# Patient Record
Sex: FEMALE | Race: BLACK OR AFRICAN AMERICAN | Employment: UNEMPLOYED | URBAN - METROPOLITAN AREA
[De-identification: names, ages, dates, MRNs, and addresses within clinical notes are randomized per-mention and may not be internally consistent; named-entity substitution may affect disease eponyms.]

---

## 2017-01-17 ENCOUNTER — OFFICE VISIT (OUTPATIENT)
Dept: INTERNAL MEDICINE CLINIC | Age: 62
End: 2017-01-17

## 2017-01-17 VITALS
TEMPERATURE: 98.6 F | DIASTOLIC BLOOD PRESSURE: 68 MMHG | RESPIRATION RATE: 18 BRPM | HEART RATE: 68 BPM | SYSTOLIC BLOOD PRESSURE: 136 MMHG | OXYGEN SATURATION: 97 % | HEIGHT: 65 IN | BODY MASS INDEX: 29.69 KG/M2 | WEIGHT: 178.2 LBS

## 2017-01-17 DIAGNOSIS — I10 ESSENTIAL HYPERTENSION: Primary | ICD-10-CM

## 2017-01-17 DIAGNOSIS — M33.20 POLYMYOSITIS (HCC): ICD-10-CM

## 2017-01-17 DIAGNOSIS — Z99.89 OSA ON CPAP: ICD-10-CM

## 2017-01-17 DIAGNOSIS — C64.1 MALIGNANT NEOPLASM OF RIGHT KIDNEY (HCC): ICD-10-CM

## 2017-01-17 DIAGNOSIS — G47.33 OSA ON CPAP: ICD-10-CM

## 2017-01-17 RX ORDER — AZATHIOPRINE 50 MG/1
TABLET ORAL
Refills: 3 | COMMUNITY
Start: 2017-01-14 | End: 2017-05-22

## 2017-01-17 RX ORDER — PREDNISONE 5 MG/1
TABLET ORAL
Refills: 3 | COMMUNITY
Start: 2016-12-28 | End: 2020-05-27

## 2017-01-17 RX ORDER — LOSARTAN POTASSIUM 100 MG/1
TABLET ORAL
Refills: 6 | COMMUNITY
Start: 2016-12-18 | End: 2017-08-22 | Stop reason: SDUPTHER

## 2017-01-17 RX ORDER — FLUTICASONE PROPIONATE 50 MCG
SPRAY, SUSPENSION (ML) NASAL
Refills: 12 | COMMUNITY
Start: 2016-12-20 | End: 2017-11-30 | Stop reason: SDUPTHER

## 2017-01-17 NOTE — MR AVS SNAPSHOT
Visit Information Date & Time Provider Department Dept. Phone Encounter #  
 1/17/2017  3:00 PM Nya Dueñas MD [facility] [phone] [id number] Follow-up Instructions Return in about 1 month (around 2/17/2017) for follow-up blood pressure, labs. Your Appointments 2/2/2017 10:20 AM  
Any with Lester Sanchez MD  
[address] [facility] ([address]) Appt Note: 1st adherence visit; pt r/s; pt r/s  
 [address] [facility] [address] [address] [facility] [address] 2/15/2017  2:40 PM  
Follow Up with Valentine Alvares MD  
[facility] at [facility] [address]) Appt Note: F/U numbness,CP,$60,adt,11/2/2016  
 [address] [facility], 
[address] [place] [address], [address] [id number]  
[address], [address] [place] [address] [address], [address] [address] Upcoming Health Maintenance Date Due Hepatitis C Screening 1955 Pneumococcal 19-64 Highest Risk (1 of 3 - PCV13) 9/7/1974 DTaP/Tdap/Td series (1 - Tdap) 9/7/1976 PAP AKA CERVICAL CYTOLOGY 9/7/1976 FOBT Q 1 YEAR AGE 50-75 9/7/2005 ZOSTER VACCINE AGE 60> 9/7/2015 INFLUENZA AGE 9 TO ADULT 8/1/2016 BREAST CANCER SCRN MAMMOGRAM 9/12/2018 Allergies as of 1/17/2017  Review Complete On: 1/17/2017 By: Sam Camacho Severity Noted Reaction Type Reactions Lisinopril  05/02/2016    Other (comments) Metoprolol  07/28/2016    Other (comments)  
 hallucinations Peanut  05/02/2016    Other (comments) Current Immunizations  Reviewed on 5/2/2016 Name Date Influenza Vaccine 10/15/2015 TB Skin Test (PPD) 1/1/2012 Not reviewed this visit You Were Diagnosed With   
  
 Codes Comments Essential hypertension    -  Primary ICD-10-CM: I10 
ICD-9-CM: 401.9 Polymyositis ([facility] [address]     ICD-10-CM: M33.20 ICD-9-CM: 710.4 Malignant neoplasm of right kidney [facility])     ICD-10-CM: C64.1 ICD-9-CM: 189. 0 Vitals BP Pulse Temp Resp Height(growth percentile) Weight(growth percentile) 136/68 (BP 1 Location: Left arm, BP Patient Position: Sitting) 68 98.6 °F (37 °C) (Oral) 18 5' 5\" (1.651 m) 178 lb 3.2 oz (80.8 kg) SpO2 BMI OB Status Smoking Status 97% 29.65 kg/m2 Hysterectomy Former Smoker BMI and BSA Data Body Mass Index Body Surface Area  
 29.65 kg/m 2 1.92 m 2 Preferred Pharmacy Pharmacy Name Phone [facility] [address] [phone] Your Updated Medication List  
  
   
This list is accurate as of: 1/17/17  3:33 PM.  Always use your most recent med list.  
  
  
  
  
 alendronate 70 mg tablet Commonly known as:  FOSAMAX Take  by mouth. azaTHIOprine 50 mg tablet Commonly known as:  IMURAN  
TAKE THREE TABLET BY MOUTH DAILY  
  
 bumetanide 0.5 mg tablet Commonly known as:  Marisue Days Take  by mouth daily. fluticasone 50 mcg/actuation nasal spray Commonly known as:  Anyi Gallia INHALE 2 SPRAYS IN EACH NOSTRIL AT BEDTIME  
  
 losartan 100 mg tablet Commonly known as:  COZAAR  
TAKE ONE TABLET BY MOUTH DAILY predniSONE 5 mg tablet Commonly known as:  DELTASONE  
TAKE THREE TABLETS BY MOUTH EVERY DAY We Performed the Following METABOLIC PANEL, BASIC [53977 CPT(R)] REFERRAL TO NEUROLOGY [NYW16 Custom] Comments:  
 Please evaluate patient for polymyositis, muscle weakness. REFERRAL TO RHEUMATOLOGY [CWX51 Custom] Comments:  
 Please evaluate patient for polymyositis. REFERRAL TO UROLOGY [[id number] Custom] Comments:  
 Please evaluate patient for history of kidney cancer. Follow-up Instructions Return in about 1 month (around 2/17/2017) for follow-up blood pressure, labs. Referral Information Referral ID Referred By Referred To  
  
 [id number] Humble Oropeza MD   
   [address] Phone: [phone] Fax: [phone] Visits Status Start Date End Date 1 New Request 1/17/17 1/17/18 If your referral has a status of pending review or denied, additional information will be sent to support the outcome of this decision. Referral ID Referred By Referred To  
 [id number] Pablo Bashir MD  
   [facility] [address] [place], [address] [facility] Phone: [phone] Fax: [phone] Visits Status Start Date End Date 1 New Request 1/17/17 1/17/18 If your referral has a status of pending review or denied, additional information will be sent to support the outcome of this decision. Referral ID Referred By Referred To  
 [id number] Will Cipro Not Available Visits Status Start Date End Date 1 New Request 1/17/17 1/17/18 If your referral has a status of pending review or denied, additional information will be sent to support the outcome of this decision. Patient Instructions Polymyositis and Dermatomyositis: Care Instructions Your Care Instructions Polymyositis and dermatomyositis are problems with your immune system. Polymyositis attacks your muscles. Dermatomyositis attacks your skin and muscles. These problems affect people in different ways. Most people have muscle weakness. Some people also have swelling or pain. Others get a rash. Your symptoms depend on the muscles that are affected. It may be hard to swallow if your throat muscles are affected. And if your chest muscles are affected, it could be hard to breathe. Medicines can help with swelling. They may also help control your immune system. Physical therapy and other exercises can also help. Follow-up care is a key part of your treatment and safety.  Be sure to make and go to all appointments, and call your doctor if you are having problems. It's also a good idea to know your test results and keep a list of the medicines you take. How can you care for yourself at home? · You may get different medicines. Some may be for pain and swelling. Others may be for a rash. You may get some to control your immune system. · Take your medicines exactly as prescribed. Call your doctor if you think you are having a problem with your medicine. · Go to physical therapy. It can help keep your muscles strong and flexible. You also may get exercises from a doctor who specializes in diseases of the joints. · Talk to your doctor about an exercise program. Activity is important for your muscles, heart, and lungs. If your doctor says it is okay, you may be able to walk, swim, cycle, or take exercise classes. You also may be able to lift weights. · If your muscles get very sore, you may want to do less activity or change the type of activity. · Use canes, crutches, and any other assistive devices to help you get around. · Do not smoke or allow others to smoke around you. If you need help quitting, talk to your doctor about stop-smoking programs and medicines. These can increase your chances of quitting for good. When should you call for help? Call 911 anytime you think you may need emergency care. For example, call if: 
· It is very hard to breathe or swallow. Call your doctor now or seek immediate medical care if: 
· Your muscle pain or weakness gets worse. Watch closely for changes in your health, and be sure to contact your doctor if: 
· You have side effects from the medicines that help control the immune system. These may include: ¨ Weight gain. ¨ Mood changes. ¨ Trouble sleeping. ¨ Bruising easily. · You have any other problems with your medicine. Where can you learn more? Go to http://brijesh-marichuy.info/. Enter J003 in the search box to learn more about \"Polymyositis and Dermatomyositis: Care Instructions. \" Current as of: February 24, 2016 Content Version: 11.1 © 5636-2191 [employer]. Care instructions adapted under license by [employer] (which disclaims liability or warranty for this information). If you have questions about a medical condition or this instruction, always ask your healthcare professional. [place] [address] any warranty or liability for your use of this information. High Blood Pressure: Care Instructions Your Care Instructions If your blood pressure is usually above 140/90, you have high blood pressure, or hypertension. That means the top number is 140 or higher or the bottom number is 90 or higher, or both. Despite what a lot of people think, high blood pressure usually doesn't cause headaches or make you feel dizzy or lightheaded. It usually has no symptoms. But it does increase your risk for heart attack, stroke, and kidney or eye damage. The higher your blood pressure, the more your risk increases. Your doctor will give you a goal for your blood pressure. Your goal will be based on your health and your age. An example of a goal is to keep your blood pressure below 140/90. Lifestyle changes, such as eating healthy and being active, are always important to help lower blood pressure. You might also take medicine to reach your blood pressure goal. 
Follow-up care is a key part of your treatment and safety. Be sure to make and go to all appointments, and call your doctor if you are having problems. It's also a good idea to know your test results and keep a list of the medicines you take. How can you care for yourself at home? Medical treatment · If you stop taking your medicine, your blood pressure will go back up. You may take one or more types of medicine to lower your blood pressure. Be safe with medicines. Take your medicine exactly as prescribed. Call your doctor if you think you are having a problem with your medicine. · Talk to your doctor before you start taking aspirin every day. Aspirin can help certain people lower their risk of a heart attack or stroke. But taking aspirin isn't right for everyone, because it can cause serious bleeding. · See your doctor regularly. You may need to see the doctor more often at first or until your blood pressure comes down. · If you are taking blood pressure medicine, talk to your doctor before you take decongestants or anti-inflammatory medicine, such as ibuprofen. Some of these medicines can raise blood pressure. · Learn how to check your blood pressure at home. Lifestyle changes · Stay at a healthy weight. This is especially important if you put on weight around the waist. Losing even 10 pounds can help you lower your blood pressure. · If your doctor recommends it, get more exercise. Walking is a good choice. Bit by bit, increase the amount you walk every day. Try for at least 30 minutes on most days of the week. You also may want to swim, bike, or do other activities. · Avoid or limit alcohol. Talk to your doctor about whether you can drink any alcohol. · Try to limit how much sodium you eat to less than 2,300 milligrams (mg) a day. Your doctor may ask you to try to eat less than 1,500 mg a day. · Eat plenty of fruits (such as bananas and oranges), vegetables, legumes, whole grains, and low-fat dairy products. · Lower the amount of saturated fat in your diet. Saturated fat is found in animal products such as milk, cheese, and meat. Limiting these foods may help you lose weight and also lower your risk for heart disease. · Do not smoke. Smoking increases your risk for heart attack and stroke. If you need help quitting, talk to your doctor about stop-smoking programs and medicines. These can increase your chances of quitting for good. When should you call for help? Call 911 anytime you think you may need emergency care. This may mean having symptoms that suggest that your blood pressure is causing a serious heart or blood vessel problem. Your blood pressure may be over 180/110. For example, call 911 if: 
· You have symptoms of a heart attack. These may include: ¨ Chest pain or pressure, or a strange feeling in the chest. 
¨ Sweating. ¨ Shortness of breath. ¨ Nausea or vomiting. ¨ Pain, pressure, or a strange feeling in the back, neck, jaw, or upper belly or in one or both shoulders or arms. ¨ Lightheadedness or sudden weakness. ¨ A fast or irregular heartbeat. · You have symptoms of a stroke. These may include: 
¨ Sudden numbness, tingling, weakness, or loss of movement in your face, arm, or leg, especially on only one side of your body. ¨ Sudden vision changes. ¨ Sudden trouble speaking. ¨ Sudden confusion or trouble understanding simple statements. ¨ Sudden problems with walking or balance. ¨ A sudden, severe headache that is different from past headaches. · You have severe back or belly pain. Do not wait until your blood pressure comes down on its own. Get help right away. Call your doctor now or seek immediate care if: 
· Your blood pressure is much higher than normal (such as 180/110 or higher), but you don't have symptoms. · You think high blood pressure is causing symptoms, such as: ¨ Severe headache. ¨ Blurry vision. Watch closely for changes in your health, and be sure to contact your doctor if: 
· Your blood pressure measures 140/90 or higher at least 2 times. That means the top number is 140 or higher or the bottom number is 90 or higher, or both. · You think you may be having side effects from your blood pressure medicine. · Your blood pressure is usually normal, but it goes above normal at least 2 times. Where can you learn more? Go to http://brijesh-marichuy.info/. Enter P204 in the search box to learn more about \"High Blood Pressure: Care Instructions. \" Current as of: August 8, 2016 Content Version: 11.1 © 9648-1163 [employer]. Care instructions adapted under license by [employer] (which disclaims liability or warranty for this information). If you have questions about a medical condition or this instruction, always ask your healthcare professional. [facility] any warranty or liability for your use of this information. Introducing [facility]! Khoa Rojas introduces GlassBox patient portal. Now you can access parts of your medical record, email your doctor's office, and request medication refills online. 1. In your internet browser, go to [email or web address] 2. Click on the First Time User? Click Here link in the Sign In box. You will see the New Member Sign Up page. 3. Enter your GlassBox Access Code exactly as it appears below. You will not need to use this code after youve completed the sign-up process. If you do not sign up before the expiration date, you must request a new code. · GlassBox Access Code: [email or web address] Expires: 4/17/2017  2:32 PM 
 
4. Enter the last four digits of your Social Security Number (xxxx) and Date of Birth (mm/dd/yyyy) as indicated and click Submit. You will be taken to the next sign-up page. 5. Create a GlassBox ID. This will be your GlassBox login ID and cannot be changed, so think of one that is secure and easy to remember. 6. Create a GlassBox password. You can change your password at any time. 7. Enter your Password Reset Question and Answer. This can be used at a later time if you forget your password. 8. Enter your e-mail address. You will receive e-mail notification when new information is available in [address] 9. Click Sign Up. You can now view and download portions of your medical record. 10. Click the Download Summary menu link to download a portable copy of your medical information. If you have questions, please visit the Frequently Asked Questions section of the AVA.ai website. Remember, AVA.ai is NOT to be used for urgent needs. For medical emergencies, dial 911. Now available from your iPhone and Android! Please provide this summary of care documentation to your next provider. Your primary care clinician is listed as Alexandrea Teague. If you have any questions after today's visit, please call [phone].

## 2017-01-17 NOTE — PROGRESS NOTES
Chief Complaint   Patient presents with   [facility] Referral Request     referral for present specialists needed for insurance     Room #3

## 2017-01-17 NOTE — PROGRESS NOTES
HPI   Sowmya Jovel is a 64 y.o. female, she presents today for:    HTN: notes that her blood pressure has been wacky for years. Nyla    Has polymyositits: admitted for 60 days and had respiratory failure. Has never been in pain but has extreme weakness. Thinks she started having symptoms years ago (difficulty walking up stairs). Then in November 2015 couldn't get out of car to walk to office. Had a hospitalization that was complicated by respiratory failure, pneumonia and tracheostomy was completed. Sleep apnea: changed from     Right side nephrectomy. Had flare of disease after this surgery. But is now just starting to recover her strength. Mother and [place] may have had something similar per patient. (After a certain age sort of stopped moving). Patient continues to question if she truly has polymyositis. HTN: was treated with HCTZ. But changed to bumex by Dr. Duane Buckleyrcy. Following with:  - rheumatologist at [facility]: [place]  - neurologist: Dr. Sergo Oconnor  - urology: Dr. Rees [facility]    PMH/PSH: reviewed and updated  Sochx:  reports that she quit smoking about 18 years ago. Her smoking use included Cigarettes. She smoked 1.00 pack per day. She has never used smokeless tobacco. She reports that she drinks about 0.6 oz of alcohol per week  She reports that she does not use illicit drugs. Famhx: reviewed and updated     All: Allergies   Allergen Reactions    Lisinopril Other (comments)    Metoprolol Other (comments)     hallucinations    Peanut Other (comments)     Med:   Current Outpatient Prescriptions   Medication Sig    losartan (COZAAR) 100 mg tablet TAKE ONE TABLET BY MOUTH DAILY    predniSONE (DELTASONE) 5 mg tablet TAKE THREE TABLETS BY MOUTH EVERY DAY    fluticasone (FLONASE) 50 mcg/actuation nasal spray INHALE 2 SPRAYS IN EACH NOSTRIL AT BEDTIME    azaTHIOprine (IMURAN) 50 mg tablet TAKE THREE TABLET BY MOUTH DAILY    bumetanide (BUMEX) 0.5 mg tablet Take  by mouth daily.     hydrALAZINE (APRESOLINE) 50 mg tablet     alendronate (FOSAMAX) 70 mg tablet Take  by mouth.  carvedilol (COREG) 12.5 mg tablet Take  by mouth two (2) times daily (with meals).  predniSONE (DELTASONE) 20 mg tablet Take  by mouth daily (with breakfast). No current facility-administered medications for this visit. Review of Systems   Constitutional: Negative for chills, fever and weight loss. HENT: Negative for congestion. Respiratory: Negative for cough, shortness of breath and wheezing. Cardiovascular: Negative for chest pain and leg swelling. Gastrointestinal: Negative for abdominal pain, diarrhea, nausea and vomiting. Genitourinary: Negative for dysuria. Skin: Negative for rash. Neurological: Negative for dizziness and headaches. PE:  Blood pressure 136/68, pulse 68, temperature 98.6 °F (37 °C), temperature source Oral, resp. rate 18, height 5' 5\" (1.651 m), weight 178 lb 3.2 oz (80.8 kg), SpO2 97 %. Body mass index is 29.65 kg/(m^2). Physical Exam   Constitutional: She is oriented to person, place, and time. She appears well-developed and well-nourished. No distress. HENT:   Head: Normocephalic. Mouth/Throat: Oropharynx is clear and moist.   Eyes: Conjunctivae and EOM are normal.   Neck: Neck supple. Cardiovascular: Normal rate, regular rhythm and normal heart sounds. Pulmonary/Chest: Effort normal and breath sounds normal.   Abdominal: Soft. She exhibits no mass. Musculoskeletal:   Walks with walker, decreased strength in upper and lower extremities   Neurological: She is alert and oriented to person, place, and time. Skin: Skin is warm and dry. Nursing note and vitals reviewed. Labs:   No results found for any visits on 01/17/17. A/P:  64 y.o. female    ICD-10-CM ICD-9-CM    1. Essential hypertension E29 311.9 METABOLIC PANEL, BASIC   2.  Polymyositis ([facility] [address] M33.20 710.4 REFERRAL TO NEUROLOGY      REFERRAL TO RHEUMATOLOGY   3. Malignant neoplasm of right kidney ([facility] C64.1 189.0 REFERRAL TO UROLOGY     HTN: bp near goal today. Bmp requested. monitor    Polymyositis: patient not sure of diagnosis. However has responded to immunomodulating drugs. Requesting records. Following with neruolgoist. Possible family history of similar change reported by patient. History of kidney cancer: s/p nephrectomy. Referral placed to continue to follow-up with urologist    JOHN: continue to follow-up with sleep medicine doctor    Follow-up Disposition:  Return in about 1 month (around 2/17/2017) for follow-up blood pressure, labs.   Future Appointments  Date Time Provider Nel Davis   2/2/2017 10:20 AM Aranza Keita  [address]   2/15/2017 2:40 PM Ronell Bumpers, MD [address]

## 2017-01-17 NOTE — PATIENT INSTRUCTIONS
Polymyositis and Dermatomyositis: Care Instructions  Your Care Instructions  Polymyositis and dermatomyositis are problems with your immune system. Polymyositis attacks your muscles. Dermatomyositis attacks your skin and muscles. These problems affect people in different ways. Most people have muscle weakness. Some people also have swelling or pain. Others get a rash. Your symptoms depend on the muscles that are affected. It may be hard to swallow if your throat muscles are affected. And if your chest muscles are affected, it could be hard to breathe. Medicines can help with swelling. They may also help control your immune system. Physical therapy and other exercises can also help. Follow-up care is a key part of your treatment and safety. Be sure to make and go to all appointments, and call your doctor if you are having problems. It's also a good idea to know your test results and keep a list of the medicines you take. How can you care for yourself at home? · You may get different medicines. Some may be for pain and swelling. Others may be for a rash. You may get some to control your immune system. · Take your medicines exactly as prescribed. Call your doctor if you think you are having a problem with your medicine. · Go to physical therapy. It can help keep your muscles strong and flexible. You also may get exercises from a doctor who specializes in diseases of the joints. · Talk to your doctor about an exercise program. Activity is important for your muscles, heart, and lungs. If your doctor says it is okay, you may be able to walk, swim, cycle, or take exercise classes. You also may be able to lift weights. · If your muscles get very sore, you may want to do less activity or change the type of activity. · Use canes, crutches, and any other assistive devices to help you get around. · Do not smoke or allow others to smoke around you.  If you need help quitting, talk to your doctor about stop-smoking programs and medicines. These can increase your chances of quitting for good. When should you call for help? Call 911 anytime you think you may need emergency care. For example, call if:  · It is very hard to breathe or swallow. Call your doctor now or seek immediate medical care if:  · Your muscle pain or weakness gets worse. Watch closely for changes in your health, and be sure to contact your doctor if:  · You have side effects from the medicines that help control the immune system. These may include:  ¨ Weight gain. ¨ Mood changes. ¨ Trouble sleeping. ¨ Bruising easily. · You have any other problems with your medicine. Where can you learn more? Go to http://brijeshZapointmarichuy.info/. Enter F360 in the search box to learn more about \"Polymyositis and Dermatomyositis: Care Instructions. \"  Current as of: February 24, 2016  Content Version: 11.1  © 9494-7348 [employer]. Care instructions adapted under license by [employer] (which disclaims liability or warranty for this information). If you have questions about a medical condition or this instruction, always ask your healthcare professional. [place] [address] any warranty or liability for your use of this information. High Blood Pressure: Care Instructions  Your Care Instructions  If your blood pressure is usually above 140/90, you have high blood pressure, or hypertension. That means the top number is 140 or higher or the bottom number is 90 or higher, or both. Despite what a lot of people think, high blood pressure usually doesn't cause headaches or make you feel dizzy or lightheaded. It usually has no symptoms. But it does increase your risk for heart attack, stroke, and kidney or eye damage. The higher your blood pressure, the more your risk increases. Your doctor will give you a goal for your blood pressure. Your goal will be based on your health and your age.  An example of a goal is to keep your blood pressure below 140/90. Lifestyle changes, such as eating healthy and being active, are always important to help lower blood pressure. You might also take medicine to reach your blood pressure goal.  Follow-up care is a key part of your treatment and safety. Be sure to make and go to all appointments, and call your doctor if you are having problems. It's also a good idea to know your test results and keep a list of the medicines you take. How can you care for yourself at home? Medical treatment  · If you stop taking your medicine, your blood pressure will go back up. You may take one or more types of medicine to lower your blood pressure. Be safe with medicines. Take your medicine exactly as prescribed. Call your doctor if you think you are having a problem with your medicine. · Talk to your doctor before you start taking aspirin every day. Aspirin can help certain people lower their risk of a heart attack or stroke. But taking aspirin isn't right for everyone, because it can cause serious bleeding. · See your doctor regularly. You may need to see the doctor more often at first or until your blood pressure comes down. · If you are taking blood pressure medicine, talk to your doctor before you take decongestants or anti-inflammatory medicine, such as ibuprofen. Some of these medicines can raise blood pressure. · Learn how to check your blood pressure at home. Lifestyle changes  · Stay at a healthy weight. This is especially important if you put on weight around the waist. Losing even 10 pounds can help you lower your blood pressure. · If your doctor recommends it, get more exercise. Walking is a good choice. Bit by bit, increase the amount you walk every day. Try for at least 30 minutes on most days of the week. You also may want to swim, bike, or do other activities. · Avoid or limit alcohol. Talk to your doctor about whether you can drink any alcohol.   · Try to limit how much sodium you eat to less than 2,300 milligrams (mg) a day. Your doctor may ask you to try to eat less than 1,500 mg a day. · Eat plenty of fruits (such as bananas and oranges), vegetables, legumes, whole grains, and low-fat dairy products. · Lower the amount of saturated fat in your diet. Saturated fat is found in animal products such as milk, cheese, and meat. Limiting these foods may help you lose weight and also lower your risk for heart disease. · Do not smoke. Smoking increases your risk for heart attack and stroke. If you need help quitting, talk to your doctor about stop-smoking programs and medicines. These can increase your chances of quitting for good. When should you call for help? Call 911 anytime you think you may need emergency care. This may mean having symptoms that suggest that your blood pressure is causing a serious heart or blood vessel problem. Your blood pressure may be over 180/110. For example, call 911 if:  · You have symptoms of a heart attack. These may include:  ¨ Chest pain or pressure, or a strange feeling in the chest.  ¨ Sweating. ¨ Shortness of breath. ¨ Nausea or vomiting. ¨ Pain, pressure, or a strange feeling in the back, neck, jaw, or upper belly or in one or both shoulders or arms. ¨ Lightheadedness or sudden weakness. ¨ A fast or irregular heartbeat. · You have symptoms of a stroke. These may include:  ¨ Sudden numbness, tingling, weakness, or loss of movement in your face, arm, or leg, especially on only one side of your body. ¨ Sudden vision changes. ¨ Sudden trouble speaking. ¨ Sudden confusion or trouble understanding simple statements. ¨ Sudden problems with walking or balance. ¨ A sudden, severe headache that is different from past headaches. · You have severe back or belly pain. Do not wait until your blood pressure comes down on its own. Get help right away.   Call your doctor now or seek immediate care if:  · Your blood pressure is much higher than normal (such as 180/110 or higher), but you don't have symptoms. · You think high blood pressure is causing symptoms, such as:  ¨ Severe headache. ¨ Blurry vision. Watch closely for changes in your health, and be sure to contact your doctor if:  · Your blood pressure measures 140/90 or higher at least 2 times. That means the top number is 140 or higher or the bottom number is 90 or higher, or both. · You think you may be having side effects from your blood pressure medicine. · Your blood pressure is usually normal, but it goes above normal at least 2 times. Where can you learn more? Go to http://brijesh-marichuy.info/. Enter Y081 in the search box to learn more about \"High Blood Pressure: Care Instructions. \"  Current as of: August 8, 2016  Content Version: 11.1  © 3656-4682 [employer]. Care instructions adapted under license by [employer] (which disclaims liability or warranty for this information). If you have questions about a medical condition or this instruction, always ask your healthcare professional. [place] [address] any warranty or liability for your use of this information.

## 2017-01-18 PROBLEM — R73.09 ELEVATED GLUCOSE: Status: ACTIVE | Noted: 2017-01-18

## 2017-01-18 LAB
BUN SERPL-MCNC: 24 MG/DL (ref 8–27)
BUN/CREAT SERPL: 22 (ref 11–26)
CALCIUM SERPL-MCNC: 9 MG/DL (ref 8.7–10.3)
CHLORIDE SERPL-SCNC: 101 MMOL/L (ref 96–106)
CO2 SERPL-SCNC: 20 MMOL/L (ref 18–29)
CREAT SERPL-MCNC: 1.08 MG/DL (ref 0.57–1)
GLUCOSE SERPL-MCNC: 101 MG/DL (ref 65–99)
POTASSIUM SERPL-SCNC: 4.9 MMOL/L (ref 3.5–5.2)
SODIUM SERPL-SCNC: 142 MMOL/L (ref 134–144)

## 2017-01-18 NOTE — PROGRESS NOTES
Borderline glucose (nonfasting) and creatinine. Will want to monitor creat with time. egfr [id number]  Lab letter generated.

## 2017-02-02 ENCOUNTER — DOCUMENTATION ONLY (OUTPATIENT)
Dept: SLEEP MEDICINE | Age: 62
End: 2017-02-02

## 2017-02-02 ENCOUNTER — OFFICE VISIT (OUTPATIENT)
Dept: SLEEP MEDICINE | Age: 62
End: 2017-02-02

## 2017-02-02 VITALS
WEIGHT: 182 LBS | SYSTOLIC BLOOD PRESSURE: 163 MMHG | HEART RATE: 79 BPM | OXYGEN SATURATION: 96 % | TEMPERATURE: 97.4 F | DIASTOLIC BLOOD PRESSURE: 74 MMHG | HEIGHT: 65 IN | BODY MASS INDEX: 30.32 KG/M2

## 2017-02-02 DIAGNOSIS — G47.33 OBSTRUCTIVE SLEEP APNEA (ADULT) (PEDIATRIC): Primary | ICD-10-CM

## 2017-02-02 DIAGNOSIS — I10 ESSENTIAL HYPERTENSION: ICD-10-CM

## 2017-02-02 NOTE — PATIENT INSTRUCTIONS
217 [facility] [place]. [place], [address]  Tel.  [phone]  Fax. 100 [facility] 60  [place], [address]  Tel.  [phone]  Fax. [phone] [address]  Tel.  [phone]  Fax. [phone]     PROPER SLEEP HYGIENE    What to avoid  · Do not have drinks with caffeine, such as coffee or black tea, for 8 hours before bed. · Do not smoke or use other types of tobacco near bedtime. Nicotine is a stimulant and can keep you awake. · Avoid drinking alcohol late in the evening, because it can cause you to wake in the middle of the night. · Do not eat a big meal close to bedtime. If you are hungry, eat a light snack. · Do not drink a lot of water close to bedtime, because the need to urinate may wake you up during the night. · Do not read or watch TV in bed. Use the bed only for sleeping and sexual activity. What to try  · Go to bed at the same time every night, and wake up at the same time every morning. Do not take naps during the day. · Keep your bedroom quiet, dark, and cool. · Get regular exercise, but not within 3 to 4 hours of your bedtime. .  · Sleep on a comfortable pillow and mattress. · If watching the clock makes you anxious, turn it facing away from you so you cannot see the time. · If you worry when you lie down, start a worry book. Well before bedtime, write down your worries, and then set the book and your concerns aside. · Try meditation or other relaxation techniques before you go to bed. · If you cannot fall asleep, get up and go to another room until you feel sleepy. Do something relaxing. Repeat your bedtime routine before you go to bed again. · Make your house quiet and calm about an hour before bedtime. Turn down the lights, turn off the TV, log off the computer, and turn down the volume on music. This can help you relax after a busy day.     Drowsy Driving  The [address] [place] [address] Road Traffic Safety Administration cites drowsiness as a causing factor in more than 086,390 police reported crashes annually, resulting in 76,000 injuries and 1,500 deaths. Other surveys suggest 55% of people polled have driven while drowsy in the past year, 23% had fallen asleep but not crashed, 3% crashed, and 2% had and accident due to drowsy driving. Who is at risk? Young Drivers: One study of drowsy driving accidents states that 55% of the drivers were under 25 years. Of those, 75% were male. Shift Workers and Travelers: People who work overnight or travel across time zones frequently are at higher risk of experiencing Circadian Rhythm Disorders. They are trying to work and function when their body is programed to sleep. Sleep Deprived: Lack of sleep has a serious impact on your ability to pay attention or focus on a task. Consistently getting less than the average of 8 hours your body needs creates partial or cumulative sleep deprivation. Untreated Sleep Disorders: Sleep Apnea, Narcolepsy, R.L.S., and other sleep disorders (untreated) prevent a person from getting enough restful sleep. This leads to excessive daytime sleepiness and increases the risk for drowsy driving accidents by up to 7 times. Medications / Alcohol: Even over the counter medications can cause drowsiness. Medications that impair a drivers attention should have a warning label. Alcohol naturally makes you sleepy and on its own can cause accidents. Combined with excessive drowsiness its effects are amplified. Signs of Drowsy Driving:   * You don't remember driving the last few miles   * You may drift out of your marcelo   * You are unable to focus and your thoughts wander   * You may yawn more often than normal   * You have difficulty keeping your eyes open / nodding off   * Missing traffic signs, speeding, or tailgating  Prevention-   Good sleep hygiene, lifestyle and behavioral choices have the most impact on drowsy driving.  There is no substitute for sleep and the average person requires 8 hours nightly. If you find yourself driving drowsy, stop and sleep. Consider the sleep hygiene tips provided during your visit as well. Medication Refill Policy: Refills for all medications require 1 week advance notice. Please have your pharmacy fax a refill request. We are unable to fax, or call in \"controled substance\" medications and you will need to pick these prescriptions up from our office. Emotive Activation    Thank you for requesting access to Em[employer]. Please follow the instructions below to securely access and download your online medical record. Emotive allows you to send messages to your doctor, view your test results, renew your prescriptions, schedule appointments, and more. How Do I Sign Up? 1. In your internet browser, go to [email or web address] ([facility]). [email or web address] ([facility]). 2. Click on the First Time User? Click Here link in the Sign In box. You will see the New Member Sign Up page. 3. Enter your Emotive Access Code exactly as it appears below. You will not need to use this code after youve completed the sign-up process. If you do not sign up before the expiration date, you must request a new code. Emotive Access Code: [email or web address]  Expires: 2017  2:32 PM (This is the date your Emotive access code will )    4. Enter the last four digits of your Social Security Number (xxxx) and Date of Birth (mm/dd/yyyy) as indicated and click Submit. You will be taken to the next sign-up page. 5. Create a Emotive ID. This will be your Emotive login ID and cannot be changed, so think of one that is secure and easy to remember. 6. Create a Emotive password. You can change your password at any time. 7. Enter your Password Reset Question and Answer. This can be used at a later time if you forget your password. 8. Enter your e-mail address. You will receive e-mail notification when new information is available in [address] 9. Click Sign Up.  You can now view and download portions of your medical record. 10. Click the Download Summary menu link to download a portable copy of your medical information. Additional Information    If you have questions, please call [phone]. Remember, [employer] is NOT to be used for urgent needs. For medical emergencies, dial 911.

## 2017-02-02 NOTE — PROGRESS NOTES
217 [facility] [place]. [place], [address]  Tel.  [phone]  Fax. 100 [facility] 60  [place], [address] [facility]  Tel.  [phone]  Fax. [phone] [address]   Tel.  [phone]  Fax. [phone]     S>Oksana Benavidez is a 64 y.o. female seen for a positive airway pressure follow-up. She reports no problems using the device. She is 100% compliant over the past 30 days. The following problems are identified:    Drowsiness no Problems exhaling no   Snoring no Forget to put on no   Mask Comfortable yes Can't fall asleep no   Dry Mouth no Mask falls off no   Air Leaking no Frequent awakenings no     Download reviewed. She admits that her sleep has improved. Allergies   Allergen Reactions    Lisinopril Other (comments)    Metoprolol Other (comments)     hallucinations    Peanut Other (comments)       She has a current medication list which includes the following prescription(s): losartan, prednisone, fluticasone, azathioprine, bumetanide, and alendronate. .      She  has a past medical history of Breast lump (1999); Calculus of kidney; Hypertension; and Polymyositis ([facility] [address] (7/28/2016). Altair Sleepiness Score: 7   and Modified F.O.S.Q. Score Total / 2: 19.5   which reflect improved sleep quality over therapy time. O>    Visit Vitals    /74    Pulse 79    Temp 97.4 °F (36.3 °C)    Ht 5' 5\" (1.651 m)    Wt 182 lb (82.6 kg)    SpO2 96%    BMI 30.29 kg/m2           General:   Alert, oriented, not in distress   Neck:   No JVD    Chest/Lungs:  symetrical lung expansion , no accessory muscle use    Extremities:  no obvious rashes , negative edema    Neuro:  No focal deficits ; No obvious tremor    Psych:  Normal affect ,  Normal countenance ;           A>    ICD-10-CM ICD-9-CM    1. Obstructive sleep apnea (adult) (pediatric) G47.33 327.23    2. Essential hypertension I10 401.9      AHI = 5.2(7-16).   On CPAP :  6 cmH2O. Compliant:      yes    Therapeutic Response:  Positive    P>      *she will continue on her current pressure settings. * She was asked to contact our office for any problems regarding PAP therapy. * Counseling was provided regarding the importance of regular PAP use and on proper sleep hygiene and safe driving. * Re-enforced proper and regular cleaning for the device. She would like to avoid unnecessary appointments. 2. Hypertension - she continues on her current regimen. She has a follow-up appointment   I have reviewed the relationship between hypertension as it relates to sleep-disordered breathing.    Andre Jackson MD  Diplomate in Sleep Medicine  [facility]

## 2017-02-15 ENCOUNTER — OFFICE VISIT (OUTPATIENT)
Dept: NEUROLOGY | Age: 62
End: 2017-02-15

## 2017-02-15 VITALS
WEIGHT: 177.2 LBS | SYSTOLIC BLOOD PRESSURE: 130 MMHG | BODY MASS INDEX: 29.52 KG/M2 | DIASTOLIC BLOOD PRESSURE: 70 MMHG | OXYGEN SATURATION: 96 % | HEIGHT: 65 IN | HEART RATE: 81 BPM

## 2017-02-15 DIAGNOSIS — I10 ESSENTIAL HYPERTENSION: ICD-10-CM

## 2017-02-15 DIAGNOSIS — G47.33 OSA ON CPAP: ICD-10-CM

## 2017-02-15 DIAGNOSIS — M33.20 POLYMYOSITIS (HCC): Primary | ICD-10-CM

## 2017-02-15 DIAGNOSIS — Z99.89 OSA ON CPAP: ICD-10-CM

## 2017-02-15 NOTE — PATIENT INSTRUCTIONS
1.  Recommend EMG/nerve conduction study. Patient to call the insurance to check the cost.  Procedure code is  and 43475×1   2. Follow-up as needed      A Healthy Lifestyle: Care Instructions  Your Care Instructions  A healthy lifestyle can help you feel good, stay at a healthy weight, and have plenty of energy for both work and play. A healthy lifestyle is something you can share with your whole family. A healthy lifestyle also can lower your risk for serious health problems, such as high blood pressure, heart disease, and diabetes. You can follow a few steps listed below to improve your health and the health of your family. Follow-up care is a key part of your treatment and safety. Be sure to make and go to all appointments, and call your doctor if you are having problems. Its also a good idea to know your test results and keep a list of the medicines you take. How can you care for yourself at home? · Do not eat too much sugar, fat, or fast foods. You can still have dessert and treats now and then. The goal is moderation. · Start small to improve your eating habits. Pay attention to portion sizes, drink less juice and soda pop, and eat more fruits and vegetables. ¨ Eat a healthy amount of food. A 3-ounce serving of meat, for example, is about the size of a deck of cards. Fill the rest of your plate with vegetables and whole grains. ¨ Limit the amount of soda and sports drinks you have every day. Drink more water when you are thirsty. ¨ Eat at least 5 servings of fruits and vegetables every day. It may seem like a lot, but it is not hard to reach this goal. A serving or helping is 1 piece of fruit, 1 cup of vegetables, or 2 cups of leafy, raw vegetables. Have an apple or some carrot sticks as an afternoon snack instead of a candy bar. Try to have fruits and/or vegetables at every meal.  · Make exercise part of your daily routine.  You may want to start with simple activities, such as walking, bicycling, or slow swimming. Try to be active 30 to 60 minutes every day. You do not need to do all 30 to 60 minutes all at once. For example, you can exercise 3 times a day for 10 or 20 minutes. Moderate exercise is safe for most people, but it is always a good idea to talk to your doctor before starting an exercise program.  · Keep moving. [place] the lawn, work in the garden, or [employer]. Take the stairs instead of the elevator at work. · If you smoke, quit. People who smoke have an increased risk for heart attack, stroke, cancer, and other lung illnesses. Quitting is hard, but there are ways to boost your chance of quitting tobacco for good. ¨ Use nicotine gum, patches, or lozenges. ¨ Ask your doctor about stop-smoking programs and medicines. ¨ Keep trying. In addition to reducing your risk of diseases in the future, you will notice some benefits soon after you stop using tobacco. If you have shortness of breath or asthma symptoms, they will likely get better within a few weeks after you quit. · Limit how much alcohol you drink. Moderate amounts of alcohol (up to 2 drinks a day for men, 1 drink a day for women) are okay. But drinking too much can lead to liver problems, high blood pressure, and other health problems. Family health  If you have a family, there are many things you can do together to improve your health. · Eat meals together as a family as often as possible. · Eat healthy foods. This includes fruits, vegetables, lean meats and dairy, and whole grains. · Include your family in your fitness plan. Most people think of activities such as jogging or tennis as the way to fitness, but there are many ways you and your family can be more active. Anything that makes you breathe hard and gets your heart pumping is exercise. Here are some tips:  ¨ Walk to do errands or to take your child to school or the bus. ¨ Go for a family bike ride after dinner instead of watching TV.   Where can you learn more?  Go to http://brijesh-marichuy.info/. Enter E585 in the search box to learn more about \"A Healthy Lifestyle: Care Instructions. \"  Current as of: July 26, 2016  Content Version: 11.1  © 2485-1187 [employer]. Care instructions adapted under license by [facility] (which disclaims liability or warranty for this information). If you have questions about a medical condition or this instruction, always ask your healthcare professional. [address] any warranty or liability for your use of this information.

## 2017-02-15 NOTE — MR AVS SNAPSHOT
Visit Information Date & Time Provider Department Dept. Phone Encounter #  
 2/15/2017  2:40 PM Elliot Shields MD Neurology [facility] [phone] [id number] Your Appointments 2/21/2017  3:30 PM  
ROUTINE CARE with Hyun Moncada MD  
[facility] Pediatrics and Internal Medicine [facility] Appt Note: follow-up blood pressure, labs [address] [facility] [address]  
[place] [address] [facility] [address] Upcoming Health Maintenance Date Due Hepatitis C Screening 1955 Pneumococcal 19-64 Highest Risk (1 of 3 - PCV13) 9/7/1974 DTaP/Tdap/Td series (1 - Tdap) 9/7/1976 PAP AKA CERVICAL CYTOLOGY 9/7/1976 FOBT Q 1 YEAR AGE 50-75 9/7/2005 ZOSTER VACCINE AGE 60> 9/7/2015 INFLUENZA AGE 9 TO ADULT 8/1/2016 BREAST CANCER SCRN MAMMOGRAM 9/12/2018 Allergies as of 2/15/2017  Review Complete On: 2/15/2017 By: Elliot Shields MD  
  
 Severity Noted Reaction Type Reactions Lisinopril  05/02/2016    Other (comments) Metoprolol  07/28/2016    Other (comments)  
 hallucinations Peanut  05/02/2016    Other (comments) Current Immunizations  Reviewed on 5/2/2016 Name Date Influenza Vaccine 10/15/2015 TB Skin Test (PPD) 1/1/2012 Not reviewed this visit Vitals BP Pulse Height(growth percentile) Weight(growth percentile) SpO2 BMI  
 130/70 81 5' 5\" (1.651 m) 177 lb 3.2 oz (80.4 kg) 96% 29.49 kg/m2 OB Status Smoking Status Hysterectomy Former Smoker Vitals History BMI and BSA Data Body Mass Index Body Surface Area  
 29.49 kg/m 2 1.92 m 2 Preferred Pharmacy Pharmacy Name Phone [facility] Kristine [address] [phone] Your Updated Medication List  
  
   
This list is accurate as of: 2/15/17  3:17 PM.  Always use your most recent med list.  
  
  
  
  
 alendronate 70 mg tablet Commonly known as:  FOSAMAX Take  by mouth. azaTHIOprine 50 mg tablet Commonly known as:  IMURAN  
TAKE THREE TABLET BY MOUTH DAILY  
  
 fluticasone 50 mcg/actuation nasal spray Commonly known as:  Everette Peel INHALE 2 SPRAYS IN EACH NOSTRIL AT BEDTIME  
  
 losartan 100 mg tablet Commonly known as:  COZAAR  
TAKE ONE TABLET BY MOUTH DAILY predniSONE 5 mg tablet Commonly known as:  DELTASONE  
TAKE THREE TABLETS BY MOUTH EVERY DAY Patient Instructions 1. Recommend EMG/nerve conduction study. Patient to call the insurance to check the cost.  Procedure code is 569[address] and 70940×2 2. Follow-up as needed A Healthy Lifestyle: Care Instructions Your Care Instructions A healthy lifestyle can help you feel good, stay at a healthy weight, and have plenty of energy for both work and play. A healthy lifestyle is something you can share with your whole family. A healthy lifestyle also can lower your risk for serious health problems, such as high blood pressure, heart disease, and diabetes. You can follow a few steps listed below to improve your health and the health of your family. Follow-up care is a key part of your treatment and safety. Be sure to make and go to all appointments, and call your doctor if you are having problems. Its also a good idea to know your test results and keep a list of the medicines you take. How can you care for yourself at home? · Do not eat too much sugar, fat, or fast foods. You can still have dessert and treats now and then. The goal is moderation. · Start small to improve your eating habits. Pay attention to portion sizes, drink less juice and soda pop, and eat more fruits and vegetables. ¨ Eat a healthy amount of food. A 3-ounce serving of meat, for example, is about the size of a deck of cards. Fill the rest of your plate with vegetables and whole grains. ¨ Limit the amount of soda and sports drinks you have every day. Drink more water when you are thirsty. ¨ Eat at least 5 servings of fruits and vegetables every day. It may seem like a lot, but it is not hard to reach this goal. A serving or helping is 1 piece of fruit, 1 cup of vegetables, or 2 cups of leafy, raw vegetables. Have an apple or some carrot sticks as an afternoon snack instead of a candy bar. Try to have fruits and/or vegetables at every meal. 
· Make exercise part of your daily routine. You may want to start with simple activities, such as walking, bicycling, or slow swimming. Try to be active 30 to 60 minutes every day. You do not need to do all 30 to 60 minutes all at once. For example, you can exercise 3 times a day for 10 or 20 minutes. Moderate exercise is safe for most people, but it is always a good idea to talk to your doctor before starting an exercise program. 
· Keep moving. [employer] the lawn, work in the garden, or Backlift. Take the stairs instead of the elevator at work. · If you smoke, quit. People who smoke have an increased risk for heart attack, stroke, cancer, and other lung illnesses. Quitting is hard, but there are ways to boost your chance of quitting tobacco for good. ¨ Use nicotine gum, patches, or lozenges. ¨ Ask your doctor about stop-smoking programs and medicines. ¨ Keep trying. In addition to reducing your risk of diseases in the future, you will notice some benefits soon after you stop using tobacco. If you have shortness of breath or asthma symptoms, they will likely get better within a few weeks after you quit. · Limit how much alcohol you drink. Moderate amounts of alcohol (up to 2 drinks a day for men, 1 drink a day for women) are okay. But drinking too much can lead to liver problems, high blood pressure, and other health problems. Family health If you have a family, there are many things you can do together to improve your health. · Eat meals together as a family as often as possible. · Eat healthy foods. This includes fruits, vegetables, lean meats and dairy, and whole grains. · Include your family in your fitness plan. Most people think of activities such as jogging or tennis as the way to fitness, but there are many ways you and your family can be more active. Anything that makes you breathe hard and gets your heart pumping is exercise. Here are some tips: 
¨ Walk to do errands or to take your child to school or the bus. ¨ Go for a family bike ride after dinner instead of watching TV. Where can you learn more? Go to http://brijeshREHmarichuy.info/. Enter T907 in the search box to learn more about \"A Healthy Lifestyle: Care Instructions. \" Current as of: July 26, 2016 Content Version: 11.1 © 1712-0003 anywayanyday. Care instructions adapted under license by [employer] (which disclaims liability or warranty for this information). If you have questions about a medical condition or this instruction, always ask your healthcare professional. [address] any warranty or liability for your use of this information. Introducing hospitals & HEALTH SERVICES! Deshawn Lomeli introduces Pictrition App patient portal. Now you can access parts of your medical record, email your doctor's office, and request medication refills online. 1. In your internet browser, go to [email or web address] 2. Click on the First Time User? Click Here link in the Sign In box. You will see the New Member Sign Up page. 3. Enter your Pictrition App Access Code exactly as it appears below. You will not need to use this code after youve completed the sign-up process. If you do not sign up before the expiration date, you must request a new code. · Pictrition App Access Code: [email or web address] Expires: 4/17/2017  2:32 PM 
 
4.  Enter the last four digits of your Social Security Number (xxxx) and Date of Birth (mm/dd/yyyy) as indicated and click Submit. You will be taken to the next sign-up page. 5. Create a AQUA PURE ID. This will be your AQUA PURE login ID and cannot be changed, so think of one that is secure and easy to remember. 6. Create a AQUA PURE password. You can change your password at any time. 7. Enter your Password Reset Question and Answer. This can be used at a later time if you forget your password. 8. Enter your e-mail address. You will receive e-mail notification when new information is available in [address] 9. Click Sign Up. You can now view and download portions of your medical record. 10. Click the Download Summary menu link to download a portable copy of your medical information. If you have questions, please visit the Frequently Asked Questions section of the AQUA PURE website. Remember, AQUA PURE is NOT to be used for urgent needs. For medical emergencies, dial 911. Now available from your iPhone and Android! Please provide this summary of care documentation to your next provider. Your primary care clinician is listed as Jw Porras. If you have any questions after today's visit, please call [phone].

## 2017-02-15 NOTE — PROGRESS NOTES
NEUROLOGY follow-up note      REFERRED BY:  Radha Pearson MD    02/15/17    Chief Complaint   Patient presents with    Follow-up     hand&feet numbness inflammation       Subjective  Yoli Martínez is a 64 y.o. female who presented to the neurology office for management of muscle weakness. To recap, she did have weakness in the arms and legs in December 2015 and she did have muscle bx and was diagnosed with polymyositis. She was weak to the extent that she was in a wheel chair. She was in the CCU for 60 days and 21 days of rehab. She did improve some. She was diagnosed with kidney cancer in July 2016 and had a nephrectomy. Her symptoms worsened after that. She is presently on imuran 150 mg a day and prednisone. She has had IVIG in the past with no benefit. The patient has been with weakness. She is complaining of numbness in her hand and feet distally December. Discuss about EMG/nerve conduction study but she wants to find out the cost before we proceed. Current Outpatient Prescriptions   Medication Sig    losartan (COZAAR) 100 mg tablet TAKE ONE TABLET BY MOUTH DAILY    predniSONE (DELTASONE) 5 mg tablet TAKE THREE TABLETS BY MOUTH EVERY DAY    fluticasone (FLONASE) 50 mcg/actuation nasal spray INHALE 2 SPRAYS IN EACH NOSTRIL AT BEDTIME    azaTHIOprine (IMURAN) 50 mg tablet TAKE THREE TABLET BY MOUTH DAILY    alendronate (FOSAMAX) 70 mg tablet Take  by mouth. No current facility-administered medications for this visit.       REVIEW OF SYSTEMS:   A ten system review of constitutional, cardiovascular, respiratory, musculoskeletal, endocrine, skin, SHEENT, genitourinary, psychiatric and neurologic systems was obtained and is unremarkable except as stated in HPI     EXAMINATION:   Visit Vitals    /70    Pulse 81    Ht 5' 5\" (1.651 m)    Wt 177 lb 3.2 oz (80.4 kg)    SpO2 96%    BMI 29.49 kg/m2        General:   General appearance: Pt is in no acute distress   Distal pulses are preserved    Neurological Examination:   Mental Status: AAO x3. Speech is fluent. Follows commands, has normal fund of knowledge, attention, short term recall, comprehension and insight. Cranial Nerves: Visual fields are full. PERRL, Extraocular movements are full. Facial sensation intact V1- V3. Facial movement intact, symmetric. Hearing intact to conversation. Palate elevates symmetrically. Shoulder shrug symmetric. Tongue midline. Motor: Strength is 4/5 in bilateral deltoid and 4/5 in hip flexors. Tone: Normal    Sensation: Normal to light touch    Reflexes: DTRs trace. Coordination/Cerebellar: Intact to finger-nose-finger     Gait: Stood up with difficulty. Laboratory review:   Results for orders placed or performed in visit on 36/51/78   METABOLIC PANEL, BASIC   Result Value Ref Range    Glucose 101 (H) 65 - 99 mg/dL    BUN 24 8 - 27 mg/dL    Creatinine 1.08 (H) 0.57 - 1.00 mg/dL    BUN/Creatinine ratio 22 11 - 26    Sodium 142 134 - 144 mmol/L    Potassium 4.9 3.5 - 5.2 mmol/L    Chloride 101 96 - 106 mmol/L    CO2 20 18 - 29 mmol/L    Calcium 9.0 8.7 - 10.3 mg/dL       Imaging review:   8/3/16  Sleep Study  + for Sleep Apnea    9/12/2015  MRI brain  Normal unenhanced evaluation of the orbits. Mild chronic ischemic changes with no acute infarction. Documentation review:  I reviewed the records from the rheumatologist at [facility]. The patient did have muscle biopsy that was consistent with polymyositis. The patient was first evaluated here on 9/15/2016 and was started on Imuran and was titrated up to 150 mg daily and technique of October. In the follow-up appointment today, she feels the same in terms of strength and continues with physical therapy. She is not in a wheelchair now but uses a walker. He is taking Imuran 50 mg daily and prednisone 20 mg daily. She feels weak especially in her upper extremities. She is able to walk with walker but only short distances.   On examination she has 3+ out of 5 strength proximal muscles in the arms. Pad muscles are 4+ out of 5 CPK is elevated to 6313. Plan to continue Imuran at 150 mg daily. Taper prednisone to 15 mg daily. Need to evaluate pulmonary function test.    Assessment/Plan:   Douglas Sinclair is a 64 y.o. female who presented to the neurology office for management of proximal muscle weakness. She does have a diagnosis of polymyositis. She is being follow by a rheumatologist at [facility]. Continue Imuran 150 mg a day and prednisone 15 mg a day. She is concerned about the numbness distally in the upper and lower extremities. There is a possibility of a length dependent neuropathy and I do recommend getting an EMG/nerve conduction study but she is concerned about the cost.  I gave her the procedure records and she will call the insurance before we get that done. Follow-up as needed    Over 25minutes was spent with the patient and family of which > 50% of the visit was spent counseling on diagnosis, management, and treatment of the diagnosis         Michelle Oliva MD  Neurologist and Clinical Neurophysiologist    CC: Rhiannon Galan MD  Fax: [phone]    This note will not be viewable in [address]

## 2017-02-15 NOTE — LETTER
2/15/2017 3:22 PM 
 
Patient:    Prabha Mtz YOB: 1955 Date of Visit:    2/15/2017 Dear Jovanni Morel MD 
 
Thank you for referring Ms. Prabha Mtz to me for evaluation/treatment. Below are the relevant portions of my assessment and plan of care. NEUROLOGY follow-up note REFERRED BY: 
Jovanni Morel MD 
 
02/15/17 Chief Complaint Patient presents with  Follow-up  
  hand&feet numbness inflammation Subjective Prabha Mtz is a 64 y.o. female who presented to the neurology office for management of muscle weakness. To recap, she did have weakness in the arms and legs in December 2015 and she did have muscle bx and was diagnosed with polymyositis. She was weak to the extent that she was in a wheel chair. She was in the CCU for 60 days and 21 days of rehab. She did improve some. She was diagnosed with kidney cancer in July 2016 and had a nephrectomy. Her symptoms worsened after that. She is presently on imuran 150 mg a day and prednisone. She has had IVIG in the past with no benefit. The patient has been with weakness. She is complaining of numbness in her hand and feet distally December. Discuss about EMG/nerve conduction study but she wants to find out the cost before we proceed. Current Outpatient Prescriptions Medication Sig  
 losartan (COZAAR) 100 mg tablet TAKE ONE TABLET BY MOUTH DAILY  predniSONE (DELTASONE) 5 mg tablet TAKE THREE TABLETS BY MOUTH EVERY DAY  fluticasone (FLONASE) 50 mcg/actuation nasal spray INHALE 2 SPRAYS IN EACH NOSTRIL AT BEDTIME  azaTHIOprine (IMURAN) 50 mg tablet TAKE THREE TABLET BY MOUTH DAILY  alendronate (FOSAMAX) 70 mg tablet Take  by mouth. No current facility-administered medications for this visit.    
 
REVIEW OF SYSTEMS:  
A ten system review of constitutional, cardiovascular, respiratory, musculoskeletal, endocrine, skin, SHEENT, genitourinary, psychiatric and neurologic systems was obtained and is unremarkable except as stated in HPI EXAMINATION:  
Visit Vitals  /70  Pulse 81  
 Ht 5' 5\" (1.651 m)  Wt 177 lb 3.2 oz (80.4 kg)  SpO2 96%  BMI 29.49 kg/m2 General:  
General appearance: Pt is in no acute distress Distal pulses are preserved Neurological Examination:  
Mental Status: AAO x3. Speech is fluent. Follows commands, has normal fund of knowledge, attention, short term recall, comprehension and insight. Cranial Nerves: Visual fields are full. PERRL, Extraocular movements are full. Facial sensation intact V1- V3. Facial movement intact, symmetric. Hearing intact to conversation. Palate elevates symmetrically. Shoulder shrug symmetric. Tongue midline. Motor: Strength is 4/5 in bilateral deltoid and 4/5 in hip flexors. Tone: Normal 
 
Sensation: Normal to light touch Reflexes: DTRs trace. Coordination/Cerebellar: Intact to finger-nose-finger Gait: Stood up with difficulty. Laboratory review:  
Results for orders placed or performed in visit on 01/17/17 METABOLIC PANEL, BASIC Result Value Ref Range Glucose 101 (H) 65 - 99 mg/dL BUN 24 8 - 27 mg/dL Creatinine 1.08 (H) 0.57 - 1.00 mg/dL BUN/Creatinine ratio 22 11 - 26 Sodium 142 134 - 144 mmol/L Potassium 4.9 3.5 - 5.2 mmol/L Chloride 101 96 - 106 mmol/L  
 CO2 20 18 - 29 mmol/L Calcium 9.0 8.7 - 10.3 mg/dL Imaging review:  
8/3/16 Sleep Study + for Sleep Apnea 9/12/2015 MRI brain Normal unenhanced evaluation of the orbits. Mild chronic ischemic changes with no acute infarction. Documentation review: I reviewed the records from the rheumatologist at [facility]. The patient did have muscle biopsy that was consistent with polymyositis. The patient was first evaluated here on 9/15/2016 and was started on Imuran and was titrated up to 150 mg daily and technique of October.   In the follow-up appointment today, she feels the same in terms of strength and continues with physical therapy. She is not in a wheelchair now but uses a walker. He is taking Imuran 50 mg daily and prednisone 20 mg daily. She feels weak especially in her upper extremities. She is able to walk with walker but only short distances. On examination she has 3+ out of 5 strength proximal muscles in the arms. Pad muscles are 4+ out of 5 CPK is elevated to 6313. Plan to continue Imuran at 150 mg daily. Taper prednisone to 15 mg daily. Need to evaluate pulmonary function test. 
 
Assessment/Plan:  
Prosper Thomas is a 64 y.o. female who presented to the neurology office for management of proximal muscle weakness. She does have a diagnosis of polymyositis. She is being follow by a rheumatologist at [facility]. Continue Imuran 150 mg a day and prednisone 15 mg a day. She is concerned about the numbness distally in the upper and lower extremities. There is a possibility of a length dependent neuropathy and I do recommend getting an EMG/nerve conduction study but she is concerned about the cost.  I gave her the procedure records and she will call the insurance before we get that done. Follow-up as needed Over 25minutes was spent with the patient and family of which > 50% of the visit was spent counseling on diagnosis, management, and treatment of the diagnosis Tami Levi MD 
Neurologist and Clinical Neurophysiologist 
 
CC: Ravi Moser MD 
Fax: [phone] This note will not be viewable in [address] If you have questions, please do not hesitate to call me. I look forward to following Ms. Garner along with you. Sincerely, Tami Levi MD

## 2017-02-21 ENCOUNTER — OFFICE VISIT (OUTPATIENT)
Dept: INTERNAL MEDICINE CLINIC | Age: 62
End: 2017-02-21

## 2017-02-21 VITALS
RESPIRATION RATE: 18 BRPM | BODY MASS INDEX: 29.52 KG/M2 | TEMPERATURE: 98.9 F | HEART RATE: 91 BPM | HEIGHT: 65 IN | SYSTOLIC BLOOD PRESSURE: 135 MMHG | OXYGEN SATURATION: 98 % | WEIGHT: 177.2 LBS | DIASTOLIC BLOOD PRESSURE: 73 MMHG

## 2017-02-21 DIAGNOSIS — I10 ESSENTIAL HYPERTENSION: Primary | ICD-10-CM

## 2017-02-21 NOTE — PROGRESS NOTES
RM 1    Chief Complaint   Patient presents with    Follow-up     blood pressure     PHQ 2 / 9, over the last two weeks 2/21/2017   Little interest or pleasure in doing things Not at all   Feeling down, depressed or hopeless Not at all   Total Score PHQ 2 0

## 2017-02-21 NOTE — MR AVS SNAPSHOT
Visit Information Date & Time Provider Department Dept. Phone Encounter #  
 2/21/2017  3:30 PM Patricia Briceño MD [address] [facility] [phone] [id number] Follow-up Instructions Return in about 3 months (around 5/21/2017) for well woman and review of preventative care. Naomi Balbuena Upcoming Health Maintenance Date Due Hepatitis C Screening 1955 Pneumococcal 19-64 Highest Risk (1 of 3 - PCV13) 9/7/1974 DTaP/Tdap/Td series (1 - Tdap) 9/7/1976 PAP AKA CERVICAL CYTOLOGY 9/7/1976 FOBT Q 1 YEAR AGE 50-75 9/7/2005 ZOSTER VACCINE AGE 60> 9/7/2015 INFLUENZA AGE 9 TO ADULT 8/1/2016 BREAST CANCER SCRN MAMMOGRAM 9/12/2018 Allergies as of 2/21/2017  Review Complete On: 2/21/2017 By: Bassam Carrasco Severity Noted Reaction Type Reactions Lisinopril  05/02/2016    Other (comments) Metoprolol  07/28/2016    Other (comments)  
 hallucinations Peanut  05/02/2016    Other (comments) Current Immunizations  Reviewed on 5/2/2016 Name Date Influenza Vaccine 10/15/2015 TB Skin Test (PPD) 1/1/2012 Not reviewed this visit You Were Diagnosed With   
  
 Codes Comments Essential hypertension    -  Primary ICD-10-CM: I10 
ICD-9-CM: 401.9 Vitals BP Pulse Temp Resp Height(growth percentile) Weight(growth percentile) 135/73 91 98.9 °F (37.2 °C) (Oral) 18 5' 5\" (1.651 m) 177 lb 3.2 oz (80.4 kg) SpO2 BMI OB Status Smoking Status 98% 29.49 kg/m2 Hysterectomy Former Smoker Vitals History BMI and BSA Data Body Mass Index Body Surface Area  
 29.49 kg/m 2 1.92 m 2 Preferred Pharmacy Pharmacy Name Phone [facility] [address] [phone] Your Updated Medication List  
  
   
This list is accurate as of: 2/21/17  4:45 PM.  Always use your most recent med list.  
  
  
  
  
 alendronate 70 mg tablet Commonly known as:  FOSAMAX Take  by mouth. azaTHIOprine 50 mg tablet Commonly known as:  IMURAN  
TAKE THREE TABLET BY MOUTH DAILY  
  
 fluticasone 50 mcg/actuation nasal spray Commonly known as:  Marcia Jumper INHALE 2 SPRAYS IN EACH NOSTRIL AT BEDTIME  
  
 losartan 100 mg tablet Commonly known as:  COZAAR  
TAKE ONE TABLET BY MOUTH DAILY predniSONE 5 mg tablet Commonly known as:  DELTASONE  
TAKE THREE TABLETS BY MOUTH EVERY DAY We Performed the Following METABOLIC PANEL, BASIC [94635 CPT(R)] Follow-up Instructions Return in about 3 months (around 5/21/2017) for well woman and review of preventative care. Lion Saunders Patient Instructions Please call Dr. Wicho Lyn to discuss prednisone dosing. High Blood Pressure: Care Instructions Your Care Instructions If your blood pressure is usually above 140/90, you have high blood pressure, or hypertension. That means the top number is 140 or higher or the bottom number is 90 or higher, or both. Despite what a lot of people think, high blood pressure usually doesn't cause headaches or make you feel dizzy or lightheaded. It usually has no symptoms. But it does increase your risk for heart attack, stroke, and kidney or eye damage. The higher your blood pressure, the more your risk increases. Your doctor will give you a goal for your blood pressure. Your goal will be based on your health and your age. An example of a goal is to keep your blood pressure below 140/90. Lifestyle changes, such as eating healthy and being active, are always important to help lower blood pressure. You might also take medicine to reach your blood pressure goal. 
Follow-up care is a key part of your treatment and safety. Be sure to make and go to all appointments, and call your doctor if you are having problems. It's also a good idea to know your test results and keep a list of the medicines you take. How can you care for yourself at home? Medical treatment · If you stop taking your medicine, your blood pressure will go back up. You may take one or more types of medicine to lower your blood pressure. Be safe with medicines. Take your medicine exactly as prescribed. Call your doctor if you think you are having a problem with your medicine. · Talk to your doctor before you start taking aspirin every day. Aspirin can help certain people lower their risk of a heart attack or stroke. But taking aspirin isn't right for everyone, because it can cause serious bleeding. · See your doctor regularly. You may need to see the doctor more often at first or until your blood pressure comes down. · If you are taking blood pressure medicine, talk to your doctor before you take decongestants or anti-inflammatory medicine, such as ibuprofen. Some of these medicines can raise blood pressure. · Learn how to check your blood pressure at home. Lifestyle changes · Stay at a healthy weight. This is especially important if you put on weight around the waist. Losing even 10 pounds can help you lower your blood pressure. · If your doctor recommends it, get more exercise. Walking is a good choice. Bit by bit, increase the amount you walk every day. Try for at least 30 minutes on most days of the week. You also may want to swim, bike, or do other activities. · Avoid or limit alcohol. Talk to your doctor about whether you can drink any alcohol. · Try to limit how much sodium you eat to less than 2,300 milligrams (mg) a day. Your doctor may ask you to try to eat less than 1,500 mg a day. · Eat plenty of fruits (such as bananas and oranges), vegetables, legumes, whole grains, and low-fat dairy products. · Lower the amount of saturated fat in your diet. Saturated fat is found in animal products such as milk, cheese, and meat. Limiting these foods may help you lose weight and also lower your risk for heart disease. · Do not smoke. Smoking increases your risk for heart attack and stroke. If you need help quitting, talk to your doctor about stop-smoking programs and medicines. These can increase your chances of quitting for good. When should you call for help? Call 911 anytime you think you may need emergency care. This may mean having symptoms that suggest that your blood pressure is causing a serious heart or blood vessel problem. Your blood pressure may be over 180/110. For example, call 911 if: 
· You have symptoms of a heart attack. These may include: ¨ Chest pain or pressure, or a strange feeling in the chest. 
¨ Sweating. ¨ Shortness of breath. ¨ Nausea or vomiting. ¨ Pain, pressure, or a strange feeling in the back, neck, jaw, or upper belly or in one or both shoulders or arms. ¨ Lightheadedness or sudden weakness. ¨ A fast or irregular heartbeat. · You have symptoms of a stroke. These may include: 
¨ Sudden numbness, tingling, weakness, or loss of movement in your face, arm, or leg, especially on only one side of your body. ¨ Sudden vision changes. ¨ Sudden trouble speaking. ¨ Sudden confusion or trouble understanding simple statements. ¨ Sudden problems with walking or balance. ¨ A sudden, severe headache that is different from past headaches. · You have severe back or belly pain. Do not wait until your blood pressure comes down on its own. Get help right away. Call your doctor now or seek immediate care if: 
· Your blood pressure is much higher than normal (such as 180/110 or higher), but you don't have symptoms. · You think high blood pressure is causing symptoms, such as: ¨ Severe headache. ¨ Blurry vision. Watch closely for changes in your health, and be sure to contact your doctor if: 
· Your blood pressure measures 140/90 or higher at least 2 times. That means the top number is 140 or higher or the bottom number is 90 or higher, or both. · You think you may be having side effects from your blood pressure medicine. · Your blood pressure is usually normal, but it goes above normal at least 2 times. Where can you learn more? Go to http://brijesh-marichuy.info/. Enter R849 in the search box to learn more about \"High Blood Pressure: Care Instructions. \" Current as of: August 8, 2016 Content Version: 11.1 © 1302-1103 [employer]. Care instructions adapted under license by [employer] (which disclaims liability or warranty for this information). If you have questions about a medical condition or this instruction, always ask your healthcare professional. [address] any warranty or liability for your use of this information. Introducing [facility]! Dayami Govea introduces [employer] patient portal. Now you can access parts of your medical record, email your doctor's office, and request medication refills online. 1. In your internet browser, go to [email or web address] 2. Click on the First Time User? Click Here link in the Sign In box. You will see the New Member Sign Up page. 3. Enter your [employer] Access Code exactly as it appears below. You will not need to use this code after youve completed the sign-up process. If you do not sign up before the expiration date, you must request a new code. · [employer] Access Code: [email or web address] Expires: 4/17/2017  2:32 PM 
 
4. Enter the last four digits of your Social Security Number (xxxx) and Date of Birth (mm/dd/yyyy) as indicated and click Submit. You will be taken to the next sign-up page. 5. Create a [employer] ID. This will be your [employer] login ID and cannot be changed, so think of one that is secure and easy to remember. 6. Create a [employer] password. You can change your password at any time. 7. Enter your Password Reset Question and Answer. This can be used at a later time if you forget your password. 8. Enter your e-mail address. You will receive e-mail notification when new information is available in [address]. 9. Click Sign Up. You can now view and download portions of your medical record. 10. Click the Download Summary menu link to download a portable copy of your medical information. If you have questions, please visit the Frequently Asked Questions section of the [employer] website. Remember, [employer] is NOT to be used for urgent needs. For medical emergencies, dial 911. Now available from your iPhone and Android! Please provide this summary of care documentation to your next provider. Your primary care clinician is listed as Britt Ramsay. If you have any questions after today's visit, please call [phone].

## 2017-02-21 NOTE — PROGRESS NOTES
REED Gray is a 64 y.o. female, she presents today for:    Polymyositis: following with Dr. Rubalcava  [facility] rheumatology and neurologist, Dr. Marylu Awad. Planning on joining gym and have work-out bayron. This was advised from physical therapist. However       PMH/PSH: reviewed and updated  Sochx:  reports that she quit smoking about 18 years ago. Her smoking use included Cigarettes. She smoked 1.00 pack per day. She has never used smokeless tobacco. She reports that she drinks about 0.6 oz of alcohol per week  She reports that she does not use illicit drugs. Famhx: reviewed and updated     All: Allergies   Allergen Reactions    Lisinopril Other (comments)    Metoprolol Other (comments)     hallucinations    Peanut Other (comments)     Med:   Current Outpatient Prescriptions   Medication Sig    losartan (COZAAR) 100 mg tablet TAKE ONE TABLET BY MOUTH DAILY    predniSONE (DELTASONE) 5 mg tablet TAKE THREE TABLETS BY MOUTH EVERY DAY    fluticasone (FLONASE) 50 mcg/actuation nasal spray INHALE 2 SPRAYS IN EACH NOSTRIL AT BEDTIME    azaTHIOprine (IMURAN) 50 mg tablet TAKE THREE TABLET BY MOUTH DAILY    alendronate (FOSAMAX) 70 mg tablet Take  by mouth. No current facility-administered medications for this visit. ROS    PE:  Blood pressure 135/73, pulse 91, temperature 98.9 °F (37.2 °C), temperature source Oral, resp. rate 18, height 5' 5\" (1.651 m), weight 177 lb 3.2 oz (80.4 kg), SpO2 98 %. Body mass index is 29.49 kg/(m^2). Physical Exam    Labs:   No results found for any visits on 02/21/17. A/P:  64 y.o. female    ICD-10-CM ICD-9-CM    1. Essential hypertension M17 641.7 METABOLIC PANEL, BASIC       HTN: bp near goal today. Bmp requested. Patient prefers not to start new bp medication.    -discussed use of hibiscus tea. Brief pub-med search does show some possible bp lower effects. Unable to find what effect may be noted on potassium.  Provided lab slip for patient to have potassium checked if she chooses to start drinking this tea on a regular basis. Discussed that losartan raises potassium.    - to call if bp consistently over 140 as I would recommend adding low dose HCTZ     Polymyositis: patient not sure of diagnosis. However has responded to immunomodulating drugs. Requesting records. Following with neruolgoist. Possible family history of similar change reported by patient.      ---------------- ACTIVE PROBLEMS NOT ADDRESSED DURING THIS VISIT ---------------    History of kidney cancer: s/p nephrectomy. Referral placed to continue to follow-up with urologist     JOHN: continue to follow-up with sleep medicine doctor     Follow-up Disposition:  Return in about 3 months (around 5/21/2017) for well woman and review of preventative care. Ela Luna

## 2017-02-21 NOTE — PATIENT INSTRUCTIONS
Please call Dr. Janeen De Luna to discuss prednisone dosing. High Blood Pressure: Care Instructions  Your Care Instructions  If your blood pressure is usually above 140/90, you have high blood pressure, or hypertension. That means the top number is 140 or higher or the bottom number is 90 or higher, or both. Despite what a lot of people think, high blood pressure usually doesn't cause headaches or make you feel dizzy or lightheaded. It usually has no symptoms. But it does increase your risk for heart attack, stroke, and kidney or eye damage. The higher your blood pressure, the more your risk increases. Your doctor will give you a goal for your blood pressure. Your goal will be based on your health and your age. An example of a goal is to keep your blood pressure below 140/90. Lifestyle changes, such as eating healthy and being active, are always important to help lower blood pressure. You might also take medicine to reach your blood pressure goal.  Follow-up care is a key part of your treatment and safety. Be sure to make and go to all appointments, and call your doctor if you are having problems. It's also a good idea to know your test results and keep a list of the medicines you take. How can you care for yourself at home? Medical treatment  · If you stop taking your medicine, your blood pressure will go back up. You may take one or more types of medicine to lower your blood pressure. Be safe with medicines. Take your medicine exactly as prescribed. Call your doctor if you think you are having a problem with your medicine. · Talk to your doctor before you start taking aspirin every day. Aspirin can help certain people lower their risk of a heart attack or stroke. But taking aspirin isn't right for everyone, because it can cause serious bleeding. · See your doctor regularly. You may need to see the doctor more often at first or until your blood pressure comes down.   · If you are taking blood pressure medicine, talk to your doctor before you take decongestants or anti-inflammatory medicine, such as ibuprofen. Some of these medicines can raise blood pressure. · Learn how to check your blood pressure at home. Lifestyle changes  · Stay at a healthy weight. This is especially important if you put on weight around the waist. Losing even 10 pounds can help you lower your blood pressure. · If your doctor recommends it, get more exercise. Walking is a good choice. Bit by bit, increase the amount you walk every day. Try for at least 30 minutes on most days of the week. You also may want to swim, bike, or do other activities. · Avoid or limit alcohol. Talk to your doctor about whether you can drink any alcohol. · Try to limit how much sodium you eat to less than 2,300 milligrams (mg) a day. Your doctor may ask you to try to eat less than 1,500 mg a day. · Eat plenty of fruits (such as bananas and oranges), vegetables, legumes, whole grains, and low-fat dairy products. · Lower the amount of saturated fat in your diet. Saturated fat is found in animal products such as milk, cheese, and meat. Limiting these foods may help you lose weight and also lower your risk for heart disease. · Do not smoke. Smoking increases your risk for heart attack and stroke. If you need help quitting, talk to your doctor about stop-smoking programs and medicines. These can increase your chances of quitting for good. When should you call for help? Call 911 anytime you think you may need emergency care. This may mean having symptoms that suggest that your blood pressure is causing a serious heart or blood vessel problem. Your blood pressure may be over 180/110. For example, call 911 if:  · You have symptoms of a heart attack. These may include:  ¨ Chest pain or pressure, or a strange feeling in the chest.  ¨ Sweating. ¨ Shortness of breath. ¨ Nausea or vomiting.   ¨ Pain, pressure, or a strange feeling in the back, neck, jaw, or upper belly or in one or both shoulders or arms. ¨ Lightheadedness or sudden weakness. ¨ A fast or irregular heartbeat. · You have symptoms of a stroke. These may include:  ¨ Sudden numbness, tingling, weakness, or loss of movement in your face, arm, or leg, especially on only one side of your body. ¨ Sudden vision changes. ¨ Sudden trouble speaking. ¨ Sudden confusion or trouble understanding simple statements. ¨ Sudden problems with walking or balance. ¨ A sudden, severe headache that is different from past headaches. · You have severe back or belly pain. Do not wait until your blood pressure comes down on its own. Get help right away. Call your doctor now or seek immediate care if:  · Your blood pressure is much higher than normal (such as 180/110 or higher), but you don't have symptoms. · You think high blood pressure is causing symptoms, such as:  ¨ Severe headache. ¨ Blurry vision. Watch closely for changes in your health, and be sure to contact your doctor if:  · Your blood pressure measures 140/90 or higher at least 2 times. That means the top number is 140 or higher or the bottom number is 90 or higher, or both. · You think you may be having side effects from your blood pressure medicine. · Your blood pressure is usually normal, but it goes above normal at least 2 times. Where can you learn more? Go to http://brijesh-marichuy.info/. Enter K738 in the search box to learn more about \"High Blood Pressure: Care Instructions. \"  Current as of: August 8, 2016  Content Version: 11.1  © 4837-8319 [employer]. Care instructions adapted under license by [employer] (which disclaims liability or warranty for this information). If you have questions about a medical condition or this instruction, always ask your healthcare professional. [address] any warranty or liability for your use of this information.

## 2017-02-22 NOTE — PROGRESS NOTES
Review of Systems   Constitutional: Negative for chills and fever. Respiratory: Negative for cough and shortness of breath. Cardiovascular: Negative for chest pain. Overall feeling strong and improved compared to prior. Physical Exam   Constitutional: She is oriented to person, place, and time. She appears well-developed and well-nourished. No distress. HENT:   Head: Normocephalic. Mouth/Throat: Oropharynx is clear and moist.   Eyes: Conjunctivae and EOM are normal.   Neck: Neck supple. Cardiovascular: Normal rate, regular rhythm and normal heart sounds. Pulmonary/Chest: Effort normal and breath sounds normal.   Abdominal: Soft. She exhibits no mass. Musculoskeletal: She exhibits no edema. Walks with walker, decreased strength in upper and lower extremities   Neurological: She is alert and oriented to person, place, and time. Skin: Skin is warm and dry. Nursing note and vitals reviewed.

## 2017-03-11 ENCOUNTER — HOSPITAL ENCOUNTER (EMERGENCY)
Age: 62
Discharge: HOME | End: 2017-03-11
Attending: EMERGENCY MEDICINE

## 2017-03-11 ENCOUNTER — APPOINTMENT (OUTPATIENT)
Dept: GENERAL RADIOLOGY | Age: 62
End: 2017-03-11
Attending: EMERGENCY MEDICINE

## 2017-03-11 VITALS
DIASTOLIC BLOOD PRESSURE: 65 MMHG | TEMPERATURE: 98.6 F | SYSTOLIC BLOOD PRESSURE: 148 MMHG | BODY MASS INDEX: 29.49 KG/M2 | RESPIRATION RATE: 18 BRPM | WEIGHT: 177 LBS | HEIGHT: 65 IN | OXYGEN SATURATION: 97 % | HEART RATE: 88 BPM

## 2017-03-11 DIAGNOSIS — J18.9 PNEUMONIA OF LEFT LOWER LOBE DUE TO INFECTIOUS ORGANISM: Primary | ICD-10-CM

## 2017-03-11 RX ORDER — AMOXICILLIN AND CLAVULANATE POTASSIUM 875; 125 MG/1; MG/1
1 TABLET, FILM COATED ORAL 2 TIMES DAILY
Qty: 20 TAB | Refills: 0 | Status: SHIPPED | OUTPATIENT
Start: 2017-03-11 | End: 2017-05-22 | Stop reason: ALTCHOICE

## 2017-03-11 RX ORDER — LEVOFLOXACIN 500 MG/1
500 TABLET, FILM COATED ORAL DAILY
Qty: 10 TAB | Refills: 0 | Status: SHIPPED | OUTPATIENT
Start: 2017-03-11 | End: 2017-03-11

## 2017-03-11 NOTE — DISCHARGE INSTRUCTIONS
Pneumonia: Care Instructions  Your Care Instructions    Pneumonia is an infection of the lungs. Most cases are caused by infections from bacteria or viruses. Pneumonia may be mild or very severe. If it is caused by bacteria, you will be treated with antibiotics. It may take a few weeks to a few months to recover fully from pneumonia, depending on how sick you were and whether your overall health is good. Follow-up care is a key part of your treatment and safety. Be sure to make and go to all appointments, and call your doctor if you are having problems. Its also a good idea to know your test results and keep a list of the medicines you take. How can you care for yourself at home? · Take your antibiotics exactly as directed. Do not stop taking the medicine just because you are feeling better. You need to take the full course of antibiotics. · Take your medicines exactly as prescribed. Call your doctor if you think you are having a problem with your medicine. · Get plenty of rest and sleep. You may feel weak and tired for a while, but your energy level will improve with time. · To prevent dehydration, drink plenty of fluids, enough so that your urine is light yellow or clear like water. Choose water and other caffeine-free clear liquids until you feel better. If you have kidney, heart, or liver disease and have to limit fluids, talk with your doctor before you increase the amount of fluids you drink. · Take care of your cough so you can rest. A cough that brings up mucus from your lungs is common with pneumonia. It is one way your body gets rid of the infection. But if coughing keeps you from resting or causes severe fatigue and chest-wall pain, talk to your doctor. He or she may suggest that you take a medicine to reduce the cough. · Use a vaporizer or humidifier to add moisture to your bedroom. Follow the directions for cleaning the machine. · Do not smoke or allow others to smoke around you.  Smoke will make your cough last longer. If you need help quitting, talk to your doctor about stop-smoking programs and medicines. These can increase your chances of quitting for good. · Take an over-the-counter pain medicine, such as acetaminophen (Tylenol), ibuprofen (Advil, Motrin), or naproxen (Aleve). Read and follow all instructions on the label. · Do not take two or more pain medicines at the same time unless the doctor told you to. Many pain medicines have acetaminophen, which is Tylenol. Too much acetaminophen (Tylenol) can be harmful. · If you were given a spirometer to measure how well your lungs are working, use it as instructed. This can help your doctor tell how your recovery is going. · To prevent pneumonia in the future, talk to your doctor about getting a flu vaccine (once a year) and a pneumococcal vaccine (one time only for most people). When should you call for help? Call 911 anytime you think you may need emergency care. For example, call if:  · You have severe trouble breathing. Call your doctor now or seek immediate medical care if:  · You cough up dark brown or bloody mucus (sputum). · You have new or worse trouble breathing. · You are dizzy or lightheaded, or you feel like you may faint. Watch closely for changes in your health, and be sure to contact your doctor if:  · You have a new or higher fever. · You are coughing more deeply or more often. · You are not getting better after 2 days (48 hours). · You do not get better as expected. Where can you learn more? Go to http://brijesh-marichuy.info/. Enter [id number] in the search box to learn more about \"Pneumonia: Care Instructions. \"  Current as of: May 23, 2016  Content Version: 11.1  © 0117-4655 [employer]. Care instructions adapted under license by [employer] (which disclaims liability or warranty for this information).  If you have questions about a medical condition or this instruction, always ask your healthcare professional. [place] [address] any warranty or liability for your use of this information.

## 2017-03-11 NOTE — UC PROVIDER NOTE
Patient is a 64 y.o. female presenting with cough. The history is provided by the patient. Cough   This is a new problem. The current episode started more than 2 days ago (5 days ago). The problem occurs every few hours. The problem has not changed since onset. The cough is productive of brown sputum. There has been no fever. Associated symptoms include rhinorrhea. Pertinent negatives include no chest pain, no chills, no sweats, no weight loss, no eye redness, no ear congestion, no ear pain, no headaches, no sore throat, no myalgias, no shortness of breath, no wheezing, no nausea, no vomiting and no confusion. She has tried nothing for the symptoms. The treatment provided no relief. She is not a smoker. Her past medical history is significant for pneumonia. Her past medical history does not include bronchitis, bronchiectasis, COPD, emphysema, asthma, cancer, heart failure or CHF. Past Medical History:   Diagnosis Date    Breast lump 1999    Negative    Calculus of kidney     Hypertension     Polymyositis ([facility] [address] 7/28/2016        Past Surgical History:   Procedure Laterality Date    HX NEPHRECTOMY Right 2016    for kidney cancer    US GUIDED CORE BREAST BIOPSY Left 1999    Negative         Family History   Problem Relation Age of Onset    Cancer Mother     Breast Cancer Mother 68    Diabetes Father     Hypertension Father     Stroke Maternal Grandmother         Social History     Social History    Marital status: SINGLE     Spouse name: N/A    Number of children: N/A    Years of education: N/A     Occupational History    Not on file.      Social History Main Topics    Smoking status: Former Smoker     Packs/day: 1.00     Types: Cigarettes     Quit date: 5/1/1998    Smokeless tobacco: Never Used    Alcohol use 0.6 oz/week     0 Standard drinks or equivalent, 1 Glasses of wine per week    Drug use: No    Sexual activity: Not on file     Other Topics Concern    Not on file     Social History Narrative                ALLERGIES: Lisinopril; Metoprolol; and Peanut    Review of Systems   Constitutional: Positive for fatigue. Negative for chills, fever and weight loss. HENT: Positive for postnasal drip and rhinorrhea. Negative for ear pain, mouth sores, nosebleeds and sore throat. Eyes: Negative for redness. Respiratory: Positive for cough. Negative for chest tightness, shortness of breath and wheezing. Cardiovascular: Negative for chest pain, palpitations and leg swelling. Gastrointestinal: Negative. Negative for nausea and vomiting. Endocrine: Negative. Genitourinary: Negative. Musculoskeletal: Negative for myalgias. Skin: Negative. Allergic/Immunologic: Positive for food allergies and immunocompromised state. Negative for environmental allergies. Neurological: Negative for headaches. Psychiatric/Behavioral: Negative for confusion. All other systems reviewed and are negative. Vitals:    03/11/17 1338   BP: 148/65   Pulse: 88   Resp: 18   Temp: 98.6 °F (37 °C)   SpO2: 97%   Weight: 80.3 kg (177 lb)   Height: 5' 5\" (1.651 m)       Physical Exam   Constitutional: She is oriented to person, place, and time. She appears well-developed and well-nourished. HENT:   Head: Normocephalic and atraumatic. Right Ear: External ear normal.   Left Ear: External ear normal.   Mouth/Throat: Oropharynx is clear and moist. No oropharyngeal exudate. Eyes: Conjunctivae and EOM are normal. Pupils are equal, round, and reactive to light. Right eye exhibits no discharge. Left eye exhibits no discharge. No scleral icterus. Neck: Normal range of motion. No tracheal deviation present. No thyromegaly present. Cardiovascular: Normal rate, regular rhythm and normal heart sounds. No murmur heard. Pulmonary/Chest: Effort normal and breath sounds normal. No respiratory distress. She has no wheezes. She has no rales. She exhibits no tenderness. Abdominal: Soft.  Bowel sounds are normal. She exhibits no distension. There is no tenderness. There is no rebound and no guarding. Musculoskeletal: Normal range of motion. She exhibits no edema or tenderness. Lymphadenopathy:     She has no cervical adenopathy. Neurological: She is alert and oriented to person, place, and time. No cranial nerve deficit. Coordination normal.   Skin: Skin is warm. No erythema. Psychiatric: She has a normal mood and affect. Her behavior is normal. Judgment and thought content normal.   Nursing note and vitals reviewed.       MDM     Differential Diagnosis; Clinical Impression; Plan:     Cough, bronchitis, possible pneumonia,  Chest xray with patchy infiltrate left lower lobe consistent with pneumonia      Procedures

## 2017-03-17 ENCOUNTER — OFFICE VISIT (OUTPATIENT)
Dept: INTERNAL MEDICINE CLINIC | Age: 62
End: 2017-03-17

## 2017-03-17 VITALS
HEART RATE: 82 BPM | WEIGHT: 174 LBS | OXYGEN SATURATION: 97 % | RESPIRATION RATE: 14 BRPM | BODY MASS INDEX: 28.99 KG/M2 | SYSTOLIC BLOOD PRESSURE: 153 MMHG | TEMPERATURE: 98.3 F | DIASTOLIC BLOOD PRESSURE: 79 MMHG | HEIGHT: 65 IN

## 2017-03-17 DIAGNOSIS — J18.9 PNEUMONIA OF LEFT LOWER LOBE DUE TO INFECTIOUS ORGANISM: Primary | ICD-10-CM

## 2017-03-17 DIAGNOSIS — M33.20 POLYMYOSITIS (HCC): ICD-10-CM

## 2017-03-17 RX ORDER — BUMETANIDE 0.5 MG/1
TABLET ORAL
COMMUNITY
Start: 2017-02-14 | End: 2017-05-22

## 2017-03-17 NOTE — PROGRESS NOTES
ACUTE VISIT     HPI:   Roxane Galdamez is a 64 y.o. female, she presents today for:     Diagnosed with left lower lobe pneumonia on 3/9/2017. Cough, phlegm, felt feverish, chills, headache, congestion. No pain in chest no pain breating, did not feel short of breath. Started on Augmentin this is day 8/10. She is not coughing as much and is bring up more phlegm. No longer feels feverish. Continues to have chills but congestion and headache resolved. Continues to have a feeling of congestion centrally around epigastrum. Has had feeling of fullness, also with pain in esophagus that comes and goes. Worsened when using fosamax (the day she took the pill). Improved when taking milkshakes. Also with concern around mold in fathers home. She is moving out today. Could this be causing her muscle problem. ROS: as noted above. Aslo no N/V/D, no dizziness     Medications used for acute illness: augmentin    Current Outpatient Prescriptions on File Prior to Visit   Medication Sig    amoxicillin-clavulanate (AUGMENTIN) 875-125 mg per tablet Take 1 Tab by mouth two (2) times a day.  losartan (COZAAR) 100 mg tablet TAKE ONE TABLET BY MOUTH DAILY    predniSONE (DELTASONE) 5 mg tablet TAKE THREE TABLETS BY MOUTH EVERY DAY    fluticasone (FLONASE) 50 mcg/actuation nasal spray INHALE 2 SPRAYS IN EACH NOSTRIL AT BEDTIME    azaTHIOprine (IMURAN) 50 mg tablet TAKE THREE TABLET BY MOUTH DAILY     No current facility-administered medications on file prior to visit. Allergies   Allergen Reactions    Lisinopril Other (comments)    Metoprolol Other (comments)     hallucinations    Peanut Other (comments)       PMH/PSH/FH: reviewed and updated    Sochx:   reports that she quit smoking about 18 years ago. Her smoking use included Cigarettes. She smoked 1.00 pack per day.  She has never used smokeless tobacco. She reports that she drinks about 0.6 oz of alcohol per week  She reports that she does not use illicit drugs. PE:  Blood pressure 153/79, pulse 82, temperature 98.3 °F (36.8 °C), temperature source Oral, resp. rate 14, height 5' 5\" (1.651 m), weight 174 lb (78.9 kg), SpO2 97 %. Body mass index is 28.96 kg/(m^2). Physical Exam   Constitutional: She is oriented to person, place, and time. She appears well-developed and well-nourished. No distress. HENT:   Head: Normocephalic. Mouth/Throat: Oropharynx is clear and moist.   Eyes: Conjunctivae and EOM are normal.   Neck: Neck supple. Cardiovascular: Normal rate, regular rhythm and normal heart sounds. Pulmonary/Chest: Effort normal and breath sounds normal. She has no wheezes. She has no rales. Abdominal: Soft. She exhibits no mass. Musculoskeletal: She exhibits no edema. Walks with walker, decreased strength in upper and lower extremities   Neurological: She is alert and oriented to person, place, and time. Skin: Skin is warm and dry. Nursing note and vitals reviewed. A/P  Sowmya Jovel was seen today for had concerns including Pneumonia. .  The diagnosis and plan was discussed including:        ICD-10-CM ICD-9-CM    1. Pneumonia of left lower lobe due to infectious organism J18.9 483.8 XR CHEST PA LAT   2. Polymyositis ([facility]) M33.20 710.4      Pneumonia: recovering well. Advised to complete abx. Plan to follow-up CXR in 8 weeks to ensure clearing. Polymyositis: discussed low likelihood of allergy causing this muscle response. However if much improved after 6--8 weeks out of prior home to consider.     - I advised her to call back or return to office if symptoms worsen/change/persist.  - She was given AVS and expressed understanding with the diagnosis and plan as discussed. Follow-up Disposition:  Return for as abhijit scheduled, please get Chest x-ray after may 4th and before appoitnment.  .    25 minutes time spent with >50% in counseling and/or coordination of care

## 2017-03-17 NOTE — MR AVS SNAPSHOT
Visit Information Date & Time Provider Department Dept. Phone Encounter #  
 3/17/2017  2:00 PM Anastacio Jung MD [address] [facility] [phone] [id number] Follow-up Instructions Return for as aleady scheduled, please get Chest x-ray after may 4th and before appoitnment. .  
  
Your Appointments 5/22/2017  3:45 PM  
ROUTINE CARE with Anastacio Jung MD  
[facility] [address] [facility]) Appt Note: 3 mo f/u  
 [address] [address] [facility] [address] [place] [address]  
[place] [address] [place] [address] Upcoming Health Maintenance Date Due Hepatitis C Screening 1955 Pneumococcal 19-64 Highest Risk (1 of 3 - PCV13) 9/7/1974 DTaP/Tdap/Td series (1 - Tdap) 9/7/1976 PAP AKA CERVICAL CYTOLOGY 9/7/1976 FOBT Q 1 YEAR AGE 50-75 9/7/2005 ZOSTER VACCINE AGE 60> 9/7/2015 INFLUENZA AGE 9 TO ADULT 8/1/2016 BREAST CANCER SCRN MAMMOGRAM 9/12/2018 Allergies as of 3/17/2017  Review Complete On: 3/17/2017 By: Anastacio Jung MD  
  
 Severity Noted Reaction Type Reactions Lisinopril  05/02/2016    Other (comments) Metoprolol  07/28/2016    Other (comments)  
 hallucinations Peanut  05/02/2016    Other (comments) Current Immunizations  Reviewed on 5/2/2016 Name Date Influenza Vaccine 10/15/2015 TB Skin Test (PPD) 1/1/2012 Not reviewed this visit You Were Diagnosed With   
  
 Codes Comments Pneumonia of left lower lobe due to infectious organism    -  Primary ICD-10-CM: J18.9 ICD-9-CM: 483.8 Polymyositis ([address]     ICD-10-CM: M33.20 ICD-9-CM: 710.4 Vitals BP Pulse Temp Resp Height(growth percentile) Weight(growth percentile) 153/79 (BP 1 Location: Right arm, BP Patient Position: Sitting) 82 98.3 °F (36.8 °C) (Oral) 14 5' 5\" (1.651 m) 174 lb (78.9 kg) SpO2 BMI OB Status Smoking Status 97% 28.96 kg/m2 Hysterectomy Former Smoker BMI and BSA Data Body Mass Index Body Surface Area  
 28.96 kg/m 2 1.9 m 2 Preferred Pharmacy Pharmacy Name Phone [facility] [address] [phone] Your Updated Medication List  
  
   
This list is accurate as of: 3/17/17  3:07 PM.  Always use your most recent med list.  
  
  
  
  
 amoxicillin-clavulanate 875-125 mg per tablet Commonly known as:  AUGMENTIN Take 1 Tab by mouth two (2) times a day. azaTHIOprine 50 mg tablet Commonly known as:  IMURAN  
TAKE THREE TABLET BY MOUTH DAILY  
  
 bumetanide 0.5 mg tablet Commonly known as:  BUMEX  
  
 fluticasone 50 mcg/actuation nasal spray Commonly known as:  Euna Jericho INHALE 2 SPRAYS IN EACH NOSTRIL AT BEDTIME  
  
 losartan 100 mg tablet Commonly known as:  COZAAR  
TAKE ONE TABLET BY MOUTH DAILY predniSONE 5 mg tablet Commonly known as:  DELTASONE  
TAKE THREE TABLETS BY MOUTH EVERY DAY Follow-up Instructions Return for as shajiady scheduled, please get Chest x-ray after may 4th and before appoitnment. Cynthia Krishnamurthy To-Do List   
 Around 05/03/2017 Imaging:  XR CHEST PA LAT Patient Instructions Pneumonia: Care Instructions Your Care Instructions Pneumonia is an infection of the lungs. Most cases are caused by infections from bacteria or viruses. Pneumonia may be mild or very severe. If it is caused by bacteria, you will be treated with antibiotics. It may take a few weeks to a few months to recover fully from pneumonia, depending on how sick you were and whether your overall health is good. Follow-up care is a key part of your treatment and safety. Be sure to make and go to all appointments, and call your doctor if you are having problems. Its also a good idea to know your test results and keep a list of the medicines you take. How can you care for yourself at home? · Take your antibiotics exactly as directed. Do not stop taking the medicine just because you are feeling better. You need to take the full course of antibiotics. · Take your medicines exactly as prescribed. Call your doctor if you think you are having a problem with your medicine. · Get plenty of rest and sleep. You may feel weak and tired for a while, but your energy level will improve with time. · To prevent dehydration, drink plenty of fluids, enough so that your urine is light yellow or clear like water. Choose water and other caffeine-free clear liquids until you feel better. If you have kidney, heart, or liver disease and have to limit fluids, talk with your doctor before you increase the amount of fluids you drink. · Take care of your cough so you can rest. A cough that brings up mucus from your lungs is common with pneumonia. It is one way your body gets rid of the infection. But if coughing keeps you from resting or causes severe fatigue and chest-wall pain, talk to your doctor. He or she may suggest that you take a medicine to reduce the cough. · Use a vaporizer or humidifier to add moisture to your bedroom. Follow the directions for cleaning the machine. · Do not smoke or allow others to smoke around you. Smoke will make your cough last longer. If you need help quitting, talk to your doctor about stop-smoking programs and medicines. These can increase your chances of quitting for good. · Take an over-the-counter pain medicine, such as acetaminophen (Tylenol), ibuprofen (Advil, Motrin), or naproxen (Aleve). Read and follow all instructions on the label. · Do not take two or more pain medicines at the same time unless the doctor told you to. Many pain medicines have acetaminophen, which is Tylenol. Too much acetaminophen (Tylenol) can be harmful. · If you were given a spirometer to measure how well your lungs are working, use it as instructed.  This can help your doctor tell how your recovery is going. · To prevent pneumonia in the future, talk to your doctor about getting a flu vaccine (once a year) and a pneumococcal vaccine (one time only for most people). When should you call for help? Call 911 anytime you think you may need emergency care. For example, call if: 
· You have severe trouble breathing. Call your doctor now or seek immediate medical care if: 
· You cough up dark brown or bloody mucus (sputum). · You have new or worse trouble breathing. · You are dizzy or lightheaded, or you feel like you may faint. Watch closely for changes in your health, and be sure to contact your doctor if: 
· You have a new or higher fever. · You are coughing more deeply or more often. · You are not getting better after 2 days (48 hours). · You do not get better as expected. Where can you learn more? Go to http://brijesh-marichuy.info/. Enter [id number] in the search box to learn more about \"Pneumonia: Care Instructions. \" Current as of: May 23, 2016 Content Version: 11.1 © 1212-5308 [employer]. Care instructions adapted under license by [employer] (which disclaims liability or warranty for this information). If you have questions about a medical condition or this instruction, always ask your healthcare professional. [address] any warranty or liability for your use of this information. Introducing [facility]! Prashant Del Cid introduces [employer] patient portal. Now you can access parts of your medical record, email your doctor's office, and request medication refills online. 1. In your internet browser, go to [email or web address] 2. Click on the First Time User? Click Here link in the Sign In box. You will see the New Member Sign Up page. 3. Enter your [employer] Access Code exactly as it appears below. You will not need to use this code after youve completed the sign-up process.  If you do not sign up before the expiration date, you must request a new code. · Normal Access Code: [email or web address] Expires: 4/17/2017  3:32 PM 
 
4. Enter the last four digits of your Social Security Number (xxxx) and Date of Birth (mm/dd/yyyy) as indicated and click Submit. You will be taken to the next sign-up page. 5. Create a Normal ID. This will be your Normal login ID and cannot be changed, so think of one that is secure and easy to remember. 6. Create a Normal password. You can change your password at any time. 7. Enter your Password Reset Question and Answer. This can be used at a later time if you forget your password. 8. Enter your e-mail address. You will receive e-mail notification when new information is available in [address] 9. Click Sign Up. You can now view and download portions of your medical record. 10. Click the Download Summary menu link to download a portable copy of your medical information. If you have questions, please visit the Frequently Asked Questions section of the Normal website. Remember, Normal is NOT to be used for urgent needs. For medical emergencies, dial 911. Now available from your iPhone and Android! Please provide this summary of care documentation to your next provider. Your primary care clinician is listed as Elida Cisneros. If you have any questions after today's visit, please call [phone].

## 2017-03-17 NOTE — PATIENT INSTRUCTIONS
Pneumonia: Care Instructions  Your Care Instructions    Pneumonia is an infection of the lungs. Most cases are caused by infections from bacteria or viruses. Pneumonia may be mild or very severe. If it is caused by bacteria, you will be treated with antibiotics. It may take a few weeks to a few months to recover fully from pneumonia, depending on how sick you were and whether your overall health is good. Follow-up care is a key part of your treatment and safety. Be sure to make and go to all appointments, and call your doctor if you are having problems. Its also a good idea to know your test results and keep a list of the medicines you take. How can you care for yourself at home? · Take your antibiotics exactly as directed. Do not stop taking the medicine just because you are feeling better. You need to take the full course of antibiotics. · Take your medicines exactly as prescribed. Call your doctor if you think you are having a problem with your medicine. · Get plenty of rest and sleep. You may feel weak and tired for a while, but your energy level will improve with time. · To prevent dehydration, drink plenty of fluids, enough so that your urine is light yellow or clear like water. Choose water and other caffeine-free clear liquids until you feel better. If you have kidney, heart, or liver disease and have to limit fluids, talk with your doctor before you increase the amount of fluids you drink. · Take care of your cough so you can rest. A cough that brings up mucus from your lungs is common with pneumonia. It is one way your body gets rid of the infection. But if coughing keeps you from resting or causes severe fatigue and chest-wall pain, talk to your doctor. He or she may suggest that you take a medicine to reduce the cough. · Use a vaporizer or humidifier to add moisture to your bedroom. Follow the directions for cleaning the machine. · Do not smoke or allow others to smoke around you.  Smoke will make your cough last longer. If you need help quitting, talk to your doctor about stop-smoking programs and medicines. These can increase your chances of quitting for good. · Take an over-the-counter pain medicine, such as acetaminophen (Tylenol), ibuprofen (Advil, Motrin), or naproxen (Aleve). Read and follow all instructions on the label. · Do not take two or more pain medicines at the same time unless the doctor told you to. Many pain medicines have acetaminophen, which is Tylenol. Too much acetaminophen (Tylenol) can be harmful. · If you were given a spirometer to measure how well your lungs are working, use it as instructed. This can help your doctor tell how your recovery is going. · To prevent pneumonia in the future, talk to your doctor about getting a flu vaccine (once a year) and a pneumococcal vaccine (one time only for most people). When should you call for help? Call 911 anytime you think you may need emergency care. For example, call if:  · You have severe trouble breathing. Call your doctor now or seek immediate medical care if:  · You cough up dark brown or bloody mucus (sputum). · You have new or worse trouble breathing. · You are dizzy or lightheaded, or you feel like you may faint. Watch closely for changes in your health, and be sure to contact your doctor if:  · You have a new or higher fever. · You are coughing more deeply or more often. · You are not getting better after 2 days (48 hours). · You do not get better as expected. Where can you learn more? Go to http://brijesh-marichuy.info/. Enter [id number] in the search box to learn more about \"Pneumonia: Care Instructions. \"  Current as of: May 23, 2016  Content Version: 11.1  © 4157-6230 [employer]. Care instructions adapted under license by [employer] (which disclaims liability or warranty for this information).  If you have questions about a medical condition or this instruction, always ask your healthcare professional. [place] [address] any warranty or liability for your use of this information.

## 2017-05-09 ENCOUNTER — OFFICE VISIT (OUTPATIENT)
Dept: INTERNAL MEDICINE CLINIC | Age: 62
End: 2017-05-09

## 2017-05-09 VITALS
DIASTOLIC BLOOD PRESSURE: 62 MMHG | OXYGEN SATURATION: 95 % | HEIGHT: 65 IN | BODY MASS INDEX: 29.36 KG/M2 | SYSTOLIC BLOOD PRESSURE: 131 MMHG | WEIGHT: 176.2 LBS | RESPIRATION RATE: 16 BRPM | HEART RATE: 90 BPM | TEMPERATURE: 98.5 F

## 2017-05-09 DIAGNOSIS — R07.0 THROAT BURNING: ICD-10-CM
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
## 2017-05-09 DIAGNOSIS — K64.9 HEMORRHOIDS, UNSPECIFIED HEMORRHOID TYPE: ICD-10-CM

## 2017-05-09 RX ORDER — OMEPRAZOLE 40 MG/1
40 CAPSULE, DELAYED RELEASE ORAL DAILY
Qty: 14 CAP | Refills: 3 | Status: SHIPPED | OUTPATIENT
Start: 2017-05-09 | End: 2017-08-03

## 2017-05-09 RX ORDER — HYDROCORTISONE 25 MG/G
CREAM TOPICAL 4 TIMES DAILY
Qty: 30 G | Refills: 0 | Status: SHIPPED | OUTPATIENT
Start: 2017-05-09 | End: 2017-08-03

## 2017-05-09 RX ORDER — FLUTICASONE PROPIONATE 0.5 MG/G
CREAM TOPICAL 2 TIMES DAILY
Qty: 15 G | Refills: 0 | Status: SHIPPED | OUTPATIENT
Start: 2017-05-09 | End: 2017-08-03

## 2017-05-09 NOTE — PROGRESS NOTES
HISTORY OF PRESENT ILLNESS  Alisa Coburn is a 64 y.o. female. HPI  Last Monday saw black spider on floor . Legs started to itch shortly after. Lesion next day. Unaware of bite, no swelling. Burning sensaiton in throat at nights or when she gets up at 6 AM. No cough. Symptoms present for ~ two weeks    Hemorrhoids bleeding, not constipated, small amount on blood on  TP, pinkinsh. Hemorrhoids snce January     Past Medical History:   Diagnosis Date    Breast lump 1999    Negative    Calculus of kidney     Hypertension     Polymyositis ([address] 7/28/2016       Current Outpatient Prescriptions on File Prior to Visit   Medication Sig Dispense Refill    losartan (COZAAR) 100 mg tablet TAKE ONE TABLET BY MOUTH DAILY  6    predniSONE (DELTASONE) 5 mg tablet TAKE THREE TABLETS BY MOUTH EVERY DAY  3    fluticasone (FLONASE) 50 mcg/actuation nasal spray INHALE 2 SPRAYS IN EACH NOSTRIL AT BEDTIME  12    azaTHIOprine (IMURAN) 50 mg tablet TAKE THREE TABLET BY MOUTH DAILY  3    bumetanide (BUMEX) 0.5 mg tablet       amoxicillin-clavulanate (AUGMENTIN) 875-125 mg per tablet Take 1 Tab by mouth two (2) times a day. 20 Tab 0     No current facility-administered medications on file prior to visit. Review of Systems   Constitutional: Negative for chills, diaphoresis and fever. Eyes: Negative for blurred vision. Respiratory: Negative. Cardiovascular: Negative. Gastrointestinal: Negative for nausea and vomiting. Musculoskeletal: Positive for myalgias. Neurological: Negative for dizziness, weakness and headaches. Physical Exam   Constitutional: She is oriented to person, place, and time. She appears well-developed and well-nourished. No distress. Cardiovascular: Normal rate and regular rhythm. Pulmonary/Chest: Effort normal and breath sounds normal.   Musculoskeletal: She exhibits no edema, tenderness or deformity.         Legs:  Neurological: She is alert and oriented to person, place, and time.   Skin: Skin is warm and dry. She is not diaphoretic. ASSESSMENT and PLAN    ICD-10-CM ICD-9-CM    1. Insect bite, initial encounter W57. XXXA 919.4 fluticasone (CUTIVATE) 0.05 % topical cream     E906.4    2. Hemorrhoids, unspecified hemorrhoid type K64.9 455.6 hydrocortisone (ANUSOL-HC) 2.5 % rectal cream   3. Throat burning R07.0 784.1 omeprazole (PRILOSEC) 40 mg capsule     Follow-up Disposition:  Return if symptoms worsen or fail to improve. reviewed medications and side effects in detail    1.  nsect bite resolving. 2. Encouraged to increase hydration, avoid constipation, GI evaluation of symptoms persist    3. likely GERD. Uriel Kelley Discussed lifestyle, dietary management.  GI evaluation if persist

## 2017-05-09 NOTE — PROGRESS NOTES
RM#9  Chief Complaint   Patient presents with   [address]     left lower extremity     1. Have you been to the ER, urgent care clinic since your last visit? Hospitalized since your last visit? No    2. Have you seen or consulted any other health care providers outside of the [address] [place] [address] since your last visit? Include any pap smears or colon screening.  No

## 2017-05-09 NOTE — MR AVS SNAPSHOT
Visit Information Date & Time Provider Department Dept. Phone Encounter #  
 5/9/2017  4:00 PM Gregory Hughesgolden [address] and Internal Medicine [phone] [id number] Follow-up Instructions Return if symptoms worsen or fail to improve. Your Appointments 5/22/2017  3:45 PM  
ROUTINE CARE with Leigh Howell MD  
[facility] and Internal Medicine [facility]) Appt Note: 3 mo f/u  
 [address] [facility] [address] [facility] [address]  
[place] [address] [facility] [address] Upcoming Health Maintenance Date Due Hepatitis C Screening 1955 Pneumococcal 19-64 Highest Risk (1 of 3 - PCV13) 9/7/1974 DTaP/Tdap/Td series (1 - Tdap) 9/7/1976 PAP AKA CERVICAL CYTOLOGY 9/7/1976 FOBT Q 1 YEAR AGE 50-75 9/7/2005 ZOSTER VACCINE AGE 60> 9/7/2015 INFLUENZA AGE 9 TO ADULT 8/1/2017 BREAST CANCER SCRN MAMMOGRAM 9/12/2018 Allergies as of 5/9/2017  Review Complete On: 5/9/2017 By: Wilton Vaca NP Severity Noted Reaction Type Reactions Levaquin [Levofloxacin]  05/09/2017    Other (comments) Lisinopril  05/02/2016    Other (comments) Metoprolol  07/28/2016    Other (comments)  
 hallucinations Peanut  05/02/2016    Other (comments) Current Immunizations  Reviewed on 5/2/2016 Name Date Influenza Vaccine 10/15/2015 TB Skin Test (PPD) 1/1/2012 Not reviewed this visit You Were Diagnosed With   
  
 Codes Comments Hemorrhoids, unspecified hemorrhoid type    -  Primary ICD-10-CM: K64.9 ICD-9-CM: 455.6 Insect bite, initial encounter     ICD-10-CM: W57. [place] ICD-9-CM: 919.4, E906.4 Throat burning     ICD-10-CM: R07.0 ICD-9-CM: 784.1 Vitals BP Pulse Temp Resp Height(growth percentile) Weight(growth percentile)  131/62 (BP 1 Location: Left arm, BP Patient Position: Sitting) 90 98.5 °F (36.9 °C) (Oral) 16 5' 5\" (1.651 m) 176 lb 3.2 oz (79.9 kg) SpO2 BMI OB Status Smoking Status 95% 29.32 kg/m2 Hysterectomy Former Smoker BMI and BSA Data Body Mass Index Body Surface Area  
 29.32 kg/m 2 1.91 m 2 Preferred Pharmacy Pharmacy Name Phone [facility] Laurita Fierro [phone] Your Updated Medication List  
  
   
This list is accurate as of: 5/9/17  4:27 PM.  Always use your most recent med list.  
  
  
  
  
 amoxicillin-clavulanate 875-125 mg per tablet Commonly known as:  AUGMENTIN Take 1 Tab by mouth two (2) times a day. azaTHIOprine 50 mg tablet Commonly known as:  IMURAN  
TAKE THREE TABLET BY MOUTH DAILY  
  
 bumetanide 0.5 mg tablet Commonly known as:  Yolanda Azul * fluticasone 50 mcg/actuation nasal spray Commonly known as:  Kamran Noss INHALE 2 SPRAYS IN EACH NOSTRIL AT BEDTIME  
  
 * fluticasone 0.05 % topical cream  
Commonly known as:  Latrice Evans Apply  to affected area two (2) times a day. hydrocortisone 2.5 % rectal cream  
Commonly known as:  ANUSOL-HC Insert  into rectum four (4) times daily. losartan 100 mg tablet Commonly known as:  COZAAR  
TAKE ONE TABLET BY MOUTH DAILY  
  
 omeprazole 40 mg capsule Commonly known as:  PRILOSEC Take 1 Cap by mouth daily. predniSONE 5 mg tablet Commonly known as:  [address] TABLETS BY MOUTH EVERY DAY  
  
 * Notice: This list has 2 medication(s) that are the same as other medications prescribed for you. Read the directions carefully, and ask your doctor or other care provider to review them with you. Prescriptions Sent to Pharmacy Refills  
 hydrocortisone (ANUSOL-HC) 2.5 % rectal cream 0 Sig: Insert  into rectum four (4) times daily. Class: Normal  
 Pharmacy: [facility] [place] [address], [address] [place] [address] #: [phone]  Route: Rectal  
 fluticasone (CUTIVATE) 0.05 % topical cream 0 Sig: Apply  to affected area two (2) times a day. Class: Normal  
 Pharmacy: [facility] [address] #: [phone] Route: Topical  
 omeprazole (PRILOSEC) 40 mg capsule 3 Sig: Take 1 Cap by mouth daily. Class: Normal  
 Pharmacy: [address] #: [phone] Route: Oral  
  
Follow-up Instructions Return if symptoms worsen or fail to improve. Introducing [facility] & HEALTH SERVICES! Vikki Jackson introduces Arrogene patient portal. Now you can access parts of your medical record, email your doctor's office, and request medication refills online. 1. In your internet browser, go to [email or web address] 2. Click on the First Time User? Click Here link in the Sign In box. You will see the New Member Sign Up page. 3. Enter your Arrogene Access Code exactly as it appears below. You will not need to use this code after youve completed the sign-up process. If you do not sign up before the expiration date, you must request a new code. · Arrogene Access Code: [email or web address] Expires: 8/7/2017  3:43 PM 
 
4. Enter the last four digits of your Social Security Number (xxxx) and Date of Birth (mm/dd/yyyy) as indicated and click Submit. You will be taken to the next sign-up page. 5. Create a Endoventiont ID. This will be your Arrogene login ID and cannot be changed, so think of one that is secure and easy to remember. 6. Create a Arrogene password. You can change your password at any time. 7. Enter your Password Reset Question and Answer. This can be used at a later time if you forget your password. 8. Enter your e-mail address. You will receive e-mail notification when new information is available in [address] 9. Click Sign Up. You can now view and download portions of your medical record. 10. Click the Download Summary menu link to download a portable copy of your medical information. If you have questions, please visit the Frequently Asked Questions section of the Marro.[email or web address] website. Remember, [email or web address] is NOT to be used for urgent needs. For medical emergencies, dial 911. Now available from your iPhone and Android! Please provide this summary of care documentation to your next provider. Your primary care clinician is listed as Chad Mathews. If you have any questions after today's visit, please call [phone].

## 2017-05-22 ENCOUNTER — OFFICE VISIT (OUTPATIENT)
Dept: INTERNAL MEDICINE CLINIC | Age: 62
End: 2017-05-22

## 2017-05-22 VITALS
BODY MASS INDEX: 29.85 KG/M2 | OXYGEN SATURATION: 98 % | SYSTOLIC BLOOD PRESSURE: 148 MMHG | HEIGHT: 65 IN | DIASTOLIC BLOOD PRESSURE: 76 MMHG | WEIGHT: 179.2 LBS | HEART RATE: 86 BPM | RESPIRATION RATE: 18 BRPM | TEMPERATURE: 98.2 F

## 2017-05-22 DIAGNOSIS — Z99.89 OSA ON CPAP: ICD-10-CM

## 2017-05-22 DIAGNOSIS — I10 ESSENTIAL HYPERTENSION: Primary | ICD-10-CM

## 2017-05-22 DIAGNOSIS — M33.20 POLYMYOSITIS (HCC): ICD-10-CM

## 2017-05-22 DIAGNOSIS — R73.09 ELEVATED GLUCOSE: ICD-10-CM

## 2017-05-22 DIAGNOSIS — G47.33 OSA ON CPAP: ICD-10-CM

## 2017-05-22 RX ORDER — MYCOPHENOLATE MOFETIL 200 MG/ML
250 POWDER, FOR SUSPENSION ORAL 2 TIMES DAILY
COMMUNITY
End: 2017-05-22 | Stop reason: SDUPTHER

## 2017-05-22 RX ORDER — MYCOPHENOLATE MOFETIL 500 MG/1
TABLET ORAL
Refills: 1 | COMMUNITY
Start: 2017-03-17 | End: 2017-08-03

## 2017-05-22 NOTE — MR AVS SNAPSHOT
Visit Information Date & Time Provider Department Dept. Phone Encounter #  
 5/22/2017  3:45 PM Mega Thomas MD [address] [facility] and Internal Medicine [phone] [id number] Follow-up Instructions Return in about 3 months (around 8/22/2017) for follow-up mood, blood pressure. Upcoming Health Maintenance Date Due Hepatitis C Screening 1955 Pneumococcal 19-64 Highest Risk (1 of 3 - PCV13) 9/7/1974 DTaP/Tdap/Td series (1 - Tdap) 9/7/1976 PAP AKA CERVICAL CYTOLOGY 9/7/1976 FOBT Q 1 YEAR AGE 50-75 9/7/2005 ZOSTER VACCINE AGE 60> 9/7/2015 INFLUENZA AGE 9 TO ADULT 8/1/2017 BREAST CANCER SCRN MAMMOGRAM 9/12/2018 Allergies as of 5/22/2017  Review Complete On: 5/22/2017 By: Roverto Lizarraga LPN Severity Noted Reaction Type Reactions Levaquin [Levofloxacin]  05/09/2017    Other (comments) Lisinopril  05/02/2016    Other (comments) Metoprolol  07/28/2016    Other (comments)  
 hallucinations Peanut  05/02/2016    Other (comments) Current Immunizations  Reviewed on 5/2/2016 Name Date Influenza Vaccine 10/15/2015 TB Skin Test (PPD) 1/1/2012 Not reviewed this visit You Were Diagnosed With   
  
 Codes Comments Essential hypertension    -  Primary ICD-10-CM: I10 
ICD-9-CM: 401.9 Elevated glucose     ICD-10-CM: R73.09 
ICD-9-CM: 790.29 JOHN on CPAP     ICD-10-CM: G47.33, Z99.89 ICD-9-CM: 327.23, V46.8 Polymyositis ([facility] [address]     ICD-10-CM: M33.20 ICD-9-CM: 710.4 Vitals BP Pulse Temp Resp Height(growth percentile) Weight(growth percentile) 148/76 (BP 1 Location: Left arm, BP Patient Position: Sitting) 86 98.2 °F (36.8 °C) (Oral) 18 5' 5\" (1.651 m) 179 lb 3.2 oz (81.3 kg) LMP SpO2 BMI OB Status Smoking Status (Exact Date) 98% 29.82 kg/m2 Hysterectomy Former Smoker BMI and BSA Data Body Mass Index Body Surface Area  
 29.82 kg/m 2 1.93 m 2 Preferred Pharmacy Pharmacy Name Phone [facility] Laurita Fierro [phone] Your Updated Medication List  
  
   
This list is accurate as of: 5/22/17  5:13 PM.  Always use your most recent med list.  
  
  
  
  
 * fluticasone 50 mcg/actuation nasal spray Commonly known as:  Dell Hutchinson INHALE 2 SPRAYS IN EACH NOSTRIL AT BEDTIME  
  
 * fluticasone 0.05 % topical cream  
Commonly known as:  Tony Sundar Apply  to affected area two (2) times a day. hydrocortisone 2.5 % rectal cream  
Commonly known as:  ANUSOL-HC Insert  into rectum four (4) times daily. losartan 100 mg tablet Commonly known as:  COZAAR  
TAKE ONE TABLET BY MOUTH DAILY  
  
 mycophenolate 500 mg tablet Commonly known as:  CELLCEPT  
TAKE ONE TABLET BY MOUTH TWICE A DAY FOR 7 DAYS ,THEN TAKE UP TO TWO TABLETS BY MOUTH TWICE A DAY THEREAFTER  
  
 omeprazole 40 mg capsule Commonly known as:  PRILOSEC Take 1 Cap by mouth daily. predniSONE 5 mg tablet Commonly known as:  [address] TABLETS BY MOUTH EVERY DAY  
  
 * Notice: This list has 2 medication(s) that are the same as other medications prescribed for you. Read the directions carefully, and ask your doctor or other care provider to review them with you. We Performed the Following REFERRAL TO RHEUMATOLOGY [WGX93 Custom] Comments:  
 Referral to [facility] rheumatology clinic for second opinion of chronic muscle inflammation. Currently treated with rheumatologist at [facility] as polymyositis. Muscle biopsy reported as not consistent with polymyositis, also remembers having some family members that had weakness issues, and patient not seeing much response to treatment. She feels discouraged understandably and would like second opinion to alleviate concerns of some other cause for the muscle inflammation that is not being recognized. Follow-up Instructions Return in about 3 months (around 8/22/2017) for follow-up mood, blood pressure. Referral Information Referral ID Referred By Referred To  
  
 [id number] Madyson Ching Not Available Visits Status Start Date End Date 1 New Request 5/22/17 5/22/18 If your referral has a status of pending review or denied, additional information will be sent to support the outcome of this decision. Patient Instructions Please call if mood not improving. Depression and Chronic Disease: Care Instructions Your Care Instructions A chronic disease is one that you have for a long time. Some chronic diseases can be controlled, but they usually cannot be cured. Depression is common in people with chronic diseases, but it often goes unnoticed. Many people have concerns about seeking treatment for a mental health problem. You may think it's a sign of weakness, or you don't want people to know about it. It's important to overcome these reasons for not seeking treatment. Treating depression or anxiety is good for your health. Follow-up care is a key part of your treatment and safety. Be sure to make and go to all appointments, and call your doctor if you are having problems. It's also a good idea to know your test results and keep a list of the medicines you take. How can you care for yourself at home? Watch for symptoms of depression The symptoms of depression are often subtle at first. You may think they are caused by your disease rather than depression. Or you may think it is normal to be depressed when you have a chronic disease. If you are depressed you may: · Feel sad or hopeless. · Feel guilty or worthless. · Not enjoy the things you used to enjoy. · Feel hopeless, as though life is not worth living. · Have trouble thinking or remembering. · Have low energy, and you may not eat or sleep well. · Pull away from others. · Think often about death or killing yourself. (Keep the numbers for these national suicide hotlines: 6-775-038-TALK [1-830.987.1210] and 6-654-PFBCGZF [1-838.625.6226]. ) Get treatment By treating your depression, you can feel more hopeful and have more energy. If you feel better, you may take better care of yourself, so your health may improve. · Talk to your doctor if you have any changes in mood during treatment for your disease. · Ask your doctor for help. Counseling, antidepressant medicine, or a combination of the two can help most people with depression. Often a combination works best. Counseling can also help you cope with having a chronic disease. When should you call for help? Call 911 anytime you think you may need emergency care. For example, call if: 
· You feel like hurting yourself or someone else. · Someone you know has depression and is about to attempt or is attempting suicide. Call your doctor now or seek immediate medical care if: 
· You hear voices. · Someone you know has depression and: 
¨ Starts to give away his or her possessions. ¨ Uses illegal drugs or drinks alcohol heavily. ¨ Talks or writes about death, including writing suicide notes or talking about guns, knives, or pills. ¨ Starts to spend a lot of time alone. ¨ Acts very aggressively or suddenly appears calm. Watch closely for changes in your health, and be sure to contact your doctor if: 
· You do not get better as expected. Where can you learn more? Go to http://brijesh-marichuy.info/. Enter H994 in the search box to learn more about \"Depression and Chronic Disease: Care Instructions. \" Current as of: July 26, 2016 Content Version: 11.2 © 7560-9700 [employer]. Care instructions adapted under license by AppLovin (which disclaims liability or warranty for this information).  If you have questions about a medical condition or this instruction, always ask your healthcare professional. [place] [address] any warranty or liability for your use of this information. Introducing [facility] & HEALTH SERVICES! Santiago Ackerman introduces Beem patient portal. Now you can access parts of your medical record, email your doctor's office, and request medication refills online. 1. In your internet browser, go to [email or web address] 2. Click on the First Time User? Click Here link in the Sign In box. You will see the New Member Sign Up page. 3. Enter your Beem Access Code exactly as it appears below. You will not need to use this code after youve completed the sign-up process. If you do not sign up before the expiration date, you must request a new code. · Beem Access Code: [email or web address] Expires: 8/7/2017  3:43 PM 
 
4. Enter the last four digits of your Social Security Number (xxxx) and Date of Birth (mm/dd/yyyy) as indicated and click Submit. You will be taken to the next sign-up page. 5. Create a Beem ID. This will be your Beem login ID and cannot be changed, so think of one that is secure and easy to remember. 6. Create a Beem password. You can change your password at any time. 7. Enter your Password Reset Question and Answer. This can be used at a later time if you forget your password. 8. Enter your e-mail address. You will receive e-mail notification when new information is available in [address] 9. Click Sign Up. You can now view and download portions of your medical record. 10. Click the Download Summary menu link to download a portable copy of your medical information. If you have questions, please visit the Frequently Asked Questions section of the Beem website. Remember, Beem is NOT to be used for urgent needs. For medical emergencies, dial 911. Now available from your iPhone and Android! Please provide this summary of care documentation to your next provider. Your primary care clinician is listed as Cy Loges. If you have any questions after today's visit, please call [phone].

## 2017-05-22 NOTE — PROGRESS NOTES
Rm #2    Chief Complaint   Patient presents with    Hypertension     1. Have you been to the ER, urgent care clinic since your last visit? Hospitalized since your last visit? NO    2. Have you seen or consulted any other health care providers outside of the [address] [place] [address] since your last visit? Include any pap smears or colon screening.  Every month-Dr. Sow-[facility]

## 2017-05-22 NOTE — PATIENT INSTRUCTIONS
Please call if mood not improving. Depression and Chronic Disease: Care Instructions  Your Care Instructions  A chronic disease is one that you have for a long time. Some chronic diseases can be controlled, but they usually cannot be cured. Depression is common in people with chronic diseases, but it often goes unnoticed. Many people have concerns about seeking treatment for a mental health problem. You may think it's a sign of weakness, or you don't want people to know about it. It's important to overcome these reasons for not seeking treatment. Treating depression or anxiety is good for your health. Follow-up care is a key part of your treatment and safety. Be sure to make and go to all appointments, and call your doctor if you are having problems. It's also a good idea to know your test results and keep a list of the medicines you take. How can you care for yourself at home? Watch for symptoms of depression  The symptoms of depression are often subtle at first. You may think they are caused by your disease rather than depression. Or you may think it is normal to be depressed when you have a chronic disease. If you are depressed you may:  · Feel sad or hopeless. · Feel guilty or worthless. · Not enjoy the things you used to enjoy. · Feel hopeless, as though life is not worth living. · Have trouble thinking or remembering. · Have low energy, and you may not eat or sleep well. · Pull away from others. · Think often about death or killing yourself. (Keep the numbers for these national suicide hotlines: 0-530-022-TALK [1-709.943.7864] and 1-353-UZCFWLV [1-967.921.7645]. )  Get treatment  By treating your depression, you can feel more hopeful and have more energy. If you feel better, you may take better care of yourself, so your health may improve. · Talk to your doctor if you have any changes in mood during treatment for your disease. · Ask your doctor for help.  Counseling, antidepressant medicine, or a combination of the two can help most people with depression. Often a combination works best. Counseling can also help you cope with having a chronic disease. When should you call for help? Call 911 anytime you think you may need emergency care. For example, call if:  · You feel like hurting yourself or someone else. · Someone you know has depression and is about to attempt or is attempting suicide. Call your doctor now or seek immediate medical care if:  · You hear voices. · Someone you know has depression and:  ¨ Starts to give away his or her possessions. ¨ Uses illegal drugs or drinks alcohol heavily. ¨ Talks or writes about death, including writing suicide notes or talking about guns, knives, or pills. ¨ Starts to spend a lot of time alone. ¨ Acts very aggressively or suddenly appears calm. Watch closely for changes in your health, and be sure to contact your doctor if:  · You do not get better as expected. Where can you learn more? Go to http://brijesh-marichuy.info/. Enter H697 in the search box to learn more about \"Depression and Chronic Disease: Care Instructions. \"  Current as of: July 26, 2016  Content Version: 11.2  © 7506-3163 [employer], Tabblo. Care instructions adapted under license by Webcom (which disclaims liability or warranty for this information). If you have questions about a medical condition or this instruction, always ask your healthcare professional. [place] [address] any warranty or liability for your use of this information.

## 2017-05-22 NOTE — PROGRESS NOTES
REED Richardson is a 64 y.o. female, she presents today for:    Reports chest xray completed ~ 1 month ago by rheumatoloigst. Also had lung function testing. Was advised she still had pneumonia, but was asymptomatic. Ongoing CK elevation. Feeling discurated. On liquid cellcept and 40mg prednisone. Doesn't feel it is helping. Advised to repeat IVIg. Has had constant flares since April 2016. PMH/PSH: reviewed and updated  Sochx:  reports that she quit smoking about 19 years ago. Her smoking use included Cigarettes. She smoked 1.00 pack per day. She has never used smokeless tobacco. She reports that she drinks about 0.6 oz of alcohol per week  She reports that she does not use illicit drugs. Famhx: reviewed and updated     All: Allergies   Allergen Reactions    Levaquin [Levofloxacin] Other (comments)    Lisinopril Other (comments)    Metoprolol Other (comments)     hallucinations    Peanut Other (comments)     Med:   Current Outpatient Prescriptions   Medication Sig    mycophenolate (CELLCEPT) 500 mg tablet TAKE ONE TABLET BY MOUTH TWICE A DAY FOR 7 DAYS ,THEN TAKE UP TO TWO TABLETS BY MOUTH TWICE A DAY THEREAFTER    hydrocortisone (ANUSOL-HC) 2.5 % rectal cream Insert  into rectum four (4) times daily.  fluticasone (CUTIVATE) 0.05 % topical cream Apply  to affected area two (2) times a day.  omeprazole (PRILOSEC) 40 mg capsule Take 1 Cap by mouth daily.  losartan (COZAAR) 100 mg tablet TAKE ONE TABLET BY MOUTH DAILY    predniSONE (DELTASONE) 5 mg tablet TAKE THREE TABLETS BY MOUTH EVERY DAY    fluticasone (FLONASE) 50 mcg/actuation nasal spray INHALE 2 SPRAYS IN EACH NOSTRIL AT BEDTIME    bumetanide (BUMEX) 0.5 mg tablet     azaTHIOprine (IMURAN) 50 mg tablet TAKE THREE TABLET BY MOUTH DAILY     No current facility-administered medications for this visit. Review of Systems   Constitutional: Positive for malaise/fatigue. Negative for chills and fever.    Respiratory: Negative for shortness of breath. Cardiovascular: Negative for chest pain. Gastrointestinal: Negative for abdominal pain, constipation and diarrhea. PE:  Blood pressure 148/76, pulse 86, temperature 98.2 °F (36.8 °C), temperature source Oral, resp. rate 18, height 5' 5\" (1.651 m), weight 179 lb 3.2 oz (81.3 kg), SpO2 98 %. Body mass index is 29.82 kg/(m^2). Physical Exam   Constitutional: She is oriented to person, place, and time. She appears well-developed. No distress. HENT:   Head: Normocephalic. Mouth/Throat: Oropharynx is clear and moist.   Eyes: Conjunctivae are normal. Pupils are equal, round, and reactive to light. Neck: Neck supple. Cardiovascular: Normal rate. No murmur heard. Pulmonary/Chest: Effort normal.   Abdominal: Soft. Neurological: She is alert and oriented to person, place, and time. Skin: No rash noted. Psychiatric: She has a normal mood and affect. Her behavior is normal. Thought content normal.   Nursing note and vitals reviewed. A/P:  64 y.o. female    ICD-10-CM ICD-9-CM    1. Essential hypertension I10 401.9    2. Elevated glucose R73.09 790.29    3. JHON on CPAP G47.33 327.23     Z99.89 V46.8    4. [facility] M33.20 710.4 REFERRAL TO RHEUMATOLOGY     HTN: bp near goal today. Bmp requested. monitor     Polymyositis: patient not sure of diagnosis. However has responded to immunomodulating drugs. Requesting records. Following with neruolgoist. Possible family history of similar change reported by patient. - needs rheumatology referral to be processed through insurance for second opinion. Encouarged patient to seek second opinion if able to help reassure her she is getting appropirate treatment.      History of kidney cancer: s/p nephrectomy.      JOHN: continue to follow-up with sleep medicine doctor    Mood: discussed need to care for herself and her mood. Risk of depression with chronic disease.      Follow-up Disposition:  Return in about 3 months (around 8/22/2017) for follow-up mood, blood pressure. No future appointments.

## 2017-07-06 ENCOUNTER — OFFICE VISIT (OUTPATIENT)
Dept: INTERNAL MEDICINE CLINIC | Age: 62
End: 2017-07-06

## 2017-07-06 VITALS
BODY MASS INDEX: 28.32 KG/M2 | WEIGHT: 170 LBS | RESPIRATION RATE: 22 BRPM | DIASTOLIC BLOOD PRESSURE: 67 MMHG | TEMPERATURE: 98.4 F | SYSTOLIC BLOOD PRESSURE: 139 MMHG | OXYGEN SATURATION: 98 % | HEART RATE: 80 BPM | HEIGHT: 65 IN

## 2017-07-06 DIAGNOSIS — Z23 ENCOUNTER FOR IMMUNIZATION: ICD-10-CM

## 2017-07-06 DIAGNOSIS — L30.9 DERMATITIS: ICD-10-CM

## 2017-07-06 DIAGNOSIS — Z86.19 HISTORY OF VARICELLA INFECTION: ICD-10-CM

## 2017-07-06 DIAGNOSIS — H10.10 ALLERGIC CONJUNCTIVITIS, UNSPECIFIED LATERALITY: ICD-10-CM

## 2017-07-06 DIAGNOSIS — D84.9 IMMUNOSUPPRESSED STATUS (HCC): ICD-10-CM

## 2017-07-06 DIAGNOSIS — M33.20 POLYMYOSITIS (HCC): ICD-10-CM

## 2017-07-06 DIAGNOSIS — Z11.59 SCREENING FOR MEASLES: ICD-10-CM

## 2017-07-06 DIAGNOSIS — Z11.1 SCREENING FOR TUBERCULOSIS: Primary | ICD-10-CM

## 2017-07-06 DIAGNOSIS — Z78.9 RUBELLA IMMUNE STATUS NOT KNOWN: ICD-10-CM

## 2017-07-06 RX ORDER — MYCOPHENOLATE MOFETIL 200 MG/ML
6 POWDER, FOR SUSPENSION ORAL 2 TIMES DAILY
Refills: 1 | COMMUNITY
Start: 2017-06-07 | End: 2018-08-23

## 2017-07-06 RX ORDER — KETOTIFEN FUMARATE 0.35 MG/ML
1 SOLUTION/ DROPS OPHTHALMIC 2 TIMES DAILY
Qty: 1 BOTTLE | Refills: 0 | Status: SHIPPED | OUTPATIENT
Start: 2017-07-06 | End: 2017-07-16

## 2017-07-06 NOTE — MR AVS SNAPSHOT
Visit Information Date & Time Provider Department Dept. Phone Encounter #  
 7/6/2017  9:30 AM Fidelina Fitzgerald MD [address] [phone] [id number] Follow-up Instructions Return for as previously scheduled. (plan for pneumonia vaccine at next visit). Upcoming Health Maintenance Date Due Hepatitis C Screening 1955 Pneumococcal 19-64 Highest Risk (1 of 3 - PCV13) 9/7/1974 DTaP/Tdap/Td series (1 - Tdap) 9/7/1976 PAP AKA CERVICAL CYTOLOGY 9/7/1976 FOBT Q 1 YEAR AGE 50-75 9/7/2005 INFLUENZA AGE 9 TO ADULT 8/1/2017 BREAST CANCER SCRN MAMMOGRAM 9/12/2018 Allergies as of 7/6/2017  Review Complete On: 7/6/2017 By: Gisela Silverman LPN Severity Noted Reaction Type Reactions Levaquin [Levofloxacin]  05/09/2017    Other (comments) Lisinopril  05/02/2016    Other (comments) Metoprolol  07/28/2016    Other (comments)  
 hallucinations Peanut  05/02/2016    Other (comments) Current Immunizations  Reviewed on 5/2/2016 Name Date Influenza Vaccine 11/3/2016, 10/15/2015 TB Skin Test (PPD) 1/1/2012 Tdap  Incomplete Not reviewed this visit You Were Diagnosed With   
  
 Codes Comments Screening for tuberculosis    -  Primary ICD-10-CM: Z11.1 ICD-9-CM: V74.1 Polymyositis ([facility]     ICD-10-CM: M33.20 ICD-9-CM: 710.4 Dermatitis     ICD-10-CM: L30.9 ICD-9-CM: 692.9 Allergic conjunctivitis, unspecified laterality     ICD-10-CM: H10.10 ICD-9-CM: 372.14 Screening for measles     ICD-10-CM: Z11.59 
ICD-9-CM: V73.2 Rubella immune status not known     ICD-10-CM: Z78.9 ICD-9-CM: V49.89 History of varicella infection     ICD-10-CM: Z86.19 ICD-9-CM: V12.09 Encounter for immunization     ICD-10-CM: R46 ICD-9-CM: V03.89 Vitals BP Pulse Temp Resp Height(growth percentile) Weight(growth percentile) 139/67 (BP 1 Location: Left arm, BP Patient Position: Sitting) 80 98.4 °F (36.9 °C) (Oral) 22 5' 5\" (1.651 m) 170 lb (77.1 kg) LMP SpO2 BMI OB Status Smoking Status (Exact Date) 98% 28.29 kg/m2 Hysterectomy Former Smoker BMI and BSA Data Body Mass Index Body Surface Area  
 28.29 kg/m 2 1.88 m 2 Preferred Pharmacy Pharmacy Name Phone [facility]/PHARMACY #[id number] [place] [phone] Your Updated Medication List  
  
   
This list is accurate as of: 7/6/17 10:31 AM.  Always use your most recent med list.  
  
  
  
  
 * fluticasone 50 mcg/actuation nasal spray Commonly known as:  Em Alvarado INHALE 2 SPRAYS IN EACH NOSTRIL AT BEDTIME  
  
 * fluticasone 0.05 % topical cream  
Commonly known as:  Harini Lakisha Apply  to affected area two (2) times a day. hydrocortisone 2.5 % rectal cream  
Commonly known as:  ANUSOL-HC Insert  into rectum four (4) times daily. IMMUNE GLOBULIN (HUMAN) (IGG) IV  
80 g by IntraVENous route.  
  
 ketotifen 0.025 % (0.035 %) ophthalmic solution Commonly known as:  ZADITOR Administer 1 Drop to left eye two (2) times a day for 10 days. losartan 100 mg tablet Commonly known as:  COZAAR  
TAKE ONE TABLET BY MOUTH DAILY * mycophenolate 500 mg tablet Commonly known as:  CELLCEPT  
TAKE ONE TABLET BY MOUTH TWICE A DAY FOR 7 DAYS ,THEN TAKE UP TO TWO TABLETS BY MOUTH TWICE A DAY THEREAFTER  
  
 * mycophenolate mofetil 200 mg/mL suspension Commonly known as:  CELLCEPT Take 6 mL by mouth two (2) times a day. omeprazole 40 mg capsule Commonly known as:  PRILOSEC Take 1 Cap by mouth daily. predniSONE 5 mg tablet Commonly known as:  [address] TABLETS BY MOUTH EVERY DAY  
  
 * Notice: This list has 4 medication(s) that are the same as other medications prescribed for you.  Read the directions carefully, and ask your doctor or other care provider to review them with you. Prescriptions Sent to Pharmacy Refills  
 ketotifen (ZADITOR) 0.025 % (0.035 %) ophthalmic solution 0 Sig: Administer 1 Drop to left eye two (2) times a day for 10 days. Class: Normal  
 Pharmacy: [facility]/pharmacy #[id number] [place] [address], [address] Ph #: [phone] Route: Left Eye We Performed the Following MEASLES/MUMPS/RUBELLA IMMUNITY [id number] CPT(R)] QUANTIFERON TB GOLD [[id number] Custom] TETANUS, DIPHTHERIA TOXOIDS AND ACELLULAR PERTUSSIS VACCINE (TDAP), IN INDIVIDS. >=7, IM [id number] CPT(R)] VZV AB, IGG [id number] CPT(R)] Follow-up Instructions Return for as previously scheduled. (plan for pneumonia vaccine at next visit). Introducing [facility]! [employer] introduces [employer] patient portal. Now you can access parts of your medical record, email your doctor's office, and request medication refills online. 1. In your internet browser, go to [email or web address] 2. Click on the First Time User? Click Here link in the Sign In box. You will see the New Member Sign Up page. 3. Enter your [employer] Access Code exactly as it appears below. You will not need to use this code after youve completed the sign-up process. If you do not sign up before the expiration date, you must request a new code. · [employer] Access Code: [email or web address] Expires: 8/7/2017  3:43 PM 
 
4. Enter the last four digits of your Social Security Number (xxxx) and Date of Birth (mm/dd/yyyy) as indicated and click Submit. You will be taken to the next sign-up page. 5. Create a [facility] ID. This will be your [employer] login ID and cannot be changed, so think of one that is secure and easy to remember. 6. Create a [employer] password. You can change your password at any time. 7. Enter your Password Reset Question and Answer.  This can be used at a later time if you forget your password. 8. Enter your e-mail address. You will receive e-mail notification when new information is available in [address] 9. Click Sign Up. You can now view and download portions of your medical record. 10. Click the Download Summary menu link to download a portable copy of your medical information. If you have questions, please visit the Frequently Asked Questions section of the [employer] website. Remember, [employer] is NOT to be used for urgent needs. For medical emergencies, dial 911. Now available from your iPhone and Android! Please provide this summary of care documentation to your next provider. Your primary care clinician is listed as Cezar Durham. If you have any questions after today's visit, please call [phone].

## 2017-07-06 NOTE — PROGRESS NOTES
REED Atkins is a 64 y.o. female, she presents today for:    Responding to cellcept. And IVIG. Also drinking green smoothies (kale, carrots, apple, beet and kaiden). To participate in [employer] program and needs testing done. Left eye itching \"all the time\". PMH/PSH: reviewed and updated  Sochx:  reports that she quit smoking about 19 years ago. Her smoking use included Cigarettes. She smoked 1.00 pack per day. She has never used smokeless tobacco. She reports that she drinks about 0.6 oz of alcohol per week  She reports that she does not use illicit drugs. Famhx: reviewed and updated     All: Allergies   Allergen Reactions    Levaquin [Levofloxacin] Other (comments)    Lisinopril Other (comments)    Metoprolol Other (comments)     hallucinations    Peanut Other (comments)     Med:   Current Outpatient Prescriptions   Medication Sig    mycophenolate mofetil (CELLCEPT) 200 mg/mL suspension Take 6 mL by mouth two (2) times a day.  IMMUNE GLOBULIN,GAMMA,IGG, (IMMUNE GLOBULIN, HUMAN,, IGG, IV) 80 g by IntraVENous route.  losartan (COZAAR) 100 mg tablet TAKE ONE TABLET BY MOUTH DAILY    predniSONE (DELTASONE) 5 mg tablet TAKE THREE TABLETS BY MOUTH EVERY DAY    fluticasone (FLONASE) 50 mcg/actuation nasal spray INHALE 2 SPRAYS IN EACH NOSTRIL AT BEDTIME    mycophenolate (CELLCEPT) 500 mg tablet TAKE ONE TABLET BY MOUTH TWICE A DAY FOR 7 DAYS ,THEN TAKE UP TO TWO TABLETS BY MOUTH TWICE A DAY THEREAFTER    hydrocortisone (ANUSOL-HC) 2.5 % rectal cream Insert  into rectum four (4) times daily.  fluticasone (CUTIVATE) 0.05 % topical cream Apply  to affected area two (2) times a day.  omeprazole (PRILOSEC) 40 mg capsule Take 1 Cap by mouth daily. No current facility-administered medications for this visit. Review of Systems   Constitutional: Negative for chills, diaphoresis and fever. Respiratory: Negative for cough, hemoptysis, sputum production and shortness of breath. Cardiovascular: Negative for chest pain. PE:  Blood pressure 139/67, pulse 80, temperature 98.4 °F (36.9 °C), temperature source Oral, resp. rate 22, height 5' 5\" (1.651 m), weight 170 lb (77.1 kg), SpO2 98 %. Body mass index is 28.29 kg/(m^2). Physical Exam   Constitutional: She is oriented to person, place, and time. She appears well-developed. No distress. HENT:   Head: Normocephalic. Mouth/Throat: Oropharynx is clear and moist.   Eyes: Conjunctivae are normal. Pupils are equal, round, and reactive to light. Neck: Neck supple. Cardiovascular: Normal rate. No murmur heard. Pulmonary/Chest: Effort normal.   Abdominal: Soft. Neurological: She is alert and oriented to person, place, and time. Skin: No rash noted. Psychiatric: She has a normal mood and affect. Her behavior is normal. Thought content normal.   Nursing note and vitals reviewed. Labs:   See addendum    A/P:  64 y.o. female    ICD-10-CM ICD-9-CM    1. Screening for tuberculosis Z11.1 V74.1 QUANTIFERON TB GOLD   2. Polymyositis (HCC) M33.20 710.4    3. Dermatitis L30.9 692.9    4. Allergic conjunctivitis, unspecified laterality H10.10 372.14 ketotifen (ZADITOR) 0.025 % (0.035 %) ophthalmic solution   5. Screening for measles Z11.59 V73.2 MEASLES/MUMPS/RUBELLA IMMUNITY   6. Rubella immune status not known Z78.9 V49.89 MEASLES/MUMPS/RUBELLA IMMUNITY   7. History of varicella infection Z86.19 V12.09 VZV AB, IGG   8. Encounter for immunization Z23 V03.89 TETANUS, DIPHTHERIA TOXOIDS AND ACELLULAR PERTUSSIS VACCINE (TDAP), IN INDIVIDS. >=7, IM     Polymyositis and immunosuppressed: discussed considering pneumonia vaccine. To discuss timing of vaccine with rheum since she is receiving IVIG and may have low efficacy. 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 Will not actually be going into clinical setting (while immunosuppressed this is wisest). However required to have testing.     - She was given AVS and expressed understanding with the diagnosis and plan as discussed. Follow-up Disposition: Not on File  No future appointments.

## 2017-07-06 NOTE — PROGRESS NOTES
Rm#3  Chief Complaint   Patient presents with    Form Completion     [facility] completion [facility]  Itchy Eye     left eye; x3 days, itching, no pain, no drainage, no redness.  Skin Problem     marks on skin on upper and lower back. no pain. upper back/neck is painful. lower back is itching. x1 month      1. Have you been to the ER, urgent care clinic since your last visit? Hospitalized since your last visit? No    2. Have you seen or consulted any other health care providers outside of the [address] [place] [address] since your last visit? Include any pap smears or colon screening.  Yes When: 7/3/17 Where: [facility] Reason for visit: f/u  Health Maintenance Due   Topic Date Due    Hepatitis C Screening  1955    Pneumococcal 19-64 Highest Risk (1 of 3 - PCV13) 09/07/1974    DTaP/Tdap/Td series (1 - Tdap) 09/07/1976    PAP AKA CERVICAL CYTOLOGY  09/07/1976    FOBT Q 1 YEAR AGE 50-75  09/07/2005    ZOSTER VACCINE AGE 60>  09/07/2015      reviewed

## 2017-07-10 ENCOUNTER — TELEPHONE (OUTPATIENT)
Dept: INTERNAL MEDICINE CLINIC | Age: 62
End: 2017-07-10

## 2017-07-10 PROBLEM — D84.9 IMMUNOSUPPRESSED STATUS (HCC): Status: ACTIVE | Noted: 2017-07-10

## 2017-07-10 LAB
ANNOTATION COMMENT IMP: ABNORMAL
GAMMA INTERFERON BACKGROUND BLD IA-ACNC: 0.06 IU/ML
M TB IFN-G BLD-IMP: ABNORMAL
M TB IFN-G CD4+ BCKGRND COR BLD-ACNC: <0 IU/ML
M TB IFN-G CD4+ T-CELLS BLD-ACNC: 0.05 IU/ML
MEV IGG SER IA-ACNC: >300 AU/ML
MITOGEN IGNF BLD-ACNC: 0.14 IU/ML
MUV IGG SER IA-ACNC: 100 AU/ML
QUANTIFERON INCUBATION: NORMAL
RUBV IGG SERPL IA-ACNC: 22.6 INDEX
SERVICE CMNT-IMP: ABNORMAL
VZV IGG SER IA-ACNC: >4000 INDEX

## 2017-07-10 NOTE — TELEPHONE ENCOUNTER
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 However the Tuberculosis screen cannot be validated because your immune response on the IGRA test was low. This is because of immunosuppression. I have explained this in the printed letter. No further testing indicated.      Please send letter as requested to patient and or volunteer      Thanks  Marcia Mcelroy MD

## 2017-07-10 NOTE — TELEPHONE ENCOUNTER
Patient returned call. Notified her of results. She also asked about the Volunteer Form being completed. I told her I will get this filled out and ready for her. She plans to come around 3 pm to pick it up along with Lab letter.

## 2017-07-10 NOTE — PROGRESS NOTES
Immune response present for MMR/varicella. Unable to determine TB screen because of immunosuppressed status.

## 2017-07-29 ENCOUNTER — APPOINTMENT (OUTPATIENT)
Dept: CT IMAGING | Age: 62
End: 2017-07-29
Attending: EMERGENCY MEDICINE
Payer: COMMERCIAL

## 2017-07-29 ENCOUNTER — HOSPITAL ENCOUNTER (EMERGENCY)
Age: 62
Discharge: HOME | End: 2017-07-30
Attending: EMERGENCY MEDICINE | Admitting: EMERGENCY MEDICINE
Payer: COMMERCIAL

## 2017-07-29 DIAGNOSIS — R19.7 DIARRHEA, UNSPECIFIED TYPE: Primary | ICD-10-CM

## 2017-07-29 DIAGNOSIS — R10.9 ABDOMINAL PAIN, OTHER SPECIFIED SITE: ICD-10-CM

## 2017-07-29 LAB
ALBUMIN SERPL BCP-MCNC: 3.1 G/DL (ref 3.5–5)
ALBUMIN/GLOB SERPL: 0.6 {RATIO} (ref 1.1–2.2)
ALP SERPL-CCNC: 97 U/L (ref 45–117)
ALT SERPL-CCNC: 59 U/L (ref 12–78)
ANION GAP BLD CALC-SCNC: 6 MMOL/L (ref 5–15)
AST SERPL W P-5'-P-CCNC: 84 U/L (ref 15–37)
BILIRUB SERPL-MCNC: 0.3 MG/DL (ref 0.2–1)
BUN SERPL-MCNC: 26 MG/DL (ref 6–20)
BUN/CREAT SERPL: 22 (ref 12–20)
CALCIUM SERPL-MCNC: 8.7 MG/DL (ref 8.5–10.1)
CHLORIDE SERPL-SCNC: 106 MMOL/L (ref 97–108)
CO2 SERPL-SCNC: 25 MMOL/L (ref 21–32)
CREAT SERPL-MCNC: 1.19 MG/DL (ref 0.55–1.02)
ERYTHROCYTE [DISTWIDTH] IN BLOOD BY AUTOMATED COUNT: 16.3 % (ref 11.5–14.5)
GLOBULIN SER CALC-MCNC: 4.9 G/DL (ref 2–4)
GLUCOSE SERPL-MCNC: 94 MG/DL (ref 65–100)
HCT VFR BLD AUTO: 36.2 % (ref 35–47)
HGB BLD-MCNC: 11.9 G/DL (ref 11.5–16)
LACTATE SERPL-SCNC: 1.1 MMOL/L (ref 0.4–2)
MCH RBC QN AUTO: 22.8 PG (ref 26–34)
MCHC RBC AUTO-ENTMCNC: 32.9 G/DL (ref 30–36.5)
MCV RBC AUTO: 69.5 FL (ref 80–99)
PLATELET # BLD AUTO: 185 K/UL (ref 150–400)
POTASSIUM SERPL-SCNC: 4 MMOL/L (ref 3.5–5.1)
PROT SERPL-MCNC: 8 G/DL (ref 6.4–8.2)
RBC # BLD AUTO: 5.21 M/UL (ref 3.8–5.2)
SODIUM SERPL-SCNC: 137 MMOL/L (ref 136–145)
WBC # BLD AUTO: 7 K/UL (ref 3.6–11)

## 2017-07-29 PROCEDURE — 93005 ELECTROCARDIOGRAM TRACING: CPT

## 2017-07-29 PROCEDURE — 74177 CT ABD & PELVIS W/CONTRAST: CPT

## 2017-07-29 PROCEDURE — 85027 COMPLETE CBC AUTOMATED: CPT | Performed by: EMERGENCY MEDICINE

## 2017-07-29 PROCEDURE — 80053 COMPREHEN METABOLIC PANEL: CPT | Performed by: EMERGENCY MEDICINE

## 2017-07-29 PROCEDURE — 96374 THER/PROPH/DIAG INJ IV PUSH: CPT

## 2017-07-29 PROCEDURE — 96375 TX/PRO/DX INJ NEW DRUG ADDON: CPT

## 2017-07-29 PROCEDURE — 83605 ASSAY OF LACTIC ACID: CPT | Performed by: EMERGENCY MEDICINE

## 2017-07-29 PROCEDURE — 99284 EMERGENCY DEPT VISIT MOD MDM: CPT

## 2017-07-29 PROCEDURE — 83690 ASSAY OF LIPASE: CPT | Performed by: EMERGENCY MEDICINE

## 2017-07-29 PROCEDURE — 36415 COLL VENOUS BLD VENIPUNCTURE: CPT | Performed by: EMERGENCY MEDICINE

## 2017-07-29 PROCEDURE — 96361 HYDRATE IV INFUSION ADD-ON: CPT

## 2017-07-29 PROCEDURE — 74011250636 HC RX REV CODE- 250/636: Performed by: EMERGENCY MEDICINE

## 2017-07-29 PROCEDURE — 83735 ASSAY OF MAGNESIUM: CPT | Performed by: EMERGENCY MEDICINE

## 2017-07-29 PROCEDURE — 94762 N-INVAS EAR/PLS OXIMTRY CONT: CPT

## 2017-07-29 PROCEDURE — 74011636320 HC RX REV CODE- 636/320: Performed by: EMERGENCY MEDICINE

## 2017-07-29 PROCEDURE — 85025 COMPLETE CBC W/AUTO DIFF WBC: CPT | Performed by: EMERGENCY MEDICINE

## 2017-07-29 RX ORDER — MORPHINE SULFATE 2 MG/ML
4 INJECTION, SOLUTION INTRAMUSCULAR; INTRAVENOUS
Status: COMPLETED | OUTPATIENT
Start: 2017-07-29 | End: 2017-07-29

## 2017-07-29 RX ORDER — ONDANSETRON 2 MG/ML
4 INJECTION INTRAMUSCULAR; INTRAVENOUS
Status: COMPLETED | OUTPATIENT
Start: 2017-07-29 | End: 2017-07-29

## 2017-07-29 RX ORDER — SODIUM CHLORIDE 0.9 % (FLUSH) 0.9 %
10 SYRINGE (ML) INJECTION
Status: COMPLETED | OUTPATIENT
Start: 2017-07-29 | End: 2017-07-29

## 2017-07-29 RX ORDER — SODIUM CHLORIDE 9 MG/ML
50 INJECTION, SOLUTION INTRAVENOUS
Status: COMPLETED | OUTPATIENT
Start: 2017-07-29 | End: 2017-07-30

## 2017-07-29 RX ADMIN — SODIUM CHLORIDE 50 ML/HR: 900 INJECTION, SOLUTION INTRAVENOUS at 23:54

## 2017-07-29 RX ADMIN — Medication 10 ML: at 23:54

## 2017-07-29 RX ADMIN — ONDANSETRON 4 MG: 2 INJECTION INTRAMUSCULAR; INTRAVENOUS at 23:18

## 2017-07-29 RX ADMIN — MORPHINE SULFATE 4 MG: 2 INJECTION, SOLUTION INTRAMUSCULAR; INTRAVENOUS at 23:21

## 2017-07-29 RX ADMIN — IOPAMIDOL 100 ML: 755 INJECTION, SOLUTION INTRAVENOUS at 23:54

## 2017-07-29 RX ADMIN — SODIUM CHLORIDE 1000 ML: 900 INJECTION, SOLUTION INTRAVENOUS at 22:58

## 2017-07-30 VITALS
WEIGHT: 170 LBS | OXYGEN SATURATION: 96 % | BODY MASS INDEX: 30.12 KG/M2 | TEMPERATURE: 97.8 F | HEIGHT: 63 IN | DIASTOLIC BLOOD PRESSURE: 64 MMHG | HEART RATE: 80 BPM | SYSTOLIC BLOOD PRESSURE: 157 MMHG | RESPIRATION RATE: 14 BRPM

## 2017-07-30 LAB
APPEARANCE UR: CLEAR
ATRIAL RATE: 78 BPM
BACTERIA URNS QL MICRO: NEGATIVE /HPF
BASOPHILS # BLD AUTO: 0 K/UL (ref 0–0.1)
BASOPHILS # BLD: 0 % (ref 0–1)
BILIRUB UR QL: NEGATIVE
CALCULATED P AXIS, ECG09: 63 DEGREES
CALCULATED R AXIS, ECG10: 57 DEGREES
CALCULATED T AXIS, ECG11: 31 DEGREES
COLOR UR: ABNORMAL
DIAGNOSIS, 93000: NORMAL
DIFFERENTIAL METHOD BLD: ABNORMAL
EOSINOPHIL # BLD: 0.1 K/UL (ref 0–0.4)
EOSINOPHIL NFR BLD: 1 % (ref 0–7)
EPITH CASTS URNS QL MICRO: ABNORMAL /LPF
ERYTHROCYTE [DISTWIDTH] IN BLOOD BY AUTOMATED COUNT: 16.3 % (ref 11.5–14.5)
GLUCOSE UR STRIP.AUTO-MCNC: NEGATIVE MG/DL
HCT VFR BLD AUTO: 36.6 % (ref 35–47)
HGB BLD-MCNC: 11.7 G/DL (ref 11.5–16)
HGB UR QL STRIP: ABNORMAL
HYALINE CASTS URNS QL MICRO: ABNORMAL /LPF (ref 0–5)
KETONES UR QL STRIP.AUTO: NEGATIVE MG/DL
LEUKOCYTE ESTERASE UR QL STRIP.AUTO: ABNORMAL
LIPASE SERPL-CCNC: 173 U/L (ref 73–393)
LYMPHOCYTES # BLD AUTO: 21 % (ref 12–49)
LYMPHOCYTES # BLD: 1.5 K/UL (ref 0.8–3.5)
MAGNESIUM SERPL-MCNC: 1.8 MG/DL (ref 1.6–2.4)
MCH RBC QN AUTO: 22.3 PG (ref 26–34)
MCHC RBC AUTO-ENTMCNC: 32 G/DL (ref 30–36.5)
MCV RBC AUTO: 69.7 FL (ref 80–99)
MONOCYTES # BLD: 0.5 K/UL (ref 0–1)
MONOCYTES NFR BLD AUTO: 7 % (ref 5–13)
NEUTS SEG # BLD: 4.9 K/UL (ref 1.8–8)
NEUTS SEG NFR BLD AUTO: 71 % (ref 32–75)
NITRITE UR QL STRIP.AUTO: NEGATIVE
P-R INTERVAL, ECG05: 176 MS
PH UR STRIP: 5 [PH] (ref 5–8)
PLATELET # BLD AUTO: 183 K/UL (ref 150–400)
PLATELET COMMENTS,PCOM: ABNORMAL
PROT UR STRIP-MCNC: NEGATIVE MG/DL
Q-T INTERVAL, ECG07: 426 MS
QRS DURATION, ECG06: 76 MS
QTC CALCULATION (BEZET), ECG08: 485 MS
RBC # BLD AUTO: 5.25 M/UL (ref 3.8–5.2)
RBC #/AREA URNS HPF: ABNORMAL /HPF (ref 0–5)
RBC MORPH BLD: ABNORMAL
SP GR UR REFRACTOMETRY: 1.01 (ref 1–1.03)
UA: UC IF INDICATED,UAUC: ABNORMAL
UROBILINOGEN UR QL STRIP.AUTO: 0.2 EU/DL (ref 0.2–1)
VENTRICULAR RATE, ECG03: 78 BPM
WBC # BLD AUTO: 7 K/UL (ref 3.6–11)
WBC URNS QL MICRO: ABNORMAL /HPF (ref 0–4)

## 2017-07-30 PROCEDURE — 87086 URINE CULTURE/COLONY COUNT: CPT | Performed by: EMERGENCY MEDICINE

## 2017-07-30 PROCEDURE — 81001 URINALYSIS AUTO W/SCOPE: CPT | Performed by: EMERGENCY MEDICINE

## 2017-07-30 RX ORDER — DICYCLOMINE HYDROCHLORIDE 10 MG/1
10 CAPSULE ORAL 4 TIMES DAILY
Qty: 20 CAP | Refills: 0 | Status: SHIPPED | OUTPATIENT
Start: 2017-07-30 | End: 2017-08-03

## 2017-07-30 RX ORDER — LOPERAMIDE HCL 2 MG
2 TABLET ORAL
Qty: 20 TAB | Refills: 0 | Status: SHIPPED | OUTPATIENT
Start: 2017-07-30 | End: 2017-08-03

## 2017-07-30 NOTE — DISCHARGE INSTRUCTIONS
Abdominal Pain: Care Instructions  Your Care Instructions    Abdominal pain has many possible causes. Some aren't serious and get better on their own in a few days. Others need more testing and treatment. If your pain continues or gets worse, you need to be rechecked and may need more tests to find out what is wrong. You may need surgery to correct the problem. Don't ignore new symptoms, such as fever, nausea and vomiting, urination problems, pain that gets worse, and dizziness. These may be signs of a more serious problem. Your doctor may have recommended a follow-up visit in the next 8 to 12 hours. If you are not getting better, you may need more tests or treatment. The doctor has checked you carefully, but problems can develop later. If you notice any problems or new symptoms, get medical treatment right away. Follow-up care is a key part of your treatment and safety. Be sure to make and go to all appointments, and call your doctor if you are having problems. It's also a good idea to know your test results and keep a list of the medicines you take. How can you care for yourself at home? · Rest until you feel better. · To prevent dehydration, drink plenty of fluids, enough so that your urine is light yellow or clear like water. Choose water and other caffeine-free clear liquids until you feel better. If you have kidney, heart, or liver disease and have to limit fluids, talk with your doctor before you increase the amount of fluids you drink. · If your stomach is upset, eat mild foods, such as rice, dry toast or crackers, bananas, and applesauce. Try eating several small meals instead of two or three large ones. · Wait until 48 hours after all symptoms have gone away before you have spicy foods, alcohol, and drinks that contain caffeine. · Do not eat foods that are high in fat. · Avoid anti-inflammatory medicines such as aspirin, ibuprofen (Advil, Motrin), and naproxen (Aleve).  These can cause stomach upset. Talk to your doctor if you take daily aspirin for another health problem. When should you call for help? Call 911 anytime you think you may need emergency care. For example, call if:  · You passed out (lost consciousness). · You pass maroon or very bloody stools. · You vomit blood or what looks like coffee grounds. · You have new, severe belly pain. Call your doctor now or seek immediate medical care if:  · Your pain gets worse, especially if it becomes focused in one area of your belly. · You have a new or higher fever. · Your stools are black and look like tar, or they have streaks of blood. · You have unexpected vaginal bleeding. · You have symptoms of a urinary tract infection. These may include:  ¨ Pain when you urinate. ¨ Urinating more often than usual.  ¨ Blood in your urine. · You are dizzy or lightheaded, or you feel like you may faint. Watch closely for changes in your health, and be sure to contact your doctor if:  · You are not getting better after 1 day (24 hours). Where can you learn more? Go to http://brijeshTrialBeemarichuy.info/. Enter G701 in the search box to learn more about \"Abdominal Pain: Care Instructions. \"  Current as of: March 20, 2017  Content Version: 11.3  © 7261-5493 [employer]. Care instructions adapted under license by [employer] (which disclaims liability or warranty for this information). If you have questions about a medical condition or this instruction, always ask your healthcare professional. [place] [address] any warranty or liability for your use of this information. Diarrhea: Care Instructions  Your Care Instructions    Diarrhea is loose, watery stools (bowel movements). The exact cause is often hard to find. Sometimes diarrhea is your body's way of getting rid of what caused an upset stomach. Viruses, food poisoning, and many medicines can cause diarrhea.  Some people get diarrhea in response to emotional stress, anxiety, or certain foods. Almost everyone has diarrhea now and then. It usually isn't serious, and your stools will return to normal soon. The important thing to do is replace the fluids you have lost, so you can prevent dehydration. The doctor has checked you carefully, but problems can develop later. If you notice any problems or new symptoms, get medical treatment right away. Follow-up care is a key part of your treatment and safety. Be sure to make and go to all appointments, and call your doctor if you are having problems. It's also a good idea to know your test results and keep a list of the medicines you take. How can you care for yourself at home? · Watch for signs of dehydration, which means your body has lost too much water. Dehydration is a serious condition and should be treated right away. Signs of dehydration are:  ¨ Increasing thirst and dry eyes and mouth. ¨ Feeling faint or lightheaded. ¨ Darker urine, and a smaller amount of urine than normal.  · To prevent dehydration, drink plenty of fluids, enough so that your urine is light yellow or clear like water. Choose water and other caffeine-free clear liquids until you feel better. If you have kidney, heart, or liver disease and have to limit fluids, talk with your doctor before you increase the amount of fluids you drink. · Begin eating small amounts of mild foods the next day, if you feel like it. ¨ Try yogurt that has live cultures of Lactobacillus. (Check the label.)  ¨ Avoid spicy foods, fruits, alcohol, and caffeine until 48 hours after all symptoms are gone. ¨ Avoid chewing gum that contains sorbitol. ¨ Avoid dairy products (except for yogurt with Lactobacillus) while you have diarrhea and for 3 days after symptoms are gone. · The doctor may recommend that you take over-the-counter medicine, such as loperamide (Imodium), if you still have diarrhea after 6 hours.  Read and follow all instructions on the label. Do not use this medicine if you have bloody diarrhea, a high fever, or other signs of serious illness. Call your doctor if you think you are having a problem with your medicine. When should you call for help? Call 911 anytime you think you may need emergency care. For example, call if:  · You passed out (lost consciousness). · Your stools are maroon or very bloody. Call your doctor now or seek immediate medical care if:  · You are dizzy or lightheaded, or you feel like you may faint. · Your stools are black and look like tar, or they have streaks of blood. · You have new or worse belly pain. · You have symptoms of dehydration, such as:  ¨ Dry eyes and a dry mouth. ¨ Passing only a little dark urine. ¨ Feeling thirstier than usual.  · You have a new or higher fever. Watch closely for changes in your health, and be sure to contact your doctor if:  · Your diarrhea is getting worse. · You see pus in the diarrhea. · You are not getting better after 2 days (48 hours). Where can you learn more? Go to http://brijesh-marichuy.info/. Enter Q910 in the search box to learn more about \"Diarrhea: Care Instructions. \"  Current as of: March 20, 2017  Content Version: 11.3  © 8899-9240 [employer]. Care instructions adapted under license by [employer] (which disclaims liability or warranty for this information). If you have questions about a medical condition or this instruction, always ask your healthcare professional. [place] [address] any warranty or liability for your use of this information.

## 2017-07-30 NOTE — ED PROVIDER NOTES
HPI Comments: Georgia Whitehead is a 64 y.o. female with PMhx significant for HTN, polymyositis, and kidney cancer who presents ambulatory to the ED with cc of constant diarrhea and nausea  x 6 days as well as waxing and waning suprapubic pain beginning ~1830. Pt also c/o of difficulty urinating while in ED. She notes unexpected weight loss (32 lbs since 8/15/2017 after starting Prednisone) and intermittent chills since 5/16/2017 after starting Cellcept. Pt indicates her diarrhea initially was watery, but is now primarily loose stools ~2x/day. She reports hx of similar abdominal pain, but states it typically lasts ~20 seconds and resolves on its own. Pt denies any recent travel, abx use, or change in medication dosage. She notes her last colonoscopy was in 10/2017 and was normal. Pt reports hx of right nephrectomy due to kidney cancer one year ago. She reports occasional EtOH use, but denies any tobacco and illicit drug use. Pt specifically denies any fever, vomiting, and blood in her stool. PCP: Ashley Wren MD    There are no other complaints, changes or physical findings at this time. The history is provided by the patient. Past Medical History:   Diagnosis Date    Breast lump 1999    Negative    Calculus of kidney     Hypertension     Polymyositis ([facility] [address] 7/28/2016       Past Surgical History:   Procedure Laterality Date    HX NEPHRECTOMY Right 2016    for kidney cancer    US GUIDED CORE BREAST BIOPSY Left 1999    Negative         Family History:   Problem Relation Age of Onset    Cancer Mother     Breast Cancer Mother 68    Diabetes Father     Hypertension Father     Stroke Maternal Grandmother        Social History     Social History    Marital status: SINGLE     Spouse name: N/A    Number of children: N/A    Years of education: N/A     Occupational History    Not on file.      Social History Main Topics    Smoking status: Former Smoker     Packs/day: 1.00     Types: Cigarettes Quit date: 5/1/1998    Smokeless tobacco: Never Used    Alcohol use 0.6 oz/week     1 Glasses of wine, 0 Standard drinks or equivalent per week      Comment: rare    Drug use: No    Sexual activity: Not on file     Other Topics Concern    Not on file     Social History Narrative         ALLERGIES: Levaquin [levofloxacin]; Lisinopril; Metoprolol; and Peanut    Review of Systems   Constitutional: Positive for chills and unexpected weight change. Negative for diaphoresis and fever. HENT: Negative for rhinorrhea and sore throat. Eyes: Negative for pain. Respiratory: Negative for shortness of breath, wheezing and stridor. Cardiovascular: Negative for chest pain and leg swelling. Gastrointestinal: Positive for abdominal pain (suprapubic), diarrhea and nausea. Negative for blood in stool and vomiting. Genitourinary: Positive for difficulty urinating. Negative for dysuria and flank pain. Musculoskeletal: Negative for back pain and neck stiffness. Skin: Negative for rash. Neurological: Negative for seizures, syncope, weakness, light-headedness and headaches. Psychiatric/Behavioral: Negative for confusion. Patient Vitals for the past 12 hrs:   Temp Pulse Resp BP SpO2   07/30/17 0130 - 80 14 157/64 96 %   07/29/17 2330 - 75 14 154/69 98 %   07/29/17 2315 - 75 16 170/65 99 %   07/29/17 2300 - 76 20 170/67 97 %   07/29/17 2245 - 61 13 165/66 99 %   07/29/17 2050 97.8 °F (36.6 °C) 83 16 185/80 100 %            Physical Exam   Constitutional: She is oriented to person, place, and time. She appears well-developed and well-nourished. No distress. HENT:   Nose: Nose normal.   Mouth/Throat: Oropharynx is clear and moist. No oropharyngeal exudate. Eyes: Conjunctivae and EOM are normal. Pupils are equal, round, and reactive to light. Right eye exhibits no discharge. Left eye exhibits no discharge. No scleral icterus. Neck: Normal range of motion. Neck supple. No JVD present.    Cardiovascular: Normal rate, regular rhythm, normal heart sounds and intact distal pulses. No murmur heard. Pulmonary/Chest: Effort normal and breath sounds normal. No stridor. No respiratory distress. She has no wheezes. She has no rales. Abdominal: Soft. Bowel sounds are normal. She exhibits no distension. There is tenderness. There is no rebound and no guarding. Well healed ex-lap scar, diffuse lower abdominal tenderness, particularly in the suprapubic region   Musculoskeletal: She exhibits no edema or tenderness. Neurological: She is alert and oriented to person, place, and time. Skin: Skin is warm and dry. No rash noted. She is not diaphoretic. Psychiatric: She has a normal mood and affect. Nursing note and vitals reviewed. MDM  Number of Diagnoses or Management Options  Abdominal pain, other specified site:   Diarrhea, unspecified type:   Diagnosis management comments: Diarrhea x days with lower abdominal pain. Labs unremarkable. CT negative. Symptoms resolved with treatment in the ED. Patient nontoxic, afebrile, well appearing, tolerating PO. Stable for discharge. Amount and/or Complexity of Data Reviewed  Clinical lab tests: ordered and reviewed  Tests in the radiology section of CPT®: reviewed and ordered  Tests in the medicine section of CPT®: ordered and reviewed  Review and summarize past medical records: yes  Independent visualization of images, tracings, or specimens: yes    Patient Progress  Patient progress: stable    ED Course       Procedures    EKG interpretation: (Preliminary)  11:06  Rhythm: normal sinus rhythm; and regular . Rate (approx.): 78; Axis: normal; IA interval: normal; QRS interval: normal ; ST/T wave: normal; Other findings: possible left atrial enlargement. PROGRESS NOTE:  12:50 AM  Pt urinated and feels much better.   Written by Francesco Patterson ED Scribe, as dictated by Alex Holden MD.    LABORATORY TESTS:  Recent Results (from the past 12 hour(s))   CBC W/O DIFF Collection Time: 07/29/17 10:29 PM   Result Value Ref Range    WBC 7.0 3.6 - 11.0 K/uL    RBC 5.21 (H) 3.80 - 5.20 M/uL    HGB 11.9 11.5 - 16.0 g/dL    HCT 36.2 35.0 - 47.0 %    MCV 69.5 (L) 80.0 - 99.0 FL    MCH 22.8 (L) 26.0 - 34.0 PG    MCHC 32.9 30.0 - 36.5 g/dL    RDW 16.3 (H) 11.5 - 14.5 %    PLATELET 521 423 - 652 K/uL   METABOLIC PANEL, COMPREHENSIVE    Collection Time: 07/29/17 10:29 PM   Result Value Ref Range    Sodium 137 136 - 145 mmol/L    Potassium 4.0 3.5 - 5.1 mmol/L    Chloride 106 97 - 108 mmol/L    CO2 25 21 - 32 mmol/L    Anion gap 6 5 - 15 mmol/L    Glucose 94 65 - 100 mg/dL    BUN 26 (H) 6 - 20 MG/DL    Creatinine 1.19 (H) 0.55 - 1.02 MG/DL    BUN/Creatinine ratio 22 (H) 12 - 20      GFR est AA 56 (L) >60 ml/min/1.73m2    GFR est non-AA 46 (L) >60 ml/min/1.73m2    Calcium 8.7 8.5 - 10.1 MG/DL    Bilirubin, total 0.3 0.2 - 1.0 MG/DL    ALT (SGPT) 59 12 - 78 U/L    AST (SGOT) 84 (H) 15 - 37 U/L    Alk. phosphatase 97 45 - 117 U/L    Protein, total 8.0 6.4 - 8.2 g/dL    Albumin 3.1 (L) 3.5 - 5.0 g/dL    Globulin 4.9 (H) 2.0 - 4.0 g/dL    A-G Ratio 0.6 (L) 1.1 - 2.2     CBC WITH AUTOMATED DIFF    Collection Time: 07/29/17 10:29 PM   Result Value Ref Range    WBC 7.0 3.6 - 11.0 K/uL    RBC 5.25 (H) 3.80 - 5.20 M/uL    HGB 11.7 11.5 - 16.0 g/dL    HCT 36.6 35.0 - 47.0 %    MCV 69.7 (L) 80.0 - 99.0 FL    MCH 22.3 (L) 26.0 - 34.0 PG    MCHC 32.0 30.0 - 36.5 g/dL    RDW 16.3 (H) 11.5 - 14.5 %    PLATELET 775 468 - 304 K/uL    NEUTROPHILS 71 32 - 75 %    LYMPHOCYTES 21 12 - 49 %    MONOCYTES 7 5 - 13 %    EOSINOPHILS 1 0 - 7 %    BASOPHILS 0 0 - 1 %    ABS. NEUTROPHILS 4.9 1.8 - 8.0 K/UL    ABS. LYMPHOCYTES 1.5 0.8 - 3.5 K/UL    ABS. MONOCYTES 0.5 0.0 - 1.0 K/UL    ABS. EOSINOPHILS 0.1 0.0 - 0.4 K/UL    ABS.  BASOPHILS 0.0 0.0 - 0.1 K/UL    PLATELET COMMENTS LARGE PLATELETS      RBC COMMENTS ANISOCYTOSIS  1+        RBC COMMENTS MICROCYTOSIS  1+        RBC COMMENTS HYPOCHROMIA  2+        DF SMEAR SCANNED     LIPASE    Collection Time: 07/29/17 10:29 PM   Result Value Ref Range    Lipase 173 73 - 393 U/L   MAGNESIUM    Collection Time: 07/29/17 10:29 PM   Result Value Ref Range    Magnesium 1.8 1.6 - 2.4 mg/dL   LACTIC ACID    Collection Time: 07/29/17 11:00 PM   Result Value Ref Range    Lactic acid 1.1 0.4 - 2.0 MMOL/L   EKG, 12 LEAD, INITIAL    Collection Time: 07/29/17 11:06 PM   Result Value Ref Range    Ventricular Rate 78 BPM    Atrial Rate 78 BPM    P-R Interval 176 ms    QRS Duration 76 ms    Q-T Interval 426 ms    QTC Calculation (Bezet) 485 ms    Calculated P Axis 63 degrees    Calculated R Axis 57 degrees    Calculated T Axis 31 degrees    Diagnosis       Normal sinus rhythm  Possible Left atrial enlargement  Borderline ECG  No previous ECGs available     URINALYSIS W/ REFLEX CULTURE    Collection Time: 07/30/17 12:10 AM   Result Value Ref Range    Color YELLOW/STRAW      Appearance CLEAR CLEAR      Specific gravity 1.012 1.003 - 1.030      pH (UA) 5.0 5.0 - 8.0      Protein NEGATIVE  NEG mg/dL    Glucose NEGATIVE  NEG mg/dL    Ketone NEGATIVE  NEG mg/dL    Bilirubin NEGATIVE  NEG      Blood TRACE (A) NEG      Urobilinogen 0.2 0.2 - 1.0 EU/dL    Nitrites NEGATIVE  NEG      Leukocyte Esterase SMALL (A) NEG      WBC 10-20 0 - 4 /hpf    RBC 5-10 0 - 5 /hpf    Epithelial cells FEW FEW /lpf    Bacteria NEGATIVE  NEG /hpf    UA:UC IF INDICATED URINE CULTURE ORDERED (A) CNI      Hyaline cast 0-2 0 - 5 /lpf       IMAGING RESULTS:  CT Results  (Last 48 hours)               07/29/17 2354  CT ABD PELV W CONT Final result    Narrative:  EXAM:  CT ABD PELV W CONT       INDICATION: lower abd pain, diarrhea, on immunomodulators       COMPARISON: CT abdomen 4/20/2016. CONTRAST:  100 mL of Isovue-370. TECHNIQUE:    Following the uneventful intravenous administration of contrast, thin axial   images were obtained through the abdomen and pelvis. Coronal and sagittal   reconstructions were generated.  Oral contrast was not administered. CT dose   reduction was achieved through use of a standardized protocol tailored for this   examination and automatic exposure control for dose modulation. FINDINGS:        LUNG BASES: Dependent atelectasis. Otherwise clear. INCIDENTALLY IMAGED HEART AND MEDIASTINUM: The visualized heart is normal in   size without pericardial effusion. LIVER: There is a too small to fully characterize hypodensity in the right lobe   which is statistically likely to be benign. Otherwise unremarkable. GALLBLADDER: Unremarkable. SPLEEN: Unremarkable. PANCREAS: No mass or duct dilation. ADRENALS: Unremarkable. KIDNEYS: The right kidney is surgically as it. Left kidney is unremarkable with   no mass, calculus, or hydronephrosis. STOMACH: There is a small hiatal hernia. Otherwise unremarkable. SMALL BOWEL: No dilatation or wall thickening. COLON: No dilation or wall thickening. APPENDIX: Unremarkable. PERITONEUM: No ascites or pneumoperitoneum. RETROPERITONEUM: Atherosclerotic calcification of the aorta without aneurysm or   dissection. No enlarged lymphadenopathy. REPRODUCTIVE ORGANS:   URINARY BLADDER: Mildly distended with no stone or mass evident. BONES: Upper convex lumbar scoliosis, degenerative spine change, advanced hip   osteoarthritis, and no acute fracture or aggressive lesion. ADDITIONAL COMMENTS: N/A       IMPRESSION: Urinary bladder distention. Incidental findings as above with no   acute findings otherwise.                      MEDICATIONS GIVEN:  Medications   sodium chloride 0.9 % bolus infusion 1,000 mL (1,000 mL IntraVENous New Bag 7/29/17 2255)   morphine injection 4 mg (4 mg IntraVENous Given 7/29/17 2321)   ondansetron (ZOFRAN) injection 4 mg (4 mg IntraVENous Given 7/29/17 2318)   0.9% sodium chloride infusion (50 mL/hr IntraVENous New Bag 7/29/17 6284)   iopamidol (ISOVUE-370) 76 % injection 100 mL (100 mL IntraVENous Given 7/29/17 2354)   sodium chloride (NS) flush 10 mL (10 mL IntraVENous Given 7/29/17 6303)       IMPRESSION:  1. Diarrhea, unspecified type    2. Abdominal pain, other specified site        PLAN:  1. Discharge home  Discharge Medication List as of 7/30/2017  1:09 AM      START taking these medications    Details   loperamide (IMODIUM A-D) 2 mg tablet Take 1 Tab by mouth four (4) times daily as needed for Diarrhea for up to 10 days. , Normal, Disp-20 Tab, R-0         CONTINUE these medications which have NOT CHANGED    Details   mycophenolate mofetil (CELLCEPT) 200 mg/mL suspension Take 6 mL by mouth two (2) times a day., Historical Med, R-1      IMMUNE GLOBULIN,GAMMA,IGG, (IMMUNE GLOBULIN, HUMAN,, IGG, IV) 80 g by IntraVENous route., Historical Med      mycophenolate (CELLCEPT) 500 mg tablet TAKE ONE TABLET BY MOUTH TWICE A DAY FOR 7 DAYS ,THEN TAKE UP TO TWO TABLETS BY MOUTH TWICE A DAY THEREAFTER, Historical Med, R-1      hydrocortisone (ANUSOL-HC) 2.5 % rectal cream Insert  into rectum four (4) times daily. , Normal, Disp-30 g, R-0      fluticasone (CUTIVATE) 0.05 % topical cream Apply  to affected area two (2) times a day., Normal, Disp-15 g, R-0      omeprazole (PRILOSEC) 40 mg capsule Take 1 Cap by mouth daily. , Normal, Disp-14 Cap, R-3      losartan (COZAAR) 100 mg tablet TAKE ONE TABLET BY MOUTH DAILY, Historical Med, R-6      predniSONE (DELTASONE) 5 mg tablet TAKE THREE TABLETS BY MOUTH EVERY DAY, Historical Med, R-3      fluticasone (FLONASE) 50 mcg/actuation nasal spray INHALE 2 SPRAYS IN EACH NOSTRIL AT BEDTIME, Historical Med, R-12           2.    Follow-up Information     Follow up With Details Comments Contact Info    Pranav Finn MD Call in 2 days  [address]  [phone]      [facility] EMERGENCY DEPT  As needed, If symptoms worsen [address] [place] [address] Drive  [address]  [phone]        Return to ED if worse     DISCHARGE NOTE:  1:11 AM  The patient is ready for discharge. The patients signs, symptoms, diagnosis, and instructions for discharge have been discussed and the pt has conveyed their understanding. The patient is to follow up as recommended with PCP or return to the ER should their symptoms worsen. Plan has been discussed and patient has conveyed their agreement. This note is prepared by Zoe Roche, acting as Scribe for Abby Menjivar MD.    Abby Menjivar MD: The scribe's documentation has been prepared under my direction and personally reviewed by me in its entirety. I confirm that the note above accurately reflects all work, treatment, procedures, and medical decision making performed by me.

## 2017-07-31 LAB
BACTERIA SPEC CULT: NORMAL
CC UR VC: NORMAL
SERVICE CMNT-IMP: NORMAL

## 2017-08-01 ENCOUNTER — HOSPITAL ENCOUNTER (EMERGENCY)
Age: 62
Discharge: HOME | End: 2017-08-02
Attending: EMERGENCY MEDICINE
Payer: COMMERCIAL

## 2017-08-01 ENCOUNTER — HOSPITAL ENCOUNTER (EMERGENCY)
Age: 62
Discharge: ANOTHER HOSPITAL | End: 2017-08-01
Attending: FAMILY MEDICINE

## 2017-08-01 ENCOUNTER — APPOINTMENT (OUTPATIENT)
Dept: ULTRASOUND IMAGING | Age: 62
End: 2017-08-01
Attending: EMERGENCY MEDICINE
Payer: COMMERCIAL

## 2017-08-01 VITALS
HEIGHT: 65 IN | RESPIRATION RATE: 16 BRPM | TEMPERATURE: 98.1 F | HEART RATE: 105 BPM | BODY MASS INDEX: 26.96 KG/M2 | WEIGHT: 161.8 LBS | SYSTOLIC BLOOD PRESSURE: 185 MMHG | DIASTOLIC BLOOD PRESSURE: 87 MMHG | OXYGEN SATURATION: 96 %

## 2017-08-01 DIAGNOSIS — K80.20 GALLSTONES: Primary | ICD-10-CM

## 2017-08-01 DIAGNOSIS — R14.0 ABDOMINAL DISTENTION: ICD-10-CM

## 2017-08-01 DIAGNOSIS — R10.84 ABDOMINAL PAIN, GENERALIZED: Primary | ICD-10-CM

## 2017-08-01 LAB
ALBUMIN SERPL BCP-MCNC: 3.4 G/DL (ref 3.5–5)
ALBUMIN/GLOB SERPL: 0.6 {RATIO} (ref 1.1–2.2)
ALP SERPL-CCNC: 87 U/L (ref 45–117)
ALT SERPL-CCNC: 52 U/L (ref 12–78)
ANION GAP BLD CALC-SCNC: 8 MMOL/L (ref 5–15)
APPEARANCE UR: ABNORMAL
AST SERPL W P-5'-P-CCNC: 69 U/L (ref 15–37)
BACTERIA URNS QL MICRO: ABNORMAL /HPF
BILIRUB SERPL-MCNC: 0.6 MG/DL (ref 0.2–1)
BILIRUB UR QL CFM: NEGATIVE
BILIRUB UR QL: ABNORMAL
BUN SERPL-MCNC: 35 MG/DL (ref 6–20)
BUN/CREAT SERPL: 28 (ref 12–20)
CALCIUM SERPL-MCNC: 9 MG/DL (ref 8.5–10.1)
CHLORIDE SERPL-SCNC: 99 MMOL/L (ref 97–108)
CO2 SERPL-SCNC: 28 MMOL/L (ref 21–32)
COLOR UR: ABNORMAL
CREAT SERPL-MCNC: 1.24 MG/DL (ref 0.55–1.02)
EPITH CASTS URNS QL MICRO: ABNORMAL /LPF
ERYTHROCYTE [DISTWIDTH] IN BLOOD BY AUTOMATED COUNT: 16.1 % (ref 11.5–14.5)
GLOBULIN SER CALC-MCNC: 5.4 G/DL (ref 2–4)
GLUCOSE SERPL-MCNC: 87 MG/DL (ref 65–100)
GLUCOSE UR QL STRIP.AUTO: NEGATIVE MG/DL
GLUCOSE UR STRIP.AUTO-MCNC: NEGATIVE MG/DL
HCT VFR BLD AUTO: 42.3 % (ref 35–47)
HGB BLD-MCNC: 14.1 G/DL (ref 11.5–16)
HGB UR QL STRIP: ABNORMAL
KETONES UR QL STRIP.AUTO: ABNORMAL MG/DL
KETONES UR-MCNC: ABNORMAL MG/DL
LACTATE SERPL-SCNC: 2.4 MMOL/L (ref 0.4–2)
LEUKOCYTE ESTERASE UR QL STRIP.AUTO: NEGATIVE
LEUKOCYTE ESTERASE UR QL STRIP: NEGATIVE
MCH RBC QN AUTO: 22.9 PG (ref 26–34)
MCHC RBC AUTO-ENTMCNC: 33.3 G/DL (ref 30–36.5)
MCV RBC AUTO: 68.8 FL (ref 80–99)
NITRITE UR QL STRIP.AUTO: NEGATIVE
NITRITE UR QL: NEGATIVE
PH UR STRIP: 5 [PH] (ref 5–8)
PH UR: 5 [PH] (ref 5–8)
PLATELET # BLD AUTO: 208 K/UL (ref 150–400)
POTASSIUM SERPL-SCNC: 3.9 MMOL/L (ref 3.5–5.1)
PROT SERPL-MCNC: 8.8 G/DL (ref 6.4–8.2)
PROT UR QL: 100 MG/DL
PROT UR STRIP-MCNC: 100 MG/DL
RBC # BLD AUTO: 6.15 M/UL (ref 3.8–5.2)
RBC # UR STRIP: ABNORMAL /UL
RBC #/AREA URNS HPF: ABNORMAL /HPF (ref 0–5)
SODIUM SERPL-SCNC: 135 MMOL/L (ref 136–145)
SP GR UR REFRACTOMETRY: 1.03 (ref 1–1.03)
SP GR UR: 1.03 (ref 1–1.03)
UROBILINOGEN UR QL STRIP.AUTO: 0.2 EU/DL (ref 0.2–1)
UROBILINOGEN UR QL: 1 EU/DL (ref 0.2–1)
WBC # BLD AUTO: 10.1 K/UL (ref 3.6–11)
WBC URNS QL MICRO: ABNORMAL /HPF (ref 0–4)

## 2017-08-01 PROCEDURE — 81001 URINALYSIS AUTO W/SCOPE: CPT | Performed by: EMERGENCY MEDICINE

## 2017-08-01 PROCEDURE — 96361 HYDRATE IV INFUSION ADD-ON: CPT

## 2017-08-01 PROCEDURE — 77030008771 HC TU NG SALEM SUMP -A

## 2017-08-01 PROCEDURE — 74011250636 HC RX REV CODE- 250/636: Performed by: EMERGENCY MEDICINE

## 2017-08-01 PROCEDURE — 80053 COMPREHEN METABOLIC PANEL: CPT | Performed by: EMERGENCY MEDICINE

## 2017-08-01 PROCEDURE — 99284 EMERGENCY DEPT VISIT MOD MDM: CPT

## 2017-08-01 PROCEDURE — 83605 ASSAY OF LACTIC ACID: CPT | Performed by: EMERGENCY MEDICINE

## 2017-08-01 PROCEDURE — 76705 ECHO EXAM OF ABDOMEN: CPT

## 2017-08-01 PROCEDURE — 77030019563 HC DEV ATTCH FEED HOLL -A

## 2017-08-01 PROCEDURE — 96374 THER/PROPH/DIAG INJ IV PUSH: CPT

## 2017-08-01 PROCEDURE — 85027 COMPLETE CBC AUTOMATED: CPT | Performed by: EMERGENCY MEDICINE

## 2017-08-01 PROCEDURE — 36415 COLL VENOUS BLD VENIPUNCTURE: CPT | Performed by: EMERGENCY MEDICINE

## 2017-08-01 RX ORDER — MORPHINE SULFATE 2 MG/ML
2 INJECTION, SOLUTION INTRAMUSCULAR; INTRAVENOUS
Status: COMPLETED | OUTPATIENT
Start: 2017-08-01 | End: 2017-08-01

## 2017-08-01 RX ORDER — ONDANSETRON 4 MG/1
4 TABLET, ORALLY DISINTEGRATING ORAL
Status: COMPLETED | OUTPATIENT
Start: 2017-08-01 | End: 2017-08-01

## 2017-08-01 RX ORDER — ONDANSETRON 4 MG/1
4 TABLET, ORALLY DISINTEGRATING ORAL
Qty: 10 TAB | Refills: 0 | Status: SHIPPED | OUTPATIENT
Start: 2017-08-01 | End: 2018-10-16

## 2017-08-01 RX ADMIN — SODIUM CHLORIDE 1000 ML: 900 INJECTION, SOLUTION INTRAVENOUS at 23:02

## 2017-08-01 RX ADMIN — SODIUM CHLORIDE 500 ML: 900 INJECTION, SOLUTION INTRAVENOUS at 21:40

## 2017-08-01 RX ADMIN — ONDANSETRON 4 MG: 4 TABLET, ORALLY DISINTEGRATING ORAL at 18:14

## 2017-08-01 RX ADMIN — MORPHINE SULFATE 2 MG: 2 INJECTION, SOLUTION INTRAMUSCULAR; INTRAVENOUS at 21:40

## 2017-08-01 NOTE — UC PROVIDER NOTE
HPI Comments: Frandy Artis, with hx of HTN, Polymyositis, Renal Ca s/p nephrectomy in 2016, presents with worsening generalized abdominal pain and bloating, nausea and vomiting. Was seen at [facility] ED 4 days ago for diarrhea, nausea, CT abdomen was negative; given imodium and bentyl with improvement of diarrhea but no improvement with nausea. States she does not remember passing gas for the past day. The history is provided by the patient. Past Medical History:   Diagnosis Date    Breast lump 1999    Negative    Calculus of kidney     Hypertension     Polymyositis ([address] 7/28/2016        Past Surgical History:   Procedure Laterality Date    HX NEPHRECTOMY Right 2016    for kidney cancer    US GUIDED CORE BREAST BIOPSY Left 1999    Negative         Family History   Problem Relation Age of Onset    Cancer Mother     Breast Cancer Mother 68    Diabetes Father     Hypertension Father     Stroke Maternal Grandmother         Social History     Social History    Marital status: SINGLE     Spouse name: N/A    Number of children: N/A    Years of education: N/A     Occupational History    Not on file. Social History Main Topics    Smoking status: Former Smoker     Packs/day: 1.00     Types: Cigarettes     Quit date: 5/1/1998    Smokeless tobacco: Never Used    Alcohol use 0.6 oz/week     1 Glasses of wine, 0 Standard drinks or equivalent per week      Comment: rare    Drug use: No    Sexual activity: Not on file     Other Topics Concern    Not on file     Social History Narrative                ALLERGIES: Levaquin [levofloxacin]; Lisinopril; Metoprolol; and Peanut    Review of Systems   Constitutional: Negative for chills and fever. Respiratory: Negative for shortness of breath and wheezing. Cardiovascular: Negative for chest pain and palpitations. Gastrointestinal: Positive for abdominal pain, nausea and vomiting. Musculoskeletal: Negative for back pain.        Vitals:    08/01/17 1744 BP: 185/87   Pulse: (!) 105   Resp: 16   Temp: 98.1 °F (36.7 °C)   SpO2: 96%   Weight: 73.4 kg (161 lb 12.8 oz)   Height: 5' 5\" (1.651 m)       Physical Exam   Constitutional: She appears well-developed and well-nourished. She appears distressed. Cardiovascular: Normal rate, regular rhythm and normal heart sounds. Pulmonary/Chest: Effort normal and breath sounds normal. No respiratory distress. She has no rales. Abdominal: Soft. She exhibits distension. Bowel sounds are decreased. There is generalized tenderness. There is no rigidity, no rebound, no guarding and no CVA tenderness. Skin: She is not diaphoretic. Nursing note and vitals reviewed. MDM     Differential Diagnosis; Clinical Impression; Plan:     CLINICAL IMPRESSION:  Abdominal pain, generalized  (primary encounter diagnosis)  Abdominal distention    DDx: SBO, gastroenteritis    Plan:  1. Referred to ER for further evaluation  Amount and/or Complexity of Data Reviewed:   Clinical lab tests:  Ordered and reviewed  Risk of Significant Complications, Morbidity, and/or Mortality:   Presenting problems: Moderate  Diagnostic procedures: Moderate  Management options:   Moderate      Procedures

## 2017-08-01 NOTE — TELEPHONE ENCOUNTER
Patient called states that she was seen at the ER 7/29/2017 for Diarrhea , States the medicine has not helped and noww she is vomiting and diarrhea is worse.   Patient asking if she should return to the ER I stated yes

## 2017-08-02 VITALS
SYSTOLIC BLOOD PRESSURE: 174 MMHG | DIASTOLIC BLOOD PRESSURE: 79 MMHG | OXYGEN SATURATION: 97 % | TEMPERATURE: 98 F | WEIGHT: 160.94 LBS | HEIGHT: 63 IN | BODY MASS INDEX: 28.52 KG/M2 | HEART RATE: 100 BPM | RESPIRATION RATE: 16 BRPM

## 2017-08-02 RX ORDER — HYDROCODONE BITARTRATE AND ACETAMINOPHEN 5; 325 MG/1; MG/1
1 TABLET ORAL
Qty: 20 TAB | Refills: 0 | Status: SHIPPED | OUTPATIENT
Start: 2017-08-02 | End: 2017-08-03

## 2017-08-02 NOTE — ED NOTES
Pt resting quietly and in no acute distress at this time. VSS. Pt A&Ox4. Family at bedside. Call bell within reach.

## 2017-08-02 NOTE — ED PROVIDER NOTES
HPI Comments: Carmenza Santiago is a 64 y.o. female with PMhx significant for HTN who presents ambulatory to the ED c/o an acute onset of generalized abdominal pain with associated nausea/vomiting worsening over the last 3 days. The pt reports associated sx of abdominal distention and chills. She discloses that she was seen in the ED on 7/29 for similar sx and after a negative workup she was discharged home noting that she was feeling much better. She states that her discomfort returned the next morning and she vomited 3 times. The pt reports that she has vomited several times since 7/30 leading her back to the ED. She denies any recent abdominal surgeries but makes note of a hysterectomy and a right nephrectomy. She specifically denies any fevers, chest pain, shortness of breath, headache, rash, diarrhea, sweating or weight loss. PCP: Abigail Fulton MD      There are no other complaints, changes or physical findings at this time. Written by ESTIVEN Kaiseribroman, as dictated by [employer] Rasheed Powell MD     The history is provided by the patient. No  was used. Past Medical History:   Diagnosis Date    Breast lump 1999    Negative    Calculus of kidney     Hypertension     Polymyositis ([address] 7/28/2016       Past Surgical History:   Procedure Laterality Date    HX NEPHRECTOMY Right 2016    for kidney cancer    US GUIDED CORE BREAST BIOPSY Left 1999    Negative         Family History:   Problem Relation Age of Onset    Cancer Mother     Breast Cancer Mother 68    Diabetes Father     Hypertension Father     Stroke Maternal Grandmother        Social History     Social History    Marital status: SINGLE     Spouse name: N/A    Number of children: N/A    Years of education: N/A     Occupational History    Not on file.      Social History Main Topics    Smoking status: Former Smoker     Packs/day: 1.00     Types: Cigarettes     Quit date: 5/1/1998    Smokeless tobacco: Never Used    Alcohol use 0.6 oz/week     1 Glasses of wine, 0 Standard drinks or equivalent per week      Comment: rare    Drug use: No    Sexual activity: Not on file     Other Topics Concern    Not on file     Social History Narrative         ALLERGIES: Levaquin [levofloxacin]; Lisinopril; Metoprolol; and Peanut    Review of Systems   Constitutional: Positive for chills. Negative for activity change, appetite change, fatigue, fever and unexpected weight change. HENT: Negative. Negative for congestion, hearing loss, rhinorrhea, sneezing and voice change. Eyes: Negative. Negative for pain and visual disturbance. Respiratory: Negative. Negative for apnea, cough, choking, chest tightness and shortness of breath. Cardiovascular: Negative. Negative for chest pain and palpitations. Gastrointestinal: Positive for abdominal distention, abdominal pain, nausea and vomiting. Negative for blood in stool and diarrhea. Genitourinary: Negative. Negative for difficulty urinating, flank pain, frequency and urgency. No discharge   Musculoskeletal: Negative. Negative for arthralgias, back pain, myalgias and neck stiffness. Skin: Negative. Negative for color change and rash. Neurological: Negative. Negative for dizziness, seizures, syncope, speech difficulty, weakness, numbness and headaches. Hematological: Negative for adenopathy. Psychiatric/Behavioral: Negative. Negative for agitation, behavioral problems, dysphoric mood and suicidal ideas. The patient is not nervous/anxious. Patient Vitals for the past 12 hrs:   Temp Pulse Resp BP SpO2   08/01/17 2304 - - - 180/79 97 %   08/01/17 2200 - - - 174/82 97 %   08/01/17 2030 - - - 180/90 97 %   08/01/17 2014 - - - 177/89 94 %   08/01/17 1841 98 °F (36.7 °C) 100 16 162/83 95 %      Physical Exam   Constitutional: She is oriented to person, place, and time. She appears well-developed and well-nourished. No distress.    HENT:   Head: Normocephalic and atraumatic. Mouth/Throat: Oropharynx is clear and moist. No oropharyngeal exudate. Eyes: Conjunctivae and EOM are normal. Pupils are equal, round, and reactive to light. Right eye exhibits no discharge. Left eye exhibits no discharge. Neck: Normal range of motion. Neck supple. Cardiovascular: Normal rate, regular rhythm and intact distal pulses. Exam reveals no gallop and no friction rub. No murmur heard. Pulmonary/Chest: Effort normal and breath sounds normal. No respiratory distress. She has no wheezes. She has no rales. She exhibits no tenderness. Abdominal: Soft. Bowel sounds are normal. She exhibits no distension and no mass. There is generalized tenderness (mild). There is no rebound and no guarding. Musculoskeletal: Normal range of motion. She exhibits no edema. Lymphadenopathy:     She has no cervical adenopathy. Neurological: She is alert and oriented to person, place, and time. No cranial nerve deficit. Coordination normal.   Skin: Skin is warm and dry. No rash noted. No erythema. Psychiatric: She has a normal mood and affect. Nursing note and vitals reviewed. MDM  Number of Diagnoses or Management Options  Diagnosis management comments: DDx: Gastritis, Gastroenteritis, Pancreatitis, Cholecystitis. Amount and/or Complexity of Data Reviewed  Clinical lab tests: ordered and reviewed  Review and summarize past medical records: yes    Patient Progress  Patient progress: stable    ED Course       Procedures    PROGRESS NOTE:  10:26 PM  Pt has been re-evaluated. The pt has still yet to produce urine. Will straight cath the pt to obtain urine at this time. Written by Justa Mathews ED Scribe, as dictated by Ulisadiq Hernandez. Sylvia Coelho MD.     PROGRESS NOTE:  11:58 PM  Pt has been re-evaluated. The pt has remained stable during her stay in the ED. She is stable for discharge with instructions to follow up closely with GI. Written by Justa Mathews ED Scribe, as dictated by Ihsan Erazo.  Rico Hernandes MD.     LABORATORY TESTS:  Recent Results (from the past 12 hour(s))   POC URINE MACROSCOPIC    Collection Time: 08/01/17  5:55 PM   Result Value Ref Range    Spec. gravity (POC) 1.030 1.003 - 1.030      pH, urine  (POC) 5.0 5.0 - 8.0      Protein (POC) 100 (A) NEG mg/dL    Glucose, urine (POC) NEGATIVE  NEG mg/dL    Ketones (POC) TRACE (A) NEG mg/dL    Bilirubin (POC) MODERATE (A) NEG      Blood (POC) MODERATE (A) NEG      Urobilinogen (POC) 1.0 0.2 - 1.0 EU/dL    Nitrite (POC) NEGATIVE  NEG      Leukocyte esterase (POC) NEGATIVE  NEG     CBC W/O DIFF    Collection Time: 08/01/17  9:08 PM   Result Value Ref Range    WBC 10.1 3.6 - 11.0 K/uL    RBC 6.15 (H) 3.80 - 5.20 M/uL    HGB 14.1 11.5 - 16.0 g/dL    HCT 42.3 35.0 - 47.0 %    MCV 68.8 (L) 80.0 - 99.0 FL    MCH 22.9 (L) 26.0 - 34.0 PG    MCHC 33.3 30.0 - 36.5 g/dL    RDW 16.1 (H) 11.5 - 14.5 %    PLATELET 148 657 - 899 K/uL   LACTIC ACID    Collection Time: 08/01/17  9:21 PM   Result Value Ref Range    Lactic acid 2.4 (HH) 0.4 - 2.0 MMOL/L   URINALYSIS W/MICROSCOPIC    Collection Time: 08/01/17 10:57 PM   Result Value Ref Range    Color YELLOW/STRAW      Appearance TURBID (A) CLEAR      Specific gravity 1.030 1.003 - 1.030      pH (UA) 5.0 5.0 - 8.0      Protein 100 (A) NEG mg/dL    Glucose NEGATIVE  NEG mg/dL    Ketone TRACE (A) NEG mg/dL    Blood MODERATE (A) NEG      Urobilinogen 0.2 0.2 - 1.0 EU/dL    Nitrites NEGATIVE  NEG      Leukocyte Esterase NEGATIVE  NEG      WBC 5-10 0 - 4 /hpf    RBC 0-5 0 - 5 /hpf    Epithelial cells FEW FEW /lpf    Bacteria 1+ (A) NEG /hpf   BILIRUBIN, CONFIRM    Collection Time: 08/01/17 10:57 PM   Result Value Ref Range    Bilirubin UA, confirm NEGATIVE  NEG     METABOLIC PANEL, COMPREHENSIVE    Collection Time: 08/01/17 11:04 PM   Result Value Ref Range    Sodium 135 (L) 136 - 145 mmol/L    Potassium 3.9 3.5 - 5.1 mmol/L    Chloride 99 97 - 108 mmol/L    CO2 28 21 - 32 mmol/L    Anion gap 8 5 - 15 mmol/L    Glucose 87 65 - 100 mg/dL    BUN 35 (H) 6 - 20 MG/DL    Creatinine 1.24 (H) 0.55 - 1.02 MG/DL    BUN/Creatinine ratio 28 (H) 12 - 20      GFR est AA 53 (L) >60 ml/min/1.73m2    GFR est non-AA 44 (L) >60 ml/min/1.73m2    Calcium 9.0 8.5 - 10.1 MG/DL    Bilirubin, total 0.6 0.2 - 1.0 MG/DL    ALT (SGPT) 52 12 - 78 U/L    AST (SGOT) 69 (H) 15 - 37 U/L    Alk. phosphatase 87 45 - 117 U/L    Protein, total 8.8 (H) 6.4 - 8.2 g/dL    Albumin 3.4 (L) 3.5 - 5.0 g/dL    Globulin 5.4 (H) 2.0 - 4.0 g/dL    A-G Ratio 0.6 (L) 1.1 - 2.2         IMAGING RESULTS:  US ABD LTD   Final Result   INDICATION: 3 day history of mid abdominal pain     COMPARISON: Abdomen ultrasound 12/10/2015     EXAM: Real-time ultrasound of the right upper quadrant.     FINDINGS: Tiny mobile gallstones are shown. There is no gallbladder dilation or  mural thickening. No tenderness to probe palpation is noted over the gallbladder  fossa. There is no intrahepatic or extrahepatic biliary duct dilation. Common  bile duct diameter measures 5 mm. The liver is normal in size and echotexture. The visualized portion of the pancreatic head appears normal. The right kidney  is surgically absent.     IMPRESSION: Tiny gallstones. MEDICATIONS GIVEN:  Medications   morphine injection 2 mg (2 mg IntraVENous Given 8/1/17 2140)   sodium chloride 0.9 % bolus infusion 500 mL (500 mL IntraVENous New Bag 8/1/17 2140)   sodium chloride 0.9 % bolus infusion 1,000 mL (1,000 mL IntraVENous New Bag 8/1/17 2302)       IMPRESSION:  1. Gallstones        PLAN:  1. Current Discharge Medication List      START taking these medications    Details   HYDROcodone-acetaminophen (NORCO) 5-325 mg per tablet Take 1 Tab by mouth every four (4) hours as needed for Pain. Max Daily Amount: 6 Tabs. Qty: 20 Tab, Refills: 0           2.    Follow-up Information     Follow up With Details Comments Contact Info    Liliya Chua MD Call in 2 days  [address] [facility]      Bartolome Ramirez MD Call in 2 days  [address] [place] [address] Rd  [phone]          3. Return to ED if worse   Discharge Note:  12:02 AM  The patient is ready for discharge. The patient's signs, symptoms, diagnosis, and discharge instruction have been discussed and the patient has conveyed their understanding. The patient is to follow up as recommended or return to the ER should their symptoms worsen. Plan has been discussed and the patient is in agreement. Written by Mendel Bast Barrett ED Scribe, as dictated by [employer]ta Vázquez MD     Attestation: This note is prepared by Byron Baker. Reid, acting as Scribe for [employer]ta Vázquez, [address] [place] [address] Zoila Vázquez MD: The scribe's documentation has been prepared under my direction and personally reviewed by me in its entirety. I confirm that the note above accurately reflects all work, treatment, procedures, and medical decision making performed by me.

## 2017-08-02 NOTE — DISCHARGE INSTRUCTIONS
Low-Fat Diet for Gallbladder Disease: Care Instructions  Your Care Instructions  When you eat, the gallbladder releases bile, which helps you digest the fat in food. If you have an inflamed gallbladder, this may cause pain. A low-fat diet may give your gallbladder a rest so you can start to heal. Your doctor and dietitian can help you make an eating plan that does not irritate your digestive system. Always talk with your doctor or dietitian before you make changes in your diet. Follow-up care is a key part of your treatment and safety. Be sure to make and go to all appointments, and call your doctor if you are having problems. It's also a good idea to know your test results and keep a list of the medicines you take. How can you care for yourself at home? · Eat many small meals and snacks each day instead of three large meals. · Choose lean meats. ¨ Eat no more than 5 to 6½ ounces of meat a day. ¨ Cut off all fat you can see. ¨ Eat chicken and turkey without the skin. ¨ Many types of fish, such as salmon, lake trout, tuna, and herring, provide healthy omega-3 fat. But, avoid fish canned in oil, such as sardines in olive oil. ¨ Bake, broil, or grill meats, poultry, or fish instead of frying them in butter or fat. · Drink or eat nonfat or low-fat milk, yogurt, cheese, or other milk products each day. ¨ Read the labels on cheeses, and choose those with less than 5 grams of fat an ounce. ¨ Try fat-free sour cream, cream cheese, or yogurt. ¨ Avoid cream soups and cream sauces on pasta. ¨ Eat low-fat ice cream, frozen yogurt, or sorbet. Avoid regular ice cream.  · Eat whole-grain cereals, breads, crackers, rice, or pasta. Avoid high-fat foods such as croissants, scones, biscuits, waffles, doughnuts, muffins, granola, and high-fat breads. · Flavor your foods with herbs and spices (such as basil, tarragon, or mint), fat-free sauces, or lemon juice instead of butter.  You can also use butter substitutes, fat-free mayonnaise, or fat-free dressing. · Try applesauce, prune puree, or mashed bananas to replace some or all of the fat when you bake. · Limit fats and oils, such as butter, margarine, mayonnaise, and salad dressing, to no more than 1 tablespoon a meal.  · Avoid high-fat foods, such as:  ¨ Chocolate, whole milk, ice cream, and processed cheese. ¨ Fried or buttered foods. ¨ Sausage, salami, and fajardo. ¨ Cinnamon rolls, cakes, pies, cookies, and other pastries. ¨ Prepared snack foods, such as potato chips, nut and granola bars, and mixed nuts. ¨ Coconut and avocado. · Learn how to read food labels for serving sizes and ingredients. Fast-food and convenience-food meals often have lots of fat. Where can you learn more? Go to http://brijesh-marichuy.info/. Enter I041 in the search box to learn more about \"Low-Fat Diet for Gallbladder Disease: Care Instructions. \"  Current as of: July 26, 2016  Content Version: 11.3  © 7529-4196 [employer]. Care instructions adapted under license by [employer] (which disclaims liability or warranty for this information). If you have questions about a medical condition or this instruction, always ask your healthcare professional. [place] [address] any warranty or liability for your use of this information.

## 2017-08-02 NOTE — ED NOTES
Assumed care of pt from triage. Pt complaining of abdominal pain since Sunday. Pt was seen for diarrhea on Saturday. Pt has been vomiting since Sunday and has had abdominal distention. Pt denies any health history with the exception of HTN and a \"muscle weakness disease\". Pt bowel sounds hypoactive. Abdomen hard/distended on palpation. Pt A&Ox4 and in no acute distress at this time. Call bell within reach.

## 2017-08-03 ENCOUNTER — APPOINTMENT (OUTPATIENT)
Dept: GENERAL RADIOLOGY | Age: 62
DRG: 389 | End: 2017-08-03
Attending: PHYSICIAN ASSISTANT
Payer: COMMERCIAL

## 2017-08-03 ENCOUNTER — APPOINTMENT (OUTPATIENT)
Dept: GENERAL RADIOLOGY | Age: 62
DRG: 389 | End: 2017-08-03
Attending: EMERGENCY MEDICINE
Payer: COMMERCIAL

## 2017-08-03 ENCOUNTER — APPOINTMENT (OUTPATIENT)
Dept: CT IMAGING | Age: 62
DRG: 389 | End: 2017-08-03
Attending: PHYSICIAN ASSISTANT
Payer: COMMERCIAL

## 2017-08-03 ENCOUNTER — HOSPITAL ENCOUNTER (INPATIENT)
Age: 62
LOS: 6 days | Discharge: HOME | DRG: 389 | End: 2017-08-09
Attending: EMERGENCY MEDICINE | Admitting: FAMILY MEDICINE
Payer: COMMERCIAL

## 2017-08-03 DIAGNOSIS — M33.20 POLYMYOSITIS (HCC): ICD-10-CM

## 2017-08-03 DIAGNOSIS — K56.609 SBO (SMALL BOWEL OBSTRUCTION) (HCC): Primary | ICD-10-CM

## 2017-08-03 LAB
ALBUMIN SERPL BCP-MCNC: 3.7 G/DL (ref 3.5–5)
ALBUMIN/GLOB SERPL: 0.7 {RATIO} (ref 1.1–2.2)
ALP SERPL-CCNC: 84 U/L (ref 45–117)
ALT SERPL-CCNC: 48 U/L (ref 12–78)
ANION GAP BLD CALC-SCNC: 15 MMOL/L (ref 5–15)
APPEARANCE UR: CLEAR
AST SERPL W P-5'-P-CCNC: 62 U/L (ref 15–37)
ATRIAL RATE: 104 BPM
BACTERIA URNS QL MICRO: ABNORMAL /HPF
BASOPHILS # BLD AUTO: 0 K/UL (ref 0–0.1)
BASOPHILS # BLD: 0 % (ref 0–1)
BILIRUB SERPL-MCNC: 0.6 MG/DL (ref 0.2–1)
BILIRUB UR QL CFM: NEGATIVE
BUN SERPL-MCNC: 60 MG/DL (ref 6–20)
BUN/CREAT SERPL: 28 (ref 12–20)
CALCIUM SERPL-MCNC: 9.3 MG/DL (ref 8.5–10.1)
CALCULATED P AXIS, ECG09: 52 DEGREES
CALCULATED R AXIS, ECG10: 27 DEGREES
CALCULATED T AXIS, ECG11: 26 DEGREES
CAOX CRY URNS QL MICRO: ABNORMAL
CHLORIDE SERPL-SCNC: 91 MMOL/L (ref 97–108)
CK MB CFR SERPL CALC: 3 % (ref 0–2.5)
CK MB CFR SERPL CALC: 3.1 % (ref 0–2.5)
CK MB SERPL-MCNC: 24.2 NG/ML (ref 5–25)
CK MB SERPL-MCNC: 32.7 NG/ML (ref 5–25)
CK SERPL-CCNC: 1072 U/L (ref 26–192)
CK SERPL-CCNC: 804 U/L (ref 26–192)
CO2 SERPL-SCNC: 26 MMOL/L (ref 21–32)
COLOR UR: ABNORMAL
CREAT SERPL-MCNC: 2.15 MG/DL (ref 0.55–1.02)
DIAGNOSIS, 93000: NORMAL
EOSINOPHIL # BLD: 0 K/UL (ref 0–0.4)
EOSINOPHIL NFR BLD: 0 % (ref 0–7)
EPITH CASTS URNS QL MICRO: ABNORMAL /LPF
ERYTHROCYTE [DISTWIDTH] IN BLOOD BY AUTOMATED COUNT: 16 % (ref 11.5–14.5)
GLOBULIN SER CALC-MCNC: 5.3 G/DL (ref 2–4)
GLUCOSE SERPL-MCNC: 99 MG/DL (ref 65–100)
GLUCOSE UR STRIP.AUTO-MCNC: NEGATIVE MG/DL
HCT VFR BLD AUTO: 43.4 % (ref 35–47)
HGB BLD-MCNC: 14.1 G/DL (ref 11.5–16)
HGB UR QL STRIP: ABNORMAL
KETONES UR QL STRIP.AUTO: ABNORMAL MG/DL
LACTATE SERPL-SCNC: 2 MMOL/L (ref 0.4–2)
LEUKOCYTE ESTERASE UR QL STRIP.AUTO: NEGATIVE
LIPASE SERPL-CCNC: 213 U/L (ref 73–393)
LYMPHOCYTES # BLD AUTO: 16 % (ref 12–49)
LYMPHOCYTES # BLD: 1.4 K/UL (ref 0.8–3.5)
MAGNESIUM SERPL-MCNC: 2.2 MG/DL (ref 1.6–2.4)
MCH RBC QN AUTO: 22.2 PG (ref 26–34)
MCHC RBC AUTO-ENTMCNC: 32.5 G/DL (ref 30–36.5)
MCV RBC AUTO: 68.2 FL (ref 80–99)
MONOCYTES # BLD: 0.4 K/UL (ref 0–1)
MONOCYTES NFR BLD AUTO: 4 % (ref 5–13)
NEUTS SEG # BLD: 7.1 K/UL (ref 1.8–8)
NEUTS SEG NFR BLD AUTO: 80 % (ref 32–75)
NITRITE UR QL STRIP.AUTO: NEGATIVE
P-R INTERVAL, ECG05: 146 MS
PH UR STRIP: 5.5 [PH] (ref 5–8)
PHOSPHATE SERPL-MCNC: 5.3 MG/DL (ref 2.6–4.7)
PLATELET # BLD AUTO: 221 K/UL (ref 150–400)
PLATELET COMMENTS,PCOM: ABNORMAL
POTASSIUM SERPL-SCNC: 3.4 MMOL/L (ref 3.5–5.1)
PROT SERPL-MCNC: 9 G/DL (ref 6.4–8.2)
PROT UR STRIP-MCNC: 30 MG/DL
Q-T INTERVAL, ECG07: 368 MS
QRS DURATION, ECG06: 76 MS
QTC CALCULATION (BEZET), ECG08: 483 MS
RBC # BLD AUTO: 6.36 M/UL (ref 3.8–5.2)
RBC #/AREA URNS HPF: ABNORMAL /HPF (ref 0–5)
RBC MORPH BLD: ABNORMAL
SODIUM SERPL-SCNC: 132 MMOL/L (ref 136–145)
SP GR UR REFRACTOMETRY: <1.005 (ref 1–1.03)
TROPONIN I SERPL-MCNC: 0.14 NG/ML
TROPONIN I SERPL-MCNC: 0.14 NG/ML
UA: UC IF INDICATED,UAUC: ABNORMAL
UROBILINOGEN UR QL STRIP.AUTO: 0.2 EU/DL (ref 0.2–1)
VENTRICULAR RATE, ECG03: 104 BPM
WBC # BLD AUTO: 8.9 K/UL (ref 3.6–11)
WBC URNS QL MICRO: ABNORMAL /HPF (ref 0–4)

## 2017-08-03 PROCEDURE — 87040 BLOOD CULTURE FOR BACTERIA: CPT | Performed by: PHYSICIAN ASSISTANT

## 2017-08-03 PROCEDURE — 93005 ELECTROCARDIOGRAM TRACING: CPT

## 2017-08-03 PROCEDURE — 85025 COMPLETE CBC W/AUTO DIFF WBC: CPT | Performed by: PHYSICIAN ASSISTANT

## 2017-08-03 PROCEDURE — 36415 COLL VENOUS BLD VENIPUNCTURE: CPT | Performed by: PHYSICIAN ASSISTANT

## 2017-08-03 PROCEDURE — 77030005514 HC CATH URETH FOL14 BARD -A

## 2017-08-03 PROCEDURE — 82550 ASSAY OF CK (CPK): CPT | Performed by: PHYSICIAN ASSISTANT

## 2017-08-03 PROCEDURE — 96374 THER/PROPH/DIAG INJ IV PUSH: CPT

## 2017-08-03 PROCEDURE — 71020 XR CHEST PA LAT: CPT

## 2017-08-03 PROCEDURE — 96361 HYDRATE IV INFUSION ADD-ON: CPT

## 2017-08-03 PROCEDURE — 83605 ASSAY OF LACTIC ACID: CPT | Performed by: PHYSICIAN ASSISTANT

## 2017-08-03 PROCEDURE — 74011250636 HC RX REV CODE- 250/636: Performed by: PHYSICIAN ASSISTANT

## 2017-08-03 PROCEDURE — 51702 INSERT TEMP BLADDER CATH: CPT

## 2017-08-03 PROCEDURE — 71010 XR CHEST PORT: CPT

## 2017-08-03 PROCEDURE — 83690 ASSAY OF LIPASE: CPT | Performed by: PHYSICIAN ASSISTANT

## 2017-08-03 PROCEDURE — 74011000250 HC RX REV CODE- 250: Performed by: HOSPITALIST

## 2017-08-03 PROCEDURE — 99285 EMERGENCY DEPT VISIT HI MDM: CPT

## 2017-08-03 PROCEDURE — 74174 CTA ABD&PLVS W/CONTRAST: CPT

## 2017-08-03 PROCEDURE — 81001 URINALYSIS AUTO W/SCOPE: CPT | Performed by: PHYSICIAN ASSISTANT

## 2017-08-03 PROCEDURE — 83735 ASSAY OF MAGNESIUM: CPT | Performed by: HOSPITALIST

## 2017-08-03 PROCEDURE — 74011250636 HC RX REV CODE- 250/636: Performed by: HOSPITALIST

## 2017-08-03 PROCEDURE — 87086 URINE CULTURE/COLONY COUNT: CPT | Performed by: PHYSICIAN ASSISTANT

## 2017-08-03 PROCEDURE — 96375 TX/PRO/DX INJ NEW DRUG ADDON: CPT

## 2017-08-03 PROCEDURE — 74011636320 HC RX REV CODE- 636/320: Performed by: EMERGENCY MEDICINE

## 2017-08-03 PROCEDURE — 84484 ASSAY OF TROPONIN QUANT: CPT | Performed by: PHYSICIAN ASSISTANT

## 2017-08-03 PROCEDURE — 74011250636 HC RX REV CODE- 250/636: Performed by: EMERGENCY MEDICINE

## 2017-08-03 PROCEDURE — 80053 COMPREHEN METABOLIC PANEL: CPT | Performed by: PHYSICIAN ASSISTANT

## 2017-08-03 PROCEDURE — 65270000029 HC RM PRIVATE

## 2017-08-03 PROCEDURE — 84100 ASSAY OF PHOSPHORUS: CPT | Performed by: HOSPITALIST

## 2017-08-03 RX ORDER — LORAZEPAM 2 MG/ML
0.5 INJECTION INTRAMUSCULAR
Status: DISCONTINUED | OUTPATIENT
Start: 2017-08-03 | End: 2017-08-09 | Stop reason: HOSPADM

## 2017-08-03 RX ORDER — VITAMIN E
CREAM (GRAM) TOPICAL DAILY
Status: ON HOLD | COMMUNITY
End: 2020-06-06

## 2017-08-03 RX ORDER — SODIUM CHLORIDE 0.9 % (FLUSH) 0.9 %
5-10 SYRINGE (ML) INJECTION AS NEEDED
Status: DISCONTINUED | OUTPATIENT
Start: 2017-08-03 | End: 2017-08-09 | Stop reason: HOSPADM

## 2017-08-03 RX ORDER — MORPHINE SULFATE 10 MG/ML
4 INJECTION, SOLUTION INTRAMUSCULAR; INTRAVENOUS
Status: COMPLETED | OUTPATIENT
Start: 2017-08-03 | End: 2017-08-03

## 2017-08-03 RX ORDER — FAMOTIDINE 10 MG/ML
20 INJECTION INTRAVENOUS EVERY 12 HOURS
Status: DISCONTINUED | OUTPATIENT
Start: 2017-08-03 | End: 2017-08-03

## 2017-08-03 RX ORDER — SODIUM CHLORIDE 9 MG/ML
50 INJECTION, SOLUTION INTRAVENOUS
Status: COMPLETED | OUTPATIENT
Start: 2017-08-03 | End: 2017-08-03

## 2017-08-03 RX ORDER — SODIUM CHLORIDE 0.9 % (FLUSH) 0.9 %
5-10 SYRINGE (ML) INJECTION EVERY 8 HOURS
Status: DISCONTINUED | OUTPATIENT
Start: 2017-08-03 | End: 2017-08-09 | Stop reason: HOSPADM

## 2017-08-03 RX ORDER — FAMOTIDINE 10 MG/ML
20 INJECTION INTRAVENOUS DAILY
Status: DISCONTINUED | OUTPATIENT
Start: 2017-08-04 | End: 2017-08-07

## 2017-08-03 RX ORDER — ONDANSETRON 2 MG/ML
4 INJECTION INTRAMUSCULAR; INTRAVENOUS
Status: COMPLETED | OUTPATIENT
Start: 2017-08-03 | End: 2017-08-03

## 2017-08-03 RX ORDER — SODIUM CHLORIDE 0.9 % (FLUSH) 0.9 %
10 SYRINGE (ML) INJECTION
Status: COMPLETED | OUTPATIENT
Start: 2017-08-03 | End: 2017-08-03

## 2017-08-03 RX ORDER — SODIUM CHLORIDE AND POTASSIUM CHLORIDE .9; .15 G/100ML; G/100ML
SOLUTION INTRAVENOUS CONTINUOUS
Status: DISCONTINUED | OUTPATIENT
Start: 2017-08-03 | End: 2017-08-05

## 2017-08-03 RX ORDER — ONDANSETRON 2 MG/ML
4 INJECTION INTRAMUSCULAR; INTRAVENOUS
Status: DISCONTINUED | OUTPATIENT
Start: 2017-08-03 | End: 2017-08-09 | Stop reason: HOSPADM

## 2017-08-03 RX ORDER — HYDROMORPHONE HYDROCHLORIDE 1 MG/ML
1 INJECTION, SOLUTION INTRAMUSCULAR; INTRAVENOUS; SUBCUTANEOUS
Status: DISCONTINUED | OUTPATIENT
Start: 2017-08-03 | End: 2017-08-06

## 2017-08-03 RX ADMIN — IOPAMIDOL 100 ML: 755 INJECTION, SOLUTION INTRAVENOUS at 12:10

## 2017-08-03 RX ADMIN — SODIUM CHLORIDE AND POTASSIUM CHLORIDE: 9; 1.49 INJECTION, SOLUTION INTRAVENOUS at 17:28

## 2017-08-03 RX ADMIN — MORPHINE SULFATE 4 MG: 10 INJECTION INTRAMUSCULAR; INTRAVENOUS; SUBCUTANEOUS at 12:38

## 2017-08-03 RX ADMIN — SODIUM CHLORIDE 2313 ML: 900 INJECTION, SOLUTION INTRAVENOUS at 12:39

## 2017-08-03 RX ADMIN — METHYLPREDNISOLONE SODIUM SUCCINATE 15.2 MG: 40 INJECTION, POWDER, FOR SOLUTION INTRAMUSCULAR; INTRAVENOUS at 14:56

## 2017-08-03 RX ADMIN — SODIUM CHLORIDE 50 ML/HR: 900 INJECTION, SOLUTION INTRAVENOUS at 12:11

## 2017-08-03 RX ADMIN — FAMOTIDINE 20 MG: 10 INJECTION, SOLUTION INTRAVENOUS at 14:56

## 2017-08-03 RX ADMIN — Medication 10 ML: at 12:10

## 2017-08-03 RX ADMIN — ONDANSETRON 4 MG: 2 INJECTION INTRAMUSCULAR; INTRAVENOUS at 12:38

## 2017-08-03 NOTE — CONSULTS
[address]   [address] [place] [address]       Name:  Puja Padilla   MR#:  [id number]   :  1955   Account #:  [de-identified]    Date of Consultation:  2017   Date of Adm:  2017       REQUESTING PHYSICIAN: Meg Portillo MD    REASON FOR CONSULTATION: Abnormal cardiac enzymes. CHIEF COMPLAINT: Nausea, vomiting and chest pain. HISTORY OF PRESENT ILLNESS: The patient is a 42-year-old   female without prior cardiac history, but with a history of hypertension   and multiple other medical issues including polymyositis. She was   diagnosed with this in 2015, and is currently on medication for this,   including prednisone and CellCept. She has no prior cardiac history. No history of diabetes. Lipid status is unknown. She presented after   feeling poorly, starting about 2 weeks ago, when she developed some   diarrhea. She was seen in the ER Saturday and discharged home. She   subsequently developed nausea and vomiting, which started earlier   this week. In addition, she had some chest discomfort, along with   abdominal discomfort yesterday. She came to the ER overnight, and   was admitted with an apparent small-bowel obstruction. She has been   admitted to the surgery service. Her creatinine has also had increased   significantly from baseline, and her troponin was mildly abnormal. We   were asked to see the patient for evaluation. As stated above, the   patient denies prior cardiac history. She is not very active, but has had   no other episodes of chest discomfort. The chest discomfort she   experienced prior to admission was associated with nausea, vomiting   and abdominal discomfort. She denies syncope. She does have   occasional palpitations. No sudden episodes of shortness of breath. PAST MEDICAL HISTORY: As noted above, history of polymyositis   diagnosed 2015, history of renal cell cancer.     PRIOR SURGERIES: Status post prior right nephrectomy, status post   hysterectomy. MEDICATIONS ON ADMISSION   1. Vitamin E.   2. Gamunex. 3. Zofran. 4. CellCept. 5. Cozaar. 6. Prednisone. 7. Flonase. SOCIAL HISTORY: The patient is a former smoker. She only drinks   alcohol rarely. FAMILY HISTORY: Negative for heart disease. REVIEW OF SYSTEMS: As noted above. No history of GI bleeding or   melena. No strokes or TIAs. No history of pulmonary emboli or blood   clots. PHYSICAL EXAMINATION   GENERAL: Reveals a late middle-aged [facility] female,   currently in no acute distress. She has an NG tube in place. VITAL SIGNS: Blood pressure 159/63, pulse 106, respirations 20,   temperature 97.9. HEENT: Pupils are equal and reactive to light. Oropharynx with moist   oral mucosa. NECK: Supple. No masses or thyromegaly. No cervical or   supraclavicular adenopathy. No carotid bruits. No JVD. CHEST: Clear. No wheezes or crackles. SKIN: Warm and dry. BACK: No scoliosis. CARDIAC: Tachycardic rhythm, a 2/6 systolic murmur, no gallop or   rub, no diastolic murmur heard. ABDOMEN: Mildly distended, with mild tenderness. Bowel sounds   present. No obvious masses. EXTREMITIES: No cyanosis, clubbing or edema. Distal pulses 1+ in   the feet bilaterally. NEUROLOGIC: No obvious gross motor deficits. LABORATORY DATA: BUN 60, creatinine 2.2. Sodium 132. . Hemoglobin 14.1. Troponin 0.14. DIAGNOSTIC DATA: EKG: Sinus tachycardia, left atrial enlargement,   nonspecific ST and T changes. Chest x-ray shows left basilar airspace   disease. IMPRESSION   1. Mildly abnormal troponin of uncertain significance. 2. Atypical chest pain associated with nausea, vomiting and small-  bowel obstruction. 3. Hypertension. 4. Acute renal failure, probably due to dehydration. 5. Hyponatremia. 6. 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    7. History of renal cell cancer, status post nephrectomy. RECOMMENDATIONS: At this point, there is no evidence of a primary   cardiac cause of the patient's elevated troponin. She certainly has   other reasons, including the acute renal failure, dehydration and   systemic illness. At this point, I would get an echocardiogram to   establish baseline LV function and rule out wall motion abnormalities. Would also repeat cardiac enzymes and an EKG in the morning. If the   patient needs to go to surgery, then cardiac risks will be somewhat   increased, given the patient's uncertain cardiac status. However, at this   point, she is clinically stable and there is no clear evidence of a primary   cardiac event. Thank you for this consultation.         Mauri Dandy, MD Kermitt Morrison   D:  08/03/2017   16:51   T:  08/03/2017   17:14   Job #:  [id number]

## 2017-08-03 NOTE — CONSULTS
Hospitalist Consultation Note    NAME: Katie Granger   :  1955   MRN:  [id number]     Date/Time:  8/3/2017 2:28 PM    Patient PCP: Mary Larios MD    I have been asked to see this patient by the attending, Dr. Artie Bahtt , for advice/opinion re: abdominal pain. My advice is:  ________________________________________________________________________   Assessment &  Plan:  Polymyositis (PM), POA  --has chronic arm and leg weakness, dysphagia with pills. --current treatment:  Prednisone 15mg daily (reduced from 40mg in ), cellcept 1200mg bid, and IVIG qmonth x 2 months (last dose 17). Uses cane, sedentary lifestyle, exertion limited by SOB. --will switch prednisone to IV. Hold cellcept until able to resume PO.  --had respiratory failure requiring trach for 6 weeks 12/15 when first diagnosed with disease. Now use cpap at night. Report with treatment her CK has decreased from 9000 to 2000. \    Chest pain, POA  Elevated troponin 0.14  --due to SBO and GERD from repeated vomiting and musculoskeletal since eproducible in midsternum. --IV pepcid  -patients with polymyositis at increased risk. EKG with sinus tach, no active ischemia. Follow enzymes, echo. Get cardiology consult. Elevated CK/CKMB is from polymyositis but troponin I more cardiac specific. Acute kidney injury due to dehydration from repeated vomiting. Acquired solitary kidney s/p nephrectomy for renal cancer   --Cr baseline 1-1.1, now 2.15 today and got IV contrast.    --place silva to follow strict I/O's, aggressive IVF with NS 125ml/hr. Has NG tube so will need to follow output and match IVF replacement with output. --hold cozaar    HTN  --has allergy to metoprolol and lisinopril. Having to hold cozaar due to ARF. --will use nitropaste prn. Could consider clonidine patch, perhaps IV labetalol    Hypokalemia 3.3  --due to vomiting. KCl in IVF.   Recheck labs in AM    Abdominal pain due to small bowel obstruction with transition point at ileum, POA  --management per surgery. Abdomen very distended, vomiting moderate green liquid. To get NG tube placed    Will follow with you. Subjective:   CHIEF COMPLAINT:  \"abdominal pain\"    HISTORY OF PRESENT ILLNESS:     Rockland Severance is a 64 y.o.  female with polymyositis dx 12/15 when hospitalized with respiratory failure requiring trach for 6 weeks, profound weakness. Baseline now cannot lift arms above shoulders, walk little with cane. Exertion usually limited by SOB, cannot walk a block. Consulted to see for medical management. Admitted for SBo  Several ER and urgent care visits this week. Past Medical History:   Diagnosis Date    Breast lump 1999    Negative    Hypertension     Polymyositis ([facility] [address] 12/2015    Renal cancer ([facility] [address] 07/08/2016      Past Surgical History:   Procedure Laterality Date    HX GYN  1999    hysterectomy    HX NEPHRECTOMY Right 2016    for kidney cancer    US GUIDED CORE BREAST BIOPSY Left 1999    Negative     Social History   Substance Use Topics    Smoking status: Former Smoker     Packs/day: 1.00     Years: 20.00     Types: Cigarettes     Quit date: 5/1/1998    Smokeless tobacco: Never Used    Alcohol use No    Lives alone but father next door. Family History   Problem Relation Age of Onset   [facility] Cancer Mother     Breast Cancer Mother 68    Diabetes Father     Hypertension Father     Stroke Maternal Grandmother       Allergies   Allergen Reactions    Levaquin [Levofloxacin] Other (comments)    Lisinopril Other (comments)    Metoprolol Other (comments)     hallucinations    Peanut Other (comments)        Prior to Admission medications    Medication Sig Start Date End Date Taking? Authorizing Provider   immune globulin (CARIMUNE NF) 12 gram solr by IntraVENous route.    Yes Historical Provider   ondansetron (ZOFRAN ODT) 4 mg disintegrating tablet Take 1 Tab by mouth every eight (8) hours as needed for Nausea. 8/1/17  Yes Nermine Ardie Klinefelter, MD   mycophenolate mofetil (CELLCEPT) 200 mg/mL suspension Take 6 mL by mouth two (2) times a day. 6/7/17  Yes Historical Provider   losartan (COZAAR) 100 mg tablet TAKE ONE TABLET BY MOUTH DAILY 12/18/16  Yes Historical Provider   predniSONE (DELTASONE) 5 mg tablet TAKE THREE TABLETS BY MOUTH EVERY DAY 12/28/16  Yes Historical Provider   fluticasone (FLONASE) 50 mcg/actuation nasal spray INHALE 2 SPRAYS IN EACH NOSTRIL AT BEDTIME 12/20/16  Yes Historical Provider   IMMUNE GLOBULIN,GAMMA,IGG, (IMMUNE GLOBULIN, HUMAN,, IGG, IV) 80 g by IntraVENous route. Historical Provider     Current Facility-Administered Medications   Medication Dose Route Frequency    0.9% sodium chloride with KCl 20 mEq/L infusion   IntraVENous CONTINUOUS    famotidine (PF) (PEPCID) injection 20 mg  20 mg IntraVENous Q12H    methylPREDNISolone (PF) (SOLU-MEDROL) injection 15.2 mg  15.2 mg IntraVENous DAILY    nitroglycerin (NITROBID) 2 % ointment 1 Inch  1 Inch Topical Q6H PRN     Current Outpatient Prescriptions   Medication Sig    immune globulin (CARIMUNE NF) 12 gram solr by IntraVENous route.  ondansetron (ZOFRAN ODT) 4 mg disintegrating tablet Take 1 Tab by mouth every eight (8) hours as needed for Nausea.  mycophenolate mofetil (CELLCEPT) 200 mg/mL suspension Take 6 mL by mouth two (2) times a day.  losartan (COZAAR) 100 mg tablet TAKE ONE TABLET BY MOUTH DAILY    predniSONE (DELTASONE) 5 mg tablet TAKE THREE TABLETS BY MOUTH EVERY DAY    fluticasone (FLONASE) 50 mcg/actuation nasal spray INHALE 2 SPRAYS IN EACH NOSTRIL AT BEDTIME    IMMUNE GLOBULIN,GAMMA,IGG, (IMMUNE GLOBULIN, HUMAN,, IGG, IV) 80 g by IntraVENous route.       REVIEW OF SYSTEMS:  BOLD = POSITIVE; negative = normal text  General:  fever, chills, sweats, weakness, weight loss/gain  Eyes: blurred vision, eye pain, loss of vision, diplopia  Ear Nose and Throat: rhinorrhea, pharyngitis, otalgia, tinnitus, speech or swallowing difficulties  Respiratory: cough, sputum production, SOB, wheezing, LOREDO, pleuritic pain  Cardiology:   chest pain, palpitations, orthopnea, PND, edema, syncope   Gastrointestinal: abdominal pain, N/V, dysphagia, change in bowel habits--diarrhea yesterday, bleeding  Genitourinary: frequency, urgency, dysuria, hematuria, incontinence, decreased urination  Muskuloskeletal : arthralgia, myalgia, muscle weakness  Hematology:  easy bruising, bleeding, lymphadenopathy  Dermatological: rash, ulceration, mole change, new lesion  Endocrine: hot flashes or polydipsia  Neurological:  headache, dizziness, confusion, focal weakness, paresthesia, memory loss, gait disturbance  Psychological:  anxiety, depression, agitation      Objective:   VITALS:    Visit Vitals    /80    Pulse (!) 106    Temp 97.9 °F (36.6 °C)    Resp 18    Ht 5' 3\" (1.6 m)    Wt 77.1 kg (170 lb)    SpO2 92%    BMI 30.11 kg/m2     Temp (24hrs), Av.9 °F (36.6 °C), Min:97.9 °F (36.6 °C), Max:97.9 °F (36.6 °C)    PHYSICAL EXAM:    General:    Alert, cooperative, no distress, appears stated age. Heaving and vomiting, looks chronically ill  HEENT: Atraumatic, anicteric sclerae, pink conjunctivae     No oral ulcers, mucosa dry, throat clear. Hearing intact. Neck:  Supple, symmetrical,  thyroid: non tender  Lungs:   Few crackles in bases L > right. No Wheezing or Rhonchi. Chest wall:  No tenderness  No Accessory muscle use. Heart:   Regular  rhythm,  Tachy, 2/6  murmur   No gallop. No edema  Abdomen:   Distended, mild diffuse tender. Bowel sounds normal. No masses  Extremities: No cyanosis. No clubbing  Skin:     Not pale Not Jaundiced  No rashes   Psych:  Good insight. Depressed. Not anxious or agitated. Neurologic: EOMs intact. No facial asymmetry. No aphasia or slurred speech.  Symmetrical strength, Alert and oriented X 3.     ________________________________________________________________________  Care Plan discussed with:    Comments   Patient y    [facility]:      ________________________________________________________________________  TOTAL TIME:  50 minutes    ________________________________________________________________________  Marie Doss MD      Procedures: see electronic medical records for all procedures/Xrays and details which were not copied into this note but were reviewed prior to creation of Plan. LAB DATA REVIEWED:    Recent Results (from the past 24 hour(s))   EKG, 12 LEAD, INITIAL    Collection Time: 08/03/17 11:25 AM   Result Value Ref Range    Ventricular Rate 104 BPM    Atrial Rate 104 BPM    P-R Interval 146 ms    QRS Duration 76 ms    Q-T Interval 368 ms    QTC Calculation (Bezet) 483 ms    Calculated P Axis 52 degrees    Calculated R Axis 27 degrees    Calculated T Axis 26 degrees    Diagnosis       Sinus tachycardia  Possible Left atrial enlargement  Borderline ECG  When compared with ECG of 29-JUL-2017 23:06,  No significant change was found     CBC WITH AUTOMATED DIFF    Collection Time: 08/03/17 12:00 PM   Result Value Ref Range    WBC 8.9 3.6 - 11.0 K/uL    RBC 6.36 (H) 3.80 - 5.20 M/uL    HGB 14.1 11.5 - 16.0 g/dL    HCT 43.4 35.0 - 47.0 %    MCV 68.2 (L) 80.0 - 99.0 FL    MCH 22.2 (L) 26.0 - 34.0 PG    MCHC 32.5 30.0 - 36.5 g/dL    RDW 16.0 (H) 11.5 - 14.5 %    PLATELET 678 949 - 315 K/uL    NEUTROPHILS 80 (H) 32 - 75 %    LYMPHOCYTES 16 12 - 49 %    MONOCYTES 4 (L) 5 - 13 %    EOSINOPHILS 0 0 - 7 %    BASOPHILS 0 0 - 1 %    ABS. NEUTROPHILS 7.1 1.8 - 8.0 K/UL    ABS. LYMPHOCYTES 1.4 0.8 - 3.5 K/UL    ABS. MONOCYTES 0.4 0.0 - 1.0 K/UL    ABS. EOSINOPHILS 0.0 0.0 - 0.4 K/UL    ABS.  BASOPHILS 0.0 0.0 - 0.1 K/UL    PLATELET COMMENTS LARGE PLATELETS      RBC COMMENTS ANISOCYTOSIS  1+        RBC COMMENTS MICROCYTOSIS  1+        RBC COMMENTS HYPOCHROMIA  2+        RBC COMMENTS TARGET CELLS  PRESENT       METABOLIC PANEL, COMPREHENSIVE Collection Time: 08/03/17 12:00 PM   Result Value Ref Range    Sodium 132 (L) 136 - 145 mmol/L    Potassium 3.4 (L) 3.5 - 5.1 mmol/L    Chloride 91 (L) 97 - 108 mmol/L    CO2 26 21 - 32 mmol/L    Anion gap 15 5 - 15 mmol/L    Glucose 99 65 - 100 mg/dL    BUN 60 (H) 6 - 20 MG/DL    Creatinine 2.15 (H) 0.55 - 1.02 MG/DL    BUN/Creatinine ratio 28 (H) 12 - 20      GFR est AA 28 (L) >60 ml/min/1.73m2    GFR est non-AA 23 (L) >60 ml/min/1.73m2    Calcium 9.3 8.5 - 10.1 MG/DL    Bilirubin, total 0.6 0.2 - 1.0 MG/DL    ALT (SGPT) 48 12 - 78 U/L    AST (SGOT) 62 (H) 15 - 37 U/L    Alk.  phosphatase 84 45 - 117 U/L    Protein, total 9.0 (H) 6.4 - 8.2 g/dL    Albumin 3.7 3.5 - 5.0 g/dL    Globulin 5.3 (H) 2.0 - 4.0 g/dL    A-G Ratio 0.7 (L) 1.1 - 2.2     TROPONIN I    Collection Time: 08/03/17 12:00 PM   Result Value Ref Range    Troponin-I, Qt. 0.14 (H) <0.05 ng/mL   CK W/ CKMB & INDEX    Collection Time: 08/03/17 12:00 PM   Result Value Ref Range    CK 1072 (H) 26 - 192 U/L    CK - MB 32.7 (H) <3.6 NG/ML    CK-MB Index 3.1 (H) 0 - 2.5     LACTIC ACID    Collection Time: 08/03/17 12:00 PM   Result Value Ref Range    Lactic acid 2.0 0.4 - 2.0 MMOL/L   LIPASE    Collection Time: 08/03/17 12:00 PM   Result Value Ref Range    Lipase 213 73 - 393 U/L   URINALYSIS W/ REFLEX CULTURE    Collection Time: 08/03/17  1:04 PM   Result Value Ref Range    Color YELLOW/STRAW      Appearance CLEAR CLEAR      Specific gravity <1.005 1.003 - 1.030    pH (UA) 5.5 5.0 - 8.0      Protein 30 (A) NEG mg/dL    Glucose NEGATIVE  NEG mg/dL    Ketone TRACE (A) NEG mg/dL    Blood TRACE (A) NEG      Urobilinogen 0.2 0.2 - 1.0 EU/dL    Nitrites NEGATIVE  NEG      Leukocyte Esterase NEGATIVE  NEG      WBC 5-10 0 - 4 /hpf    RBC 5-10 0 - 5 /hpf    Epithelial cells MODERATE (A) FEW /lpf    Bacteria 1+ (A) NEG /hpf    UA:UC IF INDICATED URINE CULTURE ORDERED (A) CNI      CA Oxalate crystals 1+ (A) NEG   BILIRUBIN, CONFIRM    Collection Time: 08/03/17  1:04 PM Result Value Ref Range    Bilirubin UA, confirm NEGATIVE  NEG

## 2017-08-03 NOTE — PROGRESS NOTES
Renal Dosing/Monitoring  Medication: Famotidine   Current regimen:  20 mg IV every 12 hr  Recent Labs      08/03/17   1200  08/01/17   2304   CREA  2.15*  1.24*   BUN  60*  35*     Estimated CrCl:  23 mL/min  Plan: Change to 20 mg IV daily for CrCl < 50 mL/min per renal dosing protocol.        Thank you,  Dennis Matos, PHARMD

## 2017-08-03 NOTE — H&P
Surgery History and Physical    Subjective:      Alice Means is a 64 y.o. female who presents for evaluation of abdominal complaints. [facility] ER visit in 5 days. Started with diarrhea, progressed to vomiting with no BMs. Had [address] showing cholelithiasis. CT scan today shows: \"Findings consistent with small bowel obstruction, transition point in the ileum. Interval small left effusion. No evidence of mesenteric ischemia. No mesenteric vascular occlusion or  hemodynamically significant stenosis. Hydropic gallbladder.'    . Past Medical History:   Diagnosis Date    Breast lump 1999    Negative    Hypertension     Polymyositis ([facility] [address] 12/2015    Renal cancer ([facility] [address] 07/08/2016     Past Surgical History:   Procedure Laterality Date    HX GYN  1999    hysterectomy    HX NEPHRECTOMY Right 2016    for kidney cancer    US GUIDED CORE BREAST BIOPSY Left 1999    Negative      Family History   Problem Relation Age of Onset    Cancer Mother     Breast Cancer Mother 68    Diabetes Father     Hypertension Father     Stroke Maternal Grandmother      Social History   Substance Use Topics    Smoking status: Former Smoker     Packs/day: 1.00     Years: 20.00     Types: Cigarettes     Quit date: 5/1/1998    Smokeless tobacco: Never Used    Alcohol use No      Prior to Admission medications    Medication Sig Start Date End Date Taking? Authorizing Provider   vitamin E topical cream Apply  to affected area daily. Patient applies to face   Yes Melanie Hawk MD   IMMUNE GLOB,ROXANNE CAPRYLATE,IGG, (GAMUNEX IV) 160 g by IntraVENous route every twenty-eight (28) days. Patient receives 160g Gamunex infusion monthly, administered over two consecutive days, at 80g each day. Yes Melaine Hawk MD   ondansetron (ZOFRAN ODT) 4 mg disintegrating tablet Take 1 Tab by mouth every eight (8) hours as needed for Nausea.  8/1/17  Yes Komal Esparza MD   mycophenolate mofetil (CELLCEPT) 200 mg/mL suspension Take 6 mL by mouth two (2) times a day. 17  Yes Historical Provider   losartan (COZAAR) 100 mg tablet TAKE ONE TABLET BY MOUTH DAILY 16  Yes Historical Provider   predniSONE (DELTASONE) 5 mg tablet TAKE THREE TABLETS BY MOUTH EVERY DAY 16  Yes Historical Provider   fluticasone (FLONASE) 50 mcg/actuation nasal spray INHALE 1-2 SPRAYS IN EACH NOSTRIL AT BEDTIME 16  Yes Historical Provider      Allergies   Allergen Reactions    Levaquin [Levofloxacin] Other (comments)    Lisinopril Other (comments)    Metoprolol Other (comments)     hallucinations    Peanut Other (comments)       Review of SystemsSEE HPI/PMH/er RECORD    Objective:     Patient Vitals for the past 8 hrs:   BP Temp Pulse Resp SpO2 Height Weight   17 1515 159/63 - (!) 106 24 92 % - -   17 1400 145/80 - - - 92 % - -   17 1345 160/75 - - - 93 % - -   17 1330 140/73 - - - 92 % - -   17 1315 149/81 - - - 93 % - -   17 1300 (!) 152/91 - - - 92 % - -   17 1245 150/72 - - - 95 % - -   17 1242 154/80 - - - 94 % - -   17 1145 145/70 - - - 93 % - -   17 1130 140/64 - - - 94 % - -   17 1115 135/71 - - - 94 % - -   17 1111 139/72 97.9 °F (36.6 °C) (!) 106 18 94 % 5' 3\" (1.6 m) 170 lb (77.1 kg)   17 1109 139/72 - - - - - -       Temp (24hrs), Av.9 °F (36.6 °C), Min:97.9 °F (36.6 °C), Max:97.9 °F (36.6 °C)      Physical Exam Constitutional: She is oriented to person, place, and time. She appears well-developed and well-nourished. No ACUTE distress. Head: Normocephalic and atraumatic. Nose: NoseNG IN PLACE BILIOUS FLUID   Eyes: Conjunctivae and EOM are normal. Pupils are equal, round, and reactive   Neck: TRACH MIDLINE  Cardiovascular: Normal rate,and rhyythm. Pulmonary/Chest: Effort normal   Abdominal: Soft. Decreased BS She exhibits distension. There is diffuse tenderness. There is no guarding or rebound.    Musculoskeletal: grossly wnl  Neuro: alert and oriented to person, place, and time No focal neuro deficits appreciated. Skin: Skin is warm and dry. [facility]: She has a normal mood and affect. Her behavior is normal.     Assessment:   SBO    Multiple med issues    Plan:   Admit  NG/NPO  Serial exams, labs and imaging. Cardiology and Hospitalist consults.     Signed By: [place]   [id number] [facility]    August 3, 2017

## 2017-08-03 NOTE — PROGRESS NOTES
Pharmacy Clarification of the Prior to Admission Medication Regimen Retrospective to the Admission Medication Reconciliation    The patient was interviewed regarding clarification of the prior to admission medication regimen. Friend was present in room and obtained permission from patient to discuss drug regimen with visitor(s) present. Patient was questioned regarding use of any other inhalers, topical products, over the counter medications, herbal medications, vitamin products or ophthalmic/nasal/otic medication use. Patient stated she receives IVIG infusions from [facility]. Pharmacy called [facility] and after several transfers and phone calls, was connected to the [address] [place] [address], [phone], and spoke with Lamine Ron RN, who was able to provide information regarding the patient's infusion. Information Obtained From: Patient, Prescription bottles, [address] [place] [address], RX Query    Recommendations/Findings: The following amendments were made to the patient's active medication list on file at [facility]:     1) Additions:    vitamin E topical cream    2) Removals:    Carimune NF    3) Changes:   IVIG Infustion (Old regimen: 80g IV  /New regimen: 160g IV Infusion (over two consecutive days))   fluticasone (FLONASE) 50 mcg/actuation nasal spray (Old regimen: 2 sprays QHS  /New regimen: 1-2 sprays QHS)    4) Pertinent Pharmacy Findings:   losartan (COZAAR) 100 mg tablet and predniSONE (DELTASONE) 5 mg tablet: Patient stated she took these agent 8/3/17, then immediately vomited, and is unsure if the medications 'stayed down'.     Identified High Alert Medication Information  - Oral Immunosuppressants: (mycophenolate mofetil oral suspension)  - Indication: RA  - Dosing Instructions: 6 mL BID  - Can patient supply from home: Yes, patient has prescription bottle in the ED  o Current IV Medication Outpatient Regimen: (IVIG (Gamunex))  - Rheumatologist: Dr. Perri Kaur  - Indication: polymyositis   - Location receiving infusion: [address] [place] [address] [phone]  - Last dose administered: July 18 and 19, 2017  - Patient receives a total of 160 g IV every 28 days. The dose is administered as 80 g IV daily x 2 days for a total dose of 160 g IV. Patient has only received two monthly infusions (June and July 2017) and has a third infusion scheduled for 8/16/17 and 8/17/17. Confirmed with infusion center that the order does not currently have a stop date. PTA medication list was corrected to the following:     Prior to Admission Medications   Prescriptions Last Dose Informant Patient Reported? Taking? IMMUNE GLOB,ROXANNE CAPRYLATE,IGG, ([facility] IV) 7/19/2017 at Unknown time Other Yes Yes   Sig: 160 g by IntraVENous route every twenty-eight (28) days. Patient receives 160g Gamunex infusion monthly, administered over two consecutive days, at 80g each day. fluticasone (FLONASE) 50 mcg/actuation nasal spray 7/29/2017 at Unknown time Self Yes Yes   Sig: INHALE 1-2 SPRAYS IN EACH NOSTRIL AT BEDTIME   losartan (COZAAR) 100 mg tablet 8/3/2017 at Unknown time Other Yes Yes   Sig: TAKE ONE TABLET BY MOUTH DAILY   mycophenolate mofetil (CELLCEPT) 200 mg/mL suspension 8/2/2017 at Unknown time Other Yes Yes   Sig: Take 6 mL by mouth two (2) times a day. ondansetron (ZOFRAN ODT) 4 mg disintegrating tablet 8/3/2017 at Unknown time Other No Yes   Sig: Take 1 Tab by mouth every eight (8) hours as needed for Nausea. predniSONE (DELTASONE) 5 mg tablet 8/3/2017 at Unknown time Other Yes Yes   Sig: TAKE THREE TABLETS BY MOUTH EVERY DAY   vitamin E topical cream 8/3/2017 at Unknown time Self Yes Yes   Sig: Apply  to affected area daily.  Patient applies to face      Facility-Administered Medications: None          Thank you,  Natty Hightower CPhT  Medication History Pharmacy Technician

## 2017-08-03 NOTE — ED NOTES
Bedside shift change report given to Mikayla Hines (oncoming nurse) by Brandy Hobbs  (offgoing nurse). Report included the following information SBAR and ED Summary.

## 2017-08-03 NOTE — IP AVS SNAPSHOT
[address] [facility] 
[phone] Patient: Alex Nava MRN: [id number] DLN:[id number] You are allergic to the following Allergen Reactions Levaquin (Levofloxacin) Other (comments) Lisinopril Other (comments) Metoprolol Other (comments)  
 hallucinations Peanut Other (comments) Recent Documentation Height Weight BMI OB Status Smoking Status 1.6 m 80.3 kg 31.35 kg/m2 Hysterectomy Former Smoker Emergency Contacts Name Discharge Info Relation Home Work Mobile [facility] HOSPITAL DISCHARGE CAREGIVER [3] Parent [1] [phone] Ron Win DISCHARGE CAREGIVER [3] Parent [1] [phone] About your hospitalization You were admitted on:  August 3, 2017 You last received care in the:  hospitals 2 GENERAL SURGERY You were discharged on:  August 9, 2017 Unit phone number:  [phone] Why you were hospitalized Your primary diagnosis was:  Not on File Your diagnoses also included:  Sbo (Small Bowel Obstruction) ([facility]) Providers Seen During Your Hospitalizations Provider Role Specialty Primary office phone Johny Armenta MD Attending Provider Emergency Medicine [phone] Stormy Blake MD Attending Provider General Surgery [phone] Your Primary Care Physician (PCP) Primary Care Physician Office Phone Office Fax Tanja June [phone] Follow-up Information Follow up With Details Comments Contact Info Tihen Epstein MD  for stress test. [id number] [facility] 
[phone] f/u with primary Rheumatologist in 1-2 weeks. In 1 week Ines Mays MD In 1 week  [address] [facility] [place] [id number] [phone] Current Discharge Medication List  
  
START taking these medications Dose & Instructions Dispensing Information Comments Morning Noon Evening Bedtime  
 amLODIPine 5 mg tablet Commonly known as:  Ramon Rand Your last dose was: Your next dose is:    
   
   
 Dose:  5 mg Take 1 Tab by mouth daily. Quantity:  30 Tab Refills:  1  
     
   
   
   
  
 aspirin 81 mg chewable tablet Your last dose was: Your next dose is:    
   
   
 Dose:  81 mg Take 1 Tab by mouth daily. Quantity:  30 Tab Refills:  1  
     
   
   
   
  
 potassium, sodium phosphates 280-160-250 mg packet Commonly known as:  NEUTRA-PHOS Your last dose was: Your next dose is:    
   
   
 Dose:  1 Packet Take 1 Packet by mouth daily for 3 days. Indications: hypophosphatemia Quantity:  3 Packet Refills:  0 CONTINUE these medications which have NOT CHANGED Dose & Instructions Dispensing Information Comments Morning Noon Evening Bedtime  
 fluticasone 50 mcg/actuation nasal spray Commonly known as:  Jeannette Souza Your last dose was: Your next dose is:    
   
   
 INHALE 1-2 SPRAYS IN EACH NOSTRIL AT BEDTIME Refills:  12  
     
   
   
   
  
 GAMUNEX IV Your last dose was: Your next dose is:    
   
   
 Dose:  160 g  
160 g by IntraVENous route every twenty-eight (28) days. Patient receives 160g Gamunex infusion monthly, administered over two consecutive days, at 80g each day. Refills:  0  
     
   
   
   
  
 losartan 100 mg tablet Commonly known as:  COZAAR Your last dose was: Your next dose is: TAKE ONE TABLET BY MOUTH DAILY Refills:  6  
     
   
   
   
  
 mycophenolate mofetil 200 mg/mL suspension Commonly known as:  CELLCEPT Your last dose was: Your next dose is:    
   
   
 Dose:  6 mL Take 6 mL by mouth two (2) times a day. Refills:  1  
     
   
   
   
  
 ondansetron 4 mg disintegrating tablet Commonly known as:  ZOFRAN ODT Your last dose was: Your next dose is:    
   
   
 Dose:  4 mg Take 1 Tab by mouth every eight (8) hours as needed for Nausea. Quantity:  10 Tab Refills:  0  
     
   
   
   
  
 predniSONE 5 mg tablet Commonly known as:  Pawan Alexander Your last dose was: Your next dose is: TAKE THREE TABLETS BY MOUTH EVERY DAY Refills:  3  
     
   
   
   
  
 vitamin E topical cream  
   
Your last dose was: Your next dose is:    
   
   
 Apply  to affected area daily. Patient applies to face Refills:  0 STOP taking these medications IMMUNE GLOBULIN (HUMAN) (IGG) IV Where to Get Your Medications These medications were sent to [facility] [address], [address] [place] [address], [address] [facility] [address] Phone:  [phone]  
  amLODIPine 5 mg tablet  
 aspirin 81 mg chewable tablet  
 potassium, sodium phosphates 280-160-250 mg packet Discharge Instructions Bmp, Magnesium and phosphorous level in 1 week. Discharge Orders None Fotolog Announcement We are excited to announce that we are making your provider's discharge notes available to you in Fotolog. You will see these notes when they are completed and signed by the physician that discharged you from your recent hospital stay. If you have any questions or concerns about any information you see in Fotolog, please call the Health Information Department where you were seen or reach out to your Primary Care Provider for more information about your plan of care. Introducing [facility] [place] [facility]! Dear Ortencia Meigs: Thank you for requesting a Fotolog account. Our records indicate that you already have an active Fotolog account. You can access your account anytime at [email or web address] Did you know that you can access your hospital and ER discharge instructions at any time in DailyDeal? You can also review all of your test results from your hospital stay or ER visit. Additional Information If you have questions, please visit the Frequently Asked Questions section of the DailyDeal website at [email or web address]. [employer]/. Remember, DisplayLinkhart is NOT to be used for urgent needs. For medical emergencies, dial 911. Now available from your iPhone and Android! General Information Please provide this summary of care documentation to your next provider. Patient Signature:  ____________________________________________________________ Date:  ____________________________________________________________  
  
[facility] Provider Signature:  ____________________________________________________________ Date:  ____________________________________________________________

## 2017-08-04 ENCOUNTER — APPOINTMENT (OUTPATIENT)
Dept: GENERAL RADIOLOGY | Age: 62
DRG: 389 | End: 2017-08-04
Attending: FAMILY MEDICINE
Payer: COMMERCIAL

## 2017-08-04 LAB
ANION GAP BLD CALC-SCNC: 11 MMOL/L (ref 5–15)
ATRIAL RATE: 96 BPM
BUN SERPL-MCNC: 55 MG/DL (ref 6–20)
BUN/CREAT SERPL: 37 (ref 12–20)
CALCIUM SERPL-MCNC: 7.9 MG/DL (ref 8.5–10.1)
CALCULATED P AXIS, ECG09: 64 DEGREES
CALCULATED R AXIS, ECG10: 37 DEGREES
CALCULATED T AXIS, ECG11: 35 DEGREES
CHLORIDE SERPL-SCNC: 105 MMOL/L (ref 97–108)
CO2 SERPL-SCNC: 22 MMOL/L (ref 21–32)
CREAT SERPL-MCNC: 1.5 MG/DL (ref 0.55–1.02)
DIAGNOSIS, 93000: NORMAL
ERYTHROCYTE [DISTWIDTH] IN BLOOD BY AUTOMATED COUNT: 15.9 % (ref 11.5–14.5)
GLUCOSE SERPL-MCNC: 78 MG/DL (ref 65–100)
HCT VFR BLD AUTO: 36.7 % (ref 35–47)
HGB BLD-MCNC: 11.7 G/DL (ref 11.5–16)
MCH RBC QN AUTO: 22 PG (ref 26–34)
MCHC RBC AUTO-ENTMCNC: 31.9 G/DL (ref 30–36.5)
MCV RBC AUTO: 68.9 FL (ref 80–99)
P-R INTERVAL, ECG05: 152 MS
PLATELET # BLD AUTO: 203 K/UL (ref 150–400)
POTASSIUM SERPL-SCNC: 4.1 MMOL/L (ref 3.5–5.1)
Q-T INTERVAL, ECG07: 342 MS
QRS DURATION, ECG06: 76 MS
QTC CALCULATION (BEZET), ECG08: 432 MS
RBC # BLD AUTO: 5.33 M/UL (ref 3.8–5.2)
SODIUM SERPL-SCNC: 138 MMOL/L (ref 136–145)
TROPONIN I SERPL-MCNC: 0.11 NG/ML
VENTRICULAR RATE, ECG03: 96 BPM
WBC # BLD AUTO: 7.4 K/UL (ref 3.6–11)

## 2017-08-04 PROCEDURE — 74011250636 HC RX REV CODE- 250/636: Performed by: HOSPITALIST

## 2017-08-04 PROCEDURE — 74022 RADEX COMPL AQT ABD SERIES: CPT

## 2017-08-04 PROCEDURE — 80048 BASIC METABOLIC PNL TOTAL CA: CPT | Performed by: HOSPITALIST

## 2017-08-04 PROCEDURE — 84484 ASSAY OF TROPONIN QUANT: CPT | Performed by: HOSPITALIST

## 2017-08-04 PROCEDURE — 93306 TTE W/DOPPLER COMPLETE: CPT

## 2017-08-04 PROCEDURE — 74011000250 HC RX REV CODE- 250: Performed by: HOSPITALIST

## 2017-08-04 PROCEDURE — 65270000029 HC RM PRIVATE

## 2017-08-04 PROCEDURE — 36415 COLL VENOUS BLD VENIPUNCTURE: CPT | Performed by: HOSPITALIST

## 2017-08-04 PROCEDURE — 85027 COMPLETE CBC AUTOMATED: CPT | Performed by: FAMILY MEDICINE

## 2017-08-04 PROCEDURE — 74011250636 HC RX REV CODE- 250/636: Performed by: FAMILY MEDICINE

## 2017-08-04 PROCEDURE — 93005 ELECTROCARDIOGRAM TRACING: CPT

## 2017-08-04 PROCEDURE — 74011250637 HC RX REV CODE- 250/637: Performed by: HOSPITALIST

## 2017-08-04 RX ADMIN — Medication 10 ML: at 18:47

## 2017-08-04 RX ADMIN — ONDANSETRON 4 MG: 2 INJECTION INTRAMUSCULAR; INTRAVENOUS at 18:11

## 2017-08-04 RX ADMIN — FAMOTIDINE 20 MG: 10 INJECTION, SOLUTION INTRAVENOUS at 09:58

## 2017-08-04 RX ADMIN — METHYLPREDNISOLONE SODIUM SUCCINATE 15.2 MG: 40 INJECTION, POWDER, FOR SOLUTION INTRAMUSCULAR; INTRAVENOUS at 09:56

## 2017-08-04 RX ADMIN — NITROGLYCERIN 1 INCH: 20 OINTMENT TOPICAL at 20:39

## 2017-08-04 RX ADMIN — ONDANSETRON 4 MG: 2 INJECTION INTRAMUSCULAR; INTRAVENOUS at 10:04

## 2017-08-04 RX ADMIN — SODIUM CHLORIDE AND POTASSIUM CHLORIDE: 9; 1.49 INJECTION, SOLUTION INTRAVENOUS at 18:00

## 2017-08-04 RX ADMIN — SODIUM CHLORIDE AND POTASSIUM CHLORIDE: 9; 1.49 INJECTION, SOLUTION INTRAVENOUS at 10:00

## 2017-08-04 NOTE — PROGRESS NOTES
Cardiology Progress Note      8/4/2017 10:37 AM    Admit Date: 8/3/2017          Subjective:  No further chest pain. Echo pending. No other new c/o          Visit Vitals    /66    Pulse 95    Temp 98.5 °F (36.9 °C)    Resp 18    Ht 5' 3\" (1.6 m)    Wt 77.1 kg (170 lb)    LMP  (Exact Date)    SpO2 96%    BMI 30.11 kg/m2     08/02 1901 - 08/04 0700  In: -   Out: 1500 [Urine:500]        Objective:      Physical Exam:  VS as above  Chest CTA  Card RRR no agllop     Data Review:   Labs:    Hgb 11.7  Trop 0.11  BUN 55  Creat 1.5     Telemetry: not on tele       Assessment:     1. Mildly abnormal troponin of uncertain significance. 2. Atypical chest pain associated with nausea, vomiting and small-  bowel obstruction. 3. Hypertension. 4. Acute renal failure, probably due to dehydration. - better   5. Hyponatremia. 6. 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 7. History of renal cell cancer, status post nephrectomy. Plan: Will review echo.  At some point should prob have pharm nuclear stress

## 2017-08-04 NOTE — PROGRESS NOTES
Nutrition Assessment:    RECOMMENDATIONS:   Continue supportive care    DIETITIANS INTERVENTIONS/PLAN:   Monitor plan of care    ASSESSMENT:   Pt admitted with SBO. PMH: HTN, renal CA, s/p nephrectomy. Chart reviewed. NGT to suction, 1,000mL OP yesterday. Abdominal xray shows worsening of SBO today. Plan for films in AM.  No skin breakdown. MST triggered for wt loss, however wt is stable per EMR. Will monitor need for nutrition support upon F/U if diet cannot be advanced. SUBJECTIVE/OBJECTIVE:     Diet Order: NPO  % Eaten:  No data found. Pertinent Medications:pepcid, solumedrol, KCl; [email or web address]). Chemistries:  Lab Results   Component Value Date/Time    Sodium 138 08/04/2017 03:27 AM    Potassium 4.1 08/04/2017 03:27 AM    Chloride 105 08/04/2017 03:27 AM    CO2 22 08/04/2017 03:27 AM    Anion gap 11 08/04/2017 03:27 AM    Glucose 78 08/04/2017 03:27 AM    BUN 55 08/04/2017 03:27 AM    Creatinine 1.50 08/04/2017 03:27 AM    BUN/Creatinine ratio 37 08/04/2017 03:27 AM    GFR est AA 43 08/04/2017 03:27 AM    GFR est non-AA 35 08/04/2017 03:27 AM    Calcium 7.9 08/04/2017 03:27 AM    AST (SGOT) 62 08/03/2017 12:00 PM    Alk. phosphatase 84 08/03/2017 12:00 PM    Protein, total 9.0 08/03/2017 12:00 PM    Albumin 3.7 08/03/2017 12:00 PM    Globulin 5.3 08/03/2017 12:00 PM    A-G Ratio 0.7 08/03/2017 12:00 PM    ALT (SGPT) 48 08/03/2017 12:00 PM      Anthropometrics: Height: 5' 3\" (160 cm) Weight: 77.1 kg (170 lb)    IBW (%IBW):   ( ) UBW (%UBW):   (  %)    BMI: Body mass index is 30.11 kg/(m^2). This BMI is indicative of:  []Underweight   []Normal   []Overweight   [x] Obesity   [] Extreme Obesity (BMI>40)  Estimated Nutrition Needs (Based on): 1697 Kcals/day (MSJ 1305 x 1.3) , 62 g (0.8gPro/kg) Protein  Carbohydrate:  At Least 130 g/day  Fluids: 1700 mL/day    Last BM: 7/28   []Active     []Hyperactive  [x]Hypoactive       [] Absent   BS  Skin:    [x] Intact   [] Incision  [] Breakdown   [] DTI   [] Tears/Excoriation/Abrasion  []Edema [] Other: Wt Readings from Last 30 Encounters:   08/03/17 77.1 kg (170 lb)   08/01/17 73 kg (160 lb 15 oz)   08/01/17 73.4 kg (161 lb 12.8 oz)   07/29/17 77.1 kg (170 lb)   07/06/17 77.1 kg (170 lb)   05/22/17 81.3 kg (179 lb 3.2 oz)   05/09/17 79.9 kg (176 lb 3.2 oz)   03/17/17 78.9 kg (174 lb)   03/11/17 80.3 kg (177 lb)   02/21/17 80.4 kg (177 lb 3.2 oz)   02/15/17 80.4 kg (177 lb 3.2 oz)   02/02/17 82.6 kg (182 lb)   01/17/17 80.8 kg (178 lb 3.2 oz)   11/02/16 85.9 kg (189 lb 6.4 oz)   07/28/16 91.6 kg (202 lb)   05/02/16 90.7 kg (200 lb)      NUTRITION DIAGNOSES:   Problem:  Altered GI function      Etiology: related to SBO      Signs/Symptoms: as evidenced by imaging, NGT to suction, NPO. NUTRITION INTERVENTIONS:                     GOAL:   Plan of care will be determined re: nutrition in 2-4 days. Cultural, Baptist, or Ethnic Dietary Needs: None     LEARNING NEEDS (Diet, Food/Nutrient-Drug Interaction):    [x] None Identified   [] Identified and Education Provided/Documented   [] Identified and Pt declined/was not appropriate      [x] Interdisciplinary Care Plan Reviewed/Documented    [x] Participated in Discharge Planning:  To be determined    [] Interdisciplinary Rounds     NUTRITION RISK:    [x] High              [] Moderate           []  Low  []  Minimal/Uncompromised    PT SEEN FOR:    []  MD Consult: []Calorie Count      []Diabetic Diet Education        []Diet Education     []Electrolyte Management     []General Nutrition Management and Supplements     []Management of Tube Feeding     []TPN Recommendations    [x]  RN Referral:  [x]MST score >=2     []Enteral/Parenteral Nutrition PTA     []Pregnant: Gestational DM or Multigestation   []  Low BMI  []  DTR Referral       Dolores Carroll, [address]    Pager [phone]  Weekend Pager [phone]

## 2017-08-04 NOTE — PROGRESS NOTES
End of Shift Nursing Note    Bedside shift change report given to [facility] (oncoming nurse) by [address] [facility] (offgoing nurse). Report included the following information SBAR and Kardex. Zone Phone:       Significant changes during shift:    NONE   Non-emergent issues for physician to address:   NONE     Number times ambulated in hallway past shift: 1      Number of times OOB to chair past shift: 2    POD #: NONE     Vital Signs:    Temp: 98.5 °F (36.9 °C)     Pulse (Heart Rate): 98     BP: 164/72     Resp Rate: 18     O2 Sat (%): 100 %    Lines & Drains:     Urinary Catheter? Yes   Placement Date: 8/3/2017   Medical Necessity:   Central Line? No       PICC Line? NO       NG tube [] in [] removed [] not applicable   Drains [] in [] removed [] not applicable     Skin Integrity:      Wounds: no   Dressings Present: no    Wound Concerns: no      GI:    Current diet:  DIET NPO    Nausea: YES  Vomiting: NO  Bowel Sounds: YES  Flatus: NO  Last Bowel Movement: several days ago   Appearance:     Respiratory:  Supplemental O2: No      Device:    via  Liters/min     Incentive Spirometer: YES  Volume:   Coughing and Deep Breathing: YES  Oral Care: YES  Understanding (patient/family education): YES   Getting out of bed: YES  Head of bed elevation: YES    Patient Safety:    Falls Score: 2  Mobility Score: 1  Bed Alarm On? No  Sitter? No      Opportunity for questions and clarification was given to oncoming nurse. Patient bed is in low position, side rails are up x 2, door & observation blinds open as needed, call bell within reach and patient not in distress.     Leigh Cano RN

## 2017-08-04 NOTE — PROGRESS NOTES
Admit Date: 8/3/2017    POD * No surgery found *    Procedure:  * No surgery found *    Subjective:     Patient has no new complaints. Feels better, no flatus or BM    Objective:     Blood pressure 156/70, pulse 95, temperature 98 °F (36.7 °C), resp. rate 18, height 5' 3\" (1.6 m), weight 170 lb (77.1 kg), SpO2 100 %.     Temp (24hrs), Av.1 °F (36.7 °C), Min:97.9 °F (36.6 °C), Max:98.5 °F (36.9 °C)      Physical Exam:  GENERAL: alert, cooperative, no distress, appears stated age, LUNG: nl effort, HEART: regular rate and rhythm, ABDOMEN: softer, less tender, no guarding or rebound, EXTREMITIES:  extremities normal, atraumatic, no cyanosis or edema    Labs:   Recent Results (from the past 24 hour(s))   URINALYSIS W/ REFLEX CULTURE    Collection Time: 17  1:04 PM   Result Value Ref Range    Color YELLOW/STRAW      Appearance CLEAR CLEAR      Specific gravity <1.005 1.003 - 1.030    pH (UA) 5.5 5.0 - 8.0      Protein 30 (A) NEG mg/dL    Glucose NEGATIVE  NEG mg/dL    Ketone TRACE (A) NEG mg/dL    Blood TRACE (A) NEG      Urobilinogen 0.2 0.2 - 1.0 EU/dL    Nitrites NEGATIVE  NEG      Leukocyte Esterase NEGATIVE  NEG      WBC 5-10 0 - 4 /hpf    RBC 5-10 0 - 5 /hpf    Epithelial cells MODERATE (A) FEW /lpf    Bacteria 1+ (A) NEG /hpf    UA:UC IF INDICATED URINE CULTURE ORDERED (A) CNI      CA Oxalate crystals 1+ (A) NEG   BILIRUBIN, CONFIRM    Collection Time: 17  1:04 PM   Result Value Ref Range    Bilirubin UA, confirm NEGATIVE  NEG     CK W/ CKMB & INDEX    Collection Time: 17  3:05 PM   Result Value Ref Range     (H) 26 - 192 U/L    CK - MB 24.2 (H) <3.6 NG/ML    CK-MB Index 3.0 (H) 0 - 2.5     PHOSPHORUS    Collection Time: 17  3:05 PM   Result Value Ref Range    Phosphorus 5.3 (H) 2.6 - 4.7 MG/DL   MAGNESIUM    Collection Time: 17  3:05 PM   Result Value Ref Range    Magnesium 2.2 1.6 - 2.4 mg/dL   TROPONIN I    Collection Time: 17  3:05 PM   Result Value Ref Range Troponin-I, Qt. 0.14 (H) <0.31 ng/mL   METABOLIC PANEL, BASIC    Collection Time: 08/04/17  3:27 AM   Result Value Ref Range    Sodium 138 136 - 145 mmol/L    Potassium 4.1 3.5 - 5.1 mmol/L    Chloride 105 97 - 108 mmol/L    CO2 22 21 - 32 mmol/L    Anion gap 11 5 - 15 mmol/L    Glucose 78 65 - 100 mg/dL    BUN 55 (H) 6 - 20 MG/DL    Creatinine 1.50 (H) 0.55 - 1.02 MG/DL    BUN/Creatinine ratio 37 (H) 12 - 20      GFR est AA 43 (L) >60 ml/min/1.73m2    GFR est non-AA 35 (L) >60 ml/min/1.73m2    Calcium 7.9 (L) 8.5 - 10.1 MG/DL   TROPONIN I    Collection Time: 08/04/17  3:27 AM   Result Value Ref Range    Troponin-I, Qt. 0.11 (H) <0.05 ng/mL   CBC W/O DIFF    Collection Time: 08/04/17  3:27 AM   Result Value Ref Range    WBC 7.4 3.6 - 11.0 K/uL    RBC 5.33 (H) 3.80 - 5.20 M/uL    HGB 11.7 11.5 - 16.0 g/dL    HCT 36.7 35.0 - 47.0 %    MCV 68.9 (L) 80.0 - 99.0 FL    MCH 22.0 (L) 26.0 - 34.0 PG    MCHC 31.9 30.0 - 36.5 g/dL    RDW 15.9 (H) 11.5 - 14.5 %    PLATELET 700 372 - 276 K/uL   EKG, 12 LEAD, SUBSEQUENT    Collection Time: 08/04/17  3:39 AM   Result Value Ref Range    Ventricular Rate 0 BPM    Atrial Rate 0 BPM    QRS Duration 0 ms    Q-T Interval 0 ms    QTC Calculation (Bezet) 0 ms    Calculated R Axis 0 degrees    Calculated T Axis 0 degrees    Diagnosis       ** No QRS complexes found, no ECG analysis possible **  When compared with ECG of 03-AUG-2017 11:25,  Current undetermined rhythm precludes rhythm comparison, needs review         Data Review images and reports reviewed  Xray no improvement  Assessment:     Active Problems:    SBO (small bowel obstruction) (HCC) (8/3/2017)        Plan/Recommendations/Medical Decision Making:     Continue present treatment  films in AM   Increase activity    Salome Guerrero.  Shoshana Weston MD, [facility]

## 2017-08-04 NOTE — ED NOTES
Received report from Allie Damon, [facility]. Pt comfortable in bed. Green NG drainage noted in collection canister. V.s.s. IVFs w/ kcl infusing at 125 ml/hr as order. IV site is asymptomatic.

## 2017-08-04 NOTE — ED NOTES
TRANSFER - OUT REPORT:    Verbal report given to Currie Siemens, RN(name) on [facility]  being transferred to [facility]. Surg(unit) for routine progression of care       Report consisted of patients Situation, Background, Assessment and   Recommendations(SBAR). Information from the following report(s) SBAR, ED Summary, Procedure Summary, MAR and Recent Results was reviewed with the receiving nurse. Lines:   Peripheral IV 08/03/17 Right Antecubital (Active)   Site Assessment Clean, dry, & intact 8/3/2017 11:29 AM   Phlebitis Assessment 0 8/3/2017 11:29 AM   Infiltration Assessment 0 8/3/2017 11:29 AM   Dressing Status Clean, dry, & intact 8/3/2017 11:29 AM   Dressing Type Transparent 8/3/2017 11:29 AM   Hub Color/Line Status Pink;Flushed;Patent 8/3/2017 11:29 AM   Action Taken Blood drawn 8/3/2017 11:29 AM        Opportunity for questions and clarification was provided.       Patient transported with:   benchee

## 2017-08-04 NOTE — PROGRESS NOTES
Hospitalist Progress Note    NAME: Manuel Lciea   :  1955   MRN:  [id number]     Assessment / Plan:  Polymyositis (PM), POA  -baseline has chronic arm and leg weaknes - no pains, dysphagia with pills, sedentary lifestyle  -current OP Rx Prednisone 15mg daily (reduced from 40mg in ), cellcept 1200mg bid, and IVIG qmonth x 2 months (last dose 17). -given NPO status - requires hold on cellcept - restart as soon as able  -chagne to solumedrol for prednisone - will be curious to trend the cpk and look for drop with IV steroid (report shows: Report with treatment her CK has decreased from 9000 to 2000.)   -wonder if she will require higher steroid dosing at [facility] - may need to involve Rheumatology OP  -had respiratory failure requiring trach for 6 weeks 12/15 when first diagnosed with disease. Now use cpap at night. Look to continue     Chest pain, POA  Elevated troponin 0.14  -tte pending  -suspect due to underlying medical/surgical issues driving trop along with renal insufficiency now trop is trending down  -following with cardiology     Acute kidney injury due to dehydration from repeated vomiting. Acquired solitary kidney s/p nephrectomy for renal cancer   -creat improved now with IVF to 1.5  -patient with solitary kidney therefore creat <1.5 acceptable     Hypertension, Benign essential   -hold arb due to ARF and NPO status  -has allergy to metoprolol PO therefore cannot use IV either  -if needed could consider clonidine patch  -try hydralazine IV for now as well - if needed will schedule     Hypokalemia  -replaced     Abdominal pain due to small bowel obstruction with transition point at ileum, POA  --management per surgery     Subjective:     Chief Complaint / Reason for Physician Visit  \"I feel pretty good other than the NGT\". Discussed with RN events overnight.  C/o sore throat    Review of Systems:  Symptom Y/N Comments  Symptom Y/N Comments   Fever/Chills n   Chest Pain n    Poor Appetite y   Edema     Cough    Abdominal Pain     Sputum    Joint Pain     SOB/LOREDO n   Pruritis/Rash     Nausea/vomit n   Tolerating PT/OT     Diarrhea n   Tolerating Diet n    Constipation    Other       Could NOT obtain due to:      Objective:     VITALS:   Last 24hrs VS reviewed since prior progress note.  Most recent are:  Patient Vitals for the past 24 hrs:   Temp Pulse Resp BP SpO2   08/04/17 0320 97.9 °F (36.6 °C) 97 18 143/71 97 %   08/03/17 2254 98.1 °F (36.7 °C) 97 18 142/63 96 %   08/03/17 2234 - (!) 101 18 145/62 94 %   08/03/17 2115 - (!) 102 20 145/64 92 %   08/03/17 2100 - 100 20 142/56 92 %   08/03/17 2045 - (!) 103 20 142/61 92 %   08/03/17 2031 - (!) 102 20 - 91 %   08/03/17 2030 - (!) 102 20 146/59 91 %   08/03/17 2015 - (!) 102 20 144/60 91 %   08/03/17 2000 - (!) 103 20 139/60 92 %   08/03/17 1945 - (!) 104 21 147/63 91 %   08/03/17 1930 - (!) 103 20 145/61 91 %   08/03/17 1915 - (!) 103 23 144/63 91 %   08/03/17 1901 - 100 16 147/66 94 %   08/03/17 1730 - (!) 102 18 149/66 93 %   08/03/17 1715 - (!) 105 22 157/69 92 %   08/03/17 1700 - (!) 105 23 151/72 93 %   08/03/17 1645 - (!) 102 20 146/64 92 %   08/03/17 1630 - 99 18 163/64 93 %   08/03/17 1615 - (!) 101 20 161/65 94 %   08/03/17 1600 - 100 20 158/67 92 %   08/03/17 1545 - (!) 104 27 162/66 94 %   08/03/17 1530 - (!) 109 20 147/71 93 %   08/03/17 1515 - (!) 106 24 159/63 92 %   08/03/17 1400 - - - 145/80 92 %   08/03/17 1345 - - - 160/75 93 %   08/03/17 1330 - - - 140/73 92 %   08/03/17 1315 - - - 149/81 93 %   08/03/17 1300 - - - (!) 152/91 92 %   08/03/17 1245 - - - 150/72 95 %   08/03/17 1242 - - - 154/80 94 %   08/03/17 1145 - - - 145/70 93 %   08/03/17 1130 - - - 140/64 94 %   08/03/17 1115 - - - 135/71 94 %   08/03/17 1111 97.9 °F (36.6 °C) (!) 106 18 139/72 94 %   08/03/17 1109 - - - 139/72 -       Intake/Output Summary (Last 24 hours) at 08/04/17 0809  Last data filed at 08/04/17 0405   Gross per 24 hour   Intake                0 ml   Output             1500 ml   Net            -1500 ml        PHYSICAL EXAM:  General: WD, thin bf lying in bed in nad Alert, cooperative, no acute distress    EENT:  EOMI. Anicteric sclerae. MM dry - NGT in place draining green fluid  Resp:  CTA bilaterally, no wheezing or rales. No accessory muscle use  CV:  Regular  rhythm,  No edema  GI:  Soft, Non distended, Non tender.  +Bowel sounds  Neurologic:  Alert and oriented X 3, normal speech  Psych:   Good insight. Not anxious nor agitated  Skin:  no rashes or ulcers. No jaundice    Reviewed most current lab test results and cultures  YES  Reviewed most current radiology test results   YES  Review and summation of old records today    NO  Reviewed patient's current orders and MAR    YES  PMH/SH reviewed - no change compared to H&P  ________________________________________________________________________  Care Plan discussed with:    Comments   Patient x Discussed with patient in room. POC discussed. Questions answered (15   Family      RN x 5   Care Manager     Consultant:                       Multidiciplinary team rounds were held today with , nursing, pharmacist and clinical coordinator. Patient's plan of care was discussed; medications were reviewed and discharge planning was addressed. ________________________________________________________________________  Total NON critical care TIME:  25   Minutes    Total CRITICAL CARE TIME Spent:   Minutes non procedure based      Comments   >50% of visit spent in counseling and coordination of care x See above   ________________________________________________________________________  Procedures: see electronic medical records for all procedures/Xrays and details which were not copied into this note but were reviewed prior to creation of Plan.       LABS:  Recent Labs      08/04/17   0327  08/03/17   1200  08/01/17   2108   WBC  7.4  8.9  10.1   HGB  11.7  14.1  14.1   HCT  36.7  43.4  42.3   PLT  203 221  208     Recent Labs      08/04/17 0327 08/03/17   1505  08/03/17   1200  08/01/17   2304   NA  138   --   132*  135*   K  4.1   --   3.4*  3.9   CL  105   --   91*  99   CO2  22   --   26  28   BUN  55*   --   60*  35*   CREA  1.50*   --   2.15*  1.24*   GLU  78   --   99  87   CA  7.9*   --   9.3  9.0   MG   --   2.2   --    --    PHOS   --   5.3*   --    --      Recent Labs      08/03/17   1200  08/01/17   2304   SGOT  62*  69*   ALT  48  52   AP  84  87   TBILI  0.6  0.6   TP  9.0*  8.8*   ALB  3.7  3.4*   GLOB  5.3*  5.4*   LPSE  213   --      No results for input(s): INR, PTP, APTT in the last 72 hours. No lab exists for component: INREXT   No results for input(s): FE, TIBC, PSAT, FERR in the last 72 hours. No results found for: FOL, RBCF   No results for input(s): PH, PCO2, PO2 in the last 72 hours. No results for input(s): PHI, PO2I, PCO2I in the last 72 hours.   Recent Labs      08/04/17 0327 08/03/17   1505  08/03/17   1200   CPK   --   804*  1072*   CKNDX   --   3.0*  3.1*   TROIQ  0.11*  0.14*  0.14*     No results found for: CHOL, CHOLX, CHLST, CHOLV, HDL, LDL, LDLC, DLDLP, TGLX, TRIGL, TRIGP, CHHD, CHHDX  No results found for: [facility]  Lab Results   Component Value Date/Time    Color YELLOW/STRAW 08/03/2017 01:04 PM    Appearance CLEAR 08/03/2017 01:04 PM    Specific gravity <1.005 08/03/2017 01:04 PM    Specific gravity 1.012 07/30/2017 12:10 AM    pH (UA) 5.5 08/03/2017 01:04 PM    Protein 30 08/03/2017 01:04 PM    Glucose NEGATIVE  08/03/2017 01:04 PM    Ketone TRACE 08/03/2017 01:04 PM    Bilirubin NEGATIVE  07/30/2017 12:10 AM    Urobilinogen 0.2 08/03/2017 01:04 PM    Nitrites NEGATIVE  08/03/2017 01:04 PM    Leukocyte Esterase NEGATIVE  08/03/2017 01:04 PM    Epithelial cells MODERATE 08/03/2017 01:04 PM    Bacteria 1+ 08/03/2017 01:04 PM    WBC 5-10 08/03/2017 01:04 PM    RBC 5-10 08/03/2017 01:04 PM       RADIOGRAPHIC STUDIES:  CXR Results  (Last 48 hours)               08/03/17 1451  XR CHEST PORT Final result    Impression:  IMPRESSION:       Feeding tube extends to the stomach below the diaphragm. Nonspecific left   basilar airspace disease            Narrative:  history: Feeding tube placement       COMPARISON: 8/3/2017       FINDINGS:       Frontal chest radiograph submitted for review. Stable heart size. Hypoinflated lungs with left basilar airspace disease. No   pneumothorax. No significant effusion. Feeding tube extends to the stomach below   the diaphragm. 08/03/17 1231  XR CHEST PA LAT Final result    Impression:  IMPRESSION: No acute intrathoracic disease. Narrative:  CLINICAL HISTORY: Epigastric pain    INDICATION: Epigastric pain       COMPARISON: 3/11/2017       FINDINGS:    PA and lateral views of the chest are obtained. The cardiopericardial silhouette is within normal limits. There is no pleural   effusion, pneumothorax or focal consolidation present. CT Results  (Last 48 hours)               08/03/17 1210  CTA ABDOMEN PELV W CONT Final result    Impression:  IMPRESSION:        Findings consistent with small bowel obstruction, transition point in the ileum. Interval small left effusion. No evidence of mesenteric ischemia. No mesenteric vascular occlusion or   hemodynamically significant stenosis. Hydropic gallbladder. The findings were called to Maureen Santizo on 8/3/2017 at 12:46 PM by Dr. Rox Marshall. [phone]                   Narrative:  CLINICAL HISTORY: Right upper quadrant pain, abdominal pain        INDICATION:  Elevated lactic acid, abdominal pain       COMPARISON: 7/29/2017       TECHNIQUE: CT of the abdomen and pelvis with  IV contrast , Isovue-370 is   performed. Axial images from the abdomen to the level of the upper thighs is   obtained. Manual post-processing of the images and coronal reformatting is also   performed. Multiplanar reformatted imaging was performed. Sagittal and coronal reformatting. 3-D Postprocessing of imaging was performed. 3-D MIP reconstructed images were obtained in the coronal plane. CT dose reduction was achieved through use of a standardized protocol tailored   for this examination and automatic exposure control for dose modulation. FINDINGS:    LUNG BASES: Dependent atelectasis. Small left effusion. .   INCIDENTALLY IMAGED HEART AND MEDIASTINUM: There is distal esophageal wall   thickening. There is gastric distention. LIVER: Hepatic steatosis. Hepatic cyst. No duct dilatation. Portal vein is   patent. GALLBLADDER: Hydropic   SPLEEN: Unremarkable. PANCREAS: No mass or duct dilation. ADRENALS: Unremarkable. KIDNEYS: Right kidney surgically absent. Left kidney unremarkable. No mass or   calculus. STOMACH: Gastric distention. Hiatal hernia. SMALL BOWEL: Small bowel distention with transition point in the ileum the large   bowel is completely decompressed. COLON: No dilation or wall thickening. APPENDIX: Unremarkable. PERITONEUM: No ascites or pneumoperitoneum. RETROPERITONEUM: Aortic atherosclerotic change. Celiac is patent. SMA is patent. KELSEA is patent. No hemodynamically significant stenosis. No embolus. REPRODUCTIVE ORGANS:   URINARY BLADDER: No stone or mass evident. BONES: Upper convex lumbar scoliosis, degenerative spine change, advanced hip   osteoarthritis, and no acute fracture or aggressive lesion. This   protrusion/osteophyte at L5-S1 with foraminal stenosis. ADDITIONAL COMMENTS: N/A                 Echo Results  (Last 48 hours)    None          VENOUS DOPPLER results  (Last 48 hours)    None          CULTURES:    No results found for: SD[facility] Lab Results   Component Value Date/Time    Culture result: NO GROWTH AFTER 19 HOURS 08/03/2017 12:00 PM    Culture result: NO GROWTH AFTER 19 HOURS 08/03/2017 12:00 PM    Culture result: NO GROWTH 1 DAY 07/30/2017 12:10 AM          Signed:  Andrea Valles MD    This note will not be viewable in [address]

## 2017-08-05 ENCOUNTER — APPOINTMENT (OUTPATIENT)
Dept: GENERAL RADIOLOGY | Age: 62
DRG: 389 | End: 2017-08-05
Attending: FAMILY MEDICINE
Payer: COMMERCIAL

## 2017-08-05 LAB
ANION GAP BLD CALC-SCNC: 12 MMOL/L (ref 5–15)
BACTERIA SPEC CULT: NORMAL
BASOPHILS # BLD AUTO: 0 K/UL (ref 0–0.1)
BASOPHILS # BLD: 0 % (ref 0–1)
BUN SERPL-MCNC: 36 MG/DL (ref 6–20)
BUN/CREAT SERPL: 37 (ref 12–20)
CALCIUM SERPL-MCNC: 8.2 MG/DL (ref 8.5–10.1)
CC UR VC: NORMAL
CHLORIDE SERPL-SCNC: 112 MMOL/L (ref 97–108)
CK MB CFR SERPL CALC: 4 % (ref 0–2.5)
CK MB SERPL-MCNC: 22 NG/ML (ref 5–25)
CK SERPL-CCNC: 544 U/L (ref 26–192)
CO2 SERPL-SCNC: 19 MMOL/L (ref 21–32)
CREAT SERPL-MCNC: 0.97 MG/DL (ref 0.55–1.02)
EOSINOPHIL # BLD: 0 K/UL (ref 0–0.4)
EOSINOPHIL NFR BLD: 0 % (ref 0–7)
ERYTHROCYTE [DISTWIDTH] IN BLOOD BY AUTOMATED COUNT: 16.2 % (ref 11.5–14.5)
GLUCOSE BLD STRIP.AUTO-MCNC: 127 MG/DL (ref 65–100)
GLUCOSE BLD STRIP.AUTO-MCNC: 49 MG/DL (ref 65–100)
GLUCOSE BLD STRIP.AUTO-MCNC: 77 MG/DL (ref 65–100)
GLUCOSE BLD STRIP.AUTO-MCNC: 98 MG/DL (ref 65–100)
GLUCOSE SERPL-MCNC: 45 MG/DL (ref 65–100)
HCT VFR BLD AUTO: 36.3 % (ref 35–47)
HGB BLD-MCNC: 11.5 G/DL (ref 11.5–16)
LYMPHOCYTES # BLD AUTO: 21 % (ref 12–49)
LYMPHOCYTES # BLD: 1.7 K/UL (ref 0.8–3.5)
MAGNESIUM SERPL-MCNC: 2.5 MG/DL (ref 1.6–2.4)
MCH RBC QN AUTO: 22.2 PG (ref 26–34)
MCHC RBC AUTO-ENTMCNC: 31.7 G/DL (ref 30–36.5)
MCV RBC AUTO: 70.1 FL (ref 80–99)
MONOCYTES # BLD: 0.4 K/UL (ref 0–1)
MONOCYTES NFR BLD AUTO: 5 % (ref 5–13)
NEUTS SEG # BLD: 5.8 K/UL (ref 1.8–8)
NEUTS SEG NFR BLD AUTO: 74 % (ref 32–75)
PLATELET # BLD AUTO: 199 K/UL (ref 150–400)
POTASSIUM SERPL-SCNC: 4.5 MMOL/L (ref 3.5–5.1)
RBC # BLD AUTO: 5.18 M/UL (ref 3.8–5.2)
SERVICE CMNT-IMP: ABNORMAL
SERVICE CMNT-IMP: ABNORMAL
SERVICE CMNT-IMP: NORMAL
SODIUM SERPL-SCNC: 143 MMOL/L (ref 136–145)
TROPONIN I SERPL-MCNC: 0.11 NG/ML
WBC # BLD AUTO: 7.8 K/UL (ref 3.6–11)

## 2017-08-05 PROCEDURE — 83735 ASSAY OF MAGNESIUM: CPT | Performed by: INTERNAL MEDICINE

## 2017-08-05 PROCEDURE — 74011250636 HC RX REV CODE- 250/636: Performed by: INTERNAL MEDICINE

## 2017-08-05 PROCEDURE — 74011000250 HC RX REV CODE- 250: Performed by: HOSPITALIST

## 2017-08-05 PROCEDURE — 74011250637 HC RX REV CODE- 250/637: Performed by: FAMILY MEDICINE

## 2017-08-05 PROCEDURE — 36415 COLL VENOUS BLD VENIPUNCTURE: CPT | Performed by: INTERNAL MEDICINE

## 2017-08-05 PROCEDURE — 74011250636 HC RX REV CODE- 250/636: Performed by: FAMILY MEDICINE

## 2017-08-05 PROCEDURE — 85025 COMPLETE CBC W/AUTO DIFF WBC: CPT | Performed by: INTERNAL MEDICINE

## 2017-08-05 PROCEDURE — 74020 XR ABD FLAT/ ERECT: CPT

## 2017-08-05 PROCEDURE — 74011250637 HC RX REV CODE- 250/637: Performed by: INTERNAL MEDICINE

## 2017-08-05 PROCEDURE — 84484 ASSAY OF TROPONIN QUANT: CPT | Performed by: INTERNAL MEDICINE

## 2017-08-05 PROCEDURE — 65270000029 HC RM PRIVATE

## 2017-08-05 PROCEDURE — 80048 BASIC METABOLIC PNL TOTAL CA: CPT | Performed by: INTERNAL MEDICINE

## 2017-08-05 PROCEDURE — 82550 ASSAY OF CK (CPK): CPT | Performed by: INTERNAL MEDICINE

## 2017-08-05 PROCEDURE — 82962 GLUCOSE BLOOD TEST: CPT

## 2017-08-05 PROCEDURE — 74011000250 HC RX REV CODE- 250

## 2017-08-05 PROCEDURE — 74011250636 HC RX REV CODE- 250/636: Performed by: HOSPITALIST

## 2017-08-05 RX ORDER — HYDRALAZINE HYDROCHLORIDE 20 MG/ML
10 INJECTION INTRAMUSCULAR; INTRAVENOUS
Status: DISCONTINUED | OUTPATIENT
Start: 2017-08-05 | End: 2017-08-09 | Stop reason: HOSPADM

## 2017-08-05 RX ORDER — DEXTROSE 50 % IN WATER (D50W) INTRAVENOUS SYRINGE
Status: COMPLETED
Start: 2017-08-05 | End: 2017-08-05

## 2017-08-05 RX ORDER — LOSARTAN POTASSIUM 50 MG/1
100 TABLET ORAL DAILY
Status: DISCONTINUED | OUTPATIENT
Start: 2017-08-05 | End: 2017-08-05

## 2017-08-05 RX ORDER — DEXTROSE, SODIUM CHLORIDE, AND POTASSIUM CHLORIDE 5; .9; .15 G/100ML; G/100ML; G/100ML
150 INJECTION INTRAVENOUS CONTINUOUS
Status: DISCONTINUED | OUTPATIENT
Start: 2017-08-05 | End: 2017-08-09 | Stop reason: HOSPADM

## 2017-08-05 RX ORDER — VALSARTAN 80 MG/1
160 TABLET ORAL DAILY
Status: DISCONTINUED | OUTPATIENT
Start: 2017-08-05 | End: 2017-08-05

## 2017-08-05 RX ORDER — CHLORPROMAZINE HYDROCHLORIDE 25 MG/ML
25 INJECTION INTRAMUSCULAR
Status: DISCONTINUED | OUTPATIENT
Start: 2017-08-05 | End: 2017-08-09 | Stop reason: HOSPADM

## 2017-08-05 RX ORDER — DEXTROSE 50 % IN WATER (D50W) INTRAVENOUS SYRINGE
25 ONCE
Status: COMPLETED | OUTPATIENT
Start: 2017-08-05 | End: 2017-08-05

## 2017-08-05 RX ADMIN — DEXTROSE MONOHYDRATE, SODIUM CHLORIDE, AND POTASSIUM CHLORIDE 125 ML/HR: 50; 9; 1.49 INJECTION, SOLUTION INTRAVENOUS at 12:19

## 2017-08-05 RX ADMIN — HYDROMORPHONE HYDROCHLORIDE 1 MG: 1 INJECTION, SOLUTION INTRAMUSCULAR; INTRAVENOUS; SUBCUTANEOUS at 23:47

## 2017-08-05 RX ADMIN — NITROGLYCERIN 1 INCH: 20 OINTMENT TOPICAL at 18:49

## 2017-08-05 RX ADMIN — METHYLPREDNISOLONE SODIUM SUCCINATE 15.2 MG: 40 INJECTION, POWDER, FOR SOLUTION INTRAMUSCULAR; INTRAVENOUS at 09:29

## 2017-08-05 RX ADMIN — HYDRALAZINE HYDROCHLORIDE 10 MG: 20 INJECTION INTRAMUSCULAR; INTRAVENOUS at 20:39

## 2017-08-05 RX ADMIN — DEXTROSE MONOHYDRATE, SODIUM CHLORIDE, AND POTASSIUM CHLORIDE 125 ML/HR: 50; 9; 1.49 INJECTION, SOLUTION INTRAVENOUS at 04:05

## 2017-08-05 RX ADMIN — DEXTROSE MONOHYDRATE 12.5 G: 25 INJECTION, SOLUTION INTRAVENOUS at 03:56

## 2017-08-05 RX ADMIN — NITROGLYCERIN 1 INCH: 20 OINTMENT TOPICAL at 12:55

## 2017-08-05 RX ADMIN — DEXTROSE MONOHYDRATE, SODIUM CHLORIDE, AND POTASSIUM CHLORIDE 125 ML/HR: 50; 9; 1.49 INJECTION, SOLUTION INTRAVENOUS at 18:50

## 2017-08-05 RX ADMIN — HYDRALAZINE HYDROCHLORIDE 10 MG: 20 INJECTION INTRAMUSCULAR; INTRAVENOUS at 23:57

## 2017-08-05 RX ADMIN — FAMOTIDINE 20 MG: 10 INJECTION, SOLUTION INTRAVENOUS at 09:29

## 2017-08-05 RX ADMIN — HYDROMORPHONE HYDROCHLORIDE 1 MG: 1 INJECTION, SOLUTION INTRAMUSCULAR; INTRAVENOUS; SUBCUTANEOUS at 19:24

## 2017-08-05 RX ADMIN — VALSARTAN 160 MG: 80 TABLET ORAL at 01:33

## 2017-08-05 RX ADMIN — CHLORPROMAZINE HYDROCHLORIDE 25 MG: 25 INJECTION INTRAMUSCULAR at 12:55

## 2017-08-05 RX ADMIN — HYDROMORPHONE HYDROCHLORIDE 1 MG: 1 INJECTION, SOLUTION INTRAMUSCULAR; INTRAVENOUS; SUBCUTANEOUS at 15:25

## 2017-08-05 RX ADMIN — Medication 10 ML: at 15:25

## 2017-08-05 RX ADMIN — NITROGLYCERIN 1 INCH: 20 OINTMENT TOPICAL at 23:47

## 2017-08-05 RX ADMIN — DEXTROSE 50 % IN WATER (D50W) INTRAVENOUS SYRINGE 12.5 G: at 03:56

## 2017-08-05 NOTE — PROGRESS NOTES
2355 Page out to hospitalist answering service regarding blood pressure  0027 2nd page out to hospitalist answering service. Will continue to follow up  0050 3rd attempt for hospitalist will follow up with surgical group if no call back  0113 Page out to Dr. Emi Ang Return call from Dr. Lesly Kerr order to restart cozaar per home dose. Patient was hypoglycemic on morning lab doctor was called and orders received per mar. End of Shift Nursing Note    Bedside shift change report given to [address] [place] [address],[address] (oncoming nurse) by Hiral Puga (offgoing nurse). Report included the following information SBAR, Kardex, Intake/Output and MAR. Significant changes during shift:    none   Non-emergent issues for physician to address:   none     Number times ambulated in hallway past shift: 0      Number of times OOB to chair past shift: 0    Vital Signs:    Temp: 99.1 °F (37.3 °C)     Pulse (Heart Rate): 96     BP: 180/82     Resp Rate: 16     O2 Sat (%): 97 %    Lines & Drains:     Urinary Catheter? Yes   Placement Date: 8/3/17   Medical Necessity: Strict I/O    NG tube [x] in [] removed [] not applicable   Drains [] in [] removed [] not applicable     Skin Integrity:      Wounds: no   Dressings Present: no    Wound Concerns: no      GI:    Current diet:  DIET NPO    Nausea: NO  Vomiting: NO  Bowel Sounds: YES  Flatus: NO  Last Bowel Movement: several days ago   Appearance:     Respiratory:  Incentive Spirometer: NO  Volume:   Coughing and Deep Breathing: YES  Oral Care: YES  Understanding (patient/family education): YES   Getting out of bed: YES  Head of bed elevation: YES    Patient Safety:    Falls Score: 1  Mobility Score: 1  Bed Alarm On? No  Sitter? No      Opportunity for questions and clarification was given to oncoming nurse. Patient bed is in low position, side rails are up x 2, door & observation blinds open as needed, call bell within reach and patient not in distress.     Tish Blackwell RN

## 2017-08-05 NOTE — PROGRESS NOTES
Hospitalist Progress Note    NAME: Larry Foreman   :  1955   MRN:  [id number]     Assessment / Plan:  Hypoglycemia  -no prior hx of diabetes therefore have to suspect this is related to the chronic steroids - currently is on solumedrol but may need to adjust this to solucortef should bs continue to be a problem - currently is hypertensive  -continue on POC Bs as we are doing  -dsicussed symptoms of hypoglycemia with patient so that we can attempt to catch earlier and therefore avoid possible complications    Hypertension, Benign essential now accelerating  -overnight worsened blood pressures  -once able to tolerate PO will resume home medications - she remains with htn and will need possibly scheduled IV medication - could always attempt IV acei, but for now alpha blocker with hydralazine  -has allergy to metoprolol PO therefore cannot use IV either  -if needed could consider clonidine patch    Chest pain, POA  Elevated troponin  -tte 55% no valvular changes  -trop now down to 0.11 peaked at 0.14. Doubt this to be cardiac in origin but related to SBO and renal failure  -following with cardiology     Acute kidney injury due to dehydration from repeated vomiting. Acquired solitary kidney s/p nephrectomy for renal cancer   -creat now normal with IVF  -patient with solitary kidney therefore creat <1.5 acceptable     Hypokalemia   -replaced     Abdominal pain due to small bowel obstruction with transition point at ileum, POA  --management per surgery    Polymyositis (PM), POA  -cpk continues to drop         Subjective:     Chief Complaint / Reason for Physician Visit  \"I feel fine today\" no pain. Discomfort from NGT noted. Remains NPO. On dextrose in her fluid due to dropped bs.      Review of Systems:  Symptom Y/N Comments  Symptom Y/N Comments   Fever/Chills n   Chest Pain n    Poor Appetite y   Edema     Cough n   Abdominal Pain     Sputum    Joint Pain     SOB/LOREDO n   Pruritis/Rash     Nausea/vomit n Tolerating PT/OT     Diarrhea n   Tolerating Diet n    Constipation    Other       Could NOT obtain due to:      Objective:     VITALS:   Last 24hrs VS reviewed since prior progress note. Most recent are:  Patient Vitals for the past 24 hrs:   Temp Pulse Resp BP SpO2   08/05/17 0812 99 °F (37.2 °C) 95 18 196/79 95 %   08/05/17 0302 98.9 °F (37.2 °C) 95 18 176/76 98 %   08/04/17 2355 99.1 °F (37.3 °C) 96 16 180/82 97 %   08/04/17 2034 99 °F (37.2 °C) 92 18 190/79 96 %   08/04/17 1511 98.5 °F (36.9 °C) 98 18 164/72 100 %   08/04/17 1119 98 °F (36.7 °C) 95 18 156/70 100 %       Intake/Output Summary (Last 24 hours) at 08/05/17 0932  Last data filed at 08/05/17 0346   Gross per 24 hour   Intake          3064.58 ml   Output             2450 ml   Net           614.58 ml        PHYSICAL EXAM:  General: WD, thin bf lying in bed in nad Alert, cooperative, no acute distress    EENT:  EOMI. Anicteric sclerae. MM dry - NGT in place draining green fluid  Resp:  CTA bilaterally, no wheezing or rales. No accessory muscle use  CV:  Regular  rhythm,  1+ edema  GI:  Soft, Non distended, Non tender.  +Bowel sounds  Neurologic:  Alert and oriented X 3, normal speech  Psych:   Good insight. Not anxious nor agitated  Skin:  no rashes or ulcers. No jaundice    Reviewed most current lab test results and cultures  YES  Reviewed most current radiology test results   YES  Review and summation of old records today    NO  Reviewed patient's current orders and MAR    YES  PMH/SH reviewed - no change compared to H&P  ________________________________________________________________________  Care Plan discussed with:    Comments   Patient x Discussed with patient in room. POC discussed. Questions answered (15   Family      RN x 5   Care Manager     Consultant: brandon Rivera 5                     Multidiciplinary team rounds were held today with , nursing, pharmacist and clinical coordinator.  Patient's plan of care was discussed; medications were reviewed and discharge planning was addressed. ________________________________________________________________________  Total NON critical care TIME:  35   Minutes    Total CRITICAL CARE TIME Spent:   Minutes non procedure based      Comments   >50% of visit spent in counseling and coordination of care x See above   ________________________________________________________________________  Procedures: see electronic medical records for all procedures/Xrays and details which were not copied into this note but were reviewed prior to creation of Plan. LABS:  Recent Labs      08/05/17 0248 08/04/17 0327 08/03/17   1200   WBC  7.8  7.4  8.9   HGB  11.5  11.7  14.1   HCT  36.3  36.7  43.4   PLT  199  203  221     Recent Labs      08/05/17   0248  08/04/17 0327  08/03/17   1505  08/03/17   1200   NA  143  138   --   132*   K  4.5  4.1   --   3.4*   CL  112*  105   --   91*   CO2  19*  22   --   26   BUN  36*  55*   --   60*   CREA  0.97  1.50*   --   2.15*   GLU  45*  78   --   99   CA  8.2*  7.9*   --   9.3   MG  2.5*   --   2.2   --    PHOS   --    --   5.3*   --      Recent Labs      08/03/17   1200   SGOT  62*   ALT  48   AP  84   TBILI  0.6   TP  9.0*   ALB  3.7   GLOB  5.3*   LPSE  213     No results for input(s): INR, PTP, APTT in the last 72 hours. No lab exists for component: INREXT, INREXT   No results for input(s): FE, TIBC, PSAT, FERR in the last 72 hours. No results found for: FOL, RBCF   No results for input(s): PH, PCO2, PO2 in the last 72 hours. No results for input(s): PHI, PO2I, PCO2I in the last 72 hours.   Recent Labs      08/05/17   0248  08/04/17   0327  08/03/17   1505  08/03/17   1200   CPK  544*   --   804*  1072*   CKNDX  4.0*   --   3.0*  3.1*   TROIQ  0.11*  0.11*  0.14*  0.14*     No results found for: CHOL, CHOLX, CHLST, CHOLV, HDL, LDL, LDLC, DLDLP, TGLX, TRIGL, TRIGP, CHHD, CHHDX  Lab Results   Component Value Date/Time    Glucose (POC) 77 08/05/2017 07:56 AM    Glucose (POC) 98 08/05/2017 04:11 AM    Glucose (POC) 49 08/05/2017 03:41 AM     Lab Results   Component Value Date/Time    Color YELLOW/STRAW 08/03/2017 01:04 PM    Appearance CLEAR 08/03/2017 01:04 PM    Specific gravity <1.005 08/03/2017 01:04 PM    Specific gravity 1.012 07/30/2017 12:10 AM    pH (UA) 5.5 08/03/2017 01:04 PM    Protein 30 08/03/2017 01:04 PM    Glucose NEGATIVE  08/03/2017 01:04 PM    Ketone TRACE 08/03/2017 01:04 PM    Bilirubin NEGATIVE  07/30/2017 12:10 AM    Urobilinogen 0.2 08/03/2017 01:04 PM    Nitrites NEGATIVE  08/03/2017 01:04 PM    Leukocyte Esterase NEGATIVE  08/03/2017 01:04 PM    Epithelial cells MODERATE 08/03/2017 01:04 PM    Bacteria 1+ 08/03/2017 01:04 PM    WBC 5-10 08/03/2017 01:04 PM    RBC 5-10 08/03/2017 01:04 PM       RADIOGRAPHIC STUDIES:  CXR Results  (Last 48 hours)               08/04/17 0804  XR ABD ACUTE W 1 V CHEST Final result    Impression:  IMPRESSION:    Interval worsening of small bowel obstruction   Left basilar subsegmental atelectasis                           Narrative:  EXAM:  XR ABD ACUTE W 1 V CHEST       INDICATION:  Small bowel obstruction       COMPARISON: Prior day CT       FINDINGS: The upright chest radiograph demonstrates linear streaky changes at   the left base, and normal cardiac and mediastinal contours. There is no pleural   effusion or free air under the diaphragm. The NG tube extends to the stomach. Supine and upright views of the abdomen demonstrate distended small bowel loops   from 3.6 cm to an axial dimension of 5.6 cm. Air-fluid levels are present. There   is no free intraperitoneal air. No soft tissue masses or pathologic   calcifications are identified. The bones are within normal limits. 08/03/17 1451  XR CHEST PORT Final result    Impression:  IMPRESSION:       Feeding tube extends to the stomach below the diaphragm.  Nonspecific left   basilar airspace disease            Narrative:  history: Feeding tube placement       COMPARISON: 8/3/2017       FINDINGS:       Frontal chest radiograph submitted for review. Stable heart size. Hypoinflated lungs with left basilar airspace disease. No   pneumothorax. No significant effusion. Feeding tube extends to the stomach below   the diaphragm. 08/03/17 1231  XR CHEST PA LAT Final result    Impression:  IMPRESSION: No acute intrathoracic disease. Narrative:  CLINICAL HISTORY: Epigastric pain    INDICATION: Epigastric pain       COMPARISON: 3/11/2017       FINDINGS:    PA and lateral views of the chest are obtained. The cardiopericardial silhouette is within normal limits. There is no pleural   effusion, pneumothorax or focal consolidation present. CT Results  (Last 48 hours)               08/03/17 1210  CTA ABDOMEN PELV W CONT Final result    Impression:  IMPRESSION:        Findings consistent with small bowel obstruction, transition point in the ileum. Interval small left effusion. No evidence of mesenteric ischemia. No mesenteric vascular occlusion or   hemodynamically significant stenosis. Hydropic gallbladder. The findings were called to Hoda Baker on 8/3/2017 at 12:46 PM by Dr. Jimena Uriostegui. [id number]                   Narrative:  CLINICAL HISTORY: Right upper quadrant pain, abdominal pain        INDICATION:  Elevated lactic acid, abdominal pain       COMPARISON: 7/29/2017       TECHNIQUE: CT of the abdomen and pelvis with  IV contrast , Isovue-370 is   performed. Axial images from the abdomen to the level of the upper thighs is   obtained. Manual post-processing of the images and coronal reformatting is also   performed. Multiplanar reformatted imaging was performed. Sagittal and coronal reformatting. 3-D Postprocessing of imaging was performed. 3-D MIP reconstructed images were obtained in the coronal plane.        CT dose reduction was achieved through use of a standardized protocol tailored   for this examination and automatic exposure control for dose modulation. FINDINGS:    LUNG BASES: Dependent atelectasis. Small left effusion. .   INCIDENTALLY IMAGED HEART AND MEDIASTINUM: There is distal esophageal wall   thickening. There is gastric distention. LIVER: Hepatic steatosis. Hepatic cyst. No duct dilatation. Portal vein is   patent. GALLBLADDER: Hydropic   SPLEEN: Unremarkable. PANCREAS: No mass or duct dilation. ADRENALS: Unremarkable. KIDNEYS: Right kidney surgically absent. Left kidney unremarkable. No mass or   calculus. STOMACH: Gastric distention. Hiatal hernia. SMALL BOWEL: Small bowel distention with transition point in the ileum the large   bowel is completely decompressed. COLON: No dilation or wall thickening. APPENDIX: Unremarkable. PERITONEUM: No ascites or pneumoperitoneum. RETROPERITONEUM: Aortic atherosclerotic change. Celiac is patent. SMA is patent. KELSEA is patent. No hemodynamically significant stenosis. No embolus. REPRODUCTIVE ORGANS:   URINARY BLADDER: No stone or mass evident. BONES: Upper convex lumbar scoliosis, degenerative spine change, advanced hip   osteoarthritis, and no acute fracture or aggressive lesion. This   protrusion/osteophyte at L5-S1 with foraminal stenosis. ADDITIONAL COMMENTS: N/A                 Echo Results  (Last 48 hours)               17 0901  2D ECHO COMPLETE ADULT (TTE) W OR WO CONTR Final result    Narrative:  [address] [facility], [facility] [address] [facility]   [phone]       Transthoracic Echocardiogram       Patient: Andrés Jensen   MRN: [id number]   [address] #: [de-identified]   : 1955   Age: 64 years   Gender: Female   Height: 63 in   Weight: 169.6 lb   BSA: 1.8 m squared   BP: / mmHg   Study date: 04-Aug-2017   Status: Routine   Location: Echo lab   [facility] #: [id number]       Allergies: LEVOFLOXACIN, LISINOPRIL, METOPROLOL, PEANUT       Ordering Physician:  Jim Lyons. Ana Lilia Hidalgo MD   Reading Group:  [facility]   Technologist:  Alina Caldwell [facility]   Reading Physician:  Shanta Menjivar MD       SUMMARY:   Left ventricle: Systolic function was normal. Ejection fraction was   estimated in the range of 55 % to 60 %. No obvious wall motion   abnormalities identified in the views obtained. There was mild concentric   hypertrophy. Mitral valve: There was trivial regurgitation. Aortic valve: There was no stenosis. INDICATIONS: Assess left ventricular function. PROCEDURE: This was a routine study. The study included complete 2D   imaging, M-mode, complete spectral Doppler, and color Doppler. Echocardiographic views were limited by poor acoustic window availability. This was a technically difficult study. LEFT VENTRICLE: Size was normal. Systolic function was normal. Ejection   fraction was estimated in the range of 55 % to 60 %. No obvious wall   motion abnormalities identified in the views obtained. There was mild   concentric hypertrophy. RIGHT VENTRICLE: The size was normal. Systolic function was normal. Wall   thickness was normal.       LEFT ATRIUM: Size was normal.       RIGHT ATRIUM: Size was normal.       MITRAL VALVE: Normal valve structure. There was normal leaflet separation. DOPPLER: There was no evidence for stenosis. There was trivial   regurgitation. AORTIC VALVE: The valve was probably trileaflet. Leaflets exhibited normal   thickness and normal cuspal separation. DOPPLER: Transaortic velocity was   within the normal range. There was no stenosis. There was no regurgitation. TRICUSPID VALVE: Normal valve structure. There was normal leaflet   separation. DOPPLER: The transtricuspid velocity was within the normal   range. There was no evidence for tricuspid stenosis. There was trivial   regurgitation. PULMONIC VALVE: Not well visualized, but normal Doppler findings. AORTA: The root exhibited normal size. PERICARDIUM: Insignificant pericardial effusion and/or pericardial fat was   present. SYSTEM MEASUREMENT TABLES       2D   LVOT Diam: 2 cm   Ao Diam: 2.3 cm   LA Diam: 3.3 cm   %FS: 42.9 %   EDV(Teich): 58.4 ml   EF(Teich): 74.9 %   ESV(Teich): 14.7 ml   IVSd: 1.1 cm   LVIDd: 3.7 cm   LVIDs: 2.1 cm   LVPWd: 1.2 cm   SI(Teich): 24.2 ml/m2   SV(Teich): 43.7 ml   EF(Cube): 81.4 %   ESV(Cube): 9.5 ml   SI(Cube): 22.9 ml/m2   SV(Cube): 41.5 ml       CW   AV Vmax: 1.5 m/s   AV maxP.9 mmHg       PW   SHAKA Vmax: 2.6 cm2   SHAKA Vmax, Pt: 2.6 cm2   LVOT Env. Ti: 305 ms   LVOT maxP.8 mmHg   LVOT meanP.3 mmHg   LVOT VTI: 29.8 cm   LVOT Vmax: 1.3 m/s   LVOT Vmean: 1 m/s   A': 0.1 m/s   E': 0.1 m/s   LVSI Dopp: 49.7 ml/m2   LVSV Dopp: 89.9 ml   E/E': 7.3   MV A Melchor: 1.3 m/s   MV Dec [place]: 5.1 m/s2   MV DecT: 186.7 ms   MV E Melchor: 0.9 m/s   MV E/A Ratio: 0.7   MV PHT: 54.1 ms   MVA By PHT: 4.1 cm2   HR: 85.9 BPM       Prepared and E-signed by       David Rod MD   Signed 04-Aug-2017 17:52:41                 VENOUS DOPPLER results  (Last 48 hours)    None          CULTURES:    No results found for: SDES Lab Results   Component Value Date/Time    Culture result: MIXED UROGENITAL OSVALDO ISOLATED 2017 01:04 PM    Culture result: NO GROWTH 2 DAYS 2017 12:00 PM    Culture result: NO GROWTH 2 DAYS 2017 12:00 PM    Culture result: NO GROWTH 1 DAY 2017 12:10 AM          Signed: Stefan Forman MD    This note will not be viewable in [address]

## 2017-08-05 NOTE — PROGRESS NOTES
Surgery      Hiccups, will order torazine    Hypoglycemia, Dextrose added to IV    Hypertensive, Hospitalist to adrress    SBO unchanged, cont present RX, may require exploration    Cont present Rx      Benedetto Meigs MD, [facility]  ED [facility]

## 2017-08-05 NOTE — PROGRESS NOTES
Cardiology Progress Note      8/5/2017 10:37 AM    Admit Date: 8/3/2017          Subjective:  No further chest pain. No other new c/o          Visit Vitals    /79 (BP 1 Location: Left arm, BP Patient Position: At rest)    Pulse 94    Temp 99 °F (37.2 °C)    Resp 18    Ht 5' 3\" (1.6 m)    Wt 77.1 kg (170 lb)    LMP  (Exact Date)    SpO2 95%    BMI 30.11 kg/m2     08/03 1901 - 08/05 0700  In: 3064.6 [I.V.:3064.6]  Out: 2950 [Urine:1350]        Objective:      Physical Exam:  VS as above  Chest CTA  Card RRR no agllop     Data Review:   Labs:    Hgb 11.7  Trop 0.11  BUN 55  Creat 1.5     Telemetry: not on tele       Assessment:     1. Mildly abnormal troponin of uncertain significance. 2. Atypical chest pain associated with nausea, vomiting and small-  bowel obstruction. 3. Hypertension. 4. Acute renal failure, probably due to dehydration. - better   5. Hyponatremia. 6. 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 7. History of renal cell cancer, status post nephrectomy. Plan:   No recurrent pain. Echo with normal EF  Equivicol troponin in setting of CKD  IV hydralazine PRN/NTP standing for Bp control.   Current plan will be outpt nuclear stress

## 2017-08-05 NOTE — PROGRESS NOTES
Visited by Kristina Burnett Partner on 8/4/17      LINN Gomez, [facility], [address] [facility] [address]     Paging Service  [phone]

## 2017-08-05 NOTE — PROGRESS NOTES
End of Shift Nursing Note    Bedside shift change report given to Pastora Sequoyah  (oncoming nurse) by Martínez Limon (offgoing nurse). Report included the following information SBAR and Kardex. Zone Phone:       Significant changes during shift:    NONE   Non-emergent issues for physician to address:   NONE     Number times ambulated in hallway past shift: 1      Number of times OOB to chair past shift: 2    POD #: NONE     Vital Signs:    Temp: 99.3 °F (37.4 °C)     Pulse (Heart Rate): 100     BP: 180/83     Resp Rate: 18     O2 Sat (%): 93 %    Lines & Drains:     Urinary Catheter? Yes   Placement Date: 8/3/2017   Medical Necessity:   Central Line? No       PICC Line? NO       NG tube [] in [] removed [] not applicable   Drains [] in [] removed [] not applicable     Skin Integrity:      Wounds: no   Dressings Present: no    Wound Concerns: no      GI:    Current diet:  DIET NPO    Nausea: YES  Vomiting: NO  Bowel Sounds: YES  Flatus: NO  Last Bowel Movement: several days ago   Appearance:     Respiratory:  Supplemental O2: No      Device:    via  Liters/min     Incentive Spirometer: YES  Volume:   Coughing and Deep Breathing: YES  Oral Care: YES  Understanding (patient/family education): YES   Getting out of bed: YES  Head of bed elevation: YES    Patient Safety:    Falls Score: 2  Mobility Score: 1  Bed Alarm On? No  Sitter? No      Opportunity for questions and clarification was given to oncoming nurse. Patient bed is in low position, side rails are up x 2, door & observation blinds open as needed, call bell within reach and patient not in distress.     Héctor Garcia RN

## 2017-08-06 LAB
ANION GAP BLD CALC-SCNC: 4 MMOL/L (ref 5–15)
BASOPHILS # BLD AUTO: 0 K/UL (ref 0–0.1)
BASOPHILS # BLD: 0 % (ref 0–1)
BUN SERPL-MCNC: 19 MG/DL (ref 6–20)
BUN/CREAT SERPL: 20 (ref 12–20)
CALCIUM SERPL-MCNC: 8.1 MG/DL (ref 8.5–10.1)
CHLORIDE SERPL-SCNC: 116 MMOL/L (ref 97–108)
CO2 SERPL-SCNC: 26 MMOL/L (ref 21–32)
CREAT SERPL-MCNC: 0.96 MG/DL (ref 0.55–1.02)
DIFFERENTIAL METHOD BLD: ABNORMAL
EOSINOPHIL # BLD: 0.1 K/UL (ref 0–0.4)
EOSINOPHIL NFR BLD: 1 % (ref 0–7)
ERYTHROCYTE [DISTWIDTH] IN BLOOD BY AUTOMATED COUNT: 16.4 % (ref 11.5–14.5)
GLUCOSE BLD STRIP.AUTO-MCNC: 113 MG/DL (ref 65–100)
GLUCOSE BLD STRIP.AUTO-MCNC: 114 MG/DL (ref 65–100)
GLUCOSE BLD STRIP.AUTO-MCNC: 129 MG/DL (ref 65–100)
GLUCOSE SERPL-MCNC: 107 MG/DL (ref 65–100)
HCT VFR BLD AUTO: 36.8 % (ref 35–47)
HGB BLD-MCNC: 11.9 G/DL (ref 11.5–16)
LYMPHOCYTES # BLD AUTO: 11 % (ref 12–49)
LYMPHOCYTES # BLD: 1.2 K/UL (ref 0.8–3.5)
MAGNESIUM SERPL-MCNC: 2.2 MG/DL (ref 1.6–2.4)
MCH RBC QN AUTO: 22.5 PG (ref 26–34)
MCHC RBC AUTO-ENTMCNC: 32.3 G/DL (ref 30–36.5)
MCV RBC AUTO: 69.7 FL (ref 80–99)
MONOCYTES # BLD: 1.9 K/UL (ref 0–1)
MONOCYTES NFR BLD AUTO: 18 % (ref 5–13)
NEUTS SEG # BLD: 7.3 K/UL (ref 1.8–8)
NEUTS SEG NFR BLD AUTO: 70 % (ref 32–75)
PHOSPHATE SERPL-MCNC: 1.2 MG/DL (ref 2.6–4.7)
PLATELET # BLD AUTO: 207 K/UL (ref 150–400)
PLATELET COMMENTS,PCOM: ABNORMAL
POTASSIUM SERPL-SCNC: 4.8 MMOL/L (ref 3.5–5.1)
RBC # BLD AUTO: 5.28 M/UL (ref 3.8–5.2)
RBC MORPH BLD: ABNORMAL
SERVICE CMNT-IMP: ABNORMAL
SODIUM SERPL-SCNC: 146 MMOL/L (ref 136–145)
WBC # BLD AUTO: 10.5 K/UL (ref 3.6–11)

## 2017-08-06 PROCEDURE — 74011250636 HC RX REV CODE- 250/636: Performed by: INTERNAL MEDICINE

## 2017-08-06 PROCEDURE — 80048 BASIC METABOLIC PNL TOTAL CA: CPT | Performed by: INTERNAL MEDICINE

## 2017-08-06 PROCEDURE — 65270000029 HC RM PRIVATE

## 2017-08-06 PROCEDURE — 77030018836 HC SOL IRR NACL ICUM -A

## 2017-08-06 PROCEDURE — 77030018846 HC SOL IRR STRL H20 ICUM -A

## 2017-08-06 PROCEDURE — 74011250637 HC RX REV CODE- 250/637: Performed by: INTERNAL MEDICINE

## 2017-08-06 PROCEDURE — 36415 COLL VENOUS BLD VENIPUNCTURE: CPT | Performed by: INTERNAL MEDICINE

## 2017-08-06 PROCEDURE — 82962 GLUCOSE BLOOD TEST: CPT

## 2017-08-06 PROCEDURE — 84100 ASSAY OF PHOSPHORUS: CPT | Performed by: INTERNAL MEDICINE

## 2017-08-06 PROCEDURE — 85025 COMPLETE CBC W/AUTO DIFF WBC: CPT | Performed by: INTERNAL MEDICINE

## 2017-08-06 PROCEDURE — 74011000250 HC RX REV CODE- 250: Performed by: HOSPITALIST

## 2017-08-06 PROCEDURE — 77030027138 HC INCENT SPIROMETER -A

## 2017-08-06 PROCEDURE — 74011250636 HC RX REV CODE- 250/636: Performed by: HOSPITALIST

## 2017-08-06 PROCEDURE — 74011250636 HC RX REV CODE- 250/636: Performed by: FAMILY MEDICINE

## 2017-08-06 PROCEDURE — 83735 ASSAY OF MAGNESIUM: CPT | Performed by: INTERNAL MEDICINE

## 2017-08-06 RX ORDER — MORPHINE SULFATE 10 MG/ML
2 INJECTION, SOLUTION INTRAMUSCULAR; INTRAVENOUS
Status: DISCONTINUED | OUTPATIENT
Start: 2017-08-06 | End: 2017-08-09 | Stop reason: HOSPADM

## 2017-08-06 RX ADMIN — FAMOTIDINE 20 MG: 10 INJECTION, SOLUTION INTRAVENOUS at 09:30

## 2017-08-06 RX ADMIN — ONDANSETRON 4 MG: 2 INJECTION INTRAMUSCULAR; INTRAVENOUS at 11:13

## 2017-08-06 RX ADMIN — NITROGLYCERIN 1 INCH: 20 OINTMENT TOPICAL at 05:13

## 2017-08-06 RX ADMIN — DEXTROSE MONOHYDRATE, SODIUM CHLORIDE, AND POTASSIUM CHLORIDE 125 ML/HR: 50; 9; 1.49 INJECTION, SOLUTION INTRAVENOUS at 09:30

## 2017-08-06 RX ADMIN — HYDROMORPHONE HYDROCHLORIDE 1 MG: 1 INJECTION, SOLUTION INTRAMUSCULAR; INTRAVENOUS; SUBCUTANEOUS at 04:42

## 2017-08-06 RX ADMIN — MORPHINE SULFATE 2 MG: 10 INJECTION INTRAMUSCULAR; INTRAVENOUS; SUBCUTANEOUS at 17:00

## 2017-08-06 RX ADMIN — ONDANSETRON 4 MG: 2 INJECTION INTRAMUSCULAR; INTRAVENOUS at 17:12

## 2017-08-06 RX ADMIN — NITROGLYCERIN 1 INCH: 20 OINTMENT TOPICAL at 23:18

## 2017-08-06 RX ADMIN — Medication 10 ML: at 05:13

## 2017-08-06 RX ADMIN — HYDRALAZINE HYDROCHLORIDE 10 MG: 20 INJECTION INTRAMUSCULAR; INTRAVENOUS at 12:59

## 2017-08-06 RX ADMIN — DEXTROSE MONOHYDRATE, SODIUM CHLORIDE, AND POTASSIUM CHLORIDE 150 ML/HR: 50; 9; 1.49 INJECTION, SOLUTION INTRAVENOUS at 23:36

## 2017-08-06 RX ADMIN — ONDANSETRON 4 MG: 2 INJECTION INTRAMUSCULAR; INTRAVENOUS at 05:11

## 2017-08-06 RX ADMIN — Medication 5 ML: at 22:00

## 2017-08-06 RX ADMIN — Medication 10 ML: at 11:07

## 2017-08-06 RX ADMIN — METHYLPREDNISOLONE SODIUM SUCCINATE 15.2 MG: 40 INJECTION, POWDER, FOR SOLUTION INTRAMUSCULAR; INTRAVENOUS at 09:30

## 2017-08-06 RX ADMIN — NITROGLYCERIN 1 INCH: 20 OINTMENT TOPICAL at 11:04

## 2017-08-06 RX ADMIN — DEXTROSE MONOHYDRATE, SODIUM CHLORIDE, AND POTASSIUM CHLORIDE 150 ML/HR: 50; 9; 1.49 INJECTION, SOLUTION INTRAVENOUS at 16:52

## 2017-08-06 RX ADMIN — NITROGLYCERIN 1 INCH: 20 OINTMENT TOPICAL at 16:52

## 2017-08-06 NOTE — PROGRESS NOTES
End of Shift Nursing Note    Bedside shift change report given to Ciara Atkinson (oncoming nurse) by Ivory Delgadillo (offgoing nurse). Report included the following information SBAR, Kardex, Intake/Output and MAR. Significant changes during shift:    none   Non-emergent issues for physician to address:   none     Number times ambulated in hallway past shift: 0      Number of times OOB to chair past shift: 0    Vital Signs:    Temp: 99 °F (37.2 °C)     Pulse (Heart Rate): 94     BP: 172/74     Resp Rate: 16     O2 Sat (%): 95 %    Lines & Drains:     Urinary Catheter? Yes   Placement Date: 8/3/17   Medical Necessity: Strict I/O    NG tube [x] in [] removed [] not applicable   Drains [] in [] removed [] not applicable     Skin Integrity:      Wounds: no   Dressings Present: no    Wound Concerns: no      GI:    Current diet:  DIET NPO    Nausea: NO  Vomiting: NO  Bowel Sounds: YES  Flatus: NO  Last Bowel Movement: several days ago   Appearance:     Respiratory:  Incentive Spirometer: NO  Volume:   Coughing and Deep Breathing: YES  Oral Care: YES  Understanding (patient/family education): YES   Getting out of bed: YES  Head of bed elevation: YES    Patient Safety:    Falls Score: 1  Mobility Score: 1  Bed Alarm On? No  Sitter? No      Opportunity for questions and clarification was given to oncoming nurse. Patient bed is in low position, side rails are up x 2, door & observation blinds open as needed, call bell within reach and patient not in distress.     Alison Anderson RN

## 2017-08-06 NOTE — PROGRESS NOTES
Surgery      Patient had episode of passing flatus followed by a large BM, but not much flatus since. Crampy/spasm like pain resolved but still feels bloated. Abd softer, less tender    Cardiology and Hospitalist consults appreciated. Labs and films in AM    Dr Bg Pierre will assume management of patient in AM    Shanae Burgos MD  [facility]

## 2017-08-06 NOTE — PROGRESS NOTES
Hospitalist Progress Note    NAME: Maite Parikh   :  1955   MRN:  [id number]     Assessment / Plan:  Hypoglycemia  -none further from episode yesterday  -could be related to illness and use of steroids  -remains on fluid with dextrose  -continue on POC Bs as we are doing  -dsicussed symptoms of hypoglycemia with patient so that we can attempt to catch earlier and therefore avoid possible complications    Hypertension, Benign essential now accelerating  -starting to stabilize  -continue to work with cardiology for BP control  -if needed could consider clonidine patch    Chest pain, POA  Elevated troponin  -resolved chest pains  -tte 55% no valvular changes  -trop stable  -following with cardiology for OP nuclear stress     Acute kidney injury due to dehydration from repeated vomiting. Acquired solitary kidney s/p nephrectomy for renal cancer   -creat now normal with IVF  -patient with solitary kidney therefore creat <1.5 acceptable     Hypokalemia   -replaced     Abdominal pain due to small bowel obstruction with transition point at ileum, POA  --management per surgery  -+bm today    Polymyositis (PM), POA  -cpk continues to drop         Subjective:     Chief Complaint / Reason for Physician Visit  \"I feel better\" Discomfort from NGT noted. Remains NPO. +bm today. ngt still in place. Drinking some water now     Review of Systems:  Symptom Y/N Comments  Symptom Y/N Comments   Fever/Chills n   Chest Pain n    Poor Appetite y   Edema     Cough n   Abdominal Pain     Sputum    Joint Pain     SOB/LOREDO n   Pruritis/Rash     Nausea/vomit n   Tolerating PT/OT     Diarrhea n   Tolerating Diet n    Constipation y   Other       Could NOT obtain due to:      Objective:     VITALS:   Last 24hrs VS reviewed since prior progress note.  Most recent are:  Patient Vitals for the past 24 hrs:   Temp Pulse Resp BP SpO2   17 1259 - (!) 101 - 174/86 -   17 1252 - (!) 101 - 174/86 98 %   17 1250 - 96 - 198/90 97 %   08/06/17 1104 - (!) 120 - 174/81 -   08/06/17 1057 99.5 °F (37.5 °C) (!) 120 20 174/81 99 %   08/06/17 0928 98.7 °F (37.1 °C) (!) 124 19 162/80 99 %   08/06/17 0511 98.9 °F (37.2 °C) 96 17 143/64 94 %   08/06/17 0112 - - - 163/63 -   08/05/17 2347 - (!) 103 - 190/84 -   08/05/17 2137 - 94 - 172/74 -   08/05/17 2039 - 96 - 192/78 -   08/05/17 1928 99 °F (37.2 °C) 98 16 - 95 %   08/05/17 1511 99.3 °F (37.4 °C) 100 18 180/83 93 %       Intake/Output Summary (Last 24 hours) at 08/06/17 1337  Last data filed at 08/06/17 1102   Gross per 24 hour   Intake               20 ml   Output             2275 ml   Net            -2255 ml        PHYSICAL EXAM:  General: WD, thin bf sitting in chair, alert, cooperative, no acute distress    EENT:  EOMI. Anicteric sclerae. MM dry - NGT in place draining green fluid  Resp:  CTA bilaterally, no wheezing or rales. No accessory muscle use  CV:  Regular  rhythm,  no edema  GI:  Soft, Non distended, Non tender.  +Bowel sounds  Neurologic:  Alert and oriented X 3, normal speech  Psych:   Good insight. Not anxious nor agitated  Skin:  no rashes or ulcers. No jaundice    Reviewed most current lab test results and cultures  YES  Reviewed most current radiology test results   YES  Review and summation of old records today    NO  Reviewed patient's current orders and MAR    YES  PMH/SH reviewed - no change compared to H&P  ________________________________________________________________________  Care Plan discussed with:    Comments   Patient x Discussed with patient in room. POC discussed. Questions answered (10   Family      RN x 5   Care Manager     Consultant: brandon Burgos 5                     Multidiciplinary team rounds were held today with , nursing, pharmacist and clinical coordinator. Patient's plan of care was discussed; medications were reviewed and discharge planning was addressed. ________________________________________________________________________  Total NON critical care TIME:  25   Minutes    Total CRITICAL CARE TIME Spent:   Minutes non procedure based      Comments   >50% of visit spent in counseling and coordination of care x See above   ________________________________________________________________________  Procedures: see electronic medical records for all procedures/Xrays and details which were not copied into this note but were reviewed prior to creation of Plan. LABS:  Recent Labs      08/06/17   0343  08/05/17   0248  08/04/17   0327   WBC  10.5  7.8  7.4   HGB  11.9  11.5  11.7   HCT  36.8  36.3  36.7   PLT  207  199  203     Recent Labs      08/06/17   0343  08/05/17   0248  08/04/17 0327  08/03/17   1505   NA  146*  143  138   --    K  4.8  4.5  4.1   --    CL  116*  112*  105   --    CO2  26  19*  22   --    BUN  19  36*  55*   --    CREA  0.96  0.97  1.50*   --    GLU  107*  45*  78   --    CA  8.1*  8.2*  7.9*   --    MG  2.2  2.5*   --   2.2   PHOS  1.2*   --    --   5.3*     No results for input(s): SGOT, GPT, ALT, AP, TBIL, TBILI, TP, ALB, GLOB, GGT, AML, LPSE in the last 72 hours. No lab exists for component: AMYP, HLPSE  No results for input(s): INR, PTP, APTT in the last 72 hours. No lab exists for component: INREXT, INREXT   No results for input(s): FE, TIBC, PSAT, FERR in the last 72 hours. No results found for: FOL, RBCF   No results for input(s): PH, PCO2, PO2 in the last 72 hours. No results for input(s): PHI, PO2I, PCO2I in the last 72 hours.   Recent Labs      08/05/17   0248  08/04/17   0327  08/03/17   1505   CPK  544*   --   804*   CKNDX  4.0*   --   3.0*   TROIQ  0.11*  0.11*  0.14*     No results found for: CHOL, CHOLX, CHLST, CHOLV, HDL, LDL, LDLC, DLDLP, TGLX, TRIGL, TRIGP, CHHD, CHHDX  Lab Results   Component Value Date/Time    Glucose (POC) 129 08/06/2017 11:14 AM    Glucose (POC) 113 08/06/2017 08:06 AM    Glucose (POC) 114 08/06/2017 12:00 AM    Glucose (POC) 127 08/05/2017 05:14 PM    Glucose (POC) 77 08/05/2017 07:56 AM     Lab Results   Component Value Date/Time    Color YELLOW/STRAW 08/03/2017 01:04 PM    Appearance CLEAR 08/03/2017 01:04 PM    Specific gravity <1.005 08/03/2017 01:04 PM    Specific gravity 1.012 07/30/2017 12:10 AM    pH (UA) 5.5 08/03/2017 01:04 PM    Protein 30 08/03/2017 01:04 PM    Glucose NEGATIVE  08/03/2017 01:04 PM    Ketone TRACE 08/03/2017 01:04 PM    Bilirubin NEGATIVE  07/30/2017 12:10 AM    Urobilinogen 0.2 08/03/2017 01:04 PM    Nitrites NEGATIVE  08/03/2017 01:04 PM    Leukocyte Esterase NEGATIVE  08/03/2017 01:04 PM    Epithelial cells MODERATE 08/03/2017 01:04 PM    Bacteria 1+ 08/03/2017 01:04 PM    WBC 5-10 08/03/2017 01:04 PM    RBC 5-10 08/03/2017 01:04 PM       RADIOGRAPHIC STUDIES:  CXR Results  (Last 48 hours)    None          CT Results  (Last 48 hours)    None          Echo Results  (Last 48 hours)    None          VENOUS DOPPLER results  (Last 48 hours)    None          CULTURES:    No results found for: SDES Lab Results   Component Value Date/Time    Culture result: MIXED UROGENITAL OSVALDO ISOLATED 08/03/2017 01:04 PM    Culture result: NO GROWTH 3 DAYS 08/03/2017 12:00 PM    Culture result: NO GROWTH 3 DAYS 08/03/2017 12:00 PM    Culture result: NO GROWTH 1 DAY 07/30/2017 12:10 AM          Signed: Momo Lamb MD    This note will not be viewable in [address]

## 2017-08-06 NOTE — PROGRESS NOTES
End of Shift Nursing Note    Bedside shift change report given to Homar Esposito (oncoming nurse) by Jon Dunn (offgoing nurse). Report included the following information SBAR, Kardex, Intake/Output, MAR and Recent Results. Zone Phone:   [phone]    Significant changes during shift:    0   Non-emergent issues for physician to address:   0     Number times ambulated in hallway past shift: 3      Number of times OOB to chair past shift: 3    POD #: 0     Vital Signs:    Temp: 98.6 °F (37 °C)     Pulse (Heart Rate): (!) 118     BP: 171/76     Resp Rate: 19     O2 Sat (%): 97 %    Lines & Drains:     Urinary Catheter? Yes   Placement Date: 8-3-17   Medical Necessity: yes  Central Line? No   Placement Date: 0   Medical Necessity: 0  PICC Line? No   Placement Date: 0   Medical Necessity: 0    NG tube [x] in [] removed [] not applicable   Drains [] in [] removed [x] not applicable     Skin Integrity:      Wounds: no   Dressings Present: no    Wound Concerns: no      GI:    Current diet:  DIET NPO    Nausea: YES  Vomiting: NO  Bowel Sounds: YES  Flatus: YES  Last Bowel Movement: today   Appearance: 0    Respiratory:  Supplemental O2: No      Device: 0   via 0 Liters/min     Incentive Spirometer: YES  Volume: 1200  Coughing and Deep Breathing: YES  Oral Care: YES  Understanding (patient/family education): YES   Getting out of bed: YES  Head of bed elevation: YES    Patient Safety:    Falls Score: 1  Mobility Score: 1  Bed Alarm On? No  Sitter? No      Opportunity for questions and clarification was given to oncoming nurse. Patient bed is in low position, side rails are up x 2, door & observation blinds open as needed, call bell within reach and patient not in distress.     Rick Li RN

## 2017-08-06 NOTE — PROGRESS NOTES
Problem: Nausea/Vomiting (Adult)  Goal: *Absence of nausea/vomiting  Outcome: Progressing Towards Goal  Assess s/sx of N/V Q4h & prn. Medicating with 4 mg IV Zofran Q4h. NGT to LWS. Emesis bag at bedside. IVF infusing per MD order.

## 2017-08-06 NOTE — PROGRESS NOTES
Cardiology Progress Note      8/6/2017 10:37 AM    Admit Date: 8/3/2017          Subjective:  No further chest pain. No other new c/o          Visit Vitals    /81    Pulse (!) 120    Temp 99.5 °F (37.5 °C)    Resp 20    Ht 5' 3\" (1.6 m)    Wt 77.1 kg (170 lb)    LMP  (Exact Date)    SpO2 99%    BMI 30.11 kg/m2     08/04 1901 - 08/06 0700  In: 0   Out: 5000 [Urine:2300]        Objective:      Physical Exam:  VS as above  Chest CTA  Card RRR no agllop     Data Review:   Labs:    Hgb 11.7  Trop 0.11  BUN 55  Creat 1.5     Telemetry: not on tele       Assessment:     1. Mildly abnormal troponin of uncertain significance. 2. Atypical chest pain associated with nausea, vomiting and small-  bowel obstruction. 3. Hypertension. 4. Acute renal failure, probably due to dehydration. - better   5. Hyponatremia. 6. 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 7. History of renal cell cancer, status post nephrectomy. Plan:   No recurrent pain. Echo with normal EF  Equivicol troponin in setting of CKD  Intolerant of metoprolol in past  Cont NTP  Cont hydralazine PRN  Increase fluids, increased sodium, poor urine outpt  Had bm today, hopefully sbo improving.   Current plan will be outpt nuclear stress

## 2017-08-06 NOTE — PROGRESS NOTES
Problem: HYPERTENSION  Goal: *Blood pressure within specified parameters  Outcome: Progressing Towards Goal  Assessing vital signs Q4h & prn. Monitoring BP Q2h & prn. Medicating with Nitro paste Q6h, 10mg IV Hydralazine prn. Increase IVF to 150ml/hr. Monitoring for s/sx of HTN prn.

## 2017-08-07 ENCOUNTER — APPOINTMENT (OUTPATIENT)
Dept: GENERAL RADIOLOGY | Age: 62
DRG: 389 | End: 2017-08-07
Attending: FAMILY MEDICINE
Payer: COMMERCIAL

## 2017-08-07 LAB
ANION GAP BLD CALC-SCNC: 4 MMOL/L (ref 5–15)
BASOPHILS # BLD AUTO: 0 K/UL
BASOPHILS # BLD: 0 %
BUN SERPL-MCNC: 14 MG/DL (ref 6–20)
BUN/CREAT SERPL: 14 (ref 12–20)
CALCIUM SERPL-MCNC: 8.1 MG/DL (ref 8.5–10.1)
CHLORIDE SERPL-SCNC: 118 MMOL/L (ref 97–108)
CO2 SERPL-SCNC: 26 MMOL/L (ref 21–32)
CREAT SERPL-MCNC: 0.98 MG/DL (ref 0.55–1.02)
DIFFERENTIAL METHOD BLD: ABNORMAL
EOSINOPHIL # BLD: 0 K/UL
EOSINOPHIL NFR BLD: 0 %
ERYTHROCYTE [DISTWIDTH] IN BLOOD BY AUTOMATED COUNT: 16.5 % (ref 11.5–14.5)
GLUCOSE BLD STRIP.AUTO-MCNC: 107 MG/DL (ref 65–100)
GLUCOSE BLD STRIP.AUTO-MCNC: 110 MG/DL (ref 65–100)
GLUCOSE BLD STRIP.AUTO-MCNC: 84 MG/DL (ref 65–100)
GLUCOSE BLD STRIP.AUTO-MCNC: 88 MG/DL (ref 65–100)
GLUCOSE BLD STRIP.AUTO-MCNC: 91 MG/DL (ref 65–100)
GLUCOSE SERPL-MCNC: 85 MG/DL (ref 65–100)
HCT VFR BLD AUTO: 37.4 % (ref 35–47)
HGB BLD-MCNC: 11.7 G/DL (ref 11.5–16)
LYMPHOCYTES # BLD AUTO: 25 %
LYMPHOCYTES # BLD: 3.5 K/UL
MAGNESIUM SERPL-MCNC: 1.9 MG/DL (ref 1.6–2.4)
MCH RBC QN AUTO: 22.1 PG (ref 26–34)
MCHC RBC AUTO-ENTMCNC: 31.3 G/DL (ref 30–36.5)
MCV RBC AUTO: 70.6 FL (ref 80–99)
METAMYELOCYTES NFR BLD MANUAL: 1 %
MONOCYTES # BLD: 1.4 K/UL
MONOCYTES NFR BLD AUTO: 10 %
NEUTS BAND NFR BLD MANUAL: 2 %
NEUTS SEG # BLD: 8.8 K/UL
NEUTS SEG NFR BLD AUTO: 62 %
PHOSPHATE SERPL-MCNC: 1 MG/DL (ref 2.6–4.7)
PLATELET # BLD AUTO: 206 K/UL (ref 150–400)
PLATELET COMMENTS,PCOM: ABNORMAL
POTASSIUM SERPL-SCNC: 4.9 MMOL/L (ref 3.5–5.1)
RBC # BLD AUTO: 5.3 M/UL (ref 3.8–5.2)
RBC MORPH BLD: ABNORMAL
RBC MORPH BLD: ABNORMAL
SERVICE CMNT-IMP: ABNORMAL
SERVICE CMNT-IMP: ABNORMAL
SERVICE CMNT-IMP: NORMAL
SODIUM SERPL-SCNC: 148 MMOL/L (ref 136–145)
WBC # BLD AUTO: 13.8 K/UL (ref 3.6–11)
WBC MORPH BLD: ABNORMAL

## 2017-08-07 PROCEDURE — 74011000250 HC RX REV CODE- 250: Performed by: HOSPITALIST

## 2017-08-07 PROCEDURE — 74020 XR ABD FLAT/ ERECT: CPT

## 2017-08-07 PROCEDURE — 82962 GLUCOSE BLOOD TEST: CPT

## 2017-08-07 PROCEDURE — 65270000029 HC RM PRIVATE

## 2017-08-07 PROCEDURE — 36415 COLL VENOUS BLD VENIPUNCTURE: CPT | Performed by: INTERNAL MEDICINE

## 2017-08-07 PROCEDURE — 74011000250 HC RX REV CODE- 250: Performed by: INTERNAL MEDICINE

## 2017-08-07 PROCEDURE — 74011636637 HC RX REV CODE- 636/637: Performed by: INTERNAL MEDICINE

## 2017-08-07 PROCEDURE — 74011250636 HC RX REV CODE- 250/636: Performed by: INTERNAL MEDICINE

## 2017-08-07 PROCEDURE — 74011250637 HC RX REV CODE- 250/637: Performed by: INTERNAL MEDICINE

## 2017-08-07 PROCEDURE — 74011250636 HC RX REV CODE- 250/636: Performed by: FAMILY MEDICINE

## 2017-08-07 PROCEDURE — 74011250636 HC RX REV CODE- 250/636: Performed by: HOSPITALIST

## 2017-08-07 PROCEDURE — 83735 ASSAY OF MAGNESIUM: CPT | Performed by: INTERNAL MEDICINE

## 2017-08-07 PROCEDURE — 84100 ASSAY OF PHOSPHORUS: CPT | Performed by: INTERNAL MEDICINE

## 2017-08-07 PROCEDURE — 80048 BASIC METABOLIC PNL TOTAL CA: CPT | Performed by: INTERNAL MEDICINE

## 2017-08-07 PROCEDURE — 85025 COMPLETE CBC W/AUTO DIFF WBC: CPT | Performed by: INTERNAL MEDICINE

## 2017-08-07 RX ORDER — LOSARTAN POTASSIUM 50 MG/1
100 TABLET ORAL DAILY
Status: DISCONTINUED | OUTPATIENT
Start: 2017-08-07 | End: 2017-08-09 | Stop reason: HOSPADM

## 2017-08-07 RX ORDER — FAMOTIDINE 20 MG/1
20 TABLET, FILM COATED ORAL 2 TIMES DAILY
Status: DISCONTINUED | OUTPATIENT
Start: 2017-08-07 | End: 2017-08-09 | Stop reason: HOSPADM

## 2017-08-07 RX ORDER — MYCOPHENOLATE MOFETIL 200 MG/ML
1200 POWDER, FOR SUSPENSION ORAL 2 TIMES DAILY
Status: DISCONTINUED | OUTPATIENT
Start: 2017-08-07 | End: 2017-08-09 | Stop reason: HOSPADM

## 2017-08-07 RX ADMIN — FAMOTIDINE 20 MG: 10 INJECTION, SOLUTION INTRAVENOUS at 09:11

## 2017-08-07 RX ADMIN — Medication 10 ML: at 21:32

## 2017-08-07 RX ADMIN — DEXTROSE MONOHYDRATE, SODIUM CHLORIDE, AND POTASSIUM CHLORIDE 150 ML/HR: 50; 9; 1.49 INJECTION, SOLUTION INTRAVENOUS at 20:41

## 2017-08-07 RX ADMIN — FAMOTIDINE 20 MG: 20 TABLET ORAL at 12:01

## 2017-08-07 RX ADMIN — NITROGLYCERIN 1 INCH: 20 OINTMENT TOPICAL at 07:08

## 2017-08-07 RX ADMIN — DEXTROSE MONOHYDRATE, SODIUM CHLORIDE, AND POTASSIUM CHLORIDE 150 ML/HR: 50; 9; 1.49 INJECTION, SOLUTION INTRAVENOUS at 07:08

## 2017-08-07 RX ADMIN — NITROGLYCERIN 1 INCH: 20 OINTMENT TOPICAL at 12:04

## 2017-08-07 RX ADMIN — PREDNISONE 15 MG: 10 TABLET ORAL at 12:01

## 2017-08-07 RX ADMIN — NITROGLYCERIN 1 INCH: 20 OINTMENT TOPICAL at 17:38

## 2017-08-07 RX ADMIN — MYCOPHENOLATE MOFETIL 1200 MG: 200 POWDER, FOR SUSPENSION ORAL at 21:33

## 2017-08-07 RX ADMIN — POTASSIUM PHOSPHATE, MONOBASIC AND POTASSIUM PHOSPHATE, DIBASIC: 224; 236 INJECTION, SOLUTION INTRAVENOUS at 11:56

## 2017-08-07 RX ADMIN — MYCOPHENOLATE MOFETIL 1200 MG: 200 POWDER, FOR SUSPENSION ORAL at 11:59

## 2017-08-07 RX ADMIN — FAMOTIDINE 20 MG: 20 TABLET ORAL at 17:38

## 2017-08-07 RX ADMIN — METHYLPREDNISOLONE SODIUM SUCCINATE 15.2 MG: 40 INJECTION, POWDER, FOR SOLUTION INTRAMUSCULAR; INTRAVENOUS at 09:10

## 2017-08-07 RX ADMIN — Medication 10 ML: at 07:10

## 2017-08-07 RX ADMIN — Medication 10 ML: at 13:32

## 2017-08-07 RX ADMIN — POTASSIUM PHOSPHATE, MONOBASIC AND POTASSIUM PHOSPHATE, DIBASIC: 224; 236 INJECTION, SOLUTION INTRAVENOUS at 21:30

## 2017-08-07 RX ADMIN — LORAZEPAM 0.5 MG: 2 INJECTION INTRAMUSCULAR; INTRAVENOUS at 03:35

## 2017-08-07 RX ADMIN — LOSARTAN POTASSIUM 100 MG: 50 TABLET ORAL at 12:00

## 2017-08-07 NOTE — PROGRESS NOTES
Pt is a 64 y/0 -American female admitted for SBO. Pt lives with her father in a one story home with two steps to the entrance of the residence. Pt is independent with ADL's at baseline to include driving. Pt has previous [facility] with [address] Pt has previous SNF provider with [employer]. Pt has access to cane, rolling walker, BSC, shower chair and C-Pap (serviced by [facility] Pt prefers to use [facility] as needed. Pt will be transported at discharge by friend, Ms. Anel Mastreson. CM met with pt to complete initial assessment. Pt is alert and oriented to person, place,time and situation. CM will continue to follow pt to assist with discharge plans as needed. Care Management Interventions  PCP Verified by CM: Yes (Dr. Pillo Villeda )  Mode of Transport at Discharge:  Other (see comment) (private vehicle )  Transition of Care Consult (CM Consult): Discharge Planning  Discharge Durable Medical Equipment: No (Pt has rolling walker, cane, shower chair, BSC and Cpap )  Health Maintenance Reviewed: Yes  Physical Therapy Consult: No  Occupational Therapy Consult: No  Speech Therapy Consult: No  Current Support Network: [place] (Pt lives with father \)  Confirm Follow Up Transport: Self  Plan discussed with Pt/Family/Caregiver: Yes  Discharge Location  Discharge Placement: Home with family assistance    MARTIN Johnson, [address] [place] [address]   [phone]

## 2017-08-07 NOTE — PROGRESS NOTES
End of Shift Nursing Note    Bedside shift change report given to [address] [place] [address],[address] (oncoming nurse) by Flo Guerin RN (offgoing nurse). Report included the following information SBAR, Kardex, OR Summary, Intake/Output, MAR and Recent Results. Zone Phone:       Significant changes during shift:    Pt had liquid brown BMs, given Ativan with positive results when pt began demanding that nurse remove NG so she could breathe. Non-emergent issues for physician to address:   none     Number times ambulated in hallway past shift: 0      Number of times OOB to chair past shift: 0    POD #: n/a     Vital Signs:    Temp: 99.4 °F (37.4 °C)     Pulse (Heart Rate): (!) 104     BP: 189/89     Resp Rate: 18     O2 Sat (%): 97 %    Lines & Drains:     Urinary Catheter? Yes   Placement Date: 8/6   Medical Necessity: accurate I & O  Central Line? No    NG tube [x] in [] removed [] not applicable   Drains [] in [] removed [x] not applicable     Skin Integrity:      Wounds: no   Dressings Present: no    Wound Concerns: no      GI:    Current diet:  DIET NPO    Nausea: NO  Vomiting: NO  Bowel Sounds: YES  Flatus: YES  Last Bowel Movement: today   Appearance: liquid brown    Respiratory:  Supplemental O2: No          Incentive Spirometer: YES    Coughing and Deep Breathing: YES  Oral Care: YES  Understanding (patient/family education): YES   Getting out of bed: YES  Head of bed elevation: YES    Patient Safety:    Falls Score: 2  Mobility Score: 1  Bed Alarm On? No  Sitter? No      Opportunity for questions and clarification was given to oncoming nurse. Patient bed is in low position, side rails are up x 3, door & observation blinds open as needed, call bell within reach and patient not in distress.     Florence Arrington RN

## 2017-08-07 NOTE — PROGRESS NOTES
Admit Date: 8/3/2017    POD * No surgery found *    Procedure:  * No surgery found *    Subjective:     Patient has no complaints. Having BMs and flatus. Scant NG o/p    Objective:     Blood pressure (!) 160/99, pulse 90, temperature 98.2 °F (36.8 °C), resp. rate 16, height 5' 3\" (1.6 m), weight 170 lb (77.1 kg), SpO2 97 %. Temp (24hrs), Av.8 °F (37.1 °C), Min:97.9 °F (36.6 °C), Max:99.5 °F (37.5 °C)      Physical Exam:  GENERAL: alert, cooperative, no distress, appears stated age, LUNG: clear to auscultation bilaterally, HEART: regular rate and rhythm, ABDOMEN: soft, non-tender. Bowel sounds normal, EXTREMITIES:  extremities normal, atraumatic, no cyanosis or edema    Labs:   Recent Results (from the past 24 hour(s))   GLUCOSE, POC    Collection Time: 17 11:14 AM   Result Value Ref Range    Glucose (POC) 129 (H) 65 - 100 mg/dL    Performed by Hi Rivero    GLUCOSE, POC    Collection Time: 17 12:02 AM   Result Value Ref Range    Glucose (POC) 84 65 - 100 mg/dL    Performed by Jolene Orozco    CBC WITH AUTOMATED DIFF    Collection Time: 17 12:17 AM   Result Value Ref Range    WBC 13.8 (H) 3.6 - 11.0 K/uL    RBC 5.30 (H) 3.80 - 5.20 M/uL    HGB 11.7 11.5 - 16.0 g/dL    HCT 37.4 35.0 - 47.0 %    MCV 70.6 (L) 80.0 - 99.0 FL    MCH 22.1 (L) 26.0 - 34.0 PG    MCHC 31.3 30.0 - 36.5 g/dL    RDW 16.5 (H) 11.5 - 14.5 %    PLATELET 555 123 - 748 K/uL    NEUTROPHILS 62 %    BAND NEUTROPHILS 2 %    LYMPHOCYTES 25 %    MONOCYTES 10 %    EOSINOPHILS 0 %    BASOPHILS 0 %    METAMYELOCYTES 1 %    ABS. NEUTROPHILS 8.8 K/UL    ABS. LYMPHOCYTES 3.5 K/UL    ABS. MONOCYTES 1.4 K/UL    ABS. EOSINOPHILS 0.0 K/UL    ABS.  BASOPHILS 0.0 K/UL    PLATELET COMMENTS LARGE PLATELETS      RBC COMMENTS MICROCYTOSIS  1+        RBC COMMENTS HYPOCHROMIA  2+        WBC COMMENTS VACUOLATED POLYS      DF MANUAL     METABOLIC PANEL, BASIC    Collection Time: 17 12:17 AM   Result Value Ref Range    Sodium 148 (H) 136 - 145 mmol/L    Potassium 4.9 3.5 - 5.1 mmol/L    Chloride 118 (H) 97 - 108 mmol/L    CO2 26 21 - 32 mmol/L    Anion gap 4 (L) 5 - 15 mmol/L    Glucose 85 65 - 100 mg/dL    BUN 14 6 - 20 MG/DL    Creatinine 0.98 0.55 - 1.02 MG/DL    BUN/Creatinine ratio 14 12 - 20      GFR est AA >60 >60 ml/min/1.73m2    GFR est non-AA 58 (L) >60 ml/min/1.73m2    Calcium 8.1 (L) 8.5 - 10.1 MG/DL   MAGNESIUM    Collection Time: 08/07/17 12:17 AM   Result Value Ref Range    Magnesium 1.9 1.6 - 2.4 mg/dL   PHOSPHORUS    Collection Time: 08/07/17 12:17 AM   Result Value Ref Range    Phosphorus 1.0 (L) 2.6 - 4.7 MG/DL   GLUCOSE, POC    Collection Time: 08/07/17  5:45 AM   Result Value Ref Range    Glucose (POC) 91 65 - 100 mg/dL    Performed by Fito Ibrahim        Data Review images and reports reviewed    Assessment:     Active Problems:    SBO (small bowel obstruction) ([facility] [address] (8/3/2017)        Plan/Recommendations/Medical Decision Making:     Continue present treatment   D/c ngt and silva  Clear liquids and advance as shivam. Deanna Li.  Paul Crockett MD, [facility]

## 2017-08-07 NOTE — PROGRESS NOTES
Cardiology Progress Note      8/7/2017 1:40 PM    Admit Date: 8/3/2017      Subjective:  Stable, NG out. Still some occasional bandlike disc         Visit Vitals    /83 (BP 1 Location: Right arm, BP Patient Position: At rest)    Pulse 89    Temp 99.1 °F (37.3 °C)    Resp 16    Ht 5' 3\" (1.6 m)    Wt 77.1 kg (170 lb)    LMP  (Exact Date)    SpO2 98%    BMI 30.11 kg/m2     08/05 1901 - 08/07 0700  In: 6201.7 [I.V.:6141.7]  Out: 2725 [Urine:1975]        Objective:      Physical Exam:  VS as above    Data Review:   Labs:    creat 1.0  Hgb 11.7     Telemetry: not on tele      Assessment:     1. Mildly abnormal troponin of uncertain significance. 2. Atypical chest pain associated with nausea, vomiting and small-  bowel obstruction. 3. Hypertension. 4. Acute renal failure, probably due to dehydration. - better   5. Hyponatremia. 6. 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 7. History of renal cell cancer, status post nephrectomy. Plan: watch BP for now. May need additional meds.  Outpt stress at some point

## 2017-08-07 NOTE — PROGRESS NOTES
End of Shift Nursing Note    Bedside shift change report given to ABEL Rock (oncoming nurse) by Lupillo Howell (offgoing nurse). Report included the following information SBAR and Kardex. Zone Phone:       Significant changes during shift:    NONE   Non-emergent issues for physician to address:   NONE     Number times ambulated in hallway past shift: 1      Number of times OOB to chair past shift: 2    POD #: NONE     Vital Signs:    Temp: 99.1 °F (37.3 °C)     Pulse (Heart Rate): 89     BP: 173/83     Resp Rate: 16     O2 Sat (%): 98 %    Lines & Drains:     Urinary Catheter? NO     Central Line? No       PICC Line? NO       NG tube [] in [] removed [] not applicable   Drains [] in [] removed [] not applicable     Skin Integrity:      Wounds: no   Dressings Present: no    Wound Concerns: no      GI:    Current diet:  DIET CLEAR LIQUID    Nausea: YES  Vomiting: NO  Bowel Sounds: YES  Flatus: NO  Last Bowel Movement: several days ago   Appearance:     Respiratory:  Supplemental O2: No      Device:    via  Liters/min     Incentive Spirometer: YES  Volume:   Coughing and Deep Breathing: YES  Oral Care: YES  Understanding (patient/family education): YES   Getting out of bed: YES  Head of bed elevation: YES    Patient Safety:    Falls Score: 2  Mobility Score: 1  Bed Alarm On? No  Sitter? No      Opportunity for questions and clarification was given to oncoming nurse. Patient bed is in low position, side rails are up x 2, door & observation blinds open as needed, call bell within reach and patient not in distress.     Anjelica Bravo RN

## 2017-08-07 NOTE — PROGRESS NOTES
Hospitalist Progress Note    NAME: rKistian Shrestha   :  1955   MRN:  [id number]     Assessment / Plan:  Severe hypophosphatemia  hypernatremia  -elevated risk for refeed - aggressively replace phos when is this low - goal is >2.5. Will give a 30mmol replacement now and repeat this evening. Labs to repeat tomorrow AM  -suspect underlying PM and her SBO playing a large role along with mild refeeding  -watch mag/k as well  -needs free water - encourage PO    Hypoglycemia  -none further from episode   -could have been related to illness and use of steroids. For now to remains on fluid with dextrose but would consider dc if tolerating PO  -continue on POC Bs as we are doing  -dsicussed symptoms of hypoglycemia with patient so that we can attempt to catch earlier and therefore avoid possible complications    Hypertension, Benign essential now accelerating  -starting to stabilize and should improve now that we have clearance to restart home cozaar    Chest pain, POA  Elevated troponin  -resolved chest pains  -tte 55% no valvular changes  -trop stable  -following with cardiology for OP nuclear stress     Acute kidney injury due to dehydration from repeated vomiting. Acquired solitary kidney s/p nephrectomy for renal cancer   -creat now normal with IVF  -patient with solitary kidney therefore creat <1.5 acceptable     Hypokalemia   -replaced     Abdominal pain due to small bowel obstruction with transition point at ileum, POA  --management per surgery - appears to be resolving conservatively  -+bm     Polymyositis (PM), POA  -cpk continues to drop  -restart cellcept  -restart prednisone and dc IV solumedrol         Subjective:     Chief Complaint / Reason for Physician Visit  \"I feel good today\" ngt out and silva to be removed today. Tolerating some PO. Pending another BM. Some flatus.  No mm pains    Review of Systems:  Symptom Y/N Comments  Symptom Y/N Comments   Fever/Chills n   Chest Pain n    Poor Appetite y   Edema     Cough n   Abdominal Pain n    Sputum n   Joint Pain     SOB/LOREDO n   Pruritis/Rash     Nausea/vomit    Tolerating PT/OT     Diarrhea    Tolerating Diet n    Constipation y   Other       Could NOT obtain due to:      Objective:     VITALS:   Last 24hrs VS reviewed since prior progress note. Most recent are:  Patient Vitals for the past 24 hrs:   Temp Pulse Resp BP SpO2   08/07/17 0908 98.2 °F (36.8 °C) 90 16 (!) 160/99 97 %   08/07/17 0405 99.2 °F (37.3 °C) (!) 103 16 174/80 -   08/06/17 2334 99.4 °F (37.4 °C) (!) 104 18 189/89 97 %   08/06/17 1936 97.9 °F (36.6 °C) (!) 113 - 178/82 97 %   08/06/17 1839 - (!) 108 - 158/84 97 %   08/06/17 1652 - (!) 118 - 171/76 -   08/06/17 1644 98.6 °F (37 °C) (!) 118 - 171/76 97 %   08/06/17 1554 - (!) 116 - 173/86 97 %   08/06/17 1435 - (!) 113 - 170/76 96 %   08/06/17 1408 - - - (!) 206/86 -   08/06/17 1405 98.6 °F (37 °C) (!) 113 19 - 98 %   08/06/17 1259 - (!) 101 - 174/86 -   08/06/17 1252 - (!) 101 - 174/86 98 %   08/06/17 1250 - 96 - 198/90 97 %   08/06/17 1104 - (!) 120 - 174/81 -   08/06/17 1057 99.5 °F (37.5 °C) (!) 120 20 174/81 99 %       Intake/Output Summary (Last 24 hours) at 08/07/17 1025  Last data filed at 08/07/17 0811   Gross per 24 hour   Intake          3044.17 ml   Output             1175 ml   Net          1869.17 ml        PHYSICAL EXAM:  General: WD, thin bf sitting in bed, alert, cooperative, no acute distress    EENT:  EOMI. Anicteric sclerae. MM dry - NGT removed  Resp:  CTA bilaterally, no wheezing or rales. No accessory muscle use  CV:  Regular  rhythm,  no edema  GI:  Soft, Non distended, Non tender.  +Bowel sounds  Neurologic:  Alert and oriented X 3, normal speech  Psych:   Good insight. Not anxious nor agitated  Skin:  no rashes or ulcers.   No jaundice    Reviewed most current lab test results and cultures  YES  Reviewed most current radiology test results   YES  Review and summation of old records today    NO  Reviewed patient's current orders and MAR    YES  PMH/SH reviewed - no change compared to H&P  ________________________________________________________________________  Care Plan discussed with:    Comments   Patient x Discussed with patient in room. POC discussed. Questions answered (10   Family      RN x 5   Care Manager     Consultant: brandon Robles 5                     Multidiciplinary team rounds were held today with , nursing, pharmacist and clinical coordinator. Patient's plan of care was discussed; medications were reviewed and discharge planning was addressed. ________________________________________________________________________  Total NON critical care TIME:  35   Minutes    Total CRITICAL CARE TIME Spent:   Minutes non procedure based      Comments   >50% of visit spent in counseling and coordination of care x See above   ________________________________________________________________________  Procedures: see electronic medical records for all procedures/Xrays and details which were not copied into this note but were reviewed prior to creation of Plan. LABS:  Recent Labs      08/07/17 0017 08/06/17   0343  08/05/17   0248   WBC  13.8*  10.5  7.8   HGB  11.7  11.9  11.5   HCT  37.4  36.8  36.3   PLT  206  207  199     Recent Labs      08/07/17   0017 08/06/17   0343  08/05/17   0248   NA  148*  146*  143   K  4.9  4.8  4.5   CL  118*  116*  112*   CO2  26  26  19*   BUN  14  19  36*   CREA  0.98  0.96  0.97   GLU  85  107*  45*   CA  8.1*  8.1*  8.2*   MG  1.9  2.2  2.5*   PHOS  1.0*  1.2*   --      No results for input(s): SGOT, GPT, ALT, AP, TBIL, TBILI, TP, ALB, GLOB, GGT, AML, LPSE in the last 72 hours. No lab exists for component: AMYP, HLPSE  No results for input(s): INR, PTP, APTT in the last 72 hours. No lab exists for component: INREXT, INREXT   No results for input(s): FE, TIBC, PSAT, FERR in the last 72 hours.    No results found for: FOL, RBCF   No results for input(s): PH, PCO2, PO2 in the last 72 hours. No results for input(s): PHI, PO2I, PCO2I in the last 72 hours. Recent Labs      08/05/17   0248   CPK  544*   CKNDX  4.0*   TROIQ  0.11*     No results found for: CHOL, CHOLX, CHLST, CHOLV, HDL, LDL, LDLC, DLDLP, TGLX, TRIGL, TRIGP, CHHD, CHHDX  Lab Results   Component Value Date/Time    Glucose (POC) 91 08/07/2017 05:45 AM    Glucose (POC) 84 08/07/2017 12:02 AM    Glucose (POC) 129 08/06/2017 11:14 AM    Glucose (POC) 113 08/06/2017 08:06 AM    Glucose (POC) 114 08/06/2017 12:00 AM     Lab Results   Component Value Date/Time    Color YELLOW/STRAW 08/03/2017 01:04 PM    Appearance CLEAR 08/03/2017 01:04 PM    Specific gravity <1.005 08/03/2017 01:04 PM    Specific gravity 1.012 07/30/2017 12:10 AM    pH (UA) 5.5 08/03/2017 01:04 PM    Protein 30 08/03/2017 01:04 PM    Glucose NEGATIVE  08/03/2017 01:04 PM    Ketone TRACE 08/03/2017 01:04 PM    Bilirubin NEGATIVE  07/30/2017 12:10 AM    Urobilinogen 0.2 08/03/2017 01:04 PM    Nitrites NEGATIVE  08/03/2017 01:04 PM    Leukocyte Esterase NEGATIVE  08/03/2017 01:04 PM    Epithelial cells MODERATE 08/03/2017 01:04 PM    Bacteria 1+ 08/03/2017 01:04 PM    WBC 5-10 08/03/2017 01:04 PM    RBC 5-10 08/03/2017 01:04 PM       RADIOGRAPHIC STUDIES:  CXR Results  (Last 48 hours)    None          CT Results  (Last 48 hours)    None          Echo Results  (Last 48 hours)    None          VENOUS DOPPLER results  (Last 48 hours)    None          CULTURES:    No results found for: SDES Lab Results   Component Value Date/Time    Culture result: MIXED UROGENITAL OSVALDO ISOLATED 08/03/2017 01:04 PM    Culture result: NO GROWTH 4 DAYS 08/03/2017 12:00 PM    Culture result: NO GROWTH 4 DAYS 08/03/2017 12:00 PM    Culture result: NO GROWTH 1 DAY 07/30/2017 12:10 AM          Signed: Areatha Ohs, MD    This note will not be viewable in [address]

## 2017-08-08 LAB
ANION GAP BLD CALC-SCNC: 8 MMOL/L (ref 5–15)
BASOPHILS # BLD AUTO: 0 K/UL
BASOPHILS # BLD: 0 %
BUN SERPL-MCNC: 9 MG/DL (ref 6–20)
BUN/CREAT SERPL: 11 (ref 12–20)
CALCIUM SERPL-MCNC: 7.2 MG/DL (ref 8.5–10.1)
CHLORIDE SERPL-SCNC: 114 MMOL/L (ref 97–108)
CO2 SERPL-SCNC: 21 MMOL/L (ref 21–32)
CREAT SERPL-MCNC: 0.81 MG/DL (ref 0.55–1.02)
DIFFERENTIAL METHOD BLD: ABNORMAL
EOSINOPHIL # BLD: 0 K/UL
EOSINOPHIL NFR BLD: 0 %
ERYTHROCYTE [DISTWIDTH] IN BLOOD BY AUTOMATED COUNT: 16.4 % (ref 11.5–14.5)
GLUCOSE BLD STRIP.AUTO-MCNC: 108 MG/DL (ref 65–100)
GLUCOSE BLD STRIP.AUTO-MCNC: 90 MG/DL (ref 65–100)
GLUCOSE SERPL-MCNC: 89 MG/DL (ref 65–100)
HCT VFR BLD AUTO: 31.4 % (ref 35–47)
HGB BLD-MCNC: 10.2 G/DL (ref 11.5–16)
LYMPHOCYTES # BLD AUTO: 19 %
LYMPHOCYTES # BLD: 2.4 K/UL
MAGNESIUM SERPL-MCNC: 1.4 MG/DL (ref 1.6–2.4)
MCH RBC QN AUTO: 22.5 PG (ref 26–34)
MCHC RBC AUTO-ENTMCNC: 32.5 G/DL (ref 30–36.5)
MCV RBC AUTO: 69.3 FL (ref 80–99)
MONOCYTES # BLD: 2 K/UL
MONOCYTES NFR BLD AUTO: 16 %
NEUTS SEG # BLD: 8.1 K/UL
NEUTS SEG NFR BLD AUTO: 65 %
PHOSPHATE SERPL-MCNC: 2.9 MG/DL (ref 2.6–4.7)
PLATELET # BLD AUTO: 171 K/UL (ref 150–400)
POTASSIUM SERPL-SCNC: 4.2 MMOL/L (ref 3.5–5.1)
RBC # BLD AUTO: 4.53 M/UL (ref 3.8–5.2)
RBC MORPH BLD: ABNORMAL
RBC MORPH BLD: ABNORMAL
SERVICE CMNT-IMP: ABNORMAL
SERVICE CMNT-IMP: NORMAL
SODIUM SERPL-SCNC: 143 MMOL/L (ref 136–145)
WBC # BLD AUTO: 12.5 K/UL (ref 3.6–11)
WBC MORPH BLD: ABNORMAL

## 2017-08-08 PROCEDURE — 74011250636 HC RX REV CODE- 250/636: Performed by: INTERNAL MEDICINE

## 2017-08-08 PROCEDURE — 82962 GLUCOSE BLOOD TEST: CPT

## 2017-08-08 PROCEDURE — 74011000258 HC RX REV CODE- 258: Performed by: INTERNAL MEDICINE

## 2017-08-08 PROCEDURE — 80048 BASIC METABOLIC PNL TOTAL CA: CPT | Performed by: INTERNAL MEDICINE

## 2017-08-08 PROCEDURE — 83735 ASSAY OF MAGNESIUM: CPT | Performed by: INTERNAL MEDICINE

## 2017-08-08 PROCEDURE — 74011636637 HC RX REV CODE- 636/637: Performed by: INTERNAL MEDICINE

## 2017-08-08 PROCEDURE — 36415 COLL VENOUS BLD VENIPUNCTURE: CPT | Performed by: INTERNAL MEDICINE

## 2017-08-08 PROCEDURE — 84100 ASSAY OF PHOSPHORUS: CPT | Performed by: INTERNAL MEDICINE

## 2017-08-08 PROCEDURE — 65270000029 HC RM PRIVATE

## 2017-08-08 PROCEDURE — 74011250637 HC RX REV CODE- 250/637: Performed by: INTERNAL MEDICINE

## 2017-08-08 PROCEDURE — 85025 COMPLETE CBC W/AUTO DIFF WBC: CPT | Performed by: INTERNAL MEDICINE

## 2017-08-08 RX ORDER — GUAIFENESIN 100 MG/5ML
81 LIQUID (ML) ORAL DAILY
Status: DISCONTINUED | OUTPATIENT
Start: 2017-08-09 | End: 2017-08-09 | Stop reason: HOSPADM

## 2017-08-08 RX ADMIN — MYCOPHENOLATE MOFETIL 1200 MG: 200 POWDER, FOR SUSPENSION ORAL at 12:01

## 2017-08-08 RX ADMIN — DEXTROSE MONOHYDRATE, SODIUM CHLORIDE, AND POTASSIUM CHLORIDE 150 ML/HR: 50; 9; 1.49 INJECTION, SOLUTION INTRAVENOUS at 08:41

## 2017-08-08 RX ADMIN — FAMOTIDINE 20 MG: 20 TABLET ORAL at 17:24

## 2017-08-08 RX ADMIN — PREDNISONE 15 MG: 10 TABLET ORAL at 08:33

## 2017-08-08 RX ADMIN — MAGNESIUM SULFATE HEPTAHYDRATE 3 G: 500 INJECTION, SOLUTION INTRAMUSCULAR; INTRAVENOUS at 12:17

## 2017-08-08 RX ADMIN — MYCOPHENOLATE MOFETIL 1200 MG: 200 POWDER, FOR SUSPENSION ORAL at 22:30

## 2017-08-08 RX ADMIN — Medication 10 ML: at 22:31

## 2017-08-08 RX ADMIN — DEXTROSE MONOHYDRATE, SODIUM CHLORIDE, AND POTASSIUM CHLORIDE 150 ML/HR: 50; 9; 1.49 INJECTION, SOLUTION INTRAVENOUS at 17:24

## 2017-08-08 RX ADMIN — FAMOTIDINE 20 MG: 20 TABLET ORAL at 08:33

## 2017-08-08 RX ADMIN — LOSARTAN POTASSIUM 100 MG: 50 TABLET ORAL at 08:33

## 2017-08-08 RX ADMIN — HYDRALAZINE HYDROCHLORIDE 10 MG: 20 INJECTION INTRAMUSCULAR; INTRAVENOUS at 15:39

## 2017-08-08 RX ADMIN — Medication 10 ML: at 08:10

## 2017-08-08 RX ADMIN — DEXTROSE MONOHYDRATE, SODIUM CHLORIDE, AND POTASSIUM CHLORIDE 150 ML/HR: 50; 9; 1.49 INJECTION, SOLUTION INTRAVENOUS at 23:53

## 2017-08-08 RX ADMIN — NITROGLYCERIN 1 INCH: 20 OINTMENT TOPICAL at 00:00

## 2017-08-08 NOTE — PROGRESS NOTES
End of Shift Nursing Note    Bedside shift change report given to Lena Roblero (oncoming nurse) by Bc Cao (offgoing nurse). Report included the following information SBAR and Kardex. Zone Phone:       Significant changes during shift:    Patient got a headache after Nitrobid. Nitrobid removed, headache resolved. Phosphorus back to normal levels. Non-emergent issues for physician to address:        Number times ambulated in hallway past shift: 0     Number of times OOB to chair past shift: 0        Vital Signs:    Temp: 98.9 °F (37.2 °C)     Pulse (Heart Rate): 84     BP: 146/69     Resp Rate: 16     O2 Sat (%): 97 %    Lines & Drains:     Urinary Catheter? NO     Central Line? No       PICC Line? NO       NG tube [] in [] removed [x] not applicable   Drains [] in [] removed [x] not applicable     Skin Integrity:      Wounds: no   Dressings Present: no    Wound Concerns: no      GI:    Current diet:  DIET CLEAR LIQUID    Nausea: YES   Vomiting: NO  Bowel Sounds: YES  Flatus: YES  Last Bowel Movement: Today   Appearance: Loose brown    Respiratory:  Supplemental O2: No      Device:    via  Liters/min     Incentive Spirometer: YES  Volume:   Coughing and Deep Breathing: YES  Oral Care: YES  Understanding (patient/family education): YES   Getting out of bed: YES  Head of bed elevation: YES    Patient Safety:    Falls Score: 2  Mobility Score: 1  Bed Alarm On? No  Sitter? No      Opportunity for questions and clarification was given to oncoming nurse. Patient bed is in low position, side rails are up x 2, door & observation blinds open as needed, call bell within reach and patient not in distress.     Jazz Angeles RN

## 2017-08-08 NOTE — PROGRESS NOTES
Admit Date: 8/3/2017    POD * No surgery found *    Procedure:  * No surgery found *    Subjective:     Patient still having liquid stools. Mild discomfort LLQ. Nathalia clears well. Objective:     Blood pressure 152/74, pulse 86, temperature 98.9 °F (37.2 °C), resp. rate 16, height 5' 3\" (1.6 m), weight 170 lb (77.1 kg), SpO2 96 %. Temp (24hrs), Av °F (37.2 °C), Min:98.5 °F (36.9 °C), Max:99.6 °F (37.6 °C)      Physical Exam:  GENERAL: alert, cooperative, no distress, appears stated age, LUNG: clear to auscultation bilaterally, HEART: regular rate and rhythm, ABDOMEN: soft, non-tender. Bowel sounds normal, EXTREMITIES:  extremities normal, atraumatic, no cyanosis or edema    Labs:   Recent Results (from the past 24 hour(s))   GLUCOSE, POC    Collection Time: 17 11:12 AM   Result Value Ref Range    Glucose (POC) 107 (H) 65 - 100 mg/dL    Performed by [address] [place], [place] [id number], POC    Collection Time: 17  5:10 PM   Result Value Ref Range    Glucose (POC) 110 (H) 65 - 100 mg/dL    Performed by [address] [place], [place] [id number], POC    Collection Time: 17 11:07 PM   Result Value Ref Range    Glucose (POC) 88 65 - 100 mg/dL    Performed by Stanley FRANCO(EDD)    CBC WITH AUTOMATED DIFF    Collection Time: 17  6:58 AM   Result Value Ref Range    WBC 12.5 (H) 3.6 - 11.0 K/uL    RBC 4.53 3.80 - 5.20 M/uL    HGB 10.2 (L) 11.5 - 16.0 g/dL    HCT 31.4 (L) 35.0 - 47.0 %    MCV 69.3 (L) 80.0 - 99.0 FL    MCH 22.5 (L) 26.0 - 34.0 PG    MCHC 32.5 30.0 - 36.5 g/dL    RDW 16.4 (H) 11.5 - 14.5 %    PLATELET 746 882 - 824 K/uL    NEUTROPHILS 65 %    LYMPHOCYTES 19 %    MONOCYTES 16 %    EOSINOPHILS 0 %    BASOPHILS 0 %    ABS. NEUTROPHILS 8.1 K/UL    ABS. LYMPHOCYTES 2.4 K/UL    ABS. MONOCYTES 2.0 K/UL    ABS. EOSINOPHILS 0.0 K/UL    ABS.  BASOPHILS 0.0 K/UL    DF MANUAL      RBC COMMENTS ANISOCYTOSIS  1+        RBC COMMENTS HYPOCHROMIA  1+        WBC COMMENTS VACUOLATED POLYS     METABOLIC PANEL, BASIC Collection Time: 08/08/17  6:58 AM   Result Value Ref Range    Sodium 143 136 - 145 mmol/L    Potassium 4.2 3.5 - 5.1 mmol/L    Chloride 114 (H) 97 - 108 mmol/L    CO2 21 21 - 32 mmol/L    Anion gap 8 5 - 15 mmol/L    Glucose 89 65 - 100 mg/dL    BUN 9 6 - 20 MG/DL    Creatinine 0.81 0.55 - 1.02 MG/DL    BUN/Creatinine ratio 11 (L) 12 - 20      GFR est AA >60 >60 ml/min/1.73m2    GFR est non-AA >60 >60 ml/min/1.73m2    Calcium 7.2 (L) 8.5 - 10.1 MG/DL   MAGNESIUM    Collection Time: 08/08/17  6:58 AM   Result Value Ref Range    Magnesium 1.4 (L) 1.6 - 2.4 mg/dL   PHOSPHORUS    Collection Time: 08/08/17  6:58 AM   Result Value Ref Range    Phosphorus 2.9 2.6 - 4.7 MG/DL   GLUCOSE, POC    Collection Time: 08/08/17  7:15 AM   Result Value Ref Range    Glucose (POC) 90 65 - 100 mg/dL    Performed by Renetta Becker        Data Review images and reports reviewed    Assessment:     Active Problems:    SBO (small bowel obstruction) ([address] (8/3/2017)        Plan/Recommendations/Medical Decision Making:     Continue present treatment   WBC down a little today, ? Steroid effect? GI lite diet  Repeat CBC in am  Possibly ready for d/c home soon    Nany Mercado.  Luis Carlos Mendoza MD, [facility]

## 2017-08-08 NOTE — ADT AUTH CERT NOTES
Utilization Review           Intestinal Obstruction - Care Day 5 (8/7/2017) by Emilee Alvares RN        Review Status Review Entered       Completed 8/7/2017       Details              Care Day: 5 Care Date: 8/7/2017 Level of Care: Inpatient Floor       Guideline Day 2        Level Of Care       (X) Floor              Clinical Status       (X) * Hypotension absent       8/7/2017 4:52 PM EDT by Chaka Wyman         99.1 hr 89 rr 16 173/83 98% r/a              (X) * Nausea and vomiting absent or improved       (X) * Pain absent or managed       (X) * Abdominal exam stable or improved       8/7/2017 4:52 PM EDT by Chaka Wyman         Having BMs and flatus                     Activity       (X) Activity as tolerated              Routes       (X) IV fluids, medications       8/7/2017 4:52 PM EDT by Chaka Wyman         Iv d5 ns kcl 20 meq 150ml/h, iv Ativan x1, po cozaar qd, nitrobid paste q6h one inch, iv k phos 30 mmol x2,  po prednisone qd,              (X) Diet as tolerated       8/7/2017 4:52 PM EDT by Chaka Wyman         Clear liquids and advance as shivam.                     Interventions       (X) * NG tube absent       8/7/2017 4:52 PM EDT by Chaka Wyman         D/c ngt and silva              (X) Surgical re-evaluation       (X) Possible follow-up abdominal imaging       8/7/2017 4:52 PM EDT by Dex Joseph       xr abd Decreased small bowel distention                     8/7/2017 4:52 PM EDT by Chaka Wyman       Subject: Additional Clinical Information       review concurrent 8/7/17         Wbc 13.8 na 148, phos 1           Severe hypophosphatemia  hypernatremia  -elevated risk for refeed - aggressively replace phos when is this low - goal is >2.5. Will give a 30mmol replacement now and repeat this evening.  Labs to repeat tomorrow AM  -suspect underlying PM and her SBO playing a large role along with mild refeeding  -watch mag/k as well  -needs free water - encourage PO      Hypoglycemia  -none further from episode 8/5  -could have been related to illness and use of steroids. For now to remains on fluid with dextrose but would consider dc if tolerating PO  -continue on POC Bs as we are doing  -dsicussed symptoms of hypoglycemia with patient so that we can attempt to catch earlier and therefore avoid possible complications      Hypertension, Benign essential now accelerating  -starting to stabilize and should improve now that we have clearance to restart home cozaar      Chest pain, POA  Elevated troponin  -resolved chest pains  -tte 55% no valvular changes  -trop stable  -following with cardiology for OP nuclear stress        Acute kidney injury due to dehydration from repeated vomiting. Acquired solitary kidney s/p nephrectomy for renal cancer 2016  -creat now normal with IVF  -patient with solitary kidney therefore creat <1.5 acceptable        Hypokalemia   -replaced        Abdominal pain due to small bowel obstruction with transition point at ileum, POA  --management per surgery - appears to be resolving conservatively  -+bm 8/6      Polymyositis (PM), POA  -cpk continues to drop  -restart cellcept  -restart prednisone and dc IV solumedrol                                       * Milestone                  Intestinal Obstruction - Care Day 4 (8/6/2017) by Jadyn Peterson, RN        Review Status Review Entered       Completed 8/7/2017       Details              Care Day: 4 Care Date: 8/6/2017 Level of Care: Inpatient Floor       Guideline Day 2        Level Of Care       (X) Floor              Clinical Status       (X) * Hypotension absent       8/7/2017 4:41 PM EDT by Karuna Lindquist         99.5  rr 20 174/81 99% r/a              (X) * Nausea and vomiting absent or improved       (X) * Pain absent or managed       ( ) * Abdominal exam stable or improved       8/7/2017 4:41 PM EDT by Karuna Lindquist         Patient had episode of passing flatus followed by a large BM, but not much flatus since. Crampy/spasm like pain resolved but still feels bloated. Abd softer, less tender                     Routes       (X) IV fluids, medications       8/7/2017 4:41 PM EDT by Ekta Gonzalez         Iv D5 ns kcl 20 meq 125ml/h, iv hydralazine x1, iv morphine x1, nitro paste q6h scheduled, iv Zofran x3,  iv dilaudid x1, iv solumedrol qd,              ( ) Diet as tolerated       8/7/2017 4:41 PM EDT by Ekta Gonzalez         Discomfort from NGT noted. Remains NPO. +bm today. ngt still in place. Drinking some water now                     Interventions       ( ) * NG tube absent       (X) Surgical re-evaluation       (X) Possible follow-up abdominal imaging       8/7/2017 4:41 PM EDT by Ekta Gonzalez         Bmp , cbc in am, xr abd flat/erect in am,                     8/7/2017 4:41 PM EDT by Ekta Gonzalez       Subject: Additional Clinical Information       review concurrent 8/6/17                  Glucose 129, na 146,                    Hypoglycemia  -none further from episode yesterday  -could be related to illness and use of steroids  -remains on fluid with dextrose  -continue on POC Bs as we are doing  -dsicussed symptoms of hypoglycemia with patient so that we can attempt to catch earlier and therefore avoid possible complications      Hypertension, Benign essential now accelerating  -starting to stabilize  -continue to work with cardiology for BP control  -if needed could consider clonidine patch      Chest pain, POA  Elevated troponin  -resolved chest pains  -tte 55% no valvular changes  -trop stable  -following with cardiology for OP nuclear stress        Acute kidney injury due to dehydration from repeated vomiting.   Acquired solitary kidney s/p nephrectomy for renal cancer 2016  -creat now normal with IVF  -patient with solitary kidney therefore creat <1.5 acceptable        Hypokalemia   -replaced        Abdominal pain due to small bowel obstruction with transition point at ileum, POA  --management per surgery  -+bm today      Polymyositis (PM), POA  -cpk continues to drop

## 2017-08-08 NOTE — PROGRESS NOTES
Nutrition Assessment:    RECOMMENDATIONS:   Continue diet per surgeon    ASSESSMENT:   Chart reviewed, physician in with pt at time of visit. Her diet has been advanced and per RN flowsheet's she has a good appetite. Noted plan for discharge is likely very soon. Mag 1.4, being repleted. BM noted today, watery. Will monitor appetite. Dietitians Intervention(s)/Plan(s): Continue diet, replete electrolytes  SUBJECTIVE/OBJECTIVE:   Pt speaking with the physician   Diet Order: Other (comment) (GI Lite )  % Eaten:  Patient Vitals for the past 72 hrs:   % Diet Eaten   08/08/17 1037 75 %   08/07/17 1947 100 %   08/07/17 1506 100 %   08/07/17 1103 75 %   08/07/17 0811 0 %       Pertinent Medications:pepcid, MagSO4, prednisone, KCl; [email or web address]). Chemistries:  Lab Results   Component Value Date/Time    Sodium 143 08/08/2017 06:58 AM    Potassium 4.2 08/08/2017 06:58 AM    Chloride 114 08/08/2017 06:58 AM    CO2 21 08/08/2017 06:58 AM    Anion gap 8 08/08/2017 06:58 AM    Glucose 89 08/08/2017 06:58 AM    BUN 9 08/08/2017 06:58 AM    Creatinine 0.81 08/08/2017 06:58 AM    BUN/Creatinine ratio 11 08/08/2017 06:58 AM    GFR est AA >60 08/08/2017 06:58 AM    GFR est non-AA >60 08/08/2017 06:58 AM    Calcium 7.2 08/08/2017 06:58 AM    Albumin 3.7 08/03/2017 12:00 PM      Anthropometrics: Height: 5' 3\" (160 cm) Weight: 77.1 kg (170 lb)    IBW (%IBW):   ( ) UBW (%UBW):   (  %)    BMI: Body mass index is 30.11 kg/(m^2). This BMI is indicative of:  []Underweight   []Normal   []Overweight   [x] Obesity   [] Extreme Obesity (BMI>40)  Estimated Nutrition Needs (Based on): 1697 Kcals/day (MSJ 1305 x 1.3) , 62 g (0.8gPro/kg) Protein  Carbohydrate: At Least 130 g/day  Fluids: 1700 mL/day    Last BM: 8/8-watery   [x]Active     []Hyperactive  []Hypoactive       [] Absent   BS  Skin:    [x] Intact   [] Incision  [] Breakdown   [] DTI   [] Tears/Excoriation/Abrasion  []Edema [] Other:    Wt Readings from Last 30 Encounters: 08/08/17 77.1 kg (170 lb)   08/01/17 73 kg (160 lb 15 oz)   08/01/17 73.4 kg (161 lb 12.8 oz)   07/29/17 77.1 kg (170 lb)   07/06/17 77.1 kg (170 lb)   05/22/17 81.3 kg (179 lb 3.2 oz)   05/09/17 79.9 kg (176 lb 3.2 oz)   03/17/17 78.9 kg (174 lb)   03/11/17 80.3 kg (177 lb)   02/21/17 80.4 kg (177 lb 3.2 oz)   02/15/17 80.4 kg (177 lb 3.2 oz)   02/02/17 82.6 kg (182 lb)   01/17/17 80.8 kg (178 lb 3.2 oz)   11/02/16 85.9 kg (189 lb 6.4 oz)   07/28/16 91.6 kg (202 lb)   05/02/16 90.7 kg (200 lb)      NUTRITION DIAGNOSES:   Problem:  Altered GI function      Etiology: related to SBO      Signs/Symptoms: as evidenced by imaging, NGT to suction, NPO. Previous dx re: altered GI function resolved, diet advanced. NUTRITION INTERVENTIONS:  Meals/Snacks: General/healthful diet                  GOAL:   Pt will consume >70% of meals in 3-5 days.      NUTRITION MONITORING AND EVALUATION   Previous Goal: Plan of care will be determined re: nutrition in 2-4 days   Previous Goal Met: Yes   Previous Recommendations Implemented: Yes   Cultural, Tenriism, or Ethnic Dietary Needs: None   LEARNING NEEDS (Diet, Food/Nutrient-Drug Interaction):    [x] None Identified   [] Identified and Education Provided/Documented   [] Identified and Pt declined/was not appropriate      [x] Interdisciplinary Care Plan Reviewed/Documented    [x] Participated in Discharge Planning: Low Fiber diet    [x] Interdisciplinary Rounds     NUTRITION RISK:    [] High              [x] Moderate           [x]  Low  []  Minimal/Uncompromised      Angela Escalona RD, [address]   Pager [phone]  Weekend Pager [phone]

## 2017-08-08 NOTE — PROGRESS NOTES
Hospitalist Progress Note    NAME: Bill Moon   :  1955   MRN:  [id number]       Assessment / Plan:  Severe hypophosphatemia improved  With replacement  Hypernatremia improved   -suspect underlying PM and her SBO playing a large role along with mild refeeding  -recheck in am     Hypoglycemia  -none further from episode   -could have been related to illness and poor oral intake .  -continue on POC Bs as we are doing  -nutrition per surgery and now on clear liquids    Hypertension, Benign essential with few episodes in the 150's  -Cont cozaar and cardiology recommended to start norvasc at discharge. On prn hydralazine. Chest pain, POA with mildly Elevated troponin(.14)  -resolved chest pains  -tte 55% no valvular changes  -trop stable  -following with cardiology for OP nuclear stress  -will start asa       Acute kidney injury due to dehydration from repeated vomiting resolved . Acquired solitary kidney s/p nephrectomy for renal cancer   -patient with solitary kidney therefore creat <1.5 acceptable      Hypokalemia   -replaced    Hypomagnesemia: replaced      Abdominal pain due to small bowel obstruction with transition point at ileum, POA  --management per surgery     Polymyositis (PM), POA  -cpk continues to decrease   -cont predisone and cellcept  -will need op f/u with her rheumatologist      Body mass index is 30.11 kg/(m^2). Code status: Full  Recommended Disposition: Home w/Family     Subjective:     Chief Complaint / Reason for Physician Visit  Abdominal pain    She is feeling better today. She reports less abdominal pain. No nausea and vomiting. Still on clear liquid diet. Denies chest pain.     Review of Systems:  Symptom Y/N Comments  Symptom Y/N Comments   Fever/Chills    Chest Pain     Poor Appetite y   Edema     Cough    Abdominal Pain y    Sputum    Joint Pain     SOB/LOREDO    Pruritis/Rash     Nausea/vomit y   Tolerating PT/OT     Diarrhea    Tolerating Diet y    Constipation Other       Could NOT obtain due to:      Objective:     VITALS:   Last 24hrs VS reviewed since prior progress note. Most recent are:  Patient Vitals for the past 24 hrs:   Temp Pulse Resp BP SpO2   08/08/17 1100 98.5 °F (36.9 °C) 80 16 161/80 100 %   08/08/17 0830 98.9 °F (37.2 °C) 86 16 152/74 96 %   08/08/17 0437 98.9 °F (37.2 °C) 84 16 146/69 97 %   08/07/17 2303 99.1 °F (37.3 °C) 90 16 152/74 96 %   08/07/17 1953 99.6 °F (37.6 °C) 91 16 156/71 98 %   08/07/17 1500 98.5 °F (36.9 °C) 88 16 157/79 98 %       Intake/Output Summary (Last 24 hours) at 08/08/17 1453  Last data filed at 08/08/17 1338   Gross per 24 hour   Intake             3715 ml   Output              965 ml   Net             2750 ml        PHYSICAL EXAM:  General: Alert, cooperative, no acute distress    EENT:  EOMI. Anicteric sclerae. MMM  Resp:  CTA bilaterally, no wheezing or rales. No accessory muscle use  CV:  Regular  rhythm,  No edema  GI:  Soft, Non distended, Non tender.  +Bowel sounds  Neurologic:  Alert and oriented X 3, normal speech,   Skin:  No rashes.   No jaundice      Current Facility-Administered Medications:     magnesium sulfate 3 g in 0.9% sodium chloride 100 mL IVPB, 3 g, IntraVENous, ONCE, Phill Lares MD, Last Rate: 33.3 mL/hr at 08/08/17 1217, 3 g at 08/08/17 1217    famotidine (PEPCID) tablet 20 mg, 20 mg, Oral, BID, Phill Lares MD, 20 mg at 08/08/17 2954    mycophenolate mofetil (CELLCEPT) 200 mg/mL oral suspension 1,200 mg, 1,200 mg, Oral, BID, Phill Lares MD, 1,200 mg at 08/08/17 1201    predniSONE (DELTASONE) tablet 15 mg, 15 mg, Oral, DAILY WITH BREAKFAST, Phill Lares MD, 15 mg at 08/08/17 3722    losartan (COZAAR) tablet 100 mg, 100 mg, Oral, DAILY, Phill Lares MD, 100 mg at 08/08/17 2280    morphine injection 2 mg, 2 mg, IntraVENous, Q3H PRN, Renita Carlos MD, 2 mg at 08/06/17 1700    dextrose 5% - 0.9% NaCl with KCl 20 mEq/L infusion, 150 mL/hr, IntraVENous, CONTINUOUS, Sekou Suarez, MD, Last Rate: 150 mL/hr at 08/08/17 0841, 150 mL/hr at 08/08/17 0841    hydrALAZINE (APRESOLINE) 20 mg/mL injection 10 mg, 10 mg, IntraVENous, Q2H PRN, Stefan Forman MD, 10 mg at 08/06/17 1259    chlorproMAZINE (THORAZINE) injection 25 mg, 25 mg, IntraMUSCular, Q6H PRN, Kylee Wharton MD, 25 mg at 08/05/17 1255    nitroglycerin (NITROBID) 2 % ointment 1 Inch, 1 Inch, Topical, Q6H, Sekou Suarez MD, 1 Inch at 08/08/17 0000    sodium chloride (NS) flush 5-10 mL, 5-10 mL, IntraVENous, Q8H, Kylee Wharton MD, 10 mL at 08/08/17 0810    sodium chloride (NS) flush 5-10 mL, 5-10 mL, IntraVENous, PRN, Kylee Wharton MD    ondansetron [facility]) injection 4 mg, 4 mg, IntraVENous, Q4H PRN, Kylee Wharton MD, 4 mg at 08/06/17 1712    LORazepam (ATIVAN) injection 0.5 mg, 0.5 mg, IntraVENous, Q6H PRN, Kylee Wharton MD, 0.5 mg at 08/07/17 0335    phenol throat spray (CHLORASEPTIC) 1 Spray, 1 Spray, Oral, PRN, Kylee Wharton MD        ________________________________________________________________________  Care Plan discussed with:    Comments   Patient y    [facility]      RN     Care Manager     Consultant                        Multidiciplinary team rounds were held today with , nursing, pharmacist and clinical coordinator. Patient's plan of care was discussed; medications were reviewed and discharge planning was addressed. ________________________________________________________________________  Total NON critical care TIME:  35  Minutes    Total CRITICAL CARE TIME Spent:   Minutes non procedure based      Comments   >50% of visit spent in counseling and coordination of care     ________________________________________________________________________  Marcellus Li MD     Procedures: see electronic medical records for all procedures/Xrays and details which were not copied into this note but were reviewed prior to creation of Plan.       LABS:  I reviewed today's most current labs and imaging studies.   Pertinent labs include:  Recent Labs      08/08/17   0658  08/07/17   0017  08/06/17   0343   WBC  12.5*  13.8*  10.5   HGB  10.2*  11.7  11.9   HCT  31.4*  37.4  36.8   PLT  171  206  207     Recent Labs      08/08/17   0658  08/07/17   0017  08/06/17   0343   NA  143  148*  146*   K  4.2  4.9  4.8   CL  114*  118*  116*   CO2  21  26  26   GLU  89  85  107*   BUN  9  14  19   CREA  0.81  0.98  0.96   CA  7.2*  8.1*  8.1*   MG  1.4*  1.9  2.2   PHOS  2.9  1.0*  1.2*       Signed: Robyn Velázquez MD

## 2017-08-08 NOTE — PROGRESS NOTES
Cardiology Progress Note      8/8/2017 9:47 AM    Admit Date: 8/3/2017          Subjective:  Up in chair. Feeling better. Taking PO. No chest pain          Visit Vitals    /74    Pulse 86    Temp 98.9 °F (37.2 °C)    Resp 16    Ht 5' 3\" (1.6 m)    Wt 77.1 kg (170 lb)    LMP  (Exact Date)    SpO2 96%    BMI 30.11 kg/m2     08/06 1901 - 08/08 0700  In: 2127.5 [P.O.:360; I.V.:1747.5]  Out: 1565 [Urine:1565]        Objective:      Physical Exam:  VS as above   Data Review:   Labs:      Hgb 10.2  Creat 0.8  K 4.2     Telemetry: not on tele      Assessment:     1. Mildly abnormal troponin of uncertain significance. 2. Atypical chest pain associated with nausea, vomiting and small-  bowel obstruction. 3. Hypertension. 4. Acute renal failure, probably due to dehydration. - better   5. Hyponatremia. 6. 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 7. History of renal cell cancer, status post nephrectomy. Plan:  BP acceptable. Add Norvasc at d/c.  Can be set up for outpt Lexiscan nuclear stress after d/c

## 2017-08-08 NOTE — ROUTINE PROCESS
End of Shift Nursing Note    Bedside shift change report given to Neil ROMAN (oncoming nurse) by Kelly Singh RN (offgoing nurse). Report included the following information SBAR. [facility] Phone:   [phone]    Significant changes during shift:       Non-emergent issues for physician to address:        Number times ambulated in hallway past shift: 0      Number of times OOB to chair past shift: 1    POD #:      Vital Signs:    Temp: 98.5 °F (36.9 °C)     Pulse (Heart Rate): 80     BP: 161/80     Resp Rate: 16     O2 Sat (%): 100 %    Lines & Drains:     Urinary Catheter? No   Placement Date:    Medical Necessity:   Central Line? No   Placement Date:    Medical Necessity:   PICC Line? No   Placement Date:    Medical Necessity:     NG tube [] in [] removed [] not applicable   Drains [] in [] removed [] not applicable     Skin Integrity:      Wounds: no   Dressings Present: no    Wound Concerns: no      GI:    Current diet:  DIET GI LITE (POST SURGICAL)    Nausea: NO  Vomiting: NO  Bowel Sounds: YES  Flatus: YES  Last Bowel Movement: today   Appearance: pt states watery. (usual for pt)    Respiratory:  Supplemental O2: No      Device:    via  Liters/min     Incentive Spirometer: YES  Volume:   Coughing and Deep Breathing: YES  Oral Care: YES  Understanding (patient/family education): YES   Getting out of bed: YES  Head of bed elevation: YES    Patient Safety:    Falls Score: 1  Mobility Score: 1  Bed Alarm On? No  Sitter? No      Opportunity for questions and clarification was given to oncoming nurse. Patient bed is in lowest position, side rails are up x 2, door & observation blinds open as needed, call bell within reach and patient not in distress.     Oskar Young RN

## 2017-08-09 VITALS
HEIGHT: 63 IN | HEART RATE: 83 BPM | DIASTOLIC BLOOD PRESSURE: 89 MMHG | TEMPERATURE: 98.4 F | BODY MASS INDEX: 31.36 KG/M2 | WEIGHT: 177 LBS | OXYGEN SATURATION: 98 % | RESPIRATION RATE: 16 BRPM | SYSTOLIC BLOOD PRESSURE: 159 MMHG

## 2017-08-09 LAB
ANION GAP BLD CALC-SCNC: 8 MMOL/L (ref 5–15)
BACTERIA SPEC CULT: NORMAL
BACTERIA SPEC CULT: NORMAL
BASOPHILS # BLD AUTO: 0 K/UL
BASOPHILS # BLD: 0 %
BUN SERPL-MCNC: 5 MG/DL (ref 6–20)
BUN/CREAT SERPL: 6 (ref 12–20)
CALCIUM SERPL-MCNC: 7.6 MG/DL (ref 8.5–10.1)
CHLORIDE SERPL-SCNC: 112 MMOL/L (ref 97–108)
CO2 SERPL-SCNC: 19 MMOL/L (ref 21–32)
CREAT SERPL-MCNC: 0.81 MG/DL (ref 0.55–1.02)
DIFFERENTIAL METHOD BLD: ABNORMAL
EOSINOPHIL # BLD: 0.1 K/UL
EOSINOPHIL NFR BLD: 1 %
ERYTHROCYTE [DISTWIDTH] IN BLOOD BY AUTOMATED COUNT: 16.6 % (ref 11.5–14.5)
GLUCOSE BLD STRIP.AUTO-MCNC: 74 MG/DL (ref 65–100)
GLUCOSE BLD STRIP.AUTO-MCNC: 75 MG/DL (ref 65–100)
GLUCOSE BLD STRIP.AUTO-MCNC: 90 MG/DL (ref 65–100)
GLUCOSE BLD STRIP.AUTO-MCNC: 90 MG/DL (ref 65–100)
GLUCOSE SERPL-MCNC: 71 MG/DL (ref 65–100)
HCT VFR BLD AUTO: 35.7 % (ref 35–47)
HGB BLD-MCNC: 11.2 G/DL (ref 11.5–16)
LYMPHOCYTES # BLD AUTO: 21 %
LYMPHOCYTES # BLD: 2.2 K/UL
MAGNESIUM SERPL-MCNC: 1.9 MG/DL (ref 1.6–2.4)
MCH RBC QN AUTO: 21.5 PG (ref 26–34)
MCHC RBC AUTO-ENTMCNC: 31.4 G/DL (ref 30–36.5)
MCV RBC AUTO: 68.7 FL (ref 80–99)
METAMYELOCYTES NFR BLD MANUAL: 1 %
MONOCYTES # BLD: 1.7 K/UL
MONOCYTES NFR BLD AUTO: 16 %
MYELOCYTES NFR BLD MANUAL: 1 %
NEUTS SEG # BLD: 6.4 K/UL
NEUTS SEG NFR BLD AUTO: 60 %
PHOSPHATE SERPL-MCNC: 1.6 MG/DL (ref 2.6–4.7)
PLATELET # BLD AUTO: 201 K/UL (ref 150–400)
POTASSIUM SERPL-SCNC: 4.2 MMOL/L (ref 3.5–5.1)
RBC # BLD AUTO: 5.2 M/UL (ref 3.8–5.2)
RBC MORPH BLD: ABNORMAL
RBC MORPH BLD: ABNORMAL
SERVICE CMNT-IMP: NORMAL
SODIUM SERPL-SCNC: 139 MMOL/L (ref 136–145)
WBC # BLD AUTO: 10.7 K/UL (ref 3.6–11)

## 2017-08-09 PROCEDURE — 74011250636 HC RX REV CODE- 250/636: Performed by: INTERNAL MEDICINE

## 2017-08-09 PROCEDURE — 74011636637 HC RX REV CODE- 636/637: Performed by: INTERNAL MEDICINE

## 2017-08-09 PROCEDURE — 83735 ASSAY OF MAGNESIUM: CPT | Performed by: SURGERY

## 2017-08-09 PROCEDURE — 36415 COLL VENOUS BLD VENIPUNCTURE: CPT | Performed by: SURGERY

## 2017-08-09 PROCEDURE — 74011250637 HC RX REV CODE- 250/637: Performed by: INTERNAL MEDICINE

## 2017-08-09 PROCEDURE — 85025 COMPLETE CBC W/AUTO DIFF WBC: CPT | Performed by: SURGERY

## 2017-08-09 PROCEDURE — 82962 GLUCOSE BLOOD TEST: CPT

## 2017-08-09 PROCEDURE — 80048 BASIC METABOLIC PNL TOTAL CA: CPT | Performed by: SURGERY

## 2017-08-09 PROCEDURE — 84100 ASSAY OF PHOSPHORUS: CPT | Performed by: SURGERY

## 2017-08-09 RX ORDER — SODIUM,POTASSIUM PHOSPHATES 280-250MG
2 POWDER IN PACKET (EA) ORAL ONCE
Status: COMPLETED | OUTPATIENT
Start: 2017-08-09 | End: 2017-08-09

## 2017-08-09 RX ORDER — AMLODIPINE BESYLATE 5 MG/1
5 TABLET ORAL DAILY
Qty: 30 TAB | Refills: 1 | Status: SHIPPED | OUTPATIENT
Start: 2017-08-09 | End: 2017-08-10 | Stop reason: SDUPTHER

## 2017-08-09 RX ORDER — LANOLIN ALCOHOL/MO/W.PET/CERES
800 CREAM (GRAM) TOPICAL ONCE
Status: COMPLETED | OUTPATIENT
Start: 2017-08-09 | End: 2017-08-09

## 2017-08-09 RX ORDER — GUAIFENESIN 100 MG/5ML
81 LIQUID (ML) ORAL DAILY
Qty: 30 TAB | Refills: 1 | Status: SHIPPED | OUTPATIENT
Start: 2017-08-09 | End: 2017-08-10 | Stop reason: SDUPTHER

## 2017-08-09 RX ORDER — EPINEPHRINE 1 MG/ML
INJECTION, SOLUTION, CONCENTRATE INTRAVENOUS
Status: DISCONTINUED
Start: 2017-08-09 | End: 2017-08-09 | Stop reason: HOSPADM

## 2017-08-09 RX ORDER — SODIUM,POTASSIUM PHOSPHATES 280-250MG
1 POWDER IN PACKET (EA) ORAL DAILY
Qty: 3 PACKET | Refills: 0 | Status: SHIPPED | OUTPATIENT
Start: 2017-08-09 | End: 2017-08-10 | Stop reason: SDUPTHER

## 2017-08-09 RX ADMIN — FAMOTIDINE 20 MG: 20 TABLET ORAL at 09:11

## 2017-08-09 RX ADMIN — Medication 800 MG: at 14:10

## 2017-08-09 RX ADMIN — DEXTROSE MONOHYDRATE, SODIUM CHLORIDE, AND POTASSIUM CHLORIDE 150 ML/HR: 50; 9; 1.49 INJECTION, SOLUTION INTRAVENOUS at 06:07

## 2017-08-09 RX ADMIN — MYCOPHENOLATE MOFETIL 1200 MG: 200 POWDER, FOR SUSPENSION ORAL at 10:00

## 2017-08-09 RX ADMIN — LOSARTAN POTASSIUM 100 MG: 50 TABLET ORAL at 14:09

## 2017-08-09 RX ADMIN — POTASSIUM & SODIUM PHOSPHATES POWDER PACK 280-160-250 MG 2 PACKET: 280-160-250 PACK at 14:10

## 2017-08-09 RX ADMIN — Medication 10 ML: at 06:02

## 2017-08-09 RX ADMIN — ASPIRIN 81 MG CHEWABLE TABLET 81 MG: 81 TABLET CHEWABLE at 09:11

## 2017-08-09 RX ADMIN — HYDRALAZINE HYDROCHLORIDE 10 MG: 20 INJECTION INTRAMUSCULAR; INTRAVENOUS at 00:05

## 2017-08-09 RX ADMIN — PREDNISONE 15 MG: 10 TABLET ORAL at 09:11

## 2017-08-09 NOTE — ROUTINE PROCESS
I have reviewed discharge instructions with the patient. The patient verbalized understanding. IV removed. Pt taken to discharge via wheelchair and tech.

## 2017-08-09 NOTE — DISCHARGE SUMMARY
Physician Discharge Summary     Patient ID:  Baylee Palumbo  [id number]  25 y.o.  1955    Admit Date: 8/3/2017    Discharge Date: 8/9/2017    Admission Diagnoses: SBO (small bowel obstruction) [facility])    Discharge Diagnoses: Active Problems:    SBO (small bowel obstruction) ([address] (8/3/2017)         Admission Condition: Fair    Discharge Condition: Good    Last Procedure: * No surgery found [facility] Course:   Pt admitted with SBO, seen in consultation by cardiology and IM. Doing well with non-operative management with return of bowel function. Had elevated troponin of uncertain significance. Needs outpatient stress testing. Consults: Cardiology and Internal Medicine    Disposition: home    Patient Instructions:   Current Discharge Medication List      START taking these medications    Details   aspirin 81 mg chewable tablet Take 1 Tab by mouth daily. Qty: 30 Tab, Refills: 1      amLODIPine (NORVASC) 5 mg tablet Take 1 Tab by mouth daily. Qty: 30 Tab, Refills: 1      potassium, sodium phosphates (NEUTRA-PHOS) 280-160-250 mg packet Take 1 Packet by mouth daily for 3 days. Indications: hypophosphatemia  Qty: 3 Packet, Refills: 0         CONTINUE these medications which have NOT CHANGED    Details   vitamin E topical cream Apply  to affected area daily. Patient applies to face      IMMUNE GLOB,ROXANNE CAPRYLATE,IGG, (GAMUNEX IV) 160 g by IntraVENous route every twenty-eight (28) days. Patient receives 160g Gamunex infusion monthly, administered over two consecutive days, at 80g each day. ondansetron (ZOFRAN ODT) 4 mg disintegrating tablet Take 1 Tab by mouth every eight (8) hours as needed for Nausea. Qty: 10 Tab, Refills: 0      mycophenolate mofetil (CELLCEPT) 200 mg/mL suspension Take 6 mL by mouth two (2) times a day.   Refills: 1      losartan (COZAAR) 100 mg tablet TAKE ONE TABLET BY MOUTH DAILY  Refills: 6      predniSONE (DELTASONE) 5 mg tablet TAKE THREE TABLETS BY MOUTH EVERY DAY  Refills: 3 fluticasone (FLONASE) 50 mcg/actuation nasal spray INHALE 1-2 SPRAYS IN EACH NOSTRIL AT BEDTIME  Refills: 12         STOP taking these medications       IMMUNE GLOBULIN,GAMMA,IGG, (IMMUNE GLOBULIN, HUMAN,, IGG, IV) Comments:   Reason for Stopping:             Activity: Activity as tolerated  Diet: Regular Diet    Follow-up with PCP in 1 week.   Follow-up tests/labs none    Signed:  Tu Johnston MD  [id number] [facility]  8/9/2017  12:14 PM

## 2017-08-09 NOTE — PROGRESS NOTES
Hospitalist Progress Note    NAME: Rose Notice   :  1955   MRN:  [id number]       Assessment / Plan:  Severe hypophosphatemia improving  With replacement  Hypernatremia improved   -suspect underlying PM and her SBO playing a large role along with mild refeeding  -Received phos supplementation today. -Going home on oral Phos supplements for 3 days with recheck in 1 week. -informed surgery team Dr Eunice Camilo  who is the primary on this pt that pt will need repeat phos levels with pcp  in 1 week.     Hypoglycemia  -none further from episode   -could have been related to illness and poor oral intake .  -encourage good po intake at home     Hypertension   -Cont cozaar and norvasc    Chest pain, POA with mildly Elevated troponin(.14)  -resolved chest pains  -tte 55% no valvular changes  -trop stable  -following with cardiology for OP nuclear stress  - cont  asa       Acute kidney injury due to dehydration from repeated vomiting resolved . Acquired solitary kidney s/p nephrectomy for renal cancer   -patient with solitary kidney therefore creat <1.5 acceptable      Hypokalemia   -replaced    Hypomagnesemia: replaced and recheck in 1 week      Abdominal pain due to small bowel obstruction with transition point at ileum, POA  --resolved    Polymyositis (PM), POA  -cont predisone and cellcept  -will need op f/u with her rheumatologist      Body mass index is 31.35 kg/(m^2). Code status: Full  Recommended Disposition: Home w/Family     Cleared for discharge from IM stand point. Informed rn to please schedule appointments and also inform PCP's office about lab work needed in 1 week. Subjective:     Chief Complaint / Reason for Physician Visit  Abdominal pain    She is feeling better today. She is able to tolerate diet. No nausea or vomiting. No abdominal pain. No fever or chills.       Review of Systems:  Symptom Y/N Comments  Symptom Y/N Comments   Fever/Chills    Chest Pain     Poor Appetite n   Edema Cough n   Abdominal Pain n    Sputum n   Joint Pain     SOB/LOREDO n   Pruritis/Rash     Nausea/vomit n   Tolerating PT/OT     Diarrhea    Tolerating Diet y    Constipation    Other       Could NOT obtain due to:      Objective:     VITALS:   Last 24hrs VS reviewed since prior progress note. Most recent are:  Patient Vitals for the past 24 hrs:   Temp Pulse Resp BP SpO2   08/09/17 1147 98.4 °F (36.9 °C) 83 16 159/89 98 %   08/09/17 0909 98.2 °F (36.8 °C) 92 16 162/75 98 %   08/09/17 0350 98.3 °F (36.8 °C) 94 16 152/76 98 %   08/08/17 2349 98.7 °F (37.1 °C) 86 16 170/88 97 %   08/08/17 1927 98.4 °F (36.9 °C) 85 16 169/76 100 %   08/08/17 1727 - - - 162/82 -       Intake/Output Summary (Last 24 hours) at 08/09/17 1519  Last data filed at 08/09/17 1500   Gross per 24 hour   Intake             3540 ml   Output                0 ml   Net             3540 ml        PHYSICAL EXAM:  General: Alert, cooperative, no acute distress    EENT:  EOMI. Anicteric sclerae. MMM  Resp:  CTA bilaterally, no wheezing or rales. No accessory muscle use  CV:  Regular  rhythm,  No edema  GI:  Soft, Non distended, Non tender.  +Bowel sounds  Neurologic:  Alert and oriented X 3, normal speech,   Skin:  No rashes.   No jaundice      Current Facility-Administered Medications:     aspirin chewable tablet 81 mg, 81 mg, Oral, DAILY, Charla Ross MD, 81 mg at 08/09/17 0911    famotidine (PEPCID) tablet 20 mg, 20 mg, Oral, BID, Gold Ricardo MD, 20 mg at 08/09/17 0911    mycophenolate mofetil (CELLCEPT) 200 mg/mL oral suspension 1,200 mg, 1,200 mg, Oral, BID, Gold Ricardo MD, 1,200 mg at 08/09/17 1000    predniSONE (DELTASONE) tablet 15 mg, 15 mg, Oral, DAILY WITH BREAKFAST, Gold Ricardo MD, 15 mg at 08/09/17 0911    losartan (COZAAR) tablet 100 mg, 100 mg, Oral, DAILY, Gold Ricardo MD, 100 mg at 08/09/17 1409    morphine injection 2 mg, 2 mg, IntraVENous, Q3H PRN, Wendy Mas MD, 2 mg at 08/06/17 1700    dextrose 5% - 0.9% NaCl with KCl 20 mEq/L infusion, 150 mL/hr, IntraVENous, CONTINUOUS, Sekou Suarez MD, Stopped at 08/09/17 1412    hydrALAZINE (APRESOLINE) 20 mg/mL injection 10 mg, 10 mg, IntraVENous, Q2H PRN, Andrea Valles MD, 10 mg at 08/09/17 0005    chlorproMAZINE (THORAZINE) injection 25 mg, 25 mg, IntraMUSCular, Q6H PRN, Felicity Kellogg MD, 25 mg at 08/05/17 1255    nitroglycerin (NITROBID) 2 % ointment 1 Inch, 1 Inch, Topical, Q6H, Sekou Suarez MD, 1 Inch at 08/08/17 0000    sodium chloride (NS) flush 5-10 mL, 5-10 mL, IntraVENous, Q8H, Felicity Kellogg MD, 10 mL at 08/09/17 0602    sodium chloride (NS) flush 5-10 mL, 5-10 mL, IntraVENous, PRN, Felicity Kellogg MD    ondansetron [facility]) injection 4 mg, 4 mg, IntraVENous, Q4H PRN, Felicity Kellogg MD, 4 mg at 08/06/17 1712    LORazepam (ATIVAN) injection 0.5 mg, 0.5 mg, IntraVENous, Q6H PRN, Felicity Kellogg MD, 0.5 mg at 08/07/17 0335    phenol throat spray (CHLORASEPTIC) 1 Spray, 1 Spray, Oral, PRN, Felicity Kellogg MD        ________________________________________________________________________  Care Plan discussed with:    Comments   Patient y    Family      RN y    Care Manager y    Consultant                        Multidiciplinary team rounds were held today with , nursing, pharmacist and clinical coordinator. Patient's plan of care was discussed; medications were reviewed and discharge planning was addressed. ________________________________________________________________________  Total NON critical care TIME:  35  Minutes          Comments   >50% of visit spent in counseling and coordination of care     ________________________________________________________________________  Terry Toscano MD     Procedures: see electronic medical records for all procedures/Xrays and details which were not copied into this note but were reviewed prior to creation of Plan.       LABS:  I reviewed today's most current labs and imaging studies.   Pertinent labs include:  Recent Labs      08/09/17   0425  08/08/17   0658  08/07/17   0017   WBC  10.7  12.5*  13.8*   HGB  11.2*  10.2*  11.7   HCT  35.7  31.4*  37.4   PLT  201  171  206     Recent Labs      08/09/17   0425  08/08/17   0658  08/07/17   0017   NA  139  143  148*   K  4.2  4.2  4.9   CL  112*  114*  118*   CO2  19*  21  26   GLU  71  89  85   BUN  5*  9  14   CREA  0.81  0.81  0.98   CA  7.6*  7.2*  8.1*   MG  1.9  1.4*  1.9   PHOS  1.6*  2.9  1.0*       Signed: Domenica Serrato MD

## 2017-08-09 NOTE — PROGRESS NOTES
End of Shift Nursing Note    Bedside shift change report given to Lena Roblero (oncoming nurse) by Raheem Brown (offgoing nurse). Report included the following information SBAR and Kardex. Zone Phone:       Significant changes during shift:    Last BM was two nights ago. Non-emergent issues for physician to address:        Number times ambulated in hallway past shift: 0     Number of times OOB to chair past shift: 0        Vital Signs:    Temp: 98.3 °F (36.8 °C)     Pulse (Heart Rate): 94     BP: 152/76     Resp Rate: 16     O2 Sat (%): 98 %    Lines & Drains:     Urinary Catheter? NO     Central Line? NO       PICC Line? NO       NG tube [] in [] removed [x] not applicable   Drains [] in [] removed [x] not applicable     Skin Integrity:      Wounds: no   Dressings Present: no    Wound Concerns: no      GI:    Current diet:  DIET GI LITE (POST SURGICAL)    Nausea: YES   Vomiting: NO  Bowel Sounds: YES  Flatus: YES  Last Bowel Movement: several days ago   Appearance: Loose brown    Respiratory:  Supplemental O2: No      Device:    via  Liters/min     Incentive Spirometer: YES  Volume:   Coughing and Deep Breathing: YES  Oral Care: YES  Understanding (patient/family education): YES   Getting out of bed: YES  Head of bed elevation: YES    Patient Safety:    Falls Score: 2  Mobility Score: 1  Bed Alarm On? No  Sitter? No      Opportunity for questions and clarification was given to oncoming nurse. Patient bed is in low position, side rails are up x 2, door & observation blinds open as needed, call bell within reach and patient not in distress.     Ramila Mendoza RN

## 2017-08-10 ENCOUNTER — PATIENT OUTREACH (OUTPATIENT)
Dept: INTERNAL MEDICINE CLINIC | Age: 62
End: 2017-08-10

## 2017-08-10 DIAGNOSIS — E83.39 HYPOPHOSPHATEMIA: ICD-10-CM

## 2017-08-10 DIAGNOSIS — I10 ESSENTIAL HYPERTENSION: Primary | ICD-10-CM

## 2017-08-10 RX ORDER — AMLODIPINE BESYLATE 5 MG/1
5 TABLET ORAL DAILY
Qty: 30 TAB | Refills: 1 | Status: SHIPPED | OUTPATIENT
Start: 2017-08-10 | End: 2017-08-22 | Stop reason: SDUPTHER

## 2017-08-10 RX ORDER — SODIUM,POTASSIUM PHOSPHATES 280-250MG
1 POWDER IN PACKET (EA) ORAL DAILY
Qty: 3 PACKET | Refills: 0 | Status: SHIPPED | OUTPATIENT
Start: 2017-08-10 | End: 2017-08-13

## 2017-08-10 RX ORDER — GUAIFENESIN 100 MG/5ML
81 LIQUID (ML) ORAL DAILY
Qty: 30 TAB | Refills: 1 | Status: SHIPPED | OUTPATIENT
Start: 2017-08-10 | End: 2020-05-27

## 2017-08-11 ENCOUNTER — PATIENT OUTREACH (OUTPATIENT)
Dept: INTERNAL MEDICINE CLINIC | Age: 62
End: 2017-08-11

## 2017-08-11 DIAGNOSIS — R77.8 ELEVATED TROPONIN: ICD-10-CM

## 2017-08-11 DIAGNOSIS — R07.89 ATYPICAL CHEST PAIN: Primary | ICD-10-CM

## 2017-08-11 DIAGNOSIS — I10 ESSENTIAL HYPERTENSION: ICD-10-CM

## 2017-08-11 NOTE — PROGRESS NOTES
Outgoing call made to [facility]. Spoke with Siddhartha Rodriguez. Scheduled follow up cardiology appointment with Dr. Monster Cruz on 2017 at 2:45 pm. Advised to fax referral to Dr. Martín Yu office at [phone]. Will call to notify patient. Outgoing call made to patient. Verified using name and . Given appointment date and time at Dr. Martín Yu office. Advised Dr. Monster Cruz will order the stress test. Patient reports picking up all prescriptions except Neutra Phos(ordered and will be available today). Reminded of upcoming appointment with Dr. Kenny Hoyt. Advised will follow up with Dr. Kenisha Benavides on 2017 at 1:45 pm. (correction from date given yesterday).

## 2017-08-22 ENCOUNTER — OFFICE VISIT (OUTPATIENT)
Dept: INTERNAL MEDICINE CLINIC | Age: 62
End: 2017-08-22

## 2017-08-22 VITALS
OXYGEN SATURATION: 97 % | HEIGHT: 63 IN | WEIGHT: 168 LBS | RESPIRATION RATE: 14 BRPM | BODY MASS INDEX: 29.77 KG/M2 | SYSTOLIC BLOOD PRESSURE: 148 MMHG | DIASTOLIC BLOOD PRESSURE: 64 MMHG | HEART RATE: 78 BPM | TEMPERATURE: 99 F

## 2017-08-22 DIAGNOSIS — D84.9 IMMUNOSUPPRESSED STATUS (HCC): ICD-10-CM

## 2017-08-22 DIAGNOSIS — M33.20 POLYMYOSITIS (HCC): ICD-10-CM

## 2017-08-22 DIAGNOSIS — E83.39 HYPOPHOSPHATEMIA: ICD-10-CM

## 2017-08-22 DIAGNOSIS — I10 ESSENTIAL HYPERTENSION: Primary | ICD-10-CM

## 2017-08-22 DIAGNOSIS — E87.6 HYPOKALEMIA: ICD-10-CM

## 2017-08-22 PROBLEM — K56.609 SBO (SMALL BOWEL OBSTRUCTION) (HCC): Status: RESOLVED | Noted: 2017-08-03 | Resolved: 2017-08-22

## 2017-08-22 RX ORDER — POTASSIUM & SODIUM PHOSPHATES POWDER PACK 280-160-250 MG 280-160-250 MG
PACK ORAL
Refills: 0 | COMMUNITY
Start: 2017-08-15 | End: 2017-08-22 | Stop reason: ALTCHOICE

## 2017-08-22 RX ORDER — AMLODIPINE BESYLATE 5 MG/1
5 TABLET ORAL DAILY
Qty: 90 TAB | Refills: 3 | Status: SHIPPED | OUTPATIENT
Start: 2017-08-22 | End: 2018-08-23

## 2017-08-22 RX ORDER — FAMOTIDINE 20 MG/1
20 TABLET, FILM COATED ORAL 2 TIMES DAILY
COMMUNITY
End: 2018-08-23

## 2017-08-22 RX ORDER — LOSARTAN POTASSIUM 100 MG/1
100 TABLET ORAL DAILY
Qty: 90 TAB | Refills: 3 | Status: SHIPPED | OUTPATIENT
Start: 2017-08-22 | End: 2018-08-14 | Stop reason: SDUPTHER

## 2017-08-22 NOTE — PROGRESS NOTES
REED   Maite Parikh is a 64 y.o. female, she presents today for:    Hospitalized for SBO. Hyponatremia. ARF and mild troponin leak. Elevated troponin and HTN: seen by cards. Plan for outpatient stress test. continue to work on BP management. Missed IVIG infusion after hospitalization because of desire to take a break from medications. Plans to see rheumatologist again on 8/31. Notes that she felt \"1000\" times better than she felt in years since being in the hospital. Able to rest and move muscles. Scheduled follow-up with cardiologist. Michelle Kelly    PMH/PSH: reviewed and updated  Sochx:  reports that she quit smoking about 19 years ago. Her smoking use included Cigarettes. She has a 20.00 pack-year smoking history. She has never used smokeless tobacco. She reports that she does not drink alcohol or use illicit drugs. Famhx: reviewed and updated     All: Allergies   Allergen Reactions    Dilaudid [Hydromorphone] Other (comments)    Levaquin [Levofloxacin] Other (comments)    Lisinopril Other (comments)    Metoprolol Other (comments)     hallucinations    Peanut Other (comments)     Med:   Current Outpatient Prescriptions   Medication Sig    famotidine (PEPCID) 20 mg tablet Take 20 mg by mouth two (2) times a day.  aspirin 81 mg chewable tablet Take 1 Tab by mouth daily.  amLODIPine (NORVASC) 5 mg tablet Take 1 Tab by mouth daily.  vitamin E topical cream Apply  to affected area daily. Patient applies to face    losartan (COZAAR) 100 mg tablet TAKE ONE TABLET BY MOUTH DAILY    predniSONE (DELTASONE) 5 mg tablet TAKE THREE TABLETS BY MOUTH EVERY DAY    fluticasone (FLONASE) 50 mcg/actuation nasal spray INHALE 1-2 SPRAYS IN EACH NOSTRIL AT BEDTIME    IMMUNE GLOB,ROXANNE CAPRYLATE,IGG, (GAMUNEX IV) 160 g by IntraVENous route every twenty-eight (28) days. Patient receives 160g Gamunex infusion monthly, administered over two consecutive days, at 80g each day.     ondansetron (ZOFRAN ODT) 4 mg disintegrating tablet Take 1 Tab by mouth every eight (8) hours as needed for Nausea.  mycophenolate mofetil (CELLCEPT) 200 mg/mL suspension Take 6 mL by mouth two (2) times a day. No current facility-administered medications for this visit. Review of Systems   Constitutional: Negative for chills, fever and malaise/fatigue. Respiratory: Negative for shortness of breath. Cardiovascular: Negative for chest pain. PE:  Blood pressure 148/64, pulse 78, temperature 99 °F (37.2 °C), temperature source Oral, resp. rate 14, height 5' 3\" (1.6 m), weight 168 lb (76.2 kg), SpO2 97 %. Body mass index is 29.76 kg/(m^2). Physical Exam   Constitutional: She is oriented to person, place, and time. No distress. HENT:   Head: Normocephalic. Mouth/Throat: Oropharynx is clear and moist.   Eyes: Conjunctivae and EOM are normal.   Neck: Neck supple. Cardiovascular: Normal rate, regular rhythm and normal heart sounds. Pulmonary/Chest: Effort normal and breath sounds normal.   Musculoskeletal:   Moving with greater ease than normal. No edema in ankles. Able to lift arm over head   Neurological: She is alert and oriented to person, place, and time. Skin: Skin is warm and dry. Nursing note and vitals reviewed. Labs:   See addendum    A/P:  64 y.o. female    ICD-10-CM ICD-9-CM    1. Essential hypertension I10 401.9 amLODIPine (NORVASC) 5 mg tablet      losartan (COZAAR) 100 mg tablet   2. Hypophosphatemia E83.39 275.3 MAGNESIUM      PHOSPHORUS   3. Hypokalemia Q85.6 212.3 METABOLIC PANEL, COMPREHENSIVE      MAGNESIUM   4. Polymyositis ([facility]) M33.20 710.4    5. Immunosuppressed status ([facility] [address] D89.9 279.9      Elevated troponin during acute illness and HTN: BP today overall in good range. Hypophosatemia/kalemia/mag: during hospitalization while having SBO. Most likely resolved. Will recheck today. Polymyositis: symptomatically much relieved after hospitalization.  Reviewed possibility of hydration as helpful. Could it also be related to dietary trigger? Plans to discuss further with rheumatologist. Encouarged ongoing good hydration.     - She was given AVS and expressed understanding with the diagnosis and plan as discussed. Follow-up Disposition:  Return if symptoms worsen or fail to improve. And as previously scheduled. Yogesh Mast

## 2017-08-22 NOTE — PROGRESS NOTES
Rm#1  Chief Complaint   Patient presents with   [facility] Follow Up     bowl obstruction ; 8-3-17     1. Have you been to the ER, urgent care clinic since your last visit? Hospitalized since your last visit? Yes When: 8-3-17 Where: [address]  Reason for visit: bowel obstruc. 2. Have you seen or consulted any other health care providers outside of the [address] [place] [address] since your last visit? Include any pap smears or colon screening. No  Health Maintenance Due   Topic Date Due    Hepatitis C Screening  1955    Pneumococcal 19-64 Highest Risk (1 of 3 - PCV13) 09/07/1974    PAP AKA CERVICAL CYTOLOGY  09/07/1976    FOBT Q 1 YEAR AGE 50-75  09/07/2005    INFLUENZA AGE 9 TO ADULT  08/01/2017   refused flu vaccine   hasnt had pcv13 however on immun. Sepr.  Drug   Hm reviewed

## 2017-08-22 NOTE — MR AVS SNAPSHOT
Visit Information Date & Time Provider Department Dept. Phone Encounter #  
 8/22/2017 11:15 AM Ines Mays MD [facility] and Internal Medicine [phone] [id number] Follow-up Instructions Return if symptoms worsen or fail to improve. And as previously scheduled. Lindsay Lemos Upcoming Health Maintenance Date Due Hepatitis C Screening 1955 Pneumococcal 19-64 Highest Risk (1 of 3 - PCV13) 9/7/1974 PAP AKA CERVICAL CYTOLOGY 9/7/1976 FOBT Q 1 YEAR AGE 50-75 9/7/2005 BREAST CANCER SCRN MAMMOGRAM 9/12/2018 DTaP/Tdap/Td series (2 - Td) 7/6/2027 Allergies as of 8/22/2017  Review Complete On: 8/22/2017 By: Dagmar Willoughby LPN Severity Noted Reaction Type Reactions Dilaudid [Hydromorphone]  08/10/2017    Other (comments) Levaquin [Levofloxacin]  05/09/2017    Other (comments) Lisinopril  05/02/2016    Other (comments) Metoprolol  07/28/2016    Other (comments)  
 hallucinations Peanut  05/02/2016    Other (comments) Current Immunizations  Reviewed on 5/2/2016 Name Date Influenza Vaccine 11/3/2016, 10/15/2015 TB Skin Test (PPD) 1/1/2012 Tdap 7/6/2017 11:14 AM  
  
 Not reviewed this visit You Were Diagnosed With   
  
 Codes Comments Essential hypertension    -  Primary ICD-10-CM: I10 
ICD-9-CM: 401.9 Hypophosphatemia     ICD-10-CM: E83.39 
ICD-9-CM: 275.3 Hypokalemia     ICD-10-CM: E87.6 ICD-9-CM: 276.8 Polymyositis ([facility] [address]     ICD-10-CM: M33.20 ICD-9-CM: 710.4 Immunosuppressed status ([facility] [address]     ICD-10-CM: D89.9 ICD-9-CM: 279.9 Vitals BP Pulse Temp Resp Height(growth percentile) Weight(growth percentile) 148/64 (BP 1 Location: Left arm, BP Patient Position: Sitting) 78 99 °F (37.2 °C) (Oral) 14 5' 3\" (1.6 m) 168 lb (76.2 kg) LMP SpO2 BMI OB Status Smoking Status (Exact Date) 97% 29.76 kg/m2 Hysterectomy Former Smoker BMI and BSA Data Body Mass Index Body Surface Area 29.76 kg/m 2 1.84 m 2 Preferred Pharmacy Pharmacy Name Phone [facility]/PHARMACY #[id number] Mendosa [place] [address] [phone] Your Updated Medication List  
  
   
This list is accurate as of: 8/22/17 11:42 AM.  Always use your most recent med list. amLODIPine 5 mg tablet Commonly known as:  Shahid Iniguez Take 1 Tab by mouth daily. aspirin 81 mg chewable tablet Take 1 Tab by mouth daily. fluticasone 50 mcg/actuation nasal spray Commonly known as:  Semaj Roman INHALE 1-2 SPRAYS IN EACH NOSTRIL AT BEDTIME  
  
 GAMUNEX IV  
160 g by IntraVENous route every twenty-eight (28) days. Patient receives 160g Gamunex infusion monthly, administered over two consecutive days, at 80g each day. losartan 100 mg tablet Commonly known as:  COZAAR Take 1 Tab by mouth daily. mycophenolate mofetil 200 mg/mL suspension Commonly known as:  CELLCEPT Take 6 mL by mouth two (2) times a day. ondansetron 4 mg disintegrating tablet Commonly known as:  ZOFRAN ODT Take 1 Tab by mouth every eight (8) hours as needed for Nausea. PEPCID 20 mg tablet Generic drug:  famotidine Take 20 mg by mouth two (2) times a day. predniSONE 5 mg tablet Commonly known as:  DELTASONE  
TAKE THREE TABLETS BY MOUTH EVERY DAY  
  
 vitamin E topical cream  
Apply  to affected area daily. Patient applies to face Prescriptions Sent to Pharmacy Refills  
 amLODIPine (NORVASC) 5 mg tablet 3 Sig: Take 1 Tab by mouth daily. Class: Normal  
 Pharmacy: [facility]/pharmacy #[id number] Mejia Alaniz, [address] Ph #: [phone] Route: Oral  
 losartan (COZAAR) 100 mg tablet 3 Sig: Take 1 Tab by mouth daily. Class: Normal  
 Pharmacy: [facility]/pharmacy #[id number] Mejia Alaniz, [address] Ph #: [phone] Route: Oral  
  
We Performed the Following MAGNESIUM [id number] CPT(R)] METABOLIC PANEL, COMPREHENSIVE [35187 CPT(R)] PHOSPHORUS [44584 CPT(R)] Follow-up Instructions Return if symptoms worsen or fail to improve. And as previously scheduled. Lindsay Lemos Introducing [address]! Dear Marva Chung: Thank you for requesting a [employer] account. Our records indicate that you already have an active [employer] account. You can access your account anytime at [email or web address] Did you know that you can access your hospital and ER discharge instructions at any time in [employer]? You can also review all of your test results from your hospital stay or ER visit. Additional Information If you have questions, please visit the Frequently Asked Questions section of the [employer] website at [email or web address] Remember, [employer] is NOT to be used for urgent needs. For medical emergencies, dial 911. Now available from your iPhone and Android! Please provide this summary of care documentation to your next provider. Your primary care clinician is listed as Kenyon Raza. If you have any questions after today's visit, please call [phone].

## 2017-08-23 LAB
ALBUMIN SERPL-MCNC: 3.7 G/DL (ref 3.6–4.8)
ALBUMIN/GLOB SERPL: 1.3 {RATIO} (ref 1.2–2.2)
ALP SERPL-CCNC: 70 IU/L (ref 39–117)
ALT SERPL-CCNC: 23 IU/L (ref 0–32)
AST SERPL-CCNC: 31 IU/L (ref 0–40)
BILIRUB SERPL-MCNC: 0.3 MG/DL (ref 0–1.2)
BUN SERPL-MCNC: 13 MG/DL (ref 8–27)
BUN/CREAT SERPL: 12 (ref 12–28)
CALCIUM SERPL-MCNC: 9 MG/DL (ref 8.7–10.3)
CHLORIDE SERPL-SCNC: 100 MMOL/L (ref 96–106)
CO2 SERPL-SCNC: 25 MMOL/L (ref 18–29)
CREAT SERPL-MCNC: 1.08 MG/DL (ref 0.57–1)
GLOBULIN SER CALC-MCNC: 2.9 G/DL (ref 1.5–4.5)
GLUCOSE SERPL-MCNC: 83 MG/DL (ref 65–99)
MAGNESIUM SERPL-MCNC: 1.9 MG/DL (ref 1.6–2.3)
PHOSPHATE SERPL-MCNC: 3.6 MG/DL (ref 2.5–4.5)
POTASSIUM SERPL-SCNC: 4.3 MMOL/L (ref 3.5–5.2)
PROT SERPL-MCNC: 6.6 G/DL (ref 6–8.5)
SODIUM SERPL-SCNC: 141 MMOL/L (ref 134–144)

## 2017-08-23 NOTE — PROGRESS NOTES
Electrolytes all in excellent range. Creatinine remains normal, but increase in number may indicate less hydration compared to while in hospital.   At this time AST/ALT are normalized.

## 2017-09-19 ENCOUNTER — HOSPITAL ENCOUNTER (OUTPATIENT)
Dept: MAMMOGRAPHY | Age: 62
Discharge: HOME | End: 2017-09-19
Attending: INTERNAL MEDICINE
Payer: COMMERCIAL

## 2017-09-19 DIAGNOSIS — Z12.31 VISIT FOR SCREENING MAMMOGRAM: ICD-10-CM

## 2017-09-19 PROCEDURE — 77067 SCR MAMMO BI INCL CAD: CPT

## 2017-09-27 ENCOUNTER — OFFICE VISIT (OUTPATIENT)
Dept: INTERNAL MEDICINE CLINIC | Age: 62
End: 2017-09-27

## 2017-09-27 VITALS
HEART RATE: 84 BPM | HEIGHT: 63 IN | RESPIRATION RATE: 24 BRPM | OXYGEN SATURATION: 95 % | SYSTOLIC BLOOD PRESSURE: 137 MMHG | TEMPERATURE: 98.6 F | BODY MASS INDEX: 30.3 KG/M2 | WEIGHT: 171 LBS | DIASTOLIC BLOOD PRESSURE: 72 MMHG

## 2017-09-27 DIAGNOSIS — Z12.4 SCREENING FOR CERVICAL CANCER: ICD-10-CM

## 2017-09-27 DIAGNOSIS — N89.8 VAGINAL ITCHING: ICD-10-CM

## 2017-09-27 DIAGNOSIS — Z01.419 WELL WOMAN EXAM WITH ROUTINE GYNECOLOGICAL EXAM: Primary | ICD-10-CM

## 2017-09-27 DIAGNOSIS — Z23 ENCOUNTER FOR IMMUNIZATION: ICD-10-CM

## 2017-09-27 NOTE — PROGRESS NOTES
REED Hwang is a 58 y.o. female, she presents today for:    Well woman: history of full abdominal hysterectomy In 1997. No drainage, mild vaginal/labial itching    PMH/PSH: reviewed and updated  Sochx:  reports that she quit smoking about 19 years ago. Her smoking use included Cigarettes. She has a 20.00 pack-year smoking history. She has never used smokeless tobacco. She reports that she does not drink alcohol or use illicit drugs. Famhx: reviewed and updated     All: Allergies   Allergen Reactions    Dilaudid [Hydromorphone] Other (comments)    Levaquin [Levofloxacin] Other (comments)    Lisinopril Other (comments)    Metoprolol Other (comments)     hallucinations    Peanut Other (comments)     Med:   Current Outpatient Prescriptions   Medication Sig    amLODIPine (NORVASC) 5 mg tablet Take 1 Tab by mouth daily.  losartan (COZAAR) 100 mg tablet Take 1 Tab by mouth daily.  aspirin 81 mg chewable tablet Take 1 Tab by mouth daily.  vitamin E topical cream Apply  to affected area daily. Patient applies to face    IMMUNE GLOB,ROXANNE CAPRYLATE,IGG, (GAMUNEX IV) 160 g by IntraVENous route every twenty-eight (28) days. Patient receives 160g Gamunex infusion monthly, administered over two consecutive days, at 80g each day.  predniSONE (DELTASONE) 5 mg tablet TAKE THREE TABLETS BY MOUTH EVERY DAY    fluticasone (FLONASE) 50 mcg/actuation nasal spray INHALE 1-2 SPRAYS IN EACH NOSTRIL AT BEDTIME    famotidine (PEPCID) 20 mg tablet Take 20 mg by mouth two (2) times a day.  ondansetron (ZOFRAN ODT) 4 mg disintegrating tablet Take 1 Tab by mouth every eight (8) hours as needed for Nausea.  mycophenolate mofetil (CELLCEPT) 200 mg/mL suspension Take 6 mL by mouth two (2) times a day. No current facility-administered medications for this visit. Review of Systems   Constitutional: Negative for chills, fever and malaise/fatigue. Respiratory: Negative for shortness of breath. Cardiovascular: Negative for chest pain. PE:  Blood pressure 137/72, pulse 84, temperature 98.6 °F (37 °C), temperature source Oral, resp. rate 24, height 5' 3\" (1.6 m), weight 171 lb (77.6 kg), SpO2 95 %. Body mass index is 30.29 kg/(m^2). Physical Exam   Constitutional: She is oriented to person, place, and time. She appears well-developed and well-nourished. No distress. HENT:   Head: Normocephalic. Mouth/Throat: Oropharynx is clear and moist.   Eyes: Conjunctivae and EOM are normal.   Neck: Neck supple. Cardiovascular: Normal rate, regular rhythm and normal heart sounds. Pulmonary/Chest: Effort normal and breath sounds normal.   Neurological: She is alert and oriented to person, place, and time. Skin: Skin is warm and dry. Nursing note and vitals reviewed. Breasts-symmetric without dimpling/retraction/palpabable masses. No axillary adenopathy or nipple discharge. No tenderness noted. Instructed in BSE. -Naman 5 with normal external labia minora and majora. Urethra normal without lesions. Vaginal mucosa wnl. No visible cervix, swab taken from area that appears to be suture line from hysterectomy    Labs:   No results found for any visits on 09/27/17. A/P:  58 y.o. female    ICD-10-CM ICD-9-CM    1. Well woman exam with routine gynecological exam Z01.419 V72.31    2. Encounter for immunization Z23 V03.89 INFLUENZA VIRUS VAC QUAD,SPLIT,PRESV FREE SYRINGE IM      PNEUMOCOCCAL POLYSACCHARIDE VACCINE, 23-VALENT, ADULT OR IMMUNOSUPPRESSED PT DOSE,   3. Vaginal itching L29.8 698.1 NUSWAB VAGINITIS   4. Screening for cervical cancer Z12.4 V76.2 PAP IG, APTIMA HPV AND RFX 16/18,45 (290100)     Well woman (non-gyn) exam: history and exam revealed issues as noted below. Cancer screening: mammo up to date.    Colonoscopy oct 2016  Pap obtained today, no visible cervix on exam. No further paps indicated as long as result normal  Vaccine status: flu and pneumonia vaccine today  Cardiovascular risk: BP well controlled, lipid screen   Bone health: daily vit D supplement wnl. Diet and Exercise: not drinking sugary beverages. Vaginal itching, nuswab obtained    - She was given AVS and expressed understanding with the diagnosis and plan as discussed. Follow-up Disposition:  Return in about 6 months (around 3/27/2018) for general follow-up BP, weight. Plan for hep c screen with next labs, patient agrees.

## 2017-09-27 NOTE — MR AVS SNAPSHOT
Visit Information Date & Time Provider Department Dept. Phone Encounter #  
 9/27/2017 10:45 AM Carson Banks MD [address] [facility] and Internal Medicine [phone] [id number] Follow-up Instructions Return in about 6 months (around 3/27/2018) for general follow-up BP, weight. Upcoming Health Maintenance Date Due Hepatitis C Screening 1955 COLONOSCOPY 9/7/1973 Pneumococcal 19-64 Highest Risk (1 of 3 - PCV13) 9/7/1974 PAP AKA CERVICAL CYTOLOGY 9/7/1976 BREAST CANCER SCRN MAMMOGRAM 9/19/2019 DTaP/Tdap/Td series (2 - Td) 7/6/2027 Allergies as of 9/27/2017  Review Complete On: 9/27/2017 By: Jana Condon LPN Severity Noted Reaction Type Reactions Dilaudid [Hydromorphone]  08/10/2017    Other (comments) Levaquin [Levofloxacin]  05/09/2017    Other (comments) Lisinopril  05/02/2016    Other (comments) Metoprolol  07/28/2016    Other (comments)  
 hallucinations Peanut  05/02/2016    Other (comments) Current Immunizations  Reviewed on 5/2/2016 Name Date Influenza Vaccine 11/3/2016, 10/15/2015 Influenza Vaccine (Quad) PF  Incomplete Pneumococcal Polysaccharide (PPSV-23)  Incomplete TB Skin Test (PPD) 1/1/2012 Tdap 7/6/2017 11:14 AM  
  
 Not reviewed this visit You Were Diagnosed With   
  
 Codes Comments Well woman exam with routine gynecological exam    -  Primary ICD-10-CM: X70.414 ICD-9-CM: V72.31 Encounter for immunization     ICD-10-CM: T56 ICD-9-CM: V03.89 Vaginal itching     ICD-10-CM: L29.8 ICD-9-CM: 698.1 Screening for cervical cancer     ICD-10-CM: Z12.4 ICD-9-CM: V76.2 Vitals BP Pulse Temp Resp Height(growth percentile) Weight(growth percentile)  
 137/72 (BP 1 Location: Right arm, BP Patient Position: Sitting) 84 98.6 °F (37 °C) (Oral) 24 5' 3\" (1.6 m) 171 lb (77.6 kg) LMP SpO2 BMI OB Status Smoking Status (Exact Date) 95% 30.29 kg/m2 Hysterectomy Former Smoker BMI and BSA Data Body Mass Index Body Surface Area  
 30.29 kg/m 2 1.86 m 2 Preferred Pharmacy Pharmacy Name Phone [facility]/PHARMACY #[id number] Benitez [phone] Your Updated Medication List  
  
   
This list is accurate as of: 9/27/17 11:37 AM.  Always use your most recent med list. amLODIPine 5 mg tablet Commonly known as:  Abhilashzabrina Eagles Take 1 Tab by mouth daily. aspirin 81 mg chewable tablet Take 1 Tab by mouth daily. fluticasone 50 mcg/actuation nasal spray Commonly known as:  Bhavani Gill INHALE 1-2 SPRAYS IN EACH NOSTRIL AT BEDTIME  
  
 GAMUNEX IV  
160 g by IntraVENous route every twenty-eight (28) days. Patient receives 160g Gamunex infusion monthly, administered over two consecutive days, at 80g each day. losartan 100 mg tablet Commonly known as:  COZAAR Take 1 Tab by mouth daily. mycophenolate mofetil 200 mg/mL suspension Commonly known as:  CELLCEPT Take 6 mL by mouth two (2) times a day. ondansetron 4 mg disintegrating tablet Commonly known as:  ZOFRAN ODT Take 1 Tab by mouth every eight (8) hours as needed for Nausea. PEPCID 20 mg tablet Generic drug:  famotidine Take 20 mg by mouth two (2) times a day. predniSONE 5 mg tablet Commonly known as:  DELTASONE  
TAKE THREE TABLETS BY MOUTH EVERY DAY  
  
 vitamin E topical cream  
Apply  to affected area daily. Patient applies to face We Performed the Following INFLUENZA VIRUS VAC QUAD,SPLIT,PRESV FREE SYRINGE IM [id number] CPT(R)] NUSWAB VAGINITIS [[id number] Custom] PAP IG, APTIMA HPV AND RFX 16/18,45 (860361) [[id number] Custom] PNEUMOCOCCAL POLYSACCHARIDE VACCINE, 23-VALENT, ADULT OR IMMUNOSUPPRESSED PT DOSE, [48830 CPT(R)] Follow-up Instructions Return in about 6 months (around 3/27/2018) for general follow-up BP, weight. Patient Instructions Well Visit, Women 48 to 72: Care Instructions Your Care Instructions Physical exams can help you stay healthy. Your doctor has checked your overall health and may have suggested ways to take good care of yourself. He or she also may have recommended tests. At home, you can help prevent illness with healthy eating, regular exercise, and other steps. Follow-up care is a key part of your treatment and safety. Be sure to make and go to all appointments, and call your doctor if you are having problems. It's also a good idea to know your test results and keep a list of the medicines you take. How can you care for yourself at home? · Reach and stay at a healthy weight. This will lower your risk for many problems, such as obesity, diabetes, heart disease, and high blood pressure. · Get at least 30 minutes of exercise on most days of the week. Walking is a good choice. You also may want to do other activities, such as running, swimming, cycling, or playing tennis or team sports. · Do not smoke. Smoking can make health problems worse. If you need help quitting, talk to your doctor about stop-smoking programs and medicines. These can increase your chances of quitting for good. · Protect your skin from too much sun. When you're outdoors from 10 a.m. to 4 p.m., stay in the shade or cover up with clothing and a hat with a wide brim. Wear sunglasses that block UV rays. Even when it's cloudy, put broad-spectrum sunscreen (SPF 30 or higher) on any exposed skin. · See a dentist one or two times a year for checkups and to have your teeth cleaned. · Wear a seat belt in the car. · Limit alcohol to 1 drink a day. Too much alcohol can cause health problems. Follow your doctor's advice about when to have certain tests. These tests can spot problems early. · Cholesterol. Your doctor will tell you how often to have this done based on your age, family history, or other things that can increase your risk for heart attack and stroke. · Blood pressure. Have your blood pressure checked during a routine doctor visit. Your doctor will tell you how often to check your blood pressure based on your age, your blood pressure results, and other factors. · Mammogram. Ask your doctor how often you should have a mammogram, which is an X-ray of your breasts. A mammogram can spot breast cancer before it can be felt and when it is easiest to treat. · Pap test and pelvic exam. Ask your doctor how often you should have a Pap test. You may not need to have a Pap test as often as you used to. · Vision. Have your eyes checked every year or two or as often as your doctor suggests. Some experts recommend that you have yearly exams for glaucoma and other age-related eye problems starting at age 48. · Hearing. Tell your doctor if you notice any change in your hearing. You can have tests to find out how well you hear. · Diabetes. Ask your doctor whether you should have tests for diabetes. · Colon cancer. You should begin tests for colon cancer at age 48. You may have one of several tests. Your doctor will tell you how often to have tests based on your age and risk. Risks include whether you already had a precancerous polyp removed from your colon or whether your parents, sisters and brothers, or children have had colon cancer. · Thyroid disease. Talk to your doctor about whether to have your thyroid checked as part of a regular physical exam. Women have an increased chance of a thyroid problem. · Osteoporosis. You should begin tests for bone density at age 72. If you are younger than 72, ask your doctor whether you have factors that may increase your risk for this disease. You may want to have this test before age 72. · Heart attack and stroke risk. At least every 4 to 6 years, you should have your risk for heart attack and stroke assessed. Your doctor uses factors such as your age, blood pressure, cholesterol, and whether you smoke or have diabetes to show what your risk for a heart attack or stroke is over the next 10 years. When should you call for help? Watch closely for changes in your health, and be sure to contact your doctor if you have any problems or symptoms that concern you. Where can you learn more? Go to http://brijesh-marichuy.info/. Enter Y332 in the search box to learn more about \"Well Visit, Women 50 to 72: Care Instructions. \" Current as of: July 19, 2016 Content Version: 11.3 © 3682-0432 [employer]. Care instructions adapted under license by [employer] (which disclaims liability or warranty for this information). If you have questions about a medical condition or this instruction, always ask your healthcare professional. [place] [address] any warranty or liability for your use of this information. Introducing [facility]! Dear Radha Kaye: Thank you for requesting a [employer] account. Our records indicate that you already have an active [employer] account. You can access your account anytime at [email or web address] Did you know that you can access your hospital and ER discharge instructions at any time in [employer]? You can also review all of your test results from your hospital stay or ER visit. Additional Information If you have questions, please visit the Frequently Asked Questions section of the [employer] website at [email or web address] Remember, [employer] is NOT to be used for urgent needs. For medical emergencies, dial 911. Now available from your iPhone and Android! Please provide this summary of care documentation to your next provider. Your primary care clinician is listed as Elida Cisneros. If you have any questions after today's visit, please call [phone].

## 2017-09-27 NOTE — PROGRESS NOTES
Rm#3  Wants results from mammo  Needs you to state that there are no changes to form to [facility]. Chief Complaint   Patient presents with    Well Woman     w/ PAP      1. Have you been to the ER, urgent care clinic since your last visit? Hospitalized since your last visit? No    2. Have you seen or consulted any other health care providers outside of the [address] [place] [address] since your last visit? Include any pap smears or colon screening.  No  Health Maintenance Due   Topic Date Due    Hepatitis C Screening  1955    Pneumococcal 19-64 Highest Risk (1 of 3 - PCV13) 09/07/1974    PAP AKA CERVICAL CYTOLOGY  09/07/1976    FOBT Q 1 YEAR AGE 50-75  09/07/2005     Flu vaccine today  Hm reviewed

## 2017-09-27 NOTE — PATIENT INSTRUCTIONS
Well Visit, Women 48 to 72: Care Instructions  Your Care Instructions  Physical exams can help you stay healthy. Your doctor has checked your overall health and may have suggested ways to take good care of yourself. He or she also may have recommended tests. At home, you can help prevent illness with healthy eating, regular exercise, and other steps. Follow-up care is a key part of your treatment and safety. Be sure to make and go to all appointments, and call your doctor if you are having problems. It's also a good idea to know your test results and keep a list of the medicines you take. How can you care for yourself at home? · Reach and stay at a healthy weight. This will lower your risk for many problems, such as obesity, diabetes, heart disease, and high blood pressure. · Get at least 30 minutes of exercise on most days of the week. Walking is a good choice. You also may want to do other activities, such as running, swimming, cycling, or playing tennis or team sports. · Do not smoke. Smoking can make health problems worse. If you need help quitting, talk to your doctor about stop-smoking programs and medicines. These can increase your chances of quitting for good. · Protect your skin from too much sun. When you're outdoors from 10 a.m. to 4 p.m., stay in the shade or cover up with clothing and a hat with a wide brim. Wear sunglasses that block UV rays. Even when it's cloudy, put broad-spectrum sunscreen (SPF 30 or higher) on any exposed skin. · See a dentist one or two times a year for checkups and to have your teeth cleaned. · Wear a seat belt in the car. · Limit alcohol to 1 drink a day. Too much alcohol can cause health problems. Follow your doctor's advice about when to have certain tests. These tests can spot problems early. · Cholesterol.  Your doctor will tell you how often to have this done based on your age, family history, or other things that can increase your risk for heart attack and stroke. · Blood pressure. Have your blood pressure checked during a routine doctor visit. Your doctor will tell you how often to check your blood pressure based on your age, your blood pressure results, and other factors. · Mammogram. Ask your doctor how often you should have a mammogram, which is an X-ray of your breasts. A mammogram can spot breast cancer before it can be felt and when it is easiest to treat. · Pap test and pelvic exam. Ask your doctor how often you should have a Pap test. You may not need to have a Pap test as often as you used to. · Vision. Have your eyes checked every year or two or as often as your doctor suggests. Some experts recommend that you have yearly exams for glaucoma and other age-related eye problems starting at age 48. · Hearing. Tell your doctor if you notice any change in your hearing. You can have tests to find out how well you hear. · Diabetes. Ask your doctor whether you should have tests for diabetes. · Colon cancer. You should begin tests for colon cancer at age 48. You may have one of several tests. Your doctor will tell you how often to have tests based on your age and risk. Risks include whether you already had a precancerous polyp removed from your colon or whether your parents, sisters and brothers, or children have had colon cancer. · Thyroid disease. Talk to your doctor about whether to have your thyroid checked as part of a regular physical exam. Women have an increased chance of a thyroid problem. · Osteoporosis. You should begin tests for bone density at age 72. If you are younger than 72, ask your doctor whether you have factors that may increase your risk for this disease. You may want to have this test before age 72. · Heart attack and stroke risk. At least every 4 to 6 years, you should have your risk for heart attack and stroke assessed.  Your doctor uses factors such as your age, blood pressure, cholesterol, and whether you smoke or have diabetes to show what your risk for a heart attack or stroke is over the next 10 years. When should you call for help? Watch closely for changes in your health, and be sure to contact your doctor if you have any problems or symptoms that concern you. Where can you learn more? Go to http://brijesh-marichuy.info/. Enter X380 in the search box to learn more about \"Well Visit, Women 50 to 72: Care Instructions. \"  Current as of: July 19, 2016  Content Version: 11.3  © 4740-2943 [employer]. Care instructions adapted under license by [employer] (which disclaims liability or warranty for this information). If you have questions about a medical condition or this instruction, always ask your healthcare professional. [address] any warranty or liability for your use of this information.

## 2017-10-02 LAB
A VAGINAE DNA VAG QL NAA+PROBE: NORMAL SCORE
BVAB2 DNA VAG QL NAA+PROBE: NORMAL SCORE
C ALBICANS DNA VAG QL NAA+PROBE: NEGATIVE
C GLABRATA DNA VAG QL NAA+PROBE: NEGATIVE
MEGA1 DNA VAG QL NAA+PROBE: NORMAL SCORE
T VAGINALIS RRNA SPEC QL NAA+PROBE: NEGATIVE

## 2017-11-30 ENCOUNTER — OFFICE VISIT (OUTPATIENT)
Dept: INTERNAL MEDICINE CLINIC | Age: 62
End: 2017-11-30

## 2017-11-30 VITALS
HEART RATE: 72 BPM | OXYGEN SATURATION: 97 % | WEIGHT: 176 LBS | HEIGHT: 63 IN | BODY MASS INDEX: 31.18 KG/M2 | RESPIRATION RATE: 20 BRPM | TEMPERATURE: 98.9 F | SYSTOLIC BLOOD PRESSURE: 154 MMHG | DIASTOLIC BLOOD PRESSURE: 67 MMHG

## 2017-11-30 DIAGNOSIS — J31.0 CHRONIC RHINITIS, UNSPECIFIED TYPE: ICD-10-CM

## 2017-11-30 DIAGNOSIS — Z78.0 POSTMENOPAUSAL: ICD-10-CM

## 2017-11-30 DIAGNOSIS — M16.0 BILATERAL HIP JOINT ARTHRITIS: Primary | ICD-10-CM

## 2017-11-30 RX ORDER — FOLIC ACID 1 MG/1
TABLET ORAL DAILY
COMMUNITY
End: 2018-10-16

## 2017-11-30 RX ORDER — FLUTICASONE PROPIONATE 50 MCG
2 SPRAY, SUSPENSION (ML) NASAL DAILY
Qty: 1 BOTTLE | Refills: 12 | Status: SHIPPED | OUTPATIENT
Start: 2017-11-30 | End: 2018-05-01 | Stop reason: SDUPTHER

## 2017-11-30 NOTE — PROGRESS NOTES
Rm#2  Chief Complaint   Patient presents with    Leg Pain     only in hips, constant, worse when walking level 8 out of 10      1. Have you been to the ER, urgent care clinic since your last visit? Hospitalized since your last visit? No    2. Have you seen or consulted any other health care providers outside of the [address] [place] [address] since your last visit? Include any pap smears or colon screening. Yes Dr. Praveena Guaman rheum.  11-2-17  Health Maintenance Due   Topic Date Due    Hepatitis C Screening  1955    COLONOSCOPY  09/07/1973    PAP AKA CERVICAL CYTOLOGY  09/07/1976

## 2017-11-30 NOTE — PROGRESS NOTES
REED Duncan is a 58 y.o. female, she presents today for:    Right groin pain, ongoing for several years intermittantly. Started around year 2-009. Now with bilateral groin pian. Occurs at walking and at rest.     Is wondering if a bone denisty test to see if osteopenia may be related. Concerned about strength of hip. PMH/PSH: reviewed and updated  Sochx:  reports that she quit smoking about 19 years ago. Her smoking use included Cigarettes. She has a 20.00 pack-year smoking history. She has never used smokeless tobacco. She reports that she drinks about 0.6 oz of alcohol per week  She reports that she does not use illicit drugs. Famhx: reviewed and updated     All: Allergies   Allergen Reactions    Dilaudid [Hydromorphone] Other (comments)    Levaquin [Levofloxacin] Other (comments)    Lisinopril Other (comments)    Metoprolol Other (comments)     hallucinations    Peanut Other (comments)     Med:   Current Outpatient Prescriptions   Medication Sig    folic acid (FOLVITE) 1 mg tablet Take  by mouth daily.  losartan (COZAAR) 100 mg tablet Take 1 Tab by mouth daily.  vitamin E topical cream Apply  to affected area daily. Patient applies to face    IMMUNE GLOB,ROXANNE CAPRYLATE,IGG, (GAMUNEX IV) 160 g by IntraVENous route every twenty-eight (28) days. Patient receives 160g Gamunex infusion monthly, administered over two consecutive days, at 80g each day.  predniSONE (DELTASONE) 5 mg tablet TAKE THREE TABLETS BY MOUTH EVERY DAY    fluticasone (FLONASE) 50 mcg/actuation nasal spray INHALE 1-2 SPRAYS IN EACH NOSTRIL AT BEDTIME    famotidine (PEPCID) 20 mg tablet Take 20 mg by mouth two (2) times a day.  amLODIPine (NORVASC) 5 mg tablet Take 1 Tab by mouth daily.  aspirin 81 mg chewable tablet Take 1 Tab by mouth daily.  ondansetron (ZOFRAN ODT) 4 mg disintegrating tablet Take 1 Tab by mouth every eight (8) hours as needed for Nausea.     mycophenolate mofetil (CELLCEPT) 200 mg/mL suspension Take 6 mL by mouth two (2) times a day. No current facility-administered medications for this visit. Review of Systems   Constitutional: Negative for chills, fever and malaise/fatigue. Respiratory: Negative for shortness of breath. Cardiovascular: Negative for chest pain. PE:  Blood pressure 154/67, pulse 72, temperature 98.9 °F (37.2 °C), temperature source Oral, resp. rate 20, height 5' 3\" (1.6 m), weight 176 lb (79.8 kg), SpO2 97 %. Body mass index is 31.18 kg/(m^2). Physical Exam   Constitutional: She is oriented to person, place, and time. She appears well-developed and well-nourished. No distress. HENT:   Head: Normocephalic. Mouth/Throat: Oropharynx is clear and moist.   Eyes: Conjunctivae and EOM are normal.   Neck: Neck supple. Cardiovascular: Normal rate, regular rhythm and normal heart sounds. Pulmonary/Chest: Effort normal and breath sounds normal.   Neurological: She is alert and oriented to person, place, and time. Skin: Skin is warm and dry. Psychiatric:   Flat affect   Nursing note and vitals reviewed. Labs:   No results found for any visits on 11/30/17. A/P:  58 y.o. female    ICD-10-CM ICD-9-CM    1. Bilateral hip joint arthritis M16.0 716.95    2. Postmenopausal Z78.0 V49.81 DEXA BONE DENSITY STUDY AXIAL   3. Chronic rhinitis, unspecified type J31.0 472.0 fluticasone (FLONASE) 50 mcg/actuation nasal spray     Hip arthritis: left hip arthritis as well as lumbar spine arthritis noted incidentally on august 2017 abdominal x-rays. Discussed that osteoporosis is not directly related to osteoarthritis. At this time will continue with stretching and movement exercises. Would be reasonable to discuss arthritis findings with ortho if pain progressive. Post-menopausal: will screen for osteoarthritis given risk factors including: postmenopausal, chronic steroid use. Dexa ordered. Discussed with patient.      Chronic rhinitis: nasal steroid discussed and ordered. - She was given AVS and expressed understanding with the diagnosis and plan as discussed. Follow-up Disposition:  Return in about 4 months (around 3/30/2018) for follow-up weight, BP.   Future Appointments  Date Time Provider Nel Davis   12/22/2017 11:30 AM [facility] DEXA 1 Gladys Cook 142 IM

## 2017-11-30 NOTE — MR AVS SNAPSHOT
Visit Information Date & Time Provider Department Dept. Phone Encounter #  
 11/30/2017  4:30 PM Zafar Sofia MD [address] [place] [phone] [id number] Follow-up Instructions Return in about 4 months (around 3/30/2018) for follow-up weight, BP. Upcoming Health Maintenance Date Due Hepatitis C Screening 1955 COLONOSCOPY 9/7/1973 PAP AKA CERVICAL CYTOLOGY 9/7/1976 Pneumococcal 19-64 Highest Risk (2 of 3 - PCV13) 9/27/2018 BREAST CANCER SCRN MAMMOGRAM 9/19/2019 DTaP/Tdap/Td series (2 - Td) 7/6/2027 Allergies as of 11/30/2017  Review Complete On: 11/30/2017 By: Jaimee Nair LPN Severity Noted Reaction Type Reactions Dilaudid [Hydromorphone]  08/10/2017    Other (comments) Levaquin [Levofloxacin]  05/09/2017    Other (comments) Lisinopril  05/02/2016    Other (comments) Metoprolol  07/28/2016    Other (comments)  
 hallucinations Peanut  05/02/2016    Other (comments) Current Immunizations  Reviewed on 10/2/2017 Name Date Influenza Vaccine 11/3/2016, 10/15/2015 Influenza Vaccine (Quad) PF 9/27/2017 11:45 AM  
 Pneumococcal Polysaccharide (PPSV-23) 9/27/2017 11:46 AM  
 TB Skin Test (PPD) 1/1/2012 Tdap 7/6/2017 11:14 AM  
  
 Not reviewed this visit You Were Diagnosed With   
  
 Codes Comments Bilateral hip joint arthritis    -  Primary ICD-10-CM: M16.0 ICD-9-CM: 716.95 Postmenopausal     ICD-10-CM: Z78.0 ICD-9-CM: V49.81 Vitals BP Pulse Temp Resp Height(growth percentile) Weight(growth percentile) 154/67 (BP 1 Location: Left arm, BP Patient Position: Sitting) 72 98.9 °F (37.2 °C) (Oral) 20 5' 3\" (1.6 m) 176 lb (79.8 kg) LMP SpO2 BMI OB Status Smoking Status (Exact Date) 97% 31.18 kg/m2 Hysterectomy Former Smoker BMI and BSA Data Body Mass Index Body Surface Area  
 31.18 kg/m 2 1.88 m 2 Preferred Pharmacy Pharmacy Name Phone [id number] [address] [phone] Your Updated Medication List  
  
   
This list is accurate as of: 11/30/17  5:22 PM.  Always use your most recent med list. amLODIPine 5 mg tablet Commonly known as:  Arrington Girma Take 1 Tab by mouth daily. aspirin 81 mg chewable tablet Take 1 Tab by mouth daily. fluticasone 50 mcg/actuation nasal spray Commonly known as:  Anyiteto Julienn INHALE 1-2 SPRAYS IN EACH NOSTRIL AT BEDTIME  
  
 folic acid 1 mg tablet Commonly known as:  Google Take  by mouth daily. GAMUNEX IV  
160 g by IntraVENous route every twenty-eight (28) days. Patient receives 160g Gamunex infusion monthly, administered over two consecutive days, at 80g each day. losartan 100 mg tablet Commonly known as:  COZAAR Take 1 Tab by mouth daily. mycophenolate mofetil 200 mg/mL suspension Commonly known as:  CELLCEPT Take 6 mL by mouth two (2) times a day. ondansetron 4 mg disintegrating tablet Commonly known as:  ZOFRAN ODT Take 1 Tab by mouth every eight (8) hours as needed for Nausea. PEPCID 20 mg tablet Generic drug:  famotidine Take 20 mg by mouth two (2) times a day. predniSONE 5 mg tablet Commonly known as:  DELTASONE  
TAKE THREE TABLETS BY MOUTH EVERY DAY  
  
 vitamin E topical cream  
Apply  to affected area daily. Patient applies to face Follow-up Instructions Return in about 4 months (around 3/30/2018) for follow-up weight, BP. To-Do List   
 Around 12/01/2017 Imaging:  DEXA BONE DENSITY STUDY AXIAL Patient Instructions Hip Arthritis: Care Instructions Your Care Instructions Arthritis, also called osteoarthritis, is a breakdown of the tissue (cartilage) that cushions your joints. Many people have some arthritis as they age.  When the cartilage in your hip joints wears down, your hip bone rubs against the hip socket. This causes pain and stiffness. Work with your doctor to find the right mix of treatments for your arthritis. There are things you can do at home to protect your hip joints, ease your pain, and help you stay active. But if your arthritis becomes so bad that you cannot walk, you may need surgery to replace the hip joint. Follow-up care is a key part of your treatment and safety. Be sure to make and go to all appointments, and call your doctor if you are having problems. It's also a good idea to know your test results and keep a list of the medicines you take. How can you care for yourself at home? · Stay at a healthy weight. Being overweight puts extra strain on your hip joints. · Talk to your doctor or physical therapist about exercises that will help ease hip pain. These tips may help. ¨ Stretch to help prevent stiffness and to prevent injury before you exercise. You may enjoy gentle forms of yoga to help keep your joints and muscles flexible. ¨ Walk instead of jog. Other types of exercise that are less stressful on the joints include riding a bike, swimming, and doing water exercise. ¨ Lift weights. Strong muscles help reduce stress on your joints. Stronger thigh muscles, for example, take some of the stress off of the knees and hips. Learn the right way to lift weights so you do not make joint pain worse. · Take pain medicines exactly as directed. ¨ If the doctor gave you a prescription medicine for pain, take it as prescribed. ¨ If you are not taking a prescription pain medicine, ask your doctor if you can take an over-the-counter medicine. · Use a cane, crutch, walker, or another device if you need help to get around. These can help rest your hips. You also can use other things to make life easier, such as a higher toilet seat. · Do not sit in low chairs, which can make it painful to get up. · Put heat or cold on your sore hips as needed.  Use whichever helps you most. You also can go back and forth between hot and cold packs. ¨ Apply heat 2 or 3 times a day for 20 to 30 minutes-using a heating pad, hot shower, or hot pack-to relieve pain and stiffness. ¨ Put ice or a cold pack on your sore hips for 10 to 20 minutes at a time to numb the area. Put a thin cloth between the ice and your skin. · Think about talking to your doctor about using capsaicin, a cream you apply to the skin for pain relief. When should you call for help? Call your doctor now or seek immediate medical care if: 
? · You have sudden swelling, warmth, or pain in any joint. ? · You have joint pain and a fever or rash. ? · You have such bad pain that you cannot use the joint. ? Watch closely for changes in your health, and be sure to contact your doctor if: 
? · You have mild joint symptoms that continue even with more than 6 weeks of care at home. ? · You do not get better as expected. ? · You have stomach pain or other problems with your medicine. Where can you learn more? Go to http://brijesh-marichuy.info/. Enter B985 in the search box to learn more about \"Hip Arthritis: Care Instructions. \" Current as of: October 31, 2016 Content Version: 11.4 © 0867-6240 [employer]. Care instructions adapted under license by [employer] (which disclaims liability or warranty for this information). If you have questions about a medical condition or this instruction, always ask your healthcare professional. [place] [address] any warranty or liability for your use of this information. Introducing [facility]! Dear Torri Valdez: Thank you for requesting a [employer] account. Our records indicate that you already have an active [employer] account. You can access your account anytime at [email or web address] Did you know that you can access your hospital and ER discharge instructions at any time in [employer]? You can also review all of your test results from your hospital stay or ER visit. Additional Information If you have questions, please visit the Frequently Asked Questions section of the [employer] website at [email or web address] Remember, [employer] is NOT to be used for urgent needs. For medical emergencies, dial 911. Now available from your iPhone and Android! Please provide this summary of care documentation to your next provider. Your primary care clinician is listed as Dimitri Witt. If you have any questions after today's visit, please call [phone].

## 2017-11-30 NOTE — PATIENT INSTRUCTIONS
Hip Arthritis: Care Instructions  Your Care Instructions    Arthritis, also called osteoarthritis, is a breakdown of the tissue (cartilage) that cushions your joints. Many people have some arthritis as they age. When the cartilage in your hip joints wears down, your hip bone rubs against the hip socket. This causes pain and stiffness. Work with your doctor to find the right mix of treatments for your arthritis. There are things you can do at home to protect your hip joints, ease your pain, and help you stay active. But if your arthritis becomes so bad that you cannot walk, you may need surgery to replace the hip joint. Follow-up care is a key part of your treatment and safety. Be sure to make and go to all appointments, and call your doctor if you are having problems. It's also a good idea to know your test results and keep a list of the medicines you take. How can you care for yourself at home? · Stay at a healthy weight. Being overweight puts extra strain on your hip joints. · Talk to your doctor or physical therapist about exercises that will help ease hip pain. These tips may help. ¨ Stretch to help prevent stiffness and to prevent injury before you exercise. You may enjoy gentle forms of yoga to help keep your joints and muscles flexible. ¨ Walk instead of jog. Other types of exercise that are less stressful on the joints include riding a bike, swimming, and doing water exercise. ¨ Lift weights. Strong muscles help reduce stress on your joints. Stronger thigh muscles, for example, take some of the stress off of the knees and hips. Learn the right way to lift weights so you do not make joint pain worse. · Take pain medicines exactly as directed. ¨ If the doctor gave you a prescription medicine for pain, take it as prescribed. ¨ If you are not taking a prescription pain medicine, ask your doctor if you can take an over-the-counter medicine.   · Use a cane, crutch, walker, or another device if you need help to get around. These can help rest your hips. You also can use other things to make life easier, such as a higher toilet seat. · Do not sit in low chairs, which can make it painful to get up. · Put heat or cold on your sore hips as needed. Use whichever helps you most. You also can go back and forth between hot and cold packs. ¨ Apply heat 2 or 3 times a day for 20 to 30 minutes-using a heating pad, hot shower, or hot pack-to relieve pain and stiffness. ¨ Put ice or a cold pack on your sore hips for 10 to 20 minutes at a time to numb the area. Put a thin cloth between the ice and your skin. · Think about talking to your doctor about using capsaicin, a cream you apply to the skin for pain relief. When should you call for help? Call your doctor now or seek immediate medical care if:  ? · You have sudden swelling, warmth, or pain in any joint. ? · You have joint pain and a fever or rash. ? · You have such bad pain that you cannot use the joint. ? Watch closely for changes in your health, and be sure to contact your doctor if:  ? · You have mild joint symptoms that continue even with more than 6 weeks of care at home. ? · You do not get better as expected. ? · You have stomach pain or other problems with your medicine. Where can you learn more? Go to http://brijesh-marichuy.info/. Enter G072 in the search box to learn more about \"Hip Arthritis: Care Instructions. \"  Current as of: October 31, 2016  Content Version: 11.4  © 5779-3839 [employer]. Care instructions adapted under license by [employer] (which disclaims liability or warranty for this information). If you have questions about a medical condition or this instruction, always ask your healthcare professional. [address] any warranty or liability for your use of this information.

## 2017-12-26 ENCOUNTER — HOSPITAL ENCOUNTER (OUTPATIENT)
Dept: BONE DENSITY | Age: 62
Discharge: HOME | End: 2017-12-26
Attending: INTERNAL MEDICINE
Payer: COMMERCIAL

## 2017-12-26 DIAGNOSIS — Z78.0 POSTMENOPAUSAL: ICD-10-CM

## 2017-12-26 PROCEDURE — 77080 DXA BONE DENSITY AXIAL: CPT

## 2018-05-01 NOTE — TELEPHONE ENCOUNTER
Medication refill request:    Last Office Visit:  11/30/17  Next Office Visit:  Future Appointments  Date Time Provider Nel Davis   5/3/2018 10:45 AM Gamaliel Martines MD [facility] [facility] verified. yes    Patient requesting 90 day supply.

## 2018-05-07 RX ORDER — FLUTICASONE PROPIONATE 50 MCG
2 SPRAY, SUSPENSION (ML) NASAL DAILY
Qty: 1 BOTTLE | Refills: 12 | Status: SHIPPED | OUTPATIENT
Start: 2018-05-07 | End: 2020-05-27

## 2018-05-08 ENCOUNTER — OFFICE VISIT (OUTPATIENT)
Dept: INTERNAL MEDICINE CLINIC | Age: 63
End: 2018-05-08

## 2018-05-08 VITALS
SYSTOLIC BLOOD PRESSURE: 156 MMHG | BODY MASS INDEX: 29.38 KG/M2 | HEIGHT: 63 IN | OXYGEN SATURATION: 97 % | TEMPERATURE: 98.8 F | HEART RATE: 70 BPM | RESPIRATION RATE: 17 BRPM | WEIGHT: 165.8 LBS | DIASTOLIC BLOOD PRESSURE: 76 MMHG

## 2018-05-08 DIAGNOSIS — M33.20 POLYMYOSITIS (HCC): Primary | ICD-10-CM

## 2018-05-08 DIAGNOSIS — R53.1 WEAKNESS GENERALIZED: ICD-10-CM

## 2018-05-08 DIAGNOSIS — Z99.89 OSA ON CPAP: ICD-10-CM

## 2018-05-08 DIAGNOSIS — D84.9 IMMUNOSUPPRESSED STATUS (HCC): ICD-10-CM

## 2018-05-08 DIAGNOSIS — R60.0 BILATERAL LEG EDEMA: ICD-10-CM

## 2018-05-08 DIAGNOSIS — G47.33 OSA ON CPAP: ICD-10-CM

## 2018-05-08 RX ORDER — METHOTREXATE 2.5 MG/1
TABLET ORAL
Refills: 2 | COMMUNITY
Start: 2018-03-26 | End: 2018-10-16

## 2018-05-08 NOTE — PATIENT INSTRUCTIONS

## 2018-05-08 NOTE — PROGRESS NOTES
Exam Room #1  Alice Means is a 58 y.o. female  Chief Complaint   Patient presents with    Ankle swelling     x6 weeks     1. Have you been to the ER, urgent care clinic since your last visit? Hospitalized since your last visit? No    2. Have you seen or consulted any other health care providers outside of the [address] [place] [address] since your last visit? Include any pap smears or colon screening. Yes, Rheumatologist at [facility].     Visit Vitals    /76 (BP 1 Location: Left arm, BP Patient Position: Sitting)    Pulse 70    Temp 98.8 °F (37.1 °C) (Oral)    Resp 17    Ht 5' 3\" (1.6 m)    Wt 165 lb 12.8 oz (75.2 kg)    SpO2 97%    BMI 29.37 kg/m2

## 2018-05-08 NOTE — MR AVS SNAPSHOT
[address] [place] [address] 
[phone] Patient: Álvaro Capone MRN: [id number] DFI:[id number] Visit Information Date & Time Provider Department Dept. Phone Encounter #  
 5/8/2018  4:15 PM Scott Zarco MD [address] [facility] [phone] [id number] Follow-up Instructions Return in about 2 days (around 5/10/2018) for lab only, then in 3 months for well woman visit. Your Appointments 5/10/2018  2:00 PM  
LAB with LAB [facility] ([facility]) Appt Note: KEMAL/ 's pt.  
 [address] [facility] [address] [facility] [address]  
[facility] [address] [facility] [address] Upcoming Health Maintenance Date Due Hepatitis C Screening 1955 COLONOSCOPY 9/7/1973 MEDICARE YEARLY EXAM 5/1/2018 Influenza Age 5 to Adult 8/1/2018 Pneumococcal 19-64 Highest Risk (2 of 3 - PCV13) 9/27/2018 BREAST CANCER SCRN MAMMOGRAM 9/19/2019 PAP AKA CERVICAL CYTOLOGY 10/24/2020 DTaP/Tdap/Td series (2 - Td) 7/6/2027 Allergies as of 5/8/2018  Review Complete On: 5/8/2018 By: Nacho Santos LPN Severity Noted Reaction Type Reactions Dilaudid [Hydromorphone]  08/10/2017    Other (comments) Levaquin [Levofloxacin]  05/09/2017    Other (comments) Lisinopril  05/02/2016    Other (comments) Metoprolol  07/28/2016    Other (comments)  
 hallucinations Peanut  05/02/2016    Other (comments) Current Immunizations  Reviewed on 10/2/2017 Name Date Influenza Vaccine 11/3/2016, 10/15/2015 Influenza Vaccine (Quad) PF 9/27/2017 11:45 AM  
 Pneumococcal Polysaccharide (PPSV-23) 9/27/2017 11:46 AM  
 TB Skin Test (PPD) 1/1/2012 Tdap 7/6/2017 11:14 AM  
  
 Not reviewed this visit You Were Diagnosed With   
  
 Codes Comments Polymyositis ([facility]    -  Primary ICD-10-CM: M33.20 ICD-9-CM: 710.4 Immunosuppressed status ([facility]     ICD-10-CM: D89.9 ICD-9-CM: 279.9 Bilateral leg edema     ICD-10-CM: R60.0 ICD-9-CM: 161. 3 Weakness generalized     ICD-10-CM: R53.1 ICD-9-CM: 780.79 JOHN on CPAP     ICD-10-CM: G47.33, Z99.89 ICD-9-CM: 327.23, V46.8 Vitals BP Pulse Temp Resp Height(growth percentile) Weight(growth percentile) 156/76 (BP 1 Location: Left arm, BP Patient Position: Sitting) 70 98.8 °F (37.1 °C) (Oral) 17 5' 3\" (1.6 m) 165 lb 12.8 oz (75.2 kg) SpO2 BMI OB Status Smoking Status 97% 29.37 kg/m2 Hysterectomy Former Smoker BMI and BSA Data Body Mass Index Body Surface Area  
 29.37 kg/m 2 1.83 m 2 Preferred Pharmacy Pharmacy Name Phone [id number] [address] [address] [phone] Your Updated Medication List  
  
   
This list is accurate as of 5/8/18  5:54 PM.  Always use your most recent med list. amLODIPine 5 mg tablet Commonly known as:  Aletha Leong Take 1 Tab by mouth daily. aspirin 81 mg chewable tablet Take 1 Tab by mouth daily. fluticasone 50 mcg/actuation nasal spray Commonly known as:  Debi Hernández 2 Sprays by Both Nostrils route daily. folic acid 1 mg tablet Commonly known as:  Google Take  by mouth daily. GAMUNEX IV  
160 g by IntraVENous route every twenty-eight (28) days. Patient receives 160g Gamunex infusion monthly, administered over two consecutive days, at 80g each day. losartan 100 mg tablet Commonly known as:  COZAAR Take 1 Tab by mouth daily. methotrexate 2.5 mg tablet Commonly known as:  RHEUMATREX  
TAKE 5 TABLETS BY MOUTH EVERY 7 DAYS  
  
 mycophenolate mofetil 200 mg/mL suspension Commonly known as:  CELLCEPT Take 6 mL by mouth two (2) times a day. ondansetron 4 mg disintegrating tablet Commonly known as:  ZOFRAN ODT Take 1 Tab by mouth every eight (8) hours as needed for Nausea. PEPCID 20 mg tablet Generic drug:  famotidine Take 20 mg by mouth two (2) times a day. predniSONE 5 mg tablet Commonly known as:  DELTASONE  
TAKE THREE TABLETS BY MOUTH EVERY DAY  
  
 vitamin E topical cream  
Apply  to affected area daily. Patient applies to face We Performed the Following AMB SUPPLY ORDER [[id number] Custom] Comments:  
 Please supply with seat lift chair with reclining capabilities. Indication: weakness secondary to polymyositis and obstructive sleep apnea and lower extremity edema. REFERRAL TO PHYSICAL THERAPY [PRB31 Custom] Follow-up Instructions Return in about 2 days (around 5/10/2018) for lab only, then in 3 months for well woman visit. To-Do List   
 05/08/2018 Lab:  METABOLIC PANEL, BASIC Referral Information Referral ID Referred By Referred To  
  
 [id number] Kapil Cade Not Available Visits Status Start Date End Date 1 New Request 5/8/18 5/8/19 If your referral has a status of pending review or denied, additional information will be sent to support the outcome of this decision. Patient Instructions Low Sodium Diet (2,000 Milligram): Care Instructions Your Care Instructions Too much sodium causes your body to hold on to extra water. This can raise your blood pressure and force your heart and kidneys to work harder. In very serious cases, this could cause you to be put in the hospital. It might even be life-threatening. By limiting sodium, you will feel better and lower your risk of serious problems. The most common source of sodium is salt. People get most of the salt in their diet from canned, prepared, and packaged foods. Fast food and restaurant meals also are very high in sodium. Your doctor will probably limit your sodium to less than 2,000 milligrams (mg) a day.  This limit counts all the sodium in prepared and packaged foods and any salt you add to your food. Follow-up care is a key part of your treatment and safety. Be sure to make and go to all appointments, and call your doctor if you are having problems. It's also a good idea to know your test results and keep a list of the medicines you take. How can you care for yourself at home? Read food labels · Read labels on cans and food packages. The labels tell you how much sodium is in each serving. Make sure that you look at the serving size. If you eat more than the serving size, you have eaten more sodium. · Food labels also tell you the Percent Daily Value for sodium. Choose products with low Percent Daily Values for sodium. · Be aware that sodium can come in forms other than salt, including monosodium glutamate (MSG), sodium citrate, and sodium bicarbonate (baking soda). MSG is often added to Asian food. When you eat out, you can sometimes ask for food without MSG or added salt. Buy low-sodium foods · Buy foods that are labeled \"unsalted\" (no salt added), \"sodium-free\" (less than 5 mg of sodium per serving), or \"low-sodium\" (less than 140 mg of sodium per serving). Foods labeled \"reduced-sodium\" and \"light sodium\" may still have too much sodium. Be sure to read the label to see how much sodium you are getting. · Buy fresh vegetables, or frozen vegetables without added sauces. Buy low-sodium versions of canned vegetables, soups, and other canned goods. Prepare low-sodium meals · Cut back on the amount of salt you use in cooking. This will help you adjust to the taste. Do not add salt after cooking. One teaspoon of salt has about 2,300 mg of sodium. · Take the salt shaker off the table. · Flavor your food with garlic, lemon juice, onion, vinegar, herbs, and spices. Do not use soy sauce, lite soy sauce, steak sauce, onion salt, garlic salt, celery salt, mustard, or ketchup on your food. · Use low-sodium salad dressings, sauces, and ketchup. Or make your own salad dressings and sauces without adding salt. · Use less salt (or none) when recipes call for it. You can often use half the salt a recipe calls for without losing flavor. Other foods such as rice, pasta, and grains do not need added salt. · Rinse canned vegetables, and cook them in fresh water. This removes some-but not all-of the salt. · Avoid water that is naturally high in sodium or that has been treated with water softeners, which add sodium. Call your local water company to find out the sodium content of your water supply. If you buy bottled water, read the label and choose a sodium-free brand. Avoid high-sodium foods · Avoid eating: ¨ Smoked, cured, salted, and canned meat, fish, and poultry. ¨ Ham, fajardo, hot dogs, and luncheon meats. ¨ Regular, hard, and processed cheese and regular peanut butter. ¨ Crackers with salted tops, and other salted snack foods such as pretzels, chips, and salted popcorn. ¨ Frozen prepared meals, unless labeled low-sodium. ¨ Canned and dried soups, broths, and bouillon, unless labeled sodium-free or low-sodium. ¨ Canned vegetables, unless labeled sodium-free or low-sodium. ¨ Western Ayesha fries, pizza, tacos, and other fast foods. ¨ Pickles, olives, ketchup, and other condiments, especially soy sauce, unless labeled sodium-free or low-sodium. Where can you learn more? Go to http://brijesh-marichuy.info/. Enter P322 in the search box to learn more about \"Low Sodium Diet (2,000 Milligram): Care Instructions. \" Current as of: May 12, 2017 Content Version: 11.4 © 7728-2852 [employer]. Care instructions adapted under license by [employer] (which disclaims liability or warranty for this information).  If you have questions about a medical condition or this instruction, always ask your healthcare professional. [employer] disclaims any warranty or liability for your use of this information. Introducing [facility]! Dear Tremayne Shay: Thank you for requesting a GigOwl account. Our records indicate that you already have an active GigOwl account. You can access your account anytime at [email or web address] Did you know that you can access your hospital and ER discharge instructions at any time in GigOwl? You can also review all of your test results from your hospital stay or ER visit. Additional Information If you have questions, please visit the Frequently Asked Questions section of the GigOwl website at [email or web address] Remember, GigOwl is NOT to be used for urgent needs. For medical emergencies, dial 911. Now available from your iPhone and Android! Please provide this summary of care documentation to your next provider. Your primary care clinician is listed as Tamiko Vergara. If you have any questions after today's visit, please call [phone].

## 2018-05-08 NOTE — PROGRESS NOTES
REED   Lexus Shanks is a 58 y.o. female, she presents today for:     Feels that she had increasing weakness in December. Completed IVIG and did not feel there was a lot of help. Also seen by [facility] and started on supplements. Currently on methotrexate and increased prednisone which seems to be helping. CPK was elevated prior to starting this therapy at 1200mg. Left ear with heartbeat sound starting middle of march. Was sleeping in a wheelchair, historically sleeping in recliner because of JOHN. On CPAP machine but still has to sleep sitting up. Wears band to keep mouth closed. Would like recliner with lift and automated recliner. Completed physical therapy and found that she can get out of chair that is 19 inches high (normal chair is 14 inches high). Plays the harp. Last physical therapy evaluation was     Has had difficulty driving during this episode. Gets help with     PMH/PSH: reviewed and updated  Sochx:  reports that she quit smoking about 20 years ago. Her smoking use included Cigarettes. She has a 20.00 pack-year smoking history. She has never used smokeless tobacco. She reports that she drinks about 0.6 oz of alcohol per week  She reports that she does not use illicit drugs. Famhx: reviewed and updated     All: Allergies   Allergen Reactions    Dilaudid [Hydromorphone] Other (comments)    Levaquin [Levofloxacin] Other (comments)    Lisinopril Other (comments)    Metoprolol Other (comments)     hallucinations    Peanut Other (comments)     Med:   Current Outpatient Prescriptions   Medication Sig    methotrexate (RHEUMATREX) 2.5 mg tablet TAKE 5 TABLETS BY MOUTH EVERY 7 DAYS    fluticasone (FLONASE) 50 mcg/actuation nasal spray 2 Sprays by Both Nostrils route daily.  folic acid (FOLVITE) 1 mg tablet Take  by mouth daily.  losartan (COZAAR) 100 mg tablet Take 1 Tab by mouth daily.  aspirin 81 mg chewable tablet Take 1 Tab by mouth daily.     predniSONE (DELTASONE) 5 mg tablet TAKE THREE TABLETS BY MOUTH EVERY DAY    famotidine (PEPCID) 20 mg tablet Take 20 mg by mouth two (2) times a day.  amLODIPine (NORVASC) 5 mg tablet Take 1 Tab by mouth daily.  vitamin E topical cream Apply  to affected area daily. Patient applies to face    IMMUNE GLOB,ROXANNE CAPRYLATE,IGG, (GAMUNEX IV) 160 g by IntraVENous route every twenty-eight (28) days. Patient receives 160g Gamunex infusion monthly, administered over two consecutive days, at 80g each day.  ondansetron (ZOFRAN ODT) 4 mg disintegrating tablet Take 1 Tab by mouth every eight (8) hours as needed for Nausea.  mycophenolate mofetil (CELLCEPT) 200 mg/mL suspension Take 6 mL by mouth two (2) times a day. No current facility-administered medications for this visit. Review of Systems   Constitutional: Negative for chills, fever and malaise/fatigue. Respiratory: Negative for shortness of breath. Cardiovascular: Negative for chest pain. PE:  Blood pressure 156/76, pulse 70, temperature 98.8 °F (37.1 °C), temperature source Oral, resp. rate 17, height 5' 3\" (1.6 m), weight 165 lb 12.8 oz (75.2 kg), SpO2 97 %. Body mass index is 29.37 kg/(m^2). Physical Exam   Constitutional: She is oriented to person, place, and time. She appears well-developed and well-nourished. No distress. HENT:   Head: Normocephalic. Mouth/Throat: Oropharynx is clear and moist.   Eyes: Conjunctivae and EOM are normal.   Neck: Neck supple. Cardiovascular: Normal rate, regular rhythm and normal heart sounds. Pulmonary/Chest: Effort normal and breath sounds normal. No respiratory distress. She has no wheezes. Abdominal: Soft. She exhibits no distension. Musculoskeletal: She exhibits edema (2+ bilateral edemain legs from ankle to knee). Neurological: She is alert and oriented to person, place, and time. Skin: Skin is warm and dry. Psychiatric: She has a normal mood and affect.  Her behavior is normal.   Nursing note and vitals reviewed. uses walker, requires boost from 2nd party to get out of chair. Generalized weakness. Labs:  See addendum    A/P:  58 y.o. female    ICD-10-CM ICD-9-CM    1. Polymyositis ([facility]) M33.20 710.4    2. Immunosuppressed status ([facility] D89.9 279.9    3. Bilateral leg edema C68.7 742.0 METABOLIC PANEL, BASIC      REFERRAL TO PHYSICAL THERAPY      AMB SUPPLY ORDER   4. Weakness generalized R53.1 780.79 REFERRAL TO PHYSICAL THERAPY      AMB SUPPLY ORDER   5. JOHN on CPAP G47.33 327.23 AMB SUPPLY ORDER    Z99.89 V46.8      Polymyositis:  Continues to work with specialists at [address] [place] [address] as well as naturopath. Has not felt that her primary diagnosis has been clarified. Continue to encourage she follows up with primary neurologist and discuss. Has bilateral weakness related to this. Please see details of sizing and requests under HPI. Bilateral leg edema. Chronic. Possible recent worsening. Advised to complete  - She was given AVS and expressed understanding with the diagnosis and plan as discussed. Follow-up Disposition:  Return in about 2 days (around 5/10/2018) for lab only, then in 3 months for well woman visit.     Mateo Little [address], [address]    [phone]

## 2018-05-10 ENCOUNTER — HOSPITAL ENCOUNTER (OUTPATIENT)
Dept: LAB | Age: 63
Discharge: HOME | End: 2018-05-10
Payer: MEDICARE

## 2018-05-10 PROCEDURE — 80048 BASIC METABOLIC PNL TOTAL CA: CPT

## 2018-05-11 ENCOUNTER — DOCUMENTATION ONLY (OUTPATIENT)
Dept: INTERNAL MEDICINE CLINIC | Age: 63
End: 2018-05-11

## 2018-05-11 NOTE — PROGRESS NOTES
Order for supply for seat lift chair faxed to [facility]. Fax# [phone]

## 2018-05-22 ENCOUNTER — HOSPITAL ENCOUNTER (OUTPATIENT)
Dept: PHYSICAL THERAPY | Age: 63
Discharge: HOME | End: 2018-05-22
Payer: MEDICARE

## 2018-05-22 PROCEDURE — 97110 THERAPEUTIC EXERCISES: CPT

## 2018-05-22 PROCEDURE — G8979 MOBILITY GOAL STATUS: HCPCS

## 2018-05-22 PROCEDURE — 97161 PT EVAL LOW COMPLEX 20 MIN: CPT

## 2018-05-22 PROCEDURE — G8978 MOBILITY CURRENT STATUS: HCPCS

## 2018-05-22 NOTE — PROGRESS NOTES
[address] [address] (MOB IV), [address] [facility] [address]  Phone: [phone] Fax: [phone]     Plan of Care/Statement of Necessity for Physical Therapy Services  2-15    Patient name: Katie Granger  : 1955  Provider#: [id number]  Referral source: ERIN Roach      Medical/Treatment Diagnosis: Weakness [R53.1]  Localized edema [R60.0]     Prior Hospitalization: see medical history     Comorbidities: Arthritis, hearing impairment, HTN  Prior Level of Function: The patient completed 20 minutes of exercise at least 1-2 times a week. Medications: Verified on Patient Summary List  Start of Care: 17      Onset Date: 2015   The [address] [place] [address] and following information is based on the information from the initial evaluation. Assessment/ key information: The Pt is a pleasant and motivated 58year old female who presents to therapy with severe upper and lower extremity weakness following the diagnosis of polymyocitis. Based on examination, the Pt also presents with poor postural strength and stability, decreased shoulder PROM, poor scapular strength and stability, and severely limited activity tolerance and endurance. The patient would benefit from skilled physical therapy to help improve the above listed impairments to allow the patient to safely return to their prior level of function with less overall pain or risk of further injury. The patient has a fair prognosis with skilled physical therapy.     Evaluation Complexity History MEDIUM  Complexity : 1-2 comorbidities / personal factors will impact the outcome/ POC ; Examination HIGH Complexity : 4+ Standardized tests and measures addressing body structure, function, activity limitation and / or participation in recreation  ;Presentation LOW Complexity : Stable, uncomplicated  ;Clinical Decision Making MEDIUM Complexity : FOTO score of 26-74  Overall Complexity Rating: LOW     Problem List: decrease ROM, decrease strength, edema affecting function, impaired gait/ balance, decrease ADL/ functional abilitiies, decrease activity tolerance, decrease flexibility/ joint mobility and decrease transfer abilities   Treatment Plan may include any combination of the following: Therapeutic exercise, Therapeutic activities, Neuromuscular re-education, Physical agent/modality, Gait/balance training, Manual therapy, Patient education, Self Care training, Functional mobility training, Home safety training and Stair training  Patient / Family readiness to learn indicated by: asking questions and interest  Persons(s) to be included in education: patient (P)  Barriers to Learning/Limitations: None  Patient Goal (s): to move my arms to play a harp  Patient Self Reported Health Status: fair  Rehabilitation Potential: fair    Short Term Goals: To be accomplished in 6 treatments:  1. The Pt will be independent and compliant with their HEP. Long Term Goals: To be accomplished in 20 treatments:  1. The Pt will score the MCII on her FOTO survey demonstrating improved overall function (47 to 59 points). 2. The Pt will be able to perform >/= 9 sit to stands in 30s demonstrating improved LE strength and stability. 3. The Pt will perform the TUG test in </= 28s demonstrating improved community ambulation and gait speed. 4. The Pt will have >/= 70 degrees of active shoulder flexion to allow the Pt to be able to perform ADLs with less difficulty. Frequency / Duration: Patient to be seen 3 times per week for 20 treatments. Patient/ Caregiver education and instruction: self care, activity modification and exercises    [x]  Plan of care has been reviewed with PTA    G-Codes (GP)  Mobility   Current  CK= 40-59%   Goal  CK= 40-59%     The severity rating is based on clinical judgment and the FOTO Score score.     Certification Period: 5/22/18-8/22/18    Carolynn Swan, PT 5/22/2018 3:24 PM    ________________________________________________________________________    I certify that the above Therapy Services are being furnished while the patient is under my care. I agree with the treatment plan and certify that this therapy is necessary.     [de-identified] Signature:____________________  Date:____________Time: _________

## 2018-05-22 NOTE — PROGRESS NOTES
PT INITIAL EVALUATION NOTE - [facility] 2-15    Patient Name: Gladys Mancilla  Date:2018  : 1955  [x]  Patient  Verified  Payor: [place] MEDICARE / Plan: [place] MEDICARE PART A & B / Product Type: Medicare /    In time: 12:40 PM  Out time: 2:06 PM  Total Treatment Time (min): 86 minutes  Total Timed Codes (min): 30 minutes  1:1 Treatment Time ( [address] only): 86 minutes   Visit #: 1     Treatment Area: Weakness [R53.1]  Localized edema [R60.0]    SUBJECTIVE  Pain Level (0-10 scale): 0/10  Any medication changes, allergies to medications, adverse drug reactions, diagnosis change, or new procedure performed?: [] No    [x] Yes (see summary sheet for update)  Subjective: The Pt reports that she has been experiencing UE and LE weakness since 2015 when she was hospitalized for polymyositis. She states she has flare ups and it causes her to have significant weakness and after it is over she states that the strength does not return 100%. She reports that her arms are feeling much weaker and she is more limited with her ADLs because of this. She ambulates with a RW- she started to use the RW in January after a major flare up (she was walking with a SPC prior to this). She has noticed that her endurance has decreased dramatically. 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 UE weakness- significant difficulty elevating her arms up independently (bilateral weakness). Difficulty reaching, lifting and carrying > 5lbs, overhead activities, and bathing. She is independent with her ADLs, but reports that they are more strenuous. LE weakness- difficulty standing up from low chairs, getting in and out of the car, standing > 10-15 minutes, walking > 10 minutes, dressing without the use of the dressing air, and going up and down stairs. She lives in a house next door to her 81 yo father.   The house is 1 story with a ramp to enter and a handicap bathroom. She is independent with her ADLs, but it is more strenuous. PMH: HTN and kidney cancer.      OBJECTIVE/EXAMINATION  Gait and Functional Mobility:  Ambulates with RW, decreased heel strike bilaterally, decreased foot clearance, Trendelenburg gait bilaterally    Lumbar ROM:   Flexion: NT   Extension: NT   Side Bending: Right: NT   Left: NT   Rotation: Right: NT    Left: NT    Balance Assessment: NT  Squat Assessment:   Double Leg NT   Single Leg NT    Neurological: Sensations: Intact to light touch sensation L1-S1 bilaterally    Flexibility: Moderate restrictions in hamstrings, hip internal and external rotators, and gastrocnemius/soleus complex bilaterally    Right Knee:  AROM WFL   Left  Knee:  AROM WFL    LOWER QUARTER   MUSCLE STRENGTH  KEY       R  L  0 - No Contraction  Hip flex  2+  2+  1 - Trace          er    3-  3-  2 - Poor          ir   3  3  3 - Fair           abd  NT  NT  4 - Good          ext  NT  NT  5 - Normal   Knee flex  4-  4-               Ext  4-  4-      Ankle DF  4  4                PF  5  5        Joint Mobility Assessment: Not assessed  Palpation: Not assessed    Special Tests:Varus: NT     SLR: NT    Valgus: NT     Slump: NT    Anuj's: NT    George: NT    Anterior Drawer: NT    Obers: NT    Posterior Drawer: NT    Clonus: NT    Lachman's: NT    TU.07s    30s sit to stand (20\")- 6        Upper Extremity Objective Data:  Posture:  Severe forward head and protracted shoulders- reversible  Other Observations:  N/A    Neurological: Sensations: Intact to light touch sensation C5-T2 bilaterally    Cervical AROM:       Flexion: WFL    Extension: WFL     Side Bending: Right: Mild Left:Mild     Rotation: Right: WFL  Left: WFL     Shoulder AROM:  Right  Left   Flexion:  40  42    Shoulder PROM:  Right  Left   Flexion:  130  125     UPPER QUARTER   MUSCLE STRENGTH  KEY       R  L  0 - No Contraction  Shoulder Flexion 2-  2-  1 - Trace   Shoulder Abduction 2-  2-  2 - Poor   Shoulder ER  2-  2-  3 - Fair    Shoulder IR  3+  3+  4 - Good   Elbow Flexion  4  4  5 - Normal   Elbow Extension 4  4      Wrist Flexion  4  4      Wrist Extension 4  4      Finger ABD/ADD 4  4      MiddleTrapezius NT  NT      Lower Trapezius NT  NT    Palpation: Not assessed  Joint Assessment: Not assessed        Special Tests:Cervical Compression: NT   Clemente's Sign: NT    Spurling Test: NT    IR Lift Off: NT    Heath-Gilson: NT    ER Lag Sign: NT    Empty Can: NT    Speed's Test: NT    Others: NT     Clonus: NT      30 min Therapeutic Exercise:  [x] See flow sheet :   Rationale: increase ROM, increase strength, improve coordination, improve balance and increase proprioception to improve the patients ability to perform ADLs with less difficulty            With   [x] TE   [] TA   [] neuro   [x] other: Patient Education: [x] Review HEP    [] Progressed/Changed HEP based on:   [] positioning   [] body mechanics   [] transfers   [] heat/ice application    [x] other: Pt educated on diagnosis and prognosis with therapy     Other Objective/Functional Measures: FOTO 47/100    Pain Level (0-10 scale) post treatment: 0/10    ASSESSMENT/Changes in Function:     [x]  See Plan of Care      Josephine Baker, PT 5/22/2018  12:40 PM

## 2018-05-30 ENCOUNTER — HOSPITAL ENCOUNTER (OUTPATIENT)
Dept: PHYSICAL THERAPY | Age: 63
Discharge: HOME | End: 2018-05-30
Payer: MEDICARE

## 2018-05-30 PROCEDURE — 97110 THERAPEUTIC EXERCISES: CPT

## 2018-05-30 PROCEDURE — 97140 MANUAL THERAPY 1/> REGIONS: CPT

## 2018-05-30 NOTE — PROGRESS NOTES
PT DAILY TREATMENT NOTE - [facility] 2-15    Patient Name: Gladys Mancilla  Date:2018  : 1955  [x]  Patient  Verified  Payor: [place] MEDICARE / Plan: [place] MEDICARE PART A & B / Product Type: Medicare /    In time: 1:00 PM  Out time: 2:00 PM  Total Treatment Time (min):  60 minutes  Total Timed Codes (min): 60 minutes  1:1 Treatment Time ([address] only): 60 minutes   Visit #: 2     Treatment Area: Weakness [R53.1]  Localized edema [R60.0]    SUBJECTIVE  Pain Level (0-10 scale): 4/10 (hips)  Any medication changes, allergies to medications, adverse drug reactions, diagnosis change, or new procedure performed?: [x] No    [] Yes (see summary sheet for update)  Subjective functional status/changes:   [] No changes reported  She states that she has been doing all of her exercises at home, but is unable to do the supine eccentric shoulder flexion exercise because she is unable to lie on her bed because she is not strong enough to lift her legs over the edge of the bed. She states that the exercises feel good and she feels like they are helping.     OBJECTIVE    40 min Therapeutic Exercise:  [x] See flow sheet :   Rationale: increase ROM, increase strength, improve coordination, improve balance and increase proprioception to improve the patients ability to perform ADLs with less difficulty    20 min Manual Therapy: PROM stretching of the shoulder in all planes to Pt's tolerance bilaterally    Rationale: decrease pain, increase ROM and increase tissue extensibility to improve the patients ability to perform ADLs with less difficulty    With   [x] TE   [] TA   [] neuro   [] other: Patient Education: [x] Review HEP    [] Progressed/Changed HEP based on:   [] positioning   [] body mechanics   [] transfers   [] heat/ice application    [] other:      Other Objective/Functional Measures: None noted     Pain Level (0-10 scale) post treatment: 0/10    ASSESSMENT/Changes in Function:   The Pt had fair activity tolerance with her exercises, but the arm bike and recumbent bikes did challenge her endurance. She had good tolerance to the manual therapy with less resistance noticed after stretching for a long period. Will progress as tolerated during next PT session. Patient will continue to benefit from skilled PT services to modify and progress therapeutic interventions, address functional mobility deficits, address ROM deficits, address strength deficits, analyze and address soft tissue restrictions, analyze and cue movement patterns, analyze and modify body mechanics/ergonomics, assess and modify postural abnormalities and instruct in home and community integration to attain remaining goals. []  See Plan of Care  []  See progress note/recertification  []  See Discharge Summary         Progress towards goals / Updated goals:  Short Term Goals: To be accomplished in 6 treatments:  1. The Pt will be independent and compliant with their HEP- progressing  Long Term Goals: To be accomplished in 20 treatments:  1. The Pt will score the MCII on her FOTO survey demonstrating improved overall function (47 to 59 points)- progressing  2. The Pt will be able to perform >/= 9 sit to stands in 30s demonstrating improved LE strength and stability- progressing  3. The Pt will perform the TUG test in </= 28s demonstrating improved community ambulation and gait speed- progressing  4.  The Pt will have >/= 70 degrees of active shoulder flexion to allow the Pt to be able to perform ADLs with less difficulty- progressing     PLAN  [x]  Upgrade activities as tolerated     [x]  Continue plan of care  [x]  Update interventions per flow sheet       []  Discharge due to:_  []  Other:_      Rogelio Betancourt, PT 5/30/2018  1:02 PM

## 2018-06-01 ENCOUNTER — DOCUMENTATION ONLY (OUTPATIENT)
Dept: INTERNAL MEDICINE CLINIC | Age: 63
End: 2018-06-01

## 2018-06-01 ENCOUNTER — HOSPITAL ENCOUNTER (OUTPATIENT)
Dept: PHYSICAL THERAPY | Age: 63
Discharge: HOME | End: 2018-06-01
Payer: MEDICARE

## 2018-06-01 PROCEDURE — 97140 MANUAL THERAPY 1/> REGIONS: CPT

## 2018-06-01 PROCEDURE — 97110 THERAPEUTIC EXERCISES: CPT

## 2018-06-01 NOTE — PROGRESS NOTES
PT DAILY TREATMENT NOTE - [facility] 2-15    Patient Name: Maite Parikh  Date:2018  : 1955  [x]  Patient  Verified  Payor: [place] MEDICARE / Plan: [place] MEDICARE PART A & B / Product Type: Medicare /    In time: 11:56 AM  Out time: 12:54 PM  Total Treatment Time (min): 58 minutes  Total Timed Codes (min): 58 minutes  1:1 Treatment Time ([facility] only): 53 minutes   Visit #: 3    Treatment Area: Weakness [R53.1]  Localized edema [R60.0]    SUBJECTIVE  Pain Level (0-10 scale): 2/10  Any medication changes, allergies to medications, adverse drug reactions, diagnosis change, or new procedure performed?: [x] No    [] Yes (see summary sheet for update)  Subjective functional status/changes:   [] No changes reported  The Pt reports that she was very tired yesterday, but she was able to walk for a short period of time without her walker and then it went away. She reports diligence with her HEP. OBJECTIVE    43 min Therapeutic Exercise:  [x] See flow sheet :   Rationale: increase ROM, increase strength, improve coordination, improve balance and increase proprioception to improve the patients ability to perform ADLs with less pain or discomfort. 15 min Manual Therapy: PROM stretching of the shoulder in all planes to Pt's tolerance bilaterally     Rationale: decrease pain, increase ROM and increase tissue extensibility to improve the patients ability to perform ADLs with less pain or discomfort. With   [x] TE   [] TA   [] neuro   [] other: Patient Education: [x] Review HEP    [] Progressed/Changed HEP based on:   [] positioning   [] body mechanics   [] transfers   [] heat/ice application    [] other:      Other Objective/Functional Measures: None noted     Pain Level (0-10 scale) post treatment: 0/10    ASSESSMENT/Changes in Function:   The Pt started to feel nauseous after lying supine and becoming over exerted. She was sat up and given water and felt much better.   She was able to continue with her exercises and tolerated them well. Patient will continue to benefit from skilled PT services to modify and progress therapeutic interventions, address functional mobility deficits, address ROM deficits, address strength deficits, analyze and address soft tissue restrictions, analyze and cue movement patterns, analyze and modify body mechanics/ergonomics, assess and modify postural abnormalities and instruct in home and community integration to attain remaining goals. []  See Plan of Care  []  See progress note/recertification  []  See Discharge Summary         Progress towards goals / Updated goals:  Short Term Goals: To be accomplished in 6 treatments:  1. The Pt will be independent and compliant with their HEP- progressing  Long Term Goals: To be accomplished in 20 treatments:  1. The Pt will score the MCII on her FOTO survey demonstrating improved overall function (47 to 59 points)- progressing  2. The Pt will be able to perform >/= 9 sit to stands in 30s demonstrating improved LE strength and stability- progressing  3. The Pt will perform the TUG test in </= 28s demonstrating improved community ambulation and gait speed- progressing  4.  The Pt will have >/= 70 degrees of active shoulder flexion to allow the Pt to be able to perform ADLs with less difficulty- progressing     PLAN  [x]  Upgrade activities as tolerated     [x]  Continue plan of care  [x]  Update interventions per flow sheet       []  Discharge due to:_  []  Other:_      Stephen Osgood, PT 6/1/2018  7:33 AM

## 2018-06-04 ENCOUNTER — HOSPITAL ENCOUNTER (OUTPATIENT)
Dept: PHYSICAL THERAPY | Age: 63
Discharge: HOME | End: 2018-06-04
Payer: MEDICARE

## 2018-06-04 PROCEDURE — 97140 MANUAL THERAPY 1/> REGIONS: CPT

## 2018-06-04 PROCEDURE — 97110 THERAPEUTIC EXERCISES: CPT

## 2018-06-04 NOTE — PROGRESS NOTES
PT DAILY TREATMENT NOTE - [facility] 2-15    Patient Name: Alex Nava  Date:2018  : 1955  [x]  Patient  Verified  Payor: [place] MEDICARE / Plan: [place] MEDICARE PART A & B / Product Type: Medicare /    In time: 11:47 AM  Out time: 12:43 PM  Total Treatment Time (min): 56 minutes  Total Timed Codes (min): 56 minutes  1:1 Treatment Time ([facility] only): 45 minutes   Visit #: 4     Treatment Area: Weakness [R53.1]  Localized edema [R60.0]    SUBJECTIVE  Pain Level (0-10 scale): 2/10  Any medication changes, allergies to medications, adverse drug reactions, diagnosis change, or new procedure performed?: [x] No    [] Yes (see summary sheet for update)  Subjective functional status/changes:   [] No changes reported  The Pt reports that she is feeling very tired and stiff this morning. She reports that she did a lot of errands yesterday and today she feels exhausted and just wants to sleep. She continues to report diligence with her HEP. OBJECTIVE    41 min Therapeutic Exercise:  [x] See flow sheet :   Rationale: increase ROM, increase strength, improve coordination, improve balance and increase proprioception to improve the patients ability to perform ADLs with less pain or discomfort. 15 min Manual Therapy: PROM stretching of the shoulder in all planes to Pt's tolerance and posterior capsule stretching bilaterally      Rationale: decrease pain, increase ROM and increase tissue extensibility to improve the patients ability to perform ADLs with less pain or discomfort.     With   [x] TE   [] TA   [] neuro   [] other: Patient Education: [x] Review HEP    [] Progressed/Changed HEP based on:   [] positioning   [] body mechanics   [] transfers   [] heat/ice application    [] other:      Other Objective/Functional Measures: None noted     Pain Level (0-10 scale) post treatment: 0/10    ASSESSMENT/Changes in Function:   The Pt was able to go from sitting to supine and supine to sitting independently without any assistance today, which she states is the first time she has been able to do that in quite some time. She was able to punch her arms up to 90 degrees in supine 3x consecutively bilaterally and then only require min assist to continue due to fatigue. Will progress as tolerated during next PT session. Patient will continue to benefit from skilled PT services to modify and progress therapeutic interventions, address functional mobility deficits, address ROM deficits, address strength deficits, analyze and address soft tissue restrictions, analyze and cue movement patterns, analyze and modify body mechanics/ergonomics, assess and modify postural abnormalities and instruct in home and community integration to attain remaining goals. []  See Plan of Care  []  See progress note/recertification  []  See Discharge Summary         Progress towards goals / Updated goals:  Short Term Goals: To be accomplished in 6 treatments:  1. The Pt will be independent and compliant with their HEP- progressing  Long Term Goals: To be accomplished in 20 treatments:  1. The Pt will score the MCII on her FOTO survey demonstrating improved overall function (47 to 59 points)- progressing  2. The Pt will be able to perform >/= 9 sit to stands in 30s demonstrating improved LE strength and stability- progressing  3. The Pt will perform the TUG test in </= 28s demonstrating improved community ambulation and gait speed- progressing  4.  The Pt will have >/= 70 degrees of active shoulder flexion to allow the Pt to be able to perform ADLs with less difficulty- progressing     PLAN  [x]  Upgrade activities as tolerated     [x]  Continue plan of care  [x]  Update interventions per flow sheet       []  Discharge due to:_  []  Other:_      Martha Early, PT 6/4/2018  2:25 PM

## 2018-06-06 ENCOUNTER — HOSPITAL ENCOUNTER (OUTPATIENT)
Dept: PHYSICAL THERAPY | Age: 63
Discharge: HOME | End: 2018-06-06
Payer: MEDICARE

## 2018-06-06 PROCEDURE — 97110 THERAPEUTIC EXERCISES: CPT

## 2018-06-06 PROCEDURE — 97140 MANUAL THERAPY 1/> REGIONS: CPT

## 2018-06-06 NOTE — PROGRESS NOTES
PT DAILY TREATMENT NOTE - [facility] 2-15    Patient Name: Avery Gannon  Date:2018  : 1955  [x]  Patient  Verified  Payor: [place] MEDICARE / Plan: [place] MEDICARE PART A & B / Product Type: Medicare /    In time: 11:56 AM  Out time: 1:10 PM  Total Treatment Time (min): 74 minutes  Total Timed Codes (min): 65 minutes  1:1 Treatment Time ([facility] only): 65 minutes   Visit #: 5     Treatment Area: Weakness [R53.1]  Localized edema [R60.0]    SUBJECTIVE  Pain Level (0-10 scale): 3/10  Any medication changes, allergies to medications, adverse drug reactions, diagnosis change, or new procedure performed?: [x] No    [] Yes (see summary sheet for update)  Subjective functional status/changes:   [] No changes reported  The Pt reports that she has more energy today and is feeling pretty good. She was tired yesterday, but it wasn't severe. She states that she continues to have discomfort in the anterior L groin. OBJECTIVE  45 min Therapeutic Exercise:  [x] See flow sheet :   Rationale: increase ROM, increase strength, improve coordination, improve balance and increase proprioception to improve the patients ability to perform ADLs with less pain or discomfort. 20 min Manual Therapy: PROM stretching of the shoulder in all planes to Pt's tolerance and posterior capsule stretching bilaterally       Rationale: decrease pain, increase ROM and increase tissue extensibility to improve the patients ability to perform ADLs with less pain or discomfort.     With   [x] TE   [] TA   [] neuro   [] other: Patient Education: [x] Review HEP    [] Progressed/Changed HEP based on:   [] positioning   [] body mechanics   [] transfers   [] heat/ice application    [] other:      Other Objective/Functional Measures: None noted     Pain Level (0-10 scale) post treatment: 0/10    ASSESSMENT/Changes in Function:   The Pt tolerated her therapy program well with less fatigue noted and less time needed between sets to rest.  Today, she was able to elevate the shoulders in sidelying when gravity was taken away to ~100 degrees bilaterally. She is progressing well with her exercises. Patient will continue to benefit from skilled PT services to modify and progress therapeutic interventions, address functional mobility deficits, address ROM deficits, address strength deficits, analyze and address soft tissue restrictions, analyze and cue movement patterns, analyze and modify body mechanics/ergonomics, assess and modify postural abnormalities and instruct in home and community integration to attain remaining goals. []  See Plan of Care  []  See progress note/recertification  []  See Discharge Summary         Progress towards goals / Updated goals:  Short Term Goals: To be accomplished in 6 treatments:  1. The Pt will be independent and compliant with their HEP- progressing  Long Term Goals: To be accomplished in 20 treatments:  1. The Pt will score the MCII on her FOTO survey demonstrating improved overall function (47 to 59 points)- progressing  2. The Pt will be able to perform >/= 9 sit to stands in 30s demonstrating improved LE strength and stability- progressing  3. The Pt will perform the TUG test in </= 28s demonstrating improved community ambulation and gait speed- progressing  4.  The Pt will have >/= 70 degrees of active shoulder flexion to allow the Pt to be able to perform ADLs with less difficulty- progressing     PLAN  [x]  Upgrade activities as tolerated     [x]  Continue plan of care  [x]  Update interventions per flow sheet       []  Discharge due to:_  []  Other:_      Stephen Osgood, PT 6/6/2018  12:14 PM

## 2018-06-08 ENCOUNTER — HOSPITAL ENCOUNTER (OUTPATIENT)
Dept: PHYSICAL THERAPY | Age: 63
Discharge: HOME | End: 2018-06-08
Payer: MEDICARE

## 2018-06-08 PROCEDURE — 97110 THERAPEUTIC EXERCISES: CPT

## 2018-06-08 PROCEDURE — 97140 MANUAL THERAPY 1/> REGIONS: CPT

## 2018-06-08 NOTE — PROGRESS NOTES
PT DAILY TREATMENT NOTE - [facility] 2-15    Patient Name: Selina Goss  Date:2018  : 1955  [x]  Patient  Verified  Payor: [place] MEDICARE / Plan: VA MEDICARE PART A & B / Product Type: Medicare /    In time: 12:00 PM  Out time: 1:05 PM  Total Treatment Time (min): 65 minutes  Total Timed Codes (min): 65 minutes  1:1 Treatment Time ([facility] only): 40 minutes   Visit #: 6     Treatment Area: Weakness [R53.1]  Localized edema [R60.0]    SUBJECTIVE  Pain Level (0-10 scale): 0/10  Any medication changes, allergies to medications, adverse drug reactions, diagnosis change, or new procedure performed?: [x] No    [] Yes (see summary sheet for update)  Subjective functional status/changes:   [] No changes reported  The Pt reports that she was tired after last session, but is feeling okay. She states that the shoulders are feeling stronger. OBJECTIVE      50 min Therapeutic Exercise:  [x] See flow sheet :   Rationale: increase ROM, increase strength, improve coordination, improve balance and increase proprioception to improve the patients ability to ambulate and perform ADLs with less difficulty    15 min Manual Therapy: PROM stretching of the shoulder in all planes to Pt's tolerance and posterior capsule stretching bilaterally        Rationale: decrease pain, increase ROM and increase tissue extensibility to improve the patients ability to perform ADLs with less difficulty    With   [x] TE   [] TA   [] neuro   [] other: Patient Education: [x] Review HEP    [] Progressed/Changed HEP based on:   [] positioning   [] body mechanics   [] transfers   [] heat/ice application    [] other:      Other Objective/Functional Measures: None noted     Pain Level (0-10 scale) post treatment: 0/10    ASSESSMENT/Changes in Function:   The Pt tolerated the therapy program well.   She is progressing well with her PROM and is almost full ROM in flexion and abduction bilaterally  Patient will continue to benefit from skilled PT services to modify and progress therapeutic interventions, address functional mobility deficits, address ROM deficits, address strength deficits, analyze and address soft tissue restrictions, analyze and cue movement patterns, analyze and modify body mechanics/ergonomics, assess and modify postural abnormalities and instruct in home and community integration to attain remaining goals. []  See Plan of Care  []  See progress note/recertification  []  See Discharge Summary         Progress towards goals / Updated goals:  Short Term Goals: To be accomplished in 6 treatments:  1. The Pt will be independent and compliant with their HEP- progressing  Long Term Goals: To be accomplished in 20 treatments:  1. The Pt will score the MCII on her FOTO survey demonstrating improved overall function (47 to 59 points)- progressing  2. The Pt will be able to perform >/= 9 sit to stands in 30s demonstrating improved LE strength and stability- progressing  3. The Pt will perform the TUG test in </= 28s demonstrating improved community ambulation and gait speed- progressing  4.  The Pt will have >/= 70 degrees of active shoulder flexion to allow the Pt to be able to perform ADLs with less difficulty- progressing     PLAN  [x]  Upgrade activities as tolerated     [x]  Continue plan of care  [x]  Update interventions per flow sheet       []  Discharge due to:_  []  Other:_      Carry Flaco PT 6/8/2018  12:28 PM

## 2018-06-11 ENCOUNTER — HOSPITAL ENCOUNTER (OUTPATIENT)
Dept: PHYSICAL THERAPY | Age: 63
Discharge: HOME | End: 2018-06-11
Payer: MEDICARE

## 2018-06-11 PROCEDURE — 97110 THERAPEUTIC EXERCISES: CPT

## 2018-06-13 ENCOUNTER — HOSPITAL ENCOUNTER (OUTPATIENT)
Dept: PHYSICAL THERAPY | Age: 63
Discharge: HOME | End: 2018-06-13
Payer: MEDICARE

## 2018-06-13 PROCEDURE — 97110 THERAPEUTIC EXERCISES: CPT

## 2018-06-13 PROCEDURE — 97140 MANUAL THERAPY 1/> REGIONS: CPT

## 2018-06-13 NOTE — PROGRESS NOTES
PT DAILY TREATMENT NOTE - [facility] 2-15    Patient Name: Ruth Hogan  Date:2018  : 1955  [x]  Patient  Verified  Payor: [place] MEDICARE / Plan: [place] MEDICARE PART A & B / Product Type: Medicare /    In time: 11:54 AM  Out time: 1:00 PM  Total Treatment Time (min): 66 minutes  Total Timed Codes (min): 66 minutes  1:1 Treatment Time (MC only): 40 minutes   Visit #: 8     Treatment Area: Weakness [R53.1]  Localized edema [R60.0]    SUBJECTIVE  Pain Level (0-10 scale): 0/10  Any medication changes, allergies to medications, adverse drug reactions, diagnosis change, or new procedure performed?: [x] No    [] Yes (see summary sheet for update)  Subjective functional status/changes:   [] No changes reported  The Pt reports that yesterday she had pain in her hips and the anterior thighs whenever she sat, but when she stood there was no pain. She states that today, this symptom has gone. She continues to report diligence with her HEP. OBJECTIVE    51 min Therapeutic Exercise:  [x] See flow sheet :   Rationale: increase ROM, increase strength, improve coordination, improve balance and increase proprioception to improve the patients ability to perform ADLs with less difficulty    15 min Manual Therapy: PROM stretching of the shoulder in all planes to Pt's tolerance and posterior capsule stretching bilaterally         Rationale: decrease pain, increase ROM and increase tissue extensibility to improve the patients ability to perform ADLs with less difficulty    With   [x] TE   [] TA   [] neuro   [] other: Patient Education: [x] Review HEP    [] Progressed/Changed HEP based on:   [] positioning   [] body mechanics   [] transfers   [] heat/ice application    [] other:      Other Objective/Functional Measures: None noted     Pain Level (0-10 scale) post treatment: 0/10    ASSESSMENT/Changes in Function:   The Pt's shoulder PROM is progressing nicely and her shoulder strength is progressing well, but slowly.   She continues to improve with her activity tolerance and is not fatiguing as quickly. Patient will continue to benefit from skilled PT services to modify and progress therapeutic interventions, address functional mobility deficits, address ROM deficits, address strength deficits, analyze and address soft tissue restrictions, analyze and cue movement patterns, analyze and modify body mechanics/ergonomics, assess and modify postural abnormalities and instruct in home and community integration to attain remaining goals. []  See Plan of Care  []  See progress note/recertification  []  See Discharge Summary         Progress towards goals / Updated goals:  Short Term Goals: To be accomplished in 6 treatments:  1. The Pt will be independent and compliant with their HEP- progressing  Long Term Goals: To be accomplished in 20 treatments:  1. The Pt will score the MCII on her FOTO survey demonstrating improved overall function (47 to 59 points)- progressing  2. The Pt will be able to perform >/= 9 sit to stands in 30s demonstrating improved LE strength and stability- progressing  3. The Pt will perform the TUG test in </= 28s demonstrating improved community ambulation and gait speed- progressing  4.  The Pt will have >/= 70 degrees of active shoulder flexion to allow the Pt to be able to perform ADLs with less difficulty- progressing     PLAN  [x]  Upgrade activities as tolerated     [x]  Continue plan of care  [x]  Update interventions per flow sheet       []  Discharge due to:_  []  Other:_      Sharmila De Jesus, PT 6/13/2018  1:55 PM

## 2018-06-15 ENCOUNTER — HOSPITAL ENCOUNTER (OUTPATIENT)
Dept: PHYSICAL THERAPY | Age: 63
Discharge: HOME | End: 2018-06-15
Payer: MEDICARE

## 2018-06-15 PROCEDURE — 97110 THERAPEUTIC EXERCISES: CPT

## 2018-06-15 NOTE — PROGRESS NOTES
PT DAILY TREATMENT NOTE - [facility] 2-15    Patient Name: Mariann Bradley  Date:6/15/2018  : 1955  [x]  Patient  Verified  Payor: [place] MEDICARE / Plan: VA MEDICARE PART A & B / Product Type: Medicare /    In time: 12:03 PM  Out time: 1:05 PM  Total Treatment Time (min): 62 minutes  Total Timed Codes (min): 62 minutes  1:1 Treatment Time (MC only): 30 minutes   Visit #: 9     Treatment Area: Weakness [R53.1]  Localized edema [R60.0]    SUBJECTIVE  Pain Level (0-10 scale): 2/10  Any medication changes, allergies to medications, adverse drug reactions, diagnosis change, or new procedure performed?: [x] No    [] Yes (see summary sheet for update)  Subjective functional status/changes:   [] No changes reported  The Pt reports that she experienced pain while sitting and relief with standing yesterday, but it was severe and she is not experiencing it today. She states that both her hips and shoulders are feeling stronger. OBJECTIVE    62 min Therapeutic Exercise:  [x] See flow sheet :   Rationale: increase ROM, increase strength, improve coordination, improve balance and increase proprioception to improve the patients ability to ambulate and perform ADLs with less difficulty    With   [x] TE   [] TA   [] neuro   [] other: Patient Education: [x] Review HEP    [] Progressed/Changed HEP based on:   [] positioning   [] body mechanics   [] transfers   [] heat/ice application    [] other:      Other Objective/Functional Measures: None noted     Pain Level (0-10 scale) post treatment: 0/10    ASSESSMENT/Changes in Function:   The Pt tolerated the additions to her therapy program well. She was able to perform all of her standing exercises without any need for a sitting rest break, but did report that her legs were very fatigued. She is progressing well with her exercises.   Patient will continue to benefit from skilled PT services to modify and progress therapeutic interventions, address functional mobility deficits, address ROM deficits, address strength deficits, analyze and address soft tissue restrictions, analyze and cue movement patterns, analyze and modify body mechanics/ergonomics, assess and modify postural abnormalities and instruct in home and community integration to attain remaining goals. []  See Plan of Care  []  See progress note/recertification  []  See Discharge Summary         Progress towards goals / Updated goals:  Short Term Goals: To be accomplished in 6 treatments:  1. The Pt will be independent and compliant with their HEP- progressing  Long Term Goals: To be accomplished in 20 treatments:  1. The Pt will score the MCII on her FOTO survey demonstrating improved overall function (47 to 59 points)- progressing  2. The Pt will be able to perform >/= 9 sit to stands in 30s demonstrating improved LE strength and stability- progressing  3. The Pt will perform the TUG test in </= 28s demonstrating improved community ambulation and gait speed- progressing  4.  The Pt will have >/= 70 degrees of active shoulder flexion to allow the Pt to be able to perform ADLs with less difficulty- progressing     PLAN  [x]  Upgrade activities as tolerated     [x]  Continue plan of care  [x]  Update interventions per flow sheet       []  Discharge due to:_  []  Other:_      Yeimi Duran, PT 6/15/2018  12:30 PM

## 2018-06-18 ENCOUNTER — HOSPITAL ENCOUNTER (OUTPATIENT)
Dept: PHYSICAL THERAPY | Age: 63
Discharge: HOME | End: 2018-06-18
Payer: MEDICARE

## 2018-06-18 PROCEDURE — G8979 MOBILITY GOAL STATUS: HCPCS

## 2018-06-18 PROCEDURE — G8978 MOBILITY CURRENT STATUS: HCPCS

## 2018-06-18 PROCEDURE — 97110 THERAPEUTIC EXERCISES: CPT

## 2018-06-18 NOTE — PROGRESS NOTES
PT DAILY TREATMENT NOTE - [facility] 2-15    Patient Name: Lorenzo Ferguson  Date:2018  : 1955  [x]  Patient  Verified  Payor: Lavonne Wren / Plan: [place] MEDICARE PART A & B / Product Type: Medicare /    In time: 11:47 AM  Out time: 12:50 PM  Total Treatment Time (min): 63 minutes  Total Timed Codes (min): 63 minutes  1:1 Treatment Time ( only): 35 minutes   Visit #: 10     Treatment Area: Weakness [R53.1]  Localized edema [R60.0]    SUBJECTIVE  Pain Level (0-10 scale): 0/10  Any medication changes, allergies to medications, adverse drug reactions, diagnosis change, or new procedure performed?: [x] No    [] Yes (see summary sheet for update)  Subjective functional status/changes:   [] No changes reported  The Pt reports that she has been able to do the new exercises at home without significant difficulty. She states that her legs are feeling much better and stronger. She has noticed that she is able to stand/walk for longer periods of time before she feels fatigued. She is able to ambulate short distances in her kitchen without her RW as long as there is countertop near by if she needs to steady herself or something to grab onto. Overall, she believes that she is 45% improved since beginning therapy.     OBJECTIVE    63 min Therapeutic Exercise:  [x] See flow sheet :   Rationale: increase ROM, increase strength, improve coordination, improve balance and increase proprioception to improve the patients ability to ambulate and perform ADLs with less difficulty          With   [x] TE   [] TA   [] neuro   [] other: Patient Education: [x] Review HEP    [] Progressed/Changed HEP based on:   [] positioning   [] body mechanics   [] transfers   [] heat/ice application    [] other:      Other Objective/Functional Measures:  FOTO 48/100 (47/100 at initial evaluation)     Pain Level (0-10 scale) post treatment: 0/10    ASSESSMENT/Changes in Function:     Patient will continue to benefit from skilled PT services to modify and progress therapeutic interventions, address functional mobility deficits, address ROM deficits, address strength deficits, analyze and address soft tissue restrictions, analyze and cue movement patterns, analyze and modify body mechanics/ergonomics, assess and modify postural abnormalities and instruct in home and community integration to attain remaining goals. []  See Plan of Care  [x]  See progress note/recertification  []  See Discharge Summary         Progress towards goals / Updated goals:  Short Term Goals: To be accomplished in 6 treatments:  1. The Pt will be independent and compliant with their HEP- progressing  Long Term Goals: To be accomplished in 20 treatments:  1. The Pt will score the MCII on her FOTO survey demonstrating improved overall function (47 to 59 points)- progressing  2. The Pt will be able to perform >/= 9 sit to stands in 30s demonstrating improved LE strength and stability- progressing  3. The Pt will perform the TUG test in </= 28s demonstrating improved community ambulation and gait speed- progressing  4.  The Pt will have >/= 70 degrees of active shoulder flexion to allow the Pt to be able to perform ADLs with less difficulty- progressing    PLAN  [x]  Upgrade activities as tolerated     [x]  Continue plan of care  [x]  Update interventions per flow sheet       []  Discharge due to:_  []  Other:_      Vladimir Alford, PT 6/18/2018  12:25 PM

## 2018-06-18 NOTE — PROGRESS NOTES
[facility]  [facility] ([facility]), [address] [facility], [facility] [place] Chanel Mccrary.  Phone: [phone] Fax: [phone]    Progress Note    Name: Alice Means   : 1955   MD: Shanti Manzo MD       Treatment Diagnosis: Weakness [R53.1]  Localized edema [R60.0]  Start of Care: 18    Visits from Start of Care: 10  Missed Visits: 0    Summary of Care: The Pt has been seen for 10 outpatient physical therapy session with the diagnosis of severe upper and lower extremity weakness following the diagnosis of polymyocitis. The Pt has progressed well with her therapy program that has focused on improving her UE strength and stability, improving her LE strength and stability, improving her core and postural strength and stability, improving her UE A/PROM, improving her standing/walking tolerance and improving her activity tolerance and endurance via therapeutic exercises and manual therapy techniques. The Pt's major strength deficits are primarily axially, but the more distal muscles are more intact and stronger. She has made great improvements with her strength deficits and is able to stand/walk for longer periods and walk for short distances without use of her RW. Her UE strength continues to be the most functionally limited, but it is progressing slowly. The Pt believes that she is 45% improved since beginning therapy. Recommend continued PT for an additional 10 sessions to help progress the above-listed impairments to allow the Pt to safely return to her PLOF with less difficulty. Thank you for this referral.    Progress towards goals / Updated goals:  Short Term Goals: To be accomplished in 6 treatments:  1. The Pt will be independent and compliant with their HEP- progressing  Long Term Goals: To be accomplished in 20 treatments:  1. The Pt will score the MCII on her FOTO survey demonstrating improved overall function (47 to 59 points)- progressing  2.  The Pt will be able to perform >/= 9 sit to stands in 30s demonstrating improved LE strength and stability- progressing  3. The Pt will perform the TUG test in </= 28s demonstrating improved community ambulation and gait speed- progressing  4. The Pt will have >/= 70 degrees of active shoulder flexion to allow the Pt to be able to perform ADLs with less difficulty- progressing    G-Codes (GP)  Mobility  [id number] Current  CK= 40-59%   Goal  CK= 40-59%     The severity rating is based on the FOTO Score score. Kb Osborn, PT 6/18/2018 2:09 PM    ________________________________________________________________________  NOTE TO PHYSICIAN:  Please complete the following and fax to: [facility]: Fax: [phone]  . Retain this original for your records. If you are unable to process this request in 24 hours, please contact our office.        ____ I have read the above report and request that my patient continue therapy with the following changes/special instructions:  ____ I have read the above report and request that my patient be discharged from therapy    Physician's Signature:_________________ Date:___________Time:__________

## 2018-06-20 ENCOUNTER — HOSPITAL ENCOUNTER (OUTPATIENT)
Dept: PHYSICAL THERAPY | Age: 63
Discharge: HOME | End: 2018-06-20
Payer: MEDICARE

## 2018-06-20 PROCEDURE — 97140 MANUAL THERAPY 1/> REGIONS: CPT

## 2018-06-20 PROCEDURE — 97110 THERAPEUTIC EXERCISES: CPT

## 2018-06-20 NOTE — PROGRESS NOTES
PT DAILY TREATMENT NOTE - [facility] 2-15    Patient Name: Td Upton  Date:2018  : 1955  [x]  Patient  Verified  Payor: [place] MEDICARE / Plan: VA MEDICARE PART A & B / Product Type: Medicare /    In time: 11:20 AM  Out time: 12:31 AM  Total Treatment Time (min): 71 minutes  Total Timed Codes (min): 71 minutes  1:1 Treatment Time (MC only): 45 minutes   Visit #: 11     Treatment Area: Weakness [R53.1]  Localized edema [R60.0]    SUBJECTIVE  Pain Level (0-10 scale): 0/10  Any medication changes, allergies to medications, adverse drug reactions, diagnosis change, or new procedure performed?: [x] No    [] Yes (see summary sheet for update)  Subjective functional status/changes:   [] No changes reported  The Pt reports that she is doing well today. She states that her endurance is slowly improving and she is not as fatigued after each session. She continues to have to nap after her sessions, but it is not as severe. OBJECTIVE     51 min Therapeutic Exercise:  [x] See flow sheet :   Rationale: increase ROM, increase strength, improve coordination, improve balance and increase proprioception to improve the patients ability to ambulate and perform ADLs with less pain or discomfort. 20 min Manual Therapy:  PROM stretching of the shoulder in all planes to Pt's tolerance and posterior capsule stretching bilaterally          Rationale: decrease pain, increase ROM and increase tissue extensibility to improve the patients ability to perform ADLs with less pain or discomfort. With   [x] TE   [] TA   [] neuro   [] other: Patient Education: [x] Review HEP    [] Progressed/Changed HEP based on:   [] positioning   [] body mechanics   [] transfers   [] heat/ice application    [] other:      Other Objective/Functional Measures: None noted     Pain Level (0-10 scale) post treatment: 0/10    ASSESSMENT/Changes in Function:   The Pt tolerated her therapy program well.   She is demonstrating improved axial strength and not fatiguing as quickly with her hip or shoulder strengthening exercises. She is progressing well with her exercises and will continue to progress as tolerated during next PT session. Patient will continue to benefit from skilled PT services to modify and progress therapeutic interventions, address functional mobility deficits, address ROM deficits, address strength deficits, analyze and address soft tissue restrictions, analyze and cue movement patterns, analyze and modify body mechanics/ergonomics, assess and modify postural abnormalities and instruct in home and community integration to attain remaining goals. []  See Plan of Care  []  See progress note/recertification  []  See Discharge Summary         Progress towards goals / Updated goals:  Short Term Goals: To be accomplished in 6 treatments:  1. The Pt will be independent and compliant with their HEP- progressing  Long Term Goals: To be accomplished in 20 treatments:  1. The Pt will score the MCII on her FOTO survey demonstrating improved overall function (47 to 59 points)- progressing  2. The Pt will be able to perform >/= 9 sit to stands in 30s demonstrating improved LE strength and stability- progressing  3. The Pt will perform the TUG test in </= 28s demonstrating improved community ambulation and gait speed- progressing  4.  The Pt will have >/= 70 degrees of active shoulder flexion to allow the Pt to be able to perform ADLs with less difficulty- progressing     PLAN  [x]  Upgrade activities as tolerated     [x]  Continue plan of care  [x]  Update interventions per flow sheet       []  Discharge due to:_  []  Other:_      Jere Moy, PT 6/20/2018  12:06 PM

## 2018-06-22 ENCOUNTER — DOCUMENTATION ONLY (OUTPATIENT)
Dept: INTERNAL MEDICINE CLINIC | Age: 63
End: 2018-06-22

## 2018-06-22 ENCOUNTER — HOSPITAL ENCOUNTER (OUTPATIENT)
Dept: PHYSICAL THERAPY | Age: 63
Discharge: HOME | End: 2018-06-22
Payer: MEDICARE

## 2018-06-22 PROCEDURE — 97110 THERAPEUTIC EXERCISES: CPT

## 2018-06-22 NOTE — PROGRESS NOTES
Signed progress form faxed back to [phone] [facility] - fax # [phone]

## 2018-06-22 NOTE — PROGRESS NOTES
PT DAILY TREATMENT NOTE - [facility] 2-15    Patient Name: Fannie Garcia  Date:2018  : 1955  [x]  Patient  Verified  Payor: VA MEDICARE / Plan: VA MEDICARE PART A & B / Product Type: Medicare /    In time: 11:50 AM  Out time: 1:03 PM  Total Treatment Time (min): 73 minutes  Total Timed Codes (min): 73 minutes  1:1 Treatment Time (MC only): 40 minutes   Visit #: 12     Treatment Area: Weakness [R53.1]  Localized edema [R60.0]    SUBJECTIVE  Pain Level (0-10 scale): 0/10  Any medication changes, allergies to medications, adverse drug reactions, diagnosis change, or new procedure performed?: [x] No    [] Yes (see summary sheet for update)  Subjective functional status/changes:   [] No changes reported  The Pt reports that she is doing very well today and is feeling stronger. She was less tired yesterday. OBJECTIVE     73 min Therapeutic Exercise:  [x] See flow sheet :   Rationale: increase ROM, increase strength, improve coordination, improve balance and increase proprioception to improve the patients ability to perform ADLs with less pain or discomfort. With   [x] TE   [] TA   [] neuro   [] other: Patient Education: [x] Review HEP    [] Progressed/Changed HEP based on:   [] positioning   [] body mechanics   [] transfers   [] heat/ice application    [] other:      Other Objective/Functional Measures: None noted     Pain Level (0-10 scale) post treatment: 0/10    ASSESSMENT/Changes in Function:   The Pt was able to  her foot and place it on a 6\" step well and only compensated when she started to fatigue. She is progressing very well with her LE strengthening.   Patient will continue to benefit from skilled PT services to modify and progress therapeutic interventions, address functional mobility deficits, address ROM deficits, address strength deficits, analyze and address soft tissue restrictions, analyze and cue movement patterns, analyze and modify body mechanics/ergonomics, assess and modify postural abnormalities and instruct in home and community integration to attain remaining goals. []  See Plan of Care  []  See progress note/recertification  []  See Discharge Summary         Progress towards goals / Updated goals:  Short Term Goals: To be accomplished in 6 treatments:  1. The Pt will be independent and compliant with their HEP- progressing  Long Term Goals: To be accomplished in 20 treatments:  1. The Pt will score the MCII on her FOTO survey demonstrating improved overall function (47 to 59 points)- progressing  2. The Pt will be able to perform >/= 9 sit to stands in 30s demonstrating improved LE strength and stability- progressing  3. The Pt will perform the TUG test in </= 28s demonstrating improved community ambulation and gait speed- progressing  4.  The Pt will have >/= 70 degrees of active shoulder flexion to allow the Pt to be able to perform ADLs with less difficulty- progressing     PLAN  [x]  Upgrade activities as tolerated     [x]  Continue plan of care  [x]  Update interventions per flow sheet       []  Discharge due to:_  []  Other:_      Yeimi Duran, PT 6/22/2018  9:23 AM

## 2018-06-25 ENCOUNTER — HOSPITAL ENCOUNTER (OUTPATIENT)
Dept: PHYSICAL THERAPY | Age: 63
Discharge: HOME | End: 2018-06-25
Payer: MEDICARE

## 2018-06-25 PROCEDURE — 97110 THERAPEUTIC EXERCISES: CPT

## 2018-06-25 NOTE — PROGRESS NOTES
PT DAILY TREATMENT NOTE - [facility] 2-15    Patient Name: Rose Notice  Date:2018  : 1955  [x]  Patient  Verified  Payor: VA MEDICARE / Plan: VA MEDICARE PART A & B / Product Type: Medicare /    In time: 12:00 PM  Out time: 1:00 PM  Total Treatment Time (min): 60 minutes  Total Timed Codes (min): 60 minutes  1:1 Treatment Time (MC only): 30 minutes   Visit #: 13     Treatment Area: Weakness [R53.1]  Localized edema [R60.0]    SUBJECTIVE  Pain Level (0-10 scale): 0/10  Any medication changes, allergies to medications, adverse drug reactions, diagnosis change, or new procedure performed?: [x] No    [] Yes (see summary sheet for update)  Subjective functional status/changes:   [] No changes reported  The Pt reports that she woke up today without any pain or stiffness, but it did return after her breakfast.  She was able to get on and off of her bed this morning without assistance. OBJECTIVE    60 min Therapeutic Exercise:  [x] See flow sheet :   Rationale: increase ROM, increase strength, improve coordination, improve balance and increase proprioception to improve the patients ability to perform ADls          With   [] TE   [] TA   [] neuro   [] other: Patient Education: [x] Review HEP    [] Progressed/Changed HEP based on:   [] positioning   [] body mechanics   [] transfers   [] heat/ice application    [] other:      Other Objective/Functional Measures: None noted     Pain Level (0-10 scale) post treatment: 0/10    ASSESSMENT/Changes in Function:   The Pt tolerated her therapy program well, but did fatigue more quickly in her LEs. Today, she was able to elevate her arms to 90 degrees in sidelying shoulder abduction. She fatigued quickly during the set of 5, but was never exhausted. She is progressing well towards her goals.   Patient will continue to benefit from skilled PT services to modify and progress therapeutic interventions, address functional mobility deficits, address ROM deficits, address strength deficits, analyze and address soft tissue restrictions, analyze and cue movement patterns, analyze and modify body mechanics/ergonomics, assess and modify postural abnormalities and instruct in home and community integration to attain remaining goals. []  See Plan of Care  []  See progress note/recertification  []  See Discharge Summary         Progress towards goals / Updated goals:  Short Term Goals: To be accomplished in 6 treatments:  1. The Pt will be independent and compliant with their HEP- progressing  Long Term Goals: To be accomplished in 20 treatments:  1. The Pt will score the MCII on her FOTO survey demonstrating improved overall function (47 to 59 points)- progressing  2. The Pt will be able to perform >/= 9 sit to stands in 30s demonstrating improved LE strength and stability- progressing  3. The Pt will perform the TUG test in </= 28s demonstrating improved community ambulation and gait speed- progressing  4.  The Pt will have >/= 70 degrees of active shoulder flexion to allow the Pt to be able to perform ADLs with less difficulty- progressing     PLAN  [x]  Upgrade activities as tolerated     [x]  Continue plan of care  [x]  Update interventions per flow sheet       []  Discharge due to:_  []  Other:_      Regina Mooney, PT 6/25/2018  12:36 PM

## 2018-06-27 ENCOUNTER — APPOINTMENT (OUTPATIENT)
Dept: PHYSICAL THERAPY | Age: 63
End: 2018-06-27
Payer: MEDICARE

## 2018-06-29 ENCOUNTER — HOSPITAL ENCOUNTER (OUTPATIENT)
Dept: PHYSICAL THERAPY | Age: 63
Discharge: HOME | End: 2018-06-29
Payer: MEDICARE

## 2018-06-29 PROCEDURE — 97140 MANUAL THERAPY 1/> REGIONS: CPT

## 2018-06-29 PROCEDURE — 97110 THERAPEUTIC EXERCISES: CPT

## 2018-06-29 NOTE — PROGRESS NOTES
PT DAILY TREATMENT NOTE - [facility] 2-15    Patient Name: Aravind Gray  Date:2018  : 1955  [x]  Patient  Verified  Payor: [place] MEDICARE / Plan: [place] MEDICARE PART A & B / Product Type: Medicare /    In time: 11:51 AM  Out time: 1:00 PM  Total Treatment Time (min): 69 minutes  Total Timed Codes (min): 69 minutes  1:1 Treatment Time (MC only): 60 minutes   Visit #: 14     Treatment Area: Weakness [R53.1]  Localized edema [R60.0]    SUBJECTIVE  Pain Level (0-10 scale): 3/10  Any medication changes, allergies to medications, adverse drug reactions, diagnosis change, or new procedure performed?: [x] No    [] Yes (see summary sheet for update)  Subjective functional status/changes:   [] No changes reported  The Pt reports that she was experiencing some soreness and stiffness in the anterior thigh bilaterally. She states that she is feeling much stronger and able to perform her ADLs with less difficulty.     OBJECTIVE    40 min Therapeutic Exercise:  [x] See flow sheet :   Rationale: increase ROM, increase strength, improve coordination, improve balance and increase proprioception to improve the patients ability to perform ADLs with less difficulty    20 min Manual Therapy: PROM stretching of shoulders in all planes bilaterally    Rationale: decrease pain, increase ROM and increase tissue extensibility to improve the patients ability to perform ADLs with less difficulty    With   [x] TE   [] TA   [] neuro   [] other: Patient Education: [x] Review HEP    [] Progressed/Changed HEP based on:   [] positioning   [] body mechanics   [] transfers   [] heat/ice application    [] other:      Other Objective/Functional Measures: None noted     Pain Level (0-10 scale) post treatment: 0/10    ASSESSMENT/Changes in Function:   The Pt continues to have improved strength and activity tolerance with her exercise program.  She is able to elevate her arm ~130 degrees in sidelying abduction and is continually progressing with the PROM stretching in all planes. Will progress as tolerated during next PT session. Patient will continue to benefit from skilled PT services to modify and progress therapeutic interventions, address functional mobility deficits, address ROM deficits, address strength deficits, analyze and address soft tissue restrictions, analyze and cue movement patterns, analyze and modify body mechanics/ergonomics, assess and modify postural abnormalities and instruct in home and community integration to attain remaining goals. []  See Plan of Care  []  See progress note/recertification  []  See Discharge Summary         Progress towards goals / Updated goals:  Short Term Goals: To be accomplished in 6 treatments:  1. The Pt will be independent and compliant with their HEP- progressing  Long Term Goals: To be accomplished in 20 treatments:  1. The Pt will score the MCII on her FOTO survey demonstrating improved overall function (47 to 59 points)- progressing  2. The Pt will be able to perform >/= 9 sit to stands in 30s demonstrating improved LE strength and stability- progressing  3. The Pt will perform the TUG test in </= 28s demonstrating improved community ambulation and gait speed- progressing  4.  The Pt will have >/= 70 degrees of active shoulder flexion to allow the Pt to be able to perform ADLs with less difficulty- progressing     PLAN  [x]  Upgrade activities as tolerated     [x]  Continue plan of care  [x]  Update interventions per flow sheet       []  Discharge due to:_  []  Other:_      Noemy Valentin, PT 6/29/2018  12:34 PM

## 2018-07-02 ENCOUNTER — HOSPITAL ENCOUNTER (OUTPATIENT)
Dept: PHYSICAL THERAPY | Age: 63
Discharge: HOME | End: 2018-07-02
Payer: MEDICARE

## 2018-07-02 PROCEDURE — 97110 THERAPEUTIC EXERCISES: CPT

## 2018-07-02 NOTE — PROGRESS NOTES
PT DAILY TREATMENT NOTE - [facility] 2-15    Patient Name: Cindy Velazquez  Date:2018  : 1955  [x]  Patient  Verified  Payor: [place] MEDICARE / Plan: VA MEDICARE PART A & B / Product Type: Medicare /    In time: 12:46 PM  Out time: 2:02 PM  Total Treatment Time (min): 76 minutes  Total Timed Codes (min): 76 minutes  1:1 Treatment Time (MC only): 45 minutes   Visit #: 15     Treatment Area: Weakness [R53.1]  Localized edema [R60.0]    SUBJECTIVE  Pain Level (0-10 scale): 0/10  Any medication changes, allergies to medications, adverse drug reactions, diagnosis change, or new procedure performed?: [x] No    [] Yes (see summary sheet for update)  Subjective functional status/changes:   [] No changes reported  The Pt reports that the pain has dissipated, but she has stiffness in her hips this morning. She has been walking without the RW at home and used her [facility] to walk into the clinic today and denies any difficulty using the [facility]. OBJECTIVE    76 min Therapeutic Exercise:  [x] See flow sheet :   Rationale: increase ROM, increase strength, improve coordination, improve balance and increase proprioception to improve the patients ability to ambulate and perform ADLs with less difficulty    With   [x] TE   [] TA   [] neuro   [] other: Patient Education: [x] Review HEP    [] Progressed/Changed HEP based on:   [] positioning   [] body mechanics   [] transfers   [] heat/ice application    [] other:      Other Objective/Functional Measures: None noted     Pain Level (0-10 scale) post treatment: 0/10    ASSESSMENT/Changes in Function:   The Pt did require mild cuing to correct the her gait pattern using a SPC and made the corrections very easily. She was able to ambulate with her trunk being more erect and less hunched forward. She is progressing well with her exercises.   Patient will continue to benefit from skilled PT services to modify and progress therapeutic interventions, address functional mobility deficits, address ROM deficits, address strength deficits, analyze and address soft tissue restrictions, analyze and cue movement patterns, analyze and modify body mechanics/ergonomics, assess and modify postural abnormalities and instruct in home and community integration to attain remaining goals. []  See Plan of Care  []  See progress note/recertification  []  See Discharge Summary         Progress towards goals / Updated goals:  Short Term Goals: To be accomplished in 6 treatments:  1. The Pt will be independent and compliant with their HEP- progressing  Long Term Goals: To be accomplished in 20 treatments:  1. The Pt will score the MCII on her FOTO survey demonstrating improved overall function (47 to 59 points)- progressing  2. The Pt will be able to perform >/= 9 sit to stands in 30s demonstrating improved LE strength and stability- progressing  3. The Pt will perform the TUG test in </= 28s demonstrating improved community ambulation and gait speed- progressing  4.  The Pt will have >/= 70 degrees of active shoulder flexion to allow the Pt to be able to perform ADLs with less difficulty- progressing     PLAN  [x]  Upgrade activities as tolerated     [x]  Continue plan of care  [x]  Update interventions per flow sheet       []  Discharge due to:_  []  Other:_      Rangel Palma, PT 7/2/2018  1:27 PM

## 2018-07-06 ENCOUNTER — HOSPITAL ENCOUNTER (OUTPATIENT)
Dept: PHYSICAL THERAPY | Age: 63
Discharge: HOME | End: 2018-07-06
Payer: MEDICARE

## 2018-07-06 PROCEDURE — 97110 THERAPEUTIC EXERCISES: CPT

## 2018-07-06 NOTE — PROGRESS NOTES
PT DAILY TREATMENT NOTE - [facility] 2-15    Patient Name: Sarita Joseph  Date:2018  : 1955  [x]  Patient  Verified  Payor: VA MEDICARE / Plan: VA MEDICARE PART A & B / Product Type: Medicare /    In time:11:57 AM  Out time: 1:04 PM  Total Treatment Time (min): 67 minutes  Total Timed Codes (min): 67 minutes  1:1 Treatment Time (MC only): 35 minutes   Visit #: 26     Treatment Area: Weakness [R53.1]  Localized edema [R60.0]    SUBJECTIVE  Pain Level (0-10 scale): 0/10  Any medication changes, allergies to medications, adverse drug reactions, diagnosis change, or new procedure performed?: [x] No    [] Yes (see summary sheet for update)  Subjective functional status/changes:   [] No changes reported  The Pt reports that she is doing well overall and has been using the cane more frequently than her RW. She states that both the legs and shoulders are feeling stronger and she is able to do more. OBJECTIVE    67 min Therapeutic Exercise:  [x] See flow sheet :   Rationale: increase ROM, increase strength, improve coordination, improve balance and increase proprioception to improve the patients ability to ambulate and perform ADLs with less difficulty    With   [x] TE   [] TA   [] neuro   [] other: Patient Education: [x] Review HEP    [] Progressed/Changed HEP based on:   [] positioning   [] body mechanics   [] transfers   [] heat/ice application    [] other:      Other Objective/Functional Measures: None noted     Pain Level (0-10 scale) post treatment: 0/10    ASSESSMENT/Changes in Function:   The Pt tolerated the additions to her therapy program well. She is doing well with sidelying shoulder elevation exercises, but continues to have significant difficulty and weakness with elevation against gravity. Will progress as tolerated during next PT session.   Patient will continue to benefit from skilled PT services to modify and progress therapeutic interventions, address functional mobility deficits, address ROM deficits, address strength deficits, analyze and address soft tissue restrictions, analyze and cue movement patterns, analyze and modify body mechanics/ergonomics, assess and modify postural abnormalities and instruct in home and community integration to attain remaining goals. []  See Plan of Care  []  See progress note/recertification  []  See Discharge Summary         Progress towards goals / Updated goals:  Short Term Goals: To be accomplished in 6 treatments:  1. The Pt will be independent and compliant with their HEP- progressing  Long Term Goals: To be accomplished in 20 treatments:  1. The Pt will score the MCII on her FOTO survey demonstrating improved overall function (47 to 59 points)- progressing  2. The Pt will be able to perform >/= 9 sit to stands in 30s demonstrating improved LE strength and stability- progressing  3. The Pt will perform the TUG test in </= 28s demonstrating improved community ambulation and gait speed- progressing  4.  The Pt will have >/= 70 degrees of active shoulder flexion to allow the Pt to be able to perform ADLs with less difficulty- progressing     PLAN  [x]  Upgrade activities as tolerated     [x]  Continue plan of care  [x]  Update interventions per flow sheet       []  Discharge due to:_  []  Other:_      Veryl Priti, PT 7/6/2018  1:05 PM

## 2018-07-16 ENCOUNTER — HOSPITAL ENCOUNTER (OUTPATIENT)
Dept: PHYSICAL THERAPY | Age: 63
Discharge: HOME | End: 2018-07-16
Payer: MEDICARE

## 2018-07-16 PROCEDURE — 97140 MANUAL THERAPY 1/> REGIONS: CPT

## 2018-07-16 PROCEDURE — G8979 MOBILITY GOAL STATUS: HCPCS

## 2018-07-16 PROCEDURE — G8978 MOBILITY CURRENT STATUS: HCPCS

## 2018-07-16 PROCEDURE — 97110 THERAPEUTIC EXERCISES: CPT

## 2018-07-16 NOTE — PROGRESS NOTES
PT DAILY TREATMENT NOTE - [facility] 2-15    Patient Name: Prabha Mtz  Date:2018  : 1955  [x]  Patient  Verified  Payor: VA MEDICARE / Plan: VA MEDICARE PART A & B / Product Type: Medicare /    In time: 11:50 AM  Out time: 1:15 PM  Total Treatment Time (min): 85 minutes  Total Timed Codes (min): 85 minutes  1:1 Treatment Time ([address] only): 70 minutes   Visit #: 17     Treatment Area: Weakness [R53.1]  Localized edema [R60.0]    SUBJECTIVE  Pain Level (0-10 scale): 2/10  Any medication changes, allergies to medications, adverse drug reactions, diagnosis change, or new procedure performed?: [x] No    [] Yes (see summary sheet for update)  Subjective functional status/changes:   [] No changes reported  The Pt reports that she was compliant with her HEP during last week. She states that she is fatigued and tired this morning. She continues to ambulate primarily with the [facility] and feels like her activity tolerance and endurance have improved greatly since beginning therapy. She states that she is still unable to elevate her arms high enough to perform ADLs without compensations, but states that the AROM is improving slowly.     OBJECTIVE    65 min Therapeutic Exercise:  [x] See flow sheet :   Rationale: increase ROM, increase strength, improve coordination, improve balance and increase proprioception to improve the patients ability to ambulate and perform ADLs with less difficulty    20 min Manual Therapy: PROM stretching of the shoulders in all planes and posterior capsule stretching bilaterally    Rationale: decrease pain, increase ROM and increase tissue extensibility to improve the patients ability to perform ADLs with less difficulty    With   [x] TE   [] TA   [] neuro   [] other: Patient Education: [x] Review HEP    [] Progressed/Changed HEP based on:   [] positioning   [] body mechanics   [] transfers   [] heat/ice application    [] other:      Other Objective/Functional Measures: FOTO 54/100 (47/100 at initial evaluation)     Pain Level (0-10 scale) post treatment: 0/10    ASSESSMENT/Changes in Function:     Patient will continue to benefit from skilled PT services to modify and progress therapeutic interventions, address functional mobility deficits, address ROM deficits, address strength deficits, analyze and address soft tissue restrictions, analyze and cue movement patterns, analyze and modify body mechanics/ergonomics, assess and modify postural abnormalities and instruct in home and community integration to attain remaining goals. []  See Plan of Care  [x]  See progress note/recertification  []  See Discharge Summary         Progress towards goals / Updated goals:  Short Term Goals: To be accomplished in 6 treatments:  1. The Pt will be independent and compliant with their HEP- progressing  Long Term Goals: To be accomplished in 20 treatments:  1. The Pt will score the MCII on her FOTO survey demonstrating improved overall function (47 to 59 points)- progressing  2. The Pt will be able to perform >/= 9 sit to stands in 30s demonstrating improved LE strength and stability- progressing  3. The Pt will perform the TUG test in </= 28s demonstrating improved community ambulation and gait speed- progressing  4.  The Pt will have >/= 70 degrees of active shoulder flexion to allow the Pt to be able to perform ADLs with less difficulty- progressing    PLAN  [x]  Upgrade activities as tolerated     [x]  Continue plan of care  [x]  Update interventions per flow sheet       []  Discharge due to:_  []  Other:_      Jerel Gee, PT 7/16/2018  2:13 PM

## 2018-07-16 NOTE — PROGRESS NOTES
[facility]  [address] [place] [address] (MOB IV), [address] [place]  Phone: [phone] Fax: [phone]    Progress Note    Name: Jessica Marques   : 1955   MD: Veronica Reardon MD       Treatment Diagnosis: Weakness [R53.1]  Localized edema [R60.0]  Start of Care: 18    Visits from Start of Care: 17  Missed Visits: 0    Summary of Care: The Pt has been seen for 17 outpatient physical therapy session with the diagnosis of severe upper and lower extremity weakness following the diagnosis of polymyocitis. She continues to progress well with her therapy program that has focused on improving her UE strength and stability, improving her LE strength and stability, improving her core and postural strength and stability, improving her UE A/PROM, improving her standing/walking tolerance, improving her balance and neuromuscular control and improving her activity tolerance and endurance via therapeutic exercises and manual therapy techniques. The Pt has improved greatly with her activity tolerance and endurance and is not fatiguing as quickly while performing her ADLs or simple ambulation. She is able to walk primarily with her [facility] at this time, and is also able to walk independently without use of assistive devices for short periods of time. She continues to have significant deficits in her shoulder active flexion, but it is slowly improving and she is having to compensate less to perform her UE ADLs. Recommend continued PT for an additional 15 sessions to help progress the Pt's above-listed impairments to allow the Pt to safely return to her PLOF with less difficulty or risk of further injury. Thank you for this referral.     Progress towards goals / Updated goals:  Short Term Goals: To be accomplished in 6 treatments:  1. The Pt will be independent and compliant with their HEP- progressing  Long Term Goals: To be accomplished in 20 treatments:  1.  The Pt will score the MCII on her FOTO survey demonstrating improved overall function (47 to 59 points)- progressing  2. The Pt will be able to perform >/= 9 sit to stands in 30s demonstrating improved LE strength and stability- progressing  3. The Pt will perform the TUG test in </= 28s demonstrating improved community ambulation and gait speed- progressing  4. The Pt will have >/= 70 degrees of active shoulder flexion to allow the Pt to be able to perform ADLs with less difficulty- progressing     G-Codes (GP)  Mobility  [id number] Current  CK= 40-59%   Goal  CK= 40-59%    The severity rating is based on the FOTO Score score. Silvia Fitzgerald, PT 7/16/2018 2:19 PM    ________________________________________________________________________  NOTE TO PHYSICIAN:  Please complete the following and fax to: [facility]: Fax: [phone]  . Retain this original for your records. If you are unable to process this request in 24 hours, please contact our office.        ____ I have read the above report and request that my patient continue therapy with the following changes/special instructions:  ____ I have read the above report and request that my patient be discharged from therapy    Physician's Signature:_________________ Date:___________Time:__________

## 2018-07-18 ENCOUNTER — HOSPITAL ENCOUNTER (OUTPATIENT)
Dept: PHYSICAL THERAPY | Age: 63
Discharge: HOME | End: 2018-07-18
Payer: MEDICARE

## 2018-07-18 PROCEDURE — 97110 THERAPEUTIC EXERCISES: CPT

## 2018-07-18 NOTE — PROGRESS NOTES
PT DAILY TREATMENT NOTE - [facility] 2-15    Patient Name: Lazaro Benites  Date:2018  : 1955  [x]  Patient  Verified  Payor: VA MEDICARE / Plan: VA MEDICARE PART A & B / Product Type: Medicare /    In time: 12:51 PM  Out time: 2:04 PM  Total Treatment Time (min): 73 minutes  Total Timed Codes (min): 73 minutes  1:1 Treatment Time (MC only): 55 minutes   Visit #: 18     Treatment Area: Weakness [R53.1]  Localized edema [R60.0]    SUBJECTIVE  Pain Level (0-10 scale): 0/10  Any medication changes, allergies to medications, adverse drug reactions, diagnosis change, or new procedure performed?: [x] No    [] Yes (see summary sheet for update)  Subjective functional status/changes:   [] No changes reported  The Pt reports that she is tired today and feels like she is in a fog. She thinks that she may have had a small flare up yesterday because her joints are hurting her more. OBJECTIVE    73 min Therapeutic Exercise:  [x] See flow sheet :   Rationale: increase ROM, increase strength, improve coordination, improve balance and increase proprioception to improve the patients ability to ambulate and perform ADLs with less pain or discomfort. With   [x] TE   [] TA   [] neuro   [] other: Patient Education: [x] Review HEP    [] Progressed/Changed HEP based on:   [] positioning   [] body mechanics   [] transfers   [] heat/ice application    [] other:      Other Objective/Functional Measures: None noted     Pain Level (0-10 scale) post treatment: 0/10    ASSESSMENT/Changes in Function:   The Pt started off very slow and more fatigued, but after ~20 minutes she was able to do more with less fatigue. She was able to perform step ups on a standard stair with only mild cuing needed to not use her arms to pull herself off- she was able to do it without that compensation.   Patient will continue to benefit from skilled PT services to modify and progress therapeutic interventions, address functional mobility deficits, address ROM deficits, address strength deficits, analyze and address soft tissue restrictions, analyze and cue movement patterns, analyze and modify body mechanics/ergonomics, assess and modify postural abnormalities and instruct in home and community integration to attain remaining goals. []  See Plan of Care  []  See progress note/recertification  []  See Discharge Summary         Progress towards goals / Updated goals:  Short Term Goals: To be accomplished in 6 treatments:  1. The Pt will be independent and compliant with their HEP- progressing  Long Term Goals: To be accomplished in 20 treatments:  1. The Pt will score the MCII on her FOTO survey demonstrating improved overall function (47 to 59 points)- progressing  2. The Pt will be able to perform >/= 9 sit to stands in 30s demonstrating improved LE strength and stability- progressing  3. The Pt will perform the TUG test in </= 28s demonstrating improved community ambulation and gait speed- progressing  4.  The Pt will have >/= 70 degrees of active shoulder flexion to allow the Pt to be able to perform ADLs with less difficulty- progressing     PLAN  [x]  Upgrade activities as tolerated     [x]  Continue plan of care  [x]  Update interventions per flow sheet       []  Discharge due to:_  []  Other:_      Casey Veloz, PT 7/18/2018  1:20 PM

## 2018-07-23 ENCOUNTER — APPOINTMENT (OUTPATIENT)
Dept: PHYSICAL THERAPY | Age: 63
End: 2018-07-23
Payer: MEDICARE

## 2018-07-25 ENCOUNTER — HOSPITAL ENCOUNTER (OUTPATIENT)
Dept: PHYSICAL THERAPY | Age: 63
Discharge: HOME | End: 2018-07-25
Payer: MEDICARE

## 2018-07-25 PROCEDURE — 97110 THERAPEUTIC EXERCISES: CPT

## 2018-07-25 NOTE — PROGRESS NOTES
PT DAILY TREATMENT NOTE - [facility] 2-15    Patient Name: Fabby Acuna  Date:2018  : 1955  [x]  Patient  Verified  Payor: VA MEDICARE / Plan: VA MEDICARE PART A & B / Product Type: Medicare /    In time: 12:45 PM  Out time: 2:06 PM  Total Treatment Time (min): 81 minutes  Total Timed Codes (min): 81 minutes  1:1 Treatment Time ([address] only): 60 minutes   Visit #: 19     Treatment Area: Weakness [R53.1]  Localized edema [R60.0]    SUBJECTIVE  Pain Level (0-10 scale): 6/10  Any medication changes, allergies to medications, adverse drug reactions, diagnosis change, or new procedure performed?: [x] No    [] Yes (see summary sheet for update)  Subjective functional status/changes:   [] No changes reported  The Pt reports that she is tired and feels more stiff and achy. She states that her arms were working better yesterday. OBJECTIVE    81 min Therapeutic Exercise:  [x] See flow sheet :   Rationale: increase ROM, increase strength, improve coordination, improve balance and increase proprioception to improve the patients ability to ambulate and perform ADLs with less difficulty          With   [x] TE   [] TA   [] neuro   [] other: Patient Education: [x] Review HEP    [] Progressed/Changed HEP based on:   [] positioning   [] body mechanics   [] transfers   [] heat/ice application    [] other:      Other Objective/Functional Measures: None noted     Pain Level (0-10 scale) post treatment: 0/10    ASSESSMENT/Changes in Function:   The Pt had improved activity tolerance and endurance and was able to perform more reps consecutively with less fatigue. Today, was able to clean up her sit to stands and not allow her to compensate as much and she performed these well, but reported that they were significantly more difficult.   Patient will continue to benefit from skilled PT services to modify and progress therapeutic interventions, address functional mobility deficits, address ROM deficits, address strength deficits, analyze and address soft tissue restrictions, analyze and cue movement patterns, analyze and modify body mechanics/ergonomics, assess and modify postural abnormalities and instruct in home and community integration to attain remaining goals. []  See Plan of Care  []  See progress note/recertification  []  See Discharge Summary         Progress towards goals / Updated goals:  Short Term Goals: To be accomplished in 6 treatments:  1. The Pt will be independent and compliant with their HEP- progressing  Long Term Goals: To be accomplished in 20 treatments:  1. The Pt will score the MCII on her FOTO survey demonstrating improved overall function (47 to 59 points)- progressing  2. The Pt will be able to perform >/= 9 sit to stands in 30s demonstrating improved LE strength and stability- progressing  3. The Pt will perform the TUG test in </= 28s demonstrating improved community ambulation and gait speed- progressing  4.  The Pt will have >/= 70 degrees of active shoulder flexion to allow the Pt to be able to perform ADLs with less difficulty- progressing     PLAN  [x]  Upgrade activities as tolerated     [x]  Continue plan of care  [x]  Update interventions per flow sheet       []  Discharge due to:_  []  Other:_      Efrem Bowling, PT 7/25/2018  2:06 PM

## 2018-07-27 ENCOUNTER — APPOINTMENT (OUTPATIENT)
Dept: PHYSICAL THERAPY | Age: 63
End: 2018-07-27
Payer: MEDICARE

## 2018-07-30 ENCOUNTER — HOSPITAL ENCOUNTER (OUTPATIENT)
Dept: PHYSICAL THERAPY | Age: 63
Discharge: HOME | End: 2018-07-30
Payer: MEDICARE

## 2018-07-30 PROCEDURE — 97110 THERAPEUTIC EXERCISES: CPT

## 2018-07-30 NOTE — PROGRESS NOTES
PT DAILY TREATMENT NOTE - [facility] 2-15    Patient Name: Elizabet Olmos  Date:2018  : 1955  [x]  Patient  Verified  [place]or: [place] MED[place] / Plan: [place] MEDICARE PART A & B / Product Type: Medicare /    In time: 12:53 PM  Out time: 2:04 PM  Total Treatment Time (min): 71 minutes  Total Timed Codes (min): 71 minutes  1:1 Treatment Time ( only): 45 minutes   Visit #: 20     Treatment Area: Weakness [R53.1]  Localized edema [R60.0]    SUBJECTIVE  Pain Level (0-10 scale): 2/10  Any medication changes, allergies to medications, adverse drug reactions, diagnosis change, or new procedure performed?: [x] No    [] Yes (see summary sheet for update)  Subjective functional status/changes:   [] No changes reported  The Pt reports that she is a little achy in her legs, but is otherwise doing well. She states that she has been having more energy and has been able to walk more without her cane. She states that she feels stable on her feet and her legs do not feel like they are going to give out on her. She continues to report diligence with her HEP. OBJECTIVE    71 min Therapeutic Exercise:  [x] See flow sheet :   Rationale: increase ROM, increase strength, improve coordination, improve balance and increase proprioception to improve the patients ability to ambulate and perform ADLs with less difficulty    With   [x] TE   [] TA   [] neuro   [] other: Patient Education: [x] Review HEP    [] Progressed/Changed HEP based on:   [] positioning   [] body mechanics   [] transfers   [] heat/ice application    [] other:      Other Objective/Functional Measures: None noted     Pain Level (0-10 scale) post treatment: 0/10    ASSESSMENT/Changes in Function:   The Pt was able to perform more repetitions with less fatigue. She is progressing well towards her goals and will transition to therapy 2x/wk to help continue her improvement.   Patient will continue to benefit from skilled PT services to modify and progress therapeutic interventions, address functional mobility deficits, address ROM deficits, address strength deficits, analyze and address soft tissue restrictions, analyze and cue movement patterns, analyze and modify body mechanics/ergonomics, assess and modify postural abnormalities and instruct in home and community integration to attain remaining goals. []  See Plan of Care  []  See progress note/recertification  []  See Discharge Summary         Progress towards goals / Updated goals:  Short Term Goals: To be accomplished in 6 treatments:  1. The Pt will be independent and compliant with their HEP- progressing  Long Term Goals: To be accomplished in 20 treatments:  1. The Pt will score the MCII on her FOTO survey demonstrating improved overall function (47 to 59 points)- progressing  2. The Pt will be able to perform >/= 9 sit to stands in 30s demonstrating improved LE strength and stability- progressing  3. The Pt will perform the TUG test in </= 28s demonstrating improved community ambulation and gait speed- progressing  4.  The Pt will have >/= 70 degrees of active shoulder flexion to allow the Pt to be able to perform ADLs with less difficulty- progressing     PLAN  [x]  Upgrade activities as tolerated     [x]  Continue plan of care  [x]  Update interventions per flow sheet       []  Discharge due to:_  []  Other:_      Stuart Swan, PT 7/30/2018  1:25 PM

## 2018-08-01 ENCOUNTER — HOSPITAL ENCOUNTER (OUTPATIENT)
Dept: PHYSICAL THERAPY | Age: 63
Discharge: HOME | End: 2018-08-01
Payer: MEDICARE

## 2018-08-01 PROCEDURE — 97110 THERAPEUTIC EXERCISES: CPT

## 2018-08-01 PROCEDURE — 97140 MANUAL THERAPY 1/> REGIONS: CPT

## 2018-08-01 NOTE — PROGRESS NOTES
PT DAILY TREATMENT NOTE - [facility] 2-15    Patient Name: Franc Thompson  Date:2018  : 1955  [x]  Patient  Verified  Payor: [place] MEDICARE / Plan: [place] MEDICARE PART A & B / Product Type: Medicare /    In time: 12:26 PM  Out time: 1:40 PM  Total Treatment Time (min): 74 minutes  Total Timed Codes (min): 74 minutes  1:1 Treatment Time (MC only): 45 minutes   Visit #: 21     Treatment Area: Weakness [R53.1]  Localized edema [R60.0]    SUBJECTIVE  Pain Level (0-10 scale): 2/10  Any medication changes, allergies to medications, adverse drug reactions, diagnosis change, or new procedure performed?: [x] No    [] Yes (see summary sheet for update)  Subjective functional status/changes:   [] No changes reported  The Pt reports that she had a very stressful day yesterday because her dad was in a car accident. She reports that this morning she woke up and felt exhausted and had difficulty walking and had to use the RW. She states that she is both mentally and physically fatigued.      OBJECTIVE    54 min Therapeutic Exercise:  [x] See flow sheet :   Rationale: increase ROM, increase strength, improve coordination, improve balance and increase proprioception to improve the patients ability to ambulate and perform ADLs with less difficulty    20 min Manual Therapy: PROM shoulder stretching in all planes to Pt's tolerance; posterior capsule stretching bilaterally    Rationale: decrease pain, increase ROM and increase tissue extensibility to improve the patients ability to perform ADLs with less difficulty    With   [x] TE   [] TA   [] neuro   [] other: Patient Education: [x] Review HEP    [] Progressed/Changed HEP based on:   [] positioning   [] body mechanics   [] transfers   [] heat/ice application    [] other:      Other Objective/Functional Measures: None noted     Pain Level (0-10 scale) post treatment: 1/10    ASSESSMENT/Changes in Function:   The Pt was significantly more fatigued with her exercises today and required more rest and was not able to stand from the same height with the sit to stands as she had during the previous session. She reported feeling a little better at the end of the session, but still very fatigued. Will progress as tolerated during next PT session. Patient will continue to benefit from skilled PT services to modify and progress therapeutic interventions, address functional mobility deficits, address ROM deficits, address strength deficits, analyze and address soft tissue restrictions, analyze and cue movement patterns, analyze and modify body mechanics/ergonomics, assess and modify postural abnormalities and instruct in home and community integration to attain remaining goals. []  See Plan of Care  []  See progress note/recertification  []  See Discharge Summary         Progress towards goals / Updated goals:  Short Term Goals: To be accomplished in 6 treatments:  1. The Pt will be independent and compliant with their HEP- progressing  Long Term Goals: To be accomplished in 20 treatments:  1. The Pt will score the MCII on her FOTO survey demonstrating improved overall function (47 to 59 points)- progressing  2. The Pt will be able to perform >/= 9 sit to stands in 30s demonstrating improved LE strength and stability- progressing  3. The Pt will perform the TUG test in </= 28s demonstrating improved community ambulation and gait speed- progressing  4.  The Pt will have >/= 70 degrees of active shoulder flexion to allow the Pt to be able to perform ADLs with less difficulty- progressing     PLAN  [x]  Upgrade activities as tolerated     [x]  Continue plan of care  [x]  Update interventions per flow sheet       []  Discharge due to:_  []  Other:_      Manny Alves, PT 8/1/2018  2:02 PM

## 2018-08-03 ENCOUNTER — APPOINTMENT (OUTPATIENT)
Dept: PHYSICAL THERAPY | Age: 63
End: 2018-08-03
Payer: MEDICARE

## 2018-08-13 ENCOUNTER — HOSPITAL ENCOUNTER (OUTPATIENT)
Dept: PHYSICAL THERAPY | Age: 63
Discharge: HOME | End: 2018-08-13
Payer: MEDICARE

## 2018-08-13 PROCEDURE — 97110 THERAPEUTIC EXERCISES: CPT

## 2018-08-13 NOTE — PROGRESS NOTES
PT DAILY TREATMENT NOTE - [facility] 2-15    Patient Name: Yoli Martínez  Date:2018  : 1955  [x]  Patient  Verified  Payor: [place] MEDICARE / Plan: [place] MEDICARE PART A & B / Product Type: Medicare /    In time: 12:48 PM  Out time: 2:00 PM  Total Treatment Time (min): 72 minutes  Total Timed Codes (min): 65 minutes  1:1 Treatment Time (MC only): 45 minutes   Visit #: 22     Treatment Area: Weakness [R53.1]  Localized edema [R60.0]    SUBJECTIVE  Pain Level (0-10 scale): 6.5/10  Any medication changes, allergies to medications, adverse drug reactions, diagnosis change, or new procedure performed?: [x] No    [] Yes (see summary sheet for update)  Subjective functional status/changes:   [] No changes reported  The Pt reports that her legs are hurting her more today and are very achy. She states that she has been having a lot of family life stressors so she has not been doing her exercises as frequently. She has had to use her RW over the last few days because her legs have been hurting more and fatiguing more frequently. OBJECTIVE    65 min Therapeutic Exercise:  [x] See flow sheet :   Rationale: increase ROM, increase strength, improve coordination, improve balance and increase proprioception to improve the patients ability to ambulate and perform ADLs with less difficulty          With   [x] TE   [] TA   [] neuro   [] other: Patient Education: [x] Review HEP    [] Progressed/Changed HEP based on:   [] positioning   [] body mechanics   [] transfers   [] heat/ice application    [] other:      Other Objective/Functional Measures: None noted     Pain Level (0-10 scale) post treatment: 0/10    ASSESSMENT/Changes in Function:   The Pt had reduced discomfort and pain while performing her exercises. When she sat down to rest the pain returned (not as intensely), but when she got up and started to move again the pain dissipated. Will progress as tolerated during next PT session.   Patient will continue to benefit from skilled PT services to modify and progress therapeutic interventions, address functional mobility deficits, address ROM deficits, address strength deficits, analyze and address soft tissue restrictions, analyze and cue movement patterns, analyze and modify body mechanics/ergonomics, assess and modify postural abnormalities and instruct in home and community integration to attain remaining goals. []  See Plan of Care  []  See progress note/recertification  []  See Discharge Summary         Progress towards goals / Updated goals:  Short Term Goals: To be accomplished in 6 treatments:  1. The Pt will be independent and compliant with their HEP- progressing  Long Term Goals: To be accomplished in 20 treatments:  1. The Pt will score the MCII on her FOTO survey demonstrating improved overall function (47 to 59 points)- progressing  2. The Pt will be able to perform >/= 9 sit to stands in 30s demonstrating improved LE strength and stability- progressing  3. The Pt will perform the TUG test in </= 28s demonstrating improved community ambulation and gait speed- progressing  4.  The Pt will have >/= 70 degrees of active shoulder flexion to allow the Pt to be able to perform ADLs with less difficulty- progressing     PLAN  [x]  Upgrade activities as tolerated     [x]  Continue plan of care  [x]  Update interventions per flow sheet       []  Discharge due to:_  []  Other:_      Shivam Serrato, PT 8/13/2018  2:03 PM

## 2018-08-14 DIAGNOSIS — I10 ESSENTIAL HYPERTENSION: ICD-10-CM

## 2018-08-14 RX ORDER — LOSARTAN POTASSIUM 100 MG/1
TABLET ORAL
Qty: 90 TAB | Refills: 3 | Status: SHIPPED | OUTPATIENT
Start: 2018-08-14 | End: 2019-08-11 | Stop reason: SDUPTHER

## 2018-08-15 ENCOUNTER — HOSPITAL ENCOUNTER (OUTPATIENT)
Dept: PHYSICAL THERAPY | Age: 63
Discharge: HOME | End: 2018-08-15
Payer: MEDICARE

## 2018-08-15 PROCEDURE — 97112 NEUROMUSCULAR REEDUCATION: CPT

## 2018-08-15 PROCEDURE — G8979 MOBILITY GOAL STATUS: HCPCS

## 2018-08-15 PROCEDURE — 97110 THERAPEUTIC EXERCISES: CPT

## 2018-08-15 PROCEDURE — G8978 MOBILITY CURRENT STATUS: HCPCS

## 2018-08-15 NOTE — PROGRESS NOTES
[facility]  [address] ([place] [place]), [address] [facility] [place]  Phone: [phone] Fax: [phone]      Continued Plan of Care/ Re-certification for Physical Therapy Services    Hayder Lopez [place]1955   Hira Boone MD   Provider # [id number]                    Diagnosis: Weakness [R53.1]  Localized edema [R60.0]  Onset Date: December 2015  Prior Hospitalization: In December 2015     Visits from Start of Care: 23     Missed Visits: 0  Start of Care: 5/22/18  Prior Level of Function: The patient completed 20 minutes of exercise 1-2 times a week. 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 She has progressed well with her therapy program that has focused on improving her UE strength and stability, improving her LE strength and stability, improving her core and postural strength and stability, improving her UE A/PROM, improving her standing/walking tolerance, improving her balance and neuromuscular control and improving her activity tolerance and endurance via therapeutic exercises and manual therapy techniques. She has progressed very well with her shoulder PROM bilaterally and is no longer as limited. She has difficulty using the full ROM secondary to severe deficits in her shoulder strength, but this is improving gradually. Her LE strength has improved greatly and the Pt is now able to stand independently without using her arms to push herself up. She is now able to ambulate using a SPC for long distances and reports that she is able to stand/walk for ~10 minutes before she needs a break. She has recently had a flare up of her polymyocitis and has had to return to her RW, but continues to be able to walk for the same distance and stand independently.   Overall, she believes that she is ~75% improved since beginning therapy.     Gait and Functional Mobility:  Ambulates with RW, decreased heel strike bilaterally, decreased foot clearance,       LOWER QUARTER                                                          MUSCLE STRENGTH  KEY                                                                                                                    R                                          L  0 - No Contraction                                       Hip flex                                            3+                                        3+  1 - Trace                                                           er                                              3+                                         3+  2 - Poor                                                             ir                                               3+                                        3+  3 - Fair                                                               abd                                           NT                                       NT  4 - Good                                                             ext                                            NT                                       NT  5 - Normal                                                        Knee flex                                    4                                         4                                                                              Ext                                          4                                         4                                                                            Ankle DF                                  4                                          4                                                                             PF                                            5                                          5      Joint Mobility Assessment: Not assessed  Palpation: Not assessed     Special Tests:TU.31s           Upper Extremity Objective Data:  Posture:   Moderate forward head and protracted shoulders- reversible             Shoulder AROM:                                       Right                                   Left                        Flexion:                                           55                                        43     Shoulder PROM:                                       Right                                   Left                        Flexion:                                           165                                      165      UPPER QUARTER                                                           MUSCLE STRENGTH  KEY                                                                                                          R                                          L  0 - No Contraction                                       Shoulder Flexion                   2                                         2  1 - Trace                                                      Shoulder Abduction              2                                         2  2 - Poor                                                        Shoulder ER                         2                                         2  3 - Fair                                                          Shoulder IR                          3+                                        3+  4 - Good                                                        Elbow Flexion                       4                                          4  5 - Normal                                                     Elbow Extension                   4                                          4                                                                        Wrist Flexion                         4                                          4                                                                        Wrist Extension 4                                          4                                                                        Finger ABD/ADD                   4                                          4                                                                        MiddleTrapezius                   NT                                       NT                                                                         Lower Trapezius                   NT                                       NT     Key functional changes: Pt is able to ambulate with a SPC for ~10 minutes. Pt is able to transfer from sit to stand independently without use of her arms. Pt is able to perform all bed mobility independently (previously was unable to get in out or out of the bed without assistance)      Problems/ barriers to goal attainment: Polymyocitis flare ups     Problem List: pain affecting function, decrease ROM, decrease strength, impaired gait/ balance, decrease ADL/ functional abilitiies, decrease activity tolerance, decrease flexibility/ joint mobility and decrease transfer abilities    Treatment Plan: Therapeutic exercise, Therapeutic activities, Neuromuscular re-education, Physical agent/modality, Gait/balance training, Manual therapy, Patient education, Self Care training, Functional mobility training, Home safety training and Stair training     Patient Goal (s) has been updated and includes:   Short Term Goals: To be accomplished in 6 treatments:  1. The Pt will be independent and compliant with their HEP- met  Long Term Goals: To be accomplished in 20 treatments:  1. The Pt will score the MCII on her FOTO survey demonstrating improved overall function (47 to 59 points)- progressing  2. The Pt will be able to perform >/= 9 sit to stands in 30s demonstrating improved LE strength and stability- progressing  3. The Pt will perform the TUG test in </= 28s demonstrating improved community ambulation and gait speed- progressing  4. The Pt will have >/= 70 degrees of active shoulder flexion to allow the Pt to be able to perform ADLs with less difficulty- progressing     G-Codes (GP)  Mobility  [id number] Current  CL= 60-79%   Goal  CK= 40-59%    The severity rating is based on the FOTO Score score. Goals for this certification period to be accomplished in 8 treatments:    Frequency / Duration: Patient to be seen 2 times per week for 4 weeks:    Assessment / Recommendations:Recommend continued PT for an additional 8 sessions. This Pt continues to have a fair prognosis with therapy. Certification Period: 8/15/18-11/15/18    Katie Dutton, PT 8/15/2018 2:22 PM    ________________________________________________________________________  I certify that the above Therapy Services are being furnished while the patient is under my care. I agree with the treatment plan and certify that this therapy is necessary. Y or N I have read the above and request that my patient continue as recommended.   Y or N I have read the above report and request that my patient continue therapy with the following changes/special instructions  Y or N I have read the above report and request that my patient be discharged from therapy    Physician's Signature:_________________ Date:___________Time:__________

## 2018-08-15 NOTE — PROGRESS NOTES
PT DAILY TREATMENT NOTE - [facility] 2-15    Patient Name: Pily Bryant  Date:8/15/2018  : 1955  [x]  Patient  Verified  Payor: [place] MEDICARE / Plan: [place] MEDICARE PART A & B / Product Type: Medicare /    In time: 11:20 AM  Out time: 12:31 PM  Total Treatment Time (min): 71 minutes  Total Timed Codes (min): 71 minutes  1:1 Treatment Time ([address] only): 60 minutes   Visit #: 23     Treatment Area: Weakness [R53.1]  Localized edema [R60.0]    SUBJECTIVE  Pain Level (0-10 scale): 0/10  Any medication changes, allergies to medications, adverse drug reactions, diagnosis change, or new procedure performed?: [x] No    [] Yes (see summary sheet for update)  Subjective functional status/changes:   [] No changes reported  The Pt reports that she felt better after the last session, but the pain returned to her hips yesterday and this morning. She states that she has had to return to using her RW secondary to the increased hip pain.   She believes that she is 75% improved since beginning therapy    OBJECTIVE    56 min Therapeutic Exercise:  [x] See flow sheet :   Rationale: increase ROM, increase strength, improve coordination, improve balance and increase proprioception to improve the patients ability to ambulate and perform ADLs with less difficulty    15 min Neuromuscular Re-education:  [x]  See flow sheet :   Rationale: increase ROM, increase strength, improve coordination, improve balance and increase proprioception  to improve the patients ability to ambulate and perform ADLs with less risk of falling    With   [x] TE   [] TA   [] neuro   [] other: Patient Education: [x] Review HEP    [] Progressed/Changed HEP based on:   [] positioning   [] body mechanics   [] transfers   [] heat/ice application    [] other:      Other Objective/Functional Measures:  FOTO 40/100 (47/100 at initial evaluation)  Shoulder Flexion AROM:  Right- 55, Left- 43    TU.31s     Pain Level (0-10 scale) post treatment: 0/10    ASSESSMENT/Changes in Function:     Patient will continue to benefit from skilled PT services to modify and progress therapeutic interventions, address functional mobility deficits, address ROM deficits, address strength deficits, analyze and address soft tissue restrictions, analyze and cue movement patterns, analyze and modify body mechanics/ergonomics, assess and modify postural abnormalities and instruct in home and community integration to attain remaining goals. []  See Plan of Care  [x]  See progress note/recertification  []  See Discharge Summary         Progress towards goals / Updated goals:  Short Term Goals: To be accomplished in 6 treatments:  1. The Pt will be independent and compliant with their HEP- met  Long Term Goals: To be accomplished in 20 treatments:  1. The Pt will score the MCII on her FOTO survey demonstrating improved overall function (47 to 59 points)- progressing  2. The Pt will be able to perform >/= 9 sit to stands in 30s demonstrating improved LE strength and stability- progressing  3. The Pt will perform the TUG test in </= 28s demonstrating improved community ambulation and gait speed- progressing  4.  The Pt will have >/= 70 degrees of active shoulder flexion to allow the Pt to be able to perform ADLs with less difficulty- progressing     PLAN  [x]  Upgrade activities as tolerated     [x]  Continue plan of care  [x]  Update interventions per flow sheet       []  Discharge due to:_  []  Other:_      Shivam Serrato, PT 8/15/2018  11:39 AM

## 2018-08-17 ENCOUNTER — APPOINTMENT (OUTPATIENT)
Dept: PHYSICAL THERAPY | Age: 63
End: 2018-08-17
Payer: MEDICARE

## 2018-08-20 ENCOUNTER — HOSPITAL ENCOUNTER (OUTPATIENT)
Dept: PHYSICAL THERAPY | Age: 63
Discharge: HOME | End: 2018-08-20
Payer: MEDICARE

## 2018-08-20 PROCEDURE — 97110 THERAPEUTIC EXERCISES: CPT

## 2018-08-20 NOTE — PROGRESS NOTES
PT DAILY TREATMENT NOTE - [facility] 2-15    Patient Name: Ashwin Abreu  Date:2018  : 1955  [x]  Patient  Verified  Payor: [place] MEDICARE / Plan: [place] MEDICARE PART A & B / Product Type: Medicare /    In time: 12:50 PM  Out time:  2:09 PM  Total Treatment Time (min): 79 minutes  Total Timed Codes (min): 79 minutes  1:1 Treatment Time ( only): 65 minutes   Visit #: 24     Treatment Area: Weakness [R53.1]  Localized edema [R60.0]    SUBJECTIVE  Pain Level (0-10 scale): 6/10  Any medication changes, allergies to medications, adverse drug reactions, diagnosis change, or new procedure performed?: [x] No    [] Yes (see summary sheet for update)  Subjective functional status/changes:   [] No changes reported  The Pt reports that she is stiff and sore this morning. She has been using the bike and the pulleys, but has not been doing her exercises at home. OBJECTIVE    79 min Therapeutic Exercise:  [x] See flow sheet :   Rationale: increase ROM, increase strength, improve coordination, improve balance and increase proprioception to improve the patients ability to ambulate and perform ADLs with less difficulty    With   [x] TE   [] TA   [] neuro   [] other: Patient Education: [x] Review HEP    [] Progressed/Changed HEP based on:   [] positioning   [] body mechanics   [] transfers   [] heat/ice application    [] other:      Other Objective/Functional Measures: None noted     Pain Level (0-10 scale) post treatment: 0/10    ASSESSMENT/Changes in Function:   The Pt tolerated her exercises well. She did not have as much shoulder strength and was unable to perform sidelying shoulder abduction on the L due to weakness. She was educated to continue with her exercises and that the bike and pulley do not help with significant strength gains.   Patient will continue to benefit from skilled PT services to modify and progress therapeutic interventions, address functional mobility deficits, address ROM deficits, address strength deficits, analyze and address soft tissue restrictions, analyze and cue movement patterns, analyze and modify body mechanics/ergonomics, assess and modify postural abnormalities and instruct in home and community integration to attain remaining goals. []  See Plan of Care  []  See progress note/recertification  []  See Discharge Summary         Progress towards goals / Updated goals:  Short Term Goals: To be accomplished in 6 treatments:  1. The Pt will be independent and compliant with their HEP- met  Long Term Goals: To be accomplished in 20 treatments:  1. The Pt will score the MCII on her FOTO survey demonstrating improved overall function (47 to 59 points)- progressing  2. The Pt will be able to perform >/= 9 sit to stands in 30s demonstrating improved LE strength and stability- progressing  3. The Pt will perform the TUG test in </= 28s demonstrating improved community ambulation and gait speed- progressing  4.  The Pt will have >/= 70 degrees of active shoulder flexion to allow the Pt to be able to perform ADLs with less difficulty- progressing     PLAN  [x]  Upgrade activities as tolerated     [x]  Continue plan of care  [x]  Update interventions per flow sheet       []  Discharge due to:_  []  Other:_      Debora Eisenberg, PT 8/20/2018  1:40 PM

## 2018-08-22 ENCOUNTER — HOSPITAL ENCOUNTER (OUTPATIENT)
Dept: PHYSICAL THERAPY | Age: 63
Discharge: HOME | End: 2018-08-22
Payer: MEDICARE

## 2018-08-22 ENCOUNTER — DOCUMENTATION ONLY (OUTPATIENT)
Dept: INTERNAL MEDICINE CLINIC | Age: 63
End: 2018-08-22

## 2018-08-22 PROCEDURE — 97110 THERAPEUTIC EXERCISES: CPT

## 2018-08-22 NOTE — PROGRESS NOTES
PT DAILY TREATMENT NOTE - [facility] 2-15    Patient Name: Ashwin Abreu  Date:2018  : 1955  [x]  Patient  Verified  Payor: [place] MEDICARE / Plan: [place] MEDICARE PART A & B / Product Type: Medicare /    In time: 12:52 PM  Out time: 2:00 PM  Total Treatment Time (min): 68 minutes  Total Timed Codes (min): 68 minutes  1:1 Treatment Time ( only): 40 minutes   Visit #: 25     Treatment Area: Weakness [R53.1]  Localized edema [R60.0]    SUBJECTIVE  Pain Level (0-10 scale): 0/10  Any medication changes, allergies to medications, adverse drug reactions, diagnosis change, or new procedure performed?: [x] No    [] Yes (see summary sheet for update)  Subjective functional status/changes:   [] No changes reported  The Pt reports that she doesn't have any pain, but is more tired. She has been able to return to walking with her [facility] and not the . She practiced her sit to stands yesterday, but no other exercises. OBJECTIVE    68  min Therapeutic Exercise:  [x] See flow sheet :   Rationale: increase ROM, increase strength, improve coordination, improve balance and increase proprioception to improve the patients ability to ambulate and perform ADLs with less difficulty    With   [x] TE   [] TA   [] neuro   [] other: Patient Education: [x] Review HEP    [] Progressed/Changed HEP based on:   [] positioning   [] body mechanics   [] transfers   [] heat/ice application    [] other:      Other Objective/Functional Measures: None noted     Pain Level (0-10 scale) post treatment: 0/10    ASSESSMENT/Changes in Function:   The Pt was able to ambulate using her SPC, but did fatigue more quickly and required more breaks due to her LE fatigue. She was able to perform her LE strengthening exercises well.   Patient will continue to benefit from skilled PT services to modify and progress therapeutic interventions, address functional mobility deficits, address ROM deficits, address strength deficits, analyze and address soft tissue restrictions, analyze and cue movement patterns, analyze and modify body mechanics/ergonomics, assess and modify postural abnormalities and instruct in home and community integration to attain remaining goals. []  See Plan of Care  []  See progress note/recertification  []  See Discharge Summary         Progress towards goals / Updated goals:  Short Term Goals: To be accomplished in 6 treatments:  1. The Pt will be independent and compliant with their HEP- met  Long Term Goals: To be accomplished in 20 treatments:  1. The Pt will score the MCII on her FOTO survey demonstrating improved overall function (47 to 59 points)- progressing  2. The Pt will be able to perform >/= 9 sit to stands in 30s demonstrating improved LE strength and stability- progressing  3. The Pt will perform the TUG test in </= 28s demonstrating improved community ambulation and gait speed- progressing  4.  The Pt will have >/= 70 degrees of active shoulder flexion to allow the Pt to be able to perform ADLs with less difficulty- progressing      PLAN  [x]  Upgrade activities as tolerated     [x]  Continue plan of care  [x]  Update interventions per flow sheet       []  Discharge due to:_  []  Other:_      Vonda Murphy PT 8/22/2018  1:33 PM

## 2018-08-23 ENCOUNTER — OFFICE VISIT (OUTPATIENT)
Dept: INTERNAL MEDICINE CLINIC | Age: 63
End: 2018-08-23

## 2018-08-23 VITALS
DIASTOLIC BLOOD PRESSURE: 87 MMHG | BODY MASS INDEX: 28.92 KG/M2 | TEMPERATURE: 97.9 F | OXYGEN SATURATION: 96 % | SYSTOLIC BLOOD PRESSURE: 161 MMHG | RESPIRATION RATE: 13 BRPM | WEIGHT: 163.2 LBS | HEART RATE: 82 BPM | HEIGHT: 63 IN

## 2018-08-23 DIAGNOSIS — C64.1 MALIGNANT NEOPLASM OF RIGHT KIDNEY (HCC): ICD-10-CM

## 2018-08-23 DIAGNOSIS — G47.33 OSA ON CPAP: ICD-10-CM

## 2018-08-23 DIAGNOSIS — Z99.89 OSA ON CPAP: ICD-10-CM

## 2018-08-23 DIAGNOSIS — M81.0 AGE-RELATED OSTEOPOROSIS WITHOUT CURRENT PATHOLOGICAL FRACTURE: ICD-10-CM

## 2018-08-23 DIAGNOSIS — Z86.79 HISTORY OF CHF (CONGESTIVE HEART FAILURE): ICD-10-CM

## 2018-08-23 DIAGNOSIS — E55.9 VITAMIN D DEFICIENCY: ICD-10-CM

## 2018-08-23 DIAGNOSIS — Z00.00 WELCOME TO MEDICARE PREVENTIVE VISIT: Primary | ICD-10-CM

## 2018-08-23 DIAGNOSIS — M33.20 POLYMYOSITIS (HCC): ICD-10-CM

## 2018-08-23 DIAGNOSIS — R73.09 ELEVATED GLUCOSE: ICD-10-CM

## 2018-08-23 DIAGNOSIS — Z12.39 SCREENING FOR BREAST CANCER: ICD-10-CM

## 2018-08-23 DIAGNOSIS — I10 ESSENTIAL HYPERTENSION: ICD-10-CM

## 2018-08-23 DIAGNOSIS — Z11.59 ENCOUNTER FOR HEPATITIS C SCREENING TEST FOR LOW RISK PATIENT: ICD-10-CM

## 2018-08-23 RX ORDER — RANITIDINE 150 MG/1
TABLET, FILM COATED ORAL
COMMUNITY
Start: 2018-06-27 | End: 2020-05-27

## 2018-08-23 NOTE — ASSESSMENT & PLAN NOTE
States that she is not using CPAP anymore because of \"smell of burning smoke\" in mask. So stopped using it. Finds that she sleeps well, has appointmentin October with sleep clinic. Has not gotten rid of machine.

## 2018-08-23 NOTE — PATIENT INSTRUCTIONS
- Please return for fasting labs at your earliest convenience. - Please consider writing living will to clarify your choice of primary decision maker.   - follow-up with cardiologist   - follow-up with optometrist or ophthalmologist.    - review treatment option for osteoporosis. I recommend we use a form of treatment that is infusion, my advise is reclast (zolendronic acid). Here is a list of your current Health Maintenance items (your personalized list of preventive services) with a due date:  Health Maintenance Due   Topic Date Due    Hepatitis C Test  1955    Colonoscopy  09/07/1973    Annual Well Visit  05/01/2018    Flu Vaccine  08/01/2018               Medicare Wellness Visit, Female     The best way to live healthy is to have a lifestyle where you eat a well-balanced diet, exercise regularly, limit alcohol use, and quit all forms of tobacco/nicotine, if applicable. Regular preventive services are another way to keep healthy. Preventive services (vaccines, screening tests, monitoring & exams) can help personalize your care plan, which helps you manage your own care. Screening tests can find health problems at the earliest stages, when they are easiest to treat. [facility] follows the current, evidence-based guidelines published by the [place] [employer] (USPSTF) when recommending preventive services for our patients. Because we follow these guidelines, sometimes recommendations change over time as research supports it. (For example, mammograms used to be recommended annually. Even though Medicare will still pay for an annual mammogram, the newer guidelines recommend a mammogram every two years for women of average risk.)  Of course, you and your doctor may decide to screen more often for some diseases, based on your risk and your health status.    Preventive services for you include:  - Medicare offers their members a free annual wellness visit, which is time for you and your primary care provider to discuss and plan for your preventive service needs. Take advantage of this benefit every year!  -All adults over the age of 72 should receive the recommended pneumonia vaccines. Current USPSTF guidelines recommend a series of two vaccines for the best pneumonia protection.   -All adults should have a flu vaccine yearly and a tetanus vaccine every 10 years. All adults age 61 and older should receive a shingles vaccine once in their lifetime.    -A bone mass density test is recommended when a woman turns 65 to screen for osteoporosis. This test is only recommended one time, as a screening. Some providers will use this same test as a disease monitoring tool if you already have osteoporosis. -All adults age 38-68 who are overweight should have a diabetes screening test once every three years.   -Other screening tests and preventive services for persons with diabetes include: an eye exam to screen for diabetic retinopathy, a kidney function test, a foot exam, and stricter control over your cholesterol.   -Cardiovascular screening for adults with routine risk involves an electrocardiogram (ECG) at intervals determined by your doctor.   -Colorectal cancer screenings should be done for adults age 54-65 with no increased risk factors for colorectal cancer. There are a number of acceptable methods of screening for this type of cancer. Each test has its own benefits and drawbacks. Discuss with your doctor what is most appropriate for you during your annual wellness visit. The different tests include: colonoscopy (considered the best screening method), a fecal occult blood test, a fecal DNA test, and sigmoidoscopy.   -Breast cancer screenings are recommended every other year for women of normal risk, age 54-69.  -Cervical cancer screenings for women over age 72 are only recommended with certain risk factors.   -All adults born between [birth year] and 1965 should be screened once for Hepatitis C.     Here is a list of your current Health Maintenance items (your personalized list of preventive services) with a due date:  Health Maintenance Due   Topic Date Due    Hepatitis C Test  1955    Colonoscopy  09/07/1973    Annual Well Visit  05/01/2018    Flu Vaccine  08/01/2018            Deciding About Bisphosphonate Medicine for Osteoporosis  Deciding About Bisphosphonate Medicine for Osteoporosis  What is osteoporosis? Osteoporosis is a disease that affects your bones. It means you have bones that are thin and brittle and have lots of holes inside them like a sponge. This makes them easy to break. It also increases your risk for spine and hip fractures. These fractures may make it hard for you to live on your own. Bisphosphonates are the most common medicines used to prevent bone loss. Most of the time, you take them as pills. They slow the way bone dissolves and is absorbed by your body. They can increase bone thickness and strength. What are key points about this decision? · If you have osteoporosis, these medicines can increase bone thickness. And they can lower your risk of spine and hip fractures. You may also want to think about taking them if you have osteopenia or risk factors for osteoporosis. · These medicines can have side effects, such as heartburn and belly pain. They also can cause headaches. Their long-term effects are not known. · Whether you take medicine or not, healthy habits can also help protect your bones. Talk to your doctor about taking calcium and vitamin D supplements. Get regular weight-bearing exercise. Cut back on alcohol. And if you smoke, quit. Why might you choose to take bisphosphonate medicines? · You have bone loss that is not normal for your age. · You have osteoporosis or osteopenia. Or you have risk factors for osteoporosis. · You have been taking hormone therapy (HT) and stopped.  Bone loss medicines can protect against rapid bone loss after you quit HT.  · You do not mind taking pills. Or you do not mind getting medicines through a tube in your vein, called an IV. · You think the benefits of taking these medicines outweigh the side effects. Why might you choose not to take these medicines? · You want to try other medicines that help prevent bone loss. These include calcitonin (Calcimar or Miacalcin), denosumab (Prolia or Xgeva), hormone therapy, raloxifene (Evista), and teriparatide (Forteo). · You want to try healthy habits to prevent bone fractures. · You do not like the idea of taking pills. Or you do not like getting medicines through a tube in your vein, called an IV. · You are worried about the side effects of these medicines. Your decision  Thinking about the facts and your feelings can help you make a decision that is right for you. Be sure you understand the benefits and risks of your options, and think about what else you need to do before you make the decision. Where can you learn more? Go to http://brijesh-marichuy.info/. Enter L728 in the search box to learn more about \"Deciding About Bisphosphonate Medicine for Osteoporosis. \"  Current as of: May 12, 2017  Content Version: 11.7  © 6710-3742 [employer]. Care instructions adapted under license by [employer] (which disclaims liability or warranty for this information). If you have questions about a medical condition or this instruction, always ask your healthcare professional. [place] [address] any warranty or liability for your use of this information.

## 2018-08-23 NOTE — ASSESSMENT & PLAN NOTE
S/p nephrectomy in 2016. Following with urologist. Discussed following up again for surveillance. Last US in June was reported to not have good imaging.

## 2018-08-23 NOTE — PROGRESS NOTES
Exam room 1  Jessica Marques is a 58 y.o. female  Visit Vitals    /87 (BP 1 Location: Left arm, BP Patient Position: Sitting)    Pulse 82    Temp 97.9 °F (36.6 °C) (Oral)    Resp 13    Ht 5' 3\" (1.6 m)    Wt 163 lb 3.2 oz (74 kg)    SpO2 96%    BMI 28.91 kg/m2       Chief Complaint   Patient presents with   [facility] Annual Wellness Visit       1. Have you been to the ER, urgent care clinic since your last visit? Hospitalized since your last visit? No    2. Have you seen or consulted any other health care providers outside of the [address] [place] [address] since your last visit? Include any pap smears or colon screening. No    Health Maintenance Due   Topic Date Due    Hepatitis C Screening  1955    COLONOSCOPY  09/07/1973    MEDICARE YEARLY EXAM  05/01/2018    Influenza Age 9 to Adult  08/01/2018     Abuse Screening Questionnaire 8/23/2018   Do you ever feel afraid of your partner? N   Are you in a relationship with someone who physically or mentally threatens you? N   Is it safe for you to go home?  Y       ADL Assessment 8/23/2018   Feeding yourself No Help Needed   Getting from bed to chair Help Needed   Getting dressed No Help Needed   Bathing or showering No Help Needed   Walk across the room (includes cane/walker) Help Needed   Using the telphone No Help Needed   Taking your medications No Help Needed   Preparing meals No Help Needed   Managing money (expenses/bills) No Help Needed   Moderately strenuous housework (laundry) No Help Needed   Shopping for personal items (toiletries/medicines) No Help Needed   Shopping for groceries No Help Needed   Driving No Help Needed   Climbing a flight of stairs Help Needed   Getting to places beyond walking distances Help Needed       PHQ over the last two weeks 8/23/2018   Little interest or pleasure in doing things Not at all   Feeling down, depressed, irritable, or hopeless Not at all   Total Score PHQ 2 0     Fall Risk Assessment, last 12 mths 8/23/2018 Able to walk? Yes   Fall in past 12 months?  No

## 2018-08-23 NOTE — MR AVS SNAPSHOT
[address]  [address] [facility] [address] 
[phone] Patient: Elizabet Olmos MRN: [id number] Visit Information Date & Time Provider Department Dept. Phone Encounter #  
 8/23/2018  1:30 PM Osmar Plaza MD [address] [facility] [phone] [id number] Follow-up Instructions Return in about 2 months (around 10/23/2018) for follow-up blood pressure. Your Appointments 10/18/2018  2:00 PM  
Any with Luisa Thomas MD  
[address] [facility] ([address]) Appt Note: yearly follow up  
 [address] [facility], [address] [address] [address] [place] [address], [address] [address] Upcoming Health Maintenance Date Due Hepatitis C Screening 1955 COLONOSCOPY 9/7/1973 MEDICARE YEARLY EXAM 5/1/2018 Influenza Age 5 to Adult 8/1/2018 Pneumococcal 19-64 Highest Risk (2 of 3 - PCV13) 9/27/2018 BREAST CANCER SCRN MAMMOGRAM 9/19/2019 PAP AKA CERVICAL CYTOLOGY 10/24/2020 DTaP/Tdap/Td series (2 - Td) 7/6/2027 Allergies as of 8/23/2018  Review Complete On: 8/23/2018 By: Osmar Plaza MD  
  
 Severity Noted Reaction Type Reactions Dilaudid [Hydromorphone]  08/10/2017    Other (comments) Levaquin [Levofloxacin]  05/09/2017    Other (comments) Lisinopril  05/02/2016    Other (comments) Metoprolol  07/28/2016    Other (comments)  
 hallucinations Peanut  05/02/2016    Other (comments) Current Immunizations  Reviewed on 8/23/2018 Name Date Influenza Vaccine 11/3/2016, 10/15/2015 Influenza Vaccine (Quad) PF 9/27/2017 11:45 AM  
 Pneumococcal Polysaccharide (PPSV-23) 9/27/2017 11:46 AM  
 TB Skin Test (PPD) 1/1/2012  Tdap 7/6/2017 11:14 AM  
  
 Reviewed by Osmar Plaza MD on 8/23/2018 at  2:42 PM  
You Were Diagnosed With   
  
 Codes Comments Welcome to Medicare preventive visit    -  Primary ICD-10-CM: Z00.00 ICD-9-CM: V70.0 JOHN on CPAP     ICD-10-CM: G47.33, Z99.89 ICD-9-CM: 327.23, V46.8 Polymyositis ([facility] [address]     ICD-10-CM: M33.20 ICD-9-CM: 710.4 History of CHF (congestive heart failure)     ICD-10-CM: Z86.79 
ICD-9-CM: V12.59 Malignant neoplasm of right kidney [facility])     ICD-10-CM: C64.1 ICD-9-CM: 189.0 Age-related osteoporosis without current pathological fracture     ICD-10-CM: M81.0 ICD-9-CM: 733.01 Essential hypertension     ICD-10-CM: I10 
ICD-9-CM: 401.9 Elevated glucose     ICD-10-CM: R73.09 
ICD-9-CM: 790.29 Encounter for hepatitis C screening test for low risk patient     ICD-10-CM: Z11.59 
ICD-9-CM: V73.89 Screening for breast cancer     ICD-10-CM: Z12.31 
ICD-9-CM: V76.10 Vitamin D deficiency     ICD-10-CM: E55.9 ICD-9-CM: 268.9 Vitals BP Pulse Temp Resp Height(growth percentile) Weight(growth percentile) 161/87 (BP 1 Location: Left arm, BP Patient Position: Sitting) 82 97.9 °F (36.6 °C) (Oral) 13 5' 3\" (1.6 m) 163 lb 3.2 oz (74 kg) SpO2 BMI OB Status Smoking Status 96% 28.91 kg/m2 Hysterectomy Former Smoker Vitals History BMI and BSA Data Body Mass Index Body Surface Area  
 28.91 kg/m 2 1.81 m 2 Preferred Pharmacy Pharmacy Name Phone [id number] [address] [address] [phone] Your Updated Medication List  
  
   
This list is accurate as of 8/23/18  3:09 PM.  Always use your most recent med list.  
  
  
  
  
 aspirin 81 mg chewable tablet Take 1 Tab by mouth daily. fluticasone 50 mcg/actuation nasal spray Commonly known as:  Arnold Redo 2 Sprays by Both Nostrils route daily. folic acid 1 mg tablet Commonly known as:  Gareth Take  by mouth daily. losartan 100 mg tablet Commonly known as:  COZAAR  
TAKE 1 TABLET BY MOUTH EVERY DAY  
  
 methotrexate 2.5 mg tablet Commonly known as:  RHEUMATREX  
TAKE 5 TABLETS BY MOUTH EVERY 7 DAYS  
  
 ondansetron 4 mg disintegrating tablet Commonly known as:  ZOFRAN ODT Take 1 Tab by mouth every eight (8) hours as needed for Nausea. predniSONE 5 mg tablet Commonly known as:  DELTASONE  
TAKE THREE TABLETS BY MOUTH EVERY DAY  
  
 raNITIdine 150 mg tablet Commonly known as:  ZANTAC Take  by mouth. 1 tablet prn heartburn. vitamin E topical cream  
Apply  to affected area daily. Patient applies to face We Performed the Following AMB POC EKG ROUTINE W/ 12 LEADS, SCREEN () [ [facility]] REFERRAL TO CARDIOLOGY [NRX81 Custom] Follow-up Instructions Return in about 2 months (around 10/23/2018) for follow-up blood pressure. To-Do List   
 08/27/2018 1:00 PM  
  Appointment with Heri Hoyt PT at [facility] ([phone]) Please remember to arrive at the hospital at least 30 minutes prior to your scheduled appointment time. When you come in for your appointment, please be sure to bring the therapy prescription the doctor gave you, your insurance card, and a list of the medicines you are taking. Also, please remember to wear comfortable, loose- fitting clothes. We look forward to seeing you. Around 08/28/2018 Lab:  CBC WITH AUTOMATED DIFF Around 08/28/2018 Lab:  HCV AB W/RFLX TO LYNDSEY Around 08/28/2018 Lab:  HEMOGLOBIN A1C WITH EAG Around 08/28/2018 Lab:  LIPID PANEL Around 08/28/2018 Lab:  METABOLIC PANEL, COMPREHENSIVE   
  
 08/29/2018 Lab:  VITAMIN D, 25 HYDROXY   
  
 08/29/2018 1:00 PM  
  Appointment with Heri Hoyt PT at [facility] ([phone]) Please remember to arrive at the hospital at least 30 minutes prior to your scheduled appointment time.   When you come in for your appointment, please be sure to bring the therapy prescription the doctor gave you, your insurance card, and a list of the medicines you are taking. Also, please remember to wear comfortable, loose- fitting clothes. We look forward to seeing you. 09/04/2018 1:30 PM  
  Appointment with Ari Alarcon PT at [facility] ([phone]) Please remember to arrive at the hospital at least 30 minutes prior to your scheduled appointment time. When you come in for your appointment, please be sure to bring the therapy prescription the doctor gave you, your insurance card, and a list of the medicines you are taking. Also, please remember to wear comfortable, loose- fitting clothes. We look forward to seeing you. 09/07/2018 12:00 PM  
  Appointment with Ari Alarcon PT at [facility] ([phone]) Please remember to arrive at the hospital at least 30 minutes prior to your scheduled appointment time. When you come in for your appointment, please be sure to bring the therapy prescription the doctor gave you, your insurance card, and a list of the medicines you are taking. Also, please remember to wear comfortable, loose- fitting clothes. We look forward to seeing you. 09/10/2018 12:30 PM  
  Appointment with Ari Alarcon PT at [facility] ([phone]) Please remember to arrive at the hospital at least 30 minutes prior to your scheduled appointment time. When you come in for your appointment, please be sure to bring the therapy prescription the doctor gave you, your insurance card, and a list of the medicines you are taking. Also, please remember to wear comfortable, loose- fitting clothes. We look forward to seeing you. Around 09/23/2018 Imaging:  [facility] MAMMO BI SCREENING INCL CAD Referral Information Referral ID Referred By Referred To  
  
 [id number] Tigist Melara MD   
   [address], [address] Phone: [phone] Fax: [phone] Visits Status Start Date End Date 1 New Request 8/23/18 8/23/19 If your referral has a status of pending review or denied, additional information will be sent to support the outcome of this decision. Patient Instructions - Please return for fasting labs at your earliest convenience. - Please consider writing living will to clarify your choice of primary decision maker.  
- follow-up with cardiologist 
 - follow-up with optometrist or ophthalmologist.  
 - review treatment option for osteoporosis. I recommend we use a form of treatment that is infusion, my advise is reclast (zolendronic acid). Here is a list of your current Health Maintenance items (your personalized list of preventive services) with a due date: 
Health Maintenance Due Topic Date Due  
 Hepatitis C Test  1955  Colonoscopy  09/07/1973 Eben Hernandez Annual Well Visit  05/01/2018  Flu Vaccine  08/01/2018 Medicare Wellness Visit, Female The best way to live healthy is to have a lifestyle where you eat a well-balanced diet, exercise regularly, limit alcohol use, and quit all forms of tobacco/nicotine, if applicable. Regular preventive services are another way to keep healthy. Preventive services (vaccines, screening tests, monitoring & exams) can help personalize your care plan, which helps you manage your own care. Screening tests can find health problems at the earliest stages, when they are easiest to treat. [facility] follows the current, evidence-based guidelines published by the [place] [employer] [employer] (USPSTF) when recommending preventive services for our patients. Because we follow these guidelines, sometimes recommendations change over time as research supports it. (For example, mammograms used to be recommended annually. Even though Medicare will still pay for an annual mammogram, the newer guidelines recommend a mammogram every two years for women of average risk.) Of course, you and your doctor may decide to screen more often for some diseases, based on your risk and your health status. Preventive services for you include: - Medicare offers their members a free annual wellness visit, which is time for you and your primary care provider to discuss and plan for your preventive service needs. Take advantage of this benefit every year! 
-All adults over the age of 72 should receive the recommended pneumonia vaccines. Current USPSTF guidelines recommend a series of two vaccines for the best pneumonia protection.  
-All adults should have a flu vaccine yearly and a tetanus vaccine every 10 years. All adults age 61 and older should receive a shingles vaccine once in their lifetime.   
-A bone mass density test is recommended when a woman turns 65 to screen for osteoporosis. This test is only recommended one time, as a screening. Some providers will use this same test as a disease monitoring tool if you already have osteoporosis. -All adults age 38-68 who are overweight should have a diabetes screening test once every three years.  
-Other screening tests and preventive services for persons with diabetes include: an eye exam to screen for diabetic retinopathy, a kidney function test, a foot exam, and stricter control over your cholesterol.  
-Cardiovascular screening for adults with routine risk involves an electrocardiogram (ECG) at intervals determined by your doctor.  
-Colorectal cancer screenings should be done for adults age 54-65 with no increased risk factors for colorectal cancer. There are a number of acceptable methods of screening for this type of cancer. Each test has its own benefits and drawbacks. Discuss with your doctor what is most appropriate for you during your annual wellness visit. The different tests include: colonoscopy (considered the best screening method), a fecal occult blood test, a fecal DNA test, and sigmoidoscopy. -Breast cancer screenings are recommended every other year for women of normal risk, age 54-69. 
-Cervical cancer screenings for women over age 72 are only recommended with certain risk factors.  
-All adults born between [facility] should be screened once for Hepatitis C. Here is a list of your current Health Maintenance items (your personalized list of preventive services) with a due date: 
Health Maintenance Due Topic Date Due  
 Hepatitis C Test  1955  Colonoscopy  09/07/1973 Uyen Mayfield Annual Well Visit  05/01/2018  Flu Vaccine  08/01/2018 Deciding About Bisphosphonate Medicine for Osteoporosis Deciding About Bisphosphonate Medicine for Osteoporosis What is osteoporosis? Osteoporosis is a disease that affects your bones. It means you have bones that are thin and brittle and have lots of holes inside them like a sponge. This makes them easy to break. It also increases your risk for spine and hip fractures. These fractures may make it hard for you to live on your own. Bisphosphonates are the most common medicines used to prevent bone loss. Most of the time, you take them as pills. They slow the way bone dissolves and is absorbed by your body. They can increase bone thickness and strength. What are key points about this decision? · If you have osteoporosis, these medicines can increase bone thickness. And they can lower your risk of spine and hip fractures. You may also want to think about taking them if you have osteopenia or risk factors for osteoporosis. · These medicines can have side effects, such as heartburn and belly pain. They also can cause headaches. Their long-term effects are not known. · Whether you take medicine or not, healthy habits can also help protect your bones. Talk to your doctor about taking calcium and vitamin D supplements. Get regular weight-bearing exercise. Cut back on alcohol. And if you smoke, quit. Why might you choose to take bisphosphonate medicines? · You have bone loss that is not normal for your age. · You have osteoporosis or osteopenia. Or you have risk factors for osteoporosis. · You have been taking hormone therapy (HT) and stopped. Bone loss medicines can protect against rapid bone loss after you quit HT. · You do not mind taking pills. Or you do not mind getting medicines through a tube in your vein, called an IV. · You think the benefits of taking these medicines outweigh the side effects. Why might you choose not to take these medicines? · You want to try other medicines that help prevent bone loss. These include calcitonin (Calcimar or Miacalcin), denosumab (Prolia or Xgeva), hormone therapy, raloxifene (Evista), and teriparatide (Forteo). · You want to try healthy habits to prevent bone fractures. · You do not like the idea of taking pills. Or you do not like getting medicines through a tube in your vein, called an IV. · You are worried about the side effects of these medicines. Your decision Thinking about the facts and your feelings can help you make a decision that is right for you. Be sure you understand the benefits and risks of your options, and think about what else you need to do before you make the decision. Where can you learn more? Go to http://brijesh-marichuy.info/. Enter H360 in the search box to learn more about \"Deciding About Bisphosphonate Medicine for Osteoporosis. \" Current as of: May 12, 2017 Content Version: 11.7 © 2824-9808 [employer]. Care instructions adapted under license by [employer] (which disclaims liability or warranty for this information). If you have questions about a medical condition or this instruction, always ask your healthcare professional. [place] [address] any warranty or liability for your use of this information. Introducing [facility]! Dear Philipp Sloan: Thank you for requesting a [employer] account. Our records indicate that you already have an active [employer] account. You can access your account anytime at [email or web address] Did you know that you can access your hospital and ER discharge instructions at any time in [employer]? You can also review all of your test results from your hospital stay or ER visit. Additional Information If you have questions, please visit the Frequently Asked Questions section of the [employer] website at [email or web address] Remember, [employer] is NOT to be used for urgent needs. For medical emergencies, dial 911. Now available from your iPhone and Android! Please provide this summary of care documentation to your next provider. Your primary care clinician is listed as Alana Trinh. If you have any questions after today's visit, please call [phone].

## 2018-08-23 NOTE — PROGRESS NOTES
This is a \"Welcome to [employer]"  Initial Preventive Physical Examination (IPPE) providing Personalized Prevention Plan Services (Performed in the first 12 months of enrollment)    I have reviewed the patient's medical history in detail and updated the computerized patient record. History     Past Medical History:   Diagnosis Date    Hypertension     Polymyositis ([facility] 12/2015    Renal cancer ([facility] 07/08/2016    SBO (small bowel obstruction) ([facility] 8/3/2017      Past Surgical History:   Procedure Laterality Date    HX GYN  1999    hysterectomy    HX NEPHRECTOMY Right 2016    for kidney cancer    US GUIDED CORE BREAST BIOPSY Left 1999    Negative     Current Outpatient Prescriptions   Medication Sig Dispense Refill    losartan (COZAAR) 100 mg tablet TAKE 1 TABLET BY MOUTH EVERY DAY 90 Tab 3    fluticasone (FLONASE) 50 mcg/actuation nasal spray 2 Sprays by Both Nostrils route daily. 1 Bottle 12    folic acid (FOLVITE) 1 mg tablet Take  by mouth daily.  famotidine (PEPCID) 20 mg tablet Take 20 mg by mouth two (2) times a day.  aspirin 81 mg chewable tablet Take 1 Tab by mouth daily. 30 Tab 1    vitamin E topical cream Apply  to affected area daily. Patient applies to face      IMMUNE GLOB,ROXANNE CAPRYLATE,IGG, (GAMUNEX IV) 160 g by IntraVENous route every twenty-eight (28) days. Patient receives 160g Gamunex infusion monthly, administered over two consecutive days, at 80g each day.  ondansetron (ZOFRAN ODT) 4 mg disintegrating tablet Take 1 Tab by mouth every eight (8) hours as needed for Nausea. 10 Tab 0    predniSONE (DELTASONE) 5 mg tablet TAKE THREE TABLETS BY MOUTH EVERY DAY  3    methotrexate (RHEUMATREX) 2.5 mg tablet TAKE 5 TABLETS BY MOUTH EVERY 7 DAYS  2    amLODIPine (NORVASC) 5 mg tablet Take 1 Tab by mouth daily. 90 Tab 3    mycophenolate mofetil (CELLCEPT) 200 mg/mL suspension Take 6 mL by mouth two (2) times a day.   1     Allergies   Allergen Reactions    Dilaudid [Hydromorphone] Other (comments)    Levaquin [Levofloxacin] Other (comments)    Lisinopril Other (comments)    Metoprolol Other (comments)     hallucinations    Peanut Other (comments)     Family History   Problem Relation Age of Onset    Cancer Mother     Breast Cancer Mother 68    Diabetes Father     Hypertension Father     Stroke Maternal Grandmother     Breast Cancer Cousin      52's     Social History   Substance Use Topics    Smoking status: Former Smoker     Packs/day: 1.00     Years: 20.00     Types: Cigarettes     Quit date: 5/1/1998    Smokeless tobacco: Never Used    Alcohol use 0.6 oz/week     1 Glasses of wine, 0 Standard drinks or equivalent per week     Diet, Lifestyle: The patient is prescribed and follows a special diet. Avoids rice, beans. At one time was on \"autoimmune protocol\" and since has added back in [employer], french fries. Eats a lot of sardines. Finds that the sardines specifically do help with the inflammation. Exercise level: sedentary to moderately active  Still doing PT, 60 minutes x2 sessions per week. Depression Risk Screen     PHQ over the last two weeks 8/23/2018   Little interest or pleasure in doing things Not at all   Feeling down, depressed, irritable, or hopeless Not at all   Total Score PHQ 2 0     Alcohol Risk Screen   You do not drink alcohol or very rarely. Functional Ability and Level of Safety   Hearing Loss  Hearing is good. Continues to have blood flow sound in left year. Notes that it improves with reduction in prednisone. Vision Screening  Vision is fair. No exam data present      Activities of Daily Living  The home contains: no safety equipment.   Patient does total self care  ADL Assessment 8/23/2018   Feeding yourself No Help Needed   Getting from bed to chair Help Needed   Getting dressed No Help Needed   Bathing or showering No Help Needed   Walk across the room (includes cane/walker) Help Needed   Using the telphone No Help Needed   Taking your medications No Help Needed   Preparing meals No Help Needed   Managing money (expenses/bills) No Help Needed   Moderately strenuous housework (laundry) No Help Needed   Shopping for personal items (toiletries/medicines) No Help Needed   Shopping for groceries No Help Needed   Driving No Help Needed   Climbing a flight of stairs Help Needed   Getting to places beyond walking distances Help Needed     Fall Risk Screen  Fall Risk Assessment, last 12 mths 8/23/2018   Able to walk? Yes   Fall in past 12 months? No     Abuse Screen  Patient is not abused    Screening EKG   EKG order placed: Yes   NSR, rate, axis and     Patient Care Team   Patient Care Team:  Zheng Quintero MD as PCP - General (Internal Medicine)  Alice Sheth MD (Family Practice)  Linda Singleton MD as Physician (Sleep Medicine)  Veronica Jones MD (Neurology)  Evelyn Dejesus as Physician (Rheumatology)  Fabian Kelley MD (Urology)     End of Life Planning   Advanced care planning directives were discussed with the patient and /or family/caregiver. - Does not currently have a living will. -  Reports that she had CPR in hospital in 2015 - noted to have hypoxemia and CHF (stopped seeing heart specialist)     Assessment/Plan   Education and counseling provided:  Are appropriate based on today's review and evaluation    Diagnoses and all orders for this visit:    1. Welcome to Medicare preventive visit    Well woman (non-gyn) exam: history and exam revealed issues as noted below. Cancer screening:    - colon: 2016 at [facility] colonoscopy, patient reports reports as normal.    - mammo ordered   - cervical cancer: double negative in 2017. Vaccine status: shingrix vaccine. Cardiovascular risk: BP well controlled, lipid screen. Bone health: daily vit D supplement  Diet and Exercise: not drinking sugary beverages.     Health Maintenance Due   Topic Date Due    Hepatitis C Screening  1955    COLONOSCOPY 09/07/1973    MEDICARE YEARLY EXAM  05/01/2018    Influenza Age 5 to Adult  08/01/2018     60 minutes spent with patient in direct face to face. REED Gordon is a 58 y.o. female, she presents today for:    Sinus pressure. Has not had any recent chest pain, dyspnea. No new swelling in ankles, tough chronic LE edema. Had cardiac work-up in 2015     PMH/PSH: reviewed and updated  Sochx:  reports that she quit smoking about 20 years ago. Her smoking use included Cigarettes. She has a 20.00 pack-year smoking history. She has never used smokeless tobacco. She reports that she drinks about 0.6 oz of alcohol per week  She reports that she does not use illicit drugs. Famhx: reviewed and updated     All: Allergies   Allergen Reactions    Dilaudid [Hydromorphone] Other (comments)    Levaquin [Levofloxacin] Other (comments)    Lisinopril Other (comments)    Metoprolol Other (comments)     hallucinations    Peanut Other (comments)     Med:   Current Outpatient Prescriptions   Medication Sig    losartan (COZAAR) 100 mg tablet TAKE 1 TABLET BY MOUTH EVERY DAY    fluticasone (FLONASE) 50 mcg/actuation nasal spray 2 Sprays by Both Nostrils route daily.  folic acid (FOLVITE) 1 mg tablet Take  by mouth daily.  famotidine (PEPCID) 20 mg tablet Take 20 mg by mouth two (2) times a day.  aspirin 81 mg chewable tablet Take 1 Tab by mouth daily.  vitamin E topical cream Apply  to affected area daily. Patient applies to face    IMMUNE GLOB,ROXANNE CAPRYLATE,IGG, (GAMUNEX IV) 160 g by IntraVENous route every twenty-eight (28) days. Patient receives 160g Gamunex infusion monthly, administered over two consecutive days, at 80g each day.  ondansetron (ZOFRAN ODT) 4 mg disintegrating tablet Take 1 Tab by mouth every eight (8) hours as needed for Nausea.     predniSONE (DELTASONE) 5 mg tablet TAKE THREE TABLETS BY MOUTH EVERY DAY    methotrexate (RHEUMATREX) 2.5 mg tablet TAKE 5 TABLETS BY MOUTH EVERY 7 DAYS  amLODIPine (NORVASC) 5 mg tablet Take 1 Tab by mouth daily.  mycophenolate mofetil (CELLCEPT) 200 mg/mL suspension Take 6 mL by mouth two (2) times a day. No current facility-administered medications for this visit. Review of Systems   Constitutional: Negative for chills, fever and weight loss. HENT: Negative for congestion. Respiratory: Negative for cough, shortness of breath and wheezing. Cardiovascular: Negative for chest pain and leg swelling. Gastrointestinal: Negative for abdominal pain, diarrhea, nausea and vomiting. Genitourinary: Negative for dysuria. Skin: Negative for rash. Neurological: Negative for dizziness and headaches. PE:  Blood pressure 161/87, pulse 82, temperature 97.9 °F (36.6 °C), temperature source Oral, resp. rate 13, height 5' 3\" (1.6 m), weight 163 lb 3.2 oz (74 kg), SpO2 96 %. Body mass index is 28.91 kg/(m^2). Physical Exam   Constitutional: She is oriented to person, place, and time. She appears well-developed and well-nourished. No distress. HENT:   Head: Normocephalic. Mouth/Throat: Oropharynx is clear and moist.   Eyes: Conjunctivae and EOM are normal.   Neck: Neck supple. Cardiovascular: Normal rate, regular rhythm and normal heart sounds. Pulmonary/Chest: Effort normal and breath sounds normal. No respiratory distress. She has no wheezes. Abdominal: Soft. She exhibits no distension. Neurological: She is alert and oriented to person, place, and time. Skin: Skin is warm and dry. Psychiatric: She has a normal mood and affect. Her behavior is normal.   Nursing note and vitals reviewed. Labs:   See addnedum    A/P:  58 y.o. female    ICD-10-CM ICD-9-CM    1. Welcome to Medicare preventive visit Z00.00 V70.0 AMB POC EKG ROUTINE W/ 12 LEADS, SCREEN ()   2. JOHN on CPAP G47.33 327.23     Z99.89 V46.8    3. Polymyositis ([facility] Q76.92 175.4 METABOLIC PANEL, COMPREHENSIVE      CBC WITH AUTOMATED DIFF   4.  History of CHF (congestive heart failure) Z86.79 V12.59 REFERRAL TO CARDIOLOGY      LIPID PANEL   5. Malignant neoplasm of right kidney (HCC) C64.1 189.0    6. Age-related osteoporosis without current pathological fracture M81.0 733.01 VITAMIN D, 25 HYDROXY   7. Essential hypertension A82 417.0 METABOLIC PANEL, COMPREHENSIVE      LIPID PANEL   8. Elevated glucose R73.09 790.29 HEMOGLOBIN A1C WITH EAG   9. Encounter for hepatitis C screening test for low risk patient Z11.59 V73.89 HCV AB W/RFLX TO LYNDSEY   10. Screening for breast cancer Z12.31 V76.10 LONA MAMMO BI SCREENING INCL CAD   6. Vitamin D deficiency E55.9 268.9      JOHN, CPAP: discussed need to continue on cpap, good adherence. States that she is not using CPAP anymore because of \"smell of burning smoke\" in mask. So stopped using it. Finds that she sleeps well, has appointmentin October with sleep clinic. Has not gotten rid of machine.    -   Polymyositis: following with rheumatologist. Starting in setting of [address] diagnosed in 2015    History of CHF in setting of respiratory arrest, pna, and dx of rcc/polymyositis 2015. Hospitalized at [facility]. Has not had follow-up with cardiologist. Unclear if prior CAD. Unclear if catheterization compelted. Tests to screen for diabetes, vit D deficiency requested. - She was given AVS and expressed understanding with the diagnosis and plan as discussed. Follow-up Disposition:  Return in about 2 months (around 10/23/2018) for follow-up blood pressure.   Future Appointments  Date Time Provider Nel Davis   8/27/2018 1:00 PM Kobi Soto PT [facility]   8/29/2018 1:00 PM Kobi Soto PT [facility]   9/4/2018 1:30 PM Kobi Soto PT [facility]   9/7/2018 12:00 PM Kobi Soto, PT [facility]   9/10/2018 12:30 PM Kobi Soto, PT [facility]   10/18/2018 2:00 PM Antoinette Guaman  [facility]

## 2018-08-24 ENCOUNTER — APPOINTMENT (OUTPATIENT)
Dept: PHYSICAL THERAPY | Age: 63
End: 2018-08-24
Payer: MEDICARE

## 2018-08-27 ENCOUNTER — HOSPITAL ENCOUNTER (OUTPATIENT)
Dept: PHYSICAL THERAPY | Age: 63
Discharge: HOME | End: 2018-08-27
Payer: MEDICARE

## 2018-08-27 PROCEDURE — 97110 THERAPEUTIC EXERCISES: CPT

## 2018-08-27 NOTE — PROGRESS NOTES
PT DAILY TREATMENT NOTE - [facility] 2-15    Patient Name: Pily Bryant  Date:2018  : 1955  [x]  Patient  Verified  Payor: [place] MEDICARE / Plan: [place] MEDICARE PART A & B / Product Type: Medicare /    In time: 12:58 PM  Out time: 2:15 PM  Total Treatment Time (min): 77 minutes  Total Timed Codes (min): 77 minutes  1:1 Treatment Time (MC only): 60 minutes   Visit #: 26     Treatment Area: Weakness [R53.1]  Localized edema [R60.0]    SUBJECTIVE  Pain Level (0-10 scale): 0/10  Any medication changes, allergies to medications, adverse drug reactions, diagnosis change, or new procedure performed?: [x] No    [] Yes (see summary sheet for update)  Subjective functional status/changes:   [] No changes reported  The Pt reports that she is tired, but there is no pain today. She was able to walk for 5 minutes 2x yesterday on the road without muscle exhaustion. She states that her endurance is improving gradually. She has been doing her exercises more frequently at home. OBJECTIVE    77 min Therapeutic Exercise:  [x] See flow sheet :   Rationale: increase ROM, increase strength, improve coordination, improve balance and increase proprioception to improve the patients ability to ambulate and perform ADLs with less pain or difficulty    With   [x] TE   [] TA   [] neuro   [] other: Patient Education: [x] Review HEP    [] Progressed/Changed HEP based on:   [] positioning   [] body mechanics   [] transfers   [] heat/ice application    [] other:      Other Objective/Functional Measures: None noted     Pain Level (0-10 scale) post treatment: 0/10    ASSESSMENT/Changes in Function:   Today, the Pt had significantly noticeable improved UE and LE strength and endurance. She did not fatigue as quickly and was able to better control her extremities. She was able to ambulate short distances without use of her [facility] and was able to perform sit to stand exercise from a lower surface.   Will continue to progress as tolerated during next PT session. Patient will continue to benefit from skilled PT services to modify and progress therapeutic interventions, address functional mobility deficits, address ROM deficits, address strength deficits, analyze and address soft tissue restrictions, analyze and cue movement patterns, analyze and modify body mechanics/ergonomics, assess and modify postural abnormalities and instruct in home and community integration to attain remaining goals. []  See Plan of Care  []  See progress note/recertification  []  See Discharge Summary         Progress towards goals / Updated goals:  Short Term Goals: To be accomplished in 6 treatments:  1. The Pt will be independent and compliant with their HEP- met  Long Term Goals: To be accomplished in 20 treatments:  1. The Pt will score the MCII on her FOTO survey demonstrating improved overall function (47 to 59 points)- progressing  2. The Pt will be able to perform >/= 9 sit to stands in 30s demonstrating improved LE strength and stability- progressing  3. The Pt will perform the TUG test in </= 28s demonstrating improved community ambulation and gait speed- progressing  4.  The Pt will have >/= 70 degrees of active shoulder flexion to allow the Pt to be able to perform ADLs with less difficulty- progressing      PLAN  [x]  Upgrade activities as tolerated     [x]  Continue plan of care  [x]  Update interventions per flow sheet       []  Discharge due to:_  []  Other:_      Mauro Vega, PT 8/27/2018  1:19 PM

## 2018-08-28 ENCOUNTER — HOSPITAL ENCOUNTER (OUTPATIENT)
Dept: LAB | Age: 63
Discharge: HOME | End: 2018-08-28
Payer: MEDICARE

## 2018-08-28 PROCEDURE — 86803 HEPATITIS C AB TEST: CPT

## 2018-08-28 PROCEDURE — 82306 VITAMIN D 25 HYDROXY: CPT

## 2018-08-28 PROCEDURE — 85025 COMPLETE CBC W/AUTO DIFF WBC: CPT

## 2018-08-28 PROCEDURE — 80053 COMPREHEN METABOLIC PANEL: CPT

## 2018-08-28 PROCEDURE — 83036 HEMOGLOBIN GLYCOSYLATED A1C: CPT

## 2018-08-28 PROCEDURE — 80061 LIPID PANEL: CPT

## 2018-08-29 ENCOUNTER — HOSPITAL ENCOUNTER (OUTPATIENT)
Dept: PHYSICAL THERAPY | Age: 63
Discharge: HOME | End: 2018-08-29
Payer: MEDICARE

## 2018-08-29 PROCEDURE — 97110 THERAPEUTIC EXERCISES: CPT

## 2018-08-29 PROCEDURE — 97140 MANUAL THERAPY 1/> REGIONS: CPT

## 2018-08-29 NOTE — PROGRESS NOTES
PT DAILY TREATMENT NOTE - [facility] 2-15    Patient Name: Butch Duncan  Date:2018  : 1955  [x]  Patient  Verified  Payor: [place] MEDICARE / Plan: [place] MEDICARE PART A & B / Product Type: Medicare /    In time: 12:50 PM  Out time: 2:09 PM  Total Treatment Time (min): 79 minutes  Total Timed Codes (min): 79 minutes  1:1 Treatment Time ( only): 49 minutes   Visit #: 27     Treatment Area: Weakness [R53.1]  Localized edema [R60.0]    SUBJECTIVE  Pain Level (0-10 scale): 0/10  Any medication changes, allergies to medications, adverse drug reactions, diagnosis change, or new procedure performed?: [x] No    [] Yes (see summary sheet for update)  Subjective functional status/changes:   [] No changes reported  The Pt reports that she is continuing to not having any pain and reports that she is able to do more with less fatigue noted. She was tired after her last session, but was not tired enough to need a nap. She reports diligence with her HEP. OBJECTIVE    64 min Therapeutic Exercise:  [x] See flow sheet :   Rationale: increase ROM, increase strength, improve coordination, improve balance and increase proprioception to improve the patients ability to ambulate and perform ADLs with less difficulty    15 min Manual Therapy: PROM stretching of the shoulders in all planes bilaterally    Rationale: decrease pain, increase ROM and increase tissue extensibility to improve the patients ability to perform ADLs with less pain or discomfort. With   [x] TE   [] TA   [] neuro   [] other: Patient Education: [x] Review HEP    [] Progressed/Changed HEP based on:   [] positioning   [] body mechanics   [] transfers   [] heat/ice application    [] other:      Other Objective/Functional Measures: None noted     Pain Level (0-10 scale) post treatment: 0/10    ASSESSMENT/Changes in Function:   The Pt tolerated the additions and progression to her exercise program well without any increased pain.   She did not fatigue quickly and did not require as many rest breaks. She is progressing well towards her goals. Patient will continue to benefit from skilled PT services to modify and progress therapeutic interventions, address functional mobility deficits, address ROM deficits, address strength deficits, analyze and address soft tissue restrictions, analyze and cue movement patterns, analyze and modify body mechanics/ergonomics, assess and modify postural abnormalities and instruct in home and community integration to attain remaining goals. []  See Plan of Care  []  See progress note/recertification  []  See Discharge Summary         Progress towards goals / Updated goals:  Short Term Goals: To be accomplished in 6 treatments:  1. The Pt will be independent and compliant with their HEP- met  Long Term Goals: To be accomplished in 20 treatments:  1. The Pt will score the MCII on her FOTO survey demonstrating improved overall function (47 to 59 points)- progressing  2. The Pt will be able to perform >/= 9 sit to stands in 30s demonstrating improved LE strength and stability- progressing  3. The Pt will perform the TUG test in </= 28s demonstrating improved community ambulation and gait speed- progressing  4.  The Pt will have >/= 70 degrees of active shoulder flexion to allow the Pt to be able to perform ADLs with less difficulty- progressing      PLAN  [x]  Upgrade activities as tolerated     [x]  Continue plan of care  [x]  Update interventions per flow sheet       []  Discharge due to:_  []  Other:_      Heri Hoyt, PT 8/29/2018  1:38 PM

## 2018-09-04 ENCOUNTER — HOSPITAL ENCOUNTER (OUTPATIENT)
Dept: PHYSICAL THERAPY | Age: 63
Discharge: HOME | End: 2018-09-04
Payer: MEDICARE

## 2018-09-04 PROCEDURE — 97110 THERAPEUTIC EXERCISES: CPT

## 2018-09-04 NOTE — PROGRESS NOTES
PT DAILY TREATMENT NOTE - [facility] 2-15    Patient Name: Douglas Sinclair  Date:2018  : 1955  [x]  Patient  Verified  Payor: [place] MEDICARE / Plan: VA MEDICARE PART A & B / Product Type: Medicare /    In time: 1:38 PM  Out time: 2:28 PM  Total Treatment Time (min): 50 minutes  Total Timed Codes (min): 50 minutes  1:1 Treatment Time ( only): 40 minutes   Visit #: 28     Treatment Area: Weakness [R53.1]  Localized edema [R60.0]    SUBJECTIVE  Pain Level (0-10 scale): 0/10  Any medication changes, allergies to medications, adverse drug reactions, diagnosis change, or new procedure performed?: [x] No    [] Yes (see summary sheet for update)  Subjective functional status/changes:   [] No changes reported  The Pt reports that she has had to run a lot of errands this morning and her legs are tired. There is no pain, but she feels like they are very fatigued and using the [facility] would be too much right now. OBJECTIVE    50 min Therapeutic Exercise:  [x] See flow sheet :   Rationale: increase ROM, increase strength, improve coordination, improve balance and increase proprioception to improve the patients ability to ambulate and perform ADLs with less difficulty          With   [x] TE   [] TA   [] neuro   [] other: Patient Education: [x] Review HEP    [] Progressed/Changed HEP based on:   [] positioning   [] body mechanics   [] transfers   [] heat/ice application    [] other:      Other Objective/Functional Measures: None noted     Pain Level (0-10 scale) post treatment: 0/10    ASSESSMENT/Changes in Function:   The Pt was unable to complete all of her exercises secondary to significant muscle fatigue. She required rest and then was able to walk to her car to leave. Will continue as tolerated during next PT session.   Patient will continue to benefit from skilled PT services to modify and progress therapeutic interventions, address functional mobility deficits, address ROM deficits, address strength deficits, analyze and address soft tissue restrictions, analyze and cue movement patterns, analyze and modify body mechanics/ergonomics, assess and modify postural abnormalities and instruct in home and community integration to attain remaining goals. []  See Plan of Care  []  See progress note/recertification  []  See Discharge Summary         Progress towards goals / Updated goals:  Short Term Goals: To be accomplished in 6 treatments:  1. The Pt will be independent and compliant with their HEP- met  Long Term Goals: To be accomplished in 20 treatments:  1. The Pt will score the MCII on her FOTO survey demonstrating improved overall function (47 to 59 points)- progressing  2. The Pt will be able to perform >/= 9 sit to stands in 30s demonstrating improved LE strength and stability- progressing  3. The Pt will perform the TUG test in </= 28s demonstrating improved community ambulation and gait speed- progressing  4.  The Pt will have >/= 70 degrees of active shoulder flexion to allow the Pt to be able to perform ADLs with less difficulty- progressing      PLAN  [x]  Upgrade activities as tolerated     [x]  Continue plan of care  [x]  Update interventions per flow sheet       []  Discharge due to:_  []  Other:_      Tony Win, PT 9/4/2018  2:17 PM

## 2018-09-07 ENCOUNTER — HOSPITAL ENCOUNTER (OUTPATIENT)
Dept: PHYSICAL THERAPY | Age: 63
Discharge: HOME | End: 2018-09-07
Payer: MEDICARE

## 2018-09-07 PROCEDURE — 97110 THERAPEUTIC EXERCISES: CPT

## 2018-09-07 NOTE — PROGRESS NOTES
PT DAILY TREATMENT NOTE - [facility] 2-15    Patient Name: Gwen Lr  Date:2018  : 1955  [x]  Patient  Verified  [place]or: [place] MED[place] / Plan: [place] MEDICARE PART A & B / Product Type: Medicare /    In time: 12:00 PM  Out time:  12:55 PM  Total Treatment Time (min): 55 minutes  Total Timed Codes (min): 55 minutes  1:1 Treatment Time (MC only): 45 minutes   Visit #: 29     Treatment Area: Weakness [R53.1]  Localized edema [R60.0]    SUBJECTIVE  Pain Level (0-10 scale): 0/10  Any medication changes, allergies to medications, adverse drug reactions, diagnosis change, or new procedure performed?: [x] No    [] Yes (see summary sheet for update)  Subjective functional status/changes:   [] No changes reported  The Pt reports that she continues to feel significant fatigue in her LEs and feels like her L leg is going to give out/buckle on her. She states that she is not having any pain at the moment, but earlier she was experiencing some pain in her hips. OBJECTIVE    55 min Therapeutic Exercise:  [x] See flow sheet :   Rationale: increase ROM, increase strength, improve coordination, improve balance and increase proprioception to improve the patients ability to ambulate and perform ADLs with less pain or discomfort. With   [x] TE   [] TA   [] neuro   [] other: Patient Education: [x] Review HEP    [] Progressed/Changed HEP based on:   [] positioning   [] body mechanics   [] transfers   [] heat/ice application    [] other:      Other Objective/Functional Measures: None noted     Pain Level (0-10 scale) post treatment: 0/10    ASSESSMENT/Changes in Function:   The Pt was only able to perform 3 step ups secondary to reports that her legs were going to give out in buckle. She had to stop this, but was able to perform squats on the total gym without any reports of fatigue. She reported feeling like she was able to walk better after squatting.   Patient will continue to benefit from skilled PT services to modify and progress therapeutic interventions, address functional mobility deficits, address ROM deficits, address strength deficits, analyze and address soft tissue restrictions, analyze and cue movement patterns, analyze and modify body mechanics/ergonomics, assess and modify postural abnormalities and instruct in home and community integration to attain remaining goals. []  See Plan of Care  []  See progress note/recertification  []  See Discharge Summary         Progress towards goals / Updated goals:  Short Term Goals: To be accomplished in 6 treatments:  1. The Pt will be independent and compliant with their HEP- met  Long Term Goals: To be accomplished in 20 treatments:  1. The Pt will score the MCII on her FOTO survey demonstrating improved overall function (47 to 59 points)- progressing  2. The Pt will be able to perform >/= 9 sit to stands in 30s demonstrating improved LE strength and stability- progressing  3. The Pt will perform the TUG test in </= 28s demonstrating improved community ambulation and gait speed- progressing  4.  The Pt will have >/= 70 degrees of active shoulder flexion to allow the Pt to be able to perform ADLs with less difficulty- progressing      PLAN  [x]  Upgrade activities as tolerated     [x]  Continue plan of care  [x]  Update interventions per flow sheet       []  Discharge due to:_  []  Other:_      Casey Veloz, PT 9/7/2018  12:44 PM

## 2018-09-10 ENCOUNTER — APPOINTMENT (OUTPATIENT)
Dept: PHYSICAL THERAPY | Age: 63
End: 2018-09-10
Payer: MEDICARE

## 2018-09-14 ENCOUNTER — HOSPITAL ENCOUNTER (OUTPATIENT)
Dept: PHYSICAL THERAPY | Age: 63
Discharge: HOME | End: 2018-09-14
Payer: MEDICARE

## 2018-09-14 PROCEDURE — G8980 MOBILITY D/C STATUS: HCPCS

## 2018-09-14 PROCEDURE — 97110 THERAPEUTIC EXERCISES: CPT

## 2018-09-14 PROCEDURE — G8979 MOBILITY GOAL STATUS: HCPCS

## 2018-09-14 NOTE — ANCILLARY DISCHARGE INSTRUCTIONS
[facility]  Martín Alvarado (MOB IV), [address] [facility] [place], [address]  Phone: [phone] Fax: [phone]    Discharge Summary 2-15    Patient name: Clarissa Peres  : 1955  Provider#: [id number]  Referral source: Noemy Puga MD      Medical/Treatment Diagnosis: Weakness [R53.1]  Localized edema [R60.0]     Prior Hospitalization: see medical history     Comorbidities: See Plan of Care  Prior Level of Function: See Plan of Care  Medications: Verified on Patient Summary List    Start of Care: 18      Onset Date:2015   Visits from Start of Care: 30     Missed Visits: 0  Reporting Period : 18 to 18    Assessment/Summary of care: The Pt was treated for 30 outpatient physical therapy sessions with severe UE and LE strength deficits due to the diagnosis of polymyocitis. The Pt has progressed well with her therapy program that has focused on improving her UE strength and stability, improving her LE strength and stability, improving her core and postural strength and stability, improving her UE A/PROM, improving her standing/walking tolerance, improving her balance and neuromuscular control and improving her activity tolerance and endurance via therapeutic exercises and manual therapy techniques. The Pt reports significant improvements with her functional mobility- she is able to ambulate using a SPC, is independent getting in and out of chairs, and can perform all bed mobility independently. She reports improvement with her activity tolerance and endurance and reports that she does not fatigue as quickly and is able to do more. Overall, she believes that she is 80% improved since beginning therapy. At this time, it is believed that the Pt has reached maximum benefit from skilled PT and will continue to progress well independently. The Pt was educated how to safely progress her HEP and voiced understanding.   The Pt is discharged from skilled PT and will contact her referring provider with any further questions or concerns. Thank you for this referral.    Other Objective/Functional Measures:  FOTO 54/100 (47/100 at initial evaluation)  TU.54s  30s sit to stand: 9  Active shoulder flexion: 60 degrees bilaterally        Progress towards goals / Updated goals:  Short Term Goals: To be accomplished in 6 treatments:  1. The Pt will be independent and compliant with their HEP- met  Long Term Goals: To be accomplished in 20 treatments:  1. The Pt will score the MCII on her FOTO survey demonstrating improved overall function (47 to 59 points)- progressing FOTO 54/100 at discharge  2. The Pt will be able to perform >/= 9 sit to stands in 30s demonstrating improved LE strength and stability- met  3. The Pt will perform the TUG test in </= 28s demonstrating improved community ambulation and gait speed- met  4. The Pt will have >/= 70 degrees of active shoulder flexion to allow the Pt to be able to perform ADLs with less difficulty- progressing  (60 degrees bilaterally)                     G-Codes (GP)  Mobility    Goal  CK= 40-59%  D/C  CK= 40-59%    The severity rating is based on clinical judgment and the FOTO Score score. RECOMMENDATIONS:  [x]Discontinue therapy: [x]Patient has reached or is progressing toward set goals     []Patient is non-compliant or has abdicated     []Due to lack of appreciable progress towards set goals     []Other  Debora Eisenberg, PT 2018 1:15 PM        ______________________________________________________________________  NOTE TO PHYSICIAN:  Please complete the following and fax to: [facility]: Fax: [phone]. Retain this original for your records. If you are unable to process this request in 24 hours, please contact our office.      [de-identified] Signature:____________________  Date:____________Time:_________

## 2018-09-14 NOTE — PROGRESS NOTES
PT DAILY TREATMENT NOTE - [facility] 2-15    Patient Name: Pily Bryant  Date:2018  : 1955  [x]  Patient  Verified  Payor: [place] MEDICARE / Plan: [place] MEDICARE PART A & B / Product Type: Medicare /    In time: 11:50 AM  Out time: 1:00 PM  Total Treatment Time (min): 70 minutes  Total Timed Codes (min): 70 minutes  1:1 Treatment Time (MC only): 60 minutes   Visit #: 30     Treatment Area: Weakness [R53.1]  Localized edema [R60.0]    SUBJECTIVE  Pain Level (0-10 scale): 2/10  Any medication changes, allergies to medications, adverse drug reactions, diagnosis change, or new procedure performed?: [x] No    [] Yes (see summary sheet for update)  Subjective functional status/changes:   [] No changes reported  The Pt reports that she is feeling much better overall. She states that her endurance and functional mobility have improved greatly and she is ~80% improved since beginning therapy. OBJECTIVE    70 min Therapeutic Exercise:  [x] See flow sheet :   Rationale: increase ROM, increase strength, improve coordination, improve balance and increase proprioception to improve the patients ability to ambulate and perform ADLs with less difficulty          With   [x] TE   [] TA   [] neuro   [x] other: Patient Education: [x] Review HEP    [] Progressed/Changed HEP based on:   [] positioning   [] body mechanics   [] transfers   [] heat/ice application    [x] other: Pt educated how to safely progress her HEP independently      Other Objective/Functional Measures:  FOTO 54/100 (47/100 at initial evaluation)  TU.54  30s sit to stand: 9  Active shoulder flexion: 60 degrees bilaterally     Pain Level (0-10 scale) post treatment: 0/10    ASSESSMENT/Changes in Function:      []  See Plan of Care  []  See progress note/recertification  [x]  See Discharge Summary         Progress towards goals / Updated goals:  Short Term Goals: To be accomplished in 6 treatments:  1.  The Pt will be independent and compliant with their HEP- met  Long Term Goals: To be accomplished in 20 treatments:  1. The Pt will score the MCII on her FOTO survey demonstrating improved overall function (47 to 59 points)- progressing FOTO 54/100 at discharge  2. The Pt will be able to perform >/= 9 sit to stands in 30s demonstrating improved LE strength and stability- met  3. The Pt will perform the TUG test in </= 28s demonstrating improved community ambulation and gait speed- met  4.  The Pt will have >/= 70 degrees of active shoulder flexion to allow the Pt to be able to perform ADLs with less difficulty- progressing  (60 degrees bilaterally)     PLAN  []  Upgrade activities as tolerated     []  Continue plan of care  []  Update interventions per flow sheet       [x]  Discharge due to: Pt has met or is progressing well towards therapeutic goals  []  Other:_      Gala Cervantes, PT 9/14/2018  12:56 PM

## 2018-09-20 ENCOUNTER — HOSPITAL ENCOUNTER (OUTPATIENT)
Dept: MAMMOGRAPHY | Age: 63
Discharge: HOME | End: 2018-09-20
Attending: INTERNAL MEDICINE
Payer: MEDICARE

## 2018-09-20 DIAGNOSIS — Z12.39 SCREENING FOR BREAST CANCER: ICD-10-CM

## 2018-09-20 PROCEDURE — 77067 SCR MAMMO BI INCL CAD: CPT

## 2018-10-10 ENCOUNTER — TELEPHONE (OUTPATIENT)
Dept: INTERNAL MEDICINE CLINIC | Age: 63
End: 2018-10-10

## 2018-10-10 DIAGNOSIS — E55.9 VITAMIN D DEFICIENCY: Primary | ICD-10-CM

## 2018-10-10 RX ORDER — ASPIRIN 325 MG
50000 TABLET, DELAYED RELEASE (ENTERIC COATED) ORAL
Qty: 8 CAP | Refills: 0 | Status: SHIPPED | OUTPATIENT
Start: 2018-10-10 | End: 2018-11-29

## 2018-10-10 NOTE — TELEPHONE ENCOUNTER
FATMATA  Given providers message noted below. Pt verified understanding. She notes that she is in the hosp.  In [facility] for pneumonia   She f/u here in 2 months

## 2018-10-10 NOTE — TELEPHONE ENCOUNTER
Please call patient and advise high dose vit D supplement was sent to [facility] pharmacy this morning. To take daily 2000 (IU) tablet, and add 1  Time weekly the 50,000 international unit (IU) tablet. Tamela Urban MD    Lab note   \"Apologies for the delayed messaging regarding your labs. Somehow, they were tucked away at the end of my inbox. .. As you have seen, overall your labs are very stable from prior. Negative Hepatitis C screen. CBC shows improvement of hemoglobin and you no longer have anemia. Cholesterol is reasonable. A1C is in normal range indicating normal blood sugars. I will note your liver enzymes (may be a reflection of muscle and not liver) are elevated, again I think this relates back to the primary muscle condition you have. Have you had a chance to follow-up with your rheumatologist Dr. Iman Roque? Lastly, your vitamin D level is low. I would like for you to be in the habit of taking vit D supplement daily at around 2000 international units (IU) . However I will also call in a high dose supplement for you to take 1 time weekly over the next 8 weeks. I recommend following up in the office in another 2 months to review progress and follow-up vit D level.  \"

## 2018-10-16 ENCOUNTER — OFFICE VISIT (OUTPATIENT)
Dept: INTERNAL MEDICINE CLINIC | Age: 63
End: 2018-10-16

## 2018-10-16 VITALS
SYSTOLIC BLOOD PRESSURE: 158 MMHG | RESPIRATION RATE: 20 BRPM | WEIGHT: 163 LBS | TEMPERATURE: 98.3 F | BODY MASS INDEX: 28.88 KG/M2 | DIASTOLIC BLOOD PRESSURE: 77 MMHG | HEART RATE: 94 BPM | OXYGEN SATURATION: 98 % | HEIGHT: 63 IN

## 2018-10-16 DIAGNOSIS — G56.01 CARPAL TUNNEL SYNDROME OF RIGHT WRIST: ICD-10-CM

## 2018-10-16 DIAGNOSIS — J18.9 PNEUMONIA OF BOTH LUNGS DUE TO INFECTIOUS ORGANISM, UNSPECIFIED PART OF LUNG: ICD-10-CM

## 2018-10-16 DIAGNOSIS — I11.9 HYPERTENSIVE LEFT VENTRICULAR HYPERTROPHY, WITHOUT HEART FAILURE: ICD-10-CM

## 2018-10-16 DIAGNOSIS — M33.20 POLYMYOSITIS (HCC): Primary | ICD-10-CM

## 2018-10-16 DIAGNOSIS — I10 ESSENTIAL HYPERTENSION: ICD-10-CM

## 2018-10-16 RX ORDER — AMLODIPINE BESYLATE 5 MG/1
5 TABLET ORAL
COMMUNITY
Start: 2018-10-12 | End: 2018-10-16 | Stop reason: SDUPTHER

## 2018-10-16 RX ORDER — GABAPENTIN 100 MG/1
100 CAPSULE ORAL 2 TIMES DAILY
Qty: 60 CAP | Refills: 0 | Status: ON HOLD | OUTPATIENT
Start: 2018-10-16 | End: 2018-11-14 | Stop reason: SDUPTHER

## 2018-10-16 RX ORDER — AMLODIPINE BESYLATE 5 MG/1
5 TABLET ORAL DAILY
Qty: 30 TAB | Refills: 1 | Status: SHIPPED | OUTPATIENT
Start: 2018-10-16 | End: 2018-11-29 | Stop reason: SDUPTHER

## 2018-10-16 NOTE — PROGRESS NOTES
HPI   Eliane Kincaid is a 61 y.o. female, she presents today for:    Hospital follow-up   Admitted for: pneumonia. At [facility], [address] Discharge dx: pneumonia  Dates of admission: 10/4-10/11    Finished antiobiotics 5 day course, Augmentin and doxycycline. Plans to start taking probiotic. Today reports cough: \"just slightly\"     Was also seen for 1 week work-up at [facility] with Dr. Francisco Ramirez, neurologist and specialist in muscle disorders. See summary note from 10/15 telemedicine visit in care-everywhere. diagnosis was impeded because of inadequate muscle biopsy (patient refused second muscle biopsy, first done in previous year). 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 Was advised at methotrexate to stop for possible lung disease. Notes that the pain in her right hand (associated with numbness) was improving with gabapentin. Reports that she did a lot of tests at the [facility] clinic. (bronchoscopy, echocardiogram, CT PET scan, declined-muscle biopsy)     PMH/PSH: reviewed and updated  Sochx:  reports that she quit smoking about 20 years ago. Her smoking use included Cigarettes. She has a 20.00 pack-year smoking history. She has never used smokeless tobacco. She reports that she does not drink alcohol or use illicit drugs. Famhx: reviewed and updated     All: Allergies   Allergen Reactions    Ace Inhibitors Cough    Dilaudid [Hydromorphone] Other (comments)    Levaquin [Levofloxacin] Other (comments)    Lisinopril Other (comments)    Metoprolol Other (comments)     hallucinations    Peanut Other (comments)     Med:   Current Outpatient Prescriptions   Medication Sig    L. acidoph & paracasei- S therm- Bifido (RISAQUAD) 8 billion cell cap cap Take 1 Cap by mouth.  amLODIPine (NORVASC) 5 mg tablet Take 5 mg by mouth.  cholecalciferol (VITAMIN D3) 50,000 unit capsule Take 1 Cap by mouth every seven (7) days for 8 doses.     raNITIdine (ZANTAC) 150 mg tablet Take  by mouth. 1 tablet prn heartburn.  losartan (COZAAR) 100 mg tablet TAKE 1 TABLET BY MOUTH EVERY DAY    fluticasone (FLONASE) 50 mcg/actuation nasal spray 2 Sprays by Both Nostrils route daily. (Patient taking differently: 2 Sprays by Both Nostrils route daily as needed.)    vitamin E topical cream Apply  to affected area daily. Patient applies to face    predniSONE (DELTASONE) 5 mg tablet TAKE 2 TABLETS BY MOUTH EVERY DAY    methotrexate (RHEUMATREX) 2.5 mg tablet TAKE 5 TABLETS BY MOUTH EVERY 7 DAYS    folic acid (FOLVITE) 1 mg tablet Take  by mouth daily.  aspirin 81 mg chewable tablet Take 1 Tab by mouth daily.  ondansetron (ZOFRAN ODT) 4 mg disintegrating tablet Take 1 Tab by mouth every eight (8) hours as needed for Nausea. No current facility-administered medications for this visit. Review of Systems   Constitutional: Negative for chills, fever and malaise/fatigue. Respiratory: Negative for shortness of breath. Cardiovascular: Negative for chest pain. PE:  Blood pressure 158/77, pulse 94, temperature 98.3 °F (36.8 °C), temperature source Oral, resp. rate 20, height 5' 3\" (1.6 m), weight 163 lb (73.9 kg), SpO2 98 %. Body mass index is 28.87 kg/(m^2). Physical Exam   Constitutional: She is oriented to person, place, and time. She appears well-developed and well-nourished. No distress. HENT:   Head: Normocephalic. Mouth/Throat: Oropharynx is clear and moist.   Eyes: Conjunctivae and EOM are normal.   Neck: Neck supple. Cardiovascular: Normal rate, regular rhythm and normal heart sounds. Pulmonary/Chest: Effort normal and breath sounds normal.   Neurological: She is alert and oriented to person, place, and time. Skin: Skin is warm and dry. Nursing note and vitals reviewed. Labs:   No results found for any visits on 10/16/18. A/P:  61 y.o. female    ICD-10-CM ICD-9-CM    1. Polymyositis (HCC) M33.20 710.4    2.  Pneumonia of both lungs due to infectious organism, unspecified part of lung J18.9 483.8 XR CHEST PA LAT   3. 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 It is possible that a second biopsy will yield further diagnosis and help in creating effective treatment plan. - Pt has stopped methotrexate thinking this may be bad for her lungs, advised her to call Dr. Marium Baez office to notify and discuss concerns. Lung parenchymal disease and respiratory muscle weakness. -PET CT and PFTs done on 10.4, hospitalized on 10/4 with pneumonia at [facility], so unclear if she has chronic or an acute lung pathology. -  Follow-up CXR requested in ~ 3 weeks. - consider further follow-up with pulmonologist after this follow-up if ongoing pathology or shortness of breath. - high risk for recurrent lung infections given myopathic disease. Carpal tunnel syndrome: okay to trial gabapentin 100mg BID. Discussed considering carpal tunnel release in near future. HTN with LV hypertrophy: continue to work on imrpvoing bp control. Started on amlodipine in addition to losartan at [facility], continue and repeat BP in 1 month. - She was given AVS and expressed understanding with the diagnosis and plan as discussed. Follow-up Disposition:  Return in about 1 month (around 11/16/2018) for blood pressure check, follow-up chest x-ray.   Future Appointments  Date Time Provider Nel Davis   10/26/2018 2:00 PM Ivan Camejo  [address]

## 2018-10-16 NOTE — MR AVS SNAPSHOT
[address] [address] [place] [address] 
[phone] Patient: Pat Draper MRN: [id number] AFX:[id number] Visit Information Date & Time Provider Department Dept. Phone Encounter #  
 10/16/2018  4:00 PM Tamela Urban MD [address] [facility] [phone] [id number] Follow-up Instructions Return in about 1 month (around 11/16/2018) for blood pressure check, follow-up chest x-ray. Your Appointments 10/26/2018  2:00 PM  
Any with Azam Suarez MD  
[address] ([facility]) Appt Note: yearly follow up  
 [place] [address] [phone] [address] [place] [address]., [address] [address] Upcoming Health Maintenance Date Due COLONOSCOPY 9/7/1973 Shingrix Vaccine Age 50> (1 of 2) 9/7/2005 Influenza Age 5 to Adult 8/1/2018 Pneumococcal 19-64 Highest Risk (2 of 3 - PCV13) 9/27/2018 MEDICARE YEARLY EXAM 8/24/2019 BREAST CANCER SCRN MAMMOGRAM 9/20/2020 PAP AKA CERVICAL CYTOLOGY 10/24/2020 DTaP/Tdap/Td series (2 - Td) 7/6/2027 Allergies as of 10/16/2018  Review Complete On: 10/16/2018 By: Beck Urban LPN Severity Noted Reaction Type Reactions Ace Inhibitors    Cough Dilaudid [Hydromorphone]  08/10/2017    Other (comments) Levaquin [Levofloxacin]  05/09/2017    Other (comments) Lisinopril  05/02/2016    Other (comments) Metoprolol  07/28/2016    Other (comments)  
 hallucinations Peanut  05/02/2016    Other (comments) Current Immunizations  Reviewed on 8/23/2018 Name Date Influenza Vaccine 11/3/2016, 10/15/2015 Influenza Vaccine (Quad) PF 9/27/2017 11:45 AM  
 Pneumococcal Polysaccharide (PPSV-23) 9/27/2017 11:46 AM  
 TB Skin Test (PPD) 1/1/2012 Tdap 7/6/2017 11:14 AM  
  
 Not reviewed this visit You Were Diagnosed With   
  
 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 
ICD-9-CM: 354.0 Vitals BP Pulse Temp Resp Height(growth percentile) Weight(growth percentile) 158/77 (BP 1 Location: Left arm, BP Patient Position: Sitting) 94 98.3 °F (36.8 °C) (Oral) 20 5' 3\" (1.6 m) 163 lb (73.9 kg) SpO2 BMI OB Status Smoking Status 98% 28.87 kg/m2 Hysterectomy Former Smoker Vitals History BMI and BSA Data Body Mass Index Body Surface Area  
 28.87 kg/m 2 1.81 m 2 Preferred Pharmacy Pharmacy Name Phone [facility][id number] Micheal Johnson [address] [id number] [phone] Your Updated Medication List  
  
   
This list is accurate as of 10/16/18  5:13 PM.  Always use your most recent med list. amLODIPine 5 mg tablet Commonly known as:  [place] Danes Take 5 mg by mouth. aspirin 81 mg chewable tablet Take 1 Tab by mouth daily. cholecalciferol 50,000 unit capsule Commonly known as:  VITAMIN D3 Take 1 Cap by mouth every seven (7) days for 8 doses. fluticasone 50 mcg/actuation nasal spray Commonly known as:  Massimo Rea 2 Sprays by Both Nostrils route daily. gabapentin 100 mg capsule Commonly known as:  NEURONTIN Take 1 Cap by mouth two (2) times a day. losartan 100 mg tablet Commonly known as:  COZAAR  
TAKE 1 TABLET BY MOUTH EVERY DAY  
  
 predniSONE 5 mg tablet Commonly known as:  DELTASONE  
TAKE 2 TABLETS BY MOUTH EVERY DAY  
  
 raNITIdine 150 mg tablet Commonly known as:  ZANTAC Take  by mouth. 1 tablet prn heartburn. RISAQUAD 8 billion cell Cap cap Generic drug:  L. acidoph & paracasei- S therm- Bifido Take 1 Cap by mouth. vitamin E topical cream  
Apply  to affected area daily. Patient applies to face Prescriptions Sent to Pharmacy Refills  
 gabapentin (NEURONTIN) 100 mg capsule 0 Sig: Take 1 Cap by mouth two (2) times a day. Class: Normal  
 [id number] Dana Aguila  #: [phone] Route: Oral  
  
Follow-up Instructions Return in about 1 month (around 11/16/2018) for blood pressure check, follow-up chest x-ray. To-Do List   
 Around 11/05/2018 Imaging:  XR CHEST PA LAT Patient Instructions 1) If pain in hands doesn't improve after wearing brace and general health, plan to follow-up with hand surgeon. 2) Please call Dr. Emilee Lunsford office and advise of visit, verify records have been received from [facility], and note that you have stopped methotrexate. 3) follow-up in 1 month for repeat blood pressure, please get chest x-ray completed prior to this visit. Carpal Tunnel Release: Before Your Surgery What is carpal tunnel release? Carpal tunnel release is surgery that reduces the pressure on a nerve in the wrist. Your doctor will cut a ligament that presses on the nerve. This lets the nerve pass freely through the tunnel without being squeezed. The surgery can be open or endoscopic. In open surgery, your doctor makes a small cut in the palm of your hand. This cut is called an incision. In endoscopic surgery, your doctor makes one small incision in the wrist, or one small incision in the wrist and one in the palm. Your doctor puts a thin tube with a camera attached (endoscope) into the incision. Surgical tools are put in along with the endoscope. In both types of surgeries, the incisions are closed with stitches. The incisions leave scars that usually fade in time. You may be asleep during the surgery. Or you may be awake and have medicine to numb your hand and arm so you will not feel pain. After surgery, your wrist and hand pain should begin to go away. It usually takes 3 to 4 months to recover and 1 year before your hand strength returns. How much hand strength returns is different for each person. You will go home the same day as the surgery. When you can return to work depends on the type of work you do. Follow-up care is a key part of your treatment and safety. Be sure to make and go to all appointments, and call your doctor if you are having problems. It's also a good idea to know your test results and keep a list of the medicines you take. What happens before surgery? 
 Surgery can be stressful. This information will help you understand what you can expect. And it will help you safely prepare for surgery. 
 Preparing for surgery 
  · Understand exactly what surgery is planned, along with the risks, benefits, and other options. · Tell your doctors ALL the medicines, vitamins, supplements, and herbal remedies you take. Some of these can increase the risk of bleeding or interact with anesthesia.  
  · If you take blood thinners, such as warfarin (Coumadin), clopidogrel (Plavix), or aspirin, be sure to talk to your doctor. He or she will tell you if you should stop taking these medicines before your surgery. Make sure that you understand exactly what your doctor wants you to do.  
  · Your doctor will tell you which medicines to take or stop before your surgery. You may need to stop taking certain medicines a week or more before surgery. So talk to your doctor as soon as you can.  
  · If you have an advance directive, let your doctor know. It may include a living will and a durable power of  for health care. Bring a copy to the hospital. If you don't have one, you may want to prepare one. It lets your doctor and loved ones know your health care wishes. Doctors advise that everyone prepare these papers before any type of surgery or procedure. What happens on the day of surgery? · Follow the instructions exactly about when to stop eating and drinking. If you don't, your surgery may be canceled. If your doctor told you to take your medicines on the day of surgery, take them with only a sip of water.  
  · Take a bath or shower before you come in for your surgery. Do not apply lotions, perfumes, deodorants, or nail polish.  
  · Do not shave the surgical site yourself.  
  · Take off all jewelry and piercings. And take out contact lenses, if you wear them.  
 At the hospital or surgery center · Bring a picture ID.  
  · The area for surgery is often marked to make sure there are no errors.  
  · You will be kept comfortable and safe by your anesthesia provider. The anesthesia may make you sleep. Or it may just numb the area being worked on.  
  · The surgery will take about 15 to 60 minutes. Going home · Be sure you have someone to drive you home. Anesthesia and pain medicine make it unsafe for you to drive.  
  · You will be given more specific instructions about recovering from your surgery. They will cover things like diet, wound care, follow-up care, driving, and getting back to your normal routine. When should you call your doctor? · You have questions or concerns.  
  · You don't understand how to prepare for your surgery.  
  · You become ill before the surgery (such as fever, flu, or a cold).  
  · You need to reschedule or have changed your mind about having the surgery. Where can you learn more? Go to http://brijesh-marichuy.info/. Enter P376 in the search box to learn more about \"Carpal Tunnel Release: Before Your Surgery. \" Current as of: November 29, 2017 Content Version: 11.8 © 3690-1312 [employer]. Care instructions adapted under license by flux - [employer]inity (which disclaims liability or warranty for this information).  If you have questions about a medical condition or this instruction, always ask your healthcare professional. [address] any warranty or liability for your use of this information. Introducing [facility]! Dear Trae Lucero: Thank you for requesting a Casual[employer] account. Our records indicate that you already have an active Casual[employer] account. You can access your account anytime at [email or web address] Did you know that you can access your hospital and ER discharge instructions at any time in Casualing? You can also review all of your test results from your hospital stay or ER visit. Additional Information If you have questions, please visit the Frequently Asked Questions section of the Casualing website at [email or web address] Remember, Casualing is NOT to be used for urgent needs. For medical emergencies, dial 911. Now available from your iPhone and Android! Please provide this summary of care documentation to your next provider. Your primary care clinician is listed as Gavin Ochoa. If you have any questions after today's visit, please call [phone].

## 2018-10-16 NOTE — PATIENT INSTRUCTIONS
1) If pain in hands doesn't improve after wearing brace and general health, plan to follow-up with hand surgeon. 2) Please call Dr. Low Mayer office and advise of visit, [facility], and note that you have stopped methotrexate. 3) follow-up in 1 month for repeat blood pressure, please get chest x-ray completed prior to this visit. Carpal Tunnel Release: Before Your Surgery  What is carpal tunnel release? Carpal tunnel release is surgery that reduces the pressure on a nerve in the wrist. Your doctor will cut a ligament that presses on the nerve. This lets the nerve pass freely through the tunnel without being squeezed. The surgery can be open or endoscopic. In open surgery, your doctor makes a small cut in the palm of your hand. This cut is called an incision. In endoscopic surgery, your doctor makes one small incision in the wrist, or one small incision in the wrist and one in the palm. Your doctor puts a thin tube with a camera attached (endoscope) into the incision. Surgical tools are put in along with the endoscope. In both types of surgeries, the incisions are closed with stitches. The incisions leave scars that usually fade in time. You may be asleep during the surgery. Or you may be awake and have medicine to numb your hand and arm so you will not feel pain. After surgery, your wrist and hand pain should begin to go away. It usually takes 3 to 4 months to recover and 1 year before your hand strength returns. How much hand strength returns is different for each person. You will go home the same day as the surgery. When you can return to work depends on the type of work you do. Follow-up care is a key part of your treatment and safety. Be sure to make and go to all appointments, and call your doctor if you are having problems. It's also a good idea to know your test results and keep a list of the medicines you take.   What happens before surgery?   Surgery can be stressful. This information will help you understand what you can expect. And it will help you safely prepare for surgery.   Preparing for surgery    · Understand exactly what surgery is planned, along with the risks, benefits, and other options. · Tell your doctors ALL the medicines, vitamins, supplements, and herbal remedies you take. Some of these can increase the risk of bleeding or interact with anesthesia.     · If you take blood thinners, such as warfarin (Coumadin), clopidogrel (Plavix), or aspirin, be sure to talk to your doctor. He or she will tell you if you should stop taking these medicines before your surgery. Make sure that you understand exactly what your doctor wants you to do.     · Your doctor will tell you which medicines to take or stop before your surgery. You may need to stop taking certain medicines a week or more before surgery. So talk to your doctor as soon as you can.     · If you have an advance directive, let your doctor know. It may include a living will and a durable power of  for health care. Bring a copy to the hospital. If you don't have one, you may want to prepare one. It lets your doctor and loved ones know your health care wishes. Doctors advise that everyone prepare these papers before any type of surgery or procedure. What happens on the day of surgery? · Follow the instructions exactly about when to stop eating and drinking. If you don't, your surgery may be canceled. If your doctor told you to take your medicines on the day of surgery, take them with only a sip of water.     · Take a bath or shower before you come in for your surgery. Do not apply lotions, perfumes, deodorants, or nail polish.     · Do not shave the surgical site yourself.     · Take off all jewelry and piercings.  And take out contact lenses, if you wear them.    At the hospital or surgery center   · Bring a picture ID.     · The area for surgery is often marked to make sure there are no errors.     · You will be kept comfortable and safe by your anesthesia provider. The anesthesia may make you sleep. Or it may just numb the area being worked on.     · The surgery will take about 15 to 60 minutes. Going home   · Be sure you have someone to drive you home. Anesthesia and pain medicine make it unsafe for you to drive.     · You will be given more specific instructions about recovering from your surgery. They will cover things like diet, wound care, follow-up care, driving, and getting back to your normal routine. When should you call your doctor? · You have questions or concerns.     · You don't understand how to prepare for your surgery.     · You become ill before the surgery (such as fever, flu, or a cold).     · You need to reschedule or have changed your mind about having the surgery. Where can you learn more? Go to http://brijesh-marichuy.info/. Enter X426 in the search box to learn more about \"Carpal Tunnel Release: Before Your Surgery. \"  Current as of: November 29, 2017  Content Version: 11.8  © 5916-4908 [employer]wise, [employer]orporated. Care instructions adapted under license by Hello Agent (which disclaims liability or warranty for this information). If you have questions about a medical condition or this instruction, always ask your healthcare professional. [address] any warranty or liability for your use of this information.

## 2018-10-16 NOTE — PROGRESS NOTES
Rm#3  Pt was given gabapentin at [facility] and pt states it worked very well on right hand nerve pain -would like RX     Chief Complaint   Patient presents with   [facility] Follow Up     [facility], 10-4-18, pnuemonia     Numbness     numbness and tingling in right hand/arm. radiating up arm      1. Have you been to the ER, urgent care clinic since your last visit? Hospitalized since your last visit? Yes 10-4-18, [facility], [facility]     2. Have you seen or consulted any other health care providers outside of the [address] [place] [address] since your last visit? Include any pap smears or colon screening.  Yes [facility], , [address] Maintenance Due   Topic Date Due    COLONOSCOPY  09/07/1973    Shingrix Vaccine Age 50> (1 of 2) 09/07/2005    Influenza Age 5 to Adult  08/01/2018    Pneumococcal 19-64 Highest Risk (2 of 3 - PCV13) 09/27/2018     PHQ over the last two weeks 10/16/2018   Little interest or pleasure in doing things Not at all   Feeling down, depressed, irritable, or hopeless Not at all   Total Score PHQ 2 0

## 2018-10-26 ENCOUNTER — HOSPITAL ENCOUNTER (OUTPATIENT)
Dept: GENERAL RADIOLOGY | Age: 63
Discharge: HOME | End: 2018-10-26
Payer: MEDICARE

## 2018-10-26 DIAGNOSIS — J18.9 PNEUMONIA OF BOTH LUNGS DUE TO INFECTIOUS ORGANISM, UNSPECIFIED PART OF LUNG: ICD-10-CM

## 2018-10-26 PROCEDURE — 71046 X-RAY EXAM CHEST 2 VIEWS: CPT

## 2018-10-30 ENCOUNTER — PATIENT MESSAGE (OUTPATIENT)
Dept: INTERNAL MEDICINE CLINIC | Age: 63
End: 2018-10-30

## 2018-10-30 DIAGNOSIS — J18.9 PNEUMONIA OF BOTH LUNGS DUE TO INFECTIOUS ORGANISM, UNSPECIFIED PART OF LUNG: Primary | ICD-10-CM

## 2018-11-08 NOTE — TELEPHONE ENCOUNTER
From: [facility]  To: Charlie Beckman MD  Sent: 10/30/2018 7:40 PM EDT  Subject: Test Results Question    Hello Dr Carmen Suggs,  My 10/26/2018 chest X-ray indicates that I still have pneumonia in my left lung. Should I be taking more antibiotics to treat this? I have chills a lot and I have a dry cough. I stopped taking metimucil because the package said to not take more than seven days. Metamucil did help with the cough. Please advise.   MYTRND

## 2018-11-08 NOTE — TELEPHONE ENCOUNTER
Called patient:    - reports that overall she is feeling better, chills resolved, having improvement movement in legs and range of motion. - cough has become more productive. - was taking mucinex and this was helping a lot. No longer feels tight and mucous seems to becoming up better. Since symptoms besides cough have all imrpvoed (no chills, improved muscle strength), and no fever, I do not think she has active infection. Advised to continue to complete lung clearance. If worsening cough, new chills, decreased energy should be evaluted as at risk for repeat pneumonia. Follow-up with pulm or with cxr within 1 month. Patient is interested in seeing pulmonologist, provided number for pulmonary associates.      Jayson Bojorquez MD

## 2018-11-12 ENCOUNTER — TELEPHONE (OUTPATIENT)
Dept: INTERNAL MEDICINE CLINIC | Age: 63
End: 2018-11-12

## 2018-11-12 NOTE — TELEPHONE ENCOUNTER
Norvasc 5 MG, once daily,  was rx'd to pt when she was in the hospital in [facility]. Oct 2018. Pt took the last Norvasc today, should she continue this med or stop since she has finished the rx?   Please call pt back at [phone]  [facility]    Please call pt back

## 2018-11-13 NOTE — TELEPHONE ENCOUNTER
Spoke with pt , after verifying pt . Let Pt know Dr. Michel Wyatt had prescribed  Norvasc 5 mg in October, and she has 1 refill left at the pharmacy. Pt stated she will call her pharmacy,Pt verbalize understanding.

## 2018-11-14 DIAGNOSIS — I10 ESSENTIAL HYPERTENSION: ICD-10-CM

## 2018-11-14 DIAGNOSIS — G56.01 CARPAL TUNNEL SYNDROME OF RIGHT WRIST: ICD-10-CM

## 2018-11-14 RX ORDER — LOSARTAN POTASSIUM 100 MG/1
TABLET ORAL
Qty: 90 TAB | Refills: 3 | OUTPATIENT
Start: 2018-11-14

## 2018-11-15 ENCOUNTER — APPOINTMENT (OUTPATIENT)
Dept: GENERAL RADIOLOGY | Age: 63
DRG: 194 | End: 2018-11-15
Attending: EMERGENCY MEDICINE
Payer: MEDICARE

## 2018-11-15 ENCOUNTER — HOSPITAL ENCOUNTER (INPATIENT)
Age: 63
LOS: 4 days | Discharge: HOME | DRG: 194 | End: 2018-11-19
Attending: EMERGENCY MEDICINE | Admitting: INTERNAL MEDICINE
Payer: MEDICARE

## 2018-11-15 ENCOUNTER — APPOINTMENT (OUTPATIENT)
Dept: CT IMAGING | Age: 63
DRG: 194 | End: 2018-11-15
Attending: INTERNAL MEDICINE
Payer: MEDICARE

## 2018-11-15 DIAGNOSIS — Z78.9 FAILURE OF OUTPATIENT TREATMENT: Primary | ICD-10-CM

## 2018-11-15 DIAGNOSIS — J18.9 PNEUMONIA DUE TO INFECTIOUS ORGANISM, UNSPECIFIED LATERALITY, UNSPECIFIED PART OF LUNG: ICD-10-CM

## 2018-11-15 PROBLEM — R07.81 PLEURITIC CHEST PAIN: Status: ACTIVE | Noted: 2018-11-15

## 2018-11-15 LAB
ANION GAP SERPL CALC-SCNC: 8 MMOL/L (ref 5–15)
BNP SERPL-MCNC: 103 PG/ML (ref 0–125)
BUN SERPL-MCNC: 33 MG/DL (ref 6–20)
BUN/CREAT SERPL: 33 (ref 12–20)
CALCIUM SERPL-MCNC: 9.3 MG/DL (ref 8.5–10.1)
CHLORIDE SERPL-SCNC: 105 MMOL/L (ref 97–108)
CO2 SERPL-SCNC: 26 MMOL/L (ref 21–32)
CREAT SERPL-MCNC: 1 MG/DL (ref 0.55–1.02)
ERYTHROCYTE [DISTWIDTH] IN BLOOD BY AUTOMATED COUNT: 18.3 % (ref 11.5–14.5)
GLUCOSE SERPL-MCNC: 87 MG/DL (ref 65–100)
HCT VFR BLD AUTO: 41.5 % (ref 35–47)
HGB BLD-MCNC: 13 G/DL (ref 11.5–16)
LACTATE SERPL-SCNC: 1 MMOL/L (ref 0.4–2)
MCH RBC QN AUTO: 23 PG (ref 26–34)
MCHC RBC AUTO-ENTMCNC: 31.3 G/DL (ref 30–36.5)
MCV RBC AUTO: 73.3 FL (ref 80–99)
NRBC # BLD: 0 K/UL (ref 0–0.01)
NRBC BLD-RTO: 0 PER 100 WBC
PLATELET # BLD AUTO: 278 K/UL (ref 150–400)
PMV BLD AUTO: 12 FL (ref 8.9–12.9)
POTASSIUM SERPL-SCNC: 4.4 MMOL/L (ref 3.5–5.1)
RBC # BLD AUTO: 5.66 M/UL (ref 3.8–5.2)
SODIUM SERPL-SCNC: 139 MMOL/L (ref 136–145)
TROPONIN I SERPL-MCNC: <0.05 NG/ML
WBC # BLD AUTO: 11.3 K/UL (ref 3.6–11)

## 2018-11-15 PROCEDURE — 96365 THER/PROPH/DIAG IV INF INIT: CPT

## 2018-11-15 PROCEDURE — 71046 X-RAY EXAM CHEST 2 VIEWS: CPT

## 2018-11-15 PROCEDURE — 83880 ASSAY OF NATRIURETIC PEPTIDE: CPT

## 2018-11-15 PROCEDURE — 80048 BASIC METABOLIC PNL TOTAL CA: CPT

## 2018-11-15 PROCEDURE — 74011250636 HC RX REV CODE- 250/636: Performed by: EMERGENCY MEDICINE

## 2018-11-15 PROCEDURE — 84484 ASSAY OF TROPONIN QUANT: CPT

## 2018-11-15 PROCEDURE — 96375 TX/PRO/DX INJ NEW DRUG ADDON: CPT

## 2018-11-15 PROCEDURE — 71275 CT ANGIOGRAPHY CHEST: CPT

## 2018-11-15 PROCEDURE — 74011636320 HC RX REV CODE- 636/320: Performed by: EMERGENCY MEDICINE

## 2018-11-15 PROCEDURE — 74011250637 HC RX REV CODE- 250/637: Performed by: INTERNAL MEDICINE

## 2018-11-15 PROCEDURE — 94640 AIRWAY INHALATION TREATMENT: CPT

## 2018-11-15 PROCEDURE — 65660000000 HC RM CCU STEPDOWN

## 2018-11-15 PROCEDURE — 74011250636 HC RX REV CODE- 250/636: Performed by: INTERNAL MEDICINE

## 2018-11-15 PROCEDURE — 83605 ASSAY OF LACTIC ACID: CPT

## 2018-11-15 PROCEDURE — 99284 EMERGENCY DEPT VISIT MOD MDM: CPT

## 2018-11-15 PROCEDURE — 93005 ELECTROCARDIOGRAM TRACING: CPT

## 2018-11-15 PROCEDURE — 36415 COLL VENOUS BLD VENIPUNCTURE: CPT

## 2018-11-15 PROCEDURE — 77030029684 HC NEB SM VOL KT MONA -A

## 2018-11-15 PROCEDURE — 85027 COMPLETE CBC AUTOMATED: CPT

## 2018-11-15 PROCEDURE — 87040 BLOOD CULTURE FOR BACTERIA: CPT

## 2018-11-15 PROCEDURE — 96372 THER/PROPH/DIAG INJ SC/IM: CPT

## 2018-11-15 PROCEDURE — 74011000250 HC RX REV CODE- 250: Performed by: EMERGENCY MEDICINE

## 2018-11-15 PROCEDURE — 74011000258 HC RX REV CODE- 258: Performed by: EMERGENCY MEDICINE

## 2018-11-15 RX ORDER — BENZONATATE 100 MG/1
200 CAPSULE ORAL
Status: DISCONTINUED | OUTPATIENT
Start: 2018-11-15 | End: 2018-11-19 | Stop reason: HOSPADM

## 2018-11-15 RX ORDER — ENOXAPARIN SODIUM 100 MG/ML
40 INJECTION SUBCUTANEOUS EVERY 24 HOURS
Status: DISCONTINUED | OUTPATIENT
Start: 2018-11-15 | End: 2018-11-19 | Stop reason: HOSPADM

## 2018-11-15 RX ORDER — SODIUM CHLORIDE 9 MG/ML
50 INJECTION, SOLUTION INTRAVENOUS
Status: COMPLETED | OUTPATIENT
Start: 2018-11-15 | End: 2018-11-15

## 2018-11-15 RX ORDER — IPRATROPIUM BROMIDE AND ALBUTEROL SULFATE 2.5; .5 MG/3ML; MG/3ML
3 SOLUTION RESPIRATORY (INHALATION)
Status: COMPLETED | OUTPATIENT
Start: 2018-11-15 | End: 2018-11-15

## 2018-11-15 RX ORDER — LIDOCAINE 4 G/100G
1 PATCH TOPICAL EVERY 24 HOURS
Status: DISCONTINUED | OUTPATIENT
Start: 2018-11-15 | End: 2018-11-19 | Stop reason: HOSPADM

## 2018-11-15 RX ORDER — GUAIFENESIN 100 MG/5ML
81 LIQUID (ML) ORAL DAILY
Status: DISCONTINUED | OUTPATIENT
Start: 2018-11-16 | End: 2018-11-19 | Stop reason: HOSPADM

## 2018-11-15 RX ORDER — VANCOMYCIN/0.9 % SOD CHLORIDE 1.5G/250ML
1500 PLASTIC BAG, INJECTION (ML) INTRAVENOUS
Status: COMPLETED | OUTPATIENT
Start: 2018-11-15 | End: 2018-11-15

## 2018-11-15 RX ORDER — SODIUM CHLORIDE 0.9 % (FLUSH) 0.9 %
5-10 SYRINGE (ML) INJECTION EVERY 8 HOURS
Status: DISCONTINUED | OUTPATIENT
Start: 2018-11-15 | End: 2018-11-19 | Stop reason: HOSPADM

## 2018-11-15 RX ORDER — FLUTICASONE PROPIONATE 50 MCG
2 SPRAY, SUSPENSION (ML) NASAL DAILY
Status: DISCONTINUED | OUTPATIENT
Start: 2018-11-16 | End: 2018-11-19 | Stop reason: HOSPADM

## 2018-11-15 RX ORDER — FAMOTIDINE 20 MG/1
20 TABLET, FILM COATED ORAL
Status: DISCONTINUED | OUTPATIENT
Start: 2018-11-15 | End: 2018-11-19 | Stop reason: HOSPADM

## 2018-11-15 RX ORDER — ACETAMINOPHEN 325 MG/1
650 TABLET ORAL
Status: DISCONTINUED | OUTPATIENT
Start: 2018-11-15 | End: 2018-11-19 | Stop reason: HOSPADM

## 2018-11-15 RX ORDER — AMLODIPINE BESYLATE 5 MG/1
5 TABLET ORAL DAILY
Status: DISCONTINUED | OUTPATIENT
Start: 2018-11-16 | End: 2018-11-19 | Stop reason: HOSPADM

## 2018-11-15 RX ORDER — ONDANSETRON 2 MG/ML
4 INJECTION INTRAMUSCULAR; INTRAVENOUS
Status: DISCONTINUED | OUTPATIENT
Start: 2018-11-15 | End: 2018-11-19 | Stop reason: HOSPADM

## 2018-11-15 RX ORDER — PREDNISONE 10 MG/1
10 TABLET ORAL
Status: DISCONTINUED | OUTPATIENT
Start: 2018-11-16 | End: 2018-11-19 | Stop reason: HOSPADM

## 2018-11-15 RX ORDER — GABAPENTIN 100 MG/1
100 CAPSULE ORAL 2 TIMES DAILY
Status: DISCONTINUED | OUTPATIENT
Start: 2018-11-15 | End: 2018-11-19 | Stop reason: HOSPADM

## 2018-11-15 RX ORDER — KETOROLAC TROMETHAMINE 30 MG/ML
30 INJECTION, SOLUTION INTRAMUSCULAR; INTRAVENOUS
Status: COMPLETED | OUTPATIENT
Start: 2018-11-15 | End: 2018-11-15

## 2018-11-15 RX ORDER — SODIUM CHLORIDE 0.9 % (FLUSH) 0.9 %
10 SYRINGE (ML) INJECTION
Status: COMPLETED | OUTPATIENT
Start: 2018-11-15 | End: 2018-11-15

## 2018-11-15 RX ORDER — SODIUM CHLORIDE 0.9 % (FLUSH) 0.9 %
5-10 SYRINGE (ML) INJECTION AS NEEDED
Status: DISCONTINUED | OUTPATIENT
Start: 2018-11-15 | End: 2018-11-19 | Stop reason: HOSPADM

## 2018-11-15 RX ORDER — GUAIFENESIN 600 MG/1
600 TABLET, EXTENDED RELEASE ORAL EVERY 12 HOURS
Status: DISCONTINUED | OUTPATIENT
Start: 2018-11-15 | End: 2018-11-19 | Stop reason: HOSPADM

## 2018-11-15 RX ORDER — LOSARTAN POTASSIUM 50 MG/1
100 TABLET ORAL DAILY
Status: DISCONTINUED | OUTPATIENT
Start: 2018-11-16 | End: 2018-11-19 | Stop reason: HOSPADM

## 2018-11-15 RX ADMIN — KETOROLAC TROMETHAMINE 15 MG: 30 INJECTION, SOLUTION INTRAMUSCULAR at 17:55

## 2018-11-15 RX ADMIN — VANCOMYCIN HYDROCHLORIDE 1500 MG: 10 INJECTION, POWDER, LYOPHILIZED, FOR SOLUTION INTRAVENOUS at 19:02

## 2018-11-15 RX ADMIN — GABAPENTIN 100 MG: 100 CAPSULE ORAL at 23:01

## 2018-11-15 RX ADMIN — Medication 10 ML: at 20:15

## 2018-11-15 RX ADMIN — AZITHROMYCIN MONOHYDRATE 500 MG: 500 INJECTION, POWDER, LYOPHILIZED, FOR SOLUTION INTRAVENOUS at 22:56

## 2018-11-15 RX ADMIN — Medication 10 ML: at 22:04

## 2018-11-15 RX ADMIN — SODIUM CHLORIDE 50 ML/HR: 900 INJECTION, SOLUTION INTRAVENOUS at 20:15

## 2018-11-15 RX ADMIN — IOPAMIDOL 100 ML: 755 INJECTION, SOLUTION INTRAVENOUS at 20:14

## 2018-11-15 RX ADMIN — ENOXAPARIN SODIUM 40 MG: 40 INJECTION SUBCUTANEOUS at 22:03

## 2018-11-15 RX ADMIN — GUAIFENESIN 600 MG: 600 TABLET, EXTENDED RELEASE ORAL at 22:03

## 2018-11-15 RX ADMIN — CEFEPIME HYDROCHLORIDE 2 G: 2 INJECTION, POWDER, FOR SOLUTION INTRAVENOUS at 17:53

## 2018-11-15 RX ADMIN — IPRATROPIUM BROMIDE AND ALBUTEROL SULFATE 3 ML: .5; 3 SOLUTION RESPIRATORY (INHALATION) at 17:53

## 2018-11-15 NOTE — ED PROVIDER NOTES
EMERGENCY DEPARTMENT HISTORY AND PHYSICAL EXAM      Date: 11/15/2018  Patient Name: Pat Draper    History of Presenting Illness     Chief Complaint   Patient presents with    Chest Pain     recently getting over pneumonia, states continued cough, chest hurting       History Provided By: Patient    HPI: Pat Draper, 61 y.o. female with PMHx significant for HTN, polymyositis, renal cancer, CHF, presents via wheelchair to the ED with cc of a persistent dry cough x 1 month. Pt reports associated sx of new onset sharp, aching, left lower chest pain under the left breast x this morning. She expresses she was diagnosed with B/L PNA 1 month ago and was discharged after an 8 day stay. Pt discloses since being discharge she has continued with a dry cough exacerbated with coughing and no relief with incentive spirometer or Mucinex leading her back to the ED. Of note pt is on daily Prednisone for polymyositis. Pt denies any MDI use. She denies any fevers, chills, SOB, abdominal pain, nausea, vomiting, diarrhea, or dysuria. There are no other complaints, changes, or physical findings at this time. PCP: Sugar Anguiano MD  SHx: (-)Tobacco; (-) ETOH; (-) Illicit drug use  Current Outpatient Medications   Medication Sig Dispense Refill    L. acidoph & paracasei- S therm- Bifido (RISAQUAD) 8 billion cell cap cap Take 1 Cap by mouth.  gabapentin (NEURONTIN) 100 mg capsule Take 1 Cap by mouth two (2) times a day. 60 Cap 0    amLODIPine (NORVASC) 5 mg tablet Take 1 Tab by mouth daily. 30 Tab 1    cholecalciferol (VITAMIN D3) 50,000 unit capsule Take 1 Cap by mouth every seven (7) days for 8 doses. 8 Cap 0    raNITIdine (ZANTAC) 150 mg tablet Take  by mouth. 1 tablet prn heartburn.  losartan (COZAAR) 100 mg tablet TAKE 1 TABLET BY MOUTH EVERY DAY 90 Tab 3    fluticasone (FLONASE) 50 mcg/actuation nasal spray 2 Sprays by Both Nostrils route daily.  (Patient taking differently: 2 Sprays by Both Nostrils route daily as needed.) 1 Bottle 12    aspirin 81 mg chewable tablet Take 1 Tab by mouth daily. 30 Tab 1    vitamin E topical cream Apply  to affected area daily. Patient applies to face      predniSONE (DELTASONE) 5 mg tablet TAKE 2 TABLETS BY MOUTH EVERY DAY  3       Past History     Past Medical History:  Past Medical History:   Diagnosis Date    [facility]     Hypertension     Pneumonia 10/2018    Polymyositis ([facility] [address] 2015    in setting of [address] 2015    Renal cancer ([facility] [address] 2016    Respiratory arrest ([facility] [address] 2015    [facility].     SBO (small bowel obstruction) ([facility] [address] 8/3/2017       Past Surgical History:  Past Surgical History:   Procedure Laterality Date    HX GYN      hysterectomy    HX NEPHRECTOMY Right 2016    for kidney cancer    US GUIDED CORE BREAST BIOPSY Left     Negative       Family History:  Family History   Problem Relation Age of Onset    Cancer Mother     Breast Cancer Mother 68    Diabetes Father     Hypertension Father     Dementia Father 80    Stroke Maternal Grandmother     Breast Cancer Cousin         50's       Social History:  Social History     Tobacco Use    Smoking status: Former Smoker     Packs/day: 1.00     Years: 20.00     Pack years: 20.00     Types: Cigarettes     Last attempt to quit: 1998     Years since quittin.5    Smokeless tobacco: Never Used   Substance Use Topics    Alcohol use: No     Alcohol/week: 0.6 oz     Types: 1 Glasses of wine per week    Drug use: No       Allergies: Allergies   Allergen Reactions    Ace Inhibitors Cough    Dilaudid [Hydromorphone] Other (comments)    Levaquin [Levofloxacin] Other (comments)    Lisinopril Other (comments)    Metoprolol Other (comments)     hallucinations    Peanut Other (comments)         Review of Systems   Review of Systems   Constitutional: Negative. Negative for chills, diaphoresis and fever. HENT: Negative.   Negative for congestion, sore throat and trouble swallowing. Eyes: Negative. Negative for photophobia, pain and redness. Respiratory: Positive for cough (dry ). Negative for chest tightness, shortness of breath and wheezing. Cardiovascular: Positive for chest pain (left sided under left breast ). Negative for palpitations. Gastrointestinal: Negative. Negative for abdominal pain, blood in stool, diarrhea, nausea and vomiting. Genitourinary: Negative for difficulty urinating, dysuria and frequency. Musculoskeletal: Negative. Negative for arthralgias, myalgias, neck pain and neck stiffness. Skin: Negative. Neurological: Negative. Negative for dizziness, tremors, seizures, syncope, speech difficulty, light-headedness and headaches. Psychiatric/Behavioral: Negative. Negative for confusion. The patient is not nervous/anxious. All other systems reviewed and are negative. Physical Exam   Physical Exam   Constitutional: She is oriented to person, place, and time. She appears well-developed and well-nourished. Wearing a mask    HENT:   Head: Normocephalic and atraumatic. Eyes: Conjunctivae and EOM are normal.   Neck: Normal range of motion. Neck supple. Cardiovascular: Normal rate and regular rhythm. Pulmonary/Chest: Effort normal and breath sounds normal. No respiratory distress. She exhibits tenderness (minimal under left breast ). Dry cough   Pain elicited on cough      Abdominal: Soft. She exhibits no distension. There is no tenderness. Musculoskeletal: Normal range of motion. Neurological: She is alert and oriented to person, place, and time. Skin: Skin is warm and dry. Psychiatric: She has a normal mood and affect. Nursing note and vitals reviewed.       Diagnostic Study Results     Labs -     Recent Results (from the past 12 hour(s))   EKG, 12 LEAD, INITIAL    Collection Time: 11/15/18  2:24 PM   Result Value Ref Range    Ventricular Rate 75 BPM    Atrial Rate 75 BPM    P-R Interval 162 ms    QRS Duration 76 ms    Q-T Interval 392 ms    QTC Calculation (Bezet) 437 ms    Calculated P Axis 62 degrees    Calculated R Axis 34 degrees    Calculated T Axis 53 degrees    Diagnosis       Normal sinus rhythm with sinus arrhythmia  Possible Left atrial enlargement  When compared with ECG of 04-AUG-2017 03:42,  No significant change was found     CBC W/O DIFF    Collection Time: 11/15/18  5:47 PM   Result Value Ref Range    WBC 11.3 (H) 3.6 - 11.0 K/uL    RBC 5.66 (H) 3.80 - 5.20 M/uL    HGB 13.0 11.5 - 16.0 g/dL    HCT 41.5 35.0 - 47.0 %    MCV 73.3 (L) 80.0 - 99.0 FL    MCH 23.0 (L) 26.0 - 34.0 PG    MCHC 31.3 30.0 - 36.5 g/dL    RDW 18.3 (H) 11.5 - 14.5 %    PLATELET 063 775 - 976 K/uL    MPV 12.0 8.9 - 12.9 FL    NRBC 0.0 0  WBC    ABSOLUTE NRBC 0.00 0.00 - 0.20 K/uL   METABOLIC PANEL, BASIC    Collection Time: 11/15/18  5:47 PM   Result Value Ref Range    Sodium 139 136 - 145 mmol/L    Potassium 4.4 3.5 - 5.1 mmol/L    Chloride 105 97 - 108 mmol/L    CO2 26 21 - 32 mmol/L    Anion gap 8 5 - 15 mmol/L    Glucose 87 65 - 100 mg/dL    BUN 33 (H) 6 - 20 MG/DL    Creatinine 1.00 0.55 - 1.02 MG/DL    BUN/Creatinine ratio 33 (H) 12 - 20      GFR est AA >60 >60 ml/min/1.73m2    GFR est non-AA 56 (L) >60 ml/min/1.73m2    Calcium 9.3 8.5 - 10.1 MG/DL   NT-PRO BNP    Collection Time: 11/15/18  5:47 PM   Result Value Ref Range    NT pro- 0 - 125 PG/ML   TROPONIN I    Collection Time: 11/15/18  5:47 PM   Result Value Ref Range    Troponin-I, Qt. <0.05 <0.05 ng/mL   LACTIC ACID    Collection Time: 11/15/18  7:32 PM   Result Value Ref Range    Lactic acid 1.0 0.4 - 2.0 MMOL/L       Radiologic Studies -   CXR Results  (Last 48 hours)               11/15/18 1526  XR CHEST PA LAT Final result    Impression:  IMPRESSION:   1. Persistent patchy bilateral airspace disease   2. Slight decrease in small left pleural effusion.  Continued follow-up   recommended           Narrative:  INDICATION:  Cough, sob recent PNA EXAM: Chest 2 views. Comparison October 26, 2018       FINDINGS: Cardiac silhouette is borderline enlarged. Pulmonary vasculature is   normal. There are patchy ill-defined airspace opacities bilaterally, unchanged. Small left pleural effusion has decreased slightly. No pneumothorax                   Medical Decision Making   I am the first provider for this patient. I reviewed the vital signs, available nursing notes, past medical history, past surgical history, family history and social history. Vital Signs-Reviewed the patient's vital signs. Patient Vitals for the past 12 hrs:   Temp Pulse Resp BP SpO2   11/15/18 2020 -- 85 18 -- 98 %   11/15/18 2019 98.3 °F (36.8 °C) 85 18 163/66 98 %   11/15/18 1900 -- 82 12 161/66 97 %   11/15/18 1805 -- 69 15 154/72 100 %   11/15/18 1420 97.9 °F (36.6 °C) 89 20 171/66 98 %       Pulse Oximetry Analysis - 98% on room air    Cardiac Monitor:   Rate: 89 bpm  Rhythm: Normal Sinus Rhythm      EKG interpretation: (Preliminary) 1424  Rhythm: NSR with sinus arrhythmia; and regular . Rate (approx.): 75; Axis: normal; MI interval: normal; QRS interval: normal ; ST/T wave: normal; Other findings: non-ischemic. Records Reviewed: Nursing Notes, Old Medical Records, Previous electrocardiograms, Previous Radiology Studies and Previous Laboratory Studies    Provider Notes (Medical Decision Making):   Patient presents with CP. DDx:  ACS, Aortic dissection, PNA, PE, PTX, pericarditis, myocarditis, GERD, costochondritis, anxiety. Concerned for musculoskeletal given 1 month and tenderness with palpation and worse with coughing vs PNA of coughing given the HPI and Physical exam. Will obtain labs, CXR, EKG and get Cardiology Consult PRN. ED Course:   Initial assessment performed. The patients presenting problems have been discussed, and they are in agreement with the care plan formulated and outlined with them.   I have encouraged them to ask questions as they arise throughout their visit. Progress Notes:    4:31 PM   The pt has been re-evaluated. Pt's CXR shows she continues to have PNA. Will admit to hospitalist.     CONSULT NOTE:   6:24 Tyler Summers M.D spoke with Dr. Humberto Rivas,   Specialty: Hospitalist  Discussed pt's hx, disposition, and available diagnostic and imaging results. Reviewed care plans. Consultant will evaluate pt for admission. Written by Alta Anguiano, ED Scribe, as dictated by Arsh Rees M.D. Critical Care Time: 0 minutes    Disposition:  Admit Note:  6:24 PM  Patient is being admitted to the hospital by Dr. Humberto Rivas. The results of their tests and reasons for their admission have been discussed with the patient and/or available family. They convey their agreement and understanding for the need to be admitted and for their admission diagnosis. Written by Kal Mathews, ED Scribe, as dictated by Arsh Rees M.D. PLAN:  1. Admission    Diagnosis     Clinical Impression:   1. Failure of outpatient treatment    2. Pneumonia due to infectious organism, unspecified laterality, unspecified part of lung        Attestations:    Attestation: This note is prepared by Darryl Mathews, acting as Scribe for MALVIN Rice M.D: The scribe's documentation has been prepared under my direction and personally reviewed by me in its entirety. I confirm that the note above accurately reflects all work, treatment, procedures, and medical decision making performed by me.

## 2018-11-15 NOTE — PROGRESS NOTES
Pharmacy Automatic Renal Dosing Protocol - Antimicrobials    Indication for Antimicrobials: CAP     Current Regimen of Each Antimicrobial:  Azithromycin 500 mg IV daily (Start Date 11/15; Day # 1)  Cefepime 2 gm IV every 8 hours (Start Date 11/15; Day # 1)  Vancomycin 1500 mg IV x 1 dose (Start Date 11/15; Day # 1)    Significant Cultures:     Radiology / Imaging results: (X-ray, CT scan or MRI):   11/15 chest xray: Persistent patchy bilateral airspace disease    Paralysis, amputations, malnutrition:     Labs:  Recent Labs     11/15/18  1747   CREA 1.00   BUN 33*   WBC 11.3*     Temp (24hrs), Av.9 °F (36.6 °C), Min:97.9 °F (36.6 °C), Max:97.9 °F (36.6 °C)    Creatinine Clearance (mL/min) or Dialysis: 58.1 ml/min    Impression/Plan:   · Continue azithromcyin 500 mg IV daily  · Change cefepime dose to 2 gm IV q12hr per renal dosing protocol  · Antimicrobial stop date: to be determined     Pharmacy will follow daily and adjust medications as appropriate for renal function and/or serum levels.     Thank you,  ROXY Resendiz

## 2018-11-16 ENCOUNTER — DOCUMENTATION ONLY (OUTPATIENT)
Dept: INTERNAL MEDICINE CLINIC | Age: 63
End: 2018-11-16

## 2018-11-16 ENCOUNTER — HOME HEALTH ADMISSION (OUTPATIENT)
Dept: HOME HEALTH SERVICES | Facility: HOME HEALTH | Age: 63
End: 2018-11-16

## 2018-11-16 LAB
ALBUMIN SERPL-MCNC: 2.7 G/DL (ref 3.5–5)
ALBUMIN/GLOB SERPL: 0.7 {RATIO} (ref 1.1–2.2)
ALP SERPL-CCNC: 61 U/L (ref 45–117)
ALT SERPL-CCNC: 58 U/L (ref 12–78)
ANION GAP SERPL CALC-SCNC: 7 MMOL/L (ref 5–15)
AST SERPL-CCNC: 65 U/L (ref 15–37)
ATRIAL RATE: 75 BPM
BASOPHILS # BLD: 0.1 K/UL (ref 0–0.1)
BASOPHILS NFR BLD: 1 % (ref 0–1)
BILIRUB SERPL-MCNC: 0.5 MG/DL (ref 0.2–1)
BUN SERPL-MCNC: 31 MG/DL (ref 6–20)
BUN/CREAT SERPL: 34 (ref 12–20)
CALCIUM SERPL-MCNC: 8.5 MG/DL (ref 8.5–10.1)
CALCULATED P AXIS, ECG09: 62 DEGREES
CALCULATED R AXIS, ECG10: 34 DEGREES
CALCULATED T AXIS, ECG11: 53 DEGREES
CHLORIDE SERPL-SCNC: 111 MMOL/L (ref 97–108)
CO2 SERPL-SCNC: 23 MMOL/L (ref 21–32)
CREAT SERPL-MCNC: 0.91 MG/DL (ref 0.55–1.02)
DIAGNOSIS, 93000: NORMAL
DIFFERENTIAL METHOD BLD: ABNORMAL
EOSINOPHIL # BLD: 0.2 K/UL (ref 0–0.4)
EOSINOPHIL NFR BLD: 2 % (ref 0–7)
ERYTHROCYTE [DISTWIDTH] IN BLOOD BY AUTOMATED COUNT: 16.9 % (ref 11.5–14.5)
GLOBULIN SER CALC-MCNC: 3.7 G/DL (ref 2–4)
GLUCOSE SERPL-MCNC: 69 MG/DL (ref 65–100)
HCT VFR BLD AUTO: 34.2 % (ref 35–47)
HGB BLD-MCNC: 11 G/DL (ref 11.5–16)
IMM GRANULOCYTES # BLD: 0.1 K/UL (ref 0–0.04)
IMM GRANULOCYTES NFR BLD AUTO: 1 % (ref 0–0.5)
LYMPHOCYTES # BLD: 1.9 K/UL (ref 0.8–3.5)
LYMPHOCYTES NFR BLD: 22 % (ref 12–49)
MCH RBC QN AUTO: 23.2 PG (ref 26–34)
MCHC RBC AUTO-ENTMCNC: 32.2 G/DL (ref 30–36.5)
MCV RBC AUTO: 72 FL (ref 80–99)
MONOCYTES # BLD: 0.9 K/UL (ref 0–1)
MONOCYTES NFR BLD: 11 % (ref 5–13)
NEUTS SEG # BLD: 5.3 K/UL (ref 1.8–8)
NEUTS SEG NFR BLD: 63 % (ref 32–75)
NRBC # BLD: 0 K/UL (ref 0–0.01)
NRBC BLD-RTO: 0 PER 100 WBC
P-R INTERVAL, ECG05: 162 MS
PLATELET # BLD AUTO: 227 K/UL (ref 150–400)
PMV BLD AUTO: 11.9 FL (ref 8.9–12.9)
POTASSIUM SERPL-SCNC: 4 MMOL/L (ref 3.5–5.1)
PROT SERPL-MCNC: 6.4 G/DL (ref 6.4–8.2)
Q-T INTERVAL, ECG07: 392 MS
QRS DURATION, ECG06: 76 MS
QTC CALCULATION (BEZET), ECG08: 437 MS
RBC # BLD AUTO: 4.75 M/UL (ref 3.8–5.2)
SODIUM SERPL-SCNC: 141 MMOL/L (ref 136–145)
VENTRICULAR RATE, ECG03: 75 BPM
WBC # BLD AUTO: 8.3 K/UL (ref 3.6–11)

## 2018-11-16 PROCEDURE — 97161 PT EVAL LOW COMPLEX 20 MIN: CPT

## 2018-11-16 PROCEDURE — 74011250636 HC RX REV CODE- 250/636: Performed by: EMERGENCY MEDICINE

## 2018-11-16 PROCEDURE — 74011250636 HC RX REV CODE- 250/636: Performed by: INTERNAL MEDICINE

## 2018-11-16 PROCEDURE — 36415 COLL VENOUS BLD VENIPUNCTURE: CPT

## 2018-11-16 PROCEDURE — 74011000258 HC RX REV CODE- 258: Performed by: INTERNAL MEDICINE

## 2018-11-16 PROCEDURE — 74011636637 HC RX REV CODE- 636/637: Performed by: INTERNAL MEDICINE

## 2018-11-16 PROCEDURE — 65660000000 HC RM CCU STEPDOWN

## 2018-11-16 PROCEDURE — G8989 SELF CARE D/C STATUS: HCPCS

## 2018-11-16 PROCEDURE — G8987 SELF CARE CURRENT STATUS: HCPCS

## 2018-11-16 PROCEDURE — 97165 OT EVAL LOW COMPLEX 30 MIN: CPT

## 2018-11-16 PROCEDURE — 85025 COMPLETE CBC W/AUTO DIFF WBC: CPT

## 2018-11-16 PROCEDURE — G8979 MOBILITY GOAL STATUS: HCPCS

## 2018-11-16 PROCEDURE — 97116 GAIT TRAINING THERAPY: CPT

## 2018-11-16 PROCEDURE — G8988 SELF CARE GOAL STATUS: HCPCS

## 2018-11-16 PROCEDURE — 74011250637 HC RX REV CODE- 250/637: Performed by: INTERNAL MEDICINE

## 2018-11-16 PROCEDURE — G8978 MOBILITY CURRENT STATUS: HCPCS

## 2018-11-16 PROCEDURE — 80053 COMPREHEN METABOLIC PANEL: CPT

## 2018-11-16 PROCEDURE — 74011000250 HC RX REV CODE- 250: Performed by: EMERGENCY MEDICINE

## 2018-11-16 RX ORDER — IPRATROPIUM BROMIDE AND ALBUTEROL SULFATE 2.5; .5 MG/3ML; MG/3ML
3 SOLUTION RESPIRATORY (INHALATION)
Status: DISCONTINUED | OUTPATIENT
Start: 2018-11-16 | End: 2018-11-19 | Stop reason: HOSPADM

## 2018-11-16 RX ORDER — GABAPENTIN 100 MG/1
CAPSULE ORAL
Qty: 60 CAP | Refills: 5 | Status: SHIPPED | OUTPATIENT
Start: 2018-11-16 | End: 2018-11-29

## 2018-11-16 RX ADMIN — GUAIFENESIN 600 MG: 600 TABLET, EXTENDED RELEASE ORAL at 20:39

## 2018-11-16 RX ADMIN — CEFEPIME HYDROCHLORIDE 2 G: 2 INJECTION, POWDER, FOR SOLUTION INTRAVENOUS at 06:54

## 2018-11-16 RX ADMIN — GABAPENTIN 100 MG: 100 CAPSULE ORAL at 08:52

## 2018-11-16 RX ADMIN — VANCOMYCIN HYDROCHLORIDE 750 MG: 750 INJECTION, POWDER, LYOPHILIZED, FOR SOLUTION INTRAVENOUS at 20:39

## 2018-11-16 RX ADMIN — AMLODIPINE BESYLATE 5 MG: 5 TABLET ORAL at 08:53

## 2018-11-16 RX ADMIN — AZITHROMYCIN MONOHYDRATE 500 MG: 500 INJECTION, POWDER, LYOPHILIZED, FOR SOLUTION INTRAVENOUS at 18:23

## 2018-11-16 RX ADMIN — GABAPENTIN 100 MG: 100 CAPSULE ORAL at 18:17

## 2018-11-16 RX ADMIN — Medication 1 CAPSULE: at 08:52

## 2018-11-16 RX ADMIN — ASPIRIN 81 MG 81 MG: 81 TABLET ORAL at 08:53

## 2018-11-16 RX ADMIN — PREDNISONE 10 MG: 10 TABLET ORAL at 08:53

## 2018-11-16 RX ADMIN — CEFEPIME HYDROCHLORIDE 2 G: 2 INJECTION, POWDER, FOR SOLUTION INTRAVENOUS at 16:12

## 2018-11-16 RX ADMIN — LOSARTAN POTASSIUM 100 MG: 50 TABLET ORAL at 08:53

## 2018-11-16 RX ADMIN — Medication 10 ML: at 15:19

## 2018-11-16 RX ADMIN — VANCOMYCIN HYDROCHLORIDE 750 MG: 750 INJECTION, POWDER, LYOPHILIZED, FOR SOLUTION INTRAVENOUS at 08:53

## 2018-11-16 RX ADMIN — Medication 10 ML: at 06:55

## 2018-11-16 RX ADMIN — Medication 10 ML: at 23:12

## 2018-11-16 RX ADMIN — GUAIFENESIN 600 MG: 600 TABLET, EXTENDED RELEASE ORAL at 08:53

## 2018-11-16 RX ADMIN — CEFEPIME HYDROCHLORIDE 2 G: 2 INJECTION, POWDER, FOR SOLUTION INTRAVENOUS at 23:12

## 2018-11-16 NOTE — PROGRESS NOTES
Reason for Admission:   Pneumonia                   RRAT Score:  11                 Plan for utilizing home health:    Pt interested in [facility] at discharge                      Likelihood of Readmission: Mod to high                         Transition of Care Plan:    Pt is a 62 y/o AAF admitted with PNA. Pt resides alone in a one story home with a ramp to the entrance. Pt uses a cane, RW and has a transport chair. Pt also visits with a Rheumatologist-Hank Sow at [facility]. She visits with her every 4 months with her last visit being September 6th, next appt in Dec.  She last visited with her PCP in Oct.  Pt stated she has several supports. She is interested in a [facility] visit post discharge. SW will send referral to [facility] for their review. **Pt drove herself to the hospital and plans to drive herself home, will need to be evaluated for safety. NN to be contacted and alerted of pt admission. Care Management Interventions  PCP Verified by CM: Yes(Pt last visit was in Oct )  Mode of Transport at Discharge:  Other (see comment)(Pt drove herself to the hospital )  Transition of Care Consult (CM Consult): [address]: Yes  Discharge Durable Medical Equipment: (Has a cane, RW, and transport chair )  Physical Therapy Consult: Yes  Occupational Therapy Consult: Yes  Current Support Network: Lives Alone(Pt resides alone in a one story home, has ramp to the entrance)  Confirm Follow Up Transport: Self(Pt drives to her appts, but has a friend that will assist if needed)  Plan discussed with Pt/Family/Caregiver: Yes  Freedom of Choice Offered: Yes  Discharge Location  Discharge Placement: Home([id number])    MARTIN Hassan  Ext [phone]

## 2018-11-16 NOTE — PROGRESS NOTES
Bedside and Verbal shift change report given to Omega's (oncoming nurse) by DRE Flowers RN (offgoing nurse). Report given with SBAR, Kardex, Intake/Output, MAR and Recent Results.

## 2018-11-16 NOTE — CONSULTS
Consult/Admission    NAME: Landon Gonsalez   :  1955   MRN:  [id number]     Date/Time:  2018 4:00 PM    Patient PCP: Lori Bonner MD  ________________________________________________________________________     Assessment:     Pericardial effusion  .-  By CT scan   Pneumonia. Polymyositis. Plan: Will review echo when that is done. []           High complexity decision making was performed        Subjective:   CHIEF COMPLAINT: chest pain / SOB     HISTORY OF PRESENT ILLNESS:     Jakub Rios is a 61 y.o.  female who has been in and out [facility] last couple months. Elsewhere. Treated for Polymyositis and then for Pneumonia. Comes now with atypical chest pain, coughing, SOB . CT scan done  Showed pericardial effusion. CXR shows patchy lung infiltrates. No hx of heart disease. Previous echo showed normal LV fx,  No pericardial effusion. We were asked to admit for work up and evaluation of the above problems.      Past Medical History:   Diagnosis Date    Congestive heart failure ([address]     Hypertension     Pneumonia 10/2018    Polymyositis ([facility] [address] 2015    in setting of [address] 2015    Renal cancer ([facility] [address] 2016    Respiratory arrest ([facility] [address] 2015    [facility].     SBO (small bowel obstruction) ([facility] [address] 8/3/2017      Past Surgical History:   Procedure Laterality Date    HX GYN      hysterectomy    HX NEPHRECTOMY Right 2016    for kidney cancer    US GUIDED CORE BREAST BIOPSY Left     Negative     Allergies   Allergen Reactions    Ace Inhibitors Cough    Dilaudid [Hydromorphone] Other (comments)    Levaquin [Levofloxacin] Other (comments)    Lisinopril Other (comments)    Metoprolol Other (comments)     hallucinations    Peanut Other (comments)      Meds:  See below  Social History     Tobacco Use    Smoking status: Former Smoker     Packs/day: 1.00     Years: 20.00     Pack years: 20.00     Types: Cigarettes     Last attempt to quit: 1998     Years since quittin.5    Smokeless tobacco: Never Used   Substance Use Topics    Alcohol use: No     Alcohol/week: 0.6 oz     Types: 1 Glasses of wine per week      Family History   Problem Relation Age of Onset    Cancer Mother     Breast Cancer Mother 68    Diabetes Father     Hypertension Father     Dementia Father 80    Stroke Maternal Grandmother     Breast Cancer Cousin         52's       REVIEW OF SYSTEMS:     []            Unable to obtain  ROS due to ---   [x]            Total of 12 systems reviewed as follows:    Constitutional: negative fever, negative chills, negative weight loss  Eyes:   negative visual changes  ENT:   negative sore throat, tongue or lip swelling  Respiratory:  negative cough, negative dyspnea  Cards:  negative for chest pain, palpitations, lower extremity edema  GI:   negative for nausea, vomiting, diarrhea, and abdominal pain  Genitourinary: negative for frequency, dysuria  Integument:  negative for rash   Hematologic:  negative for easy bruising and gum/nose bleeding  Musculoskel: negative for myalgias,  back pain  Neurological:  negative for headaches, dizziness, vertigo, weakness  Behavl/Psych: negative for feelings of anxiety, depression     Pertinent Positives include :    Objective:      Physical Exam:    Last 24hrs VS reviewed since prior progress note. Most recent are:    Visit Vitals  /53 (BP 1 Location: Right arm, BP Patient Position: At rest)   Pulse 80   Temp 98.2 °F (36.8 °C)   Resp 16   Ht 5' 5\" (1.651 m)   Wt 74.2 kg (163 lb 9.3 oz)   SpO2 98%   BMI 27.22 kg/m²       Intake/Output Summary (Last 24 hours) at 2018 1600  Last data filed at 2018 0310  Gross per 24 hour   Intake 250 ml   Output --   Net 250 ml        General Appearance: Well developed, well nourished, alert & oriented x 3,    no acute distress.   Ears/Nose/Mouth/Throat: Pupils equal and round, Hearing grossly normal.  Neck: Supple. JVP within normal limits. Carotids good upstrokes, with no bruit. Chest: Lungs clear to auscultation bilaterally. Cardiovascular: Regular rate and rhythm, S1S2 normal, no murmur, rubs, gallops. Abdomen: Soft, non-tender, bowel sounds are active. No organomegaly. Extremities: No edema bilaterally. Femoral pulses +2, Distal Pulses +1. Skin: Warm and dry. Neuro: CN II-XII grossly intact, Strength and sensation grossly intact. Data:      Prior to Admission medications    Medication Sig Start Date End Date Taking? Authorizing Provider   gabapentin (NEURONTIN) 100 mg capsule TAKE 1 CAPSULE BY MOUTH TWICE A DAY 11/16/18   Kory Chávez MD   L. acidoph & paracasei- S therm- Bifido (RISAQUAD) 8 billion cell cap cap Take 1 Cap by mouth. 10/12/18   Provider, Historical   amLODIPine (NORVASC) 5 mg tablet Take 1 Tab by mouth daily. 10/16/18   Kory Chávez MD   cholecalciferol (VITAMIN D3) 50,000 unit capsule Take 1 Cap by mouth every seven (7) days for 8 doses. 10/10/18 11/29/18  Kory Chávez MD   raNITIdine (ZANTAC) 150 mg tablet Take  by mouth. 1 tablet prn heartburn. 6/27/18   Provider, Historical   losartan (COZAAR) 100 mg tablet TAKE 1 TABLET BY MOUTH EVERY DAY 8/14/18   Kory Chávez MD   fluticasone [facility]) 50 mcg/actuation nasal spray 2 Sprays by Both Nostrils route daily. Patient taking differently: 2 Sprays by Both Nostrils route daily as needed. 5/7/18   Kory Chávez MD   aspirin 81 mg chewable tablet Take 1 Tab by mouth daily. 8/10/17   Kory Chávez MD   vitamin E topical cream Apply  to affected area daily.  Patient applies to face    Other, MD Melanie   predniSONE (DELTASONE) 5 mg tablet TAKE 2 TABLETS BY MOUTH EVERY DAY 12/28/16   Provider, Historical       Recent Results (from the past 24 hour(s))   CBC W/O DIFF    Collection Time: 11/15/18  5:47 PM   Result Value Ref Range    WBC 11.3 (H) 3.6 - 11.0 K/uL    RBC 5.66 (H) 3.80 - 5.20 M/uL    HGB 13.0 11.5 - 16.0 g/dL    HCT 41.5 35.0 - 47.0 %    MCV 73.3 (L) 80.0 - 99.0 FL    MCH 23.0 (L) 26.0 - 34.0 PG    MCHC 31.3 30.0 - 36.5 g/dL    RDW 18.3 (H) 11.5 - 14.5 %    PLATELET 039 260 - 315 K/uL    MPV 12.0 8.9 - 12.9 FL    NRBC 0.0 0  WBC    ABSOLUTE NRBC 0.00 0.00 - 8.86 K/uL   METABOLIC PANEL, BASIC    Collection Time: 11/15/18  5:47 PM   Result Value Ref Range    Sodium 139 136 - 145 mmol/L    Potassium 4.4 3.5 - 5.1 mmol/L    Chloride 105 97 - 108 mmol/L    CO2 26 21 - 32 mmol/L    Anion gap 8 5 - 15 mmol/L    Glucose 87 65 - 100 mg/dL    BUN 33 (H) 6 - 20 MG/DL    Creatinine 1.00 0.55 - 1.02 MG/DL    BUN/Creatinine ratio 33 (H) 12 - 20      GFR est AA >60 >60 ml/min/1.73m2    GFR est non-AA 56 (L) >60 ml/min/1.73m2    Calcium 9.3 8.5 - 10.1 MG/DL   NT-PRO BNP    Collection Time: 11/15/18  5:47 PM   Result Value Ref Range    NT pro- 0 - 125 PG/ML   TROPONIN I    Collection Time: 11/15/18  5:47 PM   Result Value Ref Range    Troponin-I, Qt. <0.05 <0.05 ng/mL   CULTURE, BLOOD, PAIRED    Collection Time: 11/15/18  7:15 PM   Result Value Ref Range    Special Requests: NO SPECIAL REQUESTS      Culture result: NO GROWTH AFTER 12 HOURS     LACTIC ACID    Collection Time: 11/15/18  7:32 PM   Result Value Ref Range    Lactic acid 1.0 0.4 - 2.0 MMOL/L   METABOLIC PANEL, COMPREHENSIVE    Collection Time: 11/16/18  3:16 AM   Result Value Ref Range    Sodium 141 136 - 145 mmol/L    Potassium 4.0 3.5 - 5.1 mmol/L    Chloride 111 (H) 97 - 108 mmol/L    CO2 23 21 - 32 mmol/L    Anion gap 7 5 - 15 mmol/L    Glucose 69 65 - 100 mg/dL    BUN 31 (H) 6 - 20 MG/DL    Creatinine 0.91 0.55 - 1.02 MG/DL    BUN/Creatinine ratio 34 (H) 12 - 20      GFR est AA >60 >60 ml/min/1.73m2    GFR est non-AA >60 >60 ml/min/1.73m2    Calcium 8.5 8.5 - 10.1 MG/DL    Bilirubin, total 0.5 0.2 - 1.0 MG/DL    ALT (SGPT) 58 12 - 78 U/L    AST (SGOT) 65 (H) 15 - 37 U/L    Alk.  phosphatase 61 45 - 117 U/L    Protein, total 6.4 6.4 - 8.2 g/dL Albumin 2.7 (L) 3.5 - 5.0 g/dL    Globulin 3.7 2.0 - 4.0 g/dL    A-G Ratio 0.7 (L) 1.1 - 2.2     CBC WITH AUTOMATED DIFF    Collection Time: 11/16/18  3:16 AM   Result Value Ref Range    WBC 8.3 3.6 - 11.0 K/uL    RBC 4.75 3.80 - 5.20 M/uL    HGB 11.0 (L) 11.5 - 16.0 g/dL    HCT 34.2 (L) 35.0 - 47.0 %    MCV 72.0 (L) 80.0 - 99.0 FL    MCH 23.2 (L) 26.0 - 34.0 PG    MCHC 32.2 30.0 - 36.5 g/dL    RDW 16.9 (H) 11.5 - 14.5 %    PLATELET 371 851 - 636 K/uL    MPV 11.9 8.9 - 12.9 FL    NRBC 0.0 0  WBC    ABSOLUTE NRBC 0.00 0.00 - 0.01 K/uL    NEUTROPHILS 63 32 - 75 %    LYMPHOCYTES 22 12 - 49 %    MONOCYTES 11 5 - 13 %    EOSINOPHILS 2 0 - 7 %    BASOPHILS 1 0 - 1 %    IMMATURE GRANULOCYTES 1 (H) 0.0 - 0.5 %    ABS. NEUTROPHILS 5.3 1.8 - 8.0 K/UL    ABS. LYMPHOCYTES 1.9 0.8 - 3.5 K/UL    ABS. MONOCYTES 0.9 0.0 - 1.0 K/UL    ABS. EOSINOPHILS 0.2 0.0 - 0.4 K/UL    ABS. BASOPHILS 0.1 0.0 - 0.1 K/UL    ABS. IMM.  GRANS. 0.1 (H) 0.00 - 0.04 K/UL    DF AUTOMATED

## 2018-11-16 NOTE — PROGRESS NOTES
Pharmacy Automatic Renal Dosing Protocol - Antimicrobials    Indication for Antimicrobials: HAP or CAP     Current Regimen of Each Antimicrobial:  Azithromycin 500 mg IV daily (Start Date 11/15; Day # 1)  Cefepime 2 gm IV every 8 hours (Start Date 11/15; Day # 1)  Vancomycin 1500 mg IV x 1 dose then 750 mg Q12H (Start Date 11/15; Day # 1)    Significant Cultures:     Radiology / Imaging results: (X-ray, CT scan or MRI):   11/15 chest xray: Persistent patchy bilateral airspace disease    Paralysis, amputations, malnutrition:     Labs:  Recent Labs     11/15/18  1747   CREA 1.00   BUN 33*   WBC 11.3*     Temp (24hrs), Av.9 °F (36.6 °C), Min:97.9 °F (36.6 °C), Max:97.9 °F (36.6 °C)    Creatinine Clearance (mL/min) or Dialysis: 51.8 ml/min    Impression/Plan:   · Vancomycin 1500 mg x 1 then 750 mg Q12H with anticipated trough 16.4 mcg/ml ()  · Continue azithromcyin 500 mg IV daily  · Change cefepime dose to 2 gm IV q12hr per renal dosing protocol  · BMP daily. · Antimicrobial stop date: to be determined     Pharmacy will follow daily and adjust medications as appropriate for renal function and/or serum levels. Thank you,  Deya Sheikh, [facility]    Recommended duration of therapy  http://[facility]/[place]/[facility]/Pharmacy/Clinical%20Companion/Duration%20of%20ABX%20therapy. docx    Renal Dosing  http://[facility]/[place]/[facility]/Pharmacy/Clinical%20Companion/Renal%20Dosing%[id number]. pdf

## 2018-11-16 NOTE — PROGRESS NOTES
Received cardiology consult, but patient is a [facility] patient. I called to the staff on 3rd floor to alert them to transfer the consult to [facility].       Lu Ramirez NP  DNP, RN, AGACNP-BC

## 2018-11-16 NOTE — PROGRESS NOTES
Patient visited by [facility] Partner Volunteer in Medical Telemetry Unit on 11/16/2018. Documented by Rev. Beryle Blakes, [address] [place] [address] Road paging service: [phone]

## 2018-11-16 NOTE — ED NOTES
[facility]. Pt resting quietly and in no acute distress at this time. VSS. Call bell within reach.

## 2018-11-16 NOTE — PROGRESS NOTES
Primary Nurse April Meléndez RN and Navi Snyder RN performed a dual skin assessment on this patient No impairment noted  Armen score is 21

## 2018-11-16 NOTE — PROGRESS NOTES
physical Therapy EVALUATION/DISCHARGE  Patient: Uyen Lynch (32 y.o. female)  Date: 11/16/2018  Primary Diagnosis: Pneumonia       Precautions:      ASSESSMENT :  Based on the objective data described below, the patient presents at baseline level of function following admission for pneumonia. Patient received up in chair and agreeable to participate, reports no pain and is feeling well. Patient lives alone in a ramp accessible home and drives occasionally. Her function is limited at times by the myositis which is currently well controlled. Patient has participated in outpatient therapy in the past with good results and would like to continue in January when her benefits renew. Patient is at supervision level for transfers, she  does need assist for LEs into bed, at home sleeps in a recliner. Patient ambulated in hallway x 150 feet with cane, no SOB or fatigue noted and sats at 97% on RA. Gait taj is decreased and mild decreased step clearance noted, but no LOB. Patient returned to bed and resting comfortably. Educated on pacing and energy conservation, progression of activity and pain management. Skilled physical therapy is not indicated at this time and patient will follow up with PCP about resuming outpatient therapy in the new year. PLAN :  Discharge Recommendations: None  Further Equipment Recommendations for Discharge:      SUBJECTIVE:   Patient stated Once I am on antibiotics I feel better.     OBJECTIVE DATA SUMMARY:   HISTORY:    Past Medical History:   Diagnosis Date    Congestive heart failure ([address]     Hypertension     Pneumonia 10/2018    Polymyositis ([address] 12/2015    in setting of [address] 2015    Renal cancer ([address] 07/08/2016    Respiratory arrest ([address] 11/2015    [facility].     SBO (small bowel obstruction) ([address] 8/3/2017     Past Surgical History:   Procedure Laterality Date    HX GYN  1999    hysterectomy    HX NEPHRECTOMY Right 2016    for kidney cancer    US GUIDED CORE BREAST BIOPSY Left     Negative     Prior Level of Function/Home Situation: lives alone in ramp accessible home, drives occasionally, uses cane or RW as needed  Personal factors and/or comorbidities impacting plan of care:     Home Situation  Home Environment: Private residence  Wheelchair Ramp: Yes  One/Two Story Residence: One story  Living Alone: Yes  Support Systems: Friends \ neighbors  Patient Expects to be Discharged to[de-identified] Private residence  Current DME Used/Available at Home: [employer] [place], straight, Walker, rolling    EXAMINATION/PRESENTATION/DECISION MAKING:   Critical Behavior:   Alert and oriented           Hearing: Auditory  Auditory Impairment: None  Range Of Motion:  AROM: Within functional limits           PROM: Within functional limits           Strength:    Strength: Generally decreased, functional                    Tone & Sensation:   Tone: Normal              Sensation: Intact               Coordination:  Coordination: Within functional limits  Vision:      Functional Mobility:  Bed Mobility:        Sit to Supine: Minimum assistance     Transfers:  Sit to Stand: Supervision  Stand to Sit: Supervision        Bed to Chair: Supervision              Balance:   Sitting: Intact  Standing: Intact  Ambulation/Gait Training:  Distance (ft): 150 Feet (ft)  Assistive Device: Cane, straight  Ambulation - Level of Assistance: Contact guard assistance        Gait Abnormalities: Decreased step clearance        Base of Support: Widened     Speed/Annalee: Pace decreased (<100 feet/min)                            Stairs: Therapeutic Exercises:    Ankle pumps, LAQ, marching    Functional Measure:  Barthel Index:    Bathin  Bladder: 10  Bowels: 10  Groomin  Dressing: 10  Feeding: 10  Mobility: 15  Stairs: 0  Toilet Use: 10  Transfer (Bed to Chair and Back): 15  Total: 90       Barthel and G-code impairment scale:  Percentage of impairment CH  0% CI  1-19% CJ  20-39% CK  40-59% CL  60-79% CM  80-99% CN  100%   Barthel Score 0-100 100 99-80 79-60 59-40 20-39 1-19   0   Barthel Score 0-20 20 17-19 13-16 9-12 5-8 1-4 0      The Barthel ADL Index: Guidelines  1. The index should be used as a record of what a patient does, not as a record of what a patient could do. 2. The main aim is to establish degree of independence from any help, physical or verbal, however minor and for whatever reason. 3. The need for supervision renders the patient not independent. 4. A patient's performance should be established using the best available evidence. Asking the patient, friends/relatives and nurses are the usual sources, but direct observation and common sense are also important. However direct testing is not needed. 5. Usually the patient's performance over the preceding 24-48 hours is important, but occasionally longer periods will be relevant. 6. Middle categories imply that the patient supplies over 50 per cent of the effort. 7. Use of aids to be independent is allowed. Jewell Barahona., Barthel, D.W. (1673). Functional evaluation: the Barthel Index. [address] [facility] (14)2. Estrellita Taylor georges Ryley, REDDY, Landy Pop., [facility] [place], [address] [place] [address] Ave (1999). Measuring the change indisability after inpatient rehabilitation; comparison of the responsiveness of the Barthel Index and Functional Shannon Measure. Journal of Neurology, Neurosurgery, and Psychiatry, 66(4), 218-667. Judge Hernandez, N.J.A, AISHWARYA Crowe.TYLER, & Pito Flor MHESHAM. (2004.) Assessment of post-stroke quality of life in cost-effectiveness studies: The usefulness of the Barthel Index and the EuroQoL-5D. Quality of Life Research, 13, 378-55       G codes: In compliance with CMSs Claims Based Outcome Reporting, the following G-code set was chosen for this patient based on their primary functional limitation being treated:     The outcome measure chosen to determine the severity of the functional limitation was the Barthel with a score of 90/100 which was correlated with the impairment scale. ? Mobility - Walking and Moving Around:     - CURRENT STATUS: CI - 1%-19% impaired, limited or restricted    - GOAL STATUS: CI - 1%-19% impaired, limited or restricted    - D/C STATUS:  CI - 1%-19% impaired, limited or restricted        Physical Therapy Evaluation Charge Determination   History Examination Presentation Decision-Making   LOW Complexity : Zero comorbidities / personal factors that will impact the outcome / POC LOW Complexity : 1-2 Standardized tests and measures addressing body structure, function, activity limitation and / or participation in recreation  LOW Complexity : Stable, uncomplicated  LOW Complexity : FOTO score of       Based on the above components, the patient evaluation is determined to be of the following complexity level: LOW     Pain:  Pain Scale 1: Numeric (0 - 10)  Pain Intensity 1: 7     Activity Tolerance:   good  Please refer to the flowsheet for vital signs taken during this treatment. After treatment:   []   Patient left in no apparent distress sitting up in chair  [x]   Patient left in no apparent distress in bed  [x]   Call bell left within reach  [x]   Nursing notified  []   Caregiver present  []   Bed alarm activated    COMMUNICATION/EDUCATION:   Communication/Collaboration:  [x]   Fall prevention education was provided and the patient/caregiver indicated understanding. [x]   Patient/family have participated as able and agree with findings and recommendations. []   Patient is unable to participate in plan of care at this time.   Findings and recommendations were discussed with: Registered Nurse and Physician    Thank you for this referral.  Nanci Avery   Time Calculation: 35 mins

## 2018-11-16 NOTE — PROGRESS NOTES
Hospitalist Progress Note    NAME: Michelle Gannon   :  1955   MRN:  [id number]       Assessment / Plan:  PNA  Either Failed Treatment of abx, vs Remnant symptoms from PNA a months ago (claims to admitted for 8 days in hospital), vs HCAP   Bronchial lavage with hyphae  ID consult  CXR with Persistent patchy bilateral airspace disease, Slight decrease in small left pleural effusion. Continued follow-up recommended  Pt reported to be discharged on Doxycycline 1 month ago after admitted for 8 days in hospital for PNA  Recent 2D echo (pt has results in her cellphone) with normal EF but grade 1 diastolic dysfunction   CTA Chest- 1. Moderate-sized pericardial effusion. Subtle reticular nodular changes bilaterally likely infectious or inflammatory  No acute pulmonary embolus  Check Sputum, blood cultures-NGTD  IV vancomycin, cefepime and Zithromax  mucolytics and antitussives  Pt reported developed polymyositis with Levaquin  ProBNP 103  Request pulmonary consult      Pleuritic Chest Pain  Likely due to PNA, no PE on CTA ? Related to Pericardial effusion  Check Echo  Cardiology consult     Polymyositis  Continue steroids  Claims not taking methotrexate anymore     Hypertension   Continue Norvasc and ARBs     Neuropathy  Continue Neurontin     Code Status: DNR/DNI as per her own wishes  Surrogate Decision Maker: Cousin Bibiana Medrano     DVT Prophylaxis: Lovenox     Baseline: functional     Subjective:     Chief Complaint / Reason for Physician Visit  \"feeling some better\". Discussed with RN events overnight.      Review of Systems:  Symptom Y/N Comments  Symptom Y/N Comments   Fever/Chills n   Chest Pain y    Poor Appetite    Edema     Cough y   Abdominal Pain     Sputum n   Joint Pain     SOB/LOREDO n   Pruritis/Rash     Nausea/vomit n   Tolerating PT/OT     Diarrhea    Tolerating Diet y    Constipation    Other       Could NOT obtain due to:      Objective:     VITALS:   Last 24hrs VS reviewed since prior progress note. Most recent are:  Patient Vitals for the past 24 hrs:   Temp Pulse Resp BP SpO2   11/16/18 1519 98.2 °F (36.8 °C) 80 16 143/53 98 %   11/16/18 1133 98.6 °F (37 °C) 80 16 146/64 99 %   11/16/18 0833 98.8 °F (37.1 °C) 68 16 134/62 97 %   11/16/18 0310 97.9 °F (36.6 °C) 75 18 135/54 98 %   11/15/18 2239 98.2 °F (36.8 °C) 64 18 149/69 98 %   11/15/18 2101 97.8 °F (36.6 °C) 79 18 175/74 98 %   11/15/18 2020 -- 85 18 -- 98 %   11/15/18 2019 98.3 °F (36.8 °C) 85 18 163/66 98 %   11/15/18 1900 -- 82 12 161/66 97 %   11/15/18 1805 -- 69 15 154/72 100 %       Intake/Output Summary (Last 24 hours) at 11/16/2018 1629  Last data filed at 11/16/2018 0310  Gross per 24 hour   Intake 250 ml   Output --   Net 250 ml        PHYSICAL EXAM:  General: WD, WN. Alert, cooperative, no acute distress    EENT:  EOMI. Anicteric sclerae. MMM  Resp:  + crackles, no wheezing or rales. No accessory muscle use  CV:  Regular  rhythm,  No edema  GI:  Soft, Non distended, Non tender.  +Bowel sounds  Neurologic:  Alert and oriented X 3, normal speech, grossly intact  Psych:   Good insight. Not anxious nor agitated  Skin:  No rashes. No jaundice    Reviewed most current lab test results and cultures  YES  Reviewed most current radiology test results   YES  Review and summation of old records today    NO  Reviewed patient's current orders and MAR    YES  PMH/SH reviewed - no change compared to H&P  ________________________________________________________________________  Care Plan discussed with:    Comments   Patient y    Family      RN y    Care Manager     Consultant                        Multidiciplinary team rounds were held today with , nursing, pharmacist and clinical coordinator. Patient's plan of care was discussed; medications were reviewed and discharge planning was addressed.      ________________________________________________________________________  Total NON critical care TIME:  25   Minutes    Total CRITICAL CARE TIME Spent:   Minutes non procedure based      Comments   >50% of visit spent in counseling and coordination of care     ________________________________________________________________________  Della Galeas MD     Procedures: see electronic medical records for all procedures/Xrays and details which were not copied into this note but were reviewed prior to creation of Plan. LABS:  I reviewed today's most current labs and imaging studies.   Pertinent labs include:  Recent Labs     11/16/18 0316 11/15/18  1747   WBC 8.3 11.3*   HGB 11.0* 13.0   HCT 34.2* 41.5    278     Recent Labs     11/16/18 0316 11/15/18  1747    139   K 4.0 4.4   * 105   CO2 23 26   GLU 69 87   BUN 31* 33*   CREA 0.91 1.00   CA 8.5 9.3   ALB 2.7*  --    TBILI 0.5  --    SGOT 65*  --    ALT 58  --        Signed: Della Galeas MD

## 2018-11-16 NOTE — ED NOTES
TRANSFER - OUT REPORT:    Verbal report given to Jennifer Miramontes RN (name) on [facility]  being transferred to [facility] (unit) for routine progression of care       Report consisted of patients Situation, Background, Assessment and   Recommendations(SBAR). Information from the following report(s) SBAR, Kardex, ED Summary, Intake/Output, MAR, Recent Results and Cardiac Rhythm NSR was reviewed with the receiving nurse. Lines:   Peripheral IV 11/15/18 Left Antecubital (Active)   Site Assessment Clean, dry, & intact 11/15/2018  5:56 PM   Phlebitis Assessment 0 11/15/2018  5:56 PM   Infiltration Assessment 0 11/15/2018  5:56 PM   Dressing Status Clean, dry, & intact 11/15/2018  5:56 PM   Dressing Type Transparent 11/15/2018  5:56 PM   Hub Color/Line Status Pink;Flushed;Patent 11/15/2018  5:56 PM        Opportunity for questions and clarification was provided.       Patient transported with:   Piper

## 2018-11-16 NOTE — H&P
Hospitalist Admission Note    NAME: Johny Hathaway   :  1955   MRN:  [id number]     Date/Time:  11/15/2018 7:06 PM    Patient PCP: Hari Nova MD  ______________________________________________________________________  Given the patient's current clinical presentation, I have a high level of concern for decompensation if discharged from the emergency department. Complex decision making was performed, which includes reviewing the patient's available past medical records, laboratory results, and x-ray films. My assessment of this patient's clinical condition and my plan of care is as follows. Assessment / Plan:  PNA  Either Failed Treatment of abx, vs Remnant symptoms from PNA a months ago (claims to admitted for 8 days in hospital), vs HCAP   Admit  CXR with Persistent patchy bilateral airspace disease, Slight decrease in small left pleural effusion. Continued follow-up recommended  Pt reported to be discharged on Doxycycline 1 month ago after admitted for 8 days in hospital for PNA  Recent 2D echo (pt has results in her cellphone) with normal EF but grade 1 diastolic dysfunction  Check CTA Chest  Check Sputum and blood cultures  IV vancomycin, cefepime and Zithromax  mucolytics and antitussives  Pt reported developed polymyositis with Levaquin  Check ProBNP  May need pulmonary consult depending on CT    Pleuritic Chest Pain  Likely due to PNA but also rule out PE consider recent admissions to hospital    Polymyositis  Continue steroids  Claims not taking methotrexate anymore    Hypertension   Continue Norvasc and ARBs    Neuropathy  Continue Neurontin    Code Status: DNR/DNI as per her own wishes  Surrogate Decision Maker: Anna Regan    DVT Prophylaxis: Lovenox    Baseline: functional      Subjective:   CHIEF COMPLAINT: chest pain upon coughing    HISTORY OF PRESENT ILLNESS:     Johny Hathaway is a 61 y.o.    female who presents with chest pain upon coughing. As per patient, she was admitted to [facility] in September for polymyositis then she was admitted to another hospital a month ago for PNA and was in the hospital for 8 days. Pt reported she never got better since the discharge and report green sputum when takes mucinex otherwise cough is mostly dry. Pt denies any fever, chills, nausea, vomiting, diarrhea. Pt reported started to have chest pains upon coughing and breathing today, reported 8/10 pain, intermittent, under her breast, non radiating. In ED pt underwent CXR showed CXR with Persistent patchy bilateral airspace disease, Slight decrease in small left pleural effusion. Continued follow-up recommended    We were asked to admit for work up and evaluation of the above problems.      Past Medical History:   Diagnosis Date    Congestive heart failure ([address]     Hypertension     Pneumonia 10/2018    [facility] 2015    in setting of [address] 2015    Renal cancer ([facility] 2016    Respiratory arrest [facility] 2015    [facility].     SBO (small bowel obstruction) ([facility] 8/3/2017        Past Surgical History:   Procedure Laterality Date    HX GYN      hysterectomy    HX NEPHRECTOMY Right 2016    for kidney cancer    US GUIDED CORE BREAST BIOPSY Left     Negative       Social History     Tobacco Use    Smoking status: Former Smoker     Packs/day: 1.00     Years: 20.00     Pack years: 20.00     Types: Cigarettes     Last attempt to quit: 1998     Years since quittin.5    Smokeless tobacco: Never Used   Substance Use Topics    Alcohol use: No     Alcohol/week: 0.6 oz     Types: 1 Glasses of wine per week        Family History   Problem Relation Age of Onset    Cancer Mother     Breast Cancer Mother 68    Diabetes Father     Hypertension Father     Dementia Father 80    Stroke Maternal Grandmother     Breast Cancer Cousin         50's     Allergies   Allergen Reactions    Ace Inhibitors Cough    Dilaudid [Hydromorphone] Other (comments)    Levaquin [Levofloxacin] Other (comments)    Lisinopril Other (comments)    Metoprolol Other (comments)     hallucinations    Peanut Other (comments)        Prior to Admission medications    Medication Sig Start Date End Date Taking? Authorizing Provider   SHELBY jones & paracasei- S therm- Bifido (RISAQUAD) 8 billion cell cap cap Take 1 Cap by mouth. 10/12/18   Provider, Historical   gabapentin (NEURONTIN) 100 mg capsule Take 1 Cap by mouth two (2) times a day. 10/16/18   Trang Flores MD   amLODIPine (NORVASC) 5 mg tablet Take 1 Tab by mouth daily. 10/16/18   Trang Flores MD   cholecalciferol (VITAMIN D3) 50,000 unit capsule Take 1 Cap by mouth every seven (7) days for 8 doses. 10/10/18 11/29/18  Trang Flores MD   raNITIdine (ZANTAC) 150 mg tablet Take  by mouth. 1 tablet prn heartburn. 6/27/18   Provider, Historical   losartan (COZAAR) 100 mg tablet TAKE 1 TABLET BY MOUTH EVERY DAY 8/14/18   Trang Flores MD   fluticasone [facility]) 50 mcg/actuation nasal spray 2 Sprays by Both Nostrils route daily. Patient taking differently: 2 Sprays by Both Nostrils route daily as needed. 5/7/18   Trang Flores MD   aspirin 81 mg chewable tablet Take 1 Tab by mouth daily. 8/10/17   Trang Flores MD   vitamin E topical cream Apply  to affected area daily. Patient applies to face    OtherMelanie MD   predniSONE (DELTASONE) 5 mg tablet TAKE 2 TABLETS BY MOUTH EVERY DAY 12/28/16   Provider, Historical       REVIEW OF SYSTEMS:     I am not able to complete the review of systems because:    The patient is intubated and sedated    The patient has altered mental status due to his acute medical problems    The patient has baseline aphasia from prior stroke(s)    The patient has baseline dementia and is not reliable historian    The patient is in acute medical distress and unable to provide information           Total of 12 systems reviewed as follows: POSITIVE= underlined text  Negative = text not underlined  General:  fever, chills, sweats, generalized weakness, weight loss/gain,      loss of appetite   Eyes:    blurred vision, eye pain, loss of vision, double vision  ENT:    rhinorrhea, pharyngitis   Respiratory:   cough, sputum production, SOB, LOREDO, wheezing, pleuritic pain   Cardiology:   chest pain, palpitations, orthopnea, PND, edema, syncope   Gastrointestinal:  abdominal pain , N/V, diarrhea, dysphagia, constipation, bleeding   Genitourinary:  frequency, urgency, dysuria, hematuria, incontinence   Muskuloskeletal :  arthralgia, myalgia, back pain  Hematology:  easy bruising, nose or gum bleeding, lymphadenopathy   Dermatological: rash, ulceration, pruritis, color change / jaundice  Endocrine:   hot flashes or polydipsia   Neurological:  headache, dizziness, confusion, focal weakness, paresthesia,     Speech difficulties, memory loss, gait difficulty  Psychological: Feelings of anxiety, depression, agitation    Objective:   VITALS:    Visit Vitals  /72   Pulse 69   Temp 97.9 °F (36.6 °C)   Resp 15   Ht 5' 5\" (1.651 m)   Wt 74.2 kg (163 lb 9.3 oz)   SpO2 100%   BMI 27.22 kg/m²       PHYSICAL EXAM:    General:    Alert, cooperative, no distress, appears stated age. HEENT: Atraumatic, anicteric sclerae, pink conjunctivae     No oral ulcers, mucosa moist, throat clear, dentition fair  Neck:  Supple, symmetrical,  thyroid: non tender  Lungs:   crackles. No Wheezing or Rhonchi. No rales. Chest wall:  No tenderness  No Accessory muscle use. Heart:   Regular  rhythm,  No  murmur   No edema  Abdomen:   Soft, non-tender. Not distended. Bowel sounds normal  Extremities: No cyanosis. No clubbing,      Skin turgor normal, Capillary refill normal, Radial dial pulse 2+  Skin:     Not pale. Not Jaundiced  No rashes   Psych:  Good insight. Not depressed. Not anxious or agitated. Neurologic: EOMs intact. No facial asymmetry.  No aphasia or slurred speech. Symmetrical strength, Sensation grossly intact. Alert and oriented X 4.     _______________________________________________________________________  Care Plan discussed with:    Comments   Patient y    Family      RN y    Care Manager                    Consultant:  rasheeda ED physician   _______________________________________________________________________  Expected  Disposition:   Home with Family y   HH/PT/OT/RN    SNF/LTC    MELODIE    ________________________________________________________________________  TOTAL TIME: 61 Minutes    Critical Care Provided     Minutes non procedure based      Comments    y Reviewed previous records   >50% of visit spent in counseling and coordination of care y Discussion with patient and questions answered       ________________________________________________________________________  Signed: Karen Montano MD    Procedures: see electronic medical records for all procedures/Xrays and details which were not copied into this note but were reviewed prior to creation of Plan.     LAB DATA REVIEWED:    Recent Results (from the past 24 hour(s))   EKG, 12 LEAD, INITIAL    Collection Time: 11/15/18  2:24 PM   Result Value Ref Range    Ventricular Rate 75 BPM    Atrial Rate 75 BPM    P-R Interval 162 ms    QRS Duration 76 ms    Q-T Interval 392 ms    QTC Calculation (Bezet) 437 ms    Calculated P Axis 62 degrees    Calculated R Axis 34 degrees    Calculated T Axis 53 degrees    Diagnosis       Normal sinus rhythm with sinus arrhythmia  Possible Left atrial enlargement  When compared with ECG of 04-AUG-2017 03:42,  No significant change was found     CBC W/O DIFF    Collection Time: 11/15/18  5:47 PM   Result Value Ref Range    WBC 11.3 (H) 3.6 - 11.0 K/uL    RBC 5.66 (H) 3.80 - 5.20 M/uL    HGB 13.0 11.5 - 16.0 g/dL    HCT 41.5 35.0 - 47.0 %    MCV 73.3 (L) 80.0 - 99.0 FL    MCH 23.0 (L) 26.0 - 34.0 PG    MCHC 31.3 30.0 - 36.5 g/dL    RDW 18.3 (H) 11.5 - 14.5 %    PLATELET 048 367 - 649 K/uL    MPV 12.0 8.9 - 12.9 FL    NRBC 0.0 0  WBC    ABSOLUTE NRBC 0.00 0.00 - 2.21 K/uL   METABOLIC PANEL, BASIC    Collection Time: 11/15/18  5:47 PM   Result Value Ref Range    Sodium 139 136 - 145 mmol/L    Potassium 4.4 3.5 - 5.1 mmol/L    Chloride 105 97 - 108 mmol/L    CO2 26 21 - 32 mmol/L    Anion gap 8 5 - 15 mmol/L    Glucose 87 65 - 100 mg/dL    BUN 33 (H) 6 - 20 MG/DL    Creatinine 1.00 0.55 - 1.02 MG/DL    BUN/Creatinine ratio 33 (H) 12 - 20      GFR est AA >60 >60 ml/min/1.73m2    GFR est non-AA 56 (L) >60 ml/min/1.73m2    Calcium 9.3 8.5 - 10.1 MG/DL

## 2018-11-16 NOTE — PROGRESS NOTES
Report received by fax from [address] [place] [address]  \"  Fungal ID - bronchial lavage:    -1. Sterile Hyphae; unable to identify due to lack of conidial production. \"    Called inpatient service and faxed result to floor. Appreciate assistance of inpatient service.      Yi Manzo MD

## 2018-11-16 NOTE — PROGRESS NOTES
Occupational Therapy EVALUATION/discharge  Patient: Magaly Cannon (9 y.o. female)  Date: 11/16/2018  Primary Diagnosis: Pneumonia       Precautions:        ASSESSMENT:   Based on the objective data described below, the patient presents close to her baseline and she lives alone and uses a cane for walking. Pt was cleared to be seen and sitting on EOB. Pt was has decreased range in  BUE that has been this way for years but she is able to vasu pull over head shirt and or button up shirt. Pt. States that she is not able to vasu her socks but does not wear them and is able to vasu shoes with long handle shoe horn. Pt is supervision for ADLs and able to stand and walk to bathroom with use of SPC. She stated that she is doing fine and has not OT needs and OT agreed and recommend that pt return home with assist as needed and no further therapy needed. Further skilled acute occupational therapy is not indicated at this time. Discharge Recommendations: None  Further Equipment Recommendations for Discharge: none      SUBJECTIVE:   Patient stated I'm fine. I dont need any therapy and will be fine at home.     OBJECTIVE DATA SUMMARY:   HISTORY:   Past Medical History:   Diagnosis Date    Congestive heart failure ([address]     Hypertension     Pneumonia 10/2018    [facility]ymy[facility] ([facility] 12/2015    in setting of [address] 2015    Renal cancer ([facility] 07/08/2016    Respiratory arrest ([facility] 11/2015    [facility].     SBO (small bowel obstruction) ([facility] 8/3/2017     Past Surgical History:   Procedure Laterality Date    HX GYN  1999    hysterectomy    HX NEPHRECTOMY Right 2016    for kidney cancer    US GUIDED CORE BREAST BIOPSY Left 1999    Negative       Prior Level of Function/Environment/Context: pt lives alone and was independent with ADLs and ILS with use of [email or web address]  Occupations in which the patient is/was successful, what are the barriers preventing that success:   Performance Patterns (routines, roles, habits, and rituals):   Personal Interests and/or values:   Expanded or extensive additional review of patient history:     Home Situation  Home Environment: Private residence  Wheelchair Ramp: Yes  One/Two Story Residence: One story  Living Alone: Yes  Support Systems: Friends \ neighbors  Patient Expects to be Discharged to[de-identified] Private residence  Current DME Used/Available at Home: [employer], straight, Walker, rolling    Hand dominance: Right    EXAMINATION OF PERFORMANCE DEFICITS:  Cognitive/Behavioral Status:  Neurologic State: Alert  Orientation Level: Appropriate for age  Cognition: Appropriate decision making  Perception: Appears intact  Perseveration: No perseveration noted  Safety/Judgement: Awareness of environment    Skin: in good health     Edema: none    Hearing: Auditory  Auditory Impairment: None    Vision/Perceptual:                    intact                 Range of Motion:    AROM: Generally decreased, functional  PROM: Generally decreased, functional                      Strength:    Strength: Generally decreased, functional                Coordination:  Coordination: Within functional limits  Fine Motor Skills-Upper: Left Intact; Right Intact    Gross Motor Skills-Upper: Left Impaired;Right Impaired(but functinoal for UE)    Tone & Sensation:    Tone: Normal  Sensation: Intact                      Balance:  Sitting: Intact  Standing: Intact    Functional Mobility and Transfers for ADLs:  Bed Mobility:  Sit to Supine: Minimum assistance    Transfers:  Sit to Stand: Supervision  Stand to Sit: Supervision  Bed to Chair: Supervision  Bathroom Mobility: Supervision/set up  Toilet Transfer : Supervision    ADL Assessment:  Feeding: Independent    Oral Facial Hygiene/Grooming: Independent    Bathing: Contact guard assistance;Setup    Upper Body Dressing: Contact guard assistance;Setup    Lower Body Dressing: Contact guard assistance;Setup    Toileting: Supervision        Cognitive Retraining  Safety/Judgement: Awareness of environment         Functional Measure:  Barthel Index:    Bathin  Bladder: 10  Bowels: 10  Groomin  Dressing: 10  Feeding: 10  Mobility: 15  Stairs: 0  Toilet Use: 10  Transfer (Bed to Chair and Back): 15  Total: 90       Barthel and G-code impairment scale:  Percentage of impairment CH  0% CI  1-19% CJ  20-39% CK  40-59% CL  60-79% CM  80-99% CN  100%   Barthel Score 0-100 100 99-80 79-60 59-40 20-39 1-19   0   Barthel Score 0-20 20 17-19 13-16 9-12 5-8 1-4 0      The Barthel ADL Index: Guidelines  1. The index should be used as a record of what a patient does, not as a record of what a patient could do. 2. The main aim is to establish degree of independence from any help, physical or verbal, however minor and for whatever reason. 3. The need for supervision renders the patient not independent. 4. A patient's performance should be established using the best available evidence. Asking the patient, friends/relatives and nurses are the usual sources, but direct observation and common sense are also important. However direct testing is not needed. 5. Usually the patient's performance over the preceding 24-48 hours is important, but occasionally longer periods will be relevant. 6. Middle categories imply that the patient supplies over 50 per cent of the effort. 7. Use of aids to be independent is allowed. Jimmie Reed., Barthel, D.W. (1834). Functional evaluation: the Barthel Index. [address] [facility] (14)2. Roselyn Camargo georges Ryley, J.J.MGretaF, Nithya TinocoNaval Hospital [facility], [address] [place] [address] (). Measuring the change indisability after inpatient rehabilitation; comparison of the responsiveness of the Barthel Index and Functional [place] Measure. Journal of Neurology, Neurosurgery, and Psychiatry, 66(4), 752-293. IRENE Lopez.FARIBA, ARIELA Crowe, & Aidan Sun M.A. (2004.) Assessment of post-stroke quality of life in cost-effectiveness studies:  The usefulness of the Barthel Index and the EuroQoL-5D. Quality of Life Research, 13, 895-83         G codes: In compliance with CMSs Claims Based Outcome Reporting, the following G-code set was chosen for this patient based on their primary functional limitation being treated: The outcome measure chosen to determine the severity of the functional limitation was the Barthel with a score of 90/100 which was correlated with the impairment scale. ? Self Care:     - CURRENT STATUS: CI - 1%-19% impaired, limited or restricted    - GOAL STATUS: CI - 1%-19% impaired, limited or restricted    - D/C STATUS:  CI - 1%-19% impaired, limited or restricted     Occupational Therapy Evaluation Charge Determination   History Examination Decision-Making   LOW Complexity : Brief history review  LOW Complexity : 1-3 performance deficits relating to physical, cognitive , or psychosocial skils that result in activity limitations and / or participation restrictions  LOW Complexity : No comorbidities that affect functional and no verbal or physical assistance needed to complete eval tasks       Based on the above components, the patient evaluation is determined to be of the following complexity level: LOW   Pain:  Pain Scale 1: Numeric (0 - 10)  Pain Intensity 1: 7              Activity Tolerance:   good  Please refer to the flowsheet for vital signs taken during this treatment. After treatment:   []  Patient left in no apparent distress sitting up in chair  [x]  Patient left in no apparent distress in bed  [x]  Call bell left within reach  [x]  Nursing notified  []  Caregiver present  []  Bed alarm activated    COMMUNICATION/EDUCATION:   Communication/Collaboration:  [x]      Home safety education was provided and the patient/caregiver indicated understanding. [x]      Patient/family have participated as able and agree with findings and recommendations. []      Patient is unable to participate in plan of care at this time.   Findings and recommendations were discussed with: Physical Therapist and Registered Nurse    Brenda Huffman, OT  Time Calculation: 11 mins

## 2018-11-17 LAB
ANION GAP SERPL CALC-SCNC: 9 MMOL/L (ref 5–15)
BUN SERPL-MCNC: 30 MG/DL (ref 6–20)
BUN/CREAT SERPL: 34 (ref 12–20)
CALCIUM SERPL-MCNC: 8.4 MG/DL (ref 8.5–10.1)
CHLORIDE SERPL-SCNC: 112 MMOL/L (ref 97–108)
CO2 SERPL-SCNC: 21 MMOL/L (ref 21–32)
CREAT SERPL-MCNC: 0.89 MG/DL (ref 0.55–1.02)
DATE LAST DOSE: ABNORMAL
GLUCOSE SERPL-MCNC: 74 MG/DL (ref 65–100)
LDH SERPL L TO P-CCNC: 485 U/L (ref 81–246)
POTASSIUM SERPL-SCNC: 4.1 MMOL/L (ref 3.5–5.1)
REPORTED DOSE,DOSE: ABNORMAL UNITS
REPORTED DOSE/TIME,TMG: ABNORMAL
SODIUM SERPL-SCNC: 142 MMOL/L (ref 136–145)
VANCOMYCIN TROUGH SERPL-MCNC: 19.3 UG/ML (ref 5–10)

## 2018-11-17 PROCEDURE — 74011000250 HC RX REV CODE- 250: Performed by: INTERNAL MEDICINE

## 2018-11-17 PROCEDURE — 74011000250 HC RX REV CODE- 250: Performed by: EMERGENCY MEDICINE

## 2018-11-17 PROCEDURE — 74011250636 HC RX REV CODE- 250/636: Performed by: EMERGENCY MEDICINE

## 2018-11-17 PROCEDURE — 74011250637 HC RX REV CODE- 250/637: Performed by: INTERNAL MEDICINE

## 2018-11-17 PROCEDURE — 82787 IGG 1 2 3 OR 4 EACH: CPT

## 2018-11-17 PROCEDURE — 94640 AIRWAY INHALATION TREATMENT: CPT

## 2018-11-17 PROCEDURE — 74011250636 HC RX REV CODE- 250/636: Performed by: INTERNAL MEDICINE

## 2018-11-17 PROCEDURE — 74011000258 HC RX REV CODE- 258: Performed by: INTERNAL MEDICINE

## 2018-11-17 PROCEDURE — 65660000000 HC RM CCU STEPDOWN

## 2018-11-17 PROCEDURE — 36415 COLL VENOUS BLD VENIPUNCTURE: CPT

## 2018-11-17 PROCEDURE — 80048 BASIC METABOLIC PNL TOTAL CA: CPT

## 2018-11-17 PROCEDURE — 77030029684 HC NEB SM VOL KT MONA -A

## 2018-11-17 PROCEDURE — 82785 ASSAY OF IGE: CPT

## 2018-11-17 PROCEDURE — 83615 LACTATE (LD) (LDH) ENZYME: CPT

## 2018-11-17 PROCEDURE — 74011636637 HC RX REV CODE- 636/637: Performed by: INTERNAL MEDICINE

## 2018-11-17 PROCEDURE — 94760 N-INVAS EAR/PLS OXIMETRY 1: CPT

## 2018-11-17 PROCEDURE — 80202 ASSAY OF VANCOMYCIN: CPT

## 2018-11-17 RX ORDER — BUDESONIDE 0.5 MG/2ML
500 INHALANT ORAL
Status: DISCONTINUED | OUTPATIENT
Start: 2018-11-17 | End: 2018-11-19 | Stop reason: HOSPADM

## 2018-11-17 RX ORDER — ARFORMOTEROL TARTRATE 15 UG/2ML
15 SOLUTION RESPIRATORY (INHALATION)
Status: DISCONTINUED | OUTPATIENT
Start: 2018-11-17 | End: 2018-11-19 | Stop reason: HOSPADM

## 2018-11-17 RX ADMIN — GABAPENTIN 100 MG: 100 CAPSULE ORAL at 18:42

## 2018-11-17 RX ADMIN — IPRATROPIUM BROMIDE AND ALBUTEROL SULFATE 3 ML: .5; 3 SOLUTION RESPIRATORY (INHALATION) at 14:29

## 2018-11-17 RX ADMIN — ENOXAPARIN SODIUM 40 MG: 40 INJECTION SUBCUTANEOUS at 09:50

## 2018-11-17 RX ADMIN — CEFEPIME HYDROCHLORIDE 2 G: 2 INJECTION, POWDER, FOR SOLUTION INTRAVENOUS at 15:03

## 2018-11-17 RX ADMIN — PREDNISONE 10 MG: 10 TABLET ORAL at 08:50

## 2018-11-17 RX ADMIN — CEFEPIME HYDROCHLORIDE 2 G: 2 INJECTION, POWDER, FOR SOLUTION INTRAVENOUS at 09:07

## 2018-11-17 RX ADMIN — VANCOMYCIN HYDROCHLORIDE 750 MG: 750 INJECTION, POWDER, LYOPHILIZED, FOR SOLUTION INTRAVENOUS at 09:42

## 2018-11-17 RX ADMIN — Medication 10 ML: at 14:44

## 2018-11-17 RX ADMIN — GUAIFENESIN 600 MG: 600 TABLET, EXTENDED RELEASE ORAL at 08:50

## 2018-11-17 RX ADMIN — AMLODIPINE BESYLATE 5 MG: 5 TABLET ORAL at 08:50

## 2018-11-17 RX ADMIN — BUDESONIDE 500 MCG: 0.5 INHALANT RESPIRATORY (INHALATION) at 22:51

## 2018-11-17 RX ADMIN — GABAPENTIN 100 MG: 100 CAPSULE ORAL at 08:50

## 2018-11-17 RX ADMIN — Medication 1 CAPSULE: at 08:50

## 2018-11-17 RX ADMIN — ASPIRIN 81 MG 81 MG: 81 TABLET ORAL at 08:50

## 2018-11-17 RX ADMIN — GUAIFENESIN 600 MG: 600 TABLET, EXTENDED RELEASE ORAL at 20:09

## 2018-11-17 RX ADMIN — Medication 10 ML: at 05:26

## 2018-11-17 RX ADMIN — LOSARTAN POTASSIUM 100 MG: 50 TABLET ORAL at 08:51

## 2018-11-17 NOTE — CONSULTS
ID consult  NAME:  Johnny Floyd                      :   1955                       MRN:   [id number]   Date/Time:  2018 4:15 PM  Subjective:   REASON FOR CONSULT:   PNA  bronchial lavage with hyphae    History obtained from the patient and records from [facility] and [address] [place] [address] has been reviewed in care everywhere  Johnny Floyd  is a 61 y.o. 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 Patient has a complicated medical history. She was recently in in [facility] with fever and bilateral chest infiltrates and was treated as pneumonia and was sent home after 8 days on doxycycline. While in the hospital she underwent bronchoscopy and bronchoalveolar lavage was sent for cultures. Bacterial cultures were negative fungal culture had a hyphae but that did not grow and it was thought to be nonviable. HPI had prior to her admission to [facility] she had gone to [facility] to get a second opinion for polymyositis. Patient was diagnosed with polymyositis 2015 when she was admitted to the [facility] with respiratory failure and had to be intubated for 3 weeks and had a tracheostomy done. During that hospitalization she was also found to have a right renal mass and underwent right nephrectomy 2016 and it was adenocarcinoma. The polymyositis did not improve after the nephrectomy. She was seen by many rheumatologist and her current rheumatologist is at [facility]. She was given the following medications for the polymyositis in a sequential order. She had gotten steroids and IVIG and then for a short period of time was put on azathioprine with no improvement. She was on CellCept liquid for a few weeks.   Then her last medication was methotrexate which was started in March 2018 and she was taking it until her recent hospitalization in October 2 [facility]. .  As the polymyositis was not improving she did her own research and went to [facility] to see a neurologist Dr. Julián Sam . She saw her on September 26. She had the following tests done at [facility] EMG done showed an abnormal there was electrophysiological evidence of a diffuse myopathy more severe proximally with presence of fibrillation potentials in most muscles. She had a pulmonary function test done at [facility] and it was abnormal.  She had a PET scan done on 10/2/2018 and that revealed focal FDG activity within the left parotid gland and pericardial thickening and activity in the lungs bilaterally. Dr. Mariah Black ordered a bunch of blood work including myositis panel biomarker panel which showed an elevated J01 antibody at more than 100 and also p.m./SCL antibody elevated at 23 and SSA antibody elevated at 47 . Thyra Hermanns Rickettsial and Lyme serology was negative. Creatinine kinase was elevated at 2440. HIV test was negative. Dr. Julián Sam recommended muscle biopsy from her triceps but the patient refused. She left [place] on October 3 and on the day she  had a temperature of 104. She flew into [address] [place] [address] and her cousin who likes In [facility] picked her from the airport and as she was not feeling well she was taken to [facility].  she landed in [place] [address] and was admitted to the hospital on October 4. CT chest done on 10 /4 showed dense alveolar consolidation throughout the caudal aspect of the left upper lobe in the left lung base. Nodular alveolar consolidation of the periphery of the caudal aspect of the right upper lobe was also seen. There was an alveolar consolidation within both lungs. There was a small left pleural effusion.   She was given IV antibiotics and a repeat CT was done on October 10 and that showed areas of bilateral airspace disease involving the lingula left lower lobe right upper lobe right middle lobe and and there was a stays stable small pericardial effusion and a stable small left pleural effusion. She had a bronchoscopy on October 9. Bacterial cultures, Legionella antigen, pneumococcus antigen were all negative. Cytology was negative. Fungal cultures were sent. The bronchial brushing fungal culture was negative and there was no growth after 4 weeks of intubation. The bronchial lavage fluid did not show any fungal or yeast elements on the stain but but the fungal culture reported mycelial fungus on October 16. It was sent  to an outside lab for identification but there was no growth and they were unable to identify due to lack of conidial  Production. She was discharged home on October 11 on Augmentin and doxycycline for 5 more days. She had a conference call with the neurologist at [facility] mid-October and she inferred from the conference call that methotrexate was not a good medication in the setting of bilateral lung infiltrates. She stopped methotrexate after her discharge from [facility]. She came to [facility] because of chest pain retrosternally that was getting worse because of her cough. She has had a dry cough for more than a month but of late she has been having chest pain from the coughing. She denies any fever, purulent sputum,  nausea vomiting or  diarrhea . She has some shortness of breath on exertion. She does have weakness in her arms and legs. She has not increased her dose of steroids and she is currently on 10 mg of prednisone. She does not have any pets. She does not participate in any outdoor activity which involves white water rafting, hiking, cave exploring, camping etc.  She lives on her own and she is not sexually active in many years. She is now on disability but previously she used to be an .   Past Medical History:   Diagnosis Date    Congestive heart failure ([facility]     Hypertension     Pneumonia 10/2018    Polymyositis ([facility] 2015    in setting of [facility] 2015    Renal cancer ([facility] 2016    Respiratory arrest ([facility] 2015    [facility].     SBO (small bowel obstruction) ([facility] 8/3/2017      Past Surgical History:   Procedure Laterality Date    HX GYN  1999    hysterectomy    HX NEPHRECTOMY Right 2016    for kidney cancer    US GUIDED CORE BREAST BIOPSY Left     Negative      Social History     obacco Use    Smoking status: Former Smoker     Packs/day: 1.00     Years: 20.00     Pack years: 20.00     Types: Cigarettes     Last attempt to quit: 1998     Years since quittin.5    Smokeless tobacco: Never Used   Substance and Sexual Activity    Alcohol use: No     Alcohol/week: 0.6 oz     Types: 1 Glasses of wine per week    Drug use: No    Sexual activity: Not on file   Other Topics Concern    Not on file   Social History Narrative    Not on file      Family History   Problem Relation Age of Onset    Cancer Mother     Breast Cancer Mother 68    Diabetes Father     Hypertension Father     Dementia Father 80    Stroke Maternal Grandmother     Breast Cancer Cousin         50's     Allergies   Allergen Reactions    Ace Inhibitors Cough    Dilaudid [Hydromorphone] Other (comments)    Levaquin [Levofloxacin] Other (comments)    Lisinopril Other (comments)    Metoprolol Other (comments)     hallucinations    Peanut Other (comments)       ID   Recent   Procedure  Surgery  Injections  Trauma  Sick contacts  Travel  Antibiotic use  Food- raw/exotic  Steroid/immune suppressants/splenectomy/Hardware  Animal bites  Tick exposure  Water sports  Fishing/hunting/animal bird exposure      Current Facility-Administered Medications   Medication Dose Route Frequency Provider Last Rate Last Dose    arformoterol (BROVANA) neb solution 15 mcg  15 mcg Nebulization BID RT Ney Barrientos MD        budesonide (PULMICORT) 500 mcg/2 ml nebulizer suspension  500 mcg Nebulization BID RT Raf Cabrera MD  influenza vaccine 2018-19 (6 mos+)(PF) (FLUARIX QUAD/FLULAVAL QUAD) injection 0.5 mL  0.5 mL IntraMUSCular PRIOR TO DISCHARGE Wan Coffman MD        cefepime (MAXIPIME) 2 g in 0.9% sodium chloride (MBP/ADV) 100 mL  2 g IntraVENous Q8H Wan Coffman  mL/hr at 11/17/18 1503 2 g at 11/17/18 1503    albuterol-ipratropium (DUO-NEB) 2.5 MG-0.5 MG/3 ML  3 mL Nebulization Q6H PRN Wan Coffman MD   3 mL at 11/17/18 1429    lidocaine 4 % patch 1 Patch  1 Patch TransDERmal Q24H Josias Resendiz MD   1 Patch at 11/16/18 1613    azithromycin (ZITHROMAX) 500 mg in 0.9% sodium chloride 250 mL ([id number])  500 mg IntraVENous Q24H Pablo Fry  mL/hr at 11/16/18 1823 500 mg at 11/16/18 1823    guaiFENesin ER (MUCINEX) tablet 600 mg  600 mg Oral Q12H Pablo Fry MD   600 mg at 11/17/18 0850    benzonatate (TESSALON) capsule 200 mg  200 mg Oral TID PRN Pablo Fry MD        amLODIPine (NORVASC) tablet 5 mg  5 mg Oral DAILY Pablo Fry MD   5 mg at 11/17/18 0850    predniSONE (DELTASONE) tablet 10 mg  10 mg Oral DAILY WITH DavidaKapil Chen MD   10 mg at 11/17/18 0850    aspirin chewable tablet 81 mg  81 mg Oral DAILY Kapil Montalvo MD   81 mg at 11/17/18 0850    fluticasone (FLONASE) 50 mcg/actuation nasal spray 2 Spray  2 Spray Both Nostrils DAILY Kapil Montalvo MD        gabapentin (NEURONTIN) capsule 100 mg  100 mg Oral BID Pablo Fry MD   100 mg at 11/17/18 0850    lactobac ac& pc-s.therm-b.anim (OSVALDO Q/RISAQUAD)  1 Cap Oral DAILY Pablo Fry MD   1 Cap at 11/17/18 0850    losartan (COZAAR) tablet 100 mg  100 mg Oral DAILY Pablo Fry MD   100 mg at 11/17/18 0851    nitroglycerin (NITROBID) 2 % ointment 1 Inch  1 Inch Topical Q6H PRN Kapil Montalvo MD        famotidine (PEPCID) tablet 20 mg  20 mg Oral DAILY PRN Pablo Fry MD        sodium chloride (NS) flush 5-10 mL  5-10 mL IntraVENous Q8H Kapil Montalvo MD   10 mL at 11/17/18 1444    sodium chloride (NS) flush 5-10 mL  5-10 mL IntraVENous PRN Delmi Lopes MD        acetaminophen (TYLENOL) tablet 650 mg  650 mg Oral Q6H PRN Kapil Montalvo MD        ondansetron (ZOFRAN) injection 4 mg  4 mg IntraVENous Q4H PRN Kapil Montalvo MD        enoxaparin (LOVENOX) injection 40 mg  40 mg SubCUTAneous Q24H Delmi Lopes MD   40 mg at 11/17/18 0950    vancomycin (VANCOCIN) 750 mg in 0.9% sodium chloride 250 mL ([id number])  750 mg IntraVENous Q12H Nba Rangel  mL/hr at 11/17/18 0942 750 mg at 11/17/18 0942        REVIEW OF SYSTEMS:     Const: negative fever, negative chills, negative weight loss  Eyes:  negative diplopia or visual changes, negative eye pain  ENT:  negative coryza, negative sore throat  Resp:cough, hemoptysis, dyspnea  Cards:  chest pain, No palpitations, lower extremity edema  : negative for frequency, dysuria and hematuria  GI: Negative for abdominal apin, diarrhea, bleeding, constipation  Skin:  negative for rash and pruritus  Heme: negative for easy bruising and gum/nose bleeding  MS: muscle weakness proximal   Neurolo:negative for headaches, dizziness, vertigo, memory problems   Psych: negative for feelings of anxiety, depression   Endocrine: no polyuria or polydipsia          Objective:   VITALS:    Visit Vitals  /55 (BP 1 Location: Right arm, BP Patient Position: At rest)   Pulse 78   Temp 98.3 °F (36.8 °C)   Resp 16   Ht 5' 5\" (1.651 m)   Wt 164 lb 10.9 oz (74.7 kg)   SpO2 95%   BMI 27.40 kg/m²       PHYSICAL EXAM:   General:    Alert, cooperative, no distress, appears stated age. Does not look septic    Head:   Normocephalic, without obvious abnormality, atraumatic. Eyes:   Conjunctivae clear, anicteric sclerae. Pupils are equal  ENT  Nares normal. No drainage or sinus tenderness. Lips, mucosa, and tongue normal.  No Thrush  Neck:  Supple, symmetrical,  no adenopathy, thyroid: non tender    no carotid bruit and no JVD.   Back:    No CVA tenderness. Lungs:   Clear to auscultation bilaterally. Few crepts bases  Heart:   Regular rate and rhythm,  no murmur, rub or gallop. Abdomen:   Soft, non-tender,not distended. Bowel sounds normal. No masses  Extremities:   atraumatic, no cyanosis. No edema. No clubbing  Skin:     No rashes or lesions. Or bruising  Lymph: Cervical, supraclavicular normal.  Neurologic: Muscle weakness arms and legs    Pertinent Labs  WBC 8.3  Hemoglobin 11  Platelet 587  Eosinophil 2%  Creatinine 0.89    IMAGING RESULTS:    . Moderate-sized pericardial effusion  2. Subtle reticular nodular changes bilaterally likely infectious or  Inflammatory    Impression/Recommendation  24-year-old -American female with a history of polymyositis renal cell carcinoma status post nephrectomy presents with dry cough and chest pain because of coughing. Bilateral infiltrate of the lungs. Being treated as bacterial infection with vancomycin and azithromycin as well as cefepime. Patient has had similar findings since the past 2 months. The PET scan that was done in [facility] revealed increased activity in the lungs on October 2. She was admitted to [facility] between October 4 and October 11 and the CAT scan done that also showed bilateral infiltrates and she was treated with IV antibiotics and was discharged home on oral antibiotics for 5 days including Augmentin and doxycycline. There has not been much change to the CAT scan findings since the last 2 months. I highly doubt this is a bacterial infection. This very likely is related to her polymyositis and she may have an interstitial lung disease. A pulmonary function test was done at [facility] but I do not have the report. The differential diagnosis for bilateral interstitial infiltrate includes methotrexate-induced changes. Opportunistic infections like PCP or fungal infection  is remotely possible but she has not been on high-dose steroids.    Also the cytology of the BAL fluid from [facility] was negative . I was asked to see this patient because of the report of a fungal hyphae in the [place] culture. The BAL culture that was sent from [facility] did not grow any fungus because there was no viable tissue identified. So I highly doubt this is an active fungal infection it could have been just a colonization or contamination. Today I am sending beta D glucan, pro calcitonin and LDH. If the beta D glucan is high then we may consider possibility of a fungal infection/PCP and then she may need either a repeat BAL or a lung biopsy. Recommend discontinuing antibiotics. Pericardial effusion: On the CAT scan done at [facility]. there was a small effusion noted and now it has been noted again if this has increased in size this could very well be again part of the polymyositis especially with her stopping the methotrexate. She needs a follow-up with rheumatologist to address all her autoimmune symptoms. Polymyositis on prednisone 10 mg.  Recommend getting a CPK. Likely the LDH could be high because she has had high CPK in the past.    History of renal carcinoma status post right nephrectomy in 2016. History of respiratory failure leading to intubation in December 2015 due to polymyositis. History of small bowel obstruction relieved by nonsurgical intervention. Discussed with patient in great detail and answered all her questions.   Discussed with Dr. Goldy Luciano hospitalist.    The ID team at [facility] will follow her on Monday

## 2018-11-17 NOTE — PROGRESS NOTES
11/17/18 1503   Bedside Spirometry   FEV-1/Actual(liters) 1.53 Liters  (73% predicted)   FEV-1/ Predicted (liters) 2.1 Liters   FVC (liters) 1.84 Liters  (68% predicted)   Patient Postioning Sitting up in chair   Patient Effort Well;Reproducable

## 2018-11-17 NOTE — PROGRESS NOTES
Pharmacy Automatic Renal Dosing Protocol - Antimicrobials    Indication for Antimicrobials: HAP or CAP     Current Regimen of Each Antimicrobial:  Azithromycin 500 mg IV daily (Start Date 11/15; Day # 3)  Cefepime 2 gm IV every 8 hours (Start Date 11/15; Day # 3)  Vancomycin 1500 mg IV x 1 dose then 750 mg Q12H (Start Date 11/15; Day # 3)    Previous Antimicrobial Therapy:      Goal Level: VANCOMYCIN TROUGH GOAL RANGE    Vancomycin Trough: 15 - 20 mcg/mL    Date Dose & Interval Measured (mcg/mL) Extrapolated (mcg/mL)    750mg IV q12h 19.3 19.9                 Significant Cultures:   11/15 Blood = NG x2 days (pending)    Radiology / Imaging results: (X-ray, CT scan or MRI):   11/15 chest xray: Persistent patchy bilateral airspace disease    Paralysis, amputations, malnutrition:     Labs:  Recent Labs     18  0526 18  0316 11/15/18  1747   CREA 0.89 0.91 1.00   BUN 30* 31* 33*   WBC  --  8.3 11.3*     Temp (24hrs), Av.3 °F (36.8 °C), Min:97.4 °F (36.3 °C), Max:98.7 °F (37.1 °C)    Creatinine Clearance (mL/min) or Dialysis: 58 ml/min    Impression/Plan:   · Vancomycin Trough 19.9, although high, is within the desired range (15-20) to treat PNA  · Continue Vancomycin 750mg IV q12h  · Continue azithromcyin 500 mg IV daily  · Continue Cefepime  2 gm IV q8h    · BMP daily. · Antimicrobial stop date: to be determined     Pharmacy will follow daily and adjust medications as appropriate for renal function and/or serum levels. Thank you,  Nacho Garcia [facility]    Recommended duration of therapy  [email or web address]/Pharmacy/Clinical%[email or web address]. docx    Renal Dosing  [email or web address]/Pharmacy/Clinical%20Companion/Renal%[id number]. pdf

## 2018-11-17 NOTE — PROGRESS NOTES
ID  Full consult note to follow  Pt with polymyositis ( Jo1 positive) b/l bibi disease similar changes in CT for many months presents with cough and chest pain while coughing  No fever  CT chest b/l interstitial changes- doubt this is bacterial infection as she has been on a month of Doxy with no improvement  Could be related to polymyositis  Opportunistic infections like PCP/fungal less liekly but possible  The fungal hyphae seen in BAL done at [facility] on 10/10 did not gorw and was not viable indicating it was not an infection  Will send Beta D glucan, LDH, procal    Pericardial effusion in CT- awaiting 2 d echo  You may want to look into Repeat Bronch or lung biopsy if the above tests are normal  Recommend Dc antibiotics  Discussed with aptient in great detail 07-Jan-2017

## 2018-11-17 NOTE — PROGRESS NOTES
Progress Note      11/17/2018 1:06 PM  NAME: Lizette Fraga   MRN:  [id number]   Admit Diagnosis: Pneumonia      Problem List:     1. Pericardial effusion by CT  2. Pneumonia  3. Chest pain; pleuritic  4. Polymyositis  5. Hypertension  6. Neuropathy  7. DNR/DNI  8. Usual Cardiologist:  Dr. Eloy Latham     Assessment/Plan:   Walking halls, feels better. Still with CP with coughing. 1. Echo remains pending; will review and make additional recommendations once completed/ablt to read         []       High complexity decision making was performed in this patient at high risk for decompensation with multiple organ involvement. Subjective:     Lizette Fraga still has pleuritic CP. Discussed with RN events overnight. Review of Systems:    Symptom Y/N Comments  Symptom Y/N Comments   Fever/Chills N   Chest Pain N    Poor Appetite N   Edema N    Cough N   Abdominal Pain N    Sputum N   Joint Pain N    SOB/LOREDO N   Pruritis/Rash N    Nausea/vomit N   Tolerating PT/OT Y    Diarrhea N   Tolerating Diet Y    Constipation N   Other       Could NOT obtain due to:      Objective:      Physical Exam:    Last 24hrs VS reviewed since prior progress note. Most recent are:    Visit Vitals  /50 (BP 1 Location: Right arm, BP Patient Position: At rest)   Pulse 75   Temp 98.5 °F (36.9 °C)   Resp 18   Ht 5' 5\" (1.651 m)   Wt 74.7 kg (164 lb 10.9 oz)   SpO2 98%   BMI 27.40 kg/m²       Intake/Output Summary (Last 24 hours) at 11/17/2018 1306  Last data filed at 11/17/2018 0526  Gross per 24 hour   Intake 950 ml   Output --   Net 950 ml        General Appearance: Well developed, well nourished, alert & oriented x 3,    no acute distress. Ears/Nose/Mouth/Throat: Hearing grossly normal.  Neck: Supple. Chest: Lungs clear to auscultation bilaterally. Cardiovascular: Regular rate and rhythm, S1S2 normal, no murmur. Abdomen: Soft, non-tender, bowel sounds are active. Extremities: No edema bilaterally.   Skin: Warm and dry.    []         Post-cath site without hematoma, bruit, tenderness, or thrill. Distal pulses intact. PMH/SH reviewed - no change compared to H&P    Data Review    Telemetry: sinus rhythm     EKG:   [x]  No new EKG for review    Lab Data Personally Reviewed:    Recent Labs     11/16/18  0316 11/15/18  1747   WBC 8.3 11.3*   HGB 11.0* 13.0   HCT 34.2* 41.5    278     No results for input(s): INR, PTP, APTT in the last 72 hours. No lab exists for component: INREXT   Recent Labs     11/17/18  0526 11/16/18  0316 11/15/18  1747    141 139   K 4.1 4.0 4.4   * 111* 105   CO2 21 23 26   BUN 30* 31* 33*   CREA 0.89 0.91 1.00   GLU 74 69 87   CA 8.4* 8.5 9.3     Recent Labs     11/15/18  1747   TROIQ <0.05     Lab Results   Component Value Date/Time    Cholesterol, total 221 (H) 08/28/2018 08:45 AM    HDL Cholesterol 60 08/28/2018 08:45 AM    LDL, calculated 135 (H) 08/28/2018 08:45 AM    Triglyceride 129 08/28/2018 08:45 AM       Recent Labs     11/16/18 0316   SGOT 65*   AP 61   TP 6.4   ALB 2.7*   GLOB 3.7     No results for input(s): PH, PCO2, PO2 in the last 72 hours.     Medications Personally Reviewed:    Current Facility-Administered Medications   Medication Dose Route Frequency    influenza vaccine 2018-19 (6 mos+)(PF) (FLUARIX QUAD/FLULAVAL QUAD) injection 0.5 mL  0.5 mL IntraMUSCular PRIOR TO DISCHARGE    cefepime (MAXIPIME) 2 g in 0.9% sodium chloride (MBP/ADV) 100 mL  2 g IntraVENous Q8H    albuterol-ipratropium (DUO-NEB) 2.5 MG-0.5 MG/3 ML  3 mL Nebulization Q6H PRN    lidocaine 4 % patch 1 Patch  1 Patch TransDERmal Q24H    azithromycin (ZITHROMAX) 500 mg in 0.9% sodium chloride 250 mL ([id number])  500 mg IntraVENous Q24H    guaiFENesin ER (MUCINEX) tablet 600 mg  600 mg Oral Q12H    benzonatate (TESSALON) capsule 200 mg  200 mg Oral TID PRN    amLODIPine (NORVASC) tablet 5 mg  5 mg Oral DAILY    predniSONE (DELTASONE) tablet 10 mg  10 mg Oral DAILY WITH BREAKFAST    aspirin chewable tablet 81 mg  81 mg Oral DAILY    fluticasone (FLONASE) 50 mcg/actuation nasal spray 2 Spray  2 Spray Both Nostrils DAILY    gabapentin (NEURONTIN) capsule 100 mg  100 mg Oral BID    lactobac ac& pc-s.therm-b.anim (OSVALDO Q/RISAQUAD)  1 Cap Oral DAILY    losartan (COZAAR) tablet 100 mg  100 mg Oral DAILY    nitroglycerin (NITROBID) 2 % ointment 1 Inch  1 Inch Topical Q6H PRN    famotidine (PEPCID) tablet 20 mg  20 mg Oral DAILY PRN    sodium chloride (NS) flush 5-10 mL  5-10 mL IntraVENous Q8H    sodium chloride (NS) flush 5-10 mL  5-10 mL IntraVENous PRN    acetaminophen (TYLENOL) tablet 650 mg  650 mg Oral Q6H PRN    ondansetron (ZOFRAN) injection 4 mg  4 mg IntraVENous Q4H PRN    enoxaparin (LOVENOX) injection 40 mg  40 mg SubCUTAneous Q24H    vancomycin (VANCOCIN) 750 mg in 0.9% sodium chloride 250 mL ([id number])  750 mg IntraVENous Q12H         Royal Sour III, DO

## 2018-11-17 NOTE — PROGRESS NOTES
Bedside and Verbal shift change report given to Omega's (oncoming nurse) by DRE Gan RN (offgoing nurse). Report given with SBAR, Kardex, Intake/Output, MAR and Recent Results.

## 2018-11-17 NOTE — PROGRESS NOTES
Consult to intensivist called. Dr. Michael Loving on call. Dr. Michael Loving returned call will see patient. Dr. Emma Taet for infectious disease called will see patient.

## 2018-11-17 NOTE — PROGRESS NOTES
Hospitalist Progress Note    NAME: Uriel Lomas   :  1955   MRN:  [id number]       Assessment / Plan:  PNA  Either Failed Treatment of abx, vs Remnant symptoms from PNA a months ago (claims to admitted for 8 days in hospital), vs HCAP   Bronchial lavage with hyphae  ID consult  CXR with Persistent patchy bilateral airspace disease, Slight decrease in small left pleural effusion. Continued follow-up recommended  Pt reported to be discharged on Doxycycline 1 month ago after admitted for 8 days in hospital for PNA  Recent 2D echo (pt has results in her cellphone) with normal EF but grade 1 diastolic dysfunction   CTA Chest- 1. Moderate-sized pericardial effusion. Subtle reticular nodular changes bilaterally likely infectious or inflammatory  No acute pulmonary embolus  Check Sputum, blood cultures-NGTD  IV vancomycin, cefepime and Zithromax  mucolytics and antitussives  Pt reported developed polymyositis with Levaquin  ProBNP 103  Pulmonary consult      Pleuritic Chest Pain  Likely due to PNA, no PE on CTA ? Related to Pericardial effusion  Check Echo-pending  Cardiology consult is appreciated     Polymyositis  Continue steroids  Claims not taking methotrexate anymore     Hypertension   Continue Norvasc and ARBs     Neuropathy  Continue Neurontin     Code Status: DNR/DNI as per her own wishes  Surrogate Decision Maker: Cousin Roxanna Catarino     DVT Prophylaxis: Lovenox     Baseline: functional     Subjective:     Chief Complaint / Reason for Physician Visit  \"feeling some better\". Discussed with RN events overnight.      Review of Systems:  Symptom Y/N Comments  Symptom Y/N Comments   Fever/Chills n   Chest Pain y    Poor Appetite    Edema     Cough y   Abdominal Pain     Sputum n   Joint Pain     SOB/LOREDO n   Pruritis/Rash     Nausea/vomit n   Tolerating PT/OT     Diarrhea    Tolerating Diet y    Constipation    Other       Could NOT obtain due to:      Objective:     VITALS:   Last 24hrs VS reviewed since prior progress note. Most recent are:  Patient Vitals for the past 24 hrs:   Temp Pulse Resp BP SpO2   11/17/18 1429 -- -- -- -- 95 %   11/17/18 1420 98.3 °F (36.8 °C) 78 16 134/55 99 %   11/17/18 1039 98.5 °F (36.9 °C) 75 18 142/50 98 %   11/17/18 0810 98.7 °F (37.1 °C) 65 14 129/64 97 %   11/17/18 0411 97.4 °F (36.3 °C) 66 17 118/64 97 %   11/16/18 2312 98.4 °F (36.9 °C) 72 16 142/77 98 %   11/16/18 1938 98.7 °F (37.1 °C) 80 17 134/66 97 %   11/16/18 1519 98.2 °F (36.8 °C) 80 16 143/53 98 %       Intake/Output Summary (Last 24 hours) at 11/17/2018 1444  Last data filed at 11/17/2018 0526  Gross per 24 hour   Intake 950 ml   Output --   Net 950 ml        PHYSICAL EXAM:  General: WD, WN. Alert, cooperative, no acute distress    EENT:  EOMI. Anicteric sclerae. MMM  Resp:  + crackles, no wheezing or rales. No accessory muscle use  CV:  Regular  rhythm,  No edema  GI:  Soft, Non distended, Non tender.  +Bowel sounds  Neurologic:  Alert and oriented X 3, normal speech, grossly intact  Psych:   Good insight. Not anxious nor agitated  Skin:  No rashes. No jaundice    Reviewed most current lab test results and cultures  YES  Reviewed most current radiology test results   YES  Review and summation of old records today    NO  Reviewed patient's current orders and MAR    YES  PMH/SH reviewed - no change compared to H&P  ________________________________________________________________________  Care Plan discussed with:    Comments   Patient y    Family      RN y    Care Manager     Consultant                        Multidiciplinary team rounds were held today with , nursing, pharmacist and clinical coordinator. Patient's plan of care was discussed; medications were reviewed and discharge planning was addressed.      ________________________________________________________________________  Total NON critical care TIME:  25   Minutes    Total CRITICAL CARE TIME Spent:   Minutes non procedure based      Comments >50% of visit spent in counseling and coordination of care     ________________________________________________________________________  Della Galeas MD     Procedures: see electronic medical records for all procedures/Xrays and details which were not copied into this note but were reviewed prior to creation of Plan. LABS:  I reviewed today's most current labs and imaging studies.   Pertinent labs include:  Recent Labs     11/16/18  0316 11/15/18  1747   WBC 8.3 11.3*   HGB 11.0* 13.0   HCT 34.2* 41.5    278     Recent Labs     11/17/18  0526 11/16/18  0316 11/15/18  1747    141 139   K 4.1 4.0 4.4   * 111* 105   CO2 21 23 26   GLU 74 69 87   BUN 30* 31* 33*   CREA 0.89 0.91 1.00   CA 8.4* 8.5 9.3   ALB  --  2.7*  --    TBILI  --  0.5  --    SGOT  --  65*  --    ALT  --  58  --        Signed: Della Galeas MD

## 2018-11-17 NOTE — CONSULTS
[facility]  Pulmonary, Critical Care, and Sleep Medicine    Initial Patient Consult    Name: Balbir Gregorio MRN: [id number]   : 1955 Hospital: [facility]   Date: 2018        IMPRESSION:   · Cough- with patchy bibasilar and b/l mid lung zone GGO. 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 However, pts with polymyositis also at risk for pulmonary infections ( hypogammaglobulinemia etc). Given her mild leukocytosis on admisison- atypical or viral pneumonitis possible. Pt on low grade immunosuppression for her autoimmune disease and BAL in 10/18 at [address] [place] [address] [facility]comom without AFB or GS/CX organisms. · Polymyositis- pred 10 mg/day- [facility] note states \"anti-synthetase syndrome\"  · Pleuritic CP  · Recent PNA - [facility] 10/18 no organisms ID'd - CT there described dense b/l lobar consolidations  · HTN  · DNR/DNI      RECOMMENDATIONS:   · At this point she is not behaving like a bacterial PNA. If anything her CT chest now is better than the report from [address] [place] [address] [employer] 10/18. This cough may be a lingering asthmatic bronchitis with pleuritic/Mskel CP. Would not do bronch at this time  · Suggest bedside spirogram and trial of ICS/LABA  · Check immunoglobulin levels  · Will need follow up HRCT at some point in future to look for baseline ILD- at this point I can't call this Polymyositis related ILD given recent clinical pulmonary infection. CT changes are mild  · Can go home on 5 day course z pack  · Would have her call [phone] to make appt and follow up in [facility]     Subjective: This patient has been seen and evaluated at the request of Dr. Shweta Moreira for SOB. Patient is a 61 y.o. female   With recently dx'd polymyositis who has been followed at the [facility] Pt admitted with cough and 8/10 CP. Pt on RA . Started on rocephin and azithro for probable CAP. Mild leukocytosis - improved. Denies n/v/d. No ABD pain. No diarrhea.      Past Medical History:   Diagnosis Date    Congest[facility] failure ([facility]     Hypertension     Pneumonia 10/2018    Polymyositis ([facility] 12/2015    in setting of [address] 2015    Renal cancer ([facility] 07/08/2016    Respiratory arrest ([facility] 11/2015    [facility].     SBO (small bowel obstruction) ([facility] 8/3/2017      Past Surgical History:   Procedure Laterality Date    HX GYN  1999    hysterectomy    HX NEPHRECTOMY Right 2016    for kidney cancer    US GUIDED CORE BREAST BIOPSY Left 1999    Negative      Prior to Admission medications    Medication Sig Start Date End Date Taking? Authorizing Provider   gabapentin (NEURONTIN) 100 mg capsule TAKE 1 CAPSULE BY MOUTH TWICE A DAY 11/16/18   Fabian Blackwell MD   L. acidoph & paracasei- S therm- Bifido (RISAQUAD) 8 billion cell cap cap Take 1 Cap by mouth. 10/12/18   Provider, Historical   amLODIPine (NORVASC) 5 mg tablet Take 1 Tab by mouth daily. 10/16/18   Fabian Blackwell MD   cholecalciferol (VITAMIN D3) 50,000 unit capsule Take 1 Cap by mouth every seven (7) days for 8 doses. 10/10/18 11/29/18  Fabian Blackwell MD   raNITIdine (ZANTAC) 150 mg tablet Take  by mouth. 1 tablet prn heartburn. 6/27/18   Provider, Historical   losartan (COZAAR) 100 mg tablet TAKE 1 TABLET BY MOUTH EVERY DAY 8/14/18   Fabian Blackwell MD   fluticasone [facility]) 50 mcg/actuation nasal spray 2 Sprays by Both Nostrils route daily. Patient taking differently: 2 Sprays by Both Nostrils route daily as needed. 5/7/18   Fabian Blackwell MD   aspirin 81 mg chewable tablet Take 1 Tab by mouth daily. 8/10/17   Fabian Blackwell MD   vitamin E topical cream Apply  to affected area daily.  Patient applies to face    Other, MD Melanie   predniSONE (DELTASONE) 5 mg tablet TAKE 2 TABLETS BY MOUTH EVERY DAY 12/28/16   Provider, Historical     Allergies   Allergen Reactions    Ace Inhibitors Cough    Dilaudid [Hydromorphone] Other (comments)    Levaquin [Levofloxacin] Other (comments)    Lisinopril Other (comments)    Metoprolol Other (comments)     hallucinations    Peanut Other (comments)      Social History     Tobacco Use    Smoking status: Former Smoker     Packs/day: 1.00     Years: 20.00     Pack years: 20.00     Types: Cigarettes     Last attempt to quit: 1998     Years since quittin.5    Smokeless tobacco: Never Used   Substance Use Topics    Alcohol use: No     Alcohol/week: 0.6 oz     Types: 1 Glasses of wine per week      Family History   Problem Relation Age of Onset    Cancer Mother     Breast Cancer Mother 68    Diabetes Father     Hypertension Father     Dementia Father 80    Stroke Maternal Grandmother     Breast Cancer Cousin         52's        Current Facility-Administered Medications   Medication Dose Route Frequency    influenza vaccine  (6 mos+)(PF) (FLUARIX QUAD/FLULAVAL QUAD) injection 0.5 mL  0.5 mL IntraMUSCular PRIOR TO DISCHARGE    cefepime (MAXIPIME) 2 g in 0.9% sodium chloride (MBP/ADV) 100 mL  2 g IntraVENous Q8H    lidocaine 4 % patch 1 Patch  1 Patch TransDERmal Q24H    azithromycin (ZITHROMAX) 500 mg in 0.9% sodium chloride 250 mL ([id number])  500 mg IntraVENous Q24H    guaiFENesin ER (MUCINEX) tablet 600 mg  600 mg Oral Q12H    amLODIPine (NORVASC) tablet 5 mg  5 mg Oral DAILY    predniSONE (DELTASONE) tablet 10 mg  10 mg Oral DAILY WITH BREAKFAST    aspirin chewable tablet 81 mg  81 mg Oral DAILY    fluticasone (FLONASE) 50 mcg/actuation nasal spray 2 Spray  2 Spray Both Nostrils DAILY    gabapentin (NEURONTIN) capsule 100 mg  100 mg Oral BID    lactobac ac& pc-s.therm-b.anim (OSVALDO Q/RISAQUAD)  1 Cap Oral DAILY    losartan (COZAAR) tablet 100 mg  100 mg Oral DAILY    sodium chloride (NS) flush 5-10 mL  5-10 mL IntraVENous Q8H    enoxaparin (LOVENOX) injection 40 mg  40 mg SubCUTAneous Q24H    vancomycin (VANCOCIN) 750 mg in 0.9% sodium chloride 250 mL ([id number])  750 mg IntraVENous Q12H       Review of Systems:  A comprehensive review of systems was negative except for that written in the HPI. Objective:   Vital Signs:    Visit Vitals  /50 (BP 1 Location: Right arm, BP Patient Position: At rest)   Pulse 75   Temp 98.5 °F (36.9 °C)   Resp 18   Ht 5' 5\" (1.651 m)   Wt 74.7 kg (164 lb 10.9 oz)   SpO2 98%   BMI 27.40 kg/m²       O2 Device: Room air       Temp (24hrs), Av.3 °F (36.8 °C), Min:97.4 °F (36.3 °C), Max:98.7 °F (37.1 °C)       Intake/Output:   Last shift:      No intake/output data recorded. Last 3 shifts: 11/15 1901 -  0700  In: 1200 [I.V.:1200]  Out: -     Intake/Output Summary (Last 24 hours) at 2018 1305  Last data filed at 2018 0526  Gross per 24 hour   Intake 950 ml   Output --   Net 950 ml      Physical Exam:   General:  Alert, cooperative, no distress, appears stated age. Head:  Normocephalic, without obvious abnormality, atraumatic. Eyes:  Conjunctivae/corneas clear. Nose: Nares normal. Septum midline. Neck: Supple, symmetrical, trachea midline       Lungs:   Coarse rales at bases       Heart:  Regular rate and rhythm, S1, S2 normal, no murmur, click, rub or gallop. Abdomen:   Soft, non-tender. Bowel sounds normal. No masses,  No organomegaly. Extremities: Extremities normal, atraumatic, no cyanosis or edema. Pulses: 2+ and symmetric all extremities. Skin: Skin color, texture, turgor normal. No rashes or lesions             Data review:     Recent Results (from the past 24 hour(s))   METABOLIC PANEL, BASIC    Collection Time: 18  5:26 AM   Result Value Ref Range    Sodium 142 136 - 145 mmol/L    Potassium 4.1 3.5 - 5.1 mmol/L    Chloride 112 (H) 97 - 108 mmol/L    CO2 21 21 - 32 mmol/L    Anion gap 9 5 - 15 mmol/L    Glucose 74 65 - 100 mg/dL    BUN 30 (H) 6 - 20 MG/DL    Creatinine 0.89 0.55 - 1.02 MG/DL    BUN/Creatinine ratio 34 (H) 12 - 20      GFR est AA >60 >60 ml/min/1.73m2    GFR est non-AA >60 >60 ml/min/1.73m2    Calcium 8.4 (L) 8.5 - 10.1 MG/DL       Imaging:   I have personally reviewed the patients radiographs and have reviewed the reports:  CTA chest no PE and patchy b/l mid and lower lung zones areas GGO, reticulation and patchy ASD greatest at bases no honeycombing.          Facundo Stoll MD

## 2018-11-18 LAB
ANION GAP SERPL CALC-SCNC: 6 MMOL/L (ref 5–15)
BASOPHILS # BLD: 0.1 K/UL (ref 0–0.1)
BASOPHILS NFR BLD: 1 % (ref 0–1)
BUN SERPL-MCNC: 34 MG/DL (ref 6–20)
BUN/CREAT SERPL: 35 (ref 12–20)
CALCIUM SERPL-MCNC: 8.9 MG/DL (ref 8.5–10.1)
CHLORIDE SERPL-SCNC: 111 MMOL/L (ref 97–108)
CK SERPL-CCNC: 1465 U/L (ref 26–192)
CO2 SERPL-SCNC: 24 MMOL/L (ref 21–32)
CREAT SERPL-MCNC: 0.98 MG/DL (ref 0.55–1.02)
CRP SERPL-MCNC: 0.95 MG/DL (ref 0–0.6)
DIFFERENTIAL METHOD BLD: ABNORMAL
EOSINOPHIL # BLD: 0.2 K/UL (ref 0–0.4)
EOSINOPHIL NFR BLD: 2 % (ref 0–7)
ERYTHROCYTE [DISTWIDTH] IN BLOOD BY AUTOMATED COUNT: 16.9 % (ref 11.5–14.5)
ERYTHROCYTE [SEDIMENTATION RATE] IN BLOOD: 26 MM/HR (ref 0–30)
GLUCOSE SERPL-MCNC: 94 MG/DL (ref 65–100)
HCT VFR BLD AUTO: 34 % (ref 35–47)
HGB BLD-MCNC: 10.8 G/DL (ref 11.5–16)
IMM GRANULOCYTES # BLD: 0.1 K/UL (ref 0–0.04)
IMM GRANULOCYTES NFR BLD AUTO: 1 % (ref 0–0.5)
LYMPHOCYTES # BLD: 1.6 K/UL (ref 0.8–3.5)
LYMPHOCYTES NFR BLD: 18 % (ref 12–49)
MAGNESIUM SERPL-MCNC: 2 MG/DL (ref 1.6–2.4)
MCH RBC QN AUTO: 23.2 PG (ref 26–34)
MCHC RBC AUTO-ENTMCNC: 31.8 G/DL (ref 30–36.5)
MCV RBC AUTO: 73.1 FL (ref 80–99)
MONOCYTES # BLD: 1 K/UL (ref 0–1)
MONOCYTES NFR BLD: 11 % (ref 5–13)
NEUTS SEG # BLD: 6.1 K/UL (ref 1.8–8)
NEUTS SEG NFR BLD: 67 % (ref 32–75)
NRBC # BLD: 0 K/UL (ref 0–0.01)
NRBC BLD-RTO: 0 PER 100 WBC
PHOSPHATE SERPL-MCNC: 3.7 MG/DL (ref 2.6–4.7)
PLATELET # BLD AUTO: 188 K/UL (ref 150–400)
PMV BLD AUTO: 12.3 FL (ref 8.9–12.9)
POTASSIUM SERPL-SCNC: 4.3 MMOL/L (ref 3.5–5.1)
RBC # BLD AUTO: 4.65 M/UL (ref 3.8–5.2)
SODIUM SERPL-SCNC: 141 MMOL/L (ref 136–145)
WBC # BLD AUTO: 9.1 K/UL (ref 3.6–11)

## 2018-11-18 PROCEDURE — 87449 NOS EACH ORGANISM AG IA: CPT

## 2018-11-18 PROCEDURE — 86140 C-REACTIVE PROTEIN: CPT

## 2018-11-18 PROCEDURE — 83735 ASSAY OF MAGNESIUM: CPT

## 2018-11-18 PROCEDURE — 85025 COMPLETE CBC W/AUTO DIFF WBC: CPT

## 2018-11-18 PROCEDURE — 74011000250 HC RX REV CODE- 250: Performed by: EMERGENCY MEDICINE

## 2018-11-18 PROCEDURE — 36415 COLL VENOUS BLD VENIPUNCTURE: CPT

## 2018-11-18 PROCEDURE — 94640 AIRWAY INHALATION TREATMENT: CPT

## 2018-11-18 PROCEDURE — 94760 N-INVAS EAR/PLS OXIMETRY 1: CPT

## 2018-11-18 PROCEDURE — 74011000250 HC RX REV CODE- 250: Performed by: INTERNAL MEDICINE

## 2018-11-18 PROCEDURE — 93306 TTE W/DOPPLER COMPLETE: CPT

## 2018-11-18 PROCEDURE — 85652 RBC SED RATE AUTOMATED: CPT

## 2018-11-18 PROCEDURE — 84100 ASSAY OF PHOSPHORUS: CPT

## 2018-11-18 PROCEDURE — 74011250637 HC RX REV CODE- 250/637: Performed by: INTERNAL MEDICINE

## 2018-11-18 PROCEDURE — 74011636637 HC RX REV CODE- 636/637: Performed by: INTERNAL MEDICINE

## 2018-11-18 PROCEDURE — 82550 ASSAY OF CK (CPK): CPT

## 2018-11-18 PROCEDURE — 84145 PROCALCITONIN (PCT): CPT

## 2018-11-18 PROCEDURE — 77030029684 HC NEB SM VOL KT MONA -A

## 2018-11-18 PROCEDURE — 65660000000 HC RM CCU STEPDOWN

## 2018-11-18 PROCEDURE — 80048 BASIC METABOLIC PNL TOTAL CA: CPT

## 2018-11-18 RX ADMIN — LOSARTAN POTASSIUM 100 MG: 50 TABLET ORAL at 09:46

## 2018-11-18 RX ADMIN — Medication 10 ML: at 22:54

## 2018-11-18 RX ADMIN — IPRATROPIUM BROMIDE AND ALBUTEROL SULFATE 3 ML: .5; 3 SOLUTION RESPIRATORY (INHALATION) at 22:23

## 2018-11-18 RX ADMIN — BUDESONIDE 500 MCG: 0.5 INHALANT RESPIRATORY (INHALATION) at 22:23

## 2018-11-18 RX ADMIN — GUAIFENESIN 600 MG: 600 TABLET, EXTENDED RELEASE ORAL at 22:52

## 2018-11-18 RX ADMIN — Medication 10 ML: at 17:24

## 2018-11-18 RX ADMIN — GABAPENTIN 100 MG: 100 CAPSULE ORAL at 17:23

## 2018-11-18 RX ADMIN — Medication 1 CAPSULE: at 09:46

## 2018-11-18 RX ADMIN — ASPIRIN 81 MG 81 MG: 81 TABLET ORAL at 09:46

## 2018-11-18 RX ADMIN — AMLODIPINE BESYLATE 5 MG: 5 TABLET ORAL at 09:46

## 2018-11-18 RX ADMIN — PREDNISONE 10 MG: 10 TABLET ORAL at 09:47

## 2018-11-18 RX ADMIN — GABAPENTIN 100 MG: 100 CAPSULE ORAL at 09:46

## 2018-11-18 RX ADMIN — GUAIFENESIN 600 MG: 600 TABLET, EXTENDED RELEASE ORAL at 09:46

## 2018-11-18 RX ADMIN — Medication 10 ML: at 09:47

## 2018-11-18 RX ADMIN — BUDESONIDE 500 MCG: 0.5 INHALANT RESPIRATORY (INHALATION) at 07:48

## 2018-11-18 RX ADMIN — ARFORMOTEROL TARTRATE 15 MCG: 15 SOLUTION RESPIRATORY (INHALATION) at 22:43

## 2018-11-18 NOTE — PROGRESS NOTES
Hospitalist Progress Note    NAME: Balbir Gregorio   :  1955   MRN:  [id number]       Assessment / Plan:  Likely resolving PNA   Remnant symptoms from PNA a months ago (claims to admitted for 8 days in hospital), vs HCAP   Bronchial lavage with hyphae, but no growth. D/W ID, unlikely infectious process, no need to cont abx therapy, can follow-up OP with ID, might need lung biopsy if persistent sx or changes on imaging  CXR with Persistent patchy bilateral airspace disease, Slight decrease in small left pleural effusion. Continued follow-up recommended  Pt reported to be discharged on Doxycycline 1 month ago after admitted for 8 days in hospital for PNA  Recent 2D echo (pt has results in her cellphone) with normal EF but grade 1 diastolic dysfunction   CTA Chest- 1. Moderate-sized pericardial effusion. Subtle reticular nodular changes bilaterally likely infectious or inflammatory  No acute pulmonary embolus  Check Sputum, blood cultures-NGTD  IV vancomycin, cefepime and Zithromax  mucolytics and antitussives  Pt reported developed polymyositis with Levaquin  ProBNP 103  Pulmonary consult is appreciated     Pleuritic Chest Pain  Likely due to pnemonitis no PE on CTA ? Related to Pericardial effusion  Check Echo-report is pending  Cardiology consult is appreciated     Polymyositis  Continue steroids  Claims not taking methotrexate anymore     Hypertension   Continue Norvasc and ARBs     Neuropathy  Continue Neurontin     Code Status: DNR/DNI as per her own wishes  Surrogate Decision Maker: Cousin Dinesh Morrow     DVT Prophylaxis: Lovenox     Baseline: functional     Subjective:     Chief Complaint / Reason for Physician Visit  \"feeling better\". Discussed with RN events overnight.      Review of Systems:  Symptom Y/N Comments  Symptom Y/N Comments   Fever/Chills n   Chest Pain y    Poor Appetite    Edema     Cough y   Abdominal Pain     Sputum n   Joint Pain     SOB/LOREDO n   Pruritis/Rash     Nausea/vomit n Tolerating PT/OT     Diarrhea    Tolerating Diet y    Constipation    Other       Could NOT obtain due to:      Objective:     VITALS:   Last 24hrs VS reviewed since prior progress note. Most recent are:  Patient Vitals for the past 24 hrs:   Temp Pulse Resp BP SpO2   11/18/18 1041 98.2 °F (36.8 °C) 84 16 160/69 100 %   11/18/18 0759 97.9 °F (36.6 °C) 63 16 139/56 97 %   11/18/18 0748 -- -- -- -- 98 %   11/18/18 0334 98.9 °F (37.2 °C) 73 16 130/60 98 %   11/17/18 2320 98.6 °F (37 °C) 85 16 128/66 97 %   11/17/18 2249 -- -- -- -- 98 %   11/17/18 1904 99 °F (37.2 °C) 88 14 132/60 98 %   11/17/18 1429 -- -- -- -- 95 %   11/17/18 1420 98.3 °F (36.8 °C) 78 16 134/55 99 %       Intake/Output Summary (Last 24 hours) at 11/18/2018 1354  Last data filed at 11/18/2018 1023  Gross per 24 hour   Intake 480 ml   Output --   Net 480 ml        PHYSICAL EXAM:  General: WD, WN. Alert, cooperative, no acute distress    EENT:  EOMI. Anicteric sclerae. MMM  Resp:  , no wheezing or rales. No accessory muscle use  CV:  Regular  rhythm,  No edema  GI:  Soft, Non distended, Non tender.  +Bowel sounds  Neurologic:  Alert and oriented X 3, normal speech, grossly intact  Psych:   Good insight. Not anxious nor agitated  Skin:  No rashes. No jaundice    Reviewed most current lab test results and cultures  YES  Reviewed most current radiology test results   YES  Review and summation of old records today    NO  Reviewed patient's current orders and MAR    YES  PMH/SH reviewed - no change compared to H&P  ________________________________________________________________________  Care Plan discussed with:    Comments   Patient y    Family      RN y    Care Manager     Consultant  x ID                     Multidiciplinary team rounds were held today with , nursing, pharmacist and clinical coordinator. Patient's plan of care was discussed; medications were reviewed and discharge planning was addressed. ________________________________________________________________________  Total NON critical care TIME:  25   Minutes    Total CRITICAL CARE TIME Spent:   Minutes non procedure based      Comments   >50% of visit spent in counseling and coordination of care     ________________________________________________________________________  Gissel Peterson MD     Procedures: see electronic medical records for all procedures/Xrays and details which were not copied into this note but were reviewed prior to creation of Plan. LABS:  I reviewed today's most current labs and imaging studies.   Pertinent labs include:  Recent Labs     11/18/18  0349 11/16/18 0316 11/15/18  1747   WBC 9.1 8.3 11.3*   HGB 10.8* 11.0* 13.0   HCT 34.0* 34.2* 41.5    227 278     Recent Labs     11/18/18  0349 11/17/18  0526 11/16/18  0316    142 141   K 4.3 4.1 4.0   * 112* 111*   CO2 24 21 23   GLU 94 74 69   BUN 34* 30* 31*   CREA 0.98 0.89 0.91   CA 8.9 8.4* 8.5   MG 2.0  --   --    PHOS 3.7  --   --    ALB  --   --  2.7*   TBILI  --   --  0.5   SGOT  --   --  65*   ALT  --   --  58       Signed: Gissel Peterson MD

## 2018-11-18 NOTE — PROGRESS NOTES
Progress Note      11/18/2018 1:06 PM  NAME: Annmarie Oneill   MRN:  [id number]   Admit Diagnosis: Pneumonia      Problem List:     1. Pericardial effusion by CT  2. Pneumonia  3. Chest pain; pleuritic  4. Polymyositis  5. Hypertension  6. Neuropathy  7. DNR/DNI  8. Usual Cardiologist:  Dr. Nalini Plaza     Assessment/Plan:   Walking halls, feels better. Still with CP with coughing. 1. Echo remains pending (now DAY #2; ordered 11/16 @ 1142AM); will review and make additional recommendations once completed/ablt to read         []       High complexity decision making was performed in this patient at high risk for decompensation with multiple organ involvement. Subjective:     Annmarie Oneill still has pleuritic CP. Discussed with RN events overnight. Review of Systems:    Symptom Y/N Comments  Symptom Y/N Comments   Fever/Chills N   Chest Pain N    Poor Appetite N   Edema N    Cough N   Abdominal Pain N    Sputum N   Joint Pain N    SOB/LOREDO N   Pruritis/Rash N    Nausea/vomit N   Tolerating PT/OT Y    Diarrhea N   Tolerating Diet Y    Constipation N   Other       Could NOT obtain due to:      Objective:      Physical Exam:    Last 24hrs VS reviewed since prior progress note. Most recent are:    Visit Vitals  /69 (BP 1 Location: Right arm, BP Patient Position: Sitting)   Pulse 84   Temp 98.2 °F (36.8 °C)   Resp 16   Ht 5' 5\" (1.651 m)   Wt 73.9 kg (162 lb 14.7 oz)   SpO2 100%   BMI 27.11 kg/m²       Intake/Output Summary (Last 24 hours) at 11/18/2018 1050  Last data filed at 11/18/2018 1023  Gross per 24 hour   Intake 480 ml   Output --   Net 480 ml        General Appearance: Well developed, well nourished, alert & oriented x 3,    no acute distress. Ears/Nose/Mouth/Throat: Hearing grossly normal.  Neck: Supple. Chest: Lungs clear to auscultation bilaterally. Cardiovascular: Regular rate and rhythm, S1S2 normal, no murmur. Abdomen: Soft, non-tender, bowel sounds are active.   Extremities: No edema bilaterally. Skin: Warm and dry. []         Post-cath site without hematoma, bruit, tenderness, or thrill. Distal pulses intact. PMH/SH reviewed - no change compared to H&P    Data Review    Telemetry: sinus rhythm     EKG:   [x]  No new EKG for review    Lab Data Personally Reviewed:    Recent Labs     11/18/18 0349 11/16/18  0316   WBC 9.1 8.3   HGB 10.8* 11.0*   HCT 34.0* 34.2*    227     No results for input(s): INR, PTP, APTT in the last 72 hours. No lab exists for component: Mikal Leyva   Recent Labs     11/18/18  0349 11/17/18  0526 11/16/18  0316    142 141   K 4.3 4.1 4.0   * 112* 111*   CO2 24 21 23   BUN 34* 30* 31*   CREA 0.98 0.89 0.91   GLU 94 74 69   CA 8.9 8.4* 8.5   MG 2.0  --   --      Recent Labs     11/15/18  1747   TROIQ <0.05     Lab Results   Component Value Date/Time    Cholesterol, total 221 (H) 08/28/2018 08:45 AM    HDL Cholesterol 60 08/28/2018 08:45 AM    LDL, calculated 135 (H) 08/28/2018 08:45 AM    Triglyceride 129 08/28/2018 08:45 AM       Recent Labs     11/16/18  0316   SGOT 65*   AP 61   TP 6.4   ALB 2.7*   GLOB 3.7     No results for input(s): PH, PCO2, PO2 in the last 72 hours.     Medications Personally Reviewed:    Current Facility-Administered Medications   Medication Dose Route Frequency    arformoterol (BROVANA) neb solution 15 mcg  15 mcg Nebulization BID RT    budesonide (PULMICORT) 500 mcg/2 ml nebulizer suspension  500 mcg Nebulization BID RT    influenza vaccine 2018-19 (6 mos+)(PF) (FLUARIX QUAD/FLULAVAL QUAD) injection 0.5 mL  0.5 mL IntraMUSCular PRIOR TO DISCHARGE    cefepime (MAXIPIME) 2 g in 0.9% sodium chloride (MBP/ADV) 100 mL  2 g IntraVENous Q8H    albuterol-ipratropium (DUO-NEB) 2.5 MG-0.5 MG/3 ML  3 mL Nebulization Q6H PRN    lidocaine 4 % patch 1 Patch  1 Patch TransDERmal Q24H    azithromycin (ZITHROMAX) 500 mg in 0.9% sodium chloride 250 mL ([id number])  500 mg IntraVENous Q24H    guaiFENesin ER (MUCINEX) tablet 600 mg  600 mg Oral Q12H    benzonatate (TESSALON) capsule 200 mg  200 mg Oral TID PRN    amLODIPine (NORVASC) tablet 5 mg  5 mg Oral DAILY    predniSONE (DELTASONE) tablet 10 mg  10 mg Oral DAILY WITH BREAKFAST    aspirin chewable tablet 81 mg  81 mg Oral DAILY    fluticasone (FLONASE) 50 mcg/actuation nasal spray 2 Spray  2 Spray Both Nostrils DAILY    gabapentin (NEURONTIN) capsule 100 mg  100 mg Oral BID    lactobac ac& pc-s.therm-b.anim (OSVALDO Q/RISAQUAD)  1 Cap Oral DAILY    losartan (COZAAR) tablet 100 mg  100 mg Oral DAILY    nitroglycerin (NITROBID) 2 % ointment 1 Inch  1 Inch Topical Q6H PRN    famotidine (PEPCID) tablet 20 mg  20 mg Oral DAILY PRN    sodium chloride (NS) flush 5-10 mL  5-10 mL IntraVENous Q8H    sodium chloride (NS) flush 5-10 mL  5-10 mL IntraVENous PRN    acetaminophen (TYLENOL) tablet 650 mg  650 mg Oral Q6H PRN    ondansetron (ZOFRAN) injection 4 mg  4 mg IntraVENous Q4H PRN    enoxaparin (LOVENOX) injection 40 mg  40 mg SubCUTAneous Q24H    vancomycin (VANCOCIN) 750 mg in 0.9% sodium chloride 250 mL ([id number])  750 mg IntraVENous Q12H         Michael Todd III, DO

## 2018-11-18 NOTE — PROGRESS NOTES
Pt to discharge home by private vehicle today. Referral for [facility] PNA previously arranged, confirmed with [facility]. [facility] PT/OT consult discontinued. Pt to schedule PCP and specialty follow-up appointments, as offices are closed at this time. All information entered into pt AVS.     Pt has no additional CM needs at this time. Care Management Interventions  PCP Verified by CM: Yes(Pt last visit was in Oct )  Mode of Transport at Discharge:  Other (see comment)(Pt drove herself to the hospital )  Transition of Care Consult (CM Consult): [address]: Yes  Discharge Durable Medical Equipment: (Has a cane, RW, and transport chair )  Physical Therapy Consult: Yes  Occupational Therapy Consult: Yes  Current Support Network: Lives Alone(Pt resides alone in a one story home, has ramp to the entrance)  Confirm Follow Up Transport: Self(Pt drives to her appts, but has a friend that will assist if needed)  Plan discussed with Pt/Family/Caregiver: Yes  Freedom of Choice Offered: Yes  Discharge Location  Discharge Placement: Home with home health([id number])    Emmanuel Armijo, MSW Supervisee in Social Work, [address] [facility]  [phone]

## 2018-11-18 NOTE — PROGRESS NOTES
ADULT PROTOCOL: JET AEROSOL ASSESSMENT    Patient  Lizette Fraga     61 y.o.   female     11/18/2018  10:33 AM    Breath Sounds Pre Procedure: Right Breath Sounds: Lower, Crackles                               Left Breath Sounds: Lower, Crackles    Breath Sounds Post Procedure: Right Breath Sounds: Lower, Crackles                                 Left Breath Sounds: Lower, Crackles    Breathing pattern: Pre procedure Breathing Pattern: Regular          Post procedure Breathing Pattern: Regular    Heart Rate: Pre procedure Pulse: 78           Post procedure Pulse: 78    Resp Rate: Pre procedure Respirations: 16           Post procedure Respirations: 16            Cough: Pre procedure Cough: Non-productive               Post procedure Cough: Non-productive      Oxygen: O2 Device: Room air   room air     Changed: NO    SpO2: Pre procedure SpO2: 98 %   without oxygen              Post procedure SpO2: 98 %  without oxygen    Nebulizer Therapy: Current medications Aerosolized Medications: Pulmicort      Changed: NO    Smoking History: former smoker    Problem List:   Patient Active Problem List   Diagnosis Code    Malignant neoplasm of right kidney (HCC) C64.1    Polymyositis (HCC) M33.20    Hypertension I10    JOHN on CPAP G47.33, Z99.89    Elevated glucose R73.09    Immunosuppressed status (HCC) D89.9    Age-related osteoporosis without current pathological fracture M81.0    Vitamin D deficiency E55.9    Pneumonia J18.9    Pleuritic chest pain R07.81       Respiratory Therapist: Glo Silvestre, RT

## 2018-11-18 NOTE — DISCHARGE SUMMARY
Hospitalist Discharge Summary     Patient ID:  Lizette Fraga  [id number]  35 y.o.  1955    PCP on record: Nancy Schuler MD    Admit date: 11/15/2018  Discharge date and time: 11/18/2018      DISCHARGE DIAGNOSIS:  Likely resolving PNA   Remnant symptoms from PNA a months ago (claims to admitted for 8 days in hospital), vs HCAP   Bronchial lavage with hyphae, but no growth. D/W ID, unlikely infectious process, no need to cont abx therapy, can follow-up OP with ID, might need lung biopsy if persistent sx or changes on imaging  CXR with Persistent patchy bilateral airspace disease, Slight decrease in small left pleural effusion. Continued follow-up recommended  Pt reported to be discharged on Doxycycline 1 month ago after admitted for 8 days in hospital for PNA  Recent 2D echo (pt has results in her cellphone) with normal EF but grade 1 diastolic dysfunction   CTA Chest- 1. Moderate-sized pericardial effusion. Subtle reticular nodular changes bilaterally likely infectious or inflammatory  No acute pulmonary embolus  Check Sputum, blood cultures-NGTD  IV vancomycin, cefepime and Zithromax-stopped  mucolytics and antitussives  Pt reported developed polymyositis with Levaquin  ProBNP 103  Pulmonary consult is appreciated     Pleuritic Chest Pain  Likely due to pnemonitis no PE on CTA ? Related to Pericardial effusion   Echo: EF 55 % to 60 %. There were no regional wall motionabnormalities. Tricuspid valve: There was mild regurgitation. Pericardium: A small to moderate pericardial effusion was identified posterior to the heart. There was no evidence of hemodynamic compromise. Cardiology consult is appreciated, cleared by cards     Polymyositis  Continue steroids  Claims not taking methotrexate anymore  Follow-up OP with rhematology  CK at 1465, pt informed, reports her CK stays high last one was 2400 on Sept 23 and 3600 Sept 6, reports myositis sx are better. , will repeat CK today before dc       Hypertension   Continue Norvasc and ARBs      Neuropathy  Continue Neurontin     Code Status: DNR/DNI as per her own wishes  Surrogate Decision Maker: Cousin Geoff Rangel     DVT Prophylaxis: Lovenox     Baseline: functional      CONSULTATIONS:  IP CONSULT TO CARDIOLOGY  IP CONSULT TO INFECTIOUS DISEASES  IP CONSULT TO INTENSIVIST    Excerpted HPI from H&P of Humble Johnson MD:    CHIEF COMPLAINT: chest pain upon coughing     HISTORY OF PRESENT ILLNESS:     Nano Ortega is a 61 y.o.  female who presents with chest pain upon coughing. As per patient, she was admitted to [facility] in September for polymyositis then she was admitted to another hospital a month ago for PNA and was in the hospital for 8 days. Pt reported she never got better since the discharge and report green sputum when takes mucinex otherwise cough is mostly dry. Pt denies any fever, chills, nausea, vomiting, diarrhea. Pt reported started to have chest pains upon coughing and breathing today, reported 8/10 pain, intermittent, under her breast, non radiating. In ED pt underwent CXR showed CXR with Persistent patchy bilateral airspace disease, Slight decrease in small left pleural effusion. Continued follow-up recommended     We were asked to admit for work up and evaluation of the above problems. ______________________________________________________________________  DISCHARGE SUMMARY/HOSPITAL COURSE:  for full details see H&P, daily progress notes, labs, consult notes. _______________________________________________________________________  Patient seen and examined by me on discharge day. Pertinent Findings:  Gen:    Not in distress  Chest: Clear lungs  CVS:   Regular rhythm.   No edema  Abd:  Soft, not distended, not tender  Neuro:  Alert, awake, oriented x 3, at baseline  _______________________________________________________________________  DISCHARGE MEDICATIONS:   Current Discharge Medication List CONTINUE these medications which have NOT CHANGED    Details   gabapentin (NEURONTIN) 100 mg capsule TAKE 1 CAPSULE BY MOUTH TWICE A DAY  Qty: 60 Cap, Refills: 5    Associated Diagnoses: Carpal tunnel syndrome of right wrist      L. acidoph & paracasei- S therm- Bifido (RISAQUAD) 8 billion cell cap cap Take 1 Cap by mouth. amLODIPine (NORVASC) 5 mg tablet Take 1 Tab by mouth daily. Qty: 30 Tab, Refills: 1    Associated Diagnoses: Essential hypertension; Hypertensive left ventricular hypertrophy, without heart failure      cholecalciferol (VITAMIN D3) 50,000 unit capsule Take 1 Cap by mouth every seven (7) days for 8 doses. Qty: 8 Cap, Refills: 0    Associated Diagnoses: Vitamin D deficiency      raNITIdine (ZANTAC) 150 mg tablet Take  by mouth. 1 tablet prn heartburn. losartan (COZAAR) 100 mg tablet TAKE 1 TABLET BY MOUTH EVERY DAY  Qty: 90 Tab, Refills: 3    Associated Diagnoses: Essential hypertension      fluticasone (FLONASE) 50 mcg/actuation nasal spray 2 Sprays by Both Nostrils route daily. Qty: 1 Bottle, Refills: 12      aspirin 81 mg chewable tablet Take 1 Tab by mouth daily. Qty: 30 Tab, Refills: 1    Associated Diagnoses: Essential hypertension      vitamin E topical cream Apply  to affected area daily. Patient applies to face      predniSONE (DELTASONE) 5 mg tablet TAKE 2 TABLETS BY MOUTH EVERY DAY  Refills: 3             My Recommended Diet, Activity, Wound Care, and follow-up labs are listed in the patient's Discharge Insturctions which I have personally completed and reviewed.     ______________________________________________________________________    Risk of deterioration: Low    Condition at Discharge:  Stable  ______________________________________________________________________    Disposition  Home with family and home health services  ______________________________________________________________________    Care Plan discussed with:   Patient, Family, RN, Care Manager, Consultant    ______________________________________________________________________    Code Status: Full Code  ______________________________________________________________________      Follow up with:   PCP : Trang Flores MD  Follow-up Information     Follow up With Specialties Details Why Contact Info    Sukumar Allen MD Infectious Diseases, Internal Medicine In 1 week hospital follow-up [address] [facility] [address] [facility] Internal Medicine   [place] [id number] [facility]  [phone]      Maria M Light MD Pulmonary Disease In 1 week hospital follow-up [address] [facility]    [facility] [id number]  [phone]      Rheumatology Division [facility]  In 1 week myositis [address] [place] [address] [address] [facility]    Trang Flores MD Internal Medicine   [address] [place] [address]  [address] [facility]  [phone]                Total time in minutes spent coordinating this discharge (includes going over instructions, follow-up, prescriptions, and preparing report for sign off to her PCP) :  60 minutes    Signed:  Pamela Soria MD

## 2018-11-18 NOTE — PROGRESS NOTES
TT Dr. Marks Pencil & progress notes    Thea Rojas 7827 MRN [id number]    ID consulted last pm. See preliminary note    Dr. Jesus Bowman suggests discontinuing antibiotics; she suspects symptoms are due to polymyositis    Thanks, Nadine Clayton

## 2018-11-19 VITALS
BODY MASS INDEX: 26.89 KG/M2 | WEIGHT: 161.38 LBS | HEART RATE: 88 BPM | SYSTOLIC BLOOD PRESSURE: 164 MMHG | TEMPERATURE: 98.4 F | RESPIRATION RATE: 20 BRPM | DIASTOLIC BLOOD PRESSURE: 76 MMHG | OXYGEN SATURATION: 99 % | HEIGHT: 65 IN

## 2018-11-19 LAB
ANION GAP SERPL CALC-SCNC: 6 MMOL/L (ref 5–15)
BUN SERPL-MCNC: 34 MG/DL (ref 6–20)
BUN/CREAT SERPL: 36 (ref 12–20)
CALCIUM SERPL-MCNC: 9.6 MG/DL (ref 8.5–10.1)
CHLORIDE SERPL-SCNC: 105 MMOL/L (ref 97–108)
CK SERPL-CCNC: 2042 U/L (ref 26–192)
CO2 SERPL-SCNC: 26 MMOL/L (ref 21–32)
CREAT SERPL-MCNC: 0.94 MG/DL (ref 0.55–1.02)
GLUCOSE SERPL-MCNC: 81 MG/DL (ref 65–100)
POTASSIUM SERPL-SCNC: 4.3 MMOL/L (ref 3.5–5.1)
PROCALCITONIN SERPL-MCNC: 0.03 NG/ML (ref 0–0.08)
SODIUM SERPL-SCNC: 137 MMOL/L (ref 136–145)

## 2018-11-19 PROCEDURE — 74011250637 HC RX REV CODE- 250/637: Performed by: INTERNAL MEDICINE

## 2018-11-19 PROCEDURE — 74011636637 HC RX REV CODE- 636/637: Performed by: INTERNAL MEDICINE

## 2018-11-19 PROCEDURE — 74011250636 HC RX REV CODE- 250/636: Performed by: INTERNAL MEDICINE

## 2018-11-19 PROCEDURE — 80048 BASIC METABOLIC PNL TOTAL CA: CPT

## 2018-11-19 PROCEDURE — 86635 COCCIDIOIDES ANTIBODY: CPT

## 2018-11-19 PROCEDURE — 82550 ASSAY OF CK (CPK): CPT

## 2018-11-19 PROCEDURE — 90471 IMMUNIZATION ADMIN: CPT

## 2018-11-19 PROCEDURE — 36415 COLL VENOUS BLD VENIPUNCTURE: CPT

## 2018-11-19 PROCEDURE — 90686 IIV4 VACC NO PRSV 0.5 ML IM: CPT | Performed by: INTERNAL MEDICINE

## 2018-11-19 RX ADMIN — AMLODIPINE BESYLATE 5 MG: 5 TABLET ORAL at 09:40

## 2018-11-19 RX ADMIN — ASPIRIN 81 MG 81 MG: 81 TABLET ORAL at 09:36

## 2018-11-19 RX ADMIN — PREDNISONE 10 MG: 10 TABLET ORAL at 09:36

## 2018-11-19 RX ADMIN — Medication 10 ML: at 05:34

## 2018-11-19 RX ADMIN — GABAPENTIN 100 MG: 100 CAPSULE ORAL at 09:32

## 2018-11-19 RX ADMIN — INFLUENZA VIRUS VACCINE 0.5 ML: 15; 15; 15; 15 SUSPENSION INTRAMUSCULAR at 12:30

## 2018-11-19 RX ADMIN — Medication 1 CAPSULE: at 09:34

## 2018-11-19 RX ADMIN — LOSARTAN POTASSIUM 100 MG: 50 TABLET ORAL at 09:36

## 2018-11-19 RX ADMIN — GUAIFENESIN 600 MG: 600 TABLET, EXTENDED RELEASE ORAL at 09:36

## 2018-11-19 NOTE — DISCHARGE INSTRUCTIONS
HOSPITALIST DISCHARGE INSTRUCTIONS    NAME: Trini Martinez   :  1955   MRN:  [id number]     Date/Time:  2018 2:04 PM    ADMIT DATE: 11/15/2018   DISCHARGE DATE: 2018         · It is important that you take the medication exactly as they are prescribed. · Keep your medication in the bottles provided by the pharmacist and keep a list of the medication names, dosages, and times to be taken in your wallet. · Do not take other medications without consulting your doctor. What to do at Home    Recommended diet:  Cardiac Diet and Low fat, Low cholesterol    Recommended activity: PT/OT per Home Health    Follow-up with PCP, Pulmonary, Infectious diseases and rheumatology. Discuss with PCP and infectious disease final test results pending still at discharge. Discuss with PCP and pulmonary repeat CT Chest in 2-3 weeks  Discuss anemia workup with PCP      If you have questions regarding the hospital related prescriptions or hospital related issues please call [facility] at [phone]. You can always direct your questions to your primary care doctor if you are unable to reach your hospital physician; your PCP works as an extension of your hospital doctor just like your hospital doctor is an extension of your PCP for your time at the hospital [facility])    If you experience any of the following symptoms then please call your primary care physician or return to the emergency room if you cannot get hold of your doctor:    Fever, chills, nausea, vomiting, or persistent diarrhea  Worsening weakness or new problems with your speech or balance  Dark stools or visible blood in your stools  New Leg swelling or shortness of breath as these could be signs of a clot    Additional Instructions:      Bring these papers with you to your follow up appointments. The papers will help your doctors be sure to continue the care plan from the hospital.              Information obtained by :   I understand that if any problems occur once I am at home I am to contact my physician. I understand and acknowledge receipt of the instructions indicated above.                                                                                                                                            Physician's or R.N.'s Signature                                                                  Date/Time                                                                                                                                              Patient or Representative Signature

## 2018-11-19 NOTE — PROGRESS NOTES
[address] can follow for [email or web address] secondary to PNA. For probable d/c today to home. Family to transport.

## 2018-11-19 NOTE — ROUTINE PROCESS
Follow up apt scheduled with Charlie Thakkar on 11/21/18 at [phone] will contact the patient for further information.   PCP CHAN apt scheduled with Dr. Nasra Hubbard on 11/28/18 at 8:30AM.  Apt added to AVS

## 2018-11-19 NOTE — CONSULTS
[facility]  Pulmonary, Critical Care, and Sleep Medicine    Patient Consult    Name: Joann Mata MRN: [id number]   : 1955 Hospital: [facility]   Date: 2018        IMPRESSION:   · Cough- with patchy bibasilar and b/l mid lung zone GGO. 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 However, pts with polymyositis also at risk for pulmonary infections ( hypogammaglobulinemia etc). Given her mild leukocytosis on admisison- atypical or viral pneumonitis possible. Pt on low grade immunosuppression for her autoimmune disease and BAL in 10/18 at [address] [place] [address] [employer] without AFB or GS/CX organisms. · Left sided mid chest pain pain, worse with coughing. 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 Jeremías Reed. Magi, ph: [phone]      RECOMMENDATIONS:   · At this point she is not behaving like a bacterial PNA. If anything her CT chest now is better than the report from [address] [place] [address] 10/18. This cough may be a lingering asthmatic bronchitis with pleuritic/Mskel CP. Would not do bronch at this time  · Suggest bedside spirogram and trial of ICS/LABA  · Check immunoglobulin levels  · Will need follow up HRCT at some point in future to look for baseline ILD- at this point I can't call this Polymyositis related ILD given recent clinical pulmonary infection. CT changes are mild  · Can go home on 5 day course z pack  · Would Add ICS/LABA  · Will need to follow up with me on  at 11am gave her my information. · Would have her call [phone] to make appt and follow up in [facility]     Subjective:   Discussed how she has been feeling over last 24 hrs. She was seen by ID here. Has serology pending.   Was noted to have a lot of cough and this seemed to worsen her Left pleuritic Chest pain. Has had recurrent episodes of Pneumonia. Multiple hospital admission due to pneumonia. Had Pneumonia in dec 2015, Jan 2016, March 2017, and October 3. She has mainly a dry cough. Not bring up much sputum. Overall she has been feeling better compared to admission. This patient has been seen and evaluated at the request of Dr. Zee Chi for SOB. Patient is a 61 y.o. female   With recently dx'd polymyositis who has been [facility] the [facility] Pt admitted with cough and 8/10 CP. Pt on RA . Started on rocephin and azithro for probable CAP. Mild leukocytosis - improved. Denies n/v/d. No ABD pain. No diarrhea. Past Medical History:   Diagnosis Date    Congestive heart failure ([address]     Hypertension     Pneumonia 10/2018    Polymyositis ([facility] 12/2015    in setting of [address] 2015    Renal cancer ([facility] 07/08/2016    Respiratory arrest ([address] 11/2015    [facility].     SBO (small bowel obstruction) ([address] 8/3/2017      Past Surgical History:   Procedure Laterality Date    HX GYN  1999    hysterectomy    HX NEPHRECTOMY Right 2016    for kidney cancer    US GUIDED CORE BREAST BIOPSY Left 1999    Negative      Prior to Admission medications    Medication Sig Start Date End Date Taking? Authorizing Provider   gabapentin (NEURONTIN) 100 mg capsule TAKE 1 CAPSULE BY MOUTH TWICE A DAY 11/16/18   Dina Ceballos MD   L. acidoph & paracasei- S therm- Bifido (RISAQUAD) 8 billion cell cap cap Take 1 Cap by mouth. 10/12/18   Provider, Historical   amLODIPine (NORVASC) 5 mg tablet Take 1 Tab by mouth daily. 10/16/18   Dina Ceballos MD   cholecalciferol (VITAMIN D3) 50,000 unit capsule Take 1 Cap by mouth every seven (7) days for 8 doses. 10/10/18 11/29/18  Dina Ceballos MD   raNITIdine (ZANTAC) 150 mg tablet Take  by mouth. 1 tablet prn heartburn.  6/27/18   Provider, Historical   losartan (COZAAR) 100 mg tablet TAKE 1 TABLET BY MOUTH EVERY DAY 18   Dina Ceballos MD   fluticasone [facility]) 50 mcg/actuation nasal spray 2 Sprays by Both Nostrils route daily. Patient taking differently: 2 Sprays by Both Nostrils route daily as needed. 18   Dina Ceballos MD   aspirin 81 mg chewable tablet Take 1 Tab by mouth daily. 8/10/17   Dina Ceballos MD   vitamin E topical cream Apply  to affected area daily.  Patient applies to face    Other, MD Melanie   predniSONE (DELTASONE) 5 mg tablet TAKE 2 TABLETS BY MOUTH EVERY DAY 16   Provider, Historical     Allergies   Allergen Reactions    Ace Inhibitors Cough    Dilaudid [Hydromorphone] Other (comments)    Levaquin [Levofloxacin] Other (comments)    Lisinopril Other (comments)    Metoprolol Other (comments)     hallucinations    Peanut Other (comments)      Social History     Tobacco Use    Smoking status: Former Smoker     Packs/day: 1.00     Years: 20.00     Pack years: 20.00     Types: Cigarettes     Last attempt to quit: 1998     Years since quittin.5    Smokeless tobacco: Never Used   Substance Use Topics    Alcohol use: No     Alcohol/week: 0.6 oz     Types: 1 Glasses of wine per week      Family History   Problem Relation Age of Onset    Cancer Mother     Breast Cancer Mother 68    Diabetes Father     Hypertension Father     Dementia Father 80    Stroke Maternal Grandmother     Breast Cancer Cousin         50's        Current Facility-Administered Medications   Medication Dose Route Frequency    arformoterol (BROVANA) neb solution 15 mcg  15 mcg Nebulization BID RT    budesonide (PULMICORT) 500 mcg/2 ml nebulizer suspension  500 mcg Nebulization BID RT    lidocaine 4 % patch 1 Patch  1 Patch TransDERmal Q24H    guaiFENesin ER (MUCINEX) tablet 600 mg  600 mg Oral Q12H    amLODIPine (NORVASC) tablet 5 mg  5 mg Oral DAILY    predniSONE (DELTASONE) tablet 10 mg  10 mg Oral DAILY WITH BREAKFAST    aspirin chewable tablet 81 mg  81 mg Oral DAILY    fluticasone (FLONASE) 50 mcg/actuation nasal spray 2 Spray  2 Spray Both Nostrils DAILY    gabapentin (NEURONTIN) capsule 100 mg  100 mg Oral BID    lactobac ac& pc-s.therm-b.anim (OSVALDO Q/RISAQUAD)  1 Cap Oral DAILY    losartan (COZAAR) tablet 100 mg  100 mg Oral DAILY    sodium chloride (NS) flush 5-10 mL  5-10 mL IntraVENous Q8H    enoxaparin (LOVENOX) injection 40 mg  40 mg SubCUTAneous Q24H       Review of Systems:  A comprehensive review of systems was negative except for that written in the HPI. Objective:   Vital Signs:    Visit Vitals  /87 (BP 1 Location: Right arm, BP Patient Position: Sitting)   Pulse 84   Temp 97.9 °F (36.6 °C)   Resp 18   Ht 5' 5\" (1.651 m)   Wt 73.2 kg (161 lb 6 oz)   SpO2 99%   BMI 26.85 kg/m²       O2 Device: Room air       Temp (24hrs), Av °F (36.7 °C), Min:97.5 °F (36.4 °C), Max:98.7 °F (37.1 °C)       Intake/Output:   Last shift:      No intake/output data recorded. Last 3 shifts:  1901 -  0700  In: 950 [P.O.:950]  Out: -     Intake/Output Summary (Last 24 hours) at 2018 1233  Last data filed at 2018 0424  Gross per 24 hour   Intake 470 ml   Output --   Net 470 ml      Physical Exam:   General:  Alert, cooperative, no distress, appears stated age. Head:  Normocephalic, without obvious abnormality, atraumatic. Eyes:  Conjunctivae/corneas clear. Nose: Nares normal. Septum midline. Neck: Supple, symmetrical, trachea midline       Lungs:   Coarse rales at bases       Heart:  Regular rate and rhythm, S1, S2 normal, no murmur, click, rub or gallop. Abdomen:   Soft, non-tender. Bowel sounds normal. No masses,  No organomegaly. Extremities: Extremities normal, atraumatic, no cyanosis or edema. Pulses: 2+ and symmetric all extremities. Skin: Skin color, texture, turgor normal. No rashes or lesions  Neuro: Seems to be grossly intact. , good strength of BUE and BLE  Psych: no depression or weakness.               Data review:     Recent Results (from the past 24 hour(s))   CK    Collection Time: 11/18/18  2:15 PM   Result Value Ref Range    CK 1,465 (H) 26 - 192 U/L   CK    Collection Time: 11/19/18 10:59 AM   Result Value Ref Range    CK 2,042 (H) 26 - 337 U/L   METABOLIC PANEL, BASIC    Collection Time: 11/19/18 10:59 AM   Result Value Ref Range    Sodium 137 136 - 145 mmol/L    Potassium 4.3 3.5 - 5.1 mmol/L    Chloride 105 97 - 108 mmol/L    CO2 26 21 - 32 mmol/L    Anion gap 6 5 - 15 mmol/L    Glucose 81 65 - 100 mg/dL    BUN 34 (H) 6 - 20 MG/DL    Creatinine 0.94 0.55 - 1.02 MG/DL    BUN/Creatinine ratio 36 (H) 12 - 20      GFR est AA >60 >60 ml/min/1.73m2    GFR est non-AA >60 >60 ml/min/1.73m2    Calcium 9.6 8.5 - 10.1 MG/DL       Imaging:  I have personally reviewed the patients radiographs and have reviewed the reports:  CTA chest no PE and patchy b/l mid and lower lung zones areas GGO, reticulation and patchy ASD greatest at bases no honeycombing.          Juliette Monzon MD

## 2018-11-19 NOTE — PROGRESS NOTES
Discharge instructions and follow up appointments reviewed with patient who verbalized an understanding. An opportunity for questions and clarification was provided for.

## 2018-11-19 NOTE — PROGRESS NOTES
Report from kinza RN at 08:00 and given to [facility] at 09:20. Pt is up ad meek. Pulmonary consulted with pt and will f/u OP.

## 2018-11-19 NOTE — PROGRESS NOTES
Progress Note      11/19/2018 1:06 PM  NAME: Severa Chamberlain   MRN:  [id number]   Admit Diagnosis: Pneumonia      Problem List:     1. Pericardial effusion by CT  Small effusion by echo . See below. 2. Pneumonia  3. Chest pain; pleuritic   Not cardiac   4. Polymyositis  5. Hypertension  6. Neuropathy  7. DNR/DNI  8. Usual Cardiologist:  Dr. Ryan Prado     Assessment/Plan:   Echo done late yesterday . Echo reviewed personally. The Echo is normal   There is a small and insignificant pericardial effusion posteriorly. This is an incidental finding. This is not significant and has no bearing on her symptoms,  treatment or prognosis going forward . She has vigorous LV function. She does not need any cardiac treatment. She has no other known heart disease and there is no reason to pursue any other cardiac studies at this point . She does not need a follow up Echo for this . She does not need a follow up appointment with me . You can proceed with planned care. Will sign off.              []       High complexity decision making was performed in this patient at high risk for decompensation with multiple organ involvement. Subjective:     Severa Chamberlain still has pleuritic CP. Discussed with RN events overnight. Review of Systems:    Symptom Y/N Comments  Symptom Y/N Comments   Fever/Chills N   Chest Pain N    Poor Appetite N   Edema N    Cough N   Abdominal Pain N    Sputum N   Joint Pain N    SOB/LOREDO N   Pruritis/Rash N    Nausea/vomit N   Tolerating PT/OT Y    Diarrhea N   Tolerating Diet Y    Constipation N   Other       Could NOT obtain due to:      Objective:      Physical Exam:    Last 24hrs VS reviewed since prior progress note.  Most recent are:    Visit Vitals  /79 (BP 1 Location: Right arm, BP Patient Position: Sitting)   Pulse 82   Temp 97.5 °F (36.4 °C)   Resp 16   Ht 5' 5\" (1.651 m)   Wt 73.2 kg (161 lb 6 oz)   SpO2 100%   BMI 26.85 kg/m²       Intake/Output Summary (Last 24 hours) at 11/19/2018 0855  Last data filed at 11/19/2018 0424  Gross per 24 hour   Intake 710 ml   Output --   Net 710 ml        General Appearance: Well developed, well nourished, alert & oriented x 3,    no acute distress. Ears/Nose/Mouth/Throat: Hearing grossly normal.  Neck: Supple. Chest: Lungs clear to auscultation bilaterally. Cardiovascular: Regular rate and rhythm, S1S2 normal, no murmur. Abdomen: Soft, non-tender, bowel sounds are active. Extremities: No edema bilaterally. Skin: Warm and dry. []         Post-cath site without hematoma, bruit, tenderness, or thrill. Distal pulses intact. PMH/ reviewed - no change compared to H&P    Data Review    Telemetry: sinus rhythm     EKG:   [x]  No new EKG for review    Lab Data Personally Reviewed:    Recent Labs     11/18/18  0349   WBC 9.1   HGB 10.8*   HCT 34.0*        No results for input(s): INR, PTP, APTT in the last 72 hours. No lab exists for component: [place]   Recent Labs     11/18/18  0349 11/17/18  0526    142   K 4.3 4.1   * 112*   CO2 24 21   BUN 34* 30*   CREA 0.98 0.89   GLU 94 74   CA 8.9 8.4*   MG 2.0  --      Recent Labs     11/18/18  1415   CPK 1,465*     Lab Results   Component Value Date/Time    Cholesterol, total 221 (H) 08/28/2018 08:45 AM    HDL Cholesterol 60 08/28/2018 08:45 AM    LDL, calculated 135 (H) 08/28/2018 08:45 AM    Triglyceride 129 08/28/2018 08:45 AM       No results for input(s): SGOT, GPT, AP, TBIL, TP, ALB, GLOB, GGT, AML, LPSE in the last 72 hours. No lab exists for component: AMYP, HLPSE  No results for input(s): PH, PCO2, PO2 in the last 72 hours.     Medications Personally Reviewed:    Current Facility-Administered Medications   Medication Dose Route Frequency    arformoterol (BROVANA) neb solution 15 mcg  15 mcg Nebulization BID RT    budesonide (PULMICORT) 500 mcg/2 ml nebulizer suspension  500 mcg Nebulization BID RT    influenza vaccine 2018-19 (6 mos+)(PF) (FLUARIX QUAD/FLULAVAL QUAD) injection 0.5 mL  0.5 mL IntraMUSCular PRIOR TO DISCHARGE    albuterol-ipratropium (DUO-NEB) 2.5 MG-0.5 MG/3 ML  3 mL Nebulization Q6H PRN    lidocaine 4 % patch 1 Patch  1 Patch TransDERmal Q24H    guaiFENesin ER (MUCINEX) tablet 600 mg  600 mg Oral Q12H    benzonatate (TESSALON) capsule 200 mg  200 mg Oral TID PRN    amLODIPine (NORVASC) tablet 5 mg  5 mg Oral DAILY    predniSONE (DELTASONE) tablet 10 mg  10 mg Oral DAILY WITH BREAKFAST    aspirin chewable tablet 81 mg  81 mg Oral DAILY    fluticasone (FLONASE) 50 mcg/actuation nasal spray 2 Spray  2 Spray Both Nostrils DAILY    gabapentin (NEURONTIN) capsule 100 mg  100 mg Oral BID    lactobac ac& pc-s.therm-b.anim (OSVALDO Q/RISAQUAD)  1 Cap Oral DAILY    losartan (COZAAR) tablet 100 mg  100 mg Oral DAILY    nitroglycerin (NITROBID) 2 % ointment 1 Inch  1 Inch Topical Q6H PRN    famotidine (PEPCID) tablet 20 mg  20 mg Oral DAILY PRN    sodium chloride (NS) flush 5-10 mL  5-10 mL IntraVENous Q8H    sodium chloride (NS) flush 5-10 mL  5-10 mL IntraVENous PRN    acetaminophen (TYLENOL) tablet 650 mg  650 mg Oral Q6H PRN    ondansetron (ZOFRAN) injection 4 mg  4 mg IntraVENous Q4H PRN    enoxaparin (LOVENOX) injection 40 mg  40 mg SubCUTAneous Q24H         Ousmane Stanley MD

## 2018-11-20 ENCOUNTER — HOME CARE VISIT (OUTPATIENT)
Dept: HOME HEALTH SERVICES | Facility: HOME HEALTH | Age: 63
End: 2018-11-20

## 2018-11-20 ENCOUNTER — PATIENT OUTREACH (OUTPATIENT)
Dept: INTERNAL MEDICINE CLINIC | Age: 63
End: 2018-11-20

## 2018-11-20 LAB
BACTERIA SPEC CULT: NORMAL
IGE SERPL-ACNC: 10 IU/ML (ref 0–100)
IGG SERPL-MCNC: 1345 MG/DL (ref 700–1600)
IGG1 SER-MCNC: 859 MG/DL (ref 248–810)
IGG2 SER-MCNC: 261 MG/DL (ref 130–555)
IGG3 SER-MCNC: >221 MG/DL (ref 15–102)
IGG4 SER-MCNC: 56 MG/DL (ref 2–96)
SERVICE CMNT-IMP: NORMAL

## 2018-11-20 NOTE — Clinical Note
ID wants you to follow up on labs that were sent out while patient was in the hospital.  Please review ID's last note

## 2018-11-20 NOTE — PROGRESS NOTES
Hospital Discharge Follow-Up      Date/Time:  2018 4:48 PM    Patient was admitted to [facility] on 11/15/18 and discharged on 18 for Likely resolving PNA  . The physician discharge summary was available at the time of outreach. Patient was contacted within 1 business days of discharge. Top Challenges reviewed with the provider   presents with chest pain upon coughing. As per patient, she was [facility] for polymyositis then she was admitted to another hospital a month ago for PNA and was in the hospital for 8 days. Pt reported she never got better since the discharge and report green sputum when takes mucinex otherwise cough is mostly dry. Pt denies any fever, chills, nausea, vomiting, diarrhea. Pt reported started to have chest pains upon coughing and breathing today, reported 8/10 pain, intermittent, under her breast, non radiating. In ED pt underwent CXR showed CXR with Persistent patchy bilateral airspace disease, Slight decrease in small left pleural effusion. Method of communication with provider :staff message    Inpatient RRAT score: 6  Was this a readmission? no   Patient stated reason for the readmission:     Nurse Navigator (NN) contacted the patient by telephone to perform post hospital discharge assessment. Verified name and  with patient as identifiers. Provided introduction to self, and explanation of the Nurse Navigator role. Reviewed discharge instructions and red flags with patient who verbalized understanding. Patient given an opportunity to ask questions and does not have any further questions or concerns at this time. The patient agrees to contact the PCP office for questions related to their healthcare. NN provided contact information for future reference. Disease Specific:   N/A    Summary of patient's top problems:  1.resolving PNA:  Per patient she is doing fine, voicing no complaints.   Patient is not on antibiotics per ID.  2. Bronchial lavage with hyphae, but no growth:  D/W ID, unlikely infectious process, no need to cont abx therapy, can follow-up OP with ID, might need lung biopsy if persistent sx or changes on imaging      Home Health orders at discharge: [address] [place] [address] 114: [facility]  Date of initial visit: 11/23    Advance Care Planning:   Does patient have an Advance Directive:  not on file     Medication(s):   New Medications at Discharge: na  Changed Medications at Discharge: na  Discontinued Medications at Discharge: na    Medication reconciliation was performed with patient, who verbalizes understanding of administration of home medications. There were no barriers to obtaining medications identified at this time. Referral to Pharm D needed: no     Current Outpatient Medications   Medication Sig    gabapentin (NEURONTIN) 100 mg capsule TAKE 1 CAPSULE BY MOUTH TWICE A DAY    amLODIPine (NORVASC) 5 mg tablet Take 1 Tab by mouth daily.  cholecalciferol (VITAMIN D3) 50,000 unit capsule Take 1 Cap by mouth every seven (7) days for 8 doses.  raNITIdine (ZANTAC) 150 mg tablet Take  by mouth. 1 tablet prn heartburn.  aspirin 81 mg chewable tablet Take 1 Tab by mouth daily.  predniSONE (DELTASONE) 5 mg tablet TAKE 2 TABLETS BY MOUTH EVERY DAY    L. acidoph & paracasei- S therm- Bifido (RISAQUAD) 8 billion cell cap cap Take 1 Cap by mouth.  losartan (COZAAR) 100 mg tablet TAKE 1 TABLET BY MOUTH EVERY DAY    fluticasone (FLONASE) 50 mcg/actuation nasal spray 2 Sprays by Both Nostrils route daily. (Patient taking differently: 2 Sprays by Both Nostrils route daily as needed.)    vitamin E topical cream Apply  to affected area daily. Patient applies to face     No current facility-administered medications for this visit. There are no discontinued medications.     [facility] follow up appointment(s):   Future Appointments   Date Time Provider Nel Davis   11/23/2018 To Be Determined Corky Wall RN [facility] [place] [address]   11/29/2018 11:45 AM Maggy Oliva MD [address]      Non-[facility] follow up appointment(s):   [facility]:  [address] [place] [address] Attends follow-up appointments as directed. 11/20/18  Patient has scheduled with Pulmonary on 11/28/18  PCP on 11/29/18  Patient stated ID said she did not need to follow up with her PCP would be fine. This note will not be viewable in [address]

## 2018-11-21 ENCOUNTER — DOCUMENTATION ONLY (OUTPATIENT)
Dept: INTERNAL MEDICINE CLINIC | Age: 63
End: 2018-11-21

## 2018-11-21 LAB — 1,3 BETA GLUCAN SER-MCNC: 38 PG/ML

## 2018-11-22 LAB
A FLAVUS AB SER QL ID: NEGATIVE
A FUMIGATUS AB SER QL ID: NEGATIVE
A NIGER AB SER QL ID: NEGATIVE
B DERMAT AB TITR SER: NEGATIVE {TITER}
H CAPSUL AB TITR SER ID: NEGATIVE {TITER}

## 2018-11-22 NOTE — PROGRESS NOTES
ID  Pt discharged  Results reviewed  procal  0.03  Beta glucan Neg  Does not indicate bacterial or fungal or PJP lung infection

## 2018-11-23 ENCOUNTER — HOME CARE VISIT (OUTPATIENT)
Dept: SCHEDULING | Facility: HOME HEALTH | Age: 63
End: 2018-11-23

## 2018-11-23 PROCEDURE — G0495 RN CARE TRAIN/EDU IN HH: HCPCS

## 2018-11-29 ENCOUNTER — OFFICE VISIT (OUTPATIENT)
Dept: INTERNAL MEDICINE CLINIC | Age: 63
End: 2018-11-29

## 2018-11-29 VITALS
TEMPERATURE: 98.8 F | WEIGHT: 167.4 LBS | DIASTOLIC BLOOD PRESSURE: 79 MMHG | SYSTOLIC BLOOD PRESSURE: 135 MMHG | BODY MASS INDEX: 27.89 KG/M2 | HEIGHT: 65 IN | HEART RATE: 104 BPM | OXYGEN SATURATION: 95 % | RESPIRATION RATE: 18 BRPM

## 2018-11-29 DIAGNOSIS — M33.20 POLYMYOSITIS (HCC): Primary | ICD-10-CM

## 2018-11-29 DIAGNOSIS — I11.9 HYPERTENSIVE LEFT VENTRICULAR HYPERTROPHY, WITHOUT HEART FAILURE: ICD-10-CM

## 2018-11-29 DIAGNOSIS — J18.9 PNEUMONIA OF BOTH LUNGS DUE TO INFECTIOUS ORGANISM, UNSPECIFIED PART OF LUNG: ICD-10-CM

## 2018-11-29 DIAGNOSIS — D84.9 IMMUNOSUPPRESSED STATUS (HCC): ICD-10-CM

## 2018-11-29 DIAGNOSIS — I10 ESSENTIAL HYPERTENSION: ICD-10-CM

## 2018-11-29 RX ORDER — GUAIFENESIN 600 MG/1
TABLET, EXTENDED RELEASE ORAL
Refills: 0 | COMMUNITY
Start: 2018-10-11 | End: 2019-10-30

## 2018-11-29 RX ORDER — AMLODIPINE BESYLATE 5 MG/1
5 TABLET ORAL DAILY
Qty: 90 TAB | Refills: 4 | Status: SHIPPED | OUTPATIENT
Start: 2018-11-29 | End: 2020-05-27

## 2018-11-29 NOTE — PROGRESS NOTES
REED   Eliane Kincaid is a 61 y.o. female, she presents today for:    Presents for hospital follow-up. Reviewed nurse navigator note prior to visit. Admitted from: 11/15-11/18  Discharge diagnosis: likely resolving pneumonia. With underlying polymyositis  Consults: pulmonology, ID    Notes that she had good mobility on Sunday and Monday after hospitalization. Is focused on the fact that her mobility due to muscle pains seems to be worse again. Wonders if the antibiotics she received in ER helped her feel better (reivewed that she received Toradol as well as IV fluids as well). Reports that she good appettite, no new new diarrhea no new fevers. Urination in normal pattern. Cough overall improved. No shortness of breath. Follow-up with pulmonologist tomorrow. PMH/PSH: reviewed and updated  Sochx:  reports that she quit smoking about 20 years ago. Her smoking use included cigarettes. She has a 20.00 pack-year smoking history. she has never used smokeless tobacco. She reports that she does not drink alcohol or use drugs. Famhx: reviewed and updated     All: Allergies   Allergen Reactions    Ace Inhibitors Cough    Dilaudid [Hydromorphone] Other (comments)    Levaquin [Levofloxacin] Other (comments)    Lisinopril Other (comments)    Metoprolol Other (comments)     hallucinations    Peanut Other (comments)     Med:   Current Outpatient Medications   Medication Sig    MUCINEX 600 mg ER tablet TAKE 1 TABLET (600 MG TOTAL) BY MOUTH EVERY 12 (TWELVE) HOURS.    L. acidoph & paracasei- S therm- Bifido (RISAQUAD) 8 billion cell cap cap Take 1 Cap by mouth.  amLODIPine (NORVASC) 5 mg tablet Take 1 Tab by mouth daily.  cholecalciferol (VITAMIN D3) 50,000 unit capsule Take 1 Cap by mouth every seven (7) days for 8 doses.  raNITIdine (ZANTAC) 150 mg tablet Take  by mouth. 1 tablet prn heartburn.     losartan (COZAAR) 100 mg tablet TAKE 1 TABLET BY MOUTH EVERY DAY    fluticasone (FLONASE) 50 mcg/actuation nasal spray 2 Sprays by Both Nostrils route daily. (Patient taking differently: 2 Sprays by Both Nostrils route daily as needed.)    aspirin 81 mg chewable tablet Take 1 Tab by mouth daily.  vitamin E topical cream Apply  to affected area daily. Patient applies to face    predniSONE (DELTASONE) 5 mg tablet TAKE 2 TABLETS BY MOUTH EVERY DAY     No current facility-administered medications for this visit. Review of Systems   Constitutional: Negative for chills, fever and malaise/fatigue. Respiratory: Negative for cough, shortness of breath and wheezing. Cardiovascular: Negative for chest pain. PE:  Blood pressure 135/79, pulse (!) 104, temperature 98.8 °F (37.1 °C), temperature source Oral, resp. rate 18, height 5' 5\" (1.651 m), weight 167 lb 6.4 oz (75.9 kg), SpO2 95 %. Body mass index is 27.86 kg/m². Physical Exam   Constitutional: She is oriented to person, place, and time. She appears well-developed and well-nourished. No distress. HENT:   Head: Normocephalic. Mouth/Throat: Oropharynx is clear and moist.   Eyes: Conjunctivae are normal. Pupils are equal, round, and reactive to light. Neck: Neck supple. Cardiovascular: Normal rate. Pulmonary/Chest: Effort normal. No respiratory distress. She has no rales. Neurological: She is alert and oriented to person, place, and time. Skin: Capillary refill takes less than 2 seconds. No rash noted. Psychiatric: She has a normal mood and affect. Nursing note and vitals reviewed. Labs:   No results found for any visits on 11/29/18. A/P:  61 y.o. female    ICD-10-CM ICD-9-CM    1. Polymyositis (HCC) M33.20 710.4    2. Essential hypertension I10 401.9 amLODIPine (NORVASC) 5 mg tablet   3. Hypertensive left ventricular hypertrophy, without heart failure I11.9 402.90 amLODIPine (NORVASC) 5 mg tablet   4. Pneumonia of both lungs due to infectious organism, unspecified part of lung J18.9 483.8    5.  Immunosuppressed status [facility]) D89.9 279.9      Pneumonia: continuing to resolve from hospital stay early October. With slowly clearing vs persistent findings on chest imaging, agree with follow-up with pulmonologist. No findings for fungal infection to date. Discussed preventative care given immunosuppressed status. Polymyositis: discussed that IV toradol +/- IVF were most likely cause of immediate pain relief. Long frustration and recent visit to [facility] clinic for second opinion. This was hampered somewhat by refusal to complete a second muscle biopsy. Bilateral pneumonia diagnosed immediately upon return. - encouraged patient to continue good hydration, okay to take up to 3 grams of tylenol per day if helpful for muscle sorenss, but discussed that it may be of limited benefit. - needs to follow-up with primary rheumatologist at [address] [place] [address]. Dr. Louie Tran. HTN: relatively well controlled at this interval. Continue current medication managmeent. - She was given AVS and expressed understanding with the diagnosis and plan as discussed. Follow-up Disposition:  Return in about 4 months (around 3/29/2019) for follow-up blood pressure, . No future appointments.     30 minutes time spent with >50% in counseling and/or coordination of care

## 2018-11-29 NOTE — PROGRESS NOTES
Room 2    Vinicio See is a 61 y.o. female    Chief Complaint   Patient presents with   [address] [facility] Follow Up     Chest pains, released from ED [facility] 11/19    Results     pt requests print out of CK test results from 11/18 & 11/19     1. Have you been to the ER, urgent care clinic since your last visit? Hospitalized since your last visit? Yes [facility]  [facility]    2. Have you seen or consulted any other health care providers outside of the [address] [place] [address] since your last visit? Include any pap smears or colon screening.  No

## 2018-11-29 NOTE — PATIENT INSTRUCTIONS
If you take tylenol (acetaminophen) do not take more than 3000mg per day total.     Acetaminophen (By mouth)   Acetaminophen (j-joka-s-MIN-oh-fen)  Treats minor aches and pain and reduces fever. Brand Name(s): 8 Hour Pain Relief, Acetaminophen Children's, Acetaminophen Infant's, Arthritis Pain Formula, Arthritis Pain Relief, Cetafen, Cetafen Extra, Children's Acetaminophen, Children's Dye Free Pain and Fever, Children's Mapap, Children's Pain & Fever, Children's Pain Relief, Children's Pain Reliever, Children's Tylenol, Comtrex Sore Throat Relief   There may be other brand names for this medicine. When This Medicine Should Not Be Used: This medicine is not right for everyone. Do not use it if you had an allergic reaction to acetaminophen. How to Use This Medicine:   Capsule, Liquid Filled Capsule, Liquid, Powder, Tablet, Chewable Tablet, Dissolving Tablet, Fizzy Tablet, Long Acting Tablet  · Your doctor will tell you how much medicine to use. Do not use more than directed. · If you are taking this medicine without the advice of your doctor, carefully read and follow the Drug Facts label and dosing instructions on the medicine package. Ask your doctor or pharmacist if you are not sure how to use this medicine. · Do not take this medicine for more than 10 days in a row, unless directed by your doctor. · The chewable tablet should be chewed or crushed before you swallow it. · Oral liquids: Measure the oral liquid medicine with a marked measuring spoon, oral syringe, or medicine cup. Do not use a spoon, syringe, or cup that came with a different medicine. · Oral liquid (with syringe): ¨ Shake the bottle well before each use. ¨ Remove the cap. Attach the syringe to the flow restrictor. Turn the bottle upside down. ¨ Pull back the syringe plunger until it is filled with the correct dose. Ask your doctor or pharmacist if you are not sure how much medicine to use.   ¨ Slowly give the medicine into your child's mouth. Point the syringe so the medicine goes toward the inner cheek. · Oral liquid (with dropper): ¨ Shake the bottle well before each use. ¨ Remove the cap. Insert the dropper into the bottle. Withdraw the correct dose. Ask your doctor or pharmacist if you are not sure how much medicine to use. ¨ Slowly give the medicine into your child's mouth. Point the dropper so the medicine goes toward the inner cheek. · Extended-release tablet: Swallow the extended-release tablet whole. Do not crush, break, or chew it. Take this medicine with a full glass of water. · Use only the brand of medicine your doctor prescribed. Other brands may not work the same way. · Missed dose: Take a dose as soon as you remember. If it is almost time for your next dose, wait until then and take a regular dose. Do not take extra medicine to make up for a missed dose. · Store the medicine in a closed container at room temperature, away from heat, moisture, and direct light. Drugs and Foods to Avoid:   Ask your doctor or pharmacist before using any other medicine, including over-the-counter medicines, vitamins, and herbal products. · Some medicines and foods can affect how acetaminophen works. Tell your doctor if you are taking a blood thinner, such as warfarin. · Do not drink alcohol while you are using this medicine. Acetaminophen can damage your liver, and alcohol can increase this risk. Do not take acetaminophen without asking your doctor if you have 3 or more drinks of alcohol every day. Warnings While Using This Medicine:   · Tell your doctor if you are pregnant or breastfeeding, or if you have kidney or liver disease. Tell your doctor if you have phenylketonuria (PKU). Some brands of acetaminophen contain aspartame, which can make PKU worse. · This medicine contains acetaminophen.  Read the labels of all other medicines you are using to see if they also contain acetaminophen, or ask your doctor or pharmacist. Alvaro Guadal[place]etonia not use more than 4 grams (4,000 milligrams) total of acetaminophen in one day. For Tylenol® Extra Strength, it is not safe to take more than 3 grams (3,000 milligrams) in 1 day. · Call your doctor if your symptoms do not improve or if they get worse. · Tell any doctor or dentist who treats you that you are using this medicine. This medicine may affect certain medical test results. · Keep all medicine out of the reach of children. Never share your medicine with anyone. Possible Side Effects While Using This Medicine:   Call your doctor right away if you notice any of these side effects:  · Allergic reaction: Itching or hives, swelling in your face or hands, swelling or tingling in your mouth or throat, chest tightness, trouble breathing  · Bloody or black, tarry stools  · Dark urine or pale stools, nausea, vomiting, loss of appetite, severe stomach pain, yellow skin or eyes  · Fever or a sore throat that lasts longer than 3 days, or pain that lasts longer than 5 days  · Lightheadedness, fainting, sweating, or weakness  · Unusual bleeding or bruising  · Vomiting blood or material that looks like coffee grounds  If you notice other side effects that you think are caused by this medicine, tell your doctor. Call your doctor for medical advice about side effects. You may report side effects to FDA at 6-276-FDA-1169  © 2017 [facility] [place] [facility] [place] Information is for End User's use only and may not be sold, redistributed or otherwise used for commercial purposes. The above information is an  only. It is not intended as medical advice for individual conditions or treatments. Talk to your doctor, nurse or pharmacist before following any medical regimen to see if it is safe and effective for you.

## 2018-11-30 DIAGNOSIS — E55.9 VITAMIN D DEFICIENCY: ICD-10-CM

## 2018-11-30 NOTE — TELEPHONE ENCOUNTER
Medication refill request:    Last Office Visit:  November 29, 2018  Next Office Visit:  No future appointments. Pharmacy verified. yes    Patient requesting 90 day supply.

## 2018-12-01 RX ORDER — ASPIRIN 325 MG
50000 TABLET, DELAYED RELEASE (ENTERIC COATED) ORAL
Qty: 8 CAP | Refills: 3 | OUTPATIENT
Start: 2018-12-01 | End: 2019-01-20

## 2018-12-03 DIAGNOSIS — E55.9 VITAMIN D DEFICIENCY: ICD-10-CM

## 2018-12-03 PROBLEM — R07.81 PLEURITIC CHEST PAIN: Status: RESOLVED | Noted: 2018-11-15 | Resolved: 2018-12-03

## 2018-12-04 RX ORDER — ASPIRIN 325 MG
TABLET, DELAYED RELEASE (ENTERIC COATED) ORAL
Qty: 8 CAP | Refills: 0 | OUTPATIENT
Start: 2018-12-04

## 2018-12-06 ENCOUNTER — TELEPHONE (OUTPATIENT)
Dept: INTERNAL MEDICINE CLINIC | Age: 63
End: 2018-12-06

## 2018-12-06 NOTE — TELEPHONE ENCOUNTER
LOV: 11/29/18  No future appts scheduled     Refill request for Gabapentin 100 MG  Qty: 180  Sig: Take 1 capsule by mouth twice a day  (did not see this in pt's med list)    Please send to [facility] pharmacy on file

## 2018-12-07 NOTE — TELEPHONE ENCOUNTER
Spoke with Pt pharmacy, advised that Gabapentin has been discontinued and to not send refill request for medication.

## 2018-12-10 ENCOUNTER — PATIENT OUTREACH (OUTPATIENT)
Dept: INTERNAL MEDICINE CLINIC | Age: 63
End: 2018-12-10

## 2018-12-10 NOTE — PROGRESS NOTES
Goals      Attends follow-up appointments as directed. 11/20/18  Patient has scheduled with Pulmonary on 11/28/18  PCP on 11/29/18  Patient stated ID said she did not need to follow up with her PCP would be fine. 12/10/18  Spoke with patient she is doing better no more cough or congestion. Patient is still having some weakness and will be seeing the Rheumatologist on Thursday. Attended appointments with pcp and pulm. This note will not be viewable in [address]

## 2018-12-20 ENCOUNTER — PATIENT OUTREACH (OUTPATIENT)
Dept: INTERNAL MEDICINE CLINIC | Age: 63
End: 2018-12-20

## 2018-12-20 NOTE — PROGRESS NOTES
Patient has graduated from the Transitions of Care Coordination  program on 12/20/18. Patient's symptoms are stable at this time. Patient/family has the ability to self-manage. Care management goals have been completed at this time. No further nurse navigator follow up scheduled. Goals Addressed     None          Pt has nurse navigator's contact information for any further questions, concerns, or needs. Patients upcoming visits:  No future appointments. This note will not be viewable in [address]

## 2019-08-14 ENCOUNTER — HOSPITAL ENCOUNTER (EMERGENCY)
Age: 64
Discharge: HOME | End: 2019-08-14
Attending: EMERGENCY MEDICINE
Payer: MEDICARE

## 2019-08-14 VITALS
HEIGHT: 64 IN | BODY MASS INDEX: 26.98 KG/M2 | SYSTOLIC BLOOD PRESSURE: 184 MMHG | DIASTOLIC BLOOD PRESSURE: 72 MMHG | WEIGHT: 158 LBS | HEART RATE: 84 BPM | TEMPERATURE: 98.3 F | OXYGEN SATURATION: 97 % | RESPIRATION RATE: 16 BRPM

## 2019-08-14 DIAGNOSIS — Z20.3 CONTACT WITH AND (SUSPECTED) EXPOSURE TO RABIES: Primary | ICD-10-CM

## 2019-08-14 PROCEDURE — 90471 IMMUNIZATION ADMIN: CPT

## 2019-08-14 PROCEDURE — 90675 RABIES VACCINE IM: CPT | Performed by: PHYSICIAN ASSISTANT

## 2019-08-14 PROCEDURE — 90375 RABIES IG IM/SC: CPT | Performed by: PHYSICIAN ASSISTANT

## 2019-08-14 PROCEDURE — 74011250636 HC RX REV CODE- 250/636: Performed by: PHYSICIAN ASSISTANT

## 2019-08-14 PROCEDURE — 99283 EMERGENCY DEPT VISIT LOW MDM: CPT

## 2019-08-14 PROCEDURE — 90472 IMMUNIZATION ADMIN EACH ADD: CPT

## 2019-08-14 RX ORDER — METHOTREXATE 2.5 MG/1
TABLET ORAL
COMMUNITY
End: 2020-05-27

## 2019-08-14 RX ADMIN — Medication 2.5 UNITS: at 12:50

## 2019-08-14 RX ADMIN — RABIES IMMUNE GLOBULIN (HUMAN) 1440 UNITS: 300 INJECTION, SOLUTION INFILTRATION; INTRAMUSCULAR at 14:04

## 2019-08-14 NOTE — ED NOTES
Jose Miguel CROWDER has reviewed discharge instructions with the patient. The patient verbalized understanding. Pt in wheelchair to waiting room to meet family.
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
## 2019-08-14 NOTE — PROGRESS NOTES
CM handling rabies vaccination appointments for patient. Scrip and Infusion order form scanned into patient's chart by ER . Patient states she is on Methotrexate and immunocompromised so will need 5 shots instead of 4. MD verified and placed order. Vaccinations to be:  Day 3 8/17 Sat     Day 7 8/21 Wed     Day 14 8/28 Wed and Day 21 9/11 Wed    Patient has already received 1st shots here today. Call to be placed to [facility] to schedule appointments. Met with patient in room,  CM will call patient later today with appointment times and dates.         1450:  Patient appointments received and communicated to patient:    Day 3 8/17 0800 at [facility] building, ground floor  Day 7 8/21 1500 at [address]  Day 14 8/28 1500 at [address]  Day 21 9/11 1500 at [address] [place] [address]  [phone]
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
## 2019-08-17 ENCOUNTER — HOSPITAL ENCOUNTER (OUTPATIENT)
Dept: INFUSION THERAPY | Age: 64
Discharge: HOME | End: 2019-08-17
Payer: MEDICARE

## 2019-08-17 VITALS
SYSTOLIC BLOOD PRESSURE: 160 MMHG | TEMPERATURE: 98.2 F | DIASTOLIC BLOOD PRESSURE: 77 MMHG | RESPIRATION RATE: 18 BRPM | HEART RATE: 91 BPM

## 2019-08-17 PROCEDURE — 90471 IMMUNIZATION ADMIN: CPT

## 2019-08-17 PROCEDURE — 74011250636 HC RX REV CODE- 250/636: Performed by: PHYSICIAN ASSISTANT

## 2019-08-17 PROCEDURE — 90675 RABIES VACCINE IM: CPT | Performed by: PHYSICIAN ASSISTANT

## 2019-08-17 RX ADMIN — RABIES VACCINE 2.5 UNITS: KIT at 08:07

## 2019-08-21 ENCOUNTER — HOSPITAL ENCOUNTER (OUTPATIENT)
Dept: INFUSION THERAPY | Age: 64
Discharge: HOME | End: 2019-08-21
Payer: MEDICARE

## 2019-08-21 VITALS
DIASTOLIC BLOOD PRESSURE: 76 MMHG | SYSTOLIC BLOOD PRESSURE: 148 MMHG | RESPIRATION RATE: 18 BRPM | HEART RATE: 91 BPM | TEMPERATURE: 98.7 F

## 2019-08-21 PROCEDURE — 90675 RABIES VACCINE IM: CPT | Performed by: PHYSICIAN ASSISTANT

## 2019-08-21 PROCEDURE — 96372 THER/PROPH/DIAG INJ SC/IM: CPT

## 2019-08-21 PROCEDURE — 74011250636 HC RX REV CODE- 250/636: Performed by: PHYSICIAN ASSISTANT

## 2019-08-21 RX ADMIN — RABIES VACCINE 2.5 UNITS: KIT at 15:12

## 2019-08-21 NOTE — PROGRESS NOTES
[facility] VISIT NOTE    1505. Pt arrived at [facility] via wheelchair and in no distress for Day 7 Rabies Vaccine. Assessment completed, pt states no changes to assesment. Medications received:  Rabavert - IM - Left Arm    Tolerated treatment well, no adverse reaction noted. Patient Vitals for the past 12 hrs:   Temp Resp BP   08/21/19 1508 98.7 °F (37.1 °C) 18 148/76     1515. D/C'd from [facility] via wheelchair and in no distress accompanied by family members.  Next appointment is 8/28/19 at 3:00 pm.

## 2019-08-28 ENCOUNTER — HOSPITAL ENCOUNTER (OUTPATIENT)
Dept: INFUSION THERAPY | Age: 64
Discharge: HOME | End: 2019-08-28
Payer: MEDICARE

## 2019-08-28 VITALS
DIASTOLIC BLOOD PRESSURE: 76 MMHG | HEART RATE: 87 BPM | OXYGEN SATURATION: 99 % | SYSTOLIC BLOOD PRESSURE: 149 MMHG | TEMPERATURE: 98.8 F | RESPIRATION RATE: 18 BRPM

## 2019-08-28 PROCEDURE — 90471 IMMUNIZATION ADMIN: CPT

## 2019-08-28 PROCEDURE — 90675 RABIES VACCINE IM: CPT

## 2019-08-28 PROCEDURE — 74011250636 HC RX REV CODE- 250/636

## 2019-08-28 RX ADMIN — RABIES VACCINE 2.5 UNITS: KIT at 14:56

## 2019-09-11 ENCOUNTER — HOSPITAL ENCOUNTER (OUTPATIENT)
Dept: INFUSION THERAPY | Age: 64
Discharge: HOME | End: 2019-09-11
Payer: MEDICARE

## 2019-09-11 VITALS
RESPIRATION RATE: 18 BRPM | HEIGHT: 64 IN | OXYGEN SATURATION: 98 % | HEART RATE: 94 BPM | WEIGHT: 154 LBS | TEMPERATURE: 98.9 F | DIASTOLIC BLOOD PRESSURE: 82 MMHG | BODY MASS INDEX: 26.29 KG/M2 | SYSTOLIC BLOOD PRESSURE: 165 MMHG

## 2019-09-11 PROCEDURE — 90471 IMMUNIZATION ADMIN: CPT

## 2019-09-11 PROCEDURE — 74011250636 HC RX REV CODE- 250/636: Performed by: PHYSICIAN ASSISTANT

## 2019-09-11 PROCEDURE — 90675 RABIES VACCINE IM: CPT | Performed by: PHYSICIAN ASSISTANT

## 2019-09-11 RX ADMIN — RABIES VACCINE 2.5 UNITS: KIT at 14:58

## 2019-09-11 NOTE — PROGRESS NOTES
Outpatient Infusion Center Short Visit Progress Note    4338 Pt admit to [facility] for Rabies D28 via W/C in stable condition. Assessment completed. No new concerns voiced. Patient Vitals for the past 12 hrs:   Temp Pulse Resp BP SpO2   09/11/19 1517 -- 94 18 165/82 --   09/11/19 1453 98.9 °F (37.2 °C) 91 18 160/86 98 %         Medications:  Rabies vaccine IM L deltoid    1520 Pt tolerated treatment well. D/c home via W/C, accompanied by friend/caregiver, in no distress. No future appts scheduled at this time.

## 2019-10-29 ENCOUNTER — TELEPHONE (OUTPATIENT)
Dept: INTERNAL MEDICINE CLINIC | Age: 64
End: 2019-10-29

## 2019-10-29 NOTE — PROGRESS NOTES
RM 3    Chief Complaint   Patient presents with    Follow-up     for medication,    Immunization/Injection     desires flu vaccines    Other     requested referral to [facility] myositis center     1. Have you been to the ER, urgent care clinic since your last visit? Hospitalized since your last visit?no    2. Have you seen or consulted any other health care providers outside of the [address] [place] [address] since your last visit? Include any pap smears or colon screening.  Dr. Jimenes Labor for special surgery in [facility], rheumatologist Dr. Wallace Gut Maintenance Due   Topic Date Due    COLONOSCOPY  09/07/1973    Shingrix Vaccine Age 50> (1 of 2) 09/07/2005    Pneumococcal 0-64 years (2 of 3 - PCV13) 09/27/2018    Influenza Age 9 to Adult  08/01/2019    MEDICARE YEARLY EXAM  08/24/2019     Patient desires flu and Prevnar 13 vaccines     Learning Assessment 11/29/2018   PRIMARY LEARNER Patient   HIGHEST LEVEL OF EDUCATION - PRIMARY LEARNER  > 4 YEARS OF COLLEGE   BARRIERS PRIMARY LEARNER NONE   PRIMARY LANGUAGE ENGLISH   LEARNER PREFERENCE PRIMARY READING   ANSWERED BY patient   RELATIONSHIP SELF

## 2019-10-30 ENCOUNTER — OFFICE VISIT (OUTPATIENT)
Dept: INTERNAL MEDICINE CLINIC | Age: 64
End: 2019-10-30

## 2019-10-30 ENCOUNTER — HOSPITAL ENCOUNTER (OUTPATIENT)
Dept: LAB | Age: 64
Discharge: HOME | End: 2019-10-30
Payer: MEDICARE

## 2019-10-30 VITALS
HEIGHT: 64 IN | SYSTOLIC BLOOD PRESSURE: 154 MMHG | RESPIRATION RATE: 17 BRPM | WEIGHT: 147 LBS | OXYGEN SATURATION: 97 % | BODY MASS INDEX: 25.1 KG/M2 | DIASTOLIC BLOOD PRESSURE: 76 MMHG | HEART RATE: 90 BPM | TEMPERATURE: 97.5 F

## 2019-10-30 DIAGNOSIS — Z23 ENCOUNTER FOR IMMUNIZATION: ICD-10-CM

## 2019-10-30 DIAGNOSIS — G47.33 OBSTRUCTIVE SLEEP APNEA SYNDROME: ICD-10-CM

## 2019-10-30 DIAGNOSIS — R73.09 ELEVATED GLUCOSE: ICD-10-CM

## 2019-10-30 DIAGNOSIS — D84.9 IMMUNOSUPPRESSED STATUS (HCC): ICD-10-CM

## 2019-10-30 DIAGNOSIS — M81.0 AGE-RELATED OSTEOPOROSIS WITHOUT CURRENT PATHOLOGICAL FRACTURE: ICD-10-CM

## 2019-10-30 DIAGNOSIS — M33.20 POLYMYOSITIS (HCC): Primary | ICD-10-CM

## 2019-10-30 DIAGNOSIS — I10 ESSENTIAL HYPERTENSION: ICD-10-CM

## 2019-10-30 PROBLEM — J18.9 PNEUMONIA: Status: RESOLVED | Noted: 2018-11-15 | Resolved: 2019-10-30

## 2019-10-30 PROCEDURE — 80053 COMPREHEN METABOLIC PANEL: CPT

## 2019-10-30 PROCEDURE — 85025 COMPLETE CBC W/AUTO DIFF WBC: CPT

## 2019-10-30 PROCEDURE — 83036 HEMOGLOBIN GLYCOSYLATED A1C: CPT

## 2019-10-30 RX ORDER — HYDROCHLOROTHIAZIDE 12.5 MG/1
12.5 TABLET ORAL DAILY
Qty: 90 TAB | Refills: 1 | Status: SHIPPED | OUTPATIENT
Start: 2019-10-30 | End: 2020-05-27

## 2019-10-30 RX ORDER — FOLIC ACID 1 MG/1
TABLET ORAL
COMMUNITY
End: 2020-05-27
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
## 2019-10-30 NOTE — PATIENT INSTRUCTIONS
Deciding About Bisphosphonate Medicine for Osteoporosis  How can you decide about taking bisphosphonate medicine for osteoporosis? This information is right for you if you are a woman who has been through menopause. If that does not describe you, or if you're not sure, talk with your doctor about this decision. What is osteoporosis? Osteoporosis is a disease that affects your bones. It means you have bones that are thin and brittle and have lots of holes inside them like a sponge. This makes them easy to break. It also increases your risk for spine and hip fractures. These fractures may make it hard for you to live on your own. Bisphosphonates are the most common medicines used to prevent bone loss. Most of the time, you take them as pills. They slow the way bone dissolves and is absorbed by your body. They can increase bone thickness and strength. What are key points about this decision? · If you are at a higher risk of having a fracture, taking bisphosphonates is more likely to help you prevent a fracture. If your risk of a fracture is lower, it's less likely that these medicines will help you. · Bisphosphonates can cause problems with the jaw or thigh bone. But most women do not have these side effects. · Whether you take medicine or not, healthy habits can help protect your bones. Get enough calcium and vitamin D. Get regular weight-bearing exercise. Cut back on alcohol. And if you smoke, quit. Who is helped the most by bisphosphonates? For women who have been through menopause:  · If you have osteoporosis (your T-score is -2.5 or less) or you have had a fracture, taking bisphosphonates lowers your risk of having a fracture. · If you haven't had a fracture and you have low bone density (your T-score is between -1.0 and -2.5, sometimes called osteopenia), taking bisphosphonates might lower your risk of having a fracture. This evidence is not as strong.   What are the side effects of bisphosphonates? These medicines can have side effects, such as heartburn and belly pain. Certain bone problems have also been reported in women taking bisphosphonates. Out of 1,000 people, about 1 person has a bone side effect during a year of taking bisphosphonates. That means 999 out of 1,000 people do not have a bone side effect. These bone side effects include problems with the jaw bone (called osteonecrosis). They also might include a certain kind of fracture of the thigh bone (called an atypical fracture), but more research is needed to find out if taking bisphosphonates is a cause of these fractures. Your decision  Thinking about the facts and your feelings can help you make a decision that is right for you. Be sure you understand the benefits and risks of your options, and think about what else you need to do before you make the decision. Where can you learn more? Go to http://brijesh-marichuy.info/. Enter B764 in the search box to learn more about \"Deciding About Bisphosphonate Medicine for Osteoporosis. \"  Current as of: November 7, 2018  Content Version: 12.2  © 9697-6056 [employer]. Care instructions adapted under license by [employer] (which disclaims liability or warranty for this information). If you have questions about a medical condition or this instruction, always ask your healthcare professional. [address] any warranty or liability for your use of this information.

## 2019-10-31 LAB
ALBUMIN SERPL-MCNC: 3.8 G/DL (ref 3.6–4.8)
ALBUMIN/GLOB SERPL: 1.3 {RATIO} (ref 1.2–2.2)
ALP SERPL-CCNC: 83 IU/L (ref 39–117)
ALT SERPL-CCNC: 112 IU/L (ref 0–32)
AST SERPL-CCNC: 185 IU/L (ref 0–40)
BASOPHILS # BLD AUTO: 0.1 X10E3/UL (ref 0–0.2)
BASOPHILS NFR BLD AUTO: 1 %
BILIRUB SERPL-MCNC: 0.3 MG/DL (ref 0–1.2)
BUN SERPL-MCNC: 18 MG/DL (ref 8–27)
BUN/CREAT SERPL: 26 (ref 12–28)
CALCIUM SERPL-MCNC: 9.3 MG/DL (ref 8.7–10.3)
CHLORIDE SERPL-SCNC: 108 MMOL/L (ref 96–106)
CO2 SERPL-SCNC: 22 MMOL/L (ref 20–29)
CREAT SERPL-MCNC: 0.69 MG/DL (ref 0.57–1)
EOSINOPHIL # BLD AUTO: 0.1 X10E3/UL (ref 0–0.4)
EOSINOPHIL NFR BLD AUTO: 1 %
ERYTHROCYTE [DISTWIDTH] IN BLOOD BY AUTOMATED COUNT: 19.5 % (ref 12.3–15.4)
EST. AVERAGE GLUCOSE BLD GHB EST-MCNC: 103 MG/DL
GLOBULIN SER CALC-MCNC: 3 G/DL (ref 1.5–4.5)
GLUCOSE SERPL-MCNC: 72 MG/DL (ref 65–99)
HBA1C MFR BLD: 5.2 % (ref 4.8–5.6)
HCT VFR BLD AUTO: 38.8 % (ref 34–46.6)
HGB BLD-MCNC: 11.8 G/DL (ref 11.1–15.9)
IMM GRANULOCYTES # BLD AUTO: 0.1 X10E3/UL (ref 0–0.1)
IMM GRANULOCYTES NFR BLD AUTO: 1 %
LYMPHOCYTES # BLD AUTO: 0.9 X10E3/UL (ref 0.7–3.1)
LYMPHOCYTES NFR BLD AUTO: 11 %
MCH RBC QN AUTO: 22.2 PG (ref 26.6–33)
MCHC RBC AUTO-ENTMCNC: 30.4 G/DL (ref 31.5–35.7)
MCV RBC AUTO: 73 FL (ref 79–97)
MONOCYTES # BLD AUTO: 1.3 X10E3/UL (ref 0.1–0.9)
MONOCYTES NFR BLD AUTO: 16 %
NEUTROPHILS # BLD AUTO: 5.8 X10E3/UL (ref 1.4–7)
NEUTROPHILS NFR BLD AUTO: 70 %
PLATELET # BLD AUTO: 281 X10E3/UL (ref 150–450)
POTASSIUM SERPL-SCNC: 4.6 MMOL/L (ref 3.5–5.2)
PROT SERPL-MCNC: 6.8 G/DL (ref 6–8.5)
RBC # BLD AUTO: 5.31 X10E6/UL (ref 3.77–5.28)
SODIUM SERPL-SCNC: 143 MMOL/L (ref 134–144)
WBC # BLD AUTO: 8.3 X10E3/UL (ref 3.4–10.8)

## 2019-11-07 NOTE — PROGRESS NOTES
Elevated liver enzymes consistent with history of active muscular disease/inflammation. No anemia, but ongoing microcytosis, and with rdw increased, suspicious for active bleeding. A1C does not show in range concerning for diabetes, may be falsely low due to factors such as bleeding.

## 2019-11-12 DIAGNOSIS — I10 ESSENTIAL HYPERTENSION: ICD-10-CM

## 2019-11-12 RX ORDER — LOSARTAN POTASSIUM 100 MG/1
TABLET ORAL
Qty: 90 TAB | Refills: 3 | Status: SHIPPED | OUTPATIENT
Start: 2019-11-12 | End: 2020-05-07 | Stop reason: CLARIF

## 2020-01-03 ENCOUNTER — HOSPITAL ENCOUNTER (OUTPATIENT)
Dept: MRI IMAGING | Age: 65
Discharge: HOME | End: 2020-01-03
Payer: MEDICARE

## 2020-01-03 DIAGNOSIS — M87.00 ASEPTIC NECROSIS BONE (HCC): ICD-10-CM

## 2020-01-03 PROCEDURE — 73721 MRI JNT OF LWR EXTRE W/O DYE: CPT

## 2020-02-03 ENCOUNTER — TELEPHONE (OUTPATIENT)
Dept: INTERNAL MEDICINE CLINIC | Age: 65
End: 2020-02-03

## 2020-02-03 NOTE — TELEPHONE ENCOUNTER
FYI..    Pt states that her leg is in extreme pain and that she is going to have EMS come and take her over to [facility].

## 2020-04-21 ENCOUNTER — TELEPHONE (OUTPATIENT)
Dept: INTERNAL MEDICINE CLINIC | Age: 65
End: 2020-04-21

## 2020-04-21 ENCOUNTER — VIRTUAL VISIT (OUTPATIENT)
Dept: INTERNAL MEDICINE CLINIC | Age: 65
End: 2020-04-21

## 2020-04-21 DIAGNOSIS — M33.20 POLYMYOSITIS (HCC): Primary | ICD-10-CM

## 2020-04-21 DIAGNOSIS — I10 ESSENTIAL HYPERTENSION: ICD-10-CM

## 2020-04-21 DIAGNOSIS — D84.9 IMMUNOSUPPRESSED STATUS (HCC): ICD-10-CM

## 2020-04-21 DIAGNOSIS — M81.0 AGE-RELATED OSTEOPOROSIS WITHOUT CURRENT PATHOLOGICAL FRACTURE: ICD-10-CM

## 2020-04-21 RX ORDER — GLUCOSAMINE HCL 500 MG
TABLET ORAL
Status: ON HOLD | COMMUNITY
End: 2020-06-06

## 2020-04-21 NOTE — PATIENT INSTRUCTIONS
I am glad we were able to meet earlier today. Please call and schedule your follow-up appointment soon so you are able to get the date and time you need. I recommend:  
 - measure home blood pressure 3-6 times before next appointment. - call Dr. Danna Rodas to discuss follow-up plan and scheduling of testing.  
 - start reclast for osteopororis. . If you are ready to do this at next appointment we can order through local infusion center.  
- call and schedule bone density test for sometime in next 3 months. Please call scheduling department to arrange for testing at: [phone] Please do not hesitate to contact the office if any new questions or concerns. All the best,  
  
   Dr. Artur Ocampo

## 2020-04-21 NOTE — TELEPHONE ENCOUNTER
Please call and confirm virtual visit for follow-up in 2 weeks. (discussed on phone)   Schedule as medicare wellness and BP follow-up.      Thanks  Helga Layton MD

## 2020-04-21 NOTE — PROGRESS NOTES
Anette Parra is a 59 y.o. female who was seen by synchronous (real-time) audio-video technology on 4/21/2020. Pursuant to the emergency declaration under the [address] and [place] [address] [facility] authority and the [employer] Act, this Virtual Visit was conducted, with patient's consent, to reduce the patient's risk of exposure to COVID-19 and provide continuity of care for an established patient. Services were provided through a video synchronous discussion virtually to substitute for in-person appointment. Consent:  She and/or her healthcare decision maker is aware that this patient-initiated Telehealth encounter is a billable service, with coverage as determined by her insurance carrier. She is aware that she may receive a bill and has provided verbal consent to proceed: Yes    I was in the office while conducting this encounter. Assessment & Plan:   Diagnoses and all orders for this visit:    1. Polymyositis ([facility]    2. Age-related osteoporosis without current pathological fracture    3. Essential hypertension  -     [facility]    4. Immunosuppressed status ([facility]      HTN: currently only taking losartan. No recnet BP measurement. Will ask care provider to help her use manual cuff at home.    - follow-up in 2 weeks. Osteoporosis: has been advised to take reclast by both myself and [facility] specialist. Was waiting on new dexa, but has not scheduled this. - advised we can still order infusion, but either way, it will be good to get a new dexa in the next 3-6 months as a fresh \"baseline\". - she will discuss with Dr. Calleen Kussmaul. Polymyositis: self-restarted prednisone, has not discussed this or any follow-up testing with primary rheumatologist Dr. Calleen Kussmaul at [facility]. Unclear if this was all misunderstnading, or if there is some degree of medical avoidance.  Patient does express a lot of hesitation and nervousness about most recommendations. Possible undertreated depression. Will ask patient to complete PHQ, will send as [employer] file. 232  Subjective:   Fortino Molina was seen for Medication Evaluation  59year old woman. History of inflammatory muscle condition, has struggled with finding physician she feels confident has the correct diagnosis. Evaluated [facility] in 2018, [facility] in early 2019, and evaluated with [facility] November to January 2020. Last visit with me 10/2019,     Jo1 Polymyositits, cancer related. Most recent recommendations from Dr. Annia Franks at [facility] include: biopsy, emg/ncs, and CT chest.   - recommended relcast for osteoporosis. - right hip MRI with severe AO. - referral to psychiatrist and therapist.    Seen by ortho orthovirginia (VEL Bad[facility]) given intraarticular injection 2/4/2020. Completed injection. Reports that she has continued her exercise on recumbent bike. reprots that she restarted her prednisone after the Physical therapist suggested this. Has not been in communication with her rheumatologist since [facility] visit. Is not aware of any request to repeat EMG nor of getting a CT scan. Has been waiting for May visit, but now worried that she will have to go to office. Has not been able to do \"anything\" was going into assisted living in February because condition was getting worse. She has been avoiding medical office since it was flu season and then covid-19. Home blood pressure: reports that she was taking garlic pills and so her blood pressure was normal. Last measured march 13 patient remembers it as \"normal\". Prior to Admission medications    Medication Sig Start Date End Date Taking? Authorizing Provider   losartan (COZAAR) 100 mg tablet TAKE 1 TABLET BY MOUTH EVERY DAY 11/12/19  Yes Carolynn Alonso MD   rituximab (RITUXAN IV) by IntraVENous route.  Indications: every 6 months   Yes Provider, Historical   fluticasone (FLONASE) 50 mcg/actuation nasal spray 2 Sprays by Both Nostrils route daily. Patient taking differently: 2 Sprays by Both Nostrils route daily as needed. 18  Yes Kris Luo MD   vitamin E topical cream Apply  to affected area daily. Patient applies to face   Yes Other, MD Melanie   predniSONE (DELTASONE) 5 mg tablet TAKE 2 TABLETS BY MOUTH EVERY DAY 16  Yes Provider, Historical   folic acid (FOLVITE) 1 mg tablet Take  by mouth. Provider, Historical   hydroCHLOROthiazide (HYDRODIURIL) 12.5 mg tablet Take 1 Tab by mouth daily. 10/30/19   Kris Luo MD   methotrexate (RHEUMATREX) 2.5 mg tablet Take  by mouth. Other, MD Melanie   amLODIPine (NORVASC) 5 mg tablet Take 1 Tab by mouth daily. 18   Kris Luo MD   L. acidoph & paracasei- S therm- Bifido (RISAQUAD) 8 billion cell cap cap Take 1 Cap by mouth. 10/12/18   Provider, Historical   raNITIdine (ZANTAC) 150 mg tablet Take  by mouth. 1 tablet prn heartburn. 18   Provider, Historical   aspirin 81 mg chewable tablet Take 1 Tab by mouth daily. 8/10/17   Kris Luo MD     Allergies   Allergen Reactions    Ace Inhibitors Cough    Dilaudid [Hydromorphone] Other (comments)    Levaquin [Levofloxacin] Other (comments)     Leg swelling    Lisinopril Other (comments)     Cough      Metoprolol Other (comments)     hallucinations    Peanut Other (comments)     Social History     Tobacco Use    Smoking status: Former Smoker     Packs/day: 1.00     Years: 20.00     Pack years: 20.00     Types: Cigarettes     Last attempt to quit: 1998     Years since quittin.9    Smokeless tobacco: Never Used   Substance Use Topics    Alcohol use: No     Alcohol/week: 1.0 standard drinks     Types: 1 Glasses of wine per week       Review of Systems   Constitutional: Positive for malaise/fatigue. Negative for chills and fever. Respiratory: Negative for shortness of breath. Cardiovascular: Negative for chest pain.        PHYSICAL EXAMINATION:    Vital Signs: (As obtained by patient/caregiver at home)  There were no vitals taken for this visit. Constitutional: [x] Appears well-developed and well-nourished [x] No apparent distress      Mental status: [x] Alert and awake  [x] Oriented to person/place/time [x] Able to follow commands      Eyes:   EOM    [x]  Normal      Sclera  [x]  Normal              Discharge [x]  None visible       HENT: [x] Normocephalic, atraumatic    [x] Mouth/Throat: Mucous membranes are moist    External Ears [x] Normal      Neck: [x] No visualized mass     Pulmonary/Chest: [x] Respiratory effort normal   [x] No visualized signs of difficulty breathing or respiratory distress         Musculoskeletal:   [x] Normal range of motion of neck        Neurological:        [x] No Facial Asymmetry (Cranial nerve 7 motor function) (limited exam due to video visit)          [x] No gaze palsy              Skin:        [x] No significant exanthematous lesions or discoloration noted on facial skin              Psychiatric:       [x] Normal Affect        [x] Normal behavior    Other pertinent observable physical exam findings:- none        We discussed the expected course, resolution and complications of the diagnosis(es) in detail. Medication risks, benefits, costs, interactions, and alternatives were discussed as indicated. I advised her to contact the office if her condition worsens, changes or fails to improve as anticipated. She expressed understanding with the diagnosis(es) and plan. Pursuant to the emergency declaration under the [facility] waiver authority and the [employer] General Act, this Virtual  Visit was conducted, with patient's consent, to reduce the patient's risk of exposure to COVID-19 and provide continuity of care for an established patient.      Services were provided through a video synchronous discussion virtually to substitute for in-person clinic visit.     Alphonsa Hatchet, MD

## 2020-04-21 NOTE — PROGRESS NOTES
Chief Complaint   Patient presents with    Medication Evaluation     1. Have you been to the ER, urgent care clinic since your last visit? Hospitalized since your last visit? No    2. Have you seen or consulted any other health care providers outside of the [address] [place] [address] since your last visit? Include any pap smears or colon screening.  Drew Christianson rheumatologist, [facility] dr. Rhonda Turcios - rheumatologist - reports not seeing this doctor any more      Health Maintenance Due   Topic Date Due    Colonoscopy  09/07/1973    Shingrix Vaccine Age 50> (1 of 2) 09/07/2005    Medicare Yearly Exam  08/24/2019    Breast Cancer Screen Mammogram  09/20/2019       Learning Assessment 11/29/2018   PRIMARY LEARNER Patient   HIGHEST LEVEL OF EDUCATION - PRIMARY LEARNER  > 4 YEARS OF COLLEGE   BARRIERS PRIMARY LEARNER NONE   PRIMARY LANGUAGE ENGLISH   LEARNER PREFERENCE PRIMARY READING   ANSWERED BY patient   RELATIONSHIP SELF

## 2020-05-07 ENCOUNTER — TELEPHONE (OUTPATIENT)
Dept: INTERNAL MEDICINE CLINIC | Age: 65
End: 2020-05-07

## 2020-05-07 DIAGNOSIS — I10 ESSENTIAL HYPERTENSION: Primary | ICD-10-CM

## 2020-05-07 RX ORDER — IRBESARTAN 300 MG/1
300 TABLET ORAL
Qty: 90 TAB | Refills: 1 | Status: ON HOLD | OUTPATIENT
Start: 2020-05-07 | End: 2020-06-06

## 2020-05-07 NOTE — TELEPHONE ENCOUNTER
Please advise patient to expect replacement irbesartan. 300 mg should be an equivalent dosing to her current 100mg losartan.      Thanks  Héctor Mc MD

## 2020-05-07 NOTE — TELEPHONE ENCOUNTER
Medication for Losartan 100 mg is currently on back order. Please consider an alternate therapy. Alternatives are:  Irbesartan  Olemsartan  Telmisartan   Please review and prescribe if appropriate. Thank you.            Last visit 04/21/2020 Virtual visit MD Minerva Salazar   Next appointment None   Previous refill encounter(s) 11/12/2019 Cozaar #90 with 3 refills     Requested Prescriptions      No prescriptions requested or ordered in this encounter

## 2020-05-07 NOTE — TELEPHONE ENCOUNTER
Patient advised of the change in medications.  Patient will notify the office if she has any problems or concerns while on the different medication while losartan is on back order

## 2020-05-27 ENCOUNTER — VIRTUAL VISIT (OUTPATIENT)
Dept: INTERNAL MEDICINE CLINIC | Age: 65
End: 2020-05-27

## 2020-05-27 DIAGNOSIS — M21.372 FOOT DROP, LEFT FOOT: ICD-10-CM

## 2020-05-27 DIAGNOSIS — R73.09 ELEVATED GLUCOSE: Primary | ICD-10-CM

## 2020-05-27 DIAGNOSIS — M33.20 POLYMYOSITIS (HCC): ICD-10-CM

## 2020-05-27 DIAGNOSIS — Z85.528 HISTORY OF RENAL CELL CANCER: ICD-10-CM

## 2020-05-27 DIAGNOSIS — Z90.5 S/P NEPHRECTOMY: ICD-10-CM

## 2020-05-27 DIAGNOSIS — G47.33 OBSTRUCTIVE SLEEP APNEA SYNDROME: ICD-10-CM

## 2020-05-27 PROBLEM — D84.9 IMMUNOSUPPRESSED STATUS (HCC): Status: RESOLVED | Noted: 2017-07-10 | Resolved: 2020-05-27

## 2020-05-27 NOTE — PATIENT INSTRUCTIONS
I am glad we were able to meet earlier today. Please call and schedule your follow-up appointment soon so you are able to get the date and time you need. I recommend:  
 - call [address] to request appointment to have brace for left foot/ankle provided. - complete blood work as ordered by Dr. Danna Rodas and myself at [address] [place] [address].  
  - follow-up in 2 weeks. We should be sure to discuss if you have completed follow-up with Dr. Angela Mobley. I believe you were due for a surveillance visit in 2019, and I am not sure if that was completed. Please do not hesitate to contact the office if any new questions or concerns. All the best,  
  
   Dr. Artur Ocampo To [address] 
[phone]

## 2020-05-27 NOTE — PROGRESS NOTES
Chief Complaint   Patient presents with    Foot Pain     foot drop, foot drags when walking, reports getting worse, reports saw [facility] 1 yr ago for PT, reports they provided her a brace that went into her shoe that helped and would like to use this again. 1. Have you been to the ER, urgent care clinic since your last visit? Hospitalized since your last visit? No    2. Have you seen or consulted any other health care providers outside of the [address] [place] [address] since your last visit? Include any pap smears or colon screening.  Rheumatologist at [facility] Maintenance Due   Topic Date Due    Colonoscopy  09/07/1973    Shingrix Vaccine Age 50> (1 of 2) 09/07/2005    Medicare Yearly Exam  08/24/2019    Breast Cancer Screen Mammogram  09/20/2019       Learning Assessment 11/29/2018   PRIMARY LEARNER Patient   HIGHEST LEVEL OF EDUCATION - PRIMARY LEARNER  > 4 YEARS OF COLLEGE   BARRIERS PRIMARY LEARNER NONE   PRIMARY LANGUAGE ENGLISH   LEARNER PREFERENCE PRIMARY READING   ANSWERED BY patient   RELATIONSHIP SELF

## 2020-05-27 NOTE — PROGRESS NOTES
Anette Parra is a 59 y.o. female who was seen by synchronous (real-time) audio-video technology on 5/27/2020. Consent: Anette Parra, who was seen by synchronous (real-time) audio-video technology, and/or her healthcare decision maker, is aware that this patient-initiated, Telehealth encounter on 5/27/2020 is a billable service, with coverage as determined by her insurance carrier. She is aware that she may receive a bill and has provided verbal consent to proceed: Yes. Assessment & Plan:   Diagnoses and all orders for this visit:    1. Elevated glucose  -     HEMOGLOBIN A1C WITH EAG    2. Obstructive sleep apnea syndrome    3. Polymyositis (HCC)  -     AMB SUPPLY ORDER    4. Foot drop, left foot  -     AMB SUPPLY ORDER      Polymyosiits:    - left foot drop: would like a brace for left leg to help with food drop. AFO. This is appropriate for the patient given history of generalized weakness and prior response to brace when used with physical therapist.    - Initially self restarted prednisone. Now has stopped. Next follow-up is in July. Patient reports that Dr. Calleen Kussmaul wants her to get muscle biopsy so that she can better understand treatment. At this time patient does not plan to complete a repeat muscle biopsy. HTN: currently only taking losartan. Has been measuring at home. Reports home BP in good range. Will send [employer] message regarding this.      Osteoporosis: has been advised to take reclast by both myself and [employer]. Was waiting on new dexa, but has not scheduled this. - advised we can still order infusion, but either way, it will be good to get a new dexa in the next 3-6 months as a fresh \"baseline\". - at this time would not want to pursue treatment. Per patient. She would only want medications that improves how she feels in an immediate nature. Sleep apnea: patient not longer using CPAP denies daytime sleepiness.      Depressed mood: patient does not want referral to therapist. She was advised to meet with psychiatrist by Dr. Raquel Plaza, but at this time has not. She is open to meeting with a psychiatrist. Did not want to disucss further today. History of RCC, s/p right nephrectomy 2016: (pT1b renal cell CA with neg margins 7/2016)   - surgeon Dr. Gordon Mendoza. - retrieved record from [facility] and it appears her last urology visit was ~ 1 year after nephrectomy. - now at 4 years out. Will encourage her to have follow-up with Dr. Gordon Mendoza at next visit. - CT chest done     Follow-up and Dispositions    · Return in about 2 weeks (around 6/10/2020). (Please also see details in patient instructions)  Subjective:   Rhianna Hernández is a 59 y.o. female who was seen for Foot Pain (foot drop, foot drags when walking, reports getting worse, reports saw [facility] 1 yr ago for PT, reports they provided her a brace that went into her shoe that helped and would like to use this again. )    \"I'm actually getting a little bit better\"  \"I don't want to take any medications that don't make be feel better\". Stopped prednisone. Has no further updates. Taking fewer medications at this time. No new concerns except wanting AFO brace for foot drop. Prior to Admission medications    Medication Sig Start Date End Date Taking? Authorizing Provider   irbesartan (AVAPRO) 300 mg tablet Take 1 Tab by mouth nightly. 5/7/20  Yes Tamra Thompson MD   Cholecalciferol, Vitamin D3, 3,000 unit tab Take  by mouth. Yes Provider, Historical   vitamin E topical cream Apply  to affected area daily. Patient applies to face   Yes Other, MD Melanie   folic acid (FOLVITE) 1 mg tablet Take  by mouth.  5/27/20  Provider, Historical   rituximab (RITUXAN IV) by IntraVENous route. Indications: every 6 months  5/27/20  Provider, Historical   hydroCHLOROthiazide (HYDRODIURIL) 12.5 mg tablet Take 1 Tab by mouth daily.  10/30/19 5/27/20  Tamra Thompson MD   methotrexate (RHEUMATREX) 2.5 mg tablet Take  by mouth.  20  Other, MD Melanie   amLODIPine (NORVASC) 5 mg tablet Take 1 Tab by mouth daily. 18  Joelle Campbell MD   L. acidoph & paracasei- S therm- Bifido (RISAQUAD) 8 billion cell cap cap Take 1 Cap by mouth. 10/12/18 5/27/20  Provider, Historical   raNITIdine (ZANTAC) 150 mg tablet Take  by mouth. 1 tablet prn heartburn. 18  Provider, Historical   fluticasone (FLONASE) 50 mcg/actuation nasal spray 2 Sprays by Both Nostrils route daily. Patient taking differently: 2 Sprays by Both Nostrils route daily as needed. 18  Joelle Campbell MD   aspirin 81 mg chewable tablet Take 1 Tab by mouth daily. 8/10/17 5/27/20  Joelle Campbell MD   predniSONE (DELTASONE) 5 mg tablet TAKE 2 TABLETS BY MOUTH EVERY DAY 16  Provider, Historical     Allergies   Allergen Reactions    Ace Inhibitors Cough    Dilaudid [Hydromorphone] Other (comments)    Levaquin [Levofloxacin] Other (comments)     Leg swelling    Lisinopril Other (comments)     Cough      Metoprolol Other (comments)     hallucinations    Peanut Other (comments)     Social History     Tobacco Use    Smoking status: Former Smoker     Packs/day: 1.00     Years: 20.00     Pack years: 20.00     Types: Cigarettes     Last attempt to quit: 1998     Years since quittin.0    Smokeless tobacco: Never Used   Substance Use Topics    Alcohol use: No     Alcohol/week: 1.0 standard drinks     Types: 1 Glasses of wine per week     Review of Systems   Constitutional: Negative for chills, fever and malaise/fatigue. Respiratory: Negative for shortness of breath. Cardiovascular: Negative for chest pain. Objective:   Vital Signs: (As obtained by patient/caregiver at home)  There were no vitals taken for this visit.      PHYSICAL EXAMINATION:    Constitutional: [x] Appears well-developed and well-nourished [x] No apparent distress      Mental status: [x] Alert and awake  [x] Oriented to person/place/time [x] Able to follow commands      Eyes:   EOM    [x]  Normal      Sclera  [x]  Normal              Discharge [x]  None visible       HENT: [x] Normocephalic, atraumatic    [x] Mouth/Throat: Mucous membranes are moist    External Ears [x] Normal      Neck: [x] No visualized mass     Pulmonary/Chest: [x] Respiratory effort normal   [x] No visualized signs of difficulty breathing or respiratory distress         Musculoskeletal:   [x] Normal gait with no signs of ataxia         [x] Normal range of motion of neck        Neurological:        [x] No Facial Asymmetry (Cranial nerve 7 motor function) (limited exam due to video visit)          [x] No gaze palsy              Skin:        [x] No significant exanthematous lesions or discoloration noted on facial skin              Psychiatric:       [x] Normal Affect        [x] Normal behavior    Other pertinent observable physical exam findings:- none    We discussed the expected course, resolution and complications of the diagnosis(es) in detail. Medication risks, benefits, costs, interactions, and alternatives were discussed as indicated. I advised her to contact the office if her condition worsens, changes or fails to improve as anticipated. She expressed understanding with the diagnosis(es) and plan. Garyson Ware is a 59 y.o. female who was evaluated by a video visit encounter for concerns as above. Patient identification was verified prior to start of the visit. A caregiver was present when appropriate. Due to this being a TeleHealth encounter (During TOGKB-27 public health emergency), evaluation of the following organ systems was limited: Vitals/Constitutional/EENT/Resp/CV/GI//MS/Neuro/Skin/Heme-Lymph-Imm.   Pursuant to the emergency declaration under the [facility] waiver authority and the [employer] Act, this Virtual  Visit was conducted, with patient's (and/or legal guardian's) consent, to reduce the patient's risk of exposure to COVID-19 and provide necessary medical care. Services were provided through a video synchronous discussion virtually to substitute for in-person clinic visit. Patient was located at their home. Provider was in office.      Zackery Swan MD

## 2020-06-05 ENCOUNTER — APPOINTMENT (OUTPATIENT)
Dept: GENERAL RADIOLOGY | Age: 65
DRG: 545 | End: 2020-06-05
Attending: EMERGENCY MEDICINE
Payer: MEDICARE

## 2020-06-05 ENCOUNTER — HOSPITAL ENCOUNTER (INPATIENT)
Age: 65
LOS: 11 days | Discharge: NURSING, REHAB OR LONG-TERM CARE | DRG: 545 | End: 2020-06-16
Attending: EMERGENCY MEDICINE | Admitting: INTERNAL MEDICINE
Payer: MEDICARE

## 2020-06-05 DIAGNOSIS — Z71.89 DNR (DO NOT RESUSCITATE) DISCUSSION: ICD-10-CM

## 2020-06-05 DIAGNOSIS — M62.82 NON-TRAUMATIC RHABDOMYOLYSIS: ICD-10-CM

## 2020-06-05 DIAGNOSIS — M33.20 POLYMYOSITIS (HCC): ICD-10-CM

## 2020-06-05 DIAGNOSIS — J18.9 PNEUMONIA OF LEFT LOWER LOBE DUE TO INFECTIOUS ORGANISM: ICD-10-CM

## 2020-06-05 DIAGNOSIS — R53.1 GENERALIZED WEAKNESS: Primary | ICD-10-CM

## 2020-06-05 DIAGNOSIS — Z71.89 GOALS OF CARE, COUNSELING/DISCUSSION: ICD-10-CM

## 2020-06-05 LAB
ALBUMIN SERPL-MCNC: 2.5 G/DL (ref 3.5–5)
ALBUMIN/GLOB SERPL: 0.6 {RATIO} (ref 1.1–2.2)
ALP SERPL-CCNC: 106 U/L (ref 45–117)
ALT SERPL-CCNC: 123 U/L (ref 12–78)
ANION GAP SERPL CALC-SCNC: 9 MMOL/L (ref 5–15)
AST SERPL-CCNC: 176 U/L (ref 15–37)
BASOPHILS # BLD: 0.1 K/UL (ref 0–0.1)
BASOPHILS NFR BLD: 1 % (ref 0–1)
BILIRUB SERPL-MCNC: 0.4 MG/DL (ref 0.2–1)
BUN SERPL-MCNC: 25 MG/DL (ref 6–20)
BUN/CREAT SERPL: 53 (ref 12–20)
CALCIUM SERPL-MCNC: 9.4 MG/DL (ref 8.5–10.1)
CHLORIDE SERPL-SCNC: 109 MMOL/L (ref 97–108)
CK SERPL-CCNC: 3313 U/L (ref 26–192)
CO2 SERPL-SCNC: 24 MMOL/L (ref 21–32)
CREAT SERPL-MCNC: 0.47 MG/DL (ref 0.55–1.02)
DIFFERENTIAL METHOD BLD: ABNORMAL
EOSINOPHIL # BLD: 0.3 K/UL (ref 0–0.4)
EOSINOPHIL NFR BLD: 4 % (ref 0–7)
ERYTHROCYTE [DISTWIDTH] IN BLOOD BY AUTOMATED COUNT: 15.5 % (ref 11.5–14.5)
GLOBULIN SER CALC-MCNC: 4.2 G/DL (ref 2–4)
GLUCOSE SERPL-MCNC: 71 MG/DL (ref 65–100)
HCT VFR BLD AUTO: 35.3 % (ref 35–47)
HGB BLD-MCNC: 11.8 G/DL (ref 11.5–16)
IMM GRANULOCYTES # BLD AUTO: 0.1 K/UL (ref 0–0.04)
IMM GRANULOCYTES NFR BLD AUTO: 1 % (ref 0–0.5)
LACTATE SERPL-SCNC: 1.3 MMOL/L (ref 0.4–2)
LYMPHOCYTES # BLD: 1.1 K/UL (ref 0.8–3.5)
LYMPHOCYTES NFR BLD: 16 % (ref 12–49)
MAGNESIUM SERPL-MCNC: 1.9 MG/DL (ref 1.6–2.4)
MCH RBC QN AUTO: 22.6 PG (ref 26–34)
MCHC RBC AUTO-ENTMCNC: 33.4 G/DL (ref 30–36.5)
MCV RBC AUTO: 67.5 FL (ref 80–99)
MONOCYTES # BLD: 0.9 K/UL (ref 0–1)
MONOCYTES NFR BLD: 13 % (ref 5–13)
NEUTS SEG # BLD: 4.4 K/UL (ref 1.8–8)
NEUTS SEG NFR BLD: 65 % (ref 32–75)
NRBC # BLD: 0 K/UL (ref 0–0.01)
NRBC BLD-RTO: 0 PER 100 WBC
PLATELET # BLD AUTO: 302 K/UL (ref 150–400)
PMV BLD AUTO: 11.4 FL (ref 8.9–12.9)
POTASSIUM SERPL-SCNC: 3.8 MMOL/L (ref 3.5–5.1)
PROT SERPL-MCNC: 6.7 G/DL (ref 6.4–8.2)
RBC # BLD AUTO: 5.23 M/UL (ref 3.8–5.2)
RBC MORPH BLD: ABNORMAL
SODIUM SERPL-SCNC: 142 MMOL/L (ref 136–145)
TSH SERPL DL<=0.05 MIU/L-ACNC: 2.88 UIU/ML (ref 0.36–3.74)
WBC # BLD AUTO: 6.9 K/UL (ref 3.6–11)

## 2020-06-05 PROCEDURE — 74011000258 HC RX REV CODE- 258: Performed by: INTERNAL MEDICINE

## 2020-06-05 PROCEDURE — 74011000258 HC RX REV CODE- 258: Performed by: EMERGENCY MEDICINE

## 2020-06-05 PROCEDURE — 83605 ASSAY OF LACTIC ACID: CPT

## 2020-06-05 PROCEDURE — 80053 COMPREHEN METABOLIC PANEL: CPT

## 2020-06-05 PROCEDURE — 87040 BLOOD CULTURE FOR BACTERIA: CPT

## 2020-06-05 PROCEDURE — 99285 EMERGENCY DEPT VISIT HI MDM: CPT

## 2020-06-05 PROCEDURE — 93005 ELECTROCARDIOGRAM TRACING: CPT

## 2020-06-05 PROCEDURE — 65270000029 HC RM PRIVATE

## 2020-06-05 PROCEDURE — 96367 TX/PROPH/DG ADDL SEQ IV INF: CPT

## 2020-06-05 PROCEDURE — 96375 TX/PRO/DX INJ NEW DRUG ADDON: CPT

## 2020-06-05 PROCEDURE — 84443 ASSAY THYROID STIM HORMONE: CPT

## 2020-06-05 PROCEDURE — 65660000000 HC RM CCU STEPDOWN

## 2020-06-05 PROCEDURE — 96365 THER/PROPH/DIAG IV INF INIT: CPT

## 2020-06-05 PROCEDURE — 74011250636 HC RX REV CODE- 250/636: Performed by: EMERGENCY MEDICINE

## 2020-06-05 PROCEDURE — 74011250636 HC RX REV CODE- 250/636: Performed by: INTERNAL MEDICINE

## 2020-06-05 PROCEDURE — 71045 X-RAY EXAM CHEST 1 VIEW: CPT

## 2020-06-05 PROCEDURE — 83735 ASSAY OF MAGNESIUM: CPT

## 2020-06-05 PROCEDURE — 82550 ASSAY OF CK (CPK): CPT

## 2020-06-05 PROCEDURE — 36415 COLL VENOUS BLD VENIPUNCTURE: CPT

## 2020-06-05 PROCEDURE — 85025 COMPLETE CBC W/AUTO DIFF WBC: CPT

## 2020-06-05 RX ORDER — DEXTROSE 50 % IN WATER (D50W) INTRAVENOUS SYRINGE
12.5-25 AS NEEDED
Status: DISCONTINUED | OUTPATIENT
Start: 2020-06-05 | End: 2020-06-16 | Stop reason: HOSPADM

## 2020-06-05 RX ORDER — ENOXAPARIN SODIUM 100 MG/ML
40 INJECTION SUBCUTANEOUS EVERY 24 HOURS
Status: DISCONTINUED | OUTPATIENT
Start: 2020-06-05 | End: 2020-06-10

## 2020-06-05 RX ORDER — ONDANSETRON 2 MG/ML
4 INJECTION INTRAMUSCULAR; INTRAVENOUS
Status: COMPLETED | OUTPATIENT
Start: 2020-06-05 | End: 2020-06-05

## 2020-06-05 RX ORDER — MAGNESIUM SULFATE 100 %
4 CRYSTALS MISCELLANEOUS AS NEEDED
Status: DISCONTINUED | OUTPATIENT
Start: 2020-06-05 | End: 2020-06-16 | Stop reason: HOSPADM

## 2020-06-05 RX ORDER — SODIUM CHLORIDE 9 MG/ML
100 INJECTION, SOLUTION INTRAVENOUS CONTINUOUS
Status: DISCONTINUED | OUTPATIENT
Start: 2020-06-05 | End: 2020-06-05

## 2020-06-05 RX ORDER — FENTANYL CITRATE 50 UG/ML
50 INJECTION, SOLUTION INTRAMUSCULAR; INTRAVENOUS
Status: COMPLETED | OUTPATIENT
Start: 2020-06-05 | End: 2020-06-05

## 2020-06-05 RX ORDER — SODIUM CHLORIDE 9 MG/ML
100 INJECTION, SOLUTION INTRAVENOUS CONTINUOUS
Status: DISCONTINUED | OUTPATIENT
Start: 2020-06-05 | End: 2020-06-08

## 2020-06-05 RX ORDER — INSULIN LISPRO 100 [IU]/ML
INJECTION, SOLUTION INTRAVENOUS; SUBCUTANEOUS
Status: DISCONTINUED | OUTPATIENT
Start: 2020-06-06 | End: 2020-06-16 | Stop reason: HOSPADM

## 2020-06-05 RX ORDER — SODIUM CHLORIDE 0.9 % (FLUSH) 0.9 %
5-40 SYRINGE (ML) INJECTION AS NEEDED
Status: DISCONTINUED | OUTPATIENT
Start: 2020-06-05 | End: 2020-06-16 | Stop reason: HOSPADM

## 2020-06-05 RX ORDER — ACETAMINOPHEN 325 MG/1
650 TABLET ORAL
Status: DISCONTINUED | OUTPATIENT
Start: 2020-06-05 | End: 2020-06-16 | Stop reason: HOSPADM

## 2020-06-05 RX ORDER — ONDANSETRON 2 MG/ML
4 INJECTION INTRAMUSCULAR; INTRAVENOUS
Status: DISCONTINUED | OUTPATIENT
Start: 2020-06-05 | End: 2020-06-16 | Stop reason: HOSPADM

## 2020-06-05 RX ORDER — SODIUM CHLORIDE 0.9 % (FLUSH) 0.9 %
5-40 SYRINGE (ML) INJECTION EVERY 8 HOURS
Status: DISCONTINUED | OUTPATIENT
Start: 2020-06-05 | End: 2020-06-16 | Stop reason: HOSPADM

## 2020-06-05 RX ADMIN — SODIUM CHLORIDE 100 ML/HR: 900 INJECTION, SOLUTION INTRAVENOUS at 18:31

## 2020-06-05 RX ADMIN — CEFTRIAXONE 1 G: 1 INJECTION, POWDER, FOR SOLUTION INTRAMUSCULAR; INTRAVENOUS at 19:06

## 2020-06-05 RX ADMIN — ONDANSETRON 4 MG: 2 INJECTION INTRAMUSCULAR; INTRAVENOUS at 18:31

## 2020-06-05 RX ADMIN — AZITHROMYCIN MONOHYDRATE 500 MG: 500 INJECTION, POWDER, LYOPHILIZED, FOR SOLUTION INTRAVENOUS at 20:02

## 2020-06-05 RX ADMIN — SODIUM CHLORIDE 100 ML/HR: 900 INJECTION, SOLUTION INTRAVENOUS at 22:17

## 2020-06-05 RX ADMIN — SODIUM CHLORIDE 500 MG: 900 INJECTION, SOLUTION INTRAVENOUS at 22:42

## 2020-06-05 RX ADMIN — ENOXAPARIN SODIUM 40 MG: 40 INJECTION SUBCUTANEOUS at 22:15

## 2020-06-05 RX ADMIN — FENTANYL CITRATE 50 MCG: 50 INJECTION INTRAMUSCULAR; INTRAVENOUS at 18:31

## 2020-06-06 LAB
ANION GAP SERPL CALC-SCNC: 10 MMOL/L (ref 5–15)
APPEARANCE UR: CLEAR
ATRIAL RATE: 93 BPM
BACTERIA URNS QL MICRO: ABNORMAL /HPF
BASOPHILS # BLD: 0 K/UL (ref 0–0.1)
BASOPHILS NFR BLD: 0 % (ref 0–1)
BILIRUB UR QL: NEGATIVE
BUN SERPL-MCNC: 22 MG/DL (ref 6–20)
BUN/CREAT SERPL: 58 (ref 12–20)
CALCIUM SERPL-MCNC: 9 MG/DL (ref 8.5–10.1)
CALCULATED P AXIS, ECG09: 51 DEGREES
CALCULATED R AXIS, ECG10: 3 DEGREES
CALCULATED T AXIS, ECG11: 27 DEGREES
CHLORIDE SERPL-SCNC: 110 MMOL/L (ref 97–108)
CK SERPL-CCNC: 3031 U/L (ref 26–192)
CO2 SERPL-SCNC: 20 MMOL/L (ref 21–32)
COLOR UR: ABNORMAL
CREAT SERPL-MCNC: 0.38 MG/DL (ref 0.55–1.02)
DIAGNOSIS, 93000: NORMAL
DIFFERENTIAL METHOD BLD: ABNORMAL
EOSINOPHIL # BLD: 0 K/UL (ref 0–0.4)
EOSINOPHIL NFR BLD: 0 % (ref 0–7)
EPITH CASTS URNS QL MICRO: ABNORMAL /LPF
ERYTHROCYTE [DISTWIDTH] IN BLOOD BY AUTOMATED COUNT: 16.3 % (ref 11.5–14.5)
GLUCOSE BLD STRIP.AUTO-MCNC: 169 MG/DL (ref 65–100)
GLUCOSE BLD STRIP.AUTO-MCNC: 232 MG/DL (ref 65–100)
GLUCOSE BLD STRIP.AUTO-MCNC: 264 MG/DL (ref 65–100)
GLUCOSE BLD STRIP.AUTO-MCNC: 99 MG/DL (ref 65–100)
GLUCOSE SERPL-MCNC: 94 MG/DL (ref 65–100)
GLUCOSE UR STRIP.AUTO-MCNC: NEGATIVE MG/DL
HCT VFR BLD AUTO: 36.8 % (ref 35–47)
HGB BLD-MCNC: 11.8 G/DL (ref 11.5–16)
HGB UR QL STRIP: NEGATIVE
IMM GRANULOCYTES # BLD AUTO: 0 K/UL (ref 0–0.04)
IMM GRANULOCYTES NFR BLD AUTO: 1 % (ref 0–0.5)
KETONES UR QL STRIP.AUTO: 15 MG/DL
LEUKOCYTE ESTERASE UR QL STRIP.AUTO: ABNORMAL
LYMPHOCYTES # BLD: 0.5 K/UL (ref 0.8–3.5)
LYMPHOCYTES NFR BLD: 7 % (ref 12–49)
MCH RBC QN AUTO: 22.1 PG (ref 26–34)
MCHC RBC AUTO-ENTMCNC: 32.1 G/DL (ref 30–36.5)
MCV RBC AUTO: 69 FL (ref 80–99)
MONOCYTES # BLD: 0.1 K/UL (ref 0–1)
MONOCYTES NFR BLD: 2 % (ref 5–13)
NEUTS SEG # BLD: 5.9 K/UL (ref 1.8–8)
NEUTS SEG NFR BLD: 90 % (ref 32–75)
NITRITE UR QL STRIP.AUTO: NEGATIVE
NRBC # BLD: 0 K/UL (ref 0–0.01)
NRBC BLD-RTO: 0 PER 100 WBC
OTHER,OTHU: ABNORMAL
P-R INTERVAL, ECG05: 172 MS
PH UR STRIP: 5.5 [PH] (ref 5–8)
PLATELET # BLD AUTO: 286 K/UL (ref 150–400)
PMV BLD AUTO: 11.7 FL (ref 8.9–12.9)
POTASSIUM SERPL-SCNC: 4.4 MMOL/L (ref 3.5–5.1)
PROT UR STRIP-MCNC: 30 MG/DL
Q-T INTERVAL, ECG07: 374 MS
QRS DURATION, ECG06: 72 MS
QTC CALCULATION (BEZET), ECG08: 465 MS
RBC # BLD AUTO: 5.33 M/UL (ref 3.8–5.2)
RBC #/AREA URNS HPF: ABNORMAL /HPF (ref 0–5)
SERVICE CMNT-IMP: ABNORMAL
SERVICE CMNT-IMP: NORMAL
SODIUM SERPL-SCNC: 140 MMOL/L (ref 136–145)
SP GR UR REFRACTOMETRY: 1.03 (ref 1–1.03)
UA: UC IF INDICATED,UAUC: ABNORMAL
UROBILINOGEN UR QL STRIP.AUTO: 1 EU/DL (ref 0.2–1)
VENTRICULAR RATE, ECG03: 93 BPM
WBC # BLD AUTO: 6.5 K/UL (ref 3.6–11)
WBC URNS QL MICRO: ABNORMAL /HPF (ref 0–4)

## 2020-06-06 PROCEDURE — 74011000258 HC RX REV CODE- 258: Performed by: INTERNAL MEDICINE

## 2020-06-06 PROCEDURE — 82962 GLUCOSE BLOOD TEST: CPT

## 2020-06-06 PROCEDURE — 36415 COLL VENOUS BLD VENIPUNCTURE: CPT

## 2020-06-06 PROCEDURE — 65660000000 HC RM CCU STEPDOWN

## 2020-06-06 PROCEDURE — 77030038269 HC DRN EXT URIN PURWCK BARD -A

## 2020-06-06 PROCEDURE — 74011250636 HC RX REV CODE- 250/636: Performed by: EMERGENCY MEDICINE

## 2020-06-06 PROCEDURE — 77030041247 HC PROTECTOR HEEL HEELMEDIX MDII -B

## 2020-06-06 PROCEDURE — 81001 URINALYSIS AUTO W/SCOPE: CPT

## 2020-06-06 PROCEDURE — 87040 BLOOD CULTURE FOR BACTERIA: CPT

## 2020-06-06 PROCEDURE — 82550 ASSAY OF CK (CPK): CPT

## 2020-06-06 PROCEDURE — 85025 COMPLETE CBC W/AUTO DIFF WBC: CPT

## 2020-06-06 PROCEDURE — 74011000258 HC RX REV CODE- 258: Performed by: EMERGENCY MEDICINE

## 2020-06-06 PROCEDURE — 87086 URINE CULTURE/COLONY COUNT: CPT

## 2020-06-06 PROCEDURE — 74011636637 HC RX REV CODE- 636/637: Performed by: INTERNAL MEDICINE

## 2020-06-06 PROCEDURE — 74011250636 HC RX REV CODE- 250/636: Performed by: INTERNAL MEDICINE

## 2020-06-06 PROCEDURE — 80048 BASIC METABOLIC PNL TOTAL CA: CPT

## 2020-06-06 RX ADMIN — Medication 10 ML: at 06:00

## 2020-06-06 RX ADMIN — SODIUM CHLORIDE 100 ML/HR: 900 INJECTION, SOLUTION INTRAVENOUS at 08:31

## 2020-06-06 RX ADMIN — VANCOMYCIN HYDROCHLORIDE 1000 MG: 1 INJECTION, POWDER, LYOPHILIZED, FOR SOLUTION INTRAVENOUS at 17:50

## 2020-06-06 RX ADMIN — AZITHROMYCIN 500 MG: 500 INJECTION, POWDER, LYOPHILIZED, FOR SOLUTION INTRAVENOUS at 21:01

## 2020-06-06 RX ADMIN — SODIUM CHLORIDE 500 MG: 900 INJECTION, SOLUTION INTRAVENOUS at 08:35

## 2020-06-06 RX ADMIN — SODIUM CHLORIDE 500 MG: 900 INJECTION, SOLUTION INTRAVENOUS at 21:11

## 2020-06-06 RX ADMIN — INSULIN LISPRO 2 UNITS: 100 INJECTION, SOLUTION INTRAVENOUS; SUBCUTANEOUS at 21:37

## 2020-06-06 RX ADMIN — ENOXAPARIN SODIUM 40 MG: 40 INJECTION SUBCUTANEOUS at 21:37

## 2020-06-06 RX ADMIN — SODIUM CHLORIDE 100 ML/HR: 900 INJECTION, SOLUTION INTRAVENOUS at 21:14

## 2020-06-06 RX ADMIN — CEFTRIAXONE 1 G: 1 INJECTION, POWDER, FOR SOLUTION INTRAMUSCULAR; INTRAVENOUS at 21:00

## 2020-06-06 RX ADMIN — INSULIN LISPRO 5 UNITS: 100 INJECTION, SOLUTION INTRAVENOUS; SUBCUTANEOUS at 17:49

## 2020-06-06 RX ADMIN — Medication 10 ML: at 21:11

## 2020-06-06 NOTE — ED NOTES
Report given to [facility], [facility]. They were informed of patient chief complaint, current status, orders completed (to include IV access/medications/radiology testing), outstanding orders that still need to be completed, and the treatment plan. Ensured no questions or concerns regarding the patient prior to departure.

## 2020-06-06 NOTE — PROGRESS NOTES
I spoke to susan of the [facility] rheumatology answering service who stated she was unable to reach the on call physician.  I will advise Dr Demario Nix

## 2020-06-06 NOTE — PROGRESS NOTES
I spoke to Dr Jeanie Rueda via face to face to advise him that the patient experienced coughing and throat clearing while eating cream of wheat,, oranges, magic cup and drinking water today. Dr Jeanie Rueda stated to place a speech consult for the patient. I spoke with Ramon Lama, speech therapist who stated she would reach out to [place] to see if she could see the patient today. I will place the patient on NPO per the request of Dr Jeanie Rueda.  Emmy Homans

## 2020-06-06 NOTE — PROGRESS NOTES
Bedside shift change report given to ADONAY Stack RN (oncoming nurse) by Edna Hudson RN (offgoing nurse). Report included the following information SBAR and Kardex.

## 2020-06-06 NOTE — ED NOTES
Patient's brief repositioned as it was causing her discomfort. Brief is dry at this time. Patient repositioned in bed, heels re-floated and a pillow placed under patient's head as she is constantly in one position.

## 2020-06-06 NOTE — PROGRESS NOTES
Problem: Falls - Risk of  Goal: *Absence of Falls  Description: Document Lj Lua Fall Risk and appropriate interventions in the flowsheet. Outcome: Progressing Towards Goal  Note: Fall Risk Interventions:  Mobility Interventions: Bed/chair exit alarm, Patient to call before getting OOB, Utilize walker, cane, or other assistive device, Strengthening exercises (ROM-active/passive)         Medication Interventions: Patient to call before getting OOB, Bed/chair exit alarm    Elimination Interventions: Bed/chair exit alarm, Call light in reach              Problem: Patient Education: Go to Patient Education Activity  Goal: Patient/Family Education  Outcome: Progressing Towards Goal     Problem: Pressure Injury - Risk of  Goal: *Prevention of pressure injury  Description: Document Armen Scale and appropriate interventions in the flowsheet.   Outcome: Progressing Towards Goal  Note: Pressure Injury Interventions:  Sensory Interventions: Assess changes in LOC, Minimize linen layers, Pad between skin to skin, Float heels    Moisture Interventions: Absorbent underpads, Minimize layers    Activity Interventions: Chair cushion, Increase time out of bed    Mobility Interventions: Chair cushion, HOB 30 degrees or less    Nutrition Interventions: Document food/fluid/supplement intake    Friction and Shear Interventions: Feet elevated on foot rest, Lift team/patient mobility team                Problem: Patient Education: Go to Patient Education Activity  Goal: Patient/Family Education  Outcome: Progressing Towards Goal

## 2020-06-06 NOTE — PROGRESS NOTES
Hospitalist Progress Note    NAME: Minh Rojas   :  1955   MRN:  [id number]       Assessment / Plan:      Polymyositis, likely flare  Weakness  ? PNA on CXR  Elevated CK        -Complicated and long history of polymyositis, at baseline ambulatory with a walker and most of the time uses her recliner bike for ambulation.    -Weakness for past 1 week, progressively getting worse, currently muscle strength around 2 out of 5     -started on Solu-Medrol 500 mg every 12 hours for 3 days,   -Consulted rheumatology  -Sliding scale insulin  -ED has attempted transfer patient to [facility] inpatient service, but did not have any bed available.   -Placed on azithromycin and Rocephin  -Pt has elevated CK at [address], she reports its better then before, continue IVF  -ck levels are improving slowly     Dysphagia  -likely 2/2 polymyositis  -cont care as above  -speech eval  -aspiration percautions     Refer to Clinic Note by Neurology on 20, for details on her history .        History of hypertension  History of JOHN  History of renal cancer status post right nephrectomy           Code Status: Full code        DVT Prophylaxis: Lovenox  GI Prophylaxis: not indicated     Subjective:     Chief Complaint / Reason for Physician Visit  Discussed with RN events overnight. Patient was seen and examined. No acute events overnight. \"feeling good today\"        Review of Systems:  Symptom Y/N Comments  Symptom Y/N Comments   Fever/Chills n   Chest Pain n    Poor Appetite    Edema     Cough    Abdominal Pain n    Sputum    Joint Pain     SOB/LOREDO n   Pruritis/Rash     Nausea/vomit    Tolerating PT/OT     Diarrhea    Tolerating Diet n    Constipation    Other       Could NOT obtain due to:      Objective:     VITALS:   Last 24hrs VS reviewed since prior progress note.  Most recent are:  Patient Vitals for the past 24 hrs:   Temp Pulse Resp BP SpO2   20 0757 -- 84 -- -- --   20 0656 97.5 °F (36.4 °C) 80 20 132/86 100 % 06/06/20 0344 97.4 °F (36.3 °C) 81 20 145/68 98 %   06/05/20 2353 98 °F (36.7 °C) 86 20 132/62 99 %   06/05/20 2222 98.4 °F (36.9 °C) 87 18 137/74 98 %   06/05/20 1954 98.5 °F (36.9 °C) 92 20 153/72 99 %   06/05/20 1918 -- -- -- -- 94 %   06/05/20 1915 -- 93 21 153/77 98 %   06/05/20 1900 -- 91 20 147/72 99 %   06/05/20 1845 -- 92 20 147/66 98 %   06/05/20 1830 -- 97 22 155/82 99 %   06/05/20 1815 -- 96 18 150/82 99 %   06/05/20 1800 -- 94 25 (!) 168/149 100 %   06/05/20 1745 -- 93 20 141/70 100 %   06/05/20 1730 -- 94 25 130/83 97 %   06/05/20 1715 -- 95 23 157/69 98 %   06/05/20 1700 -- 98 23 166/84 98 %   06/05/20 1628 99 °F (37.2 °C) 99 18 127/65 97 %       Intake/Output Summary (Last 24 hours) at 6/6/2020 0854  Last data filed at 6/6/2020 0658  Gross per 24 hour   Intake 1300 ml   Output 350 ml   Net 950 ml        PHYSICAL EXAM:  General: WD, WN. Alert, cooperative, no acute distress    EENT:  EOMI. Anicteric sclerae. MMM  Resp:  CTA bilaterally, no wheezing or rales. No accessory muscle use  CV:  Regular  rhythm,  No edema  GI:  Soft, Non distended, Non tender.  +Bowel sounds  Neurologic:  Alert and oriented X 3, normal speech,   Psych:   Good insight. Not anxious nor agitated  Skin:  No rashes. No jaundice    Reviewed most current lab test results and cultures  YES  Reviewed most current radiology test results   YES  Review and summation of old records today    NO  Reviewed patient's current orders and MAR    YES  PMH/SH reviewed - no change compared to H&P  ________________________________________________________________________  Care Plan discussed with:    Comments   Patient x    Family      RN x    Care Manager     Consultant                        Multidiciplinary team rounds were held today with , nursing, pharmacist and clinical coordinator. Patient's plan of care was discussed; medications were reviewed and discharge planning was addressed. ________________________________________________________________________  Total NON critical care TIME:  35   Minutes    Total CRITICAL CARE TIME Spent:   Minutes non procedure based      Comments   >50% of visit spent in counseling and coordination of care     ________________________________________________________________________  Leny Spain MD     Procedures: see electronic medical records for all procedures/Xrays and details which were not copied into this note but were reviewed prior to creation of Plan. LABS:  I reviewed today's most current labs and imaging studies.   Pertinent labs include:  Recent Labs     06/06/20  0437 06/05/20  1639   WBC 6.5 6.9   HGB 11.8 11.8   HCT 36.8 35.3    302     Recent Labs     06/06/20  0437 06/05/20  1639    142   K 4.4 3.8   * 109*   CO2 20* 24   GLU 94 71   BUN 22* 25*   CREA 0.38* 0.47*   CA 9.0 9.4   MG  --  1.9   ALB  --  2.5*   TBILI  --  0.4   ALT  --  123*       Signed: Leny Spain MD

## 2020-06-06 NOTE — PROGRESS NOTES
I was notified about blood cultures [facility] 2/3 bottles. Repeat blood cultures, Vancomycin, ECHO to r/o vegetations.

## 2020-06-06 NOTE — PROGRESS NOTES
I advised dr Sanjuana Perrin via the phone that the patient's coughing improved since her head was repositioned and had been sitting upright for awhile. The patient stated that her secretions pool at night while she is lying supine but improves as the day goes by. Dr Klaudia Lopez stated to restart the patient on clear liquids at lunch and to advance to a diabetic diet if she tolerates the diet without coughing or clearing her throat.  BASSAM

## 2020-06-06 NOTE — ED NOTES
Spoke with Jesus Crawford (patient's POA) and updated her on the patient's status. Will call her with official disposition as there is talk about potentially transferring her to [facility]. Number is updated in the patient's chart.

## 2020-06-06 NOTE — PROGRESS NOTES
Initial Nutrition Assessment:    INTERVENTIONS/RECOMMENDATIONS:   · Diet advancement/consistency per SLP, recommend a regular/liberalized diet  · Resume Magic cups BID and add ensure enlive BID once diet advanced    ASSESSMENT:   Patient medically noted for polymyositis. PMH HTN and renal cell cancer. Patient reports a decreased appetite recently and associated weight loss. Hasn't actually been weighed since October 2019 but knows she has lost weight (~10.9% x 7 months per chart review). She used to eat meals on wheels but then could only eat 1/3 of her meal and recently she has only been drinking 2 ensure supplements a day. Patient's head is tilted to the left; she reports this is fairly new over the past few months. Complains of ongoing issues with saliva and having to spit up constantly. It had become hard to eat at well. Hasn't seen a speech therapist on over a year. Magic cups currently ordered and patient stated she loved it. I spent time discussing supplements, adding ensure (vanilla or strawberry), and talking about food/ordering meals. During my visit, patient was made NPO. Discussed SLP consult with MD and RN which had already been placed. Will plan to resume/add supplements once patient cleared for PO. Patient does meet criteria for chronic severe malnutrition. Please document  Severe Protein Calorie Malnutrition in patient diagnoses. Patient meets criteria for as evidenced by:   ASPEN Malnutrition Criteria  Acute Illness, Chronic Illness, or Social/Enviornmental: Chronic illness  Energy Intake: Less than/equal to 75% of est energy req for greater than/equal to 1 month  Weight Loss: Greater than 10% x 6 mos  Body Fat Loss: Mild  Muscle Mass Loss: Mild  ASPEN Malnutrition Score - Chronic Illness: 14  Chronic Illness - Malnutrition Diagnosis: Severe malnutrition. Diet Order: NPO  % Eaten:  No data found.     Pertinent Medications: [x]Reviewed []Other: Humalog, Solu-medrol   Pertinent Labs: [x]Reviewed []Other:   Food Allergies: []None [x]Yes:  Peanut  Last BM: 6/3   []Active     []Hyperactive  []Hypoactive       [] Absent BS  Skin:    [x] Intact   [] Incision  [] Breakdown: [] Edema []Other:    Anthropometrics:   Height:  63\" Weight: 59.4 kg (130 lb 14.4 oz)   IBW (%IBW):   ( ) UBW (%UBW):   (  %)   Last Weight Metrics:  Weight Loss Metrics 6/6/2020 10/30/2019 9/11/2019 8/14/2019 11/29/2018 11/19/2018 10/16/2018   Today's Wt 130 lb 14.4 oz 147 lb 154 lb 158 lb 167 lb 6.4 oz 161 lb 6 oz 163 lb   BMI 22.47 kg/m2 25.23 kg/m2 26.43 kg/m2 27.12 kg/m2 27.86 kg/m2 26.85 kg/m2 28.87 kg/m2       BMI: Body mass index is 22.47 kg/m². This BMI is indicative of:   []Underweight    [x]Normal    []Overweight    [] Obesity   [] Extreme Obesity (BMI>40)     Estimated Nutrition Needs (Based on):   1455 Kcals/day(BMR (1119) x 1. 3AF) , 60 g(1.0 g/kg bw) Protein  Carbohydrate: At Least 130 g/day  Fluids: 1450 mL/day (1ml/kcal)    Pt expected to meet estimated nutrient needs: []Yes [x]No    NUTRITION DIAGNOSES:   Problem:  Inadequate protein-energy intake      Etiology: related to decreased appetite, swallowing issues     Signs/Symptoms: as evidenced by decreased PO intake PTA, reported wt loss, now NPO pending SLP consult      NUTRITION INTERVENTIONS:  Meals/Snacks: General/healthful diet   Supplements: Commercial supplement              GOAL:   Diet advanced and PO >50% of meals next 1-3 days    LEARNING NEEDS (Diet, Food/Nutrient-Drug Interaction):    [x] None Identified   [] Identified and Education Provided/Documented   [] Identified and Pt declined/was not appropriate     Cultural, Hoahaoism, OR Ethnic Dietary Needs:    [x] None Identified   [] Identified and Addressed     [x] Interdisciplinary Care Plan Reviewed/Documented    [x] Discharge Planning: TBD      MONITORING /EVALUATION:   Food/Nutrient Intake Outcomes:  Total energy intake  Physical Signs/Symptoms Outcomes: Weight/weight change, Electrolyte and renal profile, Glucose profile    NUTRITION RISK:    [x] Patient At Nutritional Risk              [] Patient Not at Nutritional Risk    PT SEEN FOR:    []  MD Consult: []Calorie Count      []Diabetic Diet Education        []Diet Education     []Electrolyte Management     []General Nutrition Management and Supplements     []Management of Tube Feeding     []TPN Recommendations    [x]  RN Referral:  [x]MST score >=2     []Enteral/Parenteral Nutrition PTA     []Pregnant: Gestational DM or Multigestation     []Pressure Ulcer/Wound Care needs        []  Low BMI  []  [place] [id number]  Pager [phone]    Weekend Pager [phone]

## 2020-06-06 NOTE — H&P
Hospitalist Admission Note    NAME: Shun Crum   :  1955   MRN:  [id number]     Date/Time:  2020 10:18 PM    Patient PCP: Harsha Chapman MD  ______________________________________________________________________  Given the patient's current clinical presentation, I have a high level of concern for decompensation if discharged from the emergency department. Complex decision making was performed, which includes reviewing the patient's available past medical records, laboratory results, and x-ray films. My assessment of this patient's clinical condition and my plan of care is as follows. Assessment / Plan:  Polymyositis, likely flare  Weakness  ? PNA on CXR  Elevated CK      -Complicated and long history of polymyositis, at baseline ambulatory with a walker and most of the time uses her recliner bike for ambulation.    -Weakness for past 1 week, progressively getting worse, currently muscle strength around 2 out of 5    -Give Solu-Medrol 500 mg every 12 hours for 3 days,   -Consult rheumatology  -Sliding scale insulin  -ED has attempted transfer patient to [facility] inpatient service, but did not have any bed available.   -Placed on azithromycin Rocephin  -Pt has elevated CK at [address], she reports its better then before, continue IVF    Refer to Clinic Note by Neurology on 20, for details on her history . History of hypertension  History of JOHN  History of renal cancer status post right nephrectomy        Code Status: Full code      DVT Prophylaxis: Lovenox  GI Prophylaxis: not indicated    Baseline: Partial ambulatory      Subjective:   CHIEF COMPLAINT: weakness    HISTORY OF PRESENT ILLNESS:     Mandy Villegas is a 59 y.o.  female who presents with past medical history of polymyositis, hypertension, history of renal cancer status post right nephrectomy who presented to ED with complaint of weakness.   She has a complicated and long history of questionable polymyositis, has seen multiple doctors at [facility], [place], [address] [place] [address]. At 1 point she was on methotrexate, which was stopped few months ago due to infection. Currently she is not taking any medication for that. She also tried rituximab in the past.    -At baseline she usually uses a recliner bike which she uses to get around, and usually can ambulate with a walker, but for past 1 week her weakness has gotten worse and she is requiring assistance with 2 people to get around. She denies any fever, chills, shortness of breath, palpitation. We were asked to admit for work up and evaluation of the above problems. Past Medical History:   Diagnosis Date    Congestive heart failure ([address]     Hypertension     Pneumonia 10/2018    Polymyositis ([address] 2015    in setting of [address]     Polymyositis associated with autoimmune disease ([address]     [facility]al [facility] 2016    s/p right nephrectomy.      Respiratory arrest ([address] 2015    [facility].     SBO (small bowel obstruction) ([facility] 8/3/2017        Past Surgical History:   Procedure Laterality Date    HX GYN      hysterectomy    HX NEPHRECTOMY Right 2016    for kidney cancer    US GUIDED CORE BREAST BIOPSY Left     Negative       Social History     Tobacco Use    Smoking status: Former Smoker     Packs/day: 1.00     Years: 20.00     Pack years: 20.00     Types: Cigarettes     Last attempt to quit: 1998     Years since quittin.1    Smokeless tobacco: Never Used   Substance Use Topics    Alcohol use: No     Alcohol/week: 1.0 standard drinks     Types: 1 Glasses of wine per week        Family History   Problem Relation Age of Onset    Cancer Mother     Breast Cancer Mother 68    Diabetes Father     Hypertension Father     Dementia Father 80    Stroke Maternal Grandmother     Breast Cancer Cousin         50's     Allergies   Allergen Reactions    Ace Inhibitors Cough    Dilaudid [Hydromorphone] Other (comments)    Levaquin [Levofloxacin] Other (comments)     Leg swelling    Lisinopril Other (comments)     Cough      Metoprolol Other (comments)     hallucinations    Peanut Other (comments)        Prior to Admission medications    Medication Sig Start Date End Date Taking? Authorizing Provider   irbesartan (AVAPRO) 300 mg tablet Take 1 Tab by mouth nightly. 5/7/20   Carolynn Alonso MD   Cholecalciferol, Vitamin D3, 3,000 unit tab Take  by mouth. Provider, Historical   vitamin E topical cream Apply  to affected area daily. Patient applies to face    Other, MD Melanie       REVIEW OF SYSTEMS:     I am not able to complete the review of systems because:    The patient is intubated and sedated    The patient has altered mental status due to his acute medical problems    The patient has baseline aphasia from prior stroke(s)    The patient has baseline dementia and is not reliable historian    The patient is in acute medical distress and unable to provide information           Total of 12 systems reviewed as follows:       POSITIVE= underlined text  Negative = text not underlined  General:  fever, chills, sweats, generalized weakness, weight loss/gain,      loss of appetite   Eyes:    blurred vision, eye pain, loss of vision, double vision  ENT:    rhinorrhea, pharyngitis   Respiratory:   cough, sputum production, SOB, LOREDO, wheezing, pleuritic pain   Cardiology:   chest pain, palpitations, orthopnea, PND, edema, syncope   Gastrointestinal:  abdominal pain , N/V, diarrhea, dysphagia, constipation, bleeding   Genitourinary:  frequency, urgency, dysuria, hematuria, incontinence   Muskuloskeletal :  arthralgia, myalgia, back pain  Hematology:  easy bruising, nose or gum bleeding, lymphadenopathy   Dermatological: rash, ulceration, pruritis, color change / jaundice  Endocrine:   hot flashes or polydipsia   Neurological:  headache, dizziness, confusion, focal weakness, paresthesia,     Speech difficulties, memory loss, gait difficulty  Psychological: Feelings of anxiety, depression, agitation    Objective:   VITALS:    Visit Vitals  /72 (BP 1 Location: Right arm, BP Patient Position: At rest;Sitting)   Pulse 92   Temp 98.5 °F (36.9 °C)   Resp 20   SpO2 99%       PHYSICAL EXAM:    General:    Alert, cooperative, no distress, appears stated age. HEENT: Atraumatic, anicteric sclerae, pink conjunctivae  Neck:  Supple, symmetrical,  thyroid: non tender  Lungs:   Clear to auscultation bilaterally. No Wheezing or Rhonchi. No rales. Chest wall:  No tenderness  No Accessory muscle use. Heart:   Regular  rhythm,  No  murmur   No edema  Abdomen:   Soft, non-tender. Not distended. Bowel sounds normal  Extremities: No cyanosis. No clubbing,    Skin:     Not pale. Not Jaundiced  No rashes   Psych:  Good insight. Not depressed. Not anxious or agitated. Neurologic: EOMs intact. No facial asymmetry. No aphasia or slurred speech. 2/5 strength throughout body.  Left foot drop    _______________________________________________________________________  Care Plan discussed with:    Comments   Patient x    Family      RN x    Care Manager                    Consultant:      _______________________________________________________________________  Expected  Disposition:   Home with Family x   HH/PT/OT/RN    SNF/LTC    MELODIE    ________________________________________________________________________  TOTAL TIME:  48 Minutes    Critical Care Provided     Minutes non procedure based      Comments     Reviewed previous records   >50% of visit spent in counseling and coordination of care  Discussion with patient and/or family and questions answered       ________________________________________________________________________  Signed: Dorrie Schlatter, MD    Procedures: see electronic medical records for all procedures/Xrays and details which were not copied into this note but were reviewed prior to creation of Plan.    LAB DATA REVIEWED:    Recent Results (from the past 24 hour(s))   EKG, 12 LEAD, INITIAL    Collection Time: 06/05/20  4:30 PM   Result Value Ref Range    Ventricular Rate 93 BPM    Atrial Rate 93 BPM    P-R Interval 172 ms    QRS Duration 72 ms    Q-T Interval 374 ms    QTC Calculation (Bezet) 465 ms    Calculated P Axis 51 degrees    Calculated R Axis 3 degrees    Calculated T Axis 27 degrees    Diagnosis       Normal sinus rhythm  Low voltage QRS  Cannot rule out Inferior infarct , age undetermined  Cannot rule out Anterior infarct , age undetermined  When compared with ECG of 15-NOV-2018 14:24,  Minimal criteria for Inferior infarct are now present     CBC WITH AUTOMATED DIFF    Collection Time: 06/05/20  4:39 PM   Result Value Ref Range    WBC 6.9 3.6 - 11.0 K/uL    RBC 5.23 (H) 3.80 - 5.20 M/uL    HGB 11.8 11.5 - 16.0 g/dL    HCT 35.3 35.0 - 47.0 %    MCV 67.5 (L) 80.0 - 99.0 FL    MCH 22.6 (L) 26.0 - 34.0 PG    MCHC 33.4 30.0 - 36.5 g/dL    RDW 15.5 (H) 11.5 - 14.5 %    PLATELET 122 203 - 976 K/uL    MPV 11.4 8.9 - 12.9 FL    NRBC 0.0 0  WBC    ABSOLUTE NRBC 0.00 0.00 - 0.01 K/uL    NEUTROPHILS 65 32 - 75 %    LYMPHOCYTES 16 12 - 49 %    MONOCYTES 13 5 - 13 %    EOSINOPHILS 4 0 - 7 %    BASOPHILS 1 0 - 1 %    IMMATURE GRANULOCYTES 1 (H) 0.0 - 0.5 %    ABS. NEUTROPHILS 4.4 1.8 - 8.0 K/UL    ABS. LYMPHOCYTES 1.1 0.8 - 3.5 K/UL    ABS. MONOCYTES 0.9 0.0 - 1.0 K/UL    ABS. EOSINOPHILS 0.3 0.0 - 0.4 K/UL    ABS. BASOPHILS 0.1 0.0 - 0.1 K/UL    ABS. IMM.  GRANS. 0.1 (H) 0.00 - 0.04 K/UL    DF AUTOMATED      RBC COMMENTS TARGET CELLS  1+       METABOLIC PANEL, COMPREHENSIVE    Collection Time: 06/05/20  4:39 PM   Result Value Ref Range    Sodium 142 136 - 145 mmol/L    Potassium 3.8 3.5 - 5.1 mmol/L    Chloride 109 (H) 97 - 108 mmol/L    CO2 24 21 - 32 mmol/L    Anion gap 9 5 - 15 mmol/L    Glucose 71 65 - 100 mg/dL    BUN 25 (H) 6 - 20 MG/DL    Creatinine 0.47 (L) 0.55 - 1.02 MG/DL    BUN/Creatinine ratio 53 (H) 12 - 20      GFR est AA >60 >60 ml/min/1.73m2    GFR est non-AA >60 >60 ml/min/1.73m2    Calcium 9.4 8.5 - 10.1 MG/DL    Bilirubin, total 0.4 0.2 - 1.0 MG/DL    ALT (SGPT) 123 (H) 12 - 78 U/L    AST (SGOT) 176 (H) 15 - 37 U/L    Alk.  phosphatase 106 45 - 117 U/L    Protein, total 6.7 6.4 - 8.2 g/dL    Albumin 2.5 (L) 3.5 - 5.0 g/dL    Globulin 4.2 (H) 2.0 - 4.0 g/dL    A-G Ratio 0.6 (L) 1.1 - 2.2     CK    Collection Time: 06/05/20  4:39 PM   Result Value Ref Range    CK 3,313 (H) 26 - 192 U/L   MAGNESIUM    Collection Time: 06/05/20  4:39 PM   Result Value Ref Range    Magnesium 1.9 1.6 - 2.4 mg/dL   TSH 3RD GENERATION    Collection Time: 06/05/20  6:06 PM   Result Value Ref Range    TSH 2.88 0.36 - 3.74 uIU/mL   LACTIC ACID    Collection Time: 06/05/20  6:45 PM   Result Value Ref Range    Lactic acid 1.3 0.4 - 2.0 MMOL/L

## 2020-06-06 NOTE — ED PROVIDER NOTES
EMERGENCY DEPARTMENT HISTORY AND PHYSICAL EXAM      Date: 6/5/2020  Patient Name: Roxana Crook    History of Presenting Illness     Chief Complaint   Patient presents with    Extremity Weakness     pt has a hx of polymyositis. pt c/o extreme weaknes for about a week. pt states she has two caregivers at home that are unable to take care of her due to being able to lift her.  Irregular Heart Beat     pt c/o irregular hr for approx 1 month, pt states it feels like her heart is palpating and she can hear it in her left ear. History Provided By: Patient    HPI: Roxana Crook, 59 y.o. female presents to the ED with cc of generalized weakness and fatigue palpitations. Patient presents to the emergency department by EMS from home. Patient states she does have a care provider who helps her in the home however over the last 24 hours she has had increasing weakness to the point where she is having difficulty sitting up as well as the caregiver is having difficulty as well trying to her move her about due to her weakness. Patient has a history of polymyositis and is seen rheumatology in the past.  Patient states she has been to several different institutions for work-up and evaluation and diagnosis is been documented as polymyositis. Patient states she has had 1 previous muscle biopsy however was inconclusive. She states despite this when they put her on medications for treatment of polymyositis she typically gets better and stated she would prefer not to go through another muscle biopsy. She states that she does have admissions on occasion and is provided with IV steroids secondary to flares in addition to IV fluids related to rhabdomyolysis. She denies any recent fever or chills. She has had some difficulty with swallowing her oral secretions. States she has had somewhat of a cough however is unable to get anything up. She denies any chest pain or shortness of breath.   She denies any abdominal pain, nausea, vomiting or diarrhea. She denies any recent urinary issues. She states that she has been on medications in the past including long-term use of steroids in addition to methotrexate. She had been taken off both of these medications secondary to opportunistic infections felt related to immunosuppressants. There are no other complaints, changes, or physical findings at this time. PCP: Harshad Cohn MD    No current facility-administered medications on file prior to encounter. Current Outpatient Medications on File Prior to Encounter   Medication Sig Dispense Refill    irbesartan (AVAPRO) 300 mg tablet Take 1 Tab by mouth nightly. 90 Tab 1    Cholecalciferol, Vitamin D3, 3,000 unit tab Take  by mouth.  vitamin E topical cream Apply  to affected area daily. Patient applies to face         Past History     Past Medical History:  Past Medical History:   Diagnosis Date    Congestive heart failure ([address]     Hypertension     Pneumonia 10/2018    Polymyositis ([address] 12/2015    in setting of [address] 2015    Polymyositis associated with autoimmune disease ([address]     Renal cancer ([address] 07/08/2016    s/p right nephrectomy.      Respiratory arrest ([address] 11/[facility].     SBO (small bowel obstruction) ([address] 8/3/2017       Past Surgical History:  Past Surgical History:   Procedure Laterality Date    HX GYN  1999    hysterectomy    HX NEPHRECTOMY Right 2016    for kidney cancer    US GUIDED CORE BREAST BIOPSY Left 1999    Negative       Family History:  Family History   Problem Relation Age of Onset    Cancer Mother     Breast Cancer Mother 68    Diabetes Father     Hypertension Father     Dementia Father 80    Stroke Maternal Grandmother     Breast Cancer Cousin         52's       Social History:  Social History     Tobacco Use    Smoking status: Former Smoker     Packs/day: 1.00     Years: 20.00     Pack years: 20.00     Types: Cigarettes     Last attempt to quit: 1998     Years since quittin.1    Smokeless tobacco: Never Used   Substance Use Topics    Alcohol use: No     Alcohol/week: 1.0 standard drinks     Types: 1 Glasses of wine per week    Drug use: No       Allergies: Allergies   Allergen Reactions    Ace Inhibitors Cough    Dilaudid [Hydromorphone] Other (comments)    Levaquin [Levofloxacin] Other (comments)     Leg swelling    Lisinopril Other (comments)     Cough      Metoprolol Other (comments)     hallucinations    Peanut Other (comments)         Review of Systems   Review of Systems   Constitutional: Negative. Negative for appetite change, chills, fatigue and fever. HENT: Positive for trouble swallowing (handling oral secretions ). Negative for congestion, rhinorrhea, sinus pressure and sore throat. Eyes: Negative. Respiratory: Positive for cough. Negative for choking, chest tightness, shortness of breath and wheezing. Cardiovascular: Negative. Negative for chest pain, palpitations and leg swelling. Gastrointestinal: Negative for abdominal pain, constipation, diarrhea, nausea and vomiting. Endocrine: Negative. Genitourinary: Negative. Negative for difficulty urinating, dysuria, flank pain and urgency. Musculoskeletal: Negative. Skin: Negative. Neurological: Positive for weakness (global ). Negative for dizziness, speech difficulty, light-headedness, numbness and headaches. Psychiatric/Behavioral: Negative. All other systems reviewed and are negative. Physical Exam   Physical Exam  Vitals signs and nursing note reviewed. Constitutional:       General: She is not in acute distress. Appearance: She is well-developed. She is not diaphoretic. HENT:      Head: Normocephalic and atraumatic. Mouth/Throat:      Pharynx: No oropharyngeal exudate. Eyes:      Conjunctiva/sclera: Conjunctivae normal.      Pupils: Pupils are equal, round, and reactive to light.    Neck:      Musculoskeletal: Normal range of motion and neck supple. Vascular: No JVD. Trachea: No tracheal deviation. Cardiovascular:      Rate and Rhythm: Normal rate and regular rhythm. Heart sounds: Normal heart sounds. No murmur. Pulmonary:      Effort: Pulmonary effort is normal. No respiratory distress. Breath sounds: Normal breath sounds. No stridor. No wheezing or rales. Chest:      Chest wall: No tenderness. Abdominal:      General: There is no distension. Palpations: Abdomen is soft. Tenderness: There is no abdominal tenderness. There is no guarding or rebound. Musculoskeletal:         General: No tenderness. Comments: Diffuse muscle atrophy decrease ROM neck to spasticity in left upper trapezius   Skin:     General: Skin is warm and dry. Capillary Refill: Capillary refill takes less than 2 seconds. Neurological:      Mental Status: She is alert and oriented to person, place, and time. Cranial Nerves: No cranial nerve deficit. Comments:  There is global weakness, difficulty moving all extremities even against gravity, no sensory deficits    Psychiatric:         Behavior: Behavior normal.      Comments: Flat affect, depressed mood         Diagnostic Study Results     Labs -  Recent Results (from the past 24 hour(s))   EKG, 12 LEAD, INITIAL    Collection Time: 06/05/20  4:30 PM   Result Value Ref Range    Ventricular Rate 93 BPM    Atrial Rate 93 BPM    P-R Interval 172 ms    QRS Duration 72 ms    Q-T Interval 374 ms    QTC Calculation (Bezet) 465 ms    Calculated P Axis 51 degrees    Calculated R Axis 3 degrees    Calculated T Axis 27 degrees    Diagnosis       Normal sinus rhythm  Low voltage QRS  Cannot rule out Inferior infarct , age undetermined  Cannot rule out Anterior infarct , age undetermined  When compared with ECG of 15-NOV-2018 14:24,  Minimal criteria for Inferior infarct are now present     CBC WITH AUTOMATED DIFF    Collection Time: 06/05/20  4:39 PM   Result Value Ref Range    WBC 6.9 3.6 - 11.0 K/uL    RBC 5.23 (H) 3.80 - 5.20 M/uL    HGB 11.8 11.5 - 16.0 g/dL    HCT 35.3 35.0 - 47.0 %    MCV 67.5 (L) 80.0 - 99.0 FL    MCH 22.6 (L) 26.0 - 34.0 PG    MCHC 33.4 30.0 - 36.5 g/dL    RDW 15.5 (H) 11.5 - 14.5 %    PLATELET 341 360 - 379 K/uL    MPV 11.4 8.9 - 12.9 FL    NRBC 0.0 0  WBC    ABSOLUTE NRBC 0.00 0.00 - 0.01 K/uL    NEUTROPHILS 65 32 - 75 %    LYMPHOCYTES 16 12 - 49 %    MONOCYTES 13 5 - 13 %    EOSINOPHILS 4 0 - 7 %    BASOPHILS 1 0 - 1 %    IMMATURE GRANULOCYTES 1 (H) 0.0 - 0.5 %    ABS. NEUTROPHILS 4.4 1.8 - 8.0 K/UL    ABS. LYMPHOCYTES 1.1 0.8 - 3.5 K/UL    ABS. MONOCYTES 0.9 0.0 - 1.0 K/UL    ABS. EOSINOPHILS 0.3 0.0 - 0.4 K/UL    ABS. BASOPHILS 0.1 0.0 - 0.1 K/UL    ABS. IMM. GRANS. 0.1 (H) 0.00 - 0.04 K/UL    DF AUTOMATED      RBC COMMENTS TARGET CELLS  1+       METABOLIC PANEL, COMPREHENSIVE    Collection Time: 06/05/20  4:39 PM   Result Value Ref Range    Sodium 142 136 - 145 mmol/L    Potassium 3.8 3.5 - 5.1 mmol/L    Chloride 109 (H) 97 - 108 mmol/L    CO2 24 21 - 32 mmol/L    Anion gap 9 5 - 15 mmol/L    Glucose 71 65 - 100 mg/dL    BUN 25 (H) 6 - 20 MG/DL    Creatinine 0.47 (L) 0.55 - 1.02 MG/DL    BUN/Creatinine ratio 53 (H) 12 - 20      GFR est AA >60 >60 ml/min/1.73m2    GFR est non-AA >60 >60 ml/min/1.73m2    Calcium 9.4 8.5 - 10.1 MG/DL    Bilirubin, total 0.4 0.2 - 1.0 MG/DL    ALT (SGPT) 123 (H) 12 - 78 U/L    AST (SGOT) 176 (H) 15 - 37 U/L    Alk.  phosphatase 106 45 - 117 U/L    Protein, total 6.7 6.4 - 8.2 g/dL    Albumin 2.5 (L) 3.5 - 5.0 g/dL    Globulin 4.2 (H) 2.0 - 4.0 g/dL    A-G Ratio 0.6 (L) 1.1 - 2.2     CK    Collection Time: 06/05/20  4:39 PM   Result Value Ref Range    CK 3,313 (H) 26 - 192 U/L   MAGNESIUM    Collection Time: 06/05/20  4:39 PM   Result Value Ref Range    Magnesium 1.9 1.6 - 2.4 mg/dL   TSH 3RD GENERATION    Collection Time: 06/05/20  6:06 PM   Result Value Ref Range    TSH 2.88 0.36 - 3.74 uIU/mL   LACTIC ACID Collection Time: 06/05/20  6:45 PM   Result Value Ref Range    Lactic acid 1.3 0.4 - 2.0 MMOL/L          Radiologic Studies -   XR CHEST PORT   Final Result   IMPRESSION:      1. Left basilar atelectasis or minimal infiltrate. 2. Otherwise stable atelectasis and scar in the right lung. .  . CT Results  (Last 48 hours)    None        CXR Results  (Last 48 hours)               06/05/20 1734  XR CHEST PORT Final result    Impression:  IMPRESSION:       1. Left basilar atelectasis or minimal infiltrate. 2. Otherwise stable atelectasis and scar in the right lung. .  . Narrative:  INDICATION:  generalized weakness, hx of polymyositis        EXAM: Chest single view. COMPARISON: 11/15/2018. FINDINGS: A single frontal view of the chest at 1726 hours shows patchy right   midlung field atelectasis or scar similar to the prior examination. Left basilar   atelectasis or minimal infiltrate. Right basilar atelectasis stable. .  The   heart, mediastinum and pulmonary vasculature are stable . The bony thorax is   unremarkable for age. .                 Medical Decision Making   I am the first provider for this patient. I reviewed the vital signs, available nursing notes, past medical history, past surgical history, family history and social history. Vital Signs-Reviewed the patient's vital signs.   Patient Vitals for the past 12 hrs:   Temp Pulse Resp BP SpO2   06/06/20 0344 97.4 °F (36.3 °C) 81 20 145/68 98 %   06/05/20 2353 98 °F (36.7 °C) 86 20 132/62 99 %   06/05/20 2222 98.4 °F (36.9 °C) 87 18 137/74 98 %   06/05/20 1954 98.5 °F (36.9 °C) 92 20 153/72 99 %   06/05/20 1918 -- -- -- -- 94 %   06/05/20 1915 -- 93 21 153/77 98 %   06/05/20 1900 -- 91 20 147/72 99 %   06/05/20 1845 -- 92 20 147/66 98 %   06/05/20 1830 -- 97 22 155/82 99 %   06/05/20 1815 -- 96 18 150/82 99 %   06/05/20 1800 -- 94 25 (!) 168/149 100 %   06/05/20 1745 -- 93 20 141/70 100 %   06/05/20 1730 -- 94 25 130/83 97 % 06/05/20 1715 -- 95 23 157/69 98 %   06/05/20 1700 -- 98 23 166/84 98 %       EKG interpretation: (Preliminary)  NSR, rate 93, normal axis/pr/qrs, no acute ST changes, low voltage Vee Conn DO    Records Reviewed: Nursing Notes, Old Medical Records, Ambulance Run Sheet, Previous Radiology Studies and Previous Laboratory Studies    Provider Notes (Medical Decision Making):   DDx- Rhabdomyolysis, Polymyositis exacerbation, pneumonia, UTI, electrolyte abnormality     ED Course:   Initial assessment performed. The patients presenting problems have been discussed, and they are in agreement with the care plan formulated and outlined with them. I have encouraged them to ask questions as they arise throughout their visit. Due to patient's global weakness is concerned of patient's ability to care for herself at home in her current general state. Chest x-ray does show questionable infiltrate in the left base blood cultures have been drawn will start patient on IV antibiotics for community-acquired pneumonia. Patient having difficulty with some of her oral secretions patient was provided to [facility] to be able to spit into she is able to use her arms to some extent to be able to do this. Consult note:  Case discussed with Dr. Mary Pollock. He has seen and evaluated the patient however he feels the patient will be better taking care of with a rheumatology consult while as an inpatient. He has requested that if we have no rheumatology here in the hospital he would prefer the patient be transported she has been seen in the past and admitted. There is one rheumatologist that has privileges here there was an attempt to contact him without success. Consult note:  Case discussed with medical attending at [address] [place] [address]. Currently there are no inpatient beds available for patient be transferred to them for care.   However he does offer if we can send information to him through the transfer center he will have 1 of the rheumatologist take a look at the information and help guide treatment. He states a review of patient's past medical records has had the prior biopsy which was inconclusive however all of her diagnoses including the ones from rheumatology are polymyositis. Patient has been admitted at [address] [place] [address] in the past and had been treated with IV steroids. There have been times where they have considered IgG treatment however that has not been done yet. Medical attending stated the patient could potentially still be transferred to [address] [place] [address] when a bed becomes available. However he feels that if rheumatology can help guide treatment decisions patient could also be taken care of here at [facility].    Consult note:  Case further discussed with Alannah Walls who requested potential transfer to the emergency department at [address] [place] [address]. However further discussion I do not feel the patient warrants an emergent transfer at this time especially to occupy an ED bed at [address] [place] [address] for the purposes of rheumatology consult considering the rheumatology can still be consulted as well as helped to manage patient's care here at [facility].  He will evaluate the patient and admit. Disposition:  Admit to [address] [place] [address]      Diagnosis     Clinical Impression:   1. Generalized weakness    2. Polymyositis ([address]    3. Non-traumatic rhabdomyolysis    4. Pneumonia of left lower lobe due to infectious organism        Attestations:    Pastora Bonner, DO    Please note that this dictation was completed with [employer], the computer voice recognition software. Quite often unanticipated grammatical, syntax, homophones, and other interpretive errors are inadvertently transcribed by the computer software. Please disregard these errors. Please excuse any errors that have escaped final proofreading. Thank you.

## 2020-06-06 NOTE — PROGRESS NOTES
Problem: Falls - Risk of  Goal: *Absence of Falls  Description: Document Nelly Tello Fall Risk and appropriate interventions in the flowsheet. Outcome: Progressing Towards Goal  Note: Fall Risk Interventions:  Mobility Interventions: Bed/chair exit alarm         Medication Interventions: Bed/chair exit alarm, Patient to call before getting OOB    Elimination Interventions: Bed/chair exit alarm, Call light in reach              Problem: Patient Education: Go to Patient Education Activity  Goal: Patient/Family Education  Outcome: Progressing Towards Goal     Problem: Pressure Injury - Risk of  Goal: *Prevention of pressure injury  Description: Document Armen Scale and appropriate interventions in the flowsheet. Outcome: Progressing Towards Goal  Note: Pressure Injury Interventions:  Sensory Interventions: Keep linens dry and wrinkle-free, Turn and reposition approx. every two hours (pillows and wedges if needed)    Moisture Interventions: Absorbent underpads, Check for incontinence Q2 hours and as needed, Internal/External urinary devices, Maintain skin hydration (lotion/cream)    Activity Interventions: PT/OT evaluation    Mobility Interventions: Float heels, Turn and reposition approx.  every two hours(pillow and wedges)    Nutrition Interventions: Offer support with meals,snacks and hydration    Friction and Shear Interventions: Apply protective barrier, creams and emollients, Lift sheet, Lift team/patient mobility team

## 2020-06-06 NOTE — PROGRESS NOTES
Pharmacy Automatic Renal Dosing Protocol - Antimicrobials    Indication for Antimicrobials: bloodstream     Current Regimen of Each Antimicrobial:  Ceftriaxone 1 gm IV every 24 hr (Start Date ; Day # 2)  Zithromax 500 mg IV every 24 hr (Start Date ; Day # 2)  Vancomycin 1000 mg IV once then 500 mg IV every 8 hr (Start Date ; Day # 1)    Previous Antimicrobial Therapy:   (Start Date ; Day    Goal Level: VANCOMYCIN TROUGH GOAL RANGE    Vancomycin Trough: 15 - 20 mcg/mL  (AUC: 400 - 600 mg/hr/Liter/day)     Date Dose & Interval Measured (mcg/mL) Extrapolated (mcg/mL)                       Date & time of next level:     Significant Cultures:   Pbc  - gram + cocci 2/3 bottles  Urine  - pending    Radiology / Imaging results: (X-ray, CT scan or MRI):     Paralysis, amputations, malnutrition:     Labs:  Recent Labs     20  0437 20  1639   CREA 0.38* 0.47*   BUN 22* 25*   WBC 6.5 6.9     Temp (24hrs), Av.9 °F (36.6 °C), Min:97.4 °F (36.3 °C), Max:98.5 °F (36.9 °C)    Creatinine Clearance (mL/min) or Dialysis: 70 (using scr of 0.7   -   h/o renal cancer   s/p right nephrectomy)    Impression/Plan:   Vancomycin dosed for trough ~ 15  Ceftriaxone and zithromax not renally dosed  Antimicrobial stop date pending     Pharmacy will follow daily and adjust medications as appropriate for renal function and/or serum levels. Thank you,  Darlyn Guerra PHARMD    Recommended duration of therapy  [email or web address]/Pharmacy/[email or web address]. docx    Renal Dosing  [email or web address]/Pharmacy/[email or web address]/Renal%[email or web address]. pdf

## 2020-06-06 NOTE — ED NOTES
POA called but no answer at this time. Voicemail left with my name, number and reason I was calling. Requested that she call back to be provided with specifics.

## 2020-06-06 NOTE — PROGRESS NOTES
* No surgery found *  * No surgery found *  Bedside shift change report given to Sebastian Andersen RN (oncoming nurse) by ABEL Isaacs (offgoing nurse). Report included the following information SBAR and Kardex. Zone Phone:   [phone]      Significant changes during shift:  Admit to [facility], 6\10 BLE pain, relieved by floating heels         Patient Information    Elba Caputo  59 y.o.  6/5/2020  4:13 PM by Yobani Elena MD. Elba Caputo was admitted from Home    Problem List    Patient Active Problem List    Diagnosis Date Noted    S/p nephrectomy 05/27/2020    History of renal cell cancer 05/27/2020    Vitamin D deficiency 10/10/2018    Age-related osteoporosis without current pathological fracture 08/23/2018    Elevated glucose 01/18/2017    Obstructive sleep apnea syndrome 01/17/2017    Polymyositis ([address] 07/28/2016    HTN (hypertension) 07/28/2016     Past Medical History:   Diagnosis Date    Congestive heart failure ([address]     Hypertension     Pneumonia 10/2018    Polymyositis ([address] 12/2015    in setting of [address]    Polymyositis associated with autoimmune disease ([address]     Renal cancer ([address] 07/08/2016    s/p right nephrectomy.  Respiratory arrest ([address] 11/2015    [facility].     SBO (small bowel obstruction) ([address] 8/3/2017         Core Measures:    CVA: No No  CHF:No No  PNA:No No      Activity Status:    OOB to Chair No  Ambulated this shift No   Bed Rest Yes        DVT prophylaxis:    DVT prophylaxis Med- Yes  DVT prophylaxis SCD or ELMER- No         Patient Safety:    Falls Score Total Score: 2  Safety Level_______  Bed Alarm On? Yes  Sitter?  No    Plan for upcoming shift: safety, pain management, rheumatology consult, wound care consult         Discharge Plan: No TBD    Active Consults:  IP CONSULT TO HOSPITALIST  IP CONSULT TO RHEUMATOLOGY

## 2020-06-06 NOTE — PROGRESS NOTES
Pharmacy Medication Reconciliation     The patient was interviewed regarding current PTA medication list, use and drug allergies; Patient present in room and obtained permission from patient to discuss drug regimen with visitor(s) present. The patient was questioned regarding use of any other inhalers, topical products, over the counter medications, herbal medications, vitamin products or ophthalmic/nasal/otic medication use. Allergy Update: Ace inhibitors; Dilaudid [hydromorphone]; Levaquin [levofloxacin]; Lisinopril; Metoprolol; and Peanut    Recommendations/Findings: The following amendments were made to the patient's active medication list on file at [address]:   1) Additions: None    2) Deletions: All    3) Changes: Deleted meds      Pertinent Findings:     -Clarified PTA med list with Patient. PTA medication list was corrected to the following:           Patient takes no medication. States that BP was okay when monitoring off her ARB and that prednisone and methotrexate caused to many side effects versus benefits for treatment of her RA.     None          Thank you,  MACEY ChatterjeeD

## 2020-06-07 ENCOUNTER — APPOINTMENT (OUTPATIENT)
Dept: GENERAL RADIOLOGY | Age: 65
DRG: 545 | End: 2020-06-07
Attending: INTERNAL MEDICINE
Payer: MEDICARE

## 2020-06-07 ENCOUNTER — APPOINTMENT (OUTPATIENT)
Dept: NON INVASIVE DIAGNOSTICS | Age: 65
DRG: 545 | End: 2020-06-07
Attending: INTERNAL MEDICINE
Payer: MEDICARE

## 2020-06-07 LAB
ANION GAP SERPL CALC-SCNC: 5 MMOL/L (ref 5–15)
BACTERIA SPEC CULT: NORMAL
BUN SERPL-MCNC: 32 MG/DL (ref 6–20)
BUN/CREAT SERPL: 58 (ref 12–20)
CALCIUM SERPL-MCNC: 8.4 MG/DL (ref 8.5–10.1)
CHLORIDE SERPL-SCNC: 113 MMOL/L (ref 97–108)
CK SERPL-CCNC: 2052 U/L (ref 26–192)
CO2 SERPL-SCNC: 23 MMOL/L (ref 21–32)
CREAT SERPL-MCNC: 0.55 MG/DL (ref 0.55–1.02)
ERYTHROCYTE [DISTWIDTH] IN BLOOD BY AUTOMATED COUNT: 15.7 % (ref 11.5–14.5)
GLUCOSE BLD STRIP.AUTO-MCNC: 105 MG/DL (ref 65–100)
GLUCOSE BLD STRIP.AUTO-MCNC: 111 MG/DL (ref 65–100)
GLUCOSE BLD STRIP.AUTO-MCNC: 152 MG/DL (ref 65–100)
GLUCOSE BLD STRIP.AUTO-MCNC: 165 MG/DL (ref 65–100)
GLUCOSE SERPL-MCNC: 169 MG/DL (ref 65–100)
HCT VFR BLD AUTO: 34 % (ref 35–47)
HGB BLD-MCNC: 11.2 G/DL (ref 11.5–16)
MCH RBC QN AUTO: 22.4 PG (ref 26–34)
MCHC RBC AUTO-ENTMCNC: 32.9 G/DL (ref 30–36.5)
MCV RBC AUTO: 68.1 FL (ref 80–99)
NRBC # BLD: 0 K/UL (ref 0–0.01)
NRBC BLD-RTO: 0 PER 100 WBC
PLATELET # BLD AUTO: 312 K/UL (ref 150–400)
PMV BLD AUTO: 12.2 FL (ref 8.9–12.9)
POTASSIUM SERPL-SCNC: 4.1 MMOL/L (ref 3.5–5.1)
PROCALCITONIN SERPL-MCNC: 0.14 NG/ML
RBC # BLD AUTO: 4.99 M/UL (ref 3.8–5.2)
SERVICE CMNT-IMP: ABNORMAL
SERVICE CMNT-IMP: NORMAL
SODIUM SERPL-SCNC: 141 MMOL/L (ref 136–145)
WBC # BLD AUTO: 17.6 K/UL (ref 3.6–11)

## 2020-06-07 PROCEDURE — 74011250636 HC RX REV CODE- 250/636: Performed by: INTERNAL MEDICINE

## 2020-06-07 PROCEDURE — 71046 X-RAY EXAM CHEST 2 VIEWS: CPT

## 2020-06-07 PROCEDURE — 77030038269 HC DRN EXT URIN PURWCK BARD -A

## 2020-06-07 PROCEDURE — 36415 COLL VENOUS BLD VENIPUNCTURE: CPT

## 2020-06-07 PROCEDURE — 74011000258 HC RX REV CODE- 258: Performed by: INTERNAL MEDICINE

## 2020-06-07 PROCEDURE — 74011636637 HC RX REV CODE- 636/637: Performed by: INTERNAL MEDICINE

## 2020-06-07 PROCEDURE — 82550 ASSAY OF CK (CPK): CPT

## 2020-06-07 PROCEDURE — 74011250637 HC RX REV CODE- 250/637: Performed by: INTERNAL MEDICINE

## 2020-06-07 PROCEDURE — 84145 PROCALCITONIN (PCT): CPT

## 2020-06-07 PROCEDURE — 65660000000 HC RM CCU STEPDOWN

## 2020-06-07 PROCEDURE — 80048 BASIC METABOLIC PNL TOTAL CA: CPT

## 2020-06-07 PROCEDURE — 94760 N-INVAS EAR/PLS OXIMETRY 1: CPT

## 2020-06-07 PROCEDURE — 74011250636 HC RX REV CODE- 250/636: Performed by: EMERGENCY MEDICINE

## 2020-06-07 PROCEDURE — 82962 GLUCOSE BLOOD TEST: CPT

## 2020-06-07 PROCEDURE — 92610 EVALUATE SWALLOWING FUNCTION: CPT

## 2020-06-07 PROCEDURE — 85027 COMPLETE CBC AUTOMATED: CPT

## 2020-06-07 RX ORDER — SCOLOPAMINE TRANSDERMAL SYSTEM 1 MG/1
1 PATCH, EXTENDED RELEASE TRANSDERMAL
Status: COMPLETED | OUTPATIENT
Start: 2020-06-07 | End: 2020-06-10

## 2020-06-07 RX ADMIN — SODIUM CHLORIDE 100 ML/HR: 900 INJECTION, SOLUTION INTRAVENOUS at 15:33

## 2020-06-07 RX ADMIN — VANCOMYCIN HYDROCHLORIDE 500 MG: 500 INJECTION, POWDER, LYOPHILIZED, FOR SOLUTION INTRAVENOUS at 10:44

## 2020-06-07 RX ADMIN — ENOXAPARIN SODIUM 40 MG: 40 INJECTION SUBCUTANEOUS at 22:36

## 2020-06-07 RX ADMIN — INSULIN LISPRO 2 UNITS: 100 INJECTION, SOLUTION INTRAVENOUS; SUBCUTANEOUS at 09:34

## 2020-06-07 RX ADMIN — VANCOMYCIN HYDROCHLORIDE 500 MG: 500 INJECTION, POWDER, LYOPHILIZED, FOR SOLUTION INTRAVENOUS at 02:23

## 2020-06-07 RX ADMIN — VANCOMYCIN HYDROCHLORIDE 500 MG: 500 INJECTION, POWDER, LYOPHILIZED, FOR SOLUTION INTRAVENOUS at 17:12

## 2020-06-07 RX ADMIN — INSULIN LISPRO 2 UNITS: 100 INJECTION, SOLUTION INTRAVENOUS; SUBCUTANEOUS at 12:44

## 2020-06-07 RX ADMIN — Medication 10 ML: at 04:58

## 2020-06-07 RX ADMIN — SODIUM CHLORIDE 500 MG: 900 INJECTION, SOLUTION INTRAVENOUS at 09:34

## 2020-06-07 RX ADMIN — SODIUM CHLORIDE 500 MG: 900 INJECTION, SOLUTION INTRAVENOUS at 22:37

## 2020-06-07 RX ADMIN — Medication 10 ML: at 22:46

## 2020-06-07 NOTE — PROGRESS NOTES
Bedside shift change report given to David Brown RN (oncoming nurse) by Gregory Whitehead (offgoing nurse). Report included the following information SBAR and Kardex.     Zone Phone:   [phone]        Significant changes during shift:  None           Patient Information     Sahra Fernandez  59 y.o.  6/5/2020  4:13 PM by Shakira Alcantara MD. Sahra Fernandez was admitted from Home     Problem List          Patient Active Problem List     Diagnosis Date Noted    S/p nephrectomy 05/27/2020    History of renal cell cancer 05/27/2020    Vitamin D deficiency 10/10/2018    Age-related osteoporosis without current pathological fracture 08/23/2018    Elevated glucose 01/18/2017    Obstructive sleep apnea syndrome 01/17/2017    Polymyositis ([address] 07/28/2016    HTN (hypertension) 07/28/2016      Past Medical History:   Diagnosis Date    Congestive heart failure ([address]      Hypertension      Pneumonia 10/2018    Polymyositis ([address] 12/2015     in setting of [address]    Polymyositis associated with autoimmune disease ([address]      Renal cancer ([address] 07/08/2016     s/p right nephrectomy.  Respiratory arrest ([address] 11/2015     [facility].     SBO (small bowel obstruction) ([address] 8/3/2017            Core Measures:     CVA: No No  CHF:No No  PNA:No No        Activity Status:     OOB to Chair No  Ambulated this shift No   Bed Rest Yes           DVT prophylaxis:     DVT prophylaxis Med- Yes  DVT prophylaxis SCD or ELMER- No            Patient Safety:     Falls Score Total Score: 2  Safety Level_______  Bed Alarm On? Yes  Sitter?  No     Plan for upcoming shift: safety, pain management, rheumatology consult, wound care consult            Discharge Plan: No TBD     Active Consults:  IP CONSULT TO HOSPITALIST  IP CONSULT TO RHEUMATOLOGY

## 2020-06-07 NOTE — PROGRESS NOTES
Open areas noted to bilateral butt cheek and redness under the right breast Wound consult initiated.

## 2020-06-07 NOTE — PROGRESS NOTES
Problem: Dysphagia (Adult)  Goal: *Acute Goals and Plan of Care (Insert Text)  Description: 6/7/2020  Speech path goals  1. Patient will participate with MBS    Outcome: Not Progressing Towards Goal   SPEECH [address] EVALUATION  Patient: Arcelia Johnson (17 y.o. female)  Date: 6/7/2020  Primary Diagnosis: Polymyositis ([facility] [M33.20]        Precautions:        ASSESSMENT :  Based on the objective data described below, the patient presents with mild oral and mod to severe pharyngeal dysphagia. Orally she is slow to masticate the ice and to propel the bolus' posteriorly. Pharyngeal dysphagia is characterized by mild swallow delay, weak hyolaryngeal excursion, multiple swallows which is suggestive of pharyngeal residue and throat clearing and fair cough after the swallow. She was admitted with possible pneumonia with infiltrates in her LLL. Her head falls to her L due to inability to hold it up. She is hospitalized with exacerbation to her polymyositis. Recommend MBS prior to initiating po. Patient eats some meat and some vegetables pta. She has dysphagia at baseline. Speech is fully intelligible. Her cough is fair but she can cough up her secretions. She is [place] and hears better in her R ear. Patient will benefit from skilled intervention to address the above impairments. Patients rehabilitation potential is considered to be Fair     PLAN :  Recommendations and Planned Interventions:  NPO  MBS Monday  Frequency/Duration: Patient will be followed by speech-language pathology 3 times a week to address goals. Discharge Recommendations: None     SUBJECTIVE:   Patient stated was worried about her secretion management with being NPO, becoming dehydrated if NPO and asking questions unrelated to swallowing.      OBJECTIVE:     Past Medical History:   Diagnosis Date    Congestive heart failure ([facility]     Hypertension     Pneumonia 10/2018    Polymyositis ([facility] 12/2015    in setting of RCC 2015    Polymyositis associated with autoimmune disease ([facility]     Renal cancer ([facility] 07/08/2016    s/p right nephrectomy.  Respiratory arrest ([facility] 11/2015    [facility].     SBO (small bowel obstruction) ([facility] 8/3/2017     Past Surgical History:   Procedure Laterality Date    HX GYN  1999    hysterectomy    HX NEPHRECTOMY Right 2016    for kidney cancer    US GUIDED CORE BREAST BIOPSY Left 1999    Negative     Prior Level of Function/Home Situation:   Home Situation  Home Environment: Private residence  # Steps to Enter: 4  One/Two Story Residence: One story  Living Alone: Yes  Support Systems: Family member(s), Friends \ neighbors  Patient Expects to be Discharged to[de-identified] Private residence  Current DME Used/Available at Home: Cane, straight, Walker, rolling  Diet prior to admission:   Current Diet:  reg/thins    Cognitive and Communication Status:  Neurologic State: Alert  Orientation Level: Oriented X4  Cognition: Appropriate decision making, Appropriate safety awareness, Appropriate for age attention/concentration, Follows commands  Perception: Appears intact  Perseveration: No perseveration noted     Oral Assessment:  Oral Assessment  Labial: No impairment  Dentition: Full;Natural  Lingual: No impairment  Velum: Unable to visualize  Mandible: No impairment  P.O. Trials:     Vocal quality prior to P.O.: Breathy  Consistency Presented: Ice chips;Puree; Thin liquid        Bolus Acceptance: No impairment  Bolus Formation/Control: Impaired  Type of Impairment: Delayed  Propulsion: Delayed (# of seconds)  Oral Residue: None  Initiation of Swallow: Delayed (# of seconds)  Laryngeal Elevation: Decreased  Aspiration Signs/Symptoms: Clear throat;Delayed cough/throat clear                Oral Phase Severity: Mild  Pharyngeal Phase Severity : Moderate-severe    NOMS:   The NOMS functional outcome measure was used to quantify this patient's level of swallowing impairment.   Based on the NOMS, the patient was determined to be at level 2 for swallow function       NOMS Swallowing Levels:  Level 1 (CN): NPO  Level 2 (CM): NPO but takes consistency in therapy  Level 3 (CL): Takes less than 50% of nutrition p.o. and continues with nonoral feedings; and/or safe with mod cues; and/or max diet restriction  Level 4 (CK): Safe swallow but needs mod cues; and/or mod diet restriction; and/or still requires some nonoral feeding/supplements  Level 5 (CJ): Safe swallow with min diet restriction; and/or needs min cues  Level 6 (CI): Independent with p.o.; rare cues; usually self cues; may need to avoid some foods or needs extra time  Level 7 ([address] [place] [address]): Independent for all p.o.  DEEP. (2003). National Outcomes Measurement System (NOMS): Adult Speech-Language Pathology User's Guide. Pain:  Pain Scale 1: Numeric (0 - 10)  Pain Intensity 1: 0       After treatment:   Patient left in no apparent distress in bed, call bell within reach. COMMUNICATION/EDUCATION:   Patient was educated regarding her deficit(s) of dysphagia as this relates to her diagnosis of polymyositis exacerbation. She demonstrated Good understanding. The patient's plan of care including recommendations, planned interventions, and recommended diet changes were discussed with: Registered nurse. Patient/family have participated as able in goal setting and plan of care.     Thank you for this referral.  Kareem Sim, SLP  Time Calculation: 15 mins

## 2020-06-07 NOTE — PROGRESS NOTES
Problem: Falls - Risk of  Goal: *Absence of Falls  Description: Document [place] Flow Fall Risk and appropriate interventions in the flowsheet. Outcome: Progressing Towards Goal  Note: Fall Risk Interventions:  Mobility Interventions: Bed/chair exit alarm         Medication Interventions: Bed/chair exit alarm    Elimination Interventions: Bed/chair exit alarm              Problem: Patient Education: Go to Patient Education Activity  Goal: Patient/Family Education  Outcome: Progressing Towards Goal     Problem: Pressure Injury - Risk of  Goal: *Prevention of pressure injury  Description: Document Armen Scale and appropriate interventions in the flowsheet.   Outcome: Progressing Towards Goal  Note: Pressure Injury Interventions:  Sensory Interventions: Assess changes in LOC    Moisture Interventions: Absorbent underpads    Activity Interventions: Chair cushion    Mobility Interventions: Chair cushion, Float heels    Nutrition Interventions: Document food/fluid/supplement intake, Offer support with meals,snacks and hydration    Friction and Shear Interventions: Feet elevated on foot rest, Lift team/patient mobility team                Problem: Patient Education: Go to Patient Education Activity  Goal: Patient/Family Education  Outcome: Progressing Towards Goal

## 2020-06-07 NOTE — PROGRESS NOTES
Reason for Admission: Polymyositis                     RUR Score: 13%                    Plan for utilizing home health: Unknown at this time, currently PT consult is pending. The patient may require some type of rehab placement upon d/c          PCP: First and Last name: Georgie Velazco     Name of Practice: [facility]    Are you a current patient: Yes/No: Yes   Approximate date of last visit: Beginning of May    Can you participate in a virtual visit with your PCP: Yes                    Current Advanced Directive/Advance Care Plan:   The patient is a full code and she does not have a MPOA/AD on-file                         Transition of Care Plan:                    CM spoke with the patient to discuss dispo plan. The patient lives in a 1 level home with a ramped entrance. She lives alone. She has never been  and has no children. She has 1 cousin that lives in [address] that is very supportive. She also reports having neighbors that are supportive. The patient reports that typically she is able to ambulate with a walker and attend to most of her ADLs and IADLs, however, for the past 1.5 weeks she has been basically bed-bound. She said that she currently has 2 caregiver that come to her home 7 days per week for 4 hours each day. She reports that she has a transport chair but she does not have a wheelchair. She does not drive and her neighbors or friends assist with transportation needs. She uses the [facility] on [facility] in [address] for her medications. Currently, PT recs are pending. The patient will likely require some type of rehab as she is currently functioning below her baseline. She is hopeful that she can go to [facility] upon d/c. CM will continue to monitor PT recs for dispo planning.      Alexus White LCSW

## 2020-06-07 NOTE — PROGRESS NOTES
I talked with the patient's cousin ,Linda, and she was updated on the plan, all questions were answered.

## 2020-06-07 NOTE — PROGRESS NOTES
Hospitalist Progress Note    NAME: Salma Agarwal   :  1955   MRN:  [id number]       Assessment / Plan:      Polymyositis, likely flare  Weakness  ? PNA on CXR  Elevated CK        -Complicated and long history of polymyositis, at baseline ambulatory with a walker and most of the time uses her recliner bike for ambulation.    -Weakness for past 1 week, progressively getting worse, currently muscle strength around 2 out of 5     -started on Solu-Medrol 500 mg every 12 hours for 3 days,   -Consulted rheumatology  -Sliding scale insulin  -ED has attempted transfer patient to [facility] inpatient service, but did not have any bed available.   -Placed on azithromycin and Rocephin on admission, will hold these for now and repeat CXR  -Pt has elevated CK at [address], she reports its better then before, continue IVF  -ck levels cont to improve    Dysphagia  -likely 2/2 polymyositis  -cont care as above  -speech eval  -aspiration percautions  -scopolamin for secretions      Sepsis  -tachycardia 102/min and elevated WBC 17 k  -unclear source but blood Cx are positive for GPC 3/3 bottles   -will start Vancomycin, pharmacy consult for dosing  -repeat blood cultures, f/o Procalcitonin   - ECHO to r/o vegetations   -ID consultation   -qSOFA score of 0 now  -keep MAP > 65 mmHg     Refer to Clinic Note by Neurology on 20, for details on her history .        History of hypertension  History of JOHN  History of renal cancer status post right nephrectomy           Code Status: Full code        DVT Prophylaxis: Lovenox  GI Prophylaxis: not indicated     Subjective:     Chief Complaint / Reason for Physician Visit  Discussed with RN events overnight. Patient was seen and examined. No acute events overnight.     \"Doing ok and start to feeling better\"          Review of Systems:  Symptom Y/N Comments  Symptom Y/N Comments   Fever/Chills n   Chest Pain n    Poor Appetite    Edema     Cough    Abdominal Pain n    Sputum    Joint Pain SOB/LOREDO n   Pruritis/Rash     Nausea/vomit    Tolerating PT/OT     Diarrhea    Tolerating Diet n    Constipation    Other       Could NOT obtain due to:      Objective:     VITALS:   Last 24hrs VS reviewed since prior progress note. Most recent are:  Patient Vitals for the past 24 hrs:   Temp Pulse Resp BP SpO2   06/07/20 0651 97.7 °F (36.5 °C) 81 18 125/85 99 %   06/07/20 0234 98 °F (36.7 °C) 82 18 133/77 98 %   06/06/20 2352 97.8 °F (36.6 °C) 89 18 136/66 99 %   06/06/20 2205 97.9 °F (36.6 °C) 87 18 131/67 98 %   06/06/20 1804 97.9 °F (36.6 °C) 97 18 145/72 96 %   06/06/20 1600 -- (!) 102 -- -- --   06/06/20 1506 98 °F (36.7 °C) 91 18 156/57 100 %   06/06/20 1202 97.4 °F (36.3 °C) 86 20 134/62 99 %     No intake or output data in the 24 hours ending 06/07/20 0909     PHYSICAL EXAM:  General: WD, WN. Alert, cooperative, no acute distress    EENT:  EOMI. Anicteric sclerae. MMM  Resp:  CTA bilaterally, no wheezing or rales. No accessory muscle use  CV:  Regular  rhythm,  No edema  GI:  Soft, Non distended, Non tender.  +Bowel sounds  Neurologic:  Alert and oriented X 3, normal speech,   Psych:   Good insight. Not anxious nor agitated  Skin:  No rashes. No jaundice    Reviewed most current lab test results and cultures  YES  Reviewed most current radiology test results   YES  Review and summation of old records today    NO  Reviewed patient's current orders and MAR    YES  PMH/SH reviewed - no change compared to H&P  ________________________________________________________________________  Care Plan discussed with:    Comments   Patient x    Family  x    RN x    Care Manager     Consultant                        Multidiciplinary team rounds were held today with , nursing, pharmacist and clinical coordinator. Patient's plan of care was discussed; medications were reviewed and discharge planning was addressed.      ________________________________________________________________________  Total NON critical care TIME:  30   Minutes    Total CRITICAL CARE TIME Spent:   Minutes non procedure based      Comments   >50% of visit spent in counseling and coordination of care     ________________________________________________________________________  Rebecca Knight MD     Procedures: see electronic medical records for all procedures/Xrays and details which were not copied into this note but were reviewed prior to creation of Plan. LABS:  I reviewed today's most current labs and imaging studies.   Pertinent labs include:  Recent Labs     06/07/20  0459 06/06/20  0437 06/05/20  1639   WBC 17.6* 6.5 6.9   HGB 11.2* 11.8 11.8   HCT 34.0* 36.8 35.3    286 302     Recent Labs     06/07/20  0459 06/06/20  0437 06/05/20  1639    140 142   K 4.1 4.4 3.8   * 110* 109*   CO2 23 20* 24   * 94 71   BUN 32* 22* 25*   CREA 0.55 0.38* 0.47*   CA 8.4* 9.0 9.4   MG  --   --  1.9   ALB  --   --  2.5*   TBILI  --   --  0.4   ALT  --   --  123*       Signed: Rebecca Knight MD

## 2020-06-08 ENCOUNTER — APPOINTMENT (OUTPATIENT)
Dept: GENERAL RADIOLOGY | Age: 65
DRG: 545 | End: 2020-06-08
Attending: INTERNAL MEDICINE
Payer: MEDICARE

## 2020-06-08 ENCOUNTER — APPOINTMENT (OUTPATIENT)
Dept: NON INVASIVE DIAGNOSTICS | Age: 65
DRG: 545 | End: 2020-06-08
Attending: INTERNAL MEDICINE
Payer: MEDICARE

## 2020-06-08 LAB
ANION GAP SERPL CALC-SCNC: 4 MMOL/L (ref 5–15)
AV VELOCITY RATIO: 0.71
BUN SERPL-MCNC: 33 MG/DL (ref 6–20)
BUN/CREAT SERPL: 66 (ref 12–20)
CALCIUM SERPL-MCNC: 8.1 MG/DL (ref 8.5–10.1)
CHLORIDE SERPL-SCNC: 117 MMOL/L (ref 97–108)
CK SERPL-CCNC: 1133 U/L (ref 26–192)
CO2 SERPL-SCNC: 24 MMOL/L (ref 21–32)
CREAT SERPL-MCNC: 0.5 MG/DL (ref 0.55–1.02)
ECHO AV AREA PEAK VELOCITY: 1.7 CM2
ECHO AV PEAK GRADIENT: 7.9 MMHG
ECHO AV PEAK VELOCITY: 140.23 CM/S
ECHO EST RA PRESSURE: 10 MMHG
ECHO LA AREA 4C: 14.5 CM2
ECHO LA VOL 4C: 38.56 ML (ref 22–52)
ECHO LA VOLUME INDEX A4C: 23.6 ML/M2 (ref 16–28)
ECHO LV E' LATERAL VELOCITY: 11.36 CM/S
ECHO LV E' SEPTAL VELOCITY: 10.55 CM/S
ECHO LV EDV A4C: 58 ML
ECHO LV EDV INDEX A4C: 35.5 ML/M2
ECHO LV EJECTION FRACTION A4C: 74 %
ECHO LV ESV A4C: 15.2 ML
ECHO LV ESV INDEX A4C: 9.3 ML/M2
ECHO LV INTERNAL DIMENSION DIASTOLIC MMODE: 4.84 CM
ECHO LV INTERNAL DIMENSION SYSTOLIC MMODE: 2.25 CM
ECHO LV IVSD MMODE: 0.84 CM
ECHO LV IVSS MMODE: 1.38 CM
ECHO LV POSTERIOR WALL DIASTOLIC MMODE: 1.01 CM
ECHO LV POSTERIOR WALL SYSTOLIC MMODE: 1.73 CM
ECHO LVOT DIAM: 1.73 CM
ECHO LVOT PEAK GRADIENT: 4 MMHG
ECHO LVOT PEAK VELOCITY: 100.05 CM/S
ECHO MV A VELOCITY: 97.33 CM/S
ECHO MV E DECELERATION TIME (DT): 214.7 MS
ECHO MV E VELOCITY: 106.73 CM/S
ECHO MV E/A RATIO: 1.1
ECHO MV E/E' LATERAL: 9.4
ECHO MV E/E' RATIO (AVERAGED): 9.76
ECHO MV E/E' SEPTAL: 10.12
ECHO MV REGURGITANT PEAK GRADIENT: 114.9 MMHG
ECHO MV REGURGITANT PEAK VELOCITY: 535.92 CM/S
ECHO PULMONARY ARTERY SYSTOLIC PRESSURE (PASP): 34.4 MMHG
ECHO PV MAX VELOCITY: 73.45 CM/S
ECHO PV PEAK GRADIENT: 2.2 MMHG
ECHO RIGHT VENTRICULAR SYSTOLIC PRESSURE (RVSP): 34.4 MMHG
ECHO RV INTERNAL DIMENSION: 3.31 CM
ECHO TV A WAVE: 30.28 CM/S
ECHO TV E WAVE: 46.73 CM/S
ECHO TV EROA: 0.6
ECHO TV REGURGITANT MAX VELOCITY: 247.14 CM/S
ECHO TV REGURGITANT PEAK GRADIENT: 24.4 MMHG
ERYTHROCYTE [DISTWIDTH] IN BLOOD BY AUTOMATED COUNT: 15.5 % (ref 11.5–14.5)
GLUCOSE BLD STRIP.AUTO-MCNC: 106 MG/DL (ref 65–100)
GLUCOSE BLD STRIP.AUTO-MCNC: 97 MG/DL (ref 65–100)
GLUCOSE SERPL-MCNC: 107 MG/DL (ref 65–100)
HCT VFR BLD AUTO: 32.9 % (ref 35–47)
HGB BLD-MCNC: 10.9 G/DL (ref 11.5–16)
LVFS: 53.57 %
MCH RBC QN AUTO: 22.3 PG (ref 26–34)
MCHC RBC AUTO-ENTMCNC: 33.1 G/DL (ref 30–36.5)
MCV RBC AUTO: 67.4 FL (ref 80–99)
MV DEC SLOPE: 4.97
NRBC # BLD: 0 K/UL (ref 0–0.01)
NRBC BLD-RTO: 0 PER 100 WBC
PLATELET # BLD AUTO: 292 K/UL (ref 150–400)
PMV BLD AUTO: 12.3 FL (ref 8.9–12.9)
POTASSIUM SERPL-SCNC: 3.6 MMOL/L (ref 3.5–5.1)
RBC # BLD AUTO: 4.88 M/UL (ref 3.8–5.2)
SERVICE CMNT-IMP: ABNORMAL
SERVICE CMNT-IMP: NORMAL
SODIUM SERPL-SCNC: 145 MMOL/L (ref 136–145)
WBC # BLD AUTO: 14.7 K/UL (ref 3.6–11)

## 2020-06-08 PROCEDURE — 97165 OT EVAL LOW COMPLEX 30 MIN: CPT

## 2020-06-08 PROCEDURE — 82550 ASSAY OF CK (CPK): CPT

## 2020-06-08 PROCEDURE — 65660000000 HC RM CCU STEPDOWN

## 2020-06-08 PROCEDURE — 85027 COMPLETE CBC AUTOMATED: CPT

## 2020-06-08 PROCEDURE — 82962 GLUCOSE BLOOD TEST: CPT

## 2020-06-08 PROCEDURE — 74011250636 HC RX REV CODE- 250/636: Performed by: INTERNAL MEDICINE

## 2020-06-08 PROCEDURE — 74011250636 HC RX REV CODE- 250/636: Performed by: EMERGENCY MEDICINE

## 2020-06-08 PROCEDURE — 74011000258 HC RX REV CODE- 258: Performed by: INTERNAL MEDICINE

## 2020-06-08 PROCEDURE — 74230 X-RAY XM SWLNG FUNCJ C+: CPT

## 2020-06-08 PROCEDURE — 97530 THERAPEUTIC ACTIVITIES: CPT | Performed by: PHYSICAL THERAPIST

## 2020-06-08 PROCEDURE — 80048 BASIC METABOLIC PNL TOTAL CA: CPT

## 2020-06-08 PROCEDURE — 93306 TTE W/DOPPLER COMPLETE: CPT

## 2020-06-08 PROCEDURE — 97535 SELF CARE MNGMENT TRAINING: CPT

## 2020-06-08 PROCEDURE — 36415 COLL VENOUS BLD VENIPUNCTURE: CPT

## 2020-06-08 PROCEDURE — 97162 PT EVAL MOD COMPLEX 30 MIN: CPT | Performed by: PHYSICAL THERAPIST

## 2020-06-08 PROCEDURE — 92611 MOTION FLUOROSCOPY/SWALLOW: CPT

## 2020-06-08 PROCEDURE — 77030040393 HC DRSG OPTIFOAM GENT MDII -B

## 2020-06-08 RX ORDER — IRBESARTAN 150 MG/1
300 TABLET ORAL
Status: DISCONTINUED | OUTPATIENT
Start: 2020-06-08 | End: 2020-06-12

## 2020-06-08 RX ORDER — HYDRALAZINE HYDROCHLORIDE 20 MG/ML
10 INJECTION INTRAMUSCULAR; INTRAVENOUS
Status: DISCONTINUED | OUTPATIENT
Start: 2020-06-08 | End: 2020-06-16 | Stop reason: HOSPADM

## 2020-06-08 RX ADMIN — Medication 10 ML: at 14:00

## 2020-06-08 RX ADMIN — SODIUM CHLORIDE 100 ML/HR: 900 INJECTION, SOLUTION INTRAVENOUS at 01:53

## 2020-06-08 RX ADMIN — Medication 10 ML: at 05:16

## 2020-06-08 RX ADMIN — SODIUM CHLORIDE 500 MG: 900 INJECTION, SOLUTION INTRAVENOUS at 12:37

## 2020-06-08 RX ADMIN — VANCOMYCIN HYDROCHLORIDE 500 MG: 500 INJECTION, POWDER, LYOPHILIZED, FOR SOLUTION INTRAVENOUS at 01:49

## 2020-06-08 RX ADMIN — ENOXAPARIN SODIUM 40 MG: 40 INJECTION SUBCUTANEOUS at 22:06

## 2020-06-08 RX ADMIN — Medication 10 ML: at 22:08

## 2020-06-08 NOTE — PROGRESS NOTES
Problem: Falls - Risk of  Goal: *Absence of Falls  Description: Document [place] Fall Risk and appropriate interventions in the flowsheet. Outcome: Progressing Towards Goal  Note: Fall Risk Interventions:  Mobility Interventions: Bed/chair exit alarm         Medication Interventions: Bed/chair exit alarm    Elimination Interventions: Bed/chair exit alarm              Problem: Patient Education: Go to Patient Education Activity  Goal: Patient/Family Education  Outcome: Progressing Towards Goal     Problem: Pressure Injury - Risk of  Goal: *Prevention of pressure injury  Description: Document Armen Scale and appropriate interventions in the flowsheet.   Outcome: Progressing Towards Goal  Note: Pressure Injury Interventions:  Sensory Interventions: Assess changes in LOC    Moisture Interventions: Absorbent underpads    Activity Interventions: Chair cushion    Mobility Interventions: Assess need for specialty bed    Nutrition Interventions: Document food/fluid/supplement intake, Offer support with meals,snacks and hydration    Friction and Shear Interventions: Apply protective barrier, creams and emollients, Foam dressings/transparent film/skin sealants, Transfer aides:transfer board/Cristiane lift/ceiling lift, Sit at 90-degree angle, Lift team/patient mobility team                Problem: Patient Education: Go to Patient Education Activity  Goal: Patient/Family Education  Outcome: Progressing Towards Goal     Problem: Patient Education: Go to Patient Education Activity  Goal: Patient/Family Education  Outcome: Progressing Towards Goal

## 2020-06-08 NOTE — PROGRESS NOTES
Hospitalist Progress Note    NAME: Elba Caputo   :  1955   MRN:  [id number]       Assessment / Plan:  Polymyositis flare patient is on steroids IV high-dose. Today patient's seem to be at baseline. Dysphagia likely second to underlying condition patient is having swallowing evaluation    Sepsis with average of 102 elevated WBCs and positive blood cultures. Patient is on antibiotics regimen will de-escalate. Bacteremia likely contamination will de-escalate antibiotics. History of JOHN continue current regimen. Hypertension we will continue BP medications monitor patient blood pressure closely. History of renal cell carcinoma status post nephrectomy. Nontraumatic rhabdomyolysis continue current regimen . Acute severe protein calorie malnutrition     / Body mass index is 24.29 kg/m². Code status: Full  Prophylaxis: Lovenox  Recommended Disposition: Home home care     Subjective:     Chief Complaint / Reason for Physician Visit  . Discussed with RN events overnight. Patient seen and examined at bedside comfortable no new changes complain of elbow pain. Patient was also scheduled for swallowing evaluation which has been done. Review of Systems:  Symptom Y/N Comments  Symptom Y/N Comments   Fever/Chills n   Chest Pain     Poor Appetite n   Edema     Cough n   Abdominal Pain     Sputum n   Joint Pain     SOB/LOREDO n   Pruritis/Rash     Nausea/vomit n   Tolerating PT/OT     Diarrhea n   Tolerating Diet     Constipation nn   Other       Could NOT obtain due to:      Objective:     VITALS:   Last 24hrs VS reviewed since prior progress note.  Most recent are:  Patient Vitals for the past 24 hrs:   Temp Pulse Resp BP SpO2   20 0801 98 °F (36.7 °C) 76 16 162/80 96 %   20 0345 98 °F (36.7 °C) 73 18 157/83 97 %   20 2321 97.7 °F (36.5 °C) 79 18 147/73 97 %   20 1945 98.4 °F (36.9 °C) 76 18 164/83 98 %   20 1553 98.4 °F (36.9 °C) 79 18 137/60 98 %   20 1246 98.3 °F (36.8 °C) 80 18 141/73 98 %     No intake or output data in the 24 hours ending 06/08/20 1051     PHYSICAL EXAM:  General: WD, WN. Alert, cooperative, no acute distress    EENT:  EOMI. Anicteric sclerae. MMM  Resp:  CTA bilaterally, no wheezing or rales. No accessory muscle use  CV:  Regular  rhythm,  No edema  GI:  Soft, Non distended, Non tender.  +Bowel sounds  Neurologic:  Bedbound  Psych:   awake      Reviewed most current lab test results and cultures  YES  Reviewed most current radiology test results   YES  Review and summation of old records today    NO  Reviewed patient's current orders and MAR    YES  PMH/SH reviewed - no change compared to H&P  ________________________________________________________________________  Care Plan discussed with:    Comments   Patient x    Family      RN x    Care Manager     Consultant                        Multidiciplinary team rounds were held today with , nursing, pharmacist and clinical coordinator. Patient's plan of care was discussed; medications were reviewed and discharge planning was addressed. ________________________________________________________________________          Comments   >50% of visit spent in counseling and coordination of care     ________________________________________________________________________  Elva Carbone MD     Procedures: see electronic medical records for all procedures/Xrays and details which were not copied into this note but were reviewed prior to creation of Plan. LABS:  I reviewed today's most current labs and imaging studies.   Pertinent labs include:  Recent Labs     06/08/20  0514 06/07/20  0459 06/06/20  0437   WBC 14.7* 17.6* 6.5   HGB 10.9* 11.2* 11.8   HCT 32.9* 34.0* 36.8    312 286     Recent Labs     06/08/20  0514 06/07/20  0459 06/06/20  0437 06/05/20  1639    141 140 142   K 3.6 4.1 4.4 3.8   * 113* 110* 109*   CO2 24 23 20* 24   * 169* 94 71   BUN 33* 32* 22* 25*   CREA 0.50* 0.55 0.38* 0.47*   CA 8.1* 8.4* 9.0 9.4   MG  --   --   --  1.9   ALB  --   --   --  2.5*   TBILI  --   --   --  0.4   ALT  --   --   --  123*       Signed: Elijah Camargo MD

## 2020-06-08 NOTE — PROGRESS NOTES
Bedside shift change report given to Alexus RN (oncoming nurse) by [facility] RN (offgoing nurse). Report included the following information SBAR and Kardex.     Zone ZPIZE:   0064        Significant changes during shift:  Barium swallow study           Patient Information     Heidi Lombardo  8 y.o.  6/5/2020  4:13 PM by Pascual Balbuena MD. Oksana Garner was admitted from Home     Problem List             Patient Active Problem List     Diagnosis Date Noted    S/p nephrectomy 05/27/2020    History of renal cell cancer 05/27/2020    Vitamin D deficiency 10/10/2018    Age-related osteoporosis without current pathological fracture 08/23/2018    Elevated glucose 01/18/2017    Obstructive sleep apnea syndrome 01/17/2017    Polymyositis ([address] 07/28/2016    HTN (hypertension) 07/28/2016           Past Medical History:   Diagnosis Date    Congestive heart failure ([address]      Hypertension      Pneumonia 10/2018    Polymyositis ([address] 12/2015     in setting of RCC 2015    Polymyositis associated with autoimmune disease ([address]      Renal cancer ([address] 07/08/2016     s/p right nephrectomy.      Respiratory arrest ([address] 11/2015     [facility].     SBO (small bowel obstruction) ([address] 8/3/2017            Core Measures:     CVA: No No  CHF:No No  PNA:No No        Activity Status:     OOB to Chair No  Ambulated this shift No   Bed Rest Yes           DVT prophylaxis:     DVT prophylaxis Med- Yes  DVT prophylaxis SCD or ELMER- No            Patient Safety:     Falls Score Total Score: 2  Safety Level_______  Bed Alarm On? Yes  Sitter? No     Plan for upcoming shift: safety, pain management, rheumatology consult, wound care consult            Discharge Plan: No TBD     Active Consults:  IP CONSULT TO HOSPITALIST  IP CONSULT TO RHEUMATOLOGY

## 2020-06-08 NOTE — PROGRESS NOTES
Problem: Self Care Deficits Care Plan (Adult)  Goal: *Acute Goals and Plan of Care (Insert Text)  Description:   FUNCTIONAL STATUS PRIOR TO ADMISSION: Pt reports living alone in single story home with ramped entrance, reporting she has Max A from daily caregiver (x4 hours daily), recently (past month), being bathed seated at [facility].  1 month ago pt reports she was standing with RW at walk-in shower with Max A for bathing. Pt reports sleeping in recliner. DME needs met. HOME SUPPORT: The patient lived with alone with paid caregiver assist.    Occupational Therapy Goals  Initiated 6/8/2020  1. Patient will perform self-feeding with minimal assistance within 7 day(s). 2.  Patient will perform grooming with moderate assistance  within 7 day(s). 3.  Patient will perform upper body dressing with Max assistance  within 7 day(s). 4.  Patient will perform toilet transfers, EOB to [facility], with maximal assistance within 7 day(s). 5.  Patient will perform all aspects of toileting with maximal assistance within 7 day(s). 6.  Patient will utilize energy conservation techniques during functional activities with Min verbal cues within 7 day(s). Outcome: Progressing Towards Goal    OCCUPATIONAL THERAPY EVALUATION  Patient: Shauna Daigle (16 y.o. female)  Date: 6/8/2020  Primary Diagnosis: Polymyositis ([facility] [M33.20]        Precautions:   Fall    ASSESSMENT  Based on the objective data described below, the patient presents with decreased functional strength/endurance, decreased functional mobility/balance, decreased activity tolerance, decreased BUE GM control (increased BUE distal function noted with stabilization) and decreased bilateral integration, all of which limit pt's reported baseline ADL independence. Pt currently total A for all ADL completion.   Pt reports short distance ambulation at RW, and standing at RW in walk-in shower 1 month prior to hospitalization; however, most recently she has been recliner bound, sitting on BSC for Max A level bathing/dressing. Pt reports daily caregiver for 4hrs/day to assist with ADL/IADLs. Pt noted with good compensatory technique understanding. Would benefit from SNF s/p discharge from acute hospitalization, with acute OT to continue to follow to address functional deficits in an overall effort at maximizing pt's highest level of safe functional independence prior to discharge from acute OT services. Current Level of Function Impacting Discharge (ADLs/self-care): Total A    Functional Outcome Measure: The patient scored 10/100 on the Barthel Index outcome measure. Other factors to consider for discharge: Total A, limited assist in home     Patient will benefit from skilled therapy intervention to address the above noted impairments. PLAN :  Recommendations and Planned Interventions: self care training, functional mobility training, therapeutic exercise, balance training, therapeutic activities, endurance activities, and patient education    Frequency/Duration: Patient will be followed by occupational therapy 3 times a week to address goals. Recommendation for discharge: (in order for the patient to meet his/her long term goals)  Therapy up to 5 days/week in SNF setting    This discharge recommendation:  Has not yet been discussed the attending provider and/or case management    IF patient discharges home will need the following DME: patient owns DME required for discharge       SUBJECTIVE:   Patient stated I was using a recliner bike to exercise about a month ago.     OBJECTIVE DATA SUMMARY:   HISTORY:   Past Medical History:   Diagnosis Date    Congestive heart failure ([address]     Hypertension     Pneumonia 10/2018    Polymyositis ([address] 12/2015    in setting of [address] 2015    Polymyositis associated with autoimmune disease ([address]     Renal cancer ([address] 07/08/2016    s/p right nephrectomy.      Respiratory arrest ([address] 11/2015    [facility].     SBO (small bowel obstruction) ([facility] 8/3/2017     Past Surgical History:   Procedure Laterality Date    HX GYN  1999    hysterectomy    HX NEPHRECTOMY Right 2016    for kidney cancer    US GUIDED CORE BREAST BIOPSY Left 1999    Negative       Expanded or extensive additional review of patient history:     Home Situation  Home Environment: Private residence  # Steps to Enter: 4  One/Two Story Residence: One story  Living Alone: Yes  Support Systems: Family member(s), Other (comments)(daily paidcaregiver x4hrs)  Patient Expects to be Discharged to[de-identified] Private residence  Current DME Used/Available at Home: Cane, straight, Commode, bedside, Walker, rolling(transportm chair, uplift recliner)  Tub or Shower Type: (bathes seated BSC)    Hand dominance: Bilateral    EXAMINATION OF PERFORMANCE DEFICITS:  Cognitive/Behavioral Status:  Neurologic State: Alert  Orientation Level: Oriented X4  Cognition: Follows commands        Safety/Judgement: Insight into deficits; Awareness of environment    Hearing: Auditory  Auditory Impairment: Hard of hearing, bilateral    Vision/Perceptual:    Acuity: Within Defined Limits    Corrective Lenses: Glasses    Range of Motion:  AROM: Generally decreased, functional(BUE-good/fair distal function; poor proximal)  PROM: Generally decreased, functional(BUE-good/fair distal function; poor proximal)    Strength:  Strength: Generally decreased, functional    Coordination:  Coordination: (BUE-good/fair distal function; poor proximal)  Fine Motor Skills-Upper: Left Intact; Right Intact    Gross Motor Skills-Upper: Left Impaired;Right Impaired    Tone & Sensation:  Tone: Normal  Sensation: Intact    Balance:  Sitting: Impaired; With support  Sitting - Static: Poor (constant support)    Functional Mobility and Transfers for ADLs:  Bed Mobility:  Rolling: Total assistance  Supine to Sit: Total assistance;Assist x2  Sit to Supine: Total assistance;Assist x2  Scooting:  Total assistance;Assist x2    ADL Assessment:  Feeding: Total assistance    Oral Facial Hygiene/Grooming: Total assistance    Bathing: Total assistance    Upper Body Dressing: Total assistance    Lower Body Dressing: Total assistance    Toileting: Total assistance    ADL Intervention and task modifications:  Cognitive Retraining  Safety/Judgement: Insight into deficits; Awareness of environment    Functional Measure:  Barthel Index:    Bathin  Bladder: 5  Bowels: 5  Groomin  Dressin  Feedin  Mobility: 0  Stairs: 0  Toilet Use: 0  Transfer (Bed to Chair and Back): 0  Total: 10/100       The Barthel ADL Index: Guidelines  1. The index should be used as a record of what a patient does, not as a record of what a patient could do. 2. The main aim is to establish degree of independence from any help, physical or verbal, however minor and for whatever reason. 3. The need for supervision renders the patient not independent. 4. A patient's performance should be established using the best available evidence. Asking the patient, friends/relatives and nurses are the usual sources, but direct observation and common sense are also important. However direct testing is not needed. 5. Usually the patient's performance over the preceding 24-48 hours is important, but occasionally longer periods will be relevant. 6. Middle categories imply that the patient supplies over 50 per cent of the effort. 7. Use of aids to be independent is allowed. Yvette Braden, Barthel, D.W. ([id number]). Functional evaluation: the Barthel Index. [facility] (14)2. REDDY Sevilla, Niki Maguire., Marcellus Choe., [facility], [address] [place] [address] (). Measuring the change indisability after inpatient rehabilitation; comparison of the responsiveness of the Barthel Index and Functional [place] Measure. Journal of Neurology, Neurosurgery, and Psychiatry, 66(4), 587-110.   Rashard aGn, N.J.A, AISHWARYA Crowe.TYLER, & Ever Thorpe M.A. (2004.) Assessment of post-stroke quality of life in cost-effectiveness studies: The usefulness of the Barthel Index and the EuroQoL-5D. Quality of Life Research, 15, 681-30     Occupational Therapy Evaluation Charge Determination   History Examination Decision-Making   MEDIUM Complexity : Expanded review of history including physical, cognitive and psychosocial  history  HIGH Complexity : 5 or more performance deficits relating to physical, cognitive , or psychosocial skils that result in activity limitations and / or participation restrictions MEDIUM Complexity : Patient may present with comorbidities that affect occupational performnce. Miniml to moderate modification of tasks or assistance (eg, physical or verbal ) with assesment(s) is necessary to enable patient to complete evaluation       Based on the above components, the patient evaluation is determined to be of the following complexity level: MEDIUM  Pain Rating:  No c/o pain    Activity Tolerance:   Poor and requires frequent rest breaks  Please refer to the flowsheet for vital signs taken during this treatment. After treatment patient left in no apparent distress:    Supine in bed, Call bell within reach, Bed / chair alarm activated, and Side rails x 3    COMMUNICATION/EDUCATION:   The patients plan of care was discussed with: Physical therapist and Registered nurse. Home safety education was provided and the patient/caregiver indicated understanding., Patient/family have participated as able in goal setting and plan of care. , and Patient/family agree to work toward stated goals and plan of care. This patients plan of care is appropriate for delegation to [facility].     Thank you for this referral.  Rangel Regna OT

## 2020-06-08 NOTE — PROGRESS NOTES
* No surgery found *  * No surgery found *  Bedside shift change report given to Ferny (oncoming nurse) by eleazar (offgoing nurse). Report included the following information SBAR, Kardex, ED Summary and OR Summary. Zone Phone:   [phone]      Significant changes during shift:  Pt had her barium swallow        Patient Information    Janey Hines  59 y.o.  6/5/2020  4:13 PM by Davin Arredondo MD. Janey Hines was admitted from Home    Problem List    Patient Active Problem List    Diagnosis Date Noted    S/p nephrectomy 05/27/2020    History of renal cell cancer 05/27/2020    Vitamin D deficiency 10/10/2018    Age-related osteoporosis without current pathological fracture 08/23/2018    Elevated glucose 01/18/2017    Obstructive sleep apnea syndrome 01/17/2017    Polymyositis ([address] 07/28/2016    HTN (hypertension) 07/28/2016     Past Medical History:   Diagnosis Date    Congestive heart failure ([address]     Hypertension     Pneumonia 10/2018    Polymyositis ([address] 12/2015    in setting of [address] 2015    Polymyositis associated with autoimmune disease ([address]     Renal cancer ([address] 07/08/2016    s/p right nephrectomy.  Respiratory arrest ([address] 11/2015    [facility].     SBO (small bowel obstruction) ([address] 8/3/2017         Core Measures:    PNA:Yes Yes    Activity Status:    OOB to Chair No  Ambulated this shift No   Bed Rest Yes    DVT prophylaxis:    DVT prophylaxis Med- Yes  DVT prophylaxis SCD or ELMER- No     Wounds: (If Applicable)    Wounds- Yes    Location open areas to sacrum and redness under right breast     Patient Safety:    Falls Score Total Score: 2  Safety Level_______  Bed Alarm On? Yes  Sitter?  No    Plan for upcoming shift: Safety and monitoring        Discharge Plan: Yes Long term vs home health     Active Consults:  IP CONSULT TO HOSPITALIST  IP CONSULT TO RHEUMATOLOGY  IP CONSULT TO García De Luna

## 2020-06-08 NOTE — PROGRESS NOTES
Problem: Mobility Impaired (Adult and Pediatric)  Goal: *Acute Goals and Plan of Care (Insert Text)  Description:   FUNCTIONAL STATUS PRIOR TO ADMISSION: Patient has been essentially dependent with mobility for the past 2 weeks. Prior to that she was able to ambulate from recliner to bathroom (very short distance) with RW. Has paid caregivers 4 hrs per day who assist with all ADLs, iALs and would assist her with ambulation. For the past 2 weeks has been unable to stand or walk. Sleeps and spends all day up in recliner chair. HOME SUPPORT PRIOR TO ADMISSION: The patient lived alone with paid caregivers 4 hrs per day to provide assistance. Physical Therapy Goals  Initiated 6/8/2020  1. Patient will move from supine to sit and sit to supine  in bed with maximal assistance within 7 day(s). 2.  Patient will sit EOB for static task with intermittent modA x 5 mins within 7 days. 3.  Patient will bed to chair via liliana lift and RN's educated on need to assist her to chair daily within 7 days. 4.  Patient will complete supine HEP with modA x 1 within 7 days. 5.  Patient will improve Carey Balance score by 7 points within 7 days. Outcome: Progressing Towards Goal   PHYSICAL THERAPY EVALUATION  Patient: Arcelia Johnson (13 y.o. female)  Date: 6/8/2020  Primary Diagnosis: Polymyositis ([facility] [M33.20]        Precautions:   Fall      ASSESSMENT  Based on the objective data described below, the patient presents with decreased functional mobility from baseline level of function. Patient currently limited by pain, decreased strength, decreased ROM, impaired sitting balance and inability to stand or ambulate. Currently requiring totalA x 2 for supine to sit transfers and unable to main sitting EOB without totalA. Demo's ability to perform full LAQ B LE but not formally assessed secondary to poor sitting balance. Patient was functioning at very low level 2 weeks prior but has had a decline even from that.   Will likely need SNF rehab and could benefit from transition to LTC vs jail type setting post rehab as she has limited support at home and needs increased assist.    Current Level of Function Impacting Discharge (mobility/balance): totalA for bed mobility and to sit EOB      Other factors to consider for discharge: high fall risk, cannot care for herself, has limited support at home, below her functional baseline     Patient will benefit from skilled therapy intervention to address the above noted impairments. PLAN :  Recommendations and Planned Interventions: bed mobility training, transfer training, gait training, therapeutic exercises, neuromuscular re-education, and therapeutic activities      Frequency/Duration: Patient will be followed by physical therapy:  4 times a week to address goals. Recommendation for discharge: (in order for the patient to meet his/her long term goals)  Therapy up to 5 days/week in SNF setting      IF patient discharges home will need the following DME: to be determined (TBD) (owns majority of equipment but may benefit from liliana and standard w/c if she goes home)         SUBJECTIVE:   Patient stated I sit in the lift chair all day and I sleep there too.     OBJECTIVE DATA SUMMARY:   HISTORY:    Past Medical History:   Diagnosis Date    Congestive heart failure ([address]     Hypertension     Pneumonia 10/2018    Polymyositis ([address] 12/2015    in setting of [address] 2015    Polymyositis associated with autoimmune disease ([address]     Renal cancer ([address] 07/08/2016    s/p right nephrectomy.      Respiratory arrest ([address] 11/2015    [facility].     SBO (small bowel obstruction) ([address] 8/3/2017     Past Surgical History:   Procedure Laterality Date    HX GYN  1999    hysterectomy    HX NEPHRECTOMY Right 2016    for kidney cancer    US GUIDED CORE BREAST BIOPSY Left 1999    Negative       Personal factors and/or comorbidities impacting plan of care:     Home Situation  Home Environment: Private residence  # Steps to Enter: 4  One/Two Story Residence: One story  Living Alone: Yes  Support Systems: Family member(s), Other (comments)(daily paidcaregiver x4hrs)  Patient Expects to be Discharged to[de-identified] Private residence  Current DME Used/Available at Home: Candance March, straight, Commode, bedside, Walker, rolling(transportm chair, uplift recliner)  Tub or Shower Type: (bathes seated BSC)    EXAMINATION/PRESENTATION/DECISION MAKING:   Critical Behavior:  Neurologic State: Alert  Orientation Level: Oriented X4  Cognition: Follows commands  Safety/Judgement: Insight into deficits, Awareness of environment  Hearing: Auditory  Auditory Impairment: Hard of hearing, bilateral    Range Of Motion:  AROM: Generally decreased, functional(BUE-good/fair distal function; poor proximal)           PROM: Generally decreased, functional(BUE-good/fair distal function; poor proximal)           Strength:    Strength: Generally decreased, functional                    Tone & Sensation:   Tone: Normal              Sensation: Intact               Coordination:  Coordination: (BUE-good/fair distal function; poor proximal)  Vision:   Acuity: Within Defined Limits  Corrective Lenses: Glasses  Functional Mobility:  Bed Mobility:  Rolling: Total assistance  Supine to Sit: Total assistance;Assist x2  Sit to Supine: Total assistance;Assist x2  Scooting: Total assistance;Assist x2  Transfers:   Not tested                          Balance:   Sitting: Impaired; With support  Sitting - Static: Poor (constant support)  Standing: (not tested)      Functional Measure:  Carey Balance Test:    Sitting to Standin  Standing Unsupported: 0  Sitting with Back Unsupported: 0  Standing to Sittin  Transfers: 0  Standing Unsupported with Eyes Closed: 0  Standing Unsupported with Feet Together: 0  Reach Forward with Outstretched Arm: 0   Object: 0  Turn to Look Over Shoulders: 0  Turn 360 Degrees: 0  Alternate Foot on Step/Stool: 0  Standing Unsupported One Foot in Front: 0  Stand on One Le  Total: 0/56         56=Maximum possible score;   0-20=High fall risk  21-40=Moderate fall risk   41-56=Low fall risk         Pain Rating:  Reports pain but does not rate    Activity Tolerance:   Poor and requires frequent rest breaks  Please refer to the flowsheet for vital signs taken during this treatment. After treatment patient left in no apparent distress:   Supine in bed, Call bell within reach, and Side rails x 3    COMMUNICATION/EDUCATION:   The patients plan of care was discussed with: Physical therapist, Occupational therapist, and Registered nurse. Fall prevention education was provided and the patient/caregiver indicated understanding., Patient/family have participated as able in goal setting and plan of care. , and Patient/family agree to work toward stated goals and plan of care.     Thank you for this referral.  Sofya Mendoza, PT, DPT   Time Calculation: 25 mins

## 2020-06-08 NOTE — WOUND CARE
Wound care nurse consult from staff nurse for POA gluteal cleft fissure and bilateral buttock wounds. Patient is a 60 y/o AAF admitted for POLYMYOSITIS. Past Medical History:   Diagnosis Date    Congestive heart failure ([address]     Hypertension     Pneumonia 10/2018    Polymyositis ([address] 12/2015    in setting of [address] 2015    Polymy[facility] with [facility] disease [facility]     Renal cancer ([address] 07/08/2016    s/p right nephrectomy.  Respiratory arrest ([address] 11/2015    [facility].    [facility] SBO (small bowel obstruction) ([facility] [address] 8/3/2017     Patient is bed bound and has some use of her arms and hands. Patient has a moisture associatiated gluteal cleft fissure and then x2 pink epithelial spots to right and left buttock from closed wounds. Patient c/o of her right elbow becoming sore and it appears she is having irritation and maybe some pressure to elbow. Recommend:    Gluteal cleft and buttocks; cleanse gently with soap and water, pat skin completely dry. Apply a smeal of Z-guard cream over fissure and cover with small sacral foam dressing. Right elbow: daily, check for injury and apply a small foam dressing to prevent irritation.     Hortencia Gomez RN, [employer]

## 2020-06-08 NOTE — CDMP QUERY
#2 
Patient admitted with Polymyositis, likely flare, Weakness. Pt noted to have 2/6 aspen criteria (.Energy Intake: Less than/equal to 75% of est energy req for greater than/equal to 1 month,Weight Loss: Greater than 10% x 6 mos) If possible, please document in progress notes and d/c summary if you are evaluating and/or treating any of the following: 
 
=> Acute Severe Protein-Calorie Malnutrition 
=> Acute Moderate Protein-Calorie Malnutrition 
=> Other Explanation of clinical findings 
=> Clinically Undetermined (no explanation for clinical findings) The medical record reflects the following: 
   Risk Factors: hx polymyositis, hypertension, history of renal cancer status post right nephrectomy Clinical Indicators: 6/6 PN dietitian documented \"Please document  Severe Protein Calorie Malnutrition in patient diagnoses. Patient meets criteria for as evidenced by:  
ASPEN Malnutrition Criteria Acute Illness, Chronic Illness, or Social/Enviornmental: Chronic illness Energy Intake: Less than/equal to 75% of est energy req for greater than/equal to 1 month Weight Loss: Greater than 10% x 6 mos Body Fat Loss: Mild Muscle Mass Loss: Mild ASPEN Malnutrition Score - Chronic Illness: 14 Chronic Illness - Malnutrition Diagnosis: Severe malnutrition.  
 
(~10.9% x 7 months per chart review 
decreased appetite recently and associated weight loss\" Treatment: consult dietitian, monitoring, supplements Thank You Laura Lozano, [address] [facility] 
   [phone]

## 2020-06-08 NOTE — PROGRESS NOTES
Physical Therapy  PT eval complete, full note to follow. Recommend SNF rehab at [place].   Gavin Hernandez, PT, DPT

## 2020-06-08 NOTE — CDMP QUERY
#1 
Pt admitted with Polymyositis, likely flare,Weakness. Pt noted to have elevated CK, 3K. If possible, please document in progress notes and discharge summary if you are evaluating and/or treating any of the following:  Traumatic rhabdomyolysis  Nontraumatic rhabdomyolysis  Other, please specify  Clinically unable to determine The medical record reflects the following: 
 
   Risk Factors: bedbound, hx polymyositis, hypertension Clinical Indicators: c/o weakness, elevated CK 3K, 2000, 1133 H&P documented \"elevated ck\" Treatment: lab monitoring, iv ns @100ml/hr Per [email or web address] Traumatic rhabdomyolysis cause examples: crush syndrome, prolonged immobilization Nontraumatic rhabdomyolysis cause examples:  marked exertion, hyperthermia, metabolic myopathy, drugs or toxins, infections, electrolyte disorders Thank You Jomar Booth, [address] [facility] 
   [phone]

## 2020-06-08 NOTE — CDMP QUERY
#3 
Pt admitted with Polymyositis, likely flare,Weakness. Noted documentation of Sepsis on PN 6/7 &6/8  by attending. If possible, please document in progress notes and discharge summary:  Sepsis confirmed  Sepsis  ruled out  Other, (please specify)  Clinically unable to determine The medical record reflects the following: 
 
  Risk Factors: polymyositis, hypertension, history of renal cancer status post right nephrectomy Clinical Indicators: c/o weakness, PN 6/7 documented \"Sepsis 
-tachycardia 102/min and elevated WBC 17 k 
-unclear source but blood Cx are positive for GPC 3/3 bottles\" pn 6/8 documented \"Sepsis with average of 102 elevated WBCs and positive blood cultures. Patient is on antibiotics regimen will de-escalate. Bacteremia likely contamination will de-escalate antibiotics. \" Treatment: iv vancomycin, iv ns @100ml/hr, repeat blood cultures, f/o Procalcitonin ECHO to r/o vegetations ID consultation Thank You Xavi Morris, [address] [facility] 
   [phone]

## 2020-06-08 NOTE — PROGRESS NOTES
Problem: Dysphagia (Adult)  Goal: *Acute Goals and Plan of Care (Insert Text)  Description: 6/7/2020  Speech path goals  1. Patient will participate with MBS. Goal met 6/8. Outcome: Not Progressing Towards Goal   SPEECH PATHOLOGY MODIFIED BARIUM SWALLOW STUDY  Patient: Cathryn Hou (22 y.o. female)  Date: 6/8/2020  Primary Diagnosis: Polymyositis ([facility] [M33.20]        Precautions:   Fall    ASSESSMENT :  Based on the objective data described below, the patient presents with mild to mod oral and mod to severe pharyngeal residue. Orally she has premature spillage of liquids and purees to the level of the pyriform sinus due to reduced control, slow effortful posterior propulsion of purees. Oral residue with all requiring subsequent swallows to clear. Pharyngeal dysphagia is characterized by mild to mod swallow delay, reduced tongue base retraction, reduced hyolaryngeal excursion/closure, reduced epiglottic inversion and reduced pharyngeal constriction. Very weak swallow observed. With thins, there is premature spillage, mild swallow delay and mild pharyngeal residue with the patient swallowing 8-9 times to clear it all. Trace  laryngeal penetration during the swallow due to reduced closure but it cleared with the swallow. There was min amt of residue from the pyriform sinus that penetrated the vestibule after the swallow but she cleared it with her throat clearing. With purees, there was effortful posterior propulsion, oral residue and severe vallecular residue and mild to mod pyriform sinus residue requiring again multiple swallows (5-6) with bolus not clearing fully with severe residue still. . Thins were given to help reduce the severe vallecular  residue but there was laryngeal penetration and trace aspiration of the thins that she cleared by throat clearing spontaneously. It helped reduce some of the residue but at least mod amt remained.  Nectar thick liquids offered again at least mod residue in all recesses with nectar thick that did not reduce effectively with multiple swallows. Greater residue with nectar thick than with thins. Safest texture appeared to be thins. Sensation was good with the patient consistently clearing any penetrated /aspirated liquid. She is at risk to aspirate the thins. Patient will benefit from skilled intervention to address the above impairments. Patients rehabilitation potential is considered to be Fair     PLAN :  Recommendations and Planned Interventions: Will discuss with the physician. Could allow thins by small sip with risk of aspiration accepted. May need alternative feeding if patient wants to improve po intake and reduce risk of aspiration. Due to her  degenerative disease, she  will most likely need a feeding tube or comfort care at some point if not now. Frequency/Duration: Patient will be followed by speech-language pathology 3 times a week to address goals. Discharge Recommendations: None     SUBJECTIVE:   Patient was cooperative. OBJECTIVE:     Past Medical History:   Diagnosis Date    Congestive heart failure ([address]     Hypertension     Pneumonia 10/2018    Polymyositis ([address] 12/2015    in setting of [address] 2015    Polymyositis associated with autoimmune disease ([address]     Renal cancer ([address] 07/08/2016    s/p right nephrectomy.      Respiratory arrest ([address] 11/2015    [facility].     SBO (small bowel obstruction) ([address] 8/3/2017     Past Surgical History:   Procedure Laterality Date    HX GYN  1999    hysterectomy    HX NEPHRECTOMY Right 2016    for kidney cancer    US GUIDED CORE BREAST BIOPSY Left 1999    Negative     Prior Level of Function/Home Situation:   Home Situation  Home Environment: Private residence  # Steps to Enter: 4  One/Two Story Residence: One story  Living Alone: Yes  Support Systems: Family member(s), Other (comments)(daily paidcaregiver x4hrs)  Patient Expects to be Discharged to[de-identified] Private residence  Current DME Used/Available at Home: Cane, straight, Commode, bedside, Walker, rolling(transportm chair, uplift recliner)  Tub or Shower Type: (bathes seated BSC)  Diet prior to admission:   Current Diet:  NPO   Radiologist: Dr. Willian Mckeon: Lateral;Fluoro  Patient Position: sitting in transmotion chair with her head falling forward and to her L. Trial 1:   Consistency Presented: Thin liquid;Puree;Nectar thick liquid   How Presented: Self-fed/presented;Straw       Bolus Acceptance: No impairment   Bolus Formation/Control: Impaired: Delayed; Posterior;Premature spillage   Propulsion: Delayed (# of seconds)   Oral Residue: Lingual   Initiation of Swallow: Triggered at pyriform sinus(es)   Timing: Pooling 1-5 sec   Penetration: Trace;During swallow; To laryngeal vestibule; After swallow; To cords;From residual   Aspiration/Timing: Trace; After;From residual   Pharyngeal Clearance: 10-50%;Greater than 50%   Attempted Modifications: Small sips and bites   Effective Modifications: None               Decreased Tongue Base Retraction?: Yes  Laryngeal Elevation: Incomplete laryngeal closure; Inadequate epiglottic inversion; Reduced excursion with laryngeal vestibule gap  Aspiration/Penetration Score: 6 (Aspiration-Contrast passes below the cords/glottis, and is cleared)   Pharyngeal Symmetry: Not assessed  Pharyngeal-Esophageal Segment: No impairment  Pharyngeal Dysfunction: Decreased tongue base retraction;Decreased elevation/closure;Decreased strength;Decreased pharyngeal wall constriction    Oral Phase Severity: Mild-moderate  Pharyngeal Phase Severity: Moderately severe  NOMS:   The NOMS functional outcome measure was used to quantify this patient's level of swallowing impairment.   Based on the NOMS, the patient was determined to be at level 3 for swallow function         NOMS Swallowing Levels:  Level 1 (CN): NPO  Level 2 (CM): NPO but takes consistency in therapy  Level 3 (CL): Takes less than 50% of nutrition p.o. and continues with nonoral feedings; and/or safe with mod cues; and/or max diet restriction  Level 4 (CK): Safe swallow but needs mod cues; and/or mod diet restriction; and/or still requires some nonoral feeding/supplements  Level 5 (CJ): Safe swallow with min diet restriction; and/or needs min cues  Level 6 (CI): Independent with p.o.; rare cues; usually self cues; may need to avoid some foods or needs extra time  Level 7 ([address] [place] [address]): Independent for all p.o.  DEEP. (2003). National Outcomes Measurement System (NOMS): Adult Speech-Language Pathology User's Guide. COMMUNICATION/EDUCATION:   Patient was educated regarding Her deficit(s) of dysphagia as this relates to Her diagnosis of polymyositis. She demonstrated Good understanding     The patients plan of care including findings from MBS, recommendations, planned interventions, and recommended diet changes were discussed with: Registered nurse. Patient/family have participated as able in goal setting and plan of care.     Thank you for this referral.  Hayden Jiang, SLP  Time Calculation: 20 mins

## 2020-06-08 NOTE — PROGRESS NOTES
Spiritual Care Assessment/Progress Note  [facility]      NAME: Humza Clayton      MRN: [id number]  AGE: 59 y.o. SEX: female  Faith Affiliation: Yazidi   Language: English     6/8/2020     Total Time (in minutes): 10     Spiritual Assessment begun in [facility] NEUROSCIENCE TELEMETRY through conversation with:         []Patient        [] Family    [] Friend(s)        Reason for Consult: Palliative Care, Initial/Spiritual Assessment     Spiritual beliefs: (Please include comment if needed)     [] Identifies with a amdi tradition:         [] Supported by a madi community:            [] Claims no spiritual orientation:           [] Seeking spiritual identity:                [] Adheres to an individual form of spirituality:           [x] Not able to assess:                           Identified resources for coping:      [] Prayer                               [] Music                  [] Guided Imagery     [] Family/friends                 [] Pet visits     [] Devotional reading                         [x] Unknown     [] Other:                                              Interventions offered during this visit: (See comments for more details)    Patient Interventions: Initial visit(IV)           Plan of Care:     [x] Support spiritual and/or cultural needs    [] Support AMD and/or advance care planning process      [] Support grieving process   [] Coordinate Rites and/or Rituals    [] Coordination with community clergy   [] No spiritual needs identified at this time   [] Detailed Plan of Care below (See Comments)  [] Make referral to Music Therapy  [] Make referral to Pet Therapy     [] Make referral to Addiction services  [] Make referral to [facility]  [] Make referral to Spiritual Care Partner  [] No future visits requested        [x] Follow up visits as needed     Comments:   Attempted Initial Spiritual Assessment in Neuro. Unable to assess patients needs.   No family present at this time.  Patient was on telephone at time of attempted visit. Chaplains will follow as able and/or as needed.        Titus Shafer MPS, [facility], Staff [address] [place] [address] Paging Service  [phone]

## 2020-06-09 LAB
ANION GAP SERPL CALC-SCNC: 7 MMOL/L (ref 5–15)
BACTERIA SPEC CULT: ABNORMAL
BUN SERPL-MCNC: 34 MG/DL (ref 6–20)
BUN/CREAT SERPL: 83 (ref 12–20)
CALCIUM SERPL-MCNC: 8.5 MG/DL (ref 8.5–10.1)
CHLORIDE SERPL-SCNC: 118 MMOL/L (ref 97–108)
CK SERPL-CCNC: 788 U/L (ref 26–192)
CO2 SERPL-SCNC: 21 MMOL/L (ref 21–32)
CREAT SERPL-MCNC: 0.41 MG/DL (ref 0.55–1.02)
ERYTHROCYTE [DISTWIDTH] IN BLOOD BY AUTOMATED COUNT: 15.6 % (ref 11.5–14.5)
GLUCOSE BLD STRIP.AUTO-MCNC: 83 MG/DL (ref 65–100)
GLUCOSE BLD STRIP.AUTO-MCNC: 87 MG/DL (ref 65–100)
GLUCOSE BLD STRIP.AUTO-MCNC: 88 MG/DL (ref 65–100)
GLUCOSE BLD STRIP.AUTO-MCNC: 89 MG/DL (ref 65–100)
GLUCOSE SERPL-MCNC: 101 MG/DL (ref 65–100)
HCT VFR BLD AUTO: 34.9 % (ref 35–47)
HGB BLD-MCNC: 11.5 G/DL (ref 11.5–16)
MCH RBC QN AUTO: 22.2 PG (ref 26–34)
MCHC RBC AUTO-ENTMCNC: 33 G/DL (ref 30–36.5)
MCV RBC AUTO: 67.4 FL (ref 80–99)
NRBC # BLD: 0 K/UL (ref 0–0.01)
NRBC BLD-RTO: 0 PER 100 WBC
PLATELET # BLD AUTO: 293 K/UL (ref 150–400)
PMV BLD AUTO: 12.3 FL (ref 8.9–12.9)
POTASSIUM SERPL-SCNC: 3.7 MMOL/L (ref 3.5–5.1)
RBC # BLD AUTO: 5.18 M/UL (ref 3.8–5.2)
SERVICE CMNT-IMP: ABNORMAL
SERVICE CMNT-IMP: NORMAL
SODIUM SERPL-SCNC: 146 MMOL/L (ref 136–145)
WBC # BLD AUTO: 12.5 K/UL (ref 3.6–11)

## 2020-06-09 PROCEDURE — 92526 ORAL FUNCTION THERAPY: CPT

## 2020-06-09 PROCEDURE — 97110 THERAPEUTIC EXERCISES: CPT | Performed by: PHYSICAL THERAPIST

## 2020-06-09 PROCEDURE — 82962 GLUCOSE BLOOD TEST: CPT

## 2020-06-09 PROCEDURE — 36415 COLL VENOUS BLD VENIPUNCTURE: CPT

## 2020-06-09 PROCEDURE — 65660000000 HC RM CCU STEPDOWN

## 2020-06-09 PROCEDURE — 80048 BASIC METABOLIC PNL TOTAL CA: CPT

## 2020-06-09 PROCEDURE — 74011250636 HC RX REV CODE- 250/636: Performed by: INTERNAL MEDICINE

## 2020-06-09 PROCEDURE — 85027 COMPLETE CBC AUTOMATED: CPT

## 2020-06-09 PROCEDURE — 97535 SELF CARE MNGMENT TRAINING: CPT

## 2020-06-09 PROCEDURE — 82550 ASSAY OF CK (CPK): CPT

## 2020-06-09 RX ADMIN — METHYLPREDNISOLONE SODIUM SUCCINATE 125 MG: 125 INJECTION, POWDER, FOR SOLUTION INTRAMUSCULAR; INTRAVENOUS at 21:31

## 2020-06-09 RX ADMIN — HYDRALAZINE HYDROCHLORIDE 10 MG: 20 INJECTION INTRAMUSCULAR; INTRAVENOUS at 20:00

## 2020-06-09 RX ADMIN — HYDRALAZINE HYDROCHLORIDE 10 MG: 20 INJECTION INTRAMUSCULAR; INTRAVENOUS at 00:12

## 2020-06-09 RX ADMIN — Medication 10 ML: at 21:31

## 2020-06-09 RX ADMIN — Medication 10 ML: at 04:29

## 2020-06-09 RX ADMIN — Medication 10 ML: at 17:15

## 2020-06-09 RX ADMIN — ENOXAPARIN SODIUM 40 MG: 40 INJECTION SUBCUTANEOUS at 21:31

## 2020-06-09 RX ADMIN — METHYLPREDNISOLONE SODIUM SUCCINATE 125 MG: 125 INJECTION, POWDER, FOR SOLUTION INTRAMUSCULAR; INTRAVENOUS at 17:15

## 2020-06-09 NOTE — PALLIATIVE CARE
Brief summary of visit , full note will follow. Consulted for goals of care in a setting of polymyositis, admitted with flare up and rhabdomyolysis, now with oropharyngeal dysphagia, unintentional weight loss of 10 lbs in last 6 months. Patient goals are :    1. She does not want peg tube had peg tube in past, removed in March 2016.    2. She wants to continue with clear liquids and is accepting the risk of aspiration , choking and death. she is hoping that rheumatologist  may suggest treatment that may help with getting her muscles some strengths and she can gain some strength back, swallow better and follow up with out patient speech therapy . 3. She does not want to hospice, wants to try Rehab. ( doubt she is going to tolerate because of poor nutrition ). 4. She is clear wants to be \" DNR \" and do not intubate (for respiratory failure ). 5. Pink sheet placed , she will need DDNR . Case discussed with Dr Ling Polk.

## 2020-06-09 NOTE — PROGRESS NOTES
Nutrition Assessment:    INTERVENTIONS/RECOMMENDATIONS:   Clear liquids as tolerated  Palliative care to discuss PEG     ASSESSMENT:   Chart reviewed; SLP has evaluated patient. Safest for thin liquids but still at high risk to aspirate. Clear liquid diet initiated less than an hour ago. Palliative care consulted to discuss option of PEG. Will hold off on adding supplements until palliative discusses goals of care. Will monitor plan of care to provide further recommendations. Patient very unlikely to meet nutritional needs with clear liquids only. Diet Order: Clear liquids  % Eaten:  No data found. Pertinent Medications: [x] Reviewed []Other: humalog    Pertinent Labs: [x]Reviewed  []Other: Na 146  Food Allergies: []None [x]Yes:  Peanut    Last BM: 6/8   []Active     []Hyperactive  []Hypoactive       [] Absent  BS  Skin:    [] Intact   [] Incision  [] Breakdown   []Edema   [x]Other: moisture associated gluteal cleft fissure per wound care     Anthropometrics: Height: 5' 4\" (162.6 cm) Weight: 59.4 kg (130 lb 15.3 oz)    IBW (%IBW):   ( ) UBW (%UBW):   (  %)    BMI: Body mass index is 22.48 kg/m². This BMI is indicative of:  []Underweight   [x]Normal   []Overweight   [] Obesity   [] Extreme Obesity (BMI>40)  Last Weight Metrics:  Weight Loss Metrics 6/8/2020 10/30/2019 9/11/2019 8/14/2019 11/29/2018 11/19/2018 10/16/2018   Today's Wt 130 lb 15.3 oz 147 lb 154 lb 158 lb 167 lb 6.4 oz 161 lb 6 oz 163 lb   BMI 22.48 kg/m2 25.23 kg/m2 26.43 kg/m2 27.12 kg/m2 27.86 kg/m2 26.85 kg/m2 28.87 kg/m2       Estimated Nutrition Needs (Based on): 6278 Kcals/day(BMR (1119) x 1. 3AF) , 60 g(1.0 g/kg bw) Protein  Carbohydrate:  At Least 130 g/day  Fluids: 1450 mL/day     Pt expected to meet estimated nutrient needs: []Yes [x]No    NUTRITION DIAGNOSES:   Problem:  Inadequate protein-energy intake      Etiology: related to decreased appetite, swallowing issues     Signs/Symptoms: as evidenced by decreased PO intake PTA, reported wt loss, now NPO pending SLP consult      NUTRITION INTERVENTIONS:  Meals/Snacks: General/healthful diet   Supplements: Commercial supplement              GOAL:   decision on PO vs PEG next 1-2 days     NUTRITION MONITORING AND EVALUATION   Food/Nutrient Intake Outcomes:  Total energy intake  Physical Signs/Symptoms Outcomes: Weight/weight change, Electrolyte and renal profile    Previous Goal Met:   [] Met              [] Progressing Towards Goal              [x] Not Progressing Towards Goal   Previous Recommendations:   [x] Implemented          [] Not Implemented          [] Not Applicable    LEARNING NEEDS (Diet, Food/Nutrient-Drug Interaction):    [x] None Identified   [] Identified and Education Provided/Documented   [] Identified and Pt declined/was not appropriate     Cultural, Shinto, OR Ethnic Dietary Needs:    [x] None Identified   [] Identified and Addressed     [x] Interdisciplinary Care Plan Reviewed/Documented    [x] Discharge Planning: TBD   [] Participated in Interdisciplinary Rounds    NUTRITION RISK:    [x] Patient At Nutritional Risk             [] Patient Not At Nutritional Risk      Vivian Alvarez [phone]  Pager [phone]    Weekend Pager [phone]

## 2020-06-09 NOTE — PROGRESS NOTES
Problem: Dysphagia (Adult)  Goal: *Acute Goals and Plan of Care (Insert Text)  Description: 6/7/2020  Speech path goals  1. Patient will participate with MBS. Goal met 6/8. Outcome: Progressing Towards Goal   SPEECH LANGUAGE PATHOLOGY DYSPHAGIA TREATMENT/DISCHARGE  Patient: Irma Hidalgo (80 y.o. female)  Date: 6/9/2020  Diagnosis: [facility] ([facility] [M33.20]   <principal problem not specified>       Precautions:  Fall    ASSESSMENT:  Patient had an MBS yesterday. She has significant oropharyngeal dysphagia. She did not aspirate thins but was at risk due to laryngeal penetration occurring. She cleared the penetrated liquid in the controlled setting of the MBS with throat clearing. . She is not safe with purees at this point due to pharyngeal residue and is at grave risk to aspirate them. Discussed with the patient that she could take thins but is at risk to aspirate and that aspiration could be fatal for her due to her overall weakness and the degenerative nature of her disease. Palliative will be coming today to discuss her options re: po. Suspect she will need a feeding tube. PLAN:    Patient will be discharged from acute skilled speech therapy at this time. Rationale for discharge:  Goals achieved    Discharge Recommendations:  None     SUBJECTIVE:   Patient stated . OBJECTIVE:   Cognitive and Communication Status:  Neurologic State: Alert  Orientation Level: Oriented X4  Cognition: Appropriate decision making, Appropriate for age attention/concentration, Appropriate safety awareness, Follows commands    Perception: Appears intact    Perseveration: No perseveration noted    Safety/Judgement: Insight into deficits, Awareness of environment  Dysphagia Treatment:  Oral Assessment:     P.O. Trials:  Patient Position: upright in bed  Vocal quality prior to P.O.:    Consistency Presented:  Thin liquid  How Presented: Self-fed/presented;Straw     Bolus Acceptance: No impairment  Bolus Formation/Control: No impairment     Propulsion: No impairment  Oral Residue: None  Initiation of Swallow: Delayed (# of seconds)  Laryngeal Elevation: Decreased  Aspiration Signs/Symptoms: Clear throat  Pharyngeal Phase Characteristics: Multiple swallows; Suspected pharyngeal residue           Oral Phase Severity: Mild  Pharyngeal Phase Severity : Moderate-severe                    NOMS:   The NOMS functional outcome measure was used to quantify this patient's level of swallowing impairment. Based on the NOMS, the patient was determined to be at level 2 for swallow function     NOMS Swallowing Levels:  Level 1 (CN): NPO  Level 2 (CM): NPO but takes consistency in therapy  Level 3 (CL): Takes less than 50% of nutrition p.o. and continues with nonoral feedings; and/or safe with mod cues; and/or max diet restriction  Level 4 (CK): Safe swallow but needs mod cues; and/or mod diet restriction; and/or still requires some nonoral feeding/supplements  Level 5 (CJ): Safe swallow with min diet restriction; and/or needs min cues  Level 6 (CI): Independent with p.o.; rare cues; usually self cues; may need to avoid some foods or needs extra time  Level 7 ([address] [place] [address]): Independent for all p.o.  DEEP. (2003). National Outcomes Measurement System (NOMS): Adult Speech-Language Pathology User's Guide. Pain:  Pain Scale 1: Numeric (0 - 10)  Pain Intensity 1: 0       After treatment:   Patient left in no apparent distress in bed and Call bell within reach    COMMUNICATION/EDUCATION:       The patient's plan of care including recommendations, planned interventions, and recommended diet changes were discussed with: Registered nurse.      MIREILLE Xiong  Time Calculation: 10 mins

## 2020-06-09 NOTE — PROGRESS NOTES

## 2020-06-09 NOTE — PROGRESS NOTES
Verbal order received from Dr Zohaib Morel for clear liquid diet. Order for clears placed into patient chart by the nurse.

## 2020-06-09 NOTE — PROGRESS NOTES
CHAN:   1) Rehab  2) Medicaid screening- pt will need a UAI completed prior to discharge   3) Pt will need 2ND IM letter at time of discharge   4) Pt will need [facility] transportation at time of discharge     10:04 AM- CM reviewed chart, and it is recommended for pt to go to SNF. KARIME spoke with pt who is requesting to go to [facility], [facility]. Verbal FOC completed due to pt not being able to sign due to limited function in her arms. CM inquired about a Medicaid screening- pt agreeable. CM will send information off to [employer] to complete. CM noted no AMD and extensive health diagnosis, Palliative is following and will address with pt goals of care today. CM will continue to follow and assist as needed.       MARTIN Alcazar, [address] [place] [address]   [phone]

## 2020-06-09 NOTE — PROGRESS NOTES
Problem: Falls - Risk of  Goal: *Absence of Falls  Description: Document Mali Betancourt Fall Risk and appropriate interventions in the flowsheet. Outcome: Progressing Towards Goal  Note: Fall Risk Interventions:  Mobility Interventions: Assess mobility with egress test, Bed/chair exit alarm, Communicate number of staff needed for ambulation/transfer, OT consult for ADLs, Patient to call before getting OOB, PT Consult for mobility concerns, PT Consult for assist device competence, Strengthening exercises (ROM-active/passive), Utilize walker, cane, or other assistive device         Medication Interventions: Bed/chair exit alarm, Evaluate medications/consider consulting pharmacy, Patient to call before getting OOB, Teach patient to arise slowly    Elimination Interventions: Bed/chair exit alarm, Call light in reach, Patient to call for help with toileting needs, Stay With Me (per policy), Toileting schedule/hourly rounds              Problem: Patient Education: Go to Patient Education Activity  Goal: Patient/Family Education  Outcome: Progressing Towards Goal     Problem: Pressure Injury - Risk of  Goal: *Prevention of pressure injury  Description: Document Armen Scale and appropriate interventions in the flowsheet. Outcome: Progressing Towards Goal  Note: Pressure Injury Interventions:  Sensory Interventions: Assess changes in LOC, Assess need for specialty bed, Avoid rigorous massage over bony prominences, Chair cushion, Check visual cues for pain, Discuss PT/OT consult with provider, Float heels, Keep linens dry and wrinkle-free, Maintain/enhance activity level, Minimize linen layers, Monitor skin under medical devices, Pad between skin to skin, Pressure redistribution bed/mattress (bed type), Turn and reposition approx.  every two hours (pillows and wedges if needed)    Moisture Interventions: Absorbent underpads, Apply protective barrier, creams and emollients, Assess need for specialty bed, Check for incontinence Q2 hours and as needed, Internal/External urinary devices, Minimize layers, Moisture barrier    Activity Interventions: Assess need for specialty bed, Chair cushion, Increase time out of bed, Pressure redistribution bed/mattress(bed type), PT/OT evaluation    Mobility Interventions: Assess need for specialty bed, Chair cushion, Float heels, HOB 30 degrees or less, Pressure redistribution bed/mattress (bed type), PT/OT evaluation, Turn and reposition approx.  every two hours(pillow and wedges)    Nutrition Interventions: Document food/fluid/supplement intake, Discuss nutritional consult with provider, Offer support with meals,snacks and hydration    Friction and Shear Interventions: Apply protective barrier, creams and emollients, Foam dressings/transparent film/skin sealants, HOB 30 degrees or less, Lift sheet, Lift team/patient mobility team, Minimize layers                Problem: Patient Education: Go to Patient Education Activity  Goal: Patient/Family Education  Outcome: Progressing Towards Goal     Problem: Patient Education: Go to Patient Education Activity  Goal: Patient/Family Education  Outcome: Progressing Towards Goal     Problem: Patient Education: Go to Patient Education Activity  Goal: Patient/Family Education  Outcome: Progressing Towards Goal     Problem: Patient Education: Go to Patient Education Activity  Goal: Patient/Family Education  Outcome: Progressing Towards Goal

## 2020-06-09 NOTE — PROGRESS NOTES
Hospitalist Progress Note    NAME: Turner Sullivan   :  1955   MRN:  [id number]       Assessment / Plan:  Polymyositis flare patient is on steroids patient is improved with glucagon some proximal muscle weakness nearing baseline. Acute rhabdomyolysis secondary to polymyositis improving clinically CPK level is 1100. Dysphagia likely secondary to pharyngeal dysfunction patient seen by speech we will give a trial of thin liquids monitor patient    Palliative care also to see the patient consider comfort care versus PEG tube placement. Bacteremia likely contamination work-up is negative repeat cultures are negative. Acute nontraumatic rhabdomyolysis as above secondary to polymyositis. Severe protein calorie malnutrition POA dietary nutritional support. Speech swallowing eval considering possible PEG tube placement versus comfort care hospice. And also patient may need to go for rehab. Is not clear how much  Rehab  this patient can tolerate. Plan discussed with patient answered all questions appropriately also patient case was discussed during IDR. Body mass index is 22.48 kg/m². Code status: Full code 26 consulted. Prophylaxis: Lovenox subcu  Recommended Disposition: Possible SNF. Subjective:     Chief Complaint / Reason for Physician Visit  Patient seen and examined at bedside comfortable no fever no chills no nausea no vomiting. Patient is awake and alert verbalizing appropriately. Review of Systems:  Symptom Y/N Comments  Symptom Y/N Comments   Fever/Chills n   Chest Pain n    Poor Appetite n   Edema n    Cough n   Abdominal Pain n    Sputum n   Joint Pain     SOB/LOREDO n   Pruritis/Rash     Nausea/vomit n   Tolerating PT/OT     Diarrhea n   Tolerating Diet     Constipation    Other       Could NOT obtain due to:      Objective:     VITALS:   Last 24hrs VS reviewed since prior progress note.  Most recent are:  Patient Vitals for the past 24 hrs:   Temp Pulse Resp BP SpO2 06/09/20 1133 98.3 °F (36.8 °C) (!) 57 16 163/87 98 %   06/09/20 0638 97.5 °F (36.4 °C) (!) 55 16 149/69 99 %   06/09/20 0427 98.3 °F (36.8 °C) (!) 58 16 130/61 99 %   06/08/20 2328 98.3 °F (36.8 °C) 68 16 161/77 99 %   06/08/20 2205 -- 64 -- 154/68 --   06/08/20 1859 98 °F (36.7 °C) 70 16 (!) 177/95 100 %   06/08/20 1525 98.2 °F (36.8 °C) 70 16 170/76 100 %     No intake or output data in the 24 hours ending 06/09/20 1246     PHYSICAL EXAM:  General: WD, WN. Alert, cooperative, no acute distress    EENT:  EOMI. Anicteric sclerae. MMM  Resp:  CTA bilaterally, no wheezing or rales. No accessory muscle use  CV:  Regular  rhythm,  No edema  GI:  Soft, Non distended, Non tender.  +Bowel sounds  Neurologic:  Alert and oriented X 3, normal speech,   Psych:   Good insight. Not anxious nor agitated      Reviewed most current lab test results and cultures  YES  Reviewed most current radiology test results   YES  Review and summation of old records today    NO  Reviewed patient's current orders and MAR    YES  PMH/ reviewed - no change compared to H&P  ________________________________________________________________________  Care Plan discussed with:    Comments   Patient     Family      RN     Care Manager     Consultant                        Multidiciplinary team rounds were held today with , nursing, pharmacist and clinical coordinator. Patient's plan of care was discussed; medications were reviewed and discharge planning was addressed. ________________________________________________________________________            Comments   >50% of visit spent in counseling and coordination of care     ________________________________________________________________________  Pranay James MD     Procedures: see electronic medical records for all procedures/Xrays and details which were not copied into this note but were reviewed prior to creation of Plan.       LABS:  I reviewed today's most current labs and imaging studies.   Pertinent labs include:  Recent Labs     06/09/20  0350 06/08/20  0514 06/07/20  0459   WBC 12.5* 14.7* 17.6*   HGB 11.5 10.9* 11.2*   HCT 34.9* 32.9* 34.0*    292 312     Recent Labs     06/09/20  0350 06/08/20  0514 06/07/20  0459   * 145 141   K 3.7 3.6 4.1   * 117* 113*   CO2 21 24 23   * 107* 169*   BUN 34* 33* 32*   CREA 0.41* 0.50* 0.55   CA 8.5 8.1* 8.4*       Signed: Frantz Carolina MD

## 2020-06-09 NOTE — PROGRESS NOTES
Problem: Mobility Impaired (Adult and Pediatric)  Goal: *Acute Goals and Plan of Care (Insert Text)  Description:   FUNCTIONAL STATUS PRIOR TO ADMISSION: Patient has been essentially dependent with mobility for the past 2 weeks. Prior to that she was able to ambulate from recliner to bathroom (very short distance) with RW. Has paid caregivers 4 hrs per day who assist with all ADLs, iALs and would assist her with ambulation. For the past 2 weeks has been unable to stand or walk. Sleeps and spends all day up in recliner chair. HOME SUPPORT PRIOR TO ADMISSION: The patient lived alone with paid caregivers 4 hrs per day to provide assistance. Physical Therapy Goals  Initiated 6/8/2020  1. Patient will move from supine to sit and sit to supine  in bed with maximal assistance within 7 day(s). 2.  Patient will sit EOB for static task with intermittent modA x 5 mins within 7 days. 3.  Patient will bed to chair via liliana lift and RN's educated on need to assist her to chair daily within 7 days. 4.  Patient will complete supine HEP with modA x 1 within 7 days. 5.  Patient will improve Carey Balance score by 7 points within 7 days. Outcome: Not Progressing Towards Goal   PHYSICAL THERAPY TREATMENT  Patient: Seema Mckeon (18 y.o. female)  Date: 6/9/2020  Diagnosis: Polymyositis ([facility] [M33.20]   <principal problem not specified>       Precautions: Fall  Chart, physical therapy assessment, plan of care and goals were reviewed. ASSESSMENT  Patient continues with skilled PT services and is not progressing towards goals. Patient continues to need totalA x 2 for all mobility. Patient seen in bed today with focus on PROM progressing to [facility] in LE's. Weakness is more proximal vs distal.  She is currently unable to care for herself and only has 4 hours of help a day. Patient is below her low functional baseline and could benefit from SNF rehab then transition to custodial vs LTC setting.   Patient is not safe to DC home alone at this time. Other factors to consider for discharge: at risk for falls, below functional baseline, unsafe to DC home alone as is dependent for all care and has minimal assist at home         PLAN :  Patient continues to benefit from skilled intervention to address the above impairments. Continue treatment per established plan of care. to address goals. Recommendation for discharge: (in order for the patient to meet his/her long term goals)  Therapy up to 5 days/week in SNF setting with transition to longterm vs LTC      IF patient discharges home will need the following DME: liliana hospital bed, 24 hour assist/care       SUBJECTIVE:   Patient stated I just feel so stiff.     OBJECTIVE DATA SUMMARY:   Critical Behavior:  Neurologic State: Alert  Orientation Level: Oriented X4  Cognition: Appropriate decision making, Appropriate for age attention/concentration, Appropriate safety awareness, Follows commands  Safety/Judgement: Insight into deficits, Awareness of environment  Functional Mobility Training:  Bed Mobility:  Rolling: Total assistance;Assist x1         Therapeutic Exercises:   ROM and AAROM B LE's and UE's    Pain Rating:  No c/o pain    Activity Tolerance:   Poor and requires rest breaks  Please refer to the flowsheet for vital signs taken during this treatment. After treatment patient left in no apparent distress:   Supine in bed and Call bell within reach    COMMUNICATION/COLLABORATION:   The patients plan of care was discussed with: Physical therapist, Occupational therapist, and Registered nurse.      Sofya Mendoza PT, DPT   Time Calculation: 15 mins

## 2020-06-09 NOTE — PROGRESS NOTES
* No surgery found *  * No surgery found *  Bedside shift change report given to Tish Mackenzie RN (oncoming nurse) by Janak Christian (offgoing nurse). Report included the following information SBAR, Kardex, ED Summary and OR Summary. Zone Phone:   [phone]      Significant changes during shift: Pt. BP elevated, hydralaine PRN with parameters are in place. Patient Information    Allegra Del Rio  59 y.o.  6/5/2020  4:13 PM by Negin Lawler MD. Allegra Del Rio was admitted from Home    Problem List    Patient Active Problem List    Diagnosis Date Noted    S/p nephrectomy 05/27/2020    History of renal cell cancer 05/27/2020    Vitamin D deficiency 10/10/2018    Age-related osteoporosis without current pathological fracture 08/23/2018    Elevated glucose 01/18/2017    Obstructive sleep apnea syndrome 01/17/2017    Polymyositis ([address] 07/28/2016    HTN (hypertension) 07/28/2016     Past Medical History:   Diagnosis Date    Congestive heart failure ([address]     Hypertension     Pneumonia 10/2018    Polymyositis ([address] 12/2015    in setting of [address] 2015    Polymyositis associated with autoimmune disease ([address]     Renal cancer ([address] 07/08/2016    s/p right nephrectomy.  Respiratory arrest ([address] 11/2015    [facility].     SBO (small bowel obstruction) ([address] 8/3/2017         Core Measures:    PNA:Yes Yes    Activity Status:    OOB to Chair No  Ambulated this shift No   Bed Rest Yes    DVT prophylaxis:    DVT prophylaxis Med- Yes  DVT prophylaxis SCD or ELMER- No     Wounds: (If Applicable)    Wounds- Yes    Location open areas to sacrum and redness under right breast     Patient Safety:    Falls Score Total Score: 2  Safety Level_______  Bed Alarm On? Yes  Sitter?  No    Plan for upcoming shift:   Safety and monitoring  Palliative care to see           Discharge Plan: Yes Long term vs home health     Active Consults:  IP CONSULT TO HOSPITALIST  IP CONSULT TO RHEUMATOLOGY  IP CONSULT TO PALLIATIVE CARE - PROVIDER

## 2020-06-09 NOTE — CONSULTS
Neurology Note    Patient ID:  Shauna Daigle  [id number]  39 y.o.  1955      Date of Consultation:  June 9, 2020    Referring Physician: Dr. Grace Garcia    Reason for Consultation:  polymyositis    Subjective: I am weak       History of Present Illness:   Shauna Daigle is a 59 y.o. 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 Upon admission her CPK was just over 3000. After IV steroids, her CPK is now less than thousand. She still does have significant amount of weakness. She has been treated for polymyositis after having a diagnosis of renal cell carcinoma. She has been seen at [facility], [facility], and at the [facility]. She has been on medications including methotrexate, CellCept, azathioprine, steroids, IVIG, and rituximab. These medications have either been ineffective or she was unable to tolerate the medications. She has needed a significant amount support at home due to her significant weakness. Currently, the patient does not feel that she has gotten significantly better during the hospitalization. She denies having any pain. She is having increased difficulty however with swallowing and controlling her secretions. Past Medical History:   Diagnosis Date    Congestive heart failure ([address]     Hypertension     Pneumonia 10/2018    Polymyositis ([address] 12/2015    in setting of [address]    Polymyositis associated with autoimmune disease ([address]     Renal cancer ([address] 07/08/2016    s/p right nephrectomy.      Respiratory arrest ([address] 11/2015    [facility].    [address] [place] [address] SBO (small bowel obstruction) ([address] 8/3/2017        Past Surgical History:   Procedure Laterality Date    HX GYN  1999    hysterectomy    HX NEPHRECTOMY Right 2016    for kidney cancer    US GUIDED CORE BREAST BIOPSY Left 1999    Negative        Family History   Problem Relation Age of Onset    Cancer Mother     Breast Cancer Mother 68    Diabetes Father     Hypertension Father     Dementia Father 80    Stroke Maternal Grandmother     Breast Cancer Cousin         52's        Social History     Tobacco Use    Smoking status: Former Smoker     Packs/day: 1.00     Years: 20.00     Pack years: 20.00     Types: Cigarettes     Last attempt to quit: 1998     Years since quittin.1    Smokeless tobacco: Never Used   Substance Use Topics    Alcohol use: No     Alcohol/week: 1.0 standard drinks     Types: 1 Glasses of wine per week        Allergies   Allergen Reactions    Ace Inhibitors Cough    Dilaudid [Hydromorphone] Other (comments)    Levaquin [Levofloxacin] Other (comments)     Leg swelling    Lisinopril Other (comments)     Cough      Metoprolol Other (comments)     hallucinations    Peanut Other (comments)        Prior to Admission medications    Not on File       Review of Systems:    General, constitutional: negative  Eyes, vision: negative  Ears, nose, throat: negative  Cardiovascular, heart: negative  Respiratory: negative  Gastrointestinal: negative  Genitourinary: negative  Musculoskeletal: Muscle weakness  Skin and integumentary: negative  Psychiatric: negative  Endocrine: negative  Neurological: negative, except for HPI  Hematologic/lymphatic: negative  Allergy/immunology: negative    Objective:     Visit Vitals  /87   Pulse (!) 57   Temp 98.3 °F (36.8 °C)   Resp 16   Ht 5' 4\" (1.626 m)   Wt 130 lb 15.3 oz (59.4 kg)   SpO2 98%   BMI 22.48 kg/m²       Physical Exam:      General:  appears well nourished in no acute distress  Neck: no carotid bruits  Lungs: clear to auscultation  Heart:  no murmurs, regular rate  Lower extremity: peripheral pulses palpable.   Trace lower extremity edema  Skin: intact    Neurological exam:    Awake, alert, oriented to person, place and time  Recent and remote memory were normal  Attention and concentration were intact  Language was intact. There was no aphasia  Speech: Mild nasal dysarthria  Fund of knowledge was preserved    Cranial nerves:   II-XII were tested    Perrrla  Fundus was difficult to visualize  Visual fields were full  Eomi, no evidence of nystagmus  Facial sensation:  normal and symmetric  Facial motor: Mild facial weakness  Hearing intact  SCM strength intact  Tongue: midline without fasciculations    Motor: Tone normal  Neck flexors and extensors were 4 out of 5  No evidence of fasciculations    Strength testing:   deltoid triceps biceps Wrist ext. Wrist flex. intrinsics Hip flex. Hip ext. Knee ext. Knee flex Dorsi flex Plantar flex   Right 3 3 3 4 4 4 3 4 4 4 4 4   Left 3 3 3 4 4 4 3 4 4 4 4 4         Sensory:  Upper extremity: intact to pp,   Lower extremity: intact to pp    Reflexes:  Diminished throughout    Plantar response:  flexor bilaterally      Cerebellar testing:  no tremor apparent, finger/nose and evan were intact with assistance due to her weakness  Gait not assessed due to significant level of weakness    Labs:     Lab Results   Component Value Date/Time    Hemoglobin A1c 5.2 10/30/2019 12:47 PM    Sodium 146 (H) 06/09/2020 03:50 AM    Potassium 3.7 06/09/2020 03:50 AM    Chloride 118 (H) 06/09/2020 03:50 AM    Glucose 101 (H) 06/09/2020 03:50 AM    BUN 34 (H) 06/09/2020 03:50 AM    Creatinine 0.41 (L) 06/09/2020 03:50 AM    Calcium 8.5 06/09/2020 03:50 AM    WBC 12.5 (H) 06/09/2020 03:50 AM    HCT 34.9 (L) 06/09/2020 03:50 AM    HGB 11.5 06/09/2020 03:50 AM    PLATELET 740 75/88/4789 03:50 AM       Imaging:    Results from [place] encounter on 01/03/20   MRI HIP  RT  WO CONT    Narrative EXAM: MRI HIP  RT  WO CONT    INDICATION: Avascular necrosis    COMPARISON: CT 8/3/2017    TECHNIQUE: Coronal T1 and axial T2 fat-saturated MRI of the whole pelvis; axial  and sagittal T2 fat-saturated; coronal proton density fat-saturated MRI of the  right hip . CONTRAST: None. FINDINGS: Bone marrow:  There is a small area of sclerosis in the superior right  femoral head with a ring of increased T2 signal. There is mild irregularity of  the subchondral bone plate. Joint fluid: None. Articular cartilage: Severe right hip osteoarthritis. There are prominent  marginal osteophytes. Acetabular labrum: Diffuse degenerative changes     Hip morphology: Severe right hip degenerative changes with remodeling. Tendons: Intact. Muscles: There is patchy mild edema throughout the musculature of the pelvis and  bilateral thighs. Soft tissue mass: None. Intrapelvic soft tissues: no acute process. There is mild levoconvex scoliosis of lumbar spine with multilevel spondylosis. Impression IMPRESSION:   1. Severe right hip osteoarthritis. Small area of sclerosis in the superior  right femoral head with irregularity of the subchondral bone plate and trace  surrounding degenerative edema. This is most likely related to the severe  degeneration of the right hip joint. 2. Patchy mild edema throughout the visualized musculature         Results from Abstract encounter on 12/03/18   CT CHEST WO CONT             Assessment and Plan:    The patient is a pleasant 61-year-old female with a history of inflammatory muscle disease that has been at least partially refractory to multiple immunosuppressants. Per documentation she does have serology previously that revealed anti-Sabine positivity. Muscle weakness:    She has clearly a primary muscle disease. Differential includes autoimmune, inflammatory, paraneoplastic. Less likely hereditary. Would need to keep an atypical inclusion body myositis in the differential given her refractory nature to treatment. I do think she would benefit from follow-up muscle imaging and repeat muscle biopsy. I will attempt to perform an EMG within next day or two. Most likely Thursday am.    She should have a full screen for underlying malignancy.     I would recommend also sending the antibody for inclusion body myositis, [id number]    I agree with steroids and consideration of repeat induction with rituximab.          Active Problems:    Polymyositis ([facility] (7/28/2016)                   Signed By:  Clair Knowles DO FAAN    June 9, 2020

## 2020-06-09 NOTE — PROGRESS NOTES
Problem: Falls - Risk of  Goal: *Absence of Falls  Outcome: Progressing Towards Goal  Note: Fall Risk Interventions:  Mobility Interventions: Communicate number of staff needed for ambulation/transfer         Medication Interventions: Evaluate medications/consider consulting pharmacy    Elimination Interventions: Call light in reach, Toileting schedule/hourly rounds              Problem: Falls - Risk of  Goal: *Absence of Falls  Outcome: Progressing Towards Goal  Note: Fall Risk Interventions:  Mobility Interventions: Communicate number of staff needed for ambulation/transfer         Medication Interventions: Evaluate medications/consider consulting pharmacy    Elimination Interventions: Call light in reach, Toileting schedule/hourly rounds              Problem: Patient Education: Go to Patient Education Activity  Goal: Patient/Family Education  Outcome: Progressing Towards Goal     Problem: Patient Education: Go to Patient Education Activity  Goal: Patient/Family Education  Outcome: Progressing Towards Goal

## 2020-06-09 NOTE — PROGRESS NOTES
Problem: Self Care Deficits Care Plan (Adult)  Goal: *Acute Goals and Plan of Care (Insert Text)  Description:   FUNCTIONAL STATUS PRIOR TO ADMISSION: Pt reports living alone in single story home with ramped entrance, reporting she has Max A from daily caregiver (x4 hours daily), recently (past month), being bathed seated at [facility].  1 month ago pt reports she was standing with RW at walk-in shower with Max A for bathing. Pt reports sleeping in recliner. DME needs met. HOME SUPPORT: The patient lived with alone with paid caregiver assist.    Occupational Therapy Goals  Initiated 6/8/2020  1. Patient will perform self-feeding with minimal assistance within 7 day(s). 2.  Patient will perform grooming with moderate assistance  within 7 day(s). 3.  Patient will perform upper body dressing with Max assistance  within 7 day(s). 4.  Patient will perform toilet transfers, EOB to [facility], with maximal assistance within 7 day(s). 5.  Patient will perform all aspects of toileting with maximal assistance within 7 day(s). 6.  Patient will utilize energy conservation techniques during functional activities with Min verbal cues within 7 day(s). Outcome: Not Progressing Towards Goal    OCCUPATIONAL THERAPY TREATMENT  Patient: Turner Sullivan (71 y.o. female)  Date: 6/9/2020  Diagnosis: Polymyositis ([facility] [address] [M33.20]   <principal problem not specified>       Precautions: Fall  Chart, occupational therapy assessment, plan of care, and goals were reviewed. ASSESSMENT  Patient continues with skilled OT services and is not progressing towards goals. OT attempted to engage pt in EOB core strengthening challenges; however, pt politely declined stating, \"I'm in the perfect position. \"  Pt requested assistance with self-feeding, noted to have a clear liquid lunch tray, with OT following up with SLP to determine if pt had been cleared from NPO.   Per talks with SLP and pt, as well as, MD note, pt aware of risks associated with clear liquids in regards to her decreased swallowing, providing informed consent to trial clear liquids with monitoring. Pt cued to take small sips and swallow 5-6 times, then clear throat before attempting another sip of liquid. OT attempted hand over hand assist, with pt fatiguing quickly. Pt continues to benefit from skilled OT to address functional deficits in an overall attempt at maximizing pt's highest level of safe functional independence prior to discharge from acute OT services. Current Level of Function Impacting Discharge (ADLs): Total A    Other factors to consider for discharge: Total A, limited in home assist (paid caregiver x4hr daily)         PLAN :  Patient continues to benefit from skilled intervention to address the above impairments. Continue treatment per established plan of care. to address goals. Recommend with staff: frequent positional changes, BUE/BLE PROM    Recommend next OT session: EOB core strengthening     Recommendation for discharge: (in order for the patient to meet his/her long term goals)  Therapy up to 5 days/week in SNF setting    This discharge recommendation:  Has been made in collaboration with the attending provider and/or case management    IF patient discharges home will need the following DME: patient owns DME required for discharge       SUBJECTIVE:   Patient stated I'm tired now.     OBJECTIVE DATA SUMMARY:   Cognitive/Behavioral Status:  Neurologic State: Alert  Orientation Level: Oriented X4  Cognition: Appropriate decision making; Appropriate for age attention/concentration; Appropriate safety awareness; Follows commands  Perception: Appears intact  Perseveration: No perseveration noted  Safety/Judgement: Awareness of environment    Functional Mobility and Transfers for ADLs:    ADL Intervention:  Feeding  Feeding Assistance: Total assistance (dependent)  Container Management: Total assistance (dependent)  Cutting Food:  Total assistance (dependent)  Utensil Management: Total assistance (dependent)  Food to Mouth: Total assistance (dependent)  Drink to Mouth: Total assistance (dependent)  Cues: Verbal cues provided(Min verbal cues for small sips and technique)    Cognitive Retraining  Safety/Judgement: Awareness of environment    Pain:  No c/o pain    Activity Tolerance:   Poor and requires frequent rest breaks  Please refer to the flowsheet for vital signs taken during this treatment. After treatment patient left in no apparent distress:   Supine in bed, Heels elevated for pressure relief, Call bell within reach, Bed / chair alarm activated, and Side rails x 3    COMMUNICATION/COLLABORATION:   The patients plan of care was discussed with: Physical therapy assistant and Registered nurse.      Deb Patrick OT  Time Calculation: 31 mins

## 2020-06-09 NOTE — PROGRESS NOTES
Bedside shift change report given to [facility] (oncoming nurse) by Bhargav Bashir (offgoing nurse). Report included the following information SBAR.

## 2020-06-09 NOTE — CONSULTS
Rheumatology Consult    Subjective:     Elba Caputo is a 59 y.o. female with a hx of renal cell carcinoma and  prior dx of polymositis admitted for progressive muscle weakness and a fever. She had staph growing in her bl cx and was tx abx ,but abx stopped after repeat cx were clear and TTE was negative for vegetations since it may have been a contaminant. Her CPK was <3000 on admission and is now down to 788 after receiving steroids. She was dx with polymyositis about 5 years ago and reports the muscle bx was inconclusive. She has seen Rheumatology at [facility], in [place] and now at [address] [place] [address]. She has tried MTX, MMF, AZA, high dose steroids, IVIg for 9 months and one round of Rituxan. She did not think any of these treatments have helped. She has severe muscle weakness. She cannot lift her arms and now has dysphagia     Note - I was able to contact her Rheumatologist at [address] [place] [address]. Did not have biopsy, but had prior Rheum's notes and patient did response well to steroids. They tried RTX and MTX, but she was lost to follow up. Past Medical History:   Diagnosis Date    Congestive heart failure ([address]     Hypertension     Pneumonia 10/2018    Polymyositis ([address] 12/2015    in setting of [address] 2015    Polymyositis associated with autoimmune disease ([address]     Renal cancer ([address] 07/08/2016    s/p right nephrectomy.      Respiratory arrest ([address] 11/2015    [facility].     SBO (small bowel obstruction) ([address] 8/3/2017      Past Surgical History:   Procedure Laterality Date    HX GYN  1999    hysterectomy    HX NEPHRECTOMY Right 2016    for kidney cancer    US GUIDED CORE BREAST BIOPSY Left 1999    Negative     Family History   Problem Relation Age of Onset   [facility] Mother     Breast Cancer Mother 68    Diabetes Father     Hypertension Father     Dementia Father 80    Stroke Maternal Grandmother     Breast Cancer Cousin         52's      Social History     Tobacco Use    Smoking status: Former Smoker     Packs/day: 1.00     Years: 20.00     Pack years: 20.00     Types: Cigarettes     Last attempt to quit: 1998     Years since quittin.1    Smokeless tobacco: Never Used   Substance Use Topics    Alcohol use: No     Alcohol/week: 1.0 standard drinks     Types: 1 Glasses of wine per week       Current Facility-Administered Medications   Medication Dose Route Frequency    irbesartan (AVAPRO) tablet 300 mg  300 mg Oral QHS    hydrALAZINE (APRESOLINE) 20 mg/mL injection 10 mg  10 mg IntraVENous Q4H PRN    scopolamine (TRANSDERM-SCOP) 1 mg over 3 days 1 Patch  1 Patch TransDERmal Q72H    acetaminophen (TYLENOL) tablet 650 mg  650 mg Oral Q6H PRN    sodium chloride (NS) flush 5-40 mL  5-40 mL IntraVENous Q8H    sodium chloride (NS) flush 5-40 mL  5-40 mL IntraVENous PRN    ondansetron (ZOFRAN) injection 4 mg  4 mg IntraVENous Q4H PRN    enoxaparin (LOVENOX) injection 40 mg  40 mg SubCUTAneous Q24H    insulin lispro (HUMALOG) injection   SubCUTAneous AC&HS    glucose chewable tablet 16 g  4 Tab Oral PRN    dextrose (D50W) injection syrg 12.5-25 g  12.5-25 g IntraVENous PRN    glucagon (GLUCAGEN) injection 1 mg  1 mg IntraMUSCular PRN      Allergies   Allergen Reactions    Ace Inhibitors Cough    Dilaudid [Hydromorphone] Other (comments)    Levaquin [Levofloxacin] Other (comments)     Leg swelling    Lisinopril Other (comments)     Cough      Metoprolol Other (comments)     hallucinations    Peanut Other (comments)        Review of Systems:  Constitutional - fever on admission  HEENT - no photophobia, no oral ulcers  Neck - no neck tenderness  GI - dysphagia, no diarrhea  pulm - denies dyspnea, cough  Cards - denies CP  Skin - no new rashes  Neuro - severe weakness.  Cannot walk, cannot lift her arms  MSK  - no joint pain, stiffness or swelling        Objective:   Vitals   T 98.4 HR 55 /61  Sats 96 % RA        Physical Exam:   gen- alert ,nad  HEENT - eom in tact  Neck -nontender  pulm- no nasal flaring, but taking shallow breaths  CV-  No  LE edema, perfusing well  Skin - no new rashes  msk - no joint tenderness, she has b/l hand contractures  Neuro- severe proximal weakness. She cannot lift her arms or flex her quads (2/5) she can flex her fingers and flex her toes (strength 4/5)  Psych- appropriate mood and affect          Data Review:   Recent Results (from the past 12 hour(s))   CK    Collection Time: 06/09/20  3:50 AM   Result Value Ref Range     (H) 26 - 304 U/L   METABOLIC PANEL, BASIC    Collection Time: 06/09/20  3:50 AM   Result Value Ref Range    Sodium 146 (H) 136 - 145 mmol/L    Potassium 3.7 3.5 - 5.1 mmol/L    Chloride 118 (H) 97 - 108 mmol/L    CO2 21 21 - 32 mmol/L    Anion gap 7 5 - 15 mmol/L    Glucose 101 (H) 65 - 100 mg/dL    BUN 34 (H) 6 - 20 MG/DL    Creatinine 0.41 (L) 0.55 - 1.02 MG/DL    BUN/Creatinine ratio 83 (H) 12 - 20      GFR est AA >60 >60 ml/min/1.73m2    GFR est non-AA >60 >60 ml/min/1.73m2    Calcium 8.5 8.5 - 10.1 MG/DL   CBC W/O DIFF    Collection Time: 06/09/20  3:50 AM   Result Value Ref Range    WBC 12.5 (H) 3.6 - 11.0 K/uL    RBC 5.18 3.80 - 5.20 M/uL    HGB 11.5 11.5 - 16.0 g/dL    HCT 34.9 (L) 35.0 - 47.0 %    MCV 67.4 (L) 80.0 - 99.0 FL    MCH 22.2 (L) 26.0 - 34.0 PG    MCHC 33.0 30.0 - 36.5 g/dL    RDW 15.6 (H) 11.5 - 14.5 %    PLATELET 408 998 - 776 K/uL    MPV 12.3 8.9 - 12.9 FL    NRBC 0.0 0  WBC    ABSOLUTE NRBC 0.00 0.00 - 0.01 K/uL   GLUCOSE, POC    Collection Time: 06/09/20  7:50 AM   Result Value Ref Range    Glucose (POC) 89 65 - 100 mg/dL    Performed by Livier Jackson (PCT)    GLUCOSE, POC    Collection Time: 06/09/20 11:08 AM   Result Value Ref Range    Glucose (POC) 88 65 - 100 mg/dL    Performed by Livier Jackson (PCT)          Assessment:     Active Problems:    Polymyositis ([address] (7/28/2016)      64 to with hx of renal cell carcinoma and a dx of polymyositis admitted with findings of active myositis.  She reports that she has failed several treatments for polymyositis and that her mm biopsy was inconclusive. However, her most recent rheumatologist reports that the patient did respond well to steroids      Plan:     Polymyositis - suspect she is flaring and it sounds like she has been off of treatment for a year. Per her Rheumatologist, patient does respond to steroids, but Ms Frankie De La Cruz does not think she responds to steroids  -- her CPK has trended down on IV solumedrol, so would continue and be discharged on high dose pred - 60mg daily   -- she may follow up with [facility], but if she wishes to transition care, I would feel most comfortable proceeding with steroid sparing treatments after I am able to make sure she had a muscle biopsy c/w polymyositis.  I would then consider another round of Rituxan                Signed By: Wen Handley MD     June 9, 2020

## 2020-06-09 NOTE — CONSULTS
Palliative Medicine Consult  [place]: [phone] ([phone])    Patient Name: Randell Ingram  YOB: 1955    Date of Initial Consult: 5/9/20  Reason for Consult: Care decisions  Requesting Provider: Solo Camilo MD  Primary Care Physician: Elizabeth Felix MD     SUMMARY:   Randell Ingram is a 59 y.o. female with a past history of polymyositis,  age related osteoporosis, HTN, Depression, Renal cancer s/p right nephrectomy, who was admitted on 6/5/2020 from  with a diagnosis of Polymyositis, Flare with acute rhabdomyolysis. She presented with worsening weakness for two weeks, requiring assistance with two people . Per speech, she has significant oropharyngeal dysphagia. She did not aspirate thins  but is at risk to aspirate and that aspiration could be fatal for her due to her overall weakness and the degenerative nature of her disease. Palliative will be coming today to discuss her opti       Current medical issues leading to Palliative Medicine involvement include: care decisions in setting of dysphagia with progressive weakness due to polymyositis flare up , malnutrition, weight loss. Social : [place] able to ambulate with walker in the house, uses recumbent bike for exercise, has few hrs of care giver help not consistent . Single , never  , no children, her main support is her cousin Vladimir Larsen, who lives out of town . 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 PALLIATIVE DIAGNOSES:   1. Generalized weakness  2. orohrangeal dysphagia  3. Debility   4. Weight loss ( 10 lbs in 6 months ). 5. Flare up of polymyositis. 6. Rhabdomyolysis (resolving ). 7. Goals of care/DNR discussion. PLAN:   1. Reviewed chart prior to visit. 2. Met with patient introduce my self and palliative care service.   3. Goals of care :  · I had a lengthy discussion  with her within the context of progressive decline in health, weight loss, now risk of aspiration (choking , pneumonia ) and option of focus on comfort vs feeding tube for nutritional support . · She does not want peg tube had peg tube in past, removed in March 2016.     ·  She wants to continue with clear liquids and is accepting the risk of aspiration , choking and death. she is hoping that rheumatologist  may suggest treatment that may help with getting her muscles some strengths and she can gain some strength back, swallow better and follow up with out patient speech therapy . · She notes she was on steroids x 5 years did not worked for her and she stopped it 2 months ago .     ·  She does not want to hospice, wants to try Rehab. ( doubt she is going to tolerate because of poor nutrition ).    ·  She is clear wants to be \" DNR \" and do not intubate (for respiratory failure ).    ·  Pink sheet placed , she will need DDNR . · Case discussed with Dr Ced Rizzo . 4. Advance directives : patient notes her cousin, Kem Oliva is her MPOA, and asked me to talk to her to share our conversation and to get the copy of document, I spoke to her cousin Mike Roca, update on patient medical condition, gave my e mail address to send me the Advance directive to review. 5. I will follow. 6. Initial consult note routed to primary continuity provider and/or primary health care team members  7.  Communicated plan of care with: Palliative IDTP[facility]in [facility] Team     GOALS OF CARE / TREATMENT PREFERENCES:     GOALS OF CARE:  Patient/Health Care Proxy Stated Goals: Rehabilitation    TREATMENT PREFERENCES:   Code Status: DNR    Advance Care Planning:  [x] The [facility] Interdisciplinary Team has updated the ACP Navigator with Health Care Decision Maker and Patient Capacity      Advance Care Planning 6/6/2020   Patient's Healthcare Decision Maker is: -   Confirm Advance Directive Yes, not on file       Medical Interventions: (DNR AND DNI )     Other Instructions: Artificially Administered Nutrition: No feeding tube     Other:    As far as possible, the palliative care team has discussed with patient / health care proxy about goals of care / treatment preferences for patient. HISTORY:     History obtained from: chart , patient . CHIEF COMPLAINT: stiffness and weakness. HPI/SUBJECTIVE:    The patient is:   [] Verbal and participatory    Patient notes generalized weakness, she is coughing \" saliva \", notes soreness of elbows, she does not take any pain medication . She notes her muscles are stiff. Patient was on steroids x 5 years , did not worked so she stopped it 2 months ago . She c/o constipation , oat meal with dates helps for constipation . She notes weight loss of 10 lbs in last 6 months . Clinical Pain Assessment (nonverbal scale for severity on nonverbal patients):   Clinical Pain Assessment  Severity: 0          Duration: for how long has pt been experiencing pain (e.g., 2 days, 1 month, years)  Frequency: how often pain is an issue (e.g., several times per day, once every few days, constant)     FUNCTIONAL ASSESSMENT:     Palliative Performance Scale (PPS):          PSYCHOSOCIAL/SPIRITUAL SCREENING:     Palliative IDT has assessed this patient for cultural preferences / practices and a referral made as appropriate to needs (Cultural Services, Patient Advocacy, Ethics, etc.)    Any spiritual / Yazidism concerns:  [] Yes /  [x] No    Caregiver Burnout:  [] Yes /  [x] No /  [] No Caregiver Present      Anticipatory grief assessment:   [x] Normal  / [] Maladaptive       ESAS Anxiety: Anxiety: 0    ESAS Depression: Depression: 0        REVIEW OF SYSTEMS:     Positive and pertinent negative findings in ROS are noted above in HPI. The following systems were [x] reviewed / [] unable to be reviewed as noted in HPI  Other findings are noted below.   Systems: constitutional, ears/nose/mouth/throat, respiratory, gastrointestinal, genitourinary, musculoskeletal, integumentary, neurologic, psychiatric, endocrine. Positive findings noted below. Modified ESAS Completed by: provider   Fatigue: 8 Drowsiness: 0   Depression: 0 Pain: 0   Anxiety: 0     Anorexia: 5 Dyspnea: 0     Constipation: (for past few days )     Stool Occurrence(s): 1        PHYSICAL EXAM:     From RN flowsheet:  Wt Readings from Last 3 Encounters:   06/08/20 130 lb 15.3 oz (59.4 kg)   10/30/19 147 lb (66.7 kg)   09/11/19 154 lb (69.9 kg)     Blood pressure 156/70, pulse 60, temperature 97.7 °F (36.5 °C), resp. rate 16, height 5' 4\" (1.626 m), weight 130 lb 15.3 oz (59.4 kg), SpO2 99 %. Pain Scale 1: Numeric (0 - 10)  Pain Intensity 1: 0  Pain Onset 1: acute  Pain Location 1: Leg  Pain Orientation 1: Other (comment)(BLE)  Pain Description 1: Aching  Pain Intervention(s) 1: Repositioned  Last bowel movement, if known:     Constitutional: cachectic, not in any distress, pleasant , alert , oriented x 3 . Eyes: pupils equal, anicteric  ENMT: no nasal discharge, moist mucous membranes  Cardiovascular: regular rhythm, distal pulses intact  Respiratory: breathing not labored, symmetric  Gastrointestinal: soft non-tender, +bowel sounds  Musculoskeletal: generalized muscular wasting. Skin: warm, dry  Neurologic: following commands, moving all extremities  Psychiatric: full affect, no hallucinations  Other:       HISTORY:     Active Problems:    Polymyositis ([address] (7/28/2016)      Past Medical History:   Diagnosis Date    Congestive heart failure ([address]     Hypertension     Pneumonia 10/2018    Polymyositis ([address] 12/2015    in setting of [address]    Polymyositis associated with autoimmune disease ([address]     Renal cancer ([address] 07/08/2016    s/p right nephrectomy.      Respiratory arrest ([address] 11/2015    [facility].    Isis Elena SBO (small bowel obstruction) ([address] 8/3/2017      Past Surgical History:   Procedure Laterality Date    HX GYN  1999    hysterectomy    HX NEPHRECTOMY Right 2016 for kidney cancer    US GUIDED CORE BREAST BIOPSY Left     Negative      Family History   Problem Relation Age of Onset   [facility] Mother     Breast Cancer Mother 68    Diabetes Father     Hypertension Father     Dementia Father 80    Stroke Maternal Grandmother     Breast Cancer Cousin         52's      History reviewed, no pertinent family history.   Social History     Tobacco Use    Smoking status: Former Smoker     Packs/day: 1.00     Years: 20.00     Pack years: 20.00     Types: Cigarettes     Last attempt to quit: 1998     Years since quittin.1    Smokeless tobacco: Never Used   Substance Use Topics    Alcohol use: No     Alcohol/week: 1.0 standard drinks     Types: 1 Glasses of wine per week     Allergies   Allergen Reactions    Ace Inhibitors Cough    Dilaudid [Hydromorphone] Other (comments)    Levaquin [Levofloxacin] Other (comments)     Leg swelling    Lisinopril Other (comments)     Cough      Metoprolol Other (comments)     hallucinations    Peanut Other (comments)      Current Facility-Administered Medications   Medication Dose Route Frequency    methylPREDNISolone (PF) (Solu-MEDROL) injection 125 mg  125 mg IntraVENous Q8H    irbesartan (AVAPRO) tablet 300 mg  300 mg Oral QHS    hydrALAZINE (APRESOLINE) 20 mg/mL injection 10 mg  10 mg IntraVENous Q4H PRN    scopolamine (TRANSDERM-SCOP) 1 mg over 3 days 1 Patch  1 Patch TransDERmal Q72H    acetaminophen (TYLENOL) tablet 650 mg  650 mg Oral Q6H PRN    sodium chloride (NS) flush 5-40 mL  5-40 mL IntraVENous Q8H    sodium chloride (NS) flush 5-40 mL  5-40 mL IntraVENous PRN    ondansetron (ZOFRAN) injection 4 mg  4 mg IntraVENous Q4H PRN    enoxaparin (LOVENOX) injection 40 mg  40 mg SubCUTAneous Q24H    insulin lispro (HUMALOG) injection   SubCUTAneous AC&HS    glucose chewable tablet 16 g  4 Tab Oral PRN    dextrose (D50W) injection syrg 12.5-25 g  12.5-25 g IntraVENous PRN    glucagon (GLUCAGEN) injection 1 mg  1 mg IntraMUSCular PRN          LAB AND IMAGING FINDINGS:     Lab Results   Component Value Date/Time    WBC 12.5 (H) 06/09/2020 03:50 AM    HGB 11.5 06/09/2020 03:50 AM    PLATELET 001 81/34/0845 03:50 AM     Lab Results   Component Value Date/Time    Sodium 146 (H) 06/09/2020 03:50 AM    Potassium 3.7 06/09/2020 03:50 AM    Chloride 118 (H) 06/09/2020 03:50 AM    CO2 21 06/09/2020 03:50 AM    BUN 34 (H) 06/09/2020 03:50 AM    Creatinine 0.41 (L) 06/09/2020 03:50 AM    Calcium 8.5 06/09/2020 03:50 AM    Magnesium 1.9 06/05/2020 04:39 PM    Phosphorus 3.7 11/18/2018 03:49 AM      Lab Results   Component Value Date/Time    Alk. phosphatase 106 06/05/2020 04:39 PM    Protein, total 6.7 06/05/2020 04:39 PM    Albumin 2.5 (L) 06/05/2020 04:39 PM    Globulin 4.2 (H) 06/05/2020 04:39 PM     No results found for: INR, PTMR, PTP, PT1, PT2, APTT, INREXT, INREXT   No results found for: IRON, FE, TIBC, IBCT, PSAT, FERR   No results found for: PH, PCO2, PO2  No components found for: [place]   Lab Results   Component Value Date/Time     (H) 06/09/2020 03:50 AM    CK - MB 22.0 (H) 08/05/2017 02:48 AM                Total time:   Counseling / coordination time, spent as noted above:   > 50% counseling / coordination?:     Prolonged service was provided for  []30 min   []75 min in face to face time in the presence of the patient, spent as noted above. Time Start:   Time End:   Note: this can only be billed with 52153 (initial) or 07893 (follow up). If multiple start / stop times, list each separately.

## 2020-06-10 PROBLEM — R13.12 OROPHARYNGEAL DYSPHAGIA: Status: ACTIVE | Noted: 2020-06-10

## 2020-06-10 LAB
CK SERPL-CCNC: 692 U/L (ref 26–192)
GLUCOSE BLD STRIP.AUTO-MCNC: 101 MG/DL (ref 65–100)
GLUCOSE BLD STRIP.AUTO-MCNC: 102 MG/DL (ref 65–100)
GLUCOSE BLD STRIP.AUTO-MCNC: 98 MG/DL (ref 65–100)
GLUCOSE BLD STRIP.AUTO-MCNC: 99 MG/DL (ref 65–100)
SERVICE CMNT-IMP: ABNORMAL
SERVICE CMNT-IMP: ABNORMAL
SERVICE CMNT-IMP: NORMAL
SERVICE CMNT-IMP: NORMAL

## 2020-06-10 PROCEDURE — 74011250636 HC RX REV CODE- 250/636: Performed by: INTERNAL MEDICINE

## 2020-06-10 PROCEDURE — 82550 ASSAY OF CK (CPK): CPT

## 2020-06-10 PROCEDURE — 82962 GLUCOSE BLOOD TEST: CPT

## 2020-06-10 PROCEDURE — 97530 THERAPEUTIC ACTIVITIES: CPT | Performed by: OCCUPATIONAL THERAPIST

## 2020-06-10 PROCEDURE — 97535 SELF CARE MNGMENT TRAINING: CPT | Performed by: OCCUPATIONAL THERAPIST

## 2020-06-10 PROCEDURE — 65660000000 HC RM CCU STEPDOWN

## 2020-06-10 PROCEDURE — 36415 COLL VENOUS BLD VENIPUNCTURE: CPT

## 2020-06-10 RX ADMIN — METHYLPREDNISOLONE SODIUM SUCCINATE 125 MG: 125 INJECTION, POWDER, FOR SOLUTION INTRAMUSCULAR; INTRAVENOUS at 22:06

## 2020-06-10 RX ADMIN — Medication 10 ML: at 08:31

## 2020-06-10 RX ADMIN — HYDRALAZINE HYDROCHLORIDE 10 MG: 20 INJECTION INTRAMUSCULAR; INTRAVENOUS at 15:28

## 2020-06-10 RX ADMIN — Medication 10 ML: at 22:06

## 2020-06-10 RX ADMIN — METHYLPREDNISOLONE SODIUM SUCCINATE 125 MG: 125 INJECTION, POWDER, FOR SOLUTION INTRAMUSCULAR; INTRAVENOUS at 05:26

## 2020-06-10 RX ADMIN — Medication 10 ML: at 14:23

## 2020-06-10 RX ADMIN — HYDRALAZINE HYDROCHLORIDE 10 MG: 20 INJECTION INTRAMUSCULAR; INTRAVENOUS at 23:21

## 2020-06-10 RX ADMIN — Medication 10 ML: at 05:26

## 2020-06-10 RX ADMIN — METHYLPREDNISOLONE SODIUM SUCCINATE 125 MG: 125 INJECTION, POWDER, FOR SOLUTION INTRAMUSCULAR; INTRAVENOUS at 14:23

## 2020-06-10 RX ADMIN — HYDRALAZINE HYDROCHLORIDE 10 MG: 20 INJECTION INTRAMUSCULAR; INTRAVENOUS at 08:31

## 2020-06-10 NOTE — CONSULTS
Neurology Note    Patient ID:  Salma Agarwal  [id number]  59 y.o.  1955    Subjective: I am still  weak       History of Present Illness:   Salma Agarwal is a 59 y.o. 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 Upon admission her CPK was just over 3000. After IV steroids, her CPK is now less than thousand. Today, her cpk is 46. She still does have significant weakness. She had discussion with palliative care and is considering a feeding tube for a period of time to help with nutritional support. She denies any new symptoms of numbness, or tingling    Past Medical History:   Diagnosis Date    Congestive heart failure ([address]     Hypertension     Pneumonia 10/2018    Polymyositis ([address] 2015    in setting of [address]     Polymyositis associated with autoimmune disease ([address]     Renal cancer ([address] 2016    s/p right nephrectomy.      Respiratory arrest ([address] 2015    [facility].     SBO (small bowel obstruction) ([address] 8/3/2017        Past Surgical History:   Procedure Laterality Date    HX GYN      hysterectomy    HX NEPHRECTOMY Right 2016    for kidney cancer    US GUIDED CORE BREAST BIOPSY Left     Negative        Family History   Problem Relation Age of Onset   [facility] Cancer Mother     Breast Cancer Mother 68    Diabetes Father     Hypertension Father     Dementia Father 80    Stroke Maternal Grandmother     Breast Cancer Cousin         52's        Social History     Tobacco Use    Smoking status: Former Smoker     Packs/day: 1.00     Years: 20.00     Pack years: 20.00     Types: Cigarettes     Last attempt to quit: 1998     Years since quittin.1    Smokeless tobacco: Never Used   Substance Use Topics    Alcohol use: No     Alcohol/week: 1.0 standard drinks     Types: 1 Glasses of wine per week        Allergies   Allergen Reactions    Ace Inhibitors Cough    Dilaudid [Hydromorphone] Other (comments)    Levaquin [Levofloxacin] Other (comments)     Leg swelling    Lisinopril Other (comments)     Cough      Metoprolol Other (comments)     hallucinations    Peanut Other (comments)        Prior to Admission medications    Not on File       Review of Systems:    General, constitutional: negative  Eyes, vision: negative  Ears, nose, throat: negative  Cardiovascular, heart: negative  Respiratory: negative  Gastrointestinal: negative  Genitourinary: negative  Musculoskeletal: Muscle weakness  Skin and integumentary: negative  Psychiatric: negative  Endocrine: negative  Neurological: negative, except for HPI  Hematologic/lymphatic: negative  Allergy/immunology: negative    Objective:     Visit Vitals  /73   Pulse 71   Temp 98 °F (36.7 °C)   Resp 18   Ht 5' 4\" (1.626 m)   Wt 130 lb 15.3 oz (59.4 kg)   SpO2 100%   BMI 22.48 kg/m²       Physical Exam:      General:  appears well nourished in no acute distress  Neck: no carotid bruits  Lungs: clear to auscultation  Heart:  no murmurs, regular rate  Lower extremity: peripheral pulses palpable. Trace lower extremity edema  Skin: intact    Neurological exam:    Awake, alert, oriented to person, place and time  Recent and remote memory were normal  Attention and concentration were intact  Language was intact. There was no aphasia  Speech: Mild nasal dysarthria  Fund of knowledge was preserved    Cranial nerves:   II-XII were tested    Perrrla  Visual fields were full  Eomi, no evidence of nystagmus  Facial sensation:  normal and symmetric  Facial motor: Mild facial weakness  Hearing intact  SCM strength intact  Tongue: midline without fasciculations    Motor: Tone normal  Neck flexors and extensors were 4 out of 5  No evidence of fasciculations    Strength testing:   deltoid triceps biceps Wrist ext. Wrist flex. intrinsics Hip flex. Hip ext. Knee ext.   Knee flex Dorsi flex Plantar flex Right 3 3 3 4 4 4 3 4 4 4 4 4   Left 3 3 3 4 4 4 3 4 4 4 4 4     Motor testing is unchanged from yesterday. Sensory:  Upper extremity: intact to pp,   Lower extremity: intact to pp    Reflexes:  Diminished throughout    Plantar response:  flexor bilaterally      Cerebellar testing:  no tremor apparent, finger/nose and evan were intact with assistance due to her weakness  Gait not assessed due to significant level of weakness    Labs:     Lab Results   Component Value Date/Time    Hemoglobin A1c 5.2 10/30/2019 12:47 PM    Sodium 146 (H) 06/09/2020 03:50 AM    Potassium 3.7 06/09/2020 03:50 AM    Chloride 118 (H) 06/09/2020 03:50 AM    Glucose 101 (H) 06/09/2020 03:50 AM    BUN 34 (H) 06/09/2020 03:50 AM    Creatinine 0.41 (L) 06/09/2020 03:50 AM    Calcium 8.5 06/09/2020 03:50 AM    WBC 12.5 (H) 06/09/2020 03:50 AM    HCT 34.9 (L) 06/09/2020 03:50 AM    HGB 11.5 06/09/2020 03:50 AM    PLATELET 625 29/27/7233 03:50 AM       Imaging:    Results from Hospital Encounter encounter on 01/03/20   MRI HIP  RT  WO CONT    Narrative EXAM: MRI HIP  RT  WO CONT    INDICATION: Avascular necrosis    COMPARISON: CT 8/3/2017    TECHNIQUE: Coronal T1 and axial T2 fat-saturated MRI of the whole pelvis; axial  and sagittal T2 fat-saturated; coronal proton density fat-saturated MRI of the  right hip . CONTRAST: None. FINDINGS: Bone marrow: There is a small area of sclerosis in the superior right  femoral head with a ring of increased T2 signal. There is mild irregularity of  the subchondral bone plate. Joint fluid: None. Articular cartilage: Severe right hip osteoarthritis. There are prominent  marginal osteophytes. Acetabular labrum: Diffuse degenerative changes     Hip morphology: Severe right hip degenerative changes with remodeling. Tendons: Intact. Muscles: There is patchy mild edema throughout the musculature of the pelvis and  bilateral thighs. Soft tissue mass: None.     Intrapelvic soft tissues: no acute process. There is mild levoconvex scoliosis of lumbar spine with multilevel spondylosis. Impression IMPRESSION:   1. Severe right hip osteoarthritis. Small area of sclerosis in the superior  right femoral head with irregularity of the subchondral bone plate and trace  surrounding degenerative edema. This is most likely related to the severe  degeneration of the right hip joint. 2. Patchy mild edema throughout the visualized musculature         Results from Abstract encounter on 12/03/18   CT CHEST WO CONT             Assessment and Plan:    The patient is a pleasant 80-year-old female with a history of inflammatory muscle disease that has been at least partially refractory to multiple immunosuppressants. Per documentation she does have serology previously that revealed anti-Sabine positivity. Muscle weakness:    She has clearly a primary muscle disease. Differential includes autoimmune, inflammatory, paraneoplastic. Less likely hereditary. Would need to keep an atypical inclusion body myositis in the differential given her refractory nature to treatment. I do think she would benefit from follow-up muscle imaging and repeat muscle biopsy. EMG will be tomorrow a.m. She should have a full screen for underlying malignancy. I would recommend also sending the antibody for inclusion body myositis, [id number]    I agree with steroids and consideration of repeat induction with rituximab. Neurology will continue to follow along. I did discuss this at length with the patient today.      Active Problems:    Polymyositis ([facility] (7/28/2016)                   Signed By:  Mary Cervantes DO FAAN    Destiny 10, 2020

## 2020-06-10 NOTE — PROGRESS NOTES
Bedside shift change report given to  [facility], [address] [facility] nurse) by Yovani Oliva nurse). Report included the following information SBAR, Kardex, ED Summary and OR Summary. 
  
Zone Phone:   [phone] 
  
  
Significant changes during shift: none 
  
  
Patient Information 
  
Cheyanne Jackson 
72 y.o. 
6/5/2020  4:13 PM by Rick Palmer MD. Oksana Garner was admitted from Home 
  
Problem List 
  
       
Patient Active Problem List  
  Diagnosis Date Noted  S/p nephrectomy 05/27/2020  
 History of renal cell cancer 05/27/2020  Vitamin D deficiency 10/10/2018  Age-related osteoporosis without current pathological fracture 08/23/2018  Elevated glucose 01/18/2017  Obstructive sleep apnea syndrome 01/17/2017  Polymyositis ([address] 07/28/2016  
 HTN (hypertension) 07/28/2016  
  
    
Past Medical History:  
Diagnosis Date  Congestive heart failure ([address]    
 Hypertension    
 Pneumonia 10/2018  Polymyositis ([address] 12/2015  
  in setting of [address] 2015  Polymyositis associated with autoimmune disease ([address]    
 Renal cancer ([address] 07/08/2016  
  s/p right nephrectomy.  Respiratory arrest ([address] 11/2015  
  [facility].   
 SBO (small bowel obstruction) ([address] 8/3/2017  
  
  
  
Core Measures: 
  
PNA:No 
  
Activity Status: 
  
OOB to Chair No 
Ambulated this shift No  
Bed Rest Yes 
  
DVT prophylaxis: 
  
DVT prophylaxis Med- Yes DVT prophylaxis SCD or ELMER- No  
  
Wounds: (If Applicable) 
  
Wounds- Yes 
  
Location open areas to sacrum and redness under right breast  
  
Patient Safety: 
  
Falls Score Total Score: 2 Safety Level_______ Bed Alarm On? Yes Sitter?  No 
  
Plan for upcoming shift:  
Safety and monitoring 
  
  
  
  
  
Discharge Plan: Yes Long term vs home health  
  
Active Consults: 
IP CONSULT TO HOSPITALIST 
IP CONSULT TO RHEUMATOLOGY 
IP CONSULT TO PALLIATIVE CARE - PROVIDER

## 2020-06-10 NOTE — PROGRESS NOTES
Bedside shift change report given to  Jacques Tay RN (oncoming nurse) by Anibal Mays (offgoing nurse). Report included the following information SBAR, Kardex, ED Summary and OR Summary.     Zone Phone:   [phone]        Significant changes during shift: On clear liquid diet        Patient Information     Elba Caputo  59 y.o.  6/5/2020  4:13 PM by Yobani Elena MD. Elba Caputo was admitted from Home     Problem List          Patient Active Problem List     Diagnosis Date Noted    S/p nephrectomy 05/27/2020    History of renal cell cancer 05/27/2020    Vitamin D deficiency 10/10/2018    Age-related osteoporosis without current pathological fracture 08/23/2018    Elevated glucose 01/18/2017    Obstructive sleep apnea syndrome 01/17/2017    Polymyositis ([address] 07/28/2016    HTN (hypertension) 07/28/2016      Past Medical History:   Diagnosis Date    Congestive heart failure ([address]      Hypertension      Pneumonia 10/2018    Polymyositis ([address] 12/2015     in setting of [address] 2015    Polymyositis associated with autoimmune disease ([address]      Renal cancer ([address] 07/08/2016     s/p right nephrectomy.  Respiratory arrest ([address] 11/2015     [facility].     SBO (small bowel obstruction) ([address] 8/3/2017            Core Measures:     PNA:No     Activity Status:     OOB to Chair No  Ambulated this shift No   Bed Rest Yes     DVT prophylaxis:     DVT prophylaxis Med- Yes  DVT prophylaxis SCD or ELMER- No      Wounds: (If Applicable)     Wounds- Yes     Location open areas to sacrum and redness under right breast      Patient Safety:     Falls Score Total Score: 2  Safety Level_______  Bed Alarm On? Yes  Sitter?  No     Plan for upcoming shift:   Safety and monitoring                Discharge Plan: Yes Long term vs home health      Active Consults:  IP CONSULT TO HOSPITALIST  IP CONSULT TO RHEUMATOLOGY  IP CONSULT TO PALLIATIVE CARE - PROVIDER

## 2020-06-10 NOTE — ROUTINE PROCESS
Bedside shift change report given to Yousif Graff, [facility] nurse) by Sheryl Khan RN (offgoing nurse). Report included the following information SBAR, Kardex, ED Summary and OR Summary.     Zone Phone:   [phone]        Significant changes during shift: Seen by GI, OT, CM. Patient to have PEG tube placement tomorrow as well as EMG. NPO at midnight. Patient had 10 beats of Vtach.         Patient Information     Humza Clayton  7 y.o.  6/5/2020  4:13 PM by Charles Mckoen MD. Oksana Garner was admitted from Home     Problem List             Patient Active Problem List     Diagnosis Date Noted    S/p nephrectomy 05/27/2020    History of renal cell cancer 05/27/2020    Vitamin D deficiency 10/10/2018    Age-related osteoporosis without current pathological fracture 08/23/2018    Elevated glucose 01/18/2017    Obstructive sleep apnea syndrome 01/17/2017    Polymyositis ([address] 07/28/2016    HTN (hypertension) 07/28/2016           Past Medical History:   Diagnosis Date    Congestive heart failure ([address]      Hypertension      Pneumonia 10/2018    Polymyositis ([address] 12/2015     in setting of [facility] 2015    Polymyositis associated with autoimmune disease ([address]      Renal cancer ([address] 07/08/2016     s/p right nephrectomy.  Respiratory arrest ([address] 11/2015     [facility].     SBO (small bowel obstruction) ([address] 8/3/2017            Core Measures:     PNA:No     Activity Status:     OOB to Chair No  Ambulated this shift No   Bed Rest Yes     DVT prophylaxis:     DVT prophylaxis Med- Yes  DVT prophylaxis SCD or ELMER- No      Wounds: (If Applicable)     Wounds- Yes     Location open areas to sacrum and redness under right breast      Patient Safety:     Falls Score Total Score: 2  Safety Level_______  Bed Alarm On? Yes  Sitter?  No     Plan for upcoming shift:   NPO at MN, PEG tube placement tomorrow and EMG tomorrow.                  Discharge Plan: Yes Long term vs home health      Active Consults:  IP CONSULT TO HOSPITALIST  IP CONSULT TO RHEUMATOLOGY  IP CONSULT TO PALLIATIVE CARE - PROVIDER

## 2020-06-10 NOTE — PROGRESS NOTES
Problem: Falls - Risk of  Goal: *Absence of Falls  Description: Document Blayne Bui Fall Risk and appropriate interventions in the flowsheet. Outcome: Progressing Towards Goal  Note: Fall Risk Interventions:  Mobility Interventions: Assess mobility with egress test, Bed/chair exit alarm         Medication Interventions: Assess postural VS orthostatic hypotension, Bed/chair exit alarm    Elimination Interventions: Call light in reach, Bed/chair exit alarm              Problem: Patient Education: Go to Patient Education Activity  Goal: Patient/Family Education  Outcome: Progressing Towards Goal     Problem: Pressure Injury - Risk of  Goal: *Prevention of pressure injury  Description: Document Armen Scale and appropriate interventions in the flowsheet.   Outcome: Progressing Towards Goal  Note: Pressure Injury Interventions:  Sensory Interventions: Assess changes in LOC, Assess need for specialty bed, Avoid rigorous massage over bony prominences    Moisture Interventions: Check for incontinence Q2 hours and as needed, Absorbent underpads, Apply protective barrier, creams and emollients    Activity Interventions: Assess need for specialty bed    Mobility Interventions: Assess need for specialty bed, Chair cushion    Nutrition Interventions: Document food/fluid/supplement intake, Offer support with meals,snacks and hydration    Friction and Shear Interventions: Apply protective barrier, creams and emollients, Foam dressings/transparent film/skin sealants                Problem: Patient Education: Go to Patient Education Activity  Goal: Patient/Family Education  Outcome: Progressing Towards Goal     Problem: Patient Education: Go to Patient Education Activity  Goal: Patient/Family Education  Outcome: Progressing Towards Goal     Problem: Patient Education: Go to Patient Education Activity  Goal: Patient/Family Education  Outcome: Progressing Towards Goal     Problem: Patient Education: Go to Patient Education Activity  Goal: Patient/Family Education  Outcome: Progressing Towards Goal

## 2020-06-10 NOTE — PROGRESS NOTES
Hospitalist Progress Note    NAME: Minh Rojas   :  1955   MRN:  [id number]       Assessment / Plan:  Acute polymyositis flare patient is improving clinically CPKs about 600 today patient is on steroids will transition to oral agents. I also talked to patient's rheumatologist she gave me a rundown of patient's condition. Patient was also seen by palliative care team and appreciate their input. Ultimately plan will be to go for SNF. Acute rhabdomyolysis secondary to polymyositis improving clinically is about 600 today. CPK    Dysphagia patient is on thin liquid diet which appears to be tolerating this was from speech there was a discussion about feeding tube patient does not like and does want feeding tube will call GI to evaluate patient for feeding tube placement. Severe protein calorie malnutrition secondary to debilitation from polymyositis  Will continue clear liquid diet also Ensure and possible feeding tube placement as per patient's request.  We will taper patient's steroids. Plan is to go for rehab. I had a lengthy discussion with patient patient is aware and patient is very much intelligent knows about her condition she is definitely very debilitated and appears to be deteriorating. As per patient rheumatologist she does respond to steroids. Patient is very debilitated palliative care following we will consult GI      Body mass index is 22.48 kg/m². Code status: DNR  Prophylaxis: Heparin subcu  Recommended Disposition: Possible SNF     Subjective:     Chief Complaint / Reason for Physician Visit  . Discussed with RN events overnight.   Patient seen and examined at bedside very comfortable no fever no chills awake and alert verbalizing appropriatelyn    Review of Systems:  Symptom Y/N Comments  Symptom Y/N Comments   Fever/Chills n   Chest Pain n    Poor Appetite n   Edema n    Cough n   Abdominal Pain n    Sputum n   Joint Pain     SOB/LOREDO n   Pruritis/Rash Nausea/vomit n   Tolerating PT/OT     Diarrhea n   Tolerating Diet     Constipation    Other       Could NOT obtain due to:      Objective:     VITALS:   Last 24hrs VS reviewed since prior progress note. Most recent are:  Patient Vitals for the past 24 hrs:   Temp Pulse Resp BP SpO2   06/10/20 0801 97.6 °F (36.4 °C) 78 18 172/79 97 %   06/10/20 0348 97.6 °F (36.4 °C) 71 18 155/79 99 %   06/10/20 0000 98.3 °F (36.8 °C) 77 18 160/76 98 %   06/09/20 1840 98.2 °F (36.8 °C) 82 18 174/67 97 %   06/09/20 1558 97.7 °F (36.5 °C) 60 16 156/70 99 %   06/09/20 1133 98.3 °F (36.8 °C) (!) 57 16 163/87 98 %     No intake or output data in the 24 hours ending 06/10/20 1047     PHYSICAL EXAM:  General: WD, WN. Alert, cooperative, no acute distress    EENT:  EOMI. Anicteric sclerae. MMM  Resp:  CTA bilaterally, no wheezing or rales. No accessory muscle use  CV:  Regular  rhythm,  No edema  GI:  Soft, Non distended, Non tender.  +Bowel sounds  Neurologic:  Alert and oriented X 3, normal speech,   Psych:   Good insight. Not anxious nor agitated      Reviewed most current lab test results and cultures  YES  Reviewed most current radiology test results   YES  Review and summation of old records today    NO  Reviewed patient's current orders and MAR    YES  PMH/ reviewed - no change compared to H&P  ________________________________________________________________________  Care Plan discussed with:    Comments   Patient x    Family      RN x    Care Manager     Consultant                        Multidiciplinary team rounds were held today with , nursing, pharmacist and clinical coordinator. Patient's plan of care was discussed; medications were reviewed and discharge planning was addressed.      ________________________________________________________________________            Comments   >50% of visit spent in counseling and coordination of care ________________________________________________________________________  Jodie Dalton MD     Procedures: see electronic medical records for all procedures/Xrays and details which were not copied into this note but were reviewed prior to creation of Plan. LABS:  I reviewed today's most current labs and imaging studies.   Pertinent labs include:  Recent Labs     06/09/20 0350 06/08/20 0514   WBC 12.5* 14.7*   HGB 11.5 10.9*   HCT 34.9* 32.9*    292     Recent Labs     06/09/20 0350 06/08/20 0514   * 145   K 3.7 3.6   * 117*   CO2 21 24   * 107*   BUN 34* 33*   CREA 0.41* 0.50*   CA 8.5 8.1*       Signed: Jodie Dalton MD

## 2020-06-10 NOTE — PROGRESS NOTES
CHAN:   1) Rehab  2) Medicaid screening- pt will need a [employer] completed prior to discharge   3) Pt will need 2ND IM letter at time of discharge   4) Pt will need [facility] transportation at time of discharge      5:25 PM- [facility] is continuing to follow pt.  Waiting on responses from [facility] and MARTIN Marino, [address] [facility]    [phone]

## 2020-06-10 NOTE — PROGRESS NOTES
Problem: Self Care Deficits Care Plan (Adult)  Goal: *Acute Goals and Plan of Care (Insert Text)  Description:   FUNCTIONAL STATUS PRIOR TO ADMISSION: Pt reports living alone in single story home with ramped entrance, reporting she has Max A from daily caregiver (x4 hours daily), recently (past month), being bathed seated at [facility].  1 month ago pt reports she was standing with RW at walk-in shower with Max A for bathing. Pt reports sleeping in recliner. DME needs met. HOME SUPPORT: The patient lived with alone with paid caregiver assist.    Occupational Therapy Goals  Initiated 6/8/2020  1. Patient will perform self-feeding with minimal assistance within 7 day(s). 2.  Patient will perform grooming with moderate assistance  within 7 day(s). 3.  Patient will perform upper body dressing with Max assistance  within 7 day(s). 4.  Patient will perform toilet transfers, EOB to [facility], with maximal assistance within 7 day(s). 5.  Patient will perform all aspects of toileting with maximal assistance within 7 day(s). 6.  Patient will utilize energy conservation techniques during functional activities with Min verbal cues within 7 day(s). Outcome: Not Progressing Towards Goal   OCCUPATIONAL THERAPY TREATMENT  Patient: Shun Crum (24 y.o. female)  Date: 6/10/2020  Diagnosis: Polymyositis ([facility] [address] [M33.20]   <principal problem not specified>       Precautions: Fall  Chart, occupational therapy assessment, plan of care, and goals were reviewed. ASSESSMENT  Patient continues with skilled OT services and is not progressing towards goals. Pt was supine upon arrival, reported being very [place] (the past several weeks) requiring increased volume and repetition. Pt was able to perform B UE exercise at midline with positioning of pillows for support and manual supportive assistance (at least Mod A -50%) to increase mobility and strength for adls.   Pt tolerated slow stretch to tight finger extensors bilaterally (R > L).  Pt reports she is able to push the nurse call button if it is within her reach. (could consider using a pancake call button). Pt continues to be overall very debilitated. MD in to explain feeding tube surgery for tomorrow. Pt will need 24 hour care at discharge. Current Level of Function Impacting Discharge (ADLs): total assistance    Other factors to consider for discharge: lives alone. Reports decline in function over the past several weeks. Pt for feeding tube surgery tomorrow. Per medical record, pt will have an EMG study by neurologist.           PLAN :  Patient continues to benefit from skilled intervention to address the above impairments. Continue treatment per established plan of care. to address goals. Recommend with staff: ensure call bell accessibilty. Facilitate functional positioning of BUEs using pillows to allow UEs at midline. Recommend next OT session: facilitate BUE use for adls. Recommendation for discharge: (in order for the patient to meet his/her long term goals)  Therapy up to 5 days/week in SNF setting    This discharge recommendation:  Has not yet been discussed the attending provider and/or case management    IF patient discharges home will need the following DME: needs 24 hour care. SUBJECTIVE:   Patient stated I can't hear you.     OBJECTIVE DATA SUMMARY:   Cognitive/Behavioral Status:  Neurologic State: Alert  Orientation Level: Oriented X4  Cognition: Appropriate safety awareness; Follows commands             Functional Mobility and Transfers for ADLs:   Did not perform              ADL Intervention:   Worked on assuming midline positioning of BUEs to increase ability to use her call bell and use her cell phone. Worked on gentle stretching of finger extensors and positioning to facilitate functional use for adls.   Pt maintains finger flexion at rest.                                       Therapeutic Exercises:   AAROM of Biceps/triceps flex/ext while supported on pillows and manual support at midline to increase ROM/strength for self care activities. With manual support at elbow/triceps pt able to bring BUEs from lap to chin. Tolerated 10 reps without complaint    Double rolled wash cloths to place in hands and increase finger extension/functional grasp. Pt able to use her RUE (while supported on pillow) to place the rolled cloth into her L hand. Pt tends to keep her R hand flexed due to holding her cell phone stylus. Encouraged gentle stretching of finger extensor and using the rolled cloths to aid in functional positioning. Pt encouraged to gently the roll the cloth and continue to move it from L hand to R hand (facilitating coordination at midline)    Pain:  Pain with gentle stretch of L finger extensors. Subsided when stretch ended    Activity Tolerance:   Fair  MD in to explain  feeding tube procedure  After treatment patient left in no apparent distress:   Supine in bed, Call bell within reach, and Side rails x 3    COMMUNICATION/COLLABORATION:   The patients plan of care was discussed with: Registered nurse.      Yovany Cabrales OTR/L  Time Calculation: 31 mins

## 2020-06-10 NOTE — ROUTINE PROCESS
Received call from tele that patient had 10 beats VTach. MD notified. MD stated to monitor patient. No new orders.

## 2020-06-10 NOTE — CONSULTS
Palliative Medicine Consult  [place]: [phone] ([phone])    Patient Name: Linus Townsend  YOB: 1955    Date of Initial Consult: 5/9/20  Reason for Consult: Care decisions  Requesting Provider: India Sampson MD  Primary Care Physician: Donny Dawn MD     SUMMARY:   Linus Townsend is a 59 y.o. female with a past history of polymyositis,  age related osteoporosis, HTN, Depression, Renal cancer s/p right nephrectomy, who was admitted on 6/5/2020 from  with a diagnosis of Polymyositis, Flare with acute rhabdomyolysis. She presented with worsening weakness for two weeks, requiring assistance with two people . Per speech, she has significant oropharyngeal dysphagia. She did not aspirate thins  but is at risk to aspirate and that aspiration could be fatal for her due to her overall weakness and the degenerative nature of her disease. Palliative will be coming today to discuss her opti       Current medical issues leading to Palliative Medicine involvement include: care decisions in setting of dysphagia with progressive weakness due to polymyositis flare up , malnutrition, weight loss. Social : [place] at [place] able to ambulate with walker in the house, uses recumbent bike for exercise, has few hrs of care giver help not consistent . Single , never  , no children, her main support is her cousin Keren Gray, who lives out of town . 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 PALLIATIVE DIAGNOSES:   1. Generalized weakness  2. orohrangeal dysphagia  3. Debility   4. Weight loss ( 10 lbs in 6 months ). 5. Flare up of polymyositis. 6. Rhabdomyolysis (resolving ). 7. Goals of care/DNR discussion. PLAN:   1. Reviewed chart prior to visit.   2. Goals of care :    · Follow up on our conversation with patient cousin Subhash Porterearelne, family meeting scheduled for today at 1 pm.  · Family meeting with patient and her cousin/MPOA noe(no mPOA documents in chart,  I have given my email address to her cousin to send  me the documents ). · Patient notes she has changed her mind to move forward with feeding tube placement . · Paul Alves had lot of questions about peg tube feeding , I educated on common side effects of prg tube , including infection and bleeding from the site of tube, diarrhea due to formula feeding and most importantly patient can still aspirate into lungs with feeding tube , I referred her to talk to GI, for some of the very specific questions about peg tube like infection rate? Etc. · Paul Alves, would like to speak to neurologist,  I gave her the contact info for dr Diann Cunningham from his business card patient has .       · Patient signed DDNR, refer to note from [facility] . 3. Advance directives : patient notes her cousin, Jennifer Ho is her MPOA, I am awaiting to get a copy of MPOA DOCUMENT from her cousin Paul Alves. 4. Case discussed with dr Roger Hensley in person . 5. Initial consult note routed to primary continuity provider and/or primary health care team members  6. Communicated plan of care with: Palliative IDT, [address] Team     GOALS OF CARE / TREATMENT PREFERENCES:     GOALS OF CARE:  Patient/Health Care Proxy Stated Goals: Rehabilitation    TREATMENT PREFERENCES:   Code Status: DNR    Advance Care Planning:  [x] [facility] Interdisciplinary Team has updated the ACP Navigator with Health Care Decision Maker and Patient Capacity      Advance Care Planning 6/6/2020   Patient's Healthcare Decision Maker is: -   Confirm Advance Directive Yes, not on file       Medical Interventions: (DNR)     Other Instructions:   Artificially Administered Nutrition: Feeding tube long-term, if indicated     Other:    As far as possible, the palliative care team has discussed with patient / health care proxy about goals of care / treatment preferences for patient.      HISTORY:     History obtained from: chart , patient . CHIEF COMPLAINT: stiffness and weakness, had bowel movement today . HPI/SUBJECTIVE:    The patient is:   [] Verbal and participatory    Patient notes generalized weakness, she is coughing \" saliva \", notes soreness of elbows, she does not take any pain medication . She notes her muscles are stiff. Patient was on steroids x 5 years , did not worked so she stopped it 2 months ago . She c/o constipation , oat meal with dates helps for constipation . She notes weight loss of 10 lbs in last 6 months . 6/10/20:    She denies any pain, needs mouth suction frequently for oral secretion . Denies any nausea or vomiting , bowels moved today . Clinical Pain Assessment (nonverbal scale for severity on nonverbal patients):   Clinical Pain Assessment  Severity: 0          Duration: for how long has pt been experiencing pain (e.g., 2 days, 1 month, years)  Frequency: how often pain is an issue (e.g., several times per day, once every few days, constant)     FUNCTIONAL ASSESSMENT:     Palliative Performance Scale (PPS):  PPS: 40       PSYCHOSOCIAL/SPIRITUAL SCREENING:     Palliative IDT has assessed this patient for cultural preferences / practices and a referral made as appropriate to needs (Cultural Services, Patient Advocacy, Ethics, etc.)    Any spiritual / Holiness concerns:  [] Yes /  [x] No    Caregiver Burnout:  [] Yes /  [x] No /  [] No Caregiver Present      Anticipatory grief assessment:   [x] Normal  / [] Maladaptive       ESAS Anxiety: Anxiety: 0    ESAS Depression: Depression: 0        REVIEW OF SYSTEMS:     Positive and pertinent negative findings in ROS are noted above in HPI. The following systems were [x] reviewed / [] unable to be reviewed as noted in HPI  Other findings are noted below. Systems: constitutional, ears/nose/mouth/throat, respiratory, gastrointestinal, genitourinary, musculoskeletal, integumentary, neurologic, psychiatric, endocrine.  Positive findings noted below. Modified ESAS Completed by: provider   Fatigue: 8 Drowsiness: 0   Depression: 0 Pain: 0   Anxiety: 0 Nausea: 0   Anorexia: 5 Dyspnea: 0     Constipation: No     Stool Occurrence(s): 1        PHYSICAL EXAM:     From RN flowsheet:  Wt Readings from Last 3 Encounters:   06/08/20 130 lb 15.3 oz (59.4 kg)   10/30/19 147 lb (66.7 kg)   09/11/19 154 lb (69.9 kg)     Blood pressure 165/73, pulse 71, temperature 98 °F (36.7 °C), resp. rate 18, height 5' 4\" (1.626 m), weight 130 lb 15.3 oz (59.4 kg), SpO2 100 %. Pain Scale 1: Numeric (0 - 10)  Pain Intensity 1: 0  Pain Onset 1: acute  Pain Location 1: Leg  Pain Orientation 1: Other (comment)(BLE)  Pain Description 1: Aching  Pain Intervention(s) 1: Repositioned  Last bowel movement, if known:     Constitutional: cachectic, not in any distress, pleasant , alert , oriented x 3 . Eyes: pupils equal, anicteric  ENMT: no nasal discharge, moist mucous membranes  Cardiovascular: regular rhythm, distal pulses intact  Respiratory: breathing not labored, symmetric  Gastrointestinal: soft non-tender, +bowel sounds  Musculoskeletal: generalized muscular wasting. Skin: warm, dry  Neurologic: following commands, moving all extremities  Psychiatric: full affect, no hallucinations  Other:       HISTORY:     Active Problems:    Polymyositis ([address] (7/28/2016)      Past Medical History:   Diagnosis Date    Congestive heart failure ([address]     Hypertension     Pneumonia 10/2018    Polymyositis ([address] 12/2015    in setting of [address] 2015    Polymyositis associated with autoimmune disease ([address]     Renal cancer ([address] 07/08/2016    s/p right nephrectomy.      Respiratory arrest ([address] 11/2015    [facility].    [facility] SBO (small bowel obstruction) ([address] 8/3/2017      Past Surgical History:   Procedure Laterality Date    HX GYN  1999    hysterectomy    HX NEPHRECTOMY Right 2016    for kidney cancer    US GUIDED CORE BREAST BIOPSY Left 1999    Negative      Family History   Problem Relation Age of Onset    Cancer Mother     Breast Cancer Mother 68    Diabetes Father     Hypertension Father     Dementia Father 80    Stroke Maternal Grandmother     Breast Cancer Cousin         52's      History reviewed, no pertinent family history.   Social History     Tobacco Use    Smoking status: Former Smoker     Packs/day: 1.00     Years: 20.00     Pack years: 20.00     Types: Cigarettes     Last attempt to quit: 1998     Years since quittin.1    Smokeless tobacco: Never Used   Substance Use Topics    Alcohol use: No     Alcohol/week: 1.0 standard drinks     Types: 1 Glasses of wine per week     Allergies   Allergen Reactions    Ace Inhibitors Cough    Dilaudid [Hydromorphone] Other (comments)    Levaquin [Levofloxacin] Other (comments)     Leg swelling    Lisinopril Other (comments)     Cough      Metoprolol Other (comments)     hallucinations    Peanut Other (comments)      Current Facility-Administered Medications   Medication Dose Route Frequency    methylPREDNISolone (PF) (Solu-MEDROL) injection 125 mg  125 mg IntraVENous Q8H    [Held by provider] irbesartan (AVAPRO) tablet 300 mg  300 mg Oral QHS    hydrALAZINE (APRESOLINE) 20 mg/mL injection 10 mg  10 mg IntraVENous Q4H PRN    acetaminophen (TYLENOL) tablet 650 mg  650 mg Oral Q6H PRN    sodium chloride (NS) flush 5-40 mL  5-40 mL IntraVENous Q8H    sodium chloride (NS) flush 5-40 mL  5-40 mL IntraVENous PRN    ondansetron (ZOFRAN) injection 4 mg  4 mg IntraVENous Q4H PRN    enoxaparin (LOVENOX) injection 40 mg  40 mg SubCUTAneous Q24H    insulin lispro (HUMALOG) injection   SubCUTAneous AC&HS    glucose chewable tablet 16 g  4 Tab Oral PRN    dextrose (D50W) injection syrg 12.5-25 g  12.5-25 g IntraVENous PRN    glucagon (GLUCAGEN) injection 1 mg  1 mg IntraMUSCular PRN          LAB AND IMAGING FINDINGS:     Lab Results   Component Value Date/Time    WBC 12.5 (H) 2020 03:50 AM    HGB 11.5 06/09/2020 03:50 AM    PLATELET 656 78/46/0811 03:50 AM     Lab Results   Component Value Date/Time    Sodium 146 (H) 06/09/2020 03:50 AM    Potassium 3.7 06/09/2020 03:50 AM    Chloride 118 (H) 06/09/2020 03:50 AM    CO2 21 06/09/2020 03:50 AM    BUN 34 (H) 06/09/2020 03:50 AM    Creatinine 0.41 (L) 06/09/2020 03:50 AM    Calcium 8.5 06/09/2020 03:50 AM    Magnesium 1.9 06/05/2020 04:39 PM    Phosphorus 3.7 11/18/2018 03:49 AM      Lab Results   Component Value Date/Time    Alk. phosphatase 106 06/05/2020 04:39 PM    Protein, total 6.7 06/05/2020 04:39 PM    Albumin 2.5 (L) 06/05/2020 04:39 PM    Globulin 4.2 (H) 06/05/2020 04:39 PM     No results found for: INR, PTMR, PTP, PT1, PT2, APTT, INREXT, INREXT   No results found for: IRON, FE, TIBC, IBCT, PSAT, FERR   No results found for: PH, PCO2, PO2  No components found for: [place]   Lab Results   Component Value Date/Time     (H) 06/10/2020 05:39 AM    CK - MB 22.0 (H) 08/05/2017 02:48 AM                Total time:   Counseling / coordination time, spent as noted above:   > 50% counseling / coordination?:     Prolonged service was provided for  []30 min   []75 min in face to face time in the presence of the patient, spent as noted above. Time Start:   Time End:   Note: this can only be billed with 11575 (initial) or 15665 (follow up). If multiple start / stop times, list each separately.

## 2020-06-10 NOTE — CONSULTS
[address]               Gastroenterology Consult Note  VEL Soler   for Dr. Vita Osler     Admitting: Dr. Corey Matta Date: 6/10/2020     Subjective:     Chief Complaint: Progressive weakness    History of Present Illness: Sahra Fernandez is a 59 y.o. female who is seen in consultation for possible PEG. Past medical history of polymyositis, hypertension, history of renal cancer status post right nephrectomy. She reports she had a PEG back in 12/2015 after admission when she had a trach when Polymyositis was first dx. It was removed thereafter. She denies any issues with swallowing at that time. She has been losing weight, 10 lb in the last 6 month. She feels over the last month her swallowing has worsened. Not a lot of coughing with swallowing bt has a lot of phlegm in her throat. She feels foods getting stuck in throat with swallowing. No N/V, no abdominal pain. Normal good BM today, no melena or hematochezia. Not on any anticoagulation meds. SLP has evaluated and assessment revealed: ASSESSMENT:  Patient had an MBS yesterday. She has significant oropharyngeal dysphagia. She did not aspirate thins but was at risk due to laryngeal penetration occurring. She cleared the penetrated liquid in the controlled setting of the MBS with throat clearing. . She is not safe with purees at this point due to pharyngeal residue and is at grave risk to aspirate them. Discussed with the patient that she could take thins but is at risk to aspirate and that aspiration could be fatal for her due to her overall weakness and the degenerative nature of her disease. Palliative will be coming today to discuss her options re: po. Suspect she will need a feeding tube. Colonoscopy x2 in the past, [facility] and [address]. Normal in 2017. She has had an EGD in the past as well at [facility] in 2018.       Past Medical History:   Diagnosis Date    Congestive heart failure ([address]     Hypertension     Pneumonia 10/2018    [facility] 2015    in setting of [address]     Polymyositis associated with autoimmune disease ([facility]     Renal cancer ([facility] 2016    s/p right nephrectomy.      Respiratory arrest ([facility] 2015    [facility].     SBO (small bowel obstruction) ([facility] 8/3/2017     Past Surgical History:   Procedure Laterality Date    HX GYN      hysterectomy    HX NEPHRECTOMY Right 2016    for kidney cancer    US GUIDED CORE BREAST BIOPSY Left     Negative      Family History   Problem Relation Age of Onset    Cancer Mother     Breast Cancer Mother 68    Diabetes Father     Hypertension Father     Dementia Father 80    Stroke Maternal Grandmother     Breast Cancer Cousin         52's     Social History     Tobacco Use    Smoking status: Former Smoker     Packs/day: 1.00     Years: 20.00     Pack years: 20.00     Types: Cigarettes     Last attempt to quit: 1998     Years since quittin.1    Smokeless tobacco: Never Used   Substance Use Topics    Alcohol use: No     Alcohol/week: 1.0 standard drinks     Types: 1 Glasses of wine per week      Allergies   Allergen Reactions    Ace Inhibitors Cough    Dilaudid [Hydromorphone] Other (comments)    Levaquin [Levofloxacin] Other (comments)     Leg swelling    Lisinopril Other (comments)     Cough      Metoprolol Other (comments)     hallucinations    Peanut Other (comments)     Current Facility-Administered Medications   Medication Dose Route Frequency    methylPREDNISolone (PF) (Solu-MEDROL) injection 125 mg  125 mg IntraVENous Q8H    [Held by provider] irbesartan (AVAPRO) tablet 300 mg  300 mg Oral QHS    hydrALAZINE (APRESOLINE) 20 mg/mL injection 10 mg  10 mg IntraVENous Q4H PRN    acetaminophen (TYLENOL) tablet 650 mg  650 mg Oral Q6H PRN    sodium chloride (NS) flush 5-40 mL  5-40 mL IntraVENous Q8H    sodium chloride (NS) flush 5-40 mL  5-40 mL IntraVENous PRN    ondansetron (ZOFRAN) injection 4 mg  4 mg IntraVENous Q4H PRN    enoxaparin (LOVENOX) injection 40 mg  40 mg SubCUTAneous Q24H    insulin lispro (HUMALOG) injection   SubCUTAneous AC&HS    glucose chewable tablet 16 g  4 Tab Oral PRN    dextrose (D50W) injection syrg 12.5-25 g  12.5-25 g IntraVENous PRN    glucagon (GLUCAGEN) injection 1 mg  1 mg IntraMUSCular PRN        Review of Systems:    A detailed review of systems was performed as follows:  Constitutional: +weight loss  Eyes:  No ocular sensitivity to the sun, blurred vision or double vision. ENMT:  No nose or sinus problems. Respiratory: No coughing, wheezing or sob  Cardiac:  No chest pain, exertional chest pain or palpitations  Gastrointestinal:  See history of the present illness  :   No pain with urination or hematuria  Musculoskeletal:  +joint/muscle pain, limited ROM  Endocrine:  No thyroid disease or diabetes  Psychiatric: No depression or feeling blue  Integumentary:  No skin rash or sensitivity to the sun. Neurologic:  No stroke or seizure; no numbness or tingling of the extremities. Heme-Lymphatic:  No history of anemia, no unexplained lumps or bumps      Objective:     Physical Exam:  Visit Vitals  /73   Pulse 69   Temp 98.3 °F (36.8 °C)   Resp 16   Ht 5' 4\" (1.626 m)   Wt 59.4 kg (130 lb 15.3 oz)   SpO2 100%   BMI 22.48 kg/m²        GEN: Pleasant BF, NAD  Skin:  Extremities and face reveal no rashes. HEENT: Sclerae anicteric. Extra-occular muscles are intact. No oral ulcers. No abnormal pigmentation of the lips. The neck is supple. Cardiovascular: Regular rate and rhythm. No murmurs, gallops, or rubs. Respiratory:  Comfortable breathing with no accessory muscle use. Clear breath sounds with no wheezes, rales, or rhonchi. GI:  Abdomen nondistended, soft, and nontender. Normal active bowel sounds. No enlargement of the liver or spleen. No masses palpable. Large midline incision C/D/I. Rectal:  Deferred  Musculoskeletal:  No pitting edema of the lower legs. Neurological:  Gross memory appears intact. Patient is alert and oriented. Psychiatric:  Mood appears appropriate with judgement intact. Lymphatic:  No cervical or supraclavicular adenopathy. Laboratory:    Recent Results (from the past 24 hour(s))   GLUCOSE, POC    Collection Time: 06/09/20  4:43 PM   Result Value Ref Range    Glucose (POC) 83 65 - 100 mg/dL    Performed by [facility] (PCT)    GLUCOSE, POC    Collection Time: 06/09/20  9:08 PM   Result Value Ref Range    Glucose (POC) 87 65 - 100 mg/dL    Performed by Milton Rivera (PCT)    CK    Collection Time: 06/10/20  5:39 AM   Result Value Ref Range     (H) 26 - 192 U/L   GLUCOSE, POC    Collection Time: 06/10/20  8:00 AM   Result Value Ref Range    Glucose (POC) 101 (H) 65 - 100 mg/dL    Performed by [facility] (PCT)    GLUCOSE, POC    Collection Time: 06/10/20 11:36 AM   Result Value Ref Range    Glucose (POC) 99 65 - 100 mg/dL    Performed by [employer] [facility] (PCT)          Assessment/Plan:     Active Problems:    Polymyositis ([address] (7/28/2016)      Oropharyngeal dysphagia (6/10/2020)       Given significant oropharyngeal dysphagia leavign her at risk for aspiration as well as weight loss agree that best method for nutrition would be via PEG tube. Reviewed procedure in great detail with patient and cousin over speaker phone. Both in agreement with proceeding. NPO after midnight. Have consent signed. The patient was counseled at length about the risks of richard Covid-19 during their perioperative period and any recovery window from their procedure. The patient was made aware that richard Covid-19  may worsen their prognosis for recovering from their procedure and lend to a higher morbidity and/or mortality risk. All material risks, benefits, and reasonable alternatives including postponing the procedure were discussed. The patient does wish to proceed with the procedure at this time.       VEL Piper    06/10/20  4:51 PM    [address] [facility], [address] [facility]  [phone] [id number] [address] [place] [address] [place]: [phone]

## 2020-06-10 NOTE — ACP (ADVANCE CARE PLANNING)
Palliative Medicine  [place]: [phone]  [facility]: [phone])    DDNR signed by patient today, after discussing this with her cousin Joe Mcclellan, who was on the phone/ Face Time when LCSW entered the room. Patient and cousin appear very close. Cousin is supportive and respectful of patient, patient seems to value cousin's viewpoints and seems to consult with her before she makes any decisions. DDNR placed in hard chart for scanning.  Patient given the original.

## 2020-06-10 NOTE — PROGRESS NOTES
Telemed: Patient on clear liquid diet for dysphagia. Failed barium swallow test. Has scheduled Irbesartan 300mg tablet scheduled for 2200. Can I hold med? pls advise.     Plan: Spoke with nurse, hold Avapro for dysphagia

## 2020-06-10 NOTE — PROGRESS NOTES
Problem: Falls - Risk of  Goal: *Absence of Falls  Description: Document Mariana Loron Fall Risk and appropriate interventions in the flowsheet. Note: Fall Risk Interventions:  Mobility Interventions: Bed/chair exit alarm, Patient to call before getting OOB         Medication Interventions: Evaluate medications/consider consulting pharmacy, Patient to call before getting OOB, Teach patient to arise slowly    Elimination Interventions: Bed/chair exit alarm, Call light in reach, Toileting schedule/hourly rounds              Problem: Falls - Risk of  Goal: *Absence of Falls  Description: Document Cathmercye Loron Fall Risk and appropriate interventions in the flowsheet. Note: Fall Risk Interventions:  Mobility Interventions: Bed/chair exit alarm, Patient to call before getting OOB         Medication Interventions: Evaluate medications/consider consulting pharmacy, Patient to call before getting OOB, Teach patient to arise slowly    Elimination Interventions: Bed/chair exit alarm, Call light in reach, Toileting schedule/hourly rounds              Problem: Falls - Risk of  Goal: *Absence of Falls  Description: Document Mariana Loron Fall Risk and appropriate interventions in the flowsheet.   Note: Fall Risk Interventions:  Mobility Interventions: Bed/chair exit alarm, Patient to call before getting OOB         Medication Interventions: Evaluate medications/consider consulting pharmacy, Patient to call before getting OOB, Teach patient to arise slowly    Elimination Interventions: Bed/chair exit alarm, Call light in reach, Toileting schedule/hourly rounds

## 2020-06-10 NOTE — PROGRESS NOTES
Dual skin assessment by Ingrid/Joshua  ~Skin tear R elbow  ~Excoriation to Left lower butt cheek and sacrum  ~Healing wound to Right butt cheek   ~Reddened but blanchable bilateral heels

## 2020-06-11 ENCOUNTER — ANESTHESIA EVENT (OUTPATIENT)
Dept: SURGERY | Age: 65
DRG: 545 | End: 2020-06-11
Payer: MEDICARE

## 2020-06-11 ENCOUNTER — ANESTHESIA (OUTPATIENT)
Dept: SURGERY | Age: 65
DRG: 545 | End: 2020-06-11
Payer: MEDICARE

## 2020-06-11 LAB
BACTERIA SPEC CULT: NORMAL
CK SERPL-CCNC: 534 U/L (ref 26–192)
GLUCOSE BLD STRIP.AUTO-MCNC: 107 MG/DL (ref 65–100)
GLUCOSE BLD STRIP.AUTO-MCNC: 93 MG/DL (ref 65–100)
GLUCOSE BLD STRIP.AUTO-MCNC: 94 MG/DL (ref 65–100)
INR PPP: 1.3 (ref 0.9–1.1)
PROTHROMBIN TIME: 12.9 SEC (ref 9–11.1)
SERVICE CMNT-IMP: ABNORMAL
SERVICE CMNT-IMP: NORMAL

## 2020-06-11 PROCEDURE — 77030018846 HC SOL IRR STRL H20 ICUM -A: Performed by: INTERNAL MEDICINE

## 2020-06-11 PROCEDURE — 82550 ASSAY OF CK (CPK): CPT

## 2020-06-11 PROCEDURE — 76060000032 HC ANESTHESIA 0.5 TO 1 HR: Performed by: INTERNAL MEDICINE

## 2020-06-11 PROCEDURE — 77030012058: Performed by: INTERNAL MEDICINE

## 2020-06-11 PROCEDURE — 77030040922 HC BLNKT HYPOTHRM STRY -A

## 2020-06-11 PROCEDURE — 74011250636 HC RX REV CODE- 250/636: Performed by: INTERNAL MEDICINE

## 2020-06-11 PROCEDURE — 74011250636 HC RX REV CODE- 250/636: Performed by: ANESTHESIOLOGY

## 2020-06-11 PROCEDURE — 85610 PROTHROMBIN TIME: CPT

## 2020-06-11 PROCEDURE — 65660000000 HC RM CCU STEPDOWN

## 2020-06-11 PROCEDURE — 0DH68UZ INSERTION OF FEEDING DEVICE INTO STOMACH, VIA NATURAL OR ARTIFICIAL OPENING ENDOSCOPIC: ICD-10-PCS | Performed by: INTERNAL MEDICINE

## 2020-06-11 PROCEDURE — 94760 N-INVAS EAR/PLS OXIMETRY 1: CPT

## 2020-06-11 PROCEDURE — 77010033678 HC OXYGEN DAILY

## 2020-06-11 PROCEDURE — 76010000138 HC OR TIME 0.5 TO 1 HR: Performed by: INTERNAL MEDICINE

## 2020-06-11 PROCEDURE — 77030005122 HC CATH GASTMY PEG BSC -B: Performed by: INTERNAL MEDICINE

## 2020-06-11 PROCEDURE — 76210000016 HC OR PH I REC 1 TO 1.5 HR: Performed by: INTERNAL MEDICINE

## 2020-06-11 PROCEDURE — 74011250636 HC RX REV CODE- 250/636: Performed by: NURSE ANESTHETIST, CERTIFIED REGISTERED

## 2020-06-11 PROCEDURE — 82962 GLUCOSE BLOOD TEST: CPT

## 2020-06-11 PROCEDURE — 36415 COLL VENOUS BLD VENIPUNCTURE: CPT

## 2020-06-11 PROCEDURE — 77030018798 HC PMP KT ENTRL FED COVD -A

## 2020-06-11 PROCEDURE — 77030008727 HC TU G REPLMT BSC -B: Performed by: INTERNAL MEDICINE

## 2020-06-11 PROCEDURE — 74011000250 HC RX REV CODE- 250: Performed by: NURSE ANESTHETIST, CERTIFIED REGISTERED

## 2020-06-11 PROCEDURE — 74011250636 HC RX REV CODE- 250/636

## 2020-06-11 PROCEDURE — 77030040361 HC SLV COMPR DVT MDII -B: Performed by: INTERNAL MEDICINE

## 2020-06-11 RX ORDER — LIDOCAINE HYDROCHLORIDE 10 MG/ML
0.1 INJECTION, SOLUTION EPIDURAL; INFILTRATION; INTRACAUDAL; PERINEURAL AS NEEDED
Status: DISCONTINUED | OUTPATIENT
Start: 2020-06-11 | End: 2020-06-11 | Stop reason: HOSPADM

## 2020-06-11 RX ORDER — HYDRALAZINE HYDROCHLORIDE 20 MG/ML
INJECTION INTRAMUSCULAR; INTRAVENOUS AS NEEDED
Status: DISCONTINUED | OUTPATIENT
Start: 2020-06-11 | End: 2020-06-11 | Stop reason: HOSPADM

## 2020-06-11 RX ORDER — SODIUM CHLORIDE 0.9 % (FLUSH) 0.9 %
5-40 SYRINGE (ML) INJECTION EVERY 8 HOURS
Status: DISCONTINUED | OUTPATIENT
Start: 2020-06-11 | End: 2020-06-11 | Stop reason: SDUPTHER

## 2020-06-11 RX ORDER — MIDAZOLAM HYDROCHLORIDE 1 MG/ML
.25-5 INJECTION, SOLUTION INTRAMUSCULAR; INTRAVENOUS
Status: DISCONTINUED | OUTPATIENT
Start: 2020-06-11 | End: 2020-06-11

## 2020-06-11 RX ORDER — SODIUM CHLORIDE 0.9 % (FLUSH) 0.9 %
5-40 SYRINGE (ML) INJECTION AS NEEDED
Status: DISCONTINUED | OUTPATIENT
Start: 2020-06-11 | End: 2020-06-11 | Stop reason: SDUPTHER

## 2020-06-11 RX ORDER — PROPOFOL 10 MG/ML
INJECTION, EMULSION INTRAVENOUS
Status: DISCONTINUED | OUTPATIENT
Start: 2020-06-11 | End: 2020-06-11 | Stop reason: HOSPADM

## 2020-06-11 RX ORDER — SODIUM CHLORIDE 9 MG/ML
75 INJECTION, SOLUTION INTRAVENOUS CONTINUOUS
Status: DISCONTINUED | OUTPATIENT
Start: 2020-06-11 | End: 2020-06-11 | Stop reason: SDUPTHER

## 2020-06-11 RX ORDER — DEXTROMETHORPHAN/PSEUDOEPHED 2.5-7.5/.8
1.2 DROPS ORAL
Status: DISCONTINUED | OUTPATIENT
Start: 2020-06-11 | End: 2020-06-12

## 2020-06-11 RX ORDER — EPINEPHRINE 0.1 MG/ML
1 INJECTION INTRACARDIAC; INTRAVENOUS
Status: DISCONTINUED | OUTPATIENT
Start: 2020-06-11 | End: 2020-06-11 | Stop reason: SDUPTHER

## 2020-06-11 RX ORDER — EPINEPHRINE 0.1 MG/ML
1 INJECTION INTRACARDIAC; INTRAVENOUS
Status: DISCONTINUED | OUTPATIENT
Start: 2020-06-11 | End: 2020-06-12

## 2020-06-11 RX ORDER — ESMOLOL HYDROCHLORIDE 10 MG/ML
INJECTION INTRAVENOUS AS NEEDED
Status: DISCONTINUED | OUTPATIENT
Start: 2020-06-11 | End: 2020-06-11 | Stop reason: HOSPADM

## 2020-06-11 RX ORDER — MIDAZOLAM HYDROCHLORIDE 1 MG/ML
.25-5 INJECTION, SOLUTION INTRAMUSCULAR; INTRAVENOUS
Status: DISCONTINUED | OUTPATIENT
Start: 2020-06-11 | End: 2020-06-11 | Stop reason: SDUPTHER

## 2020-06-11 RX ORDER — HYDRALAZINE HYDROCHLORIDE 20 MG/ML
10 INJECTION INTRAMUSCULAR; INTRAVENOUS ONCE
Status: COMPLETED | OUTPATIENT
Start: 2020-06-11 | End: 2020-06-11

## 2020-06-11 RX ORDER — ATROPINE SULFATE 0.1 MG/ML
0.5 INJECTION INTRAVENOUS
Status: DISCONTINUED | OUTPATIENT
Start: 2020-06-11 | End: 2020-06-12

## 2020-06-11 RX ORDER — ATROPINE SULFATE 0.1 MG/ML
0.5 INJECTION INTRAVENOUS
Status: DISCONTINUED | OUTPATIENT
Start: 2020-06-11 | End: 2020-06-11 | Stop reason: SDUPTHER

## 2020-06-11 RX ORDER — NALOXONE HYDROCHLORIDE 0.4 MG/ML
0.4 INJECTION, SOLUTION INTRAMUSCULAR; INTRAVENOUS; SUBCUTANEOUS
Status: DISCONTINUED | OUTPATIENT
Start: 2020-06-11 | End: 2020-06-11 | Stop reason: SDUPTHER

## 2020-06-11 RX ORDER — DIPHENHYDRAMINE HYDROCHLORIDE 50 MG/ML
12.5 INJECTION, SOLUTION INTRAMUSCULAR; INTRAVENOUS AS NEEDED
Status: DISCONTINUED | OUTPATIENT
Start: 2020-06-11 | End: 2020-06-11 | Stop reason: HOSPADM

## 2020-06-11 RX ORDER — FENTANYL CITRATE 50 UG/ML
INJECTION, SOLUTION INTRAMUSCULAR; INTRAVENOUS
Status: COMPLETED
Start: 2020-06-11 | End: 2020-06-11

## 2020-06-11 RX ORDER — NALOXONE HYDROCHLORIDE 0.4 MG/ML
0.4 INJECTION, SOLUTION INTRAMUSCULAR; INTRAVENOUS; SUBCUTANEOUS
Status: ACTIVE | OUTPATIENT
Start: 2020-06-11 | End: 2020-06-11

## 2020-06-11 RX ORDER — FENTANYL CITRATE 50 UG/ML
25 INJECTION, SOLUTION INTRAMUSCULAR; INTRAVENOUS
Status: DISCONTINUED | OUTPATIENT
Start: 2020-06-11 | End: 2020-06-11 | Stop reason: HOSPADM

## 2020-06-11 RX ORDER — FLUMAZENIL 0.1 MG/ML
0.2 INJECTION INTRAVENOUS
Status: DISCONTINUED | OUTPATIENT
Start: 2020-06-11 | End: 2020-06-11 | Stop reason: SDUPTHER

## 2020-06-11 RX ORDER — DEXTROMETHORPHAN/PSEUDOEPHED 2.5-7.5/.8
1.2 DROPS ORAL
Status: DISCONTINUED | OUTPATIENT
Start: 2020-06-11 | End: 2020-06-11 | Stop reason: SDUPTHER

## 2020-06-11 RX ORDER — SODIUM CHLORIDE 9 MG/ML
75 INJECTION, SOLUTION INTRAVENOUS CONTINUOUS
Status: DISCONTINUED | OUTPATIENT
Start: 2020-06-11 | End: 2020-06-11

## 2020-06-11 RX ORDER — PROPOFOL 10 MG/ML
INJECTION, EMULSION INTRAVENOUS AS NEEDED
Status: DISCONTINUED | OUTPATIENT
Start: 2020-06-11 | End: 2020-06-11 | Stop reason: HOSPADM

## 2020-06-11 RX ORDER — CEFAZOLIN SODIUM 1 G/3ML
INJECTION, POWDER, FOR SOLUTION INTRAMUSCULAR; INTRAVENOUS AS NEEDED
Status: DISCONTINUED | OUTPATIENT
Start: 2020-06-11 | End: 2020-06-11 | Stop reason: HOSPADM

## 2020-06-11 RX ORDER — SODIUM CHLORIDE, SODIUM LACTATE, POTASSIUM CHLORIDE, CALCIUM CHLORIDE 600; 310; 30; 20 MG/100ML; MG/100ML; MG/100ML; MG/100ML
25 INJECTION, SOLUTION INTRAVENOUS CONTINUOUS
Status: DISCONTINUED | OUTPATIENT
Start: 2020-06-11 | End: 2020-06-11 | Stop reason: HOSPADM

## 2020-06-11 RX ORDER — LIDOCAINE HYDROCHLORIDE 20 MG/ML
INJECTION, SOLUTION EPIDURAL; INFILTRATION; INTRACAUDAL; PERINEURAL AS NEEDED
Status: DISCONTINUED | OUTPATIENT
Start: 2020-06-11 | End: 2020-06-11 | Stop reason: HOSPADM

## 2020-06-11 RX ORDER — FLUMAZENIL 0.1 MG/ML
0.2 INJECTION INTRAVENOUS
Status: ACTIVE | OUTPATIENT
Start: 2020-06-11 | End: 2020-06-11

## 2020-06-11 RX ORDER — SODIUM CHLORIDE, SODIUM LACTATE, POTASSIUM CHLORIDE, CALCIUM CHLORIDE 600; 310; 30; 20 MG/100ML; MG/100ML; MG/100ML; MG/100ML
INJECTION, SOLUTION INTRAVENOUS
Status: DISCONTINUED | OUTPATIENT
Start: 2020-06-11 | End: 2020-06-11 | Stop reason: HOSPADM

## 2020-06-11 RX ADMIN — ESMOLOL HYDROCHLORIDE 20 MG: 10 INJECTION, SOLUTION INTRAVENOUS at 11:43

## 2020-06-11 RX ADMIN — Medication 10 ML: at 04:08

## 2020-06-11 RX ADMIN — HYDRALAZINE HYDROCHLORIDE 10 MG: 20 INJECTION INTRAMUSCULAR; INTRAVENOUS at 11:26

## 2020-06-11 RX ADMIN — SODIUM CHLORIDE 75 ML/HR: 900 INJECTION, SOLUTION INTRAVENOUS at 13:33

## 2020-06-11 RX ADMIN — FENTANYL CITRATE 25 MCG: 0.05 INJECTION, SOLUTION INTRAMUSCULAR; INTRAVENOUS at 12:24

## 2020-06-11 RX ADMIN — METHYLPREDNISOLONE SODIUM SUCCINATE 125 MG: 125 INJECTION, POWDER, FOR SOLUTION INTRAMUSCULAR; INTRAVENOUS at 17:06

## 2020-06-11 RX ADMIN — ESMOLOL HYDROCHLORIDE 20 MG: 10 INJECTION, SOLUTION INTRAVENOUS at 11:24

## 2020-06-11 RX ADMIN — CEFAZOLIN 1 G: 1 INJECTION, POWDER, FOR SOLUTION INTRAMUSCULAR; INTRAVENOUS; PARENTERAL at 11:08

## 2020-06-11 RX ADMIN — HYDRALAZINE HYDROCHLORIDE 10 MG: 20 INJECTION INTRAMUSCULAR; INTRAVENOUS at 06:42

## 2020-06-11 RX ADMIN — SODIUM CHLORIDE, POTASSIUM CHLORIDE, SODIUM LACTATE AND CALCIUM CHLORIDE: 600; 310; 30; 20 INJECTION, SOLUTION INTRAVENOUS at 10:50

## 2020-06-11 RX ADMIN — FENTANYL CITRATE 25 MCG: 0.05 INJECTION, SOLUTION INTRAMUSCULAR; INTRAVENOUS at 12:29

## 2020-06-11 RX ADMIN — HYDRALAZINE HYDROCHLORIDE 10 MG: 20 INJECTION INTRAMUSCULAR; INTRAVENOUS at 17:06

## 2020-06-11 RX ADMIN — PROPOFOL 60 MCG/KG/MIN: 10 INJECTION, EMULSION INTRAVENOUS at 11:05

## 2020-06-11 RX ADMIN — FENTANYL CITRATE 25 MCG: 0.05 INJECTION, SOLUTION INTRAMUSCULAR; INTRAVENOUS at 12:35

## 2020-06-11 RX ADMIN — Medication 10 ML: at 17:07

## 2020-06-11 RX ADMIN — Medication 10 ML: at 05:41

## 2020-06-11 RX ADMIN — HYDRALAZINE HYDROCHLORIDE 10 MG: 20 INJECTION INTRAMUSCULAR; INTRAVENOUS at 12:08

## 2020-06-11 RX ADMIN — PROPOFOL 50 MG: 10 INJECTION, EMULSION INTRAVENOUS at 11:05

## 2020-06-11 RX ADMIN — HYDRALAZINE HYDROCHLORIDE 10 MG: 20 INJECTION INTRAMUSCULAR; INTRAVENOUS at 23:26

## 2020-06-11 RX ADMIN — Medication 10 ML: at 22:47

## 2020-06-11 RX ADMIN — METHYLPREDNISOLONE SODIUM SUCCINATE 125 MG: 125 INJECTION, POWDER, FOR SOLUTION INTRAMUSCULAR; INTRAVENOUS at 05:41

## 2020-06-11 RX ADMIN — LIDOCAINE HYDROCHLORIDE 100 MG: 20 INJECTION, SOLUTION INTRAVENOUS at 11:05

## 2020-06-11 NOTE — PROGRESS NOTES
Spoke with Courtney Blount several times this evening regarding pt's concerns with drinking water and eating. Explained patients risk of aspirating and doctors orders and also explained pt will get tube feedings started this evening.

## 2020-06-11 NOTE — ANESTHESIA POSTPROCEDURE EVALUATION
Procedure(s):  PERCUTANEOUS ENDOSCOPIC GASTROSTOMY TUBE INSERTION. total IV anesthesia, MAC    Anesthesia Post Evaluation        Patient location during evaluation: PACU  Note status: Adequate. Level of consciousness: responsive to verbal stimuli and sleepy but conscious  Pain management: satisfactory to patient  Airway patency: patent  Anesthetic complications: no  Cardiovascular status: acceptable  Respiratory status: acceptable  Hydration status: acceptable  Comments: +Post-Anesthesia Evaluation and Assessment    Patient: Daniel Son MRN: [id number]  SSN: [id number]   YOB: 1955  Age: 59 y.o. Sex: female      Cardiovascular Function/Vital Signs    /79   Pulse 83   Temp 36.8 °C (98.3 °F)   Resp 22   Ht 5' 4\" (1.626 m)   Wt 59.4 kg (130 lb 15.3 oz)   SpO2 94%   BMI 22.48 kg/m²     Patient is status post Procedure(s) with comments:  PERCUTANEOUS ENDOSCOPIC GASTROSTOMY TUBE INSERTION - Please call Dr. Andrew Nick for best time  thx. Nausea/Vomiting: Controlled. Postoperative hydration reviewed and adequate. Pain:  Pain Scale 1: Numeric (0 - 10) (06/11/20 1240)  Pain Intensity 1: 3 (06/11/20 1240)   Managed. Neurological Status:   Neuro (WDL): Exceptions to WDL (06/11/20 1135)   At baseline. Mental Status and Level of Consciousness: Arousable. Pulmonary Status:   O2 Device: Nasal cannula (06/11/20 1240)   Adequate oxygenation and airway patent. Complications related to anesthesia: None    Post-anesthesia assessment completed. No concerns. Signed By: Amada Woo MD    6/11/2020  Post anesthesia nausea and vomiting:  controlled      INITIAL Post-op Vital signs:   Vitals Value Taken Time   /81 6/11/2020 12:45 PM   Temp 36.8 °C (98.3 °F) 6/11/2020 12:40 PM   Pulse 80 6/11/2020 12:48 PM   Resp 21 6/11/2020 12:48 PM   SpO2 95 % 6/11/2020 12:48 PM   Vitals shown include unvalidated device data.

## 2020-06-11 NOTE — CONSULTS
Attempted to see patient but in procedure  Appreciate Dr Kay Brown expertise    She has a hx of polymyositis, per pt bx inconclusive. I spoke with her [facility] rheumatologist who  Noted she improved on high dose steroids; however, pt did not think she improved. She has tried mmf, mtx, aza, IVIg and one round of RTX     Her cpk has improved on IV STeroids this admission. Polymyositis patient's mm strength lag behind and need to work with PT at length. Further w/u with neuro underway to help support the dx of active myositis.    She may follow up with [facility] and I agree that one more round of rituxan would be reasonable to try.   -cont IV steorids for now  - will need high dose prednisone at discharge, would recommend 60mg

## 2020-06-11 NOTE — PROGRESS NOTES
Hospitalist Progress Note    NAME: Judah Woods   :  1955   MRN:  [id number]       Assessment / Plan:  Acute polymyositis flare patient is improving clinically on steroids will switch to oral once patient gets a PEG tube. Patient is doing much better CPK levels are trending down. Patient will need SNF. Acute rhabdomyolysis secondary to polymyositis improving clinically CPK today is less than 600 about 500. Dysphagia pharyngeal weakness secondary to polymyositis patient has a plans feeding tube placement today. Appreciate GI input. Severe protein calorie malnutrition patient appears very debilitated getting a feeding tube today. Patient is a DO NOT RESUSCITATE palliative care on board. There was a family meeting discussed patient wants to get a feeding tube and want to get stronger. She continues to be weak and weak.     / Body mass index is 22.48 kg/m². Code status: DNR  Prophylaxis:  Heparin subcu  Recommended Disposition: SNF     Subjective:     Chief Complaint / Reason for Physician Visit    Discussed with RN events overnight. Patient seen and examined at bedside comfortable but she looks very frail. Going for PEG tube placement today EMG postponed to possible tomorrow. Patient will require therapy SNF. Review of Systems:  Symptom Y/N Comments  Symptom Y/N Comments   Fever/Chills n   Chest Pain n    Poor Appetite n   Edema n    Cough n   Abdominal Pain     Sputum n   Joint Pain     SOB/LOREDO n   Pruritis/Rash     Nausea/vomit n   Tolerating PT/OT     Diarrhea n   Tolerating Diet     Constipation nn   Other       Could NOT obtain due to:      Objective:     VITALS:   Last 24hrs VS reviewed since prior progress note.  Most recent are:  Patient Vitals for the past 24 hrs:   Temp Pulse Resp BP SpO2   20 1235 -- 91 23 -- 94 %   20 1230 -- 96 23 (!) 178/92 94 %   20 1229 -- 88 21 -- 95 %   20 1224 -- 100 22 -- 94 %   20 1215 -- 87 21 (!) 179/94 94 % 06/11/20 1208 -- 86 -- 185/85 --   06/11/20 1205 -- (!) 115 19 185/85 96 %   06/11/20 1200 -- (!) 102 23 184/90 92 %   06/11/20 1150 -- (!) 102 24 (!) 183/96 92 %   06/11/20 1148 98.4 °F (36.9 °C) 91 20 (!) 170/93 93 %   06/11/20 1145 -- (!) 111 23 (!) 170/93 92 %   06/11/20 1140 -- (!) 107 27 (!) 176/96 91 %   06/11/20 1135 98.4 °F (36.9 °C) (!) 111 30 (!) 162/127 (!) 89 %   06/11/20 1030 97.9 °F (36.6 °C) (!) 58 15 168/80 98 %   06/11/20 0844 -- -- -- 148/70 --   06/11/20 0840 -- -- -- 173/82 --   06/11/20 0636 97.6 °F (36.4 °C) 88 18 (!) 181/91 100 %   06/11/20 0330 98.6 °F (37 °C) 87 18 157/74 95 %   06/10/20 2316 98.4 °F (36.9 °C) 79 18 175/76 98 %   06/10/20 1824 98.5 °F (36.9 °C) 82 18 169/78 100 %   06/10/20 1515 98.3 °F (36.8 °C) 69 16 173/73 100 %       Intake/Output Summary (Last 24 hours) at 6/11/2020 1238  Last data filed at 6/11/2020 1143  Gross per 24 hour   Intake 300 ml   Output 300 ml   Net 0 ml        PHYSICAL EXAM:  General: WD, WN. Alert, cooperative, no acute distress    EENT:  EOMI. Anicteric sclerae. MMM  Resp:  CTA bilaterally, no wheezing or rales. No accessory muscle use  CV:  Regular  rhythm,  No edema  GI:  Soft, Non distended, Non tender.  +Bowel sounds  Neurologic:  Alert and oriented X 3, normal speech,   Psych:   Good insight. Not anxious nor agitated    Reviewed most current lab test results and cultures  YES  Reviewed most current radiology test results   YES  Review and summation of old records today    NO  Reviewed patient's current orders and MAR    YES  PMH/SH reviewed - no change compared to H&P  ________________________________________________________________________  Care Plan discussed with:    Comments   Patient x    Family      RN x    Care Manager     Consultant                        Multidiciplinary team rounds were held today with , nursing, pharmacist and clinical coordinator.   Patient's plan of care was discussed; medications were reviewed and discharge planning was addressed. ________________________________________________________________________          Comments   >50% of visit spent in counseling and coordination of care     ________________________________________________________________________  Lea Leyva MD     Procedures: see electronic medical records for all procedures/Xrays and details which were not copied into this note but were reviewed prior to creation of Plan. LABS:  I reviewed today's most current labs and imaging studies.   Pertinent labs include:  Recent Labs     06/09/20  0350   WBC 12.5*   HGB 11.5   HCT 34.9*        Recent Labs     06/11/20  0336 06/09/20 0350   NA  --  146*   K  --  3.7   CL  --  118*   CO2  --  21   GLU  --  101*   BUN  --  34*   CREA  --  0.41*   CA  --  8.5   INR 1.3*  --        Signed: Lea Leyva MD

## 2020-06-11 NOTE — PROGRESS NOTES
Occupational Therapy  Medical record reviewed. Transport has arrived to take pt off the floor for procedure. Will defer and continue to follow.

## 2020-06-11 NOTE — PROGRESS NOTES
Physical therapy:    Chart reviewed in prep for PT session. Pt going for PEG tube placement this morning.  Will defer and continue to follow    Rob Robbins, PT, DPT

## 2020-06-11 NOTE — CONSULTS
Neurology Note    Patient ID:  Shauna Daigle  [id number]  59 y.o.  1955    Subjective: I am still  weak       History of Present Illness:   Shauna Daigle is a 59 y.o. 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 Upon admission her CPK was just over 3000. After IV steroids, her CPK is now less than thousand. Today, her cpk is 534. She still does have significant weakness. She has decided on a supplemental feeding tube to help with her rehabilitation and strength building. This is scheduled for today. She denies any new symptoms of numbness, or tingling    Past Medical History:   Diagnosis Date    Congestive heart failure ([address]     Hypertension     Pneumonia 10/2018    Polymyositis ([address] 2015    in setting of [address]     Polymyositis associated with autoimmune disease ([address]     Renal cancer ([address] 2016    s/p right nephrectomy.      Respiratory arrest ([address] 2015    [facility].     SBO (small bowel obstruction) ([address] 8/3/2017        Past Surgical History:   Procedure Laterality Date    HX GYN      hysterectomy    HX NEPHRECTOMY Right 2016    for kidney cancer    US GUIDED CORE BREAST BIOPSY Left     Negative        Family History   Problem Relation Age of Onset   [facility] Cancer Mother     Breast Cancer Mother 68    Diabetes Father     Hypertension Father     Dementia Father 80    Stroke Maternal Grandmother     Breast Cancer Cousin         52's        Social History     Tobacco Use    Smoking status: Former Smoker     Packs/day: 1.00     Years: 20.00     Pack years: 20.00     Types: Cigarettes     Last attempt to quit: 1998     Years since quittin.1    Smokeless tobacco: Never Used   Substance Use Topics    Alcohol use: No     Alcohol/week: 1.0 standard drinks     Types: 1 Glasses of wine per week        Allergies Allergen Reactions    Ace Inhibitors Cough    Dilaudid [Hydromorphone] Other (comments)    Levaquin [Levofloxacin] Other (comments)     Leg swelling    Lisinopril Other (comments)     Cough      Metoprolol Other (comments)     hallucinations    Peanut Other (comments)        Prior to Admission medications    Not on File       Review of Systems:    General, constitutional: negative  Eyes, vision: negative  Ears, nose, throat: negative  Cardiovascular, heart: negative  Respiratory: negative  Gastrointestinal: negative  Genitourinary: negative  Musculoskeletal: Muscle weakness  Skin and integumentary: negative  Psychiatric: negative  Endocrine: negative  Neurological: negative, except for HPI  Hematologic/lymphatic: negative  Allergy/immunology: negative    Objective:     Visit Vitals  /70 (BP 1 Location: Right arm)   Pulse 88   Temp 97.6 °F (36.4 °C)   Resp 18   Ht 5' 4\" (1.626 m)   Wt 130 lb 15.3 oz (59.4 kg)   SpO2 100%   BMI 22.48 kg/m²       Physical Exam:      General:  appears well nourished in no acute distress  Neck: no carotid bruits  Lungs: clear to auscultation  Heart:  no murmurs, regular rate  Lower extremity: peripheral pulses palpable. Trace lower extremity edema  Skin: intact    Neurological exam:    Awake, alert, oriented to person, place and time  Recent and remote memory were normal  Attention and concentration were intact  Language was intact. There was no aphasia  Speech: Mild nasal dysarthria  Fund of knowledge was preserved    Cranial nerves:   II-XII were tested    Perrrla  Visual fields were full  Eomi, no evidence of nystagmus  Facial sensation:  normal and symmetric  Facial motor: Mild facial weakness  Hearing intact  SCM strength intact  Tongue: midline without fasciculations    Motor: Tone normal  Neck flexors and extensors were 4 out of 5  No evidence of fasciculations    Strength testing:   deltoid triceps biceps Wrist ext. Wrist flex. intrinsics Hip flex. Hip ext.  Knee ext.  Knee flex Dorsi flex Plantar flex   Right 3 3 3 4 4 4 3 4 4 4 4 4   Left 3 3 3 4 4 4 3 4 4 4 4 4     Motor testing is unchanged from yesterday. Sensory:  Upper extremity: intact to pp,   Lower extremity: intact to pp    Reflexes:  Diminished throughout    Plantar response:  flexor bilaterally      Cerebellar testing:  no tremor apparent, finger/nose and evan were intact with assistance due to her weakness  Gait not assessed due to significant level of weakness    Labs:     Lab Results   Component Value Date/Time    Hemoglobin A1c 5.2 10/30/2019 12:47 PM    Sodium 146 (H) 06/09/2020 03:50 AM    Potassium 3.7 06/09/2020 03:50 AM    Chloride 118 (H) 06/09/2020 03:50 AM    Glucose 101 (H) 06/09/2020 03:50 AM    BUN 34 (H) 06/09/2020 03:50 AM    Creatinine 0.41 (L) 06/09/2020 03:50 AM    Calcium 8.5 06/09/2020 03:50 AM    WBC 12.5 (H) 06/09/2020 03:50 AM    HCT 34.9 (L) 06/09/2020 03:50 AM    HGB 11.5 06/09/2020 03:50 AM    PLATELET 831 40/01/8069 03:50 AM       Imaging:    Results from Hospital Encounter encounter on 01/03/20   MRI HIP  RT  WO CONT    Narrative EXAM: MRI HIP  RT  WO CONT    INDICATION: Avascular necrosis    COMPARISON: CT 8/3/2017    TECHNIQUE: Coronal T1 and axial T2 fat-saturated MRI of the whole pelvis; axial  and sagittal T2 fat-saturated; coronal proton density fat-saturated MRI of the  right hip . CONTRAST: None. FINDINGS: Bone marrow: There is a small area of sclerosis in the superior right  femoral head with a ring of increased T2 signal. There is mild irregularity of  the subchondral bone plate. Joint fluid: None. Articular cartilage: Severe right hip osteoarthritis. There are prominent  marginal osteophytes. Acetabular labrum: Diffuse degenerative changes     Hip morphology: Severe right hip degenerative changes with remodeling. Tendons: Intact. Muscles: There is patchy mild edema throughout the musculature of the pelvis and  bilateral thighs.     Soft tissue mass: None. Intrapelvic soft tissues: no acute process. There is mild levoconvex scoliosis of lumbar spine with multilevel spondylosis. Impression IMPRESSION:   1. Severe right hip osteoarthritis. Small area of sclerosis in the superior  right femoral head with irregularity of the subchondral bone plate and trace  surrounding degenerative edema. This is most likely related to the severe  degeneration of the right hip joint. 2. Patchy mild edema throughout the visualized musculature         Results from Abstract encounter on 12/03/18   CT CHEST WO CONT             Assessment and Plan:    The patient is a pleasant 27-year-old female with a history of inflammatory muscle disease that has been at least partially refractory to multiple immunosuppressants. Per documentation she does have serology previously that revealed anti-Sabine positivity. Muscle weakness:    She has clearly a primary muscle disease. Differential includes autoimmune, inflammatory, paraneoplastic. Less likely hereditary. CPK has responded to steroid therapy. 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 Would need to keep an atypical inclusion body myositis in the differential given her refractory nature to treatment. The patient is receiving her feeding tube today which postpones the EMG/nerve conduction study. This can be obtained at a later date or as an outpatient. She should have a full screen for underlying malignancy. This can be coordinated through her outpatient primary rheumatologist.    I would recommend also sending the antibody for inclusion body myositis, [id number]    I agree with steroids and consideration of repeat induction with rituximab. Neurology will continue to follow along. I did discuss this at length with the patient today. Some of the testing can be completed in rehabilitation or as an outpatient.      Active Problems:    Polymyositis ([facility] (7/28/2016)      Oropharyngeal dysphagia (6/10/2020)                   Signed By:  Shannan Azul DO FAAN    June 11, 2020

## 2020-06-11 NOTE — PROGRESS NOTES
Back from PACUpost peg tube placement. Dual skin assessment with Tg Meyer. Alert but coughing and yankau suctioned for large amount of clear white sputum. Patient request water. Informed her I would ask physician as she is NPO for now. 0 Dr Atiya Baum on unit and notified of above and stated as long as patient is aware of sever aspirtation risk she may have water and ice chips. Oncoming nurse notified of this.

## 2020-06-11 NOTE — PERIOP NOTES
TRANSFER - OUT REPORT:    Verbal report given to Melissa Thornton RN(name) on [employer]  being transferred to [facility](unit) for routine post - op       Report consisted of patients Situation, Background, Assessment and   Recommendations(SBAR). Information from the following report(s) SBAR, OR Summary, Intake/Output, MAR and Cardiac Rhythm NSR was reviewed with the receiving nurse. Opportunity for questions and clarification was provided.       Patient transported with:   Monitor  O2 @ 2 liters  Registered Nurse

## 2020-06-11 NOTE — PROGRESS NOTES
Dual skin assessment by Ingrid/Jose L  ~Skin tear R elbow  ~Excoriation to Left lower butt cheek and sacrum  ~Healing wound to Right butt cheek   ~Reddened but blanchable bilateral heels

## 2020-06-11 NOTE — PROGRESS NOTES
Nutrition Assessment:    INTERVENTIONS/RECOMMENDATIONS:   TF: Osmolite 1.2 @ 20 mL/hr and advance as tolerated by 10 mL q 8 hours to goal rate of 50 mL/hr + 100 mL water flushes q 6 hours (1440 kcal, 67g protein, 1384 mL water)     ASSESSMENT:   Chart reviewed; patient s/p PEG placement today. TF recommendations provided above. Noted plans for rehab at discharge. Can transition to bolus regimen at rehab if needed; will need 5 cans of Osmolite 1.2/day (240 mL bolus 5x/day or 300 mL bolus 4x/day). Will monitor TF tolerance, labs, and weights. Diet Order: NPO  % Eaten:    Patient Vitals for the past 72 hrs:   % Diet Eaten   06/11/20 1245 0 %   06/10/20 1050 50 %     Pertinent Medications: [x] Reviewed []Other: Humalog, Solu-medrol   Pertinent Labs: [x]Reviewed  []Other:  -37-99  Food Allergies: [x]None []Yes:     Last BM: 6/10   [x]Active     []Hyperactive  []Hypoactive       [] Absent  BS  Skin:    [x] Intact   [] Incision  [] Breakdown   []Edema   []Other:    Anthropometrics: Height: 5' 4\" (162.6 cm) Weight: 59.4 kg (130 lb 15.3 oz)    IBW (%IBW):   ( ) UBW (%UBW):   (  %)    BMI: Body mass index is 22.48 kg/m². This BMI is indicative of:  []Underweight   [x]Normal   []Overweight   [] Obesity   [] Extreme Obesity (BMI>40)  Last Weight Metrics:  Weight Loss Metrics 6/8/2020 10/30/2019 9/11/2019 8/14/2019 11/29/2018 11/19/2018 10/16/2018   Today's Wt 130 lb 15.3 oz 147 lb 154 lb 158 lb 167 lb 6.4 oz 161 lb 6 oz 163 lb   BMI 22.48 kg/m2 25.23 kg/m2 26.43 kg/m2 27.12 kg/m2 27.86 kg/m2 26.85 kg/m2 28.87 kg/m2       Estimated Nutrition Needs (Based on): 6011 Kcals/day(-1476 BMR (1135) x 1.2-13 g/kg bw) , 60 g(-72g (1.0-1.2 g/kg bw)) Protein  Carbohydrate:  At Least 130 g/day  Fluids: 1350 mL/day     Pt expected to meet estimated nutrient needs: [x]Yes []No    NUTRITION DIAGNOSES:   Problem:  Swallowing difficulty      Etiology: related to progressive polymyositis      Signs/Symptoms: as evidenced by s/p PEG placement       NUTRITION INTERVENTIONS:   Enteral/Parenteral Nutrition: Initiate enteral nutrition               GOAL:   TF advanced to goal rate next 2-4 days    NUTRITION MONITORING AND EVALUATION   Food/Nutrient Intake Outcomes: Enteral/parenteral nutrition intake  Physical Signs/Symptoms Outcomes: Weight/weight change, Electrolyte and renal profile, GI profile, Glucose profile    Previous Goal Met:   [] Met              [x] Progressing Towards Goal              [] Not Progressing Towards Goal   Previous Recommendations:   [x] Implemented          [] Not Implemented          [] Not Applicable    LEARNING NEEDS (Diet, Food/Nutrient-Drug Interaction):    [x] None Identified   [] Identified and Education Provided/Documented   [] Identified and Pt declined/was not appropriate     Cultural, Evangelical, OR Ethnic Dietary Needs:    [x] None Identified   [] Identified and Addressed     [x] Interdisciplinary Care Plan Reviewed/Documented    [x] Discharge Planning: See TF recommendations above    [] Participated in Interdisciplinary Rounds    NUTRITION RISK:    [x] Patient At Nutritional Risk             [] Patient Not At Nutritional Risk      Krystal Maciel [id number]  Pager [phone]    Weekend Pager [phone]

## 2020-06-11 NOTE — PERIOP NOTES
Handoff Report from Operating Room to PACU    Report received from R. Jerrlyn Duverney RN and TOYA George CRNA regarding Shun Crum. Surgeon(s):  Johanna Hughes MD  And Procedure(s) (LRB):  PERCUTANEOUS ENDOSCOPIC GASTROSTOMY TUBE INSERTION (N/A)  confirmed   with allergies, dressings and local anesthetic discussed. Anesthesia type, drugs, patient history, complications, estimated blood loss, vital signs, intake and output, and last pain medication, lines and temperature were reviewed.

## 2020-06-11 NOTE — PROCEDURES
Indication/s: Feeding difficulties ; dysphagia     Medications Used: MAC but in the OR ( see anesthesia records), IV Ancef 1 gm pre procedure:    Assistant: Emi Arias PA-C      Procedure in detail:    The olympus video endoscope was inserted under direct vision into the mouth. The esohagus had a upper web and a small hiatal hernia. . There was mild erythmea abnormalities of the body and,antrum. normal cardia and iscisura of the stomach. The first and second portion of the duodenum appeared mildly erythematous. A site was selected on the anterior abdominal wall where the light shined and where one finger easily indented the anterior abdominal wall. Lidocaine analgesia was utilized (1%). The exploring needle easily indented the stomach and penetrated it. The needle-trocar device was then inserted into the skin after an incision. Once the needle trocar device entered the stomach the trocar was grasped by the snare. The needle was removed and a wire was placed thorough the trocar. The wire was grasped by the snare and removed at the mouth. A 20 Fr [employer] PEG tube was positioned over the wire and pushed out of the anterior abdominal wall. The tube was grabbed. The incision site was enlarged as necessary and the tube was pulled snug. A brief repeat endoscopy was not done (to verify  proper placement of the tube) as she had copious oral secretions, had mildly low O2 saturation and anesthesia wanted the case expedited. Impression: Successful PEG placement. Plan:    1. See post PEG orders please,  2. Keep PEG site dry and clean. 3. Call us with fever, abdominal pain and if leakage around PEG is noted. 4. Keep head of the bed elevated to decrease the risk of aspiration. 5. Nutrition consult for PEG feedings. Wyatt Kirk MD

## 2020-06-11 NOTE — ANESTHESIA PREPROCEDURE EVALUATION
Relevant Problems   No relevant active problems       Anesthetic History   No history of anesthetic complications            Review of Systems / Medical History  Patient summary reviewed, nursing notes reviewed and pertinent labs reviewed    Pulmonary        Sleep apnea  Pneumonia, shortness of breath and smoker      Comments: Hx of respiratory arrest  1 ppd for 20 years   Neuro/Psych   Within defined limits           Cardiovascular    Hypertension: poorly controlled      CHF: dyspnea on exertion        Exercise tolerance: <4 METS  Comments: ECHO this week showed a 60-65% EF with mild MR and TR   GI/Hepatic/Renal         Renal disease      Comments: Oropharyngeal dysphagia    RCC S/P nephrectomy  Hx of SBO Endo/Other        Cancer     Other Findings   Comments: Polymyositis, on steroids         Physical Exam    Airway  Mallampati: IV  TM Distance: < 4 cm  Neck ROM: decreased range of motion   Mouth opening: Diminished (comment)     Cardiovascular    Rhythm: regular  Rate: normal         Dental    Dentition: Caps/crowns, Upper dentition intact and Lower dentition intact     Pulmonary    Rhonchi:LLF             Abdominal  GI exam deferred       Other Findings            Anesthetic Plan    ASA: 4  Anesthesia type: total IV anesthesia and MAC          Induction: Intravenous  Anesthetic plan and risks discussed with: Patient      To be done in Main OR due to potential difficult airway management

## 2020-06-11 NOTE — PROGRESS NOTES
Problem: Falls - Risk of  Goal: *Absence of Falls  Description: Document Leafy Quintana Fall Risk and appropriate interventions in the flowsheet. Outcome: Progressing Towards Goal  Note: Fall Risk Interventions:  Mobility Interventions: Bed/chair exit alarm         Medication Interventions: Bed/chair exit alarm    Elimination Interventions: Bed/chair exit alarm, Call light in reach              Problem: Patient Education: Go to Patient Education Activity  Goal: Patient/Family Education  Outcome: Progressing Towards Goal     Problem: Pressure Injury - Risk of  Goal: *Prevention of pressure injury  Description: Document Armen Scale and appropriate interventions in the flowsheet.   Outcome: Progressing Towards Goal  Note: Pressure Injury Interventions:  Sensory Interventions: Assess changes in LOC    Moisture Interventions: Absorbent underpads, Apply protective barrier, creams and emollients    Activity Interventions: Assess need for specialty bed    Mobility Interventions: Assess need for specialty bed    Nutrition Interventions: Document food/fluid/supplement intake, Offer support with meals,snacks and hydration    Friction and Shear Interventions: Apply protective barrier, creams and emollients, Lift sheet, Lift team/patient mobility team, Minimize layers, Transferring/repositioning devices                Problem: Patient Education: Go to Patient Education Activity  Goal: Patient/Family Education  Outcome: Progressing Towards Goal     Problem: Patient Education: Go to Patient Education Activity  Goal: Patient/Family Education  Outcome: Progressing Towards Goal     Problem: Patient Education: Go to Patient Education Activity  Goal: Patient/Family Education  Outcome: Progressing Towards Goal     Problem: Patient Education: Go to Patient Education Activity  Goal: Patient/Family Education  Outcome: Progressing Towards Goal

## 2020-06-11 NOTE — PROGRESS NOTES
Bedside shift change report given to Brynn Garner RN (oncoming nurse) by Yahir Travis RN (offgoing nurse). Report included the following information SBAR, Kardex, ED Summary and OR Summary.     Zone Phone:   [phone]        Significant changes during shift: On clear liquid diet, Bath x 2 for surgery. All linens changed. Peg tube placement today and EMG test today.        Patient Information     Armen Corea  8 y.o.  6/5/2020  4:13 PM by Ed Console, MD. Oksana Garner was admitted from Home     Problem List             Patient Active Problem List     Diagnosis Date Noted    S/p nephrectomy 05/27/2020    History of renal cell cancer 05/27/2020    Vitamin D deficiency 10/10/2018    Age-related osteoporosis without current pathological fracture 08/23/2018    Elevated glucose 01/18/2017    Obstructive sleep apnea syndrome 01/17/2017    Polymyositis ([address] 07/28/2016    HTN (hypertension) 07/28/2016           Past Medical History:   Diagnosis Date    Congestive heart failure ([address]      Hypertension      Pneumonia 10/2018    Polymyositis ([address] 12/2015     in setting of RCC 2015    Polymyositis associated with autoimmune disease ([address]      Renal cancer ([address] 07/08/2016     s/p right nephrectomy.  Respiratory arrest ([address] 11/2015     [facility].     SBO (small bowel obstruction) ([address] 8/3/2017            Core Measures:     PNA:No     Activity Status:     OOB to Chair No  Ambulated this shift No   Bed Rest Yes     DVT prophylaxis:     DVT prophylaxis Med- Yes  DVT prophylaxis SCD or ELMER- No      Wounds: (If Applicable)     Wounds- Yes     Location open areas to sacrum and redness under right breast      Patient Safety:     Falls Score Total Score: 2  Safety Level_______  Bed Alarm On? Yes  Sitter?  No     Plan for upcoming shift:   Safety and monitoring                 Discharge Plan: Yes Long term vs home health      Active Consults:  IP CONSULT TO HOSPITALIST  IP CONSULT TO RHEUMATOLOGY  IP CONSULT TO PALLIATIVE CARE - PROVIDER

## 2020-06-11 NOTE — H&P
Pre-endoscopy H and P    The patient was seen and examined in the room/pre-op holding area. The airway was assessed and documented. The problem list, past medical history, and medications were reviewed.      Patient Active Problem List   Diagnosis Code    Polymyositis (HCC) M33.20    HTN (hypertension) I10    Obstructive sleep apnea syndrome G47.33    Elevated glucose R73.09    Age-related osteoporosis without current pathological fracture M81.0    Vitamin D deficiency E55.9    S/p nephrectomy Z90.5    History of renal cell cancer Z85.528    Oropharyngeal dysphagia R13.12     Social History     Socioeconomic History    Marital status: SINGLE     Spouse name: Not on file    Number of children: Not on file    Years of education: Not on file    Highest education level: Not on file   Occupational History    Not on file   Social Needs    Financial resource strain: Not on file    Food insecurity     Worry: Not on file     Inability: Not on file    Transportation needs     Medical: Not on file     Non-medical: Not on file   Tobacco Use    Smoking status: Former Smoker     Packs/day: 1.00     Years: 20.00     Pack years: 20.00     Types: Cigarettes     Last attempt to quit: 1998     Years since quittin.1    Smokeless tobacco: Never Used   Substance and Sexual Activity    Alcohol use: No     Alcohol/week: 1.0 standard drinks     Types: 1 Glasses of wine per week    Drug use: No    Sexual activity: Not on file   Lifestyle    Physical activity     Days per week: Not on file     Minutes per session: Not on file    Stress: Not on file   Relationships    Social connections     Talks on phone: Not on file     Gets together: Not on file     Attends Cheondoism service: Not on file     Active member of club or organization: Not on file     Attends meetings of clubs or organizations: Not on file     Relationship status: Not on file    Intimate partner violence     Fear of current or ex partner: Not on file     Emotionally abused: Not on file     Physically abused: Not on file     Forced sexual activity: Not on file   Other Topics Concern    Not on file   Social History Narrative    Not on file     Past Medical History:   Diagnosis Date    [facility] ([facility]     Hypertension     Pneumonia 10/2018    Polymyositis ([address] 12/2015    in setting of [address] 2015    Polymyositis associated with autoimmune disease ([facility]     Renal cancer ([facility] 07/08/2016    s/p right nephrectomy.  Respiratory arrest ([address] 11/2015    [facility].     SBO (small bowel obstruction) ([facility] 8/3/2017         None           The review of systems is:  Negative  for shortness of breath or chest pain      The heart, lungs, and mental status were satisfactory for the administration of deep sedation and for the procedure. I discussed with the patient the objectives, risks, consequences and alternatives to the procedure.       Rajni Siegel MD  6/11/2020  10:55 AM

## 2020-06-11 NOTE — PROGRESS NOTES
Advance Care Planning Note      NAME: Josy Cantrell   :  1955   MRN:  [id number]     Date/Time:  2020 4:54 PM    Active Diagnoses:  Hospital Problems  Date Reviewed: 2020          Codes Class Noted POA    Oropharyngeal dysphagia ICD-10-CM: R13.12  ICD-9-CM: 787.22  6/10/2020 Unknown        Pol[facility] ([facility] ICD-10-CM: M33.20  ICD-9-CM: 710.4  2016 Unknown              These active diagnoses are of sufficient risk that focused discussion on advance care planning is indicated in order to allow the patient to thoughtfully consider personal goals of care, and if situations arise that prevent the ability to personally give input, to ensure appropriate representation of their personal desires for different levels and aggressiveness of care. Discussion:   Code status addressed and wants to be a DO NOT RESUSCITATE. Patient with advanced polymyositis severe protein calorie malnutrition generalized malaise and weakness. Patient appeared to be deteriorating and very weak appear to be bedbound. We did talk in detail about disease progression and options. Family included. Patient at this point wants to be DO NOT RESUSCITATE. But he does not want to be hospice or comfort care. Patient wants the feeding tube GI was consulted palliative care as well. Persons present and participating in discussion: Juan A Cole MD,       Time Spent:   Total time spent face-to-face in education and discussion:  16 minutes.          Santa Velázquez MD   Hospitalist

## 2020-06-11 NOTE — PROGRESS NOTES
Bedside shift change report given to Gladys Van RN (oncoming nurse) by Paco Malave RN (offgoing nurse). Report included the following information SBAR, Kardex, ED Summary and OR Summary.     Zone Phone:   [phone]        Significant changes during shift: . Peg tube placement done today . Lots of coughing and yankaur suctioned for clear sputum see note. IV infiltrated in OR PICC team to here to start new access        Patient Information     Salma Agarwal  27 y.o.  6/5/2020  4:13 PM by Hailey Hernandez MD. Oksana Garner was admitted from Home     Problem List             Patient Active Problem List     Diagnosis Date Noted    S/p nephrectomy 05/27/2020    History of renal cell cancer 05/27/2020    Vitamin D deficiency 10/10/2018    Age-related osteoporosis without current pathological fracture 08/23/2018    Elevated glucose 01/18/2017    Obstructive sleep apnea syndrome 01/17/2017    Polymyositis ([address] 07/28/2016    HTN (hypertension) 07/28/2016           Past Medical History:   Diagnosis Date    Congestive heart failure ([address]      Hypertension      Pneumonia 10/2018    Polymyositis ([address] 12/2015     in setting of RCC 2015    Polymyositis associated with autoimmune disease ([address]      Renal cancer ([address] 07/08/2016     s/p right nephrectomy.  Respiratory arrest ([address] 11/2015     [facility].     SBO (small bowel obstruction) ([address] 8/3/2017            Core Measures:     PNA:No     Activity Status:     OOB to Chair No  Ambulated this shift No   Bed Rest Yes     DVT prophylaxis:     DVT prophylaxis Med- Yes  DVT prophylaxis SCD or ELMER- No      Wounds: (If Applicable)     Wounds- Yes     Location open areas to sacrum       Patient Safety:     Falls Score Total Score: 2  Safety Level_______  Bed Alarm On? Yes  Sitter?  No     Plan for upcoming shift:   Safety and monitoring                 Discharge Plan: Yes Long term vs home health      Active Consults:  IP CONSULT TO HOSPITALIST  IP CONSULT TO RHEUMATOLOGY  IP CONSULT TO PALLIATIVE CARE - PROVIDER

## 2020-06-12 LAB
CK SERPL-CCNC: 370 U/L (ref 26–192)
GLUCOSE BLD STRIP.AUTO-MCNC: 133 MG/DL (ref 65–100)
GLUCOSE BLD STRIP.AUTO-MCNC: 144 MG/DL (ref 65–100)
GLUCOSE BLD STRIP.AUTO-MCNC: 150 MG/DL (ref 65–100)
GLUCOSE BLD STRIP.AUTO-MCNC: 161 MG/DL (ref 65–100)
SERVICE CMNT-IMP: ABNORMAL

## 2020-06-12 PROCEDURE — 97112 NEUROMUSCULAR REEDUCATION: CPT

## 2020-06-12 PROCEDURE — 97530 THERAPEUTIC ACTIVITIES: CPT

## 2020-06-12 PROCEDURE — 97530 THERAPEUTIC ACTIVITIES: CPT | Performed by: OCCUPATIONAL THERAPIST

## 2020-06-12 PROCEDURE — 74011250637 HC RX REV CODE- 250/637: Performed by: HOSPITALIST

## 2020-06-12 PROCEDURE — 82962 GLUCOSE BLOOD TEST: CPT

## 2020-06-12 PROCEDURE — 65660000000 HC RM CCU STEPDOWN

## 2020-06-12 PROCEDURE — 74011250636 HC RX REV CODE- 250/636: Performed by: HOSPITALIST

## 2020-06-12 PROCEDURE — 74011250636 HC RX REV CODE- 250/636: Performed by: INTERNAL MEDICINE

## 2020-06-12 PROCEDURE — 74011636637 HC RX REV CODE- 636/637: Performed by: INTERNAL MEDICINE

## 2020-06-12 PROCEDURE — 82550 ASSAY OF CK (CPK): CPT

## 2020-06-12 PROCEDURE — 94760 N-INVAS EAR/PLS OXIMETRY 1: CPT

## 2020-06-12 PROCEDURE — 36415 COLL VENOUS BLD VENIPUNCTURE: CPT

## 2020-06-12 RX ORDER — PREDNISONE 20 MG/1
60 TABLET ORAL
Status: DISCONTINUED | OUTPATIENT
Start: 2020-06-13 | End: 2020-06-16 | Stop reason: HOSPADM

## 2020-06-12 RX ORDER — IRBESARTAN 150 MG/1
300 TABLET ORAL
Status: DISCONTINUED | OUTPATIENT
Start: 2020-06-12 | End: 2020-06-16 | Stop reason: HOSPADM

## 2020-06-12 RX ORDER — ENOXAPARIN SODIUM 100 MG/ML
40 INJECTION SUBCUTANEOUS EVERY 24 HOURS
Status: DISCONTINUED | OUTPATIENT
Start: 2020-06-12 | End: 2020-06-16 | Stop reason: HOSPADM

## 2020-06-12 RX ADMIN — Medication 10 ML: at 22:18

## 2020-06-12 RX ADMIN — INSULIN LISPRO 2 UNITS: 100 INJECTION, SOLUTION INTRAVENOUS; SUBCUTANEOUS at 08:45

## 2020-06-12 RX ADMIN — Medication 5 ML: at 14:00

## 2020-06-12 RX ADMIN — Medication 10 ML: at 01:05

## 2020-06-12 RX ADMIN — HYDRALAZINE HYDROCHLORIDE 10 MG: 20 INJECTION INTRAMUSCULAR; INTRAVENOUS at 03:22

## 2020-06-12 RX ADMIN — IRBESARTAN 300 MG: 150 TABLET, FILM COATED ORAL at 22:18

## 2020-06-12 RX ADMIN — ENOXAPARIN SODIUM 40 MG: 40 INJECTION SUBCUTANEOUS at 14:38

## 2020-06-12 RX ADMIN — INSULIN LISPRO 2 UNITS: 100 INJECTION, SOLUTION INTRAVENOUS; SUBCUTANEOUS at 17:29

## 2020-06-12 RX ADMIN — INSULIN LISPRO 2 UNITS: 100 INJECTION, SOLUTION INTRAVENOUS; SUBCUTANEOUS at 12:29

## 2020-06-12 RX ADMIN — Medication 10 ML: at 03:22

## 2020-06-12 RX ADMIN — METHYLPREDNISOLONE SODIUM SUCCINATE 125 MG: 125 INJECTION, POWDER, FOR SOLUTION INTRAMUSCULAR; INTRAVENOUS at 09:38

## 2020-06-12 RX ADMIN — METHYLPREDNISOLONE SODIUM SUCCINATE 125 MG: 125 INJECTION, POWDER, FOR SOLUTION INTRAMUSCULAR; INTRAVENOUS at 01:05

## 2020-06-12 NOTE — PROGRESS NOTES
Feeding difficulties [R63.3] Esophagitis, Hiatal Hernia, Gastritis, Duodenitis  Feeding difficulties [R63.3] Esophagitis, Hiatal Hernia, Gastritis, Duodenitis  Bedside shift change report given to Ferny (oncoming nurse) by Jamal Schulz (offgoing nurse). Report included the following information SBAR, Kardex, ED Summary and OR Summary. Zone Phone:   [phone]      Significant changes during shift:  Pt was up to 40 for PEG feedings         Patient Information    Janey Hines  59 y.o.  6/5/2020  4:13 PM by Davin Arredondo MD. Janey Hines was admitted from Home    Problem List    Patient Active Problem List    Diagnosis Date Noted    Oropharyngeal dysphagia 06/10/2020    S/p nephrectomy 05/27/2020    History of renal cell cancer 05/27/2020    Vitamin D deficiency 10/10/2018    Age-related osteoporosis without current pathological fracture 08/23/2018    Elevated glucose 01/18/2017    Obstructive sleep apnea syndrome 01/17/2017    Polymyositis ([address] 07/28/2016    HTN (hypertension) 07/28/2016     Past Medical History:   Diagnosis Date    Congestive heart failure ([address]     Hypertension     Pneumonia 10/2018    Polymyositis ([address] 12/2015    in setting of [address] 2015    Polymyositis associated with autoimmune disease ([address]     Renal cancer ([address] 07/08/2016    s/p right nephrectomy.  Respiratory arrest ([address] 11/2015    [facility].     SBO (small bowel obstruction) ([address] 8/3/2017         Core Measures:    PNA:Yes Yes    Activity Status:    OOB to Chair No  Ambulated this shift No   Bed Rest Yes    DVT prophylaxis:    DVT prophylaxis Med- Yes  DVT prophylaxis SCD or ELMER- No     Wounds: (If Applicable)    Wounds- Yes    Location Open areas to buttocks     Patient Safety:    Falls Score Total Score: 2  Safety Level_______  Bed Alarm On? Yes  Sitter?  No    Plan for upcoming shift: Safety and monitoring        Discharge Plan: Yes SNF or back to home with round the clock care     Active Consults:  IP CONSULT TO HOSPITALIST  IP CONSULT TO RHEUMATOLOGY  IP CONSULT TO PALLIATIVE CARE - PROVIDER  IP CONSULT TO NEUROLOGY  IP CONSULT TO GASTROENTEROLOGY

## 2020-06-12 NOTE — PROGRESS NOTES
Problem: Self Care Deficits Care Plan (Adult)  Goal: *Acute Goals and Plan of Care (Insert Text)  Description:   FUNCTIONAL STATUS PRIOR TO ADMISSION: Pt reports living alone in single story home with ramped entrance, reporting she has Max A from daily caregiver (x4 hours daily), recently (past month), being bathed seated at [facility].  1 month ago pt reports she was standing with RW at walk-in shower with Max A for bathing. Pt reports sleeping in recliner. DME needs met. HOME SUPPORT: The patient lived with alone with paid caregiver assist.    Occupational Therapy Goals  Initiated 6/8/2020  1. Patient will perform self-feeding with minimal assistance within 7 day(s). 2.  Patient will perform grooming with moderate assistance  within 7 day(s). 3.  Patient will perform upper body dressing with Max assistance  within 7 day(s). 4.  Patient will perform toilet transfers, EOB to [facility], with maximal assistance within 7 day(s). 5.  Patient will perform all aspects of toileting with maximal assistance within 7 day(s). 6.  Patient will utilize energy conservation techniques during functional activities with Min verbal cues within 7 day(s). Outcome: Progressing Towards Goal   OCCUPATIONAL THERAPY TREATMENT  Patient: Randell Ingram (42 y.o. female)  Date: 6/12/2020  Diagnosis: [facility] [M33.20]   <principal problem not specified>  Procedure(s) (LRB):  PERCUTANEOUS ENDOSCOPIC GASTROSTOMY TUBE INSERTION (N/A) 1 Day Post-Op  Precautions: Fall  Chart, occupational therapy assessment, plan of care, and goals were reviewed. ASSESSMENT  Patient continues with skilled OT services and is progressing towards goals. Pt's neck posture upon arrival is very stiff and shortened on her left side. Gentle stretching provided in supported and unsuported sitting, and pt 's awareness increased.    (At the end of tx session, pt was propped with rolled blankets to help maintain midline head/neck position and encouraged to laterally flex and rotate head/neck to right side. )  Pt requested to sit EOB requiring total assistance. Today, pt worked on postural control/ trunk balance and upper head/neck/chest position, tolerating at least 20 minutes seated EOB with time in unsupported sitting. For pt to have some success with using her UEs functionally, positioning with pillows and manual cues are needed in both supported and unsupported positions. Improving functional tolerance overall. Will benefit from rehab at discharge. Current Level of Function Impacting Discharge (ADLs): continues to need total assistance for mobility and adls. Other factors to consider for discharge: has aide at home for limited time         PLAN :  Patient continues to benefit from skilled intervention to address the above impairments. Continue treatment per established plan of care. to address goals. Recommend with staff: provide supportive neck and BUE positioning    Recommend next OT session: head/neck control and UE function for adls    Recommendation for discharge: (in order for the patient to meet his/her long term goals)  rehab    This discharge recommendation:  Has not yet been discussed the attending provider and/or case management    IF patient discharges home will need the following DME: tbd  pt would need 24 hour assistance       SUBJECTIVE:   Patient reported that she wanted to sit EOB    OBJECTIVE DATA SUMMARY:   Cognitive/Behavioral Status:  Neurologic State: Alert  Orientation Level: Oriented X4                Functional Mobility and Transfers for ADLs:  Bed Mobility:  Rolling: Total assistance;Assist x2  Supine to Sit: Total assistance;Assist x2  Sit to Supine: Total assistance;Assist x2  Scooting: Total assistance;Assist x2    Transfers:             Balance:  Sitting: Impaired; Without support  Sitting - Static: Poor (constant support); Occassional;Fair (occasional)  Sitting - Dynamic: (not tested due to poor sitting balance )    ADL Intervention:   Worked on supporting BUEs and simulating hand to mouth, using foam swab for oral care/ head/neck/shoulder neurofacilitation                                       Therapeutic Exercises:   B shoulders, head/neck, trunk, BUEs  Pt trying hard, initially complaining of being so weak. After, explanation of therapist's role, pt able to work on mobilizing. Pain:  No complaints  Activity Tolerance:   Fair and Poor but improved from previous session    After treatment patient left in no apparent distress:   Supine in bed HOB raised, G tube intact,  Call bell within reach, Bed / chair alarm activated, Side rails x 3, and nursing informed     COMMUNICATION/COLLABORATION:   The patients plan of care was discussed with: Physical therapist, Registered nurse, and Rehabilitation technician.      Hugh Clement OTR/L  Time Calculation: 40 mins

## 2020-06-12 NOTE — PROGRESS NOTES
Spiritual Care Assessment/Progress Note  [facility]      NAME: Elba Caputo      MRN: [id number]  AGE: 59 y.o.  SEX: female  Buddhist Affiliation: Gnosticist   Language: English     6/12/2020     Total Time (in minutes): 12     Spiritual Assessment begun in [facility]OSCI[facility] TELE[facility] through conversation with:         [x]Patient        [] Family    [] Friend(s)        Reason for Consult: Palliative Care, Initial/Spiritual Assessment     Spiritual beliefs: (Please include comment if needed)     [x] Identifies with a madi tradition:         [] Supported by a madi community:            [] Claims no spiritual orientation:           [] Seeking spiritual identity:                [] Adheres to an individual form of spirituality:           [] Not able to assess:                           Identified resources for coping:      [x] Prayer                               [] Music                  [] Guided Imagery     [] Family/friends                 [] Pet visits     [] Devotional reading                         [] Unknown     [] Other                                            Interventions offered during this visit: (See comments for more details)    Patient Interventions: Initial/Spiritual assessment, patient floor, Iconic (affirming the presence of God/Higher Power), Prayer (actual), Prayer (assurance of), Affirmation of emotions/emotional suffering, Normalization of emotional/spiritual concerns           Plan of Care:     [x] Support spiritual and/or cultural needs    [] Support AMD and/or advance care planning process      [] Support grieving process   [] Coordinate Rites and/or Rituals    [] Coordination with community clergy   [] No spiritual needs identified at this time   [] Detailed Plan of Care below (See Comments)  [] Make referral to Music Therapy  [] Make referral to Pet Therapy     [] Make referral to Addiction services  [] Make referral to [facility]  [] Make referral to Spiritual Care Partner  [] No future visits requested        [] Follow up visits as needed     Comments: Visited with Ms. Garner for initial spiritual assessment. Offered listening presence as pt shared concerns regarding her condition as well as her hopes for the future. Pt expressed her desire to drink water and her understanding that she is not able to, due to the risks. Validated and normalized her feelings as she trudi with her condition. Pt requested spoken prayer, which was offered. Assured her of ongoing prayers and  availability. Pt expressed appreciation for visit.     Emilee Salazar, Palliative

## 2020-06-12 NOTE — PROGRESS NOTES
Bedside and Verbal shift change report given to [employer] (oncoming nurse) by DRE Gan RN (offgoing nurse). Report given with SBAR, Kardex, Intake/Output, MAR and Recent Results.

## 2020-06-12 NOTE — PROGRESS NOTES
Hospitalist Progress Note    NAME: Salma Agarwal   :  1955   MRN:  [id number]     Admitted on  with worsening weakness for 1 week. Pt with long history of polymyositis. Assessed as possible pneumonia on admission. Cxray on admission :1. Left basilar atelectasis or minimal infiltrate. 2. Otherwise stable atelectasis and scar in the right lung. Cxray : Unchanged left lower lobe retrocardiac consolidative opacity, which may represent atelectasis and/or infection. Possible small left pleural effusion. Clinically no pneumonia. Cxray findings is likely d/t atelectasis d/t poor inspiratory effort. S/p CTX/zithromax on admission, was not continued       Assessment / Plan:  Acute polymyositis flare causing significant debility   - On high dose IV solumedrol   Ck trending down slowly: 3313 on admission -->370  On SS with steroids  - poor inspiratory effort, start IS   - rheumatology help appreciated:  Primary rheumatologist at [address] [place] [address]   She has tried mmf, mtx, aza, IVIg and one round of RTX  Her cpk has improved on IV STeroids this admission  Polymyositis patient's mm strength lag behind and need to work with PT at length.   cont IV steorids for now   will need high dose prednisone at discharge, would recommend 60mg   - neurology help appreciated:  Pt with h/o inflammatory muscle disease that has been at least partially refractory to multiple immunosuppressants. Per documentation she does have serology previously that revealed anti-Sabine positivity. CPK has responded to steroid therapy. 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 Would need to keep an atypical inclusion body myositis in the differential given her refractory nature to treatment. Will need EMG/nerve conduction study. This can be obtained at a later date or as an outpatient. She should have a full screen for underlying malignancy.   This can be coordinated through her outpatient primary rheumatologist.  Santy Willingham also sending the antibody for inclusion body myositis, [id number]  agree with steroids and consideration of repeat induction with rituximab. Dysphasia d/t #1   Severe protein calorie malnutrition  - failed speech   PEG 6/11 by GI Dr Hetal Howell   - TF goal 50 cc/hr, tolerating well so far    Osmolite 1.2 @ 20 mL/hr and advance as tolerated by 10 mL q 8 hours to goal rate of 50 mL/hr + 100 mL water flushes q 6 hours (1440 kcal, 67g protein, 1384 mL water)     HTN   - BP high side, PO meds were on hold d/t dysphasia  Re started Avapro     Bacteremia likely contamination   BC 6/5 coag neg staph in 1/3 bottles  No ;eukocytosis or fever on admission  Repeated BC  6/6 negative   TTE: EF 60%, no vegetations; mild MR, mild TR, pulm HTN 34, small pericardial eff     Leukocytosis is likely d/t steroids ( wbc was normal on admission). 6/7 17--> 12.5   Pneumonia was ruled out   History of JOHN  History of renal cancer status post right nephrectomy  18.5 - 24.9 Normal weight / Body mass index is 24.12 kg/m². Code status: DNR ( DDNR signed with palliative care team)   MPOA: jv Griffin   Prophylaxis: lovenox     Baseline: lives alone, no children, was ambulating with walker   Recommended Disposition: PT: SNF, ? 2-3 days pending clinical progress and agreement with consultants      Subjective:     Chief Complaint / Reason for Physician Visit: following polymyositis flare/ HTN/ debility   S/p PEG yesterday   Tolerating TF so far, at 300 cc/hr now, goal 50cc/hr      Discussed with RN events overnight. Review of Systems:  Symptom Y/N Comments  Symptom Y/N Comments   Fever/Chills n   Chest Pain n    Poor Appetite    Edema     Cough    Abdominal Pain     Sputum    Joint Pain     SOB/LOREDO n   Pruritis/Rash     Nausea/vomit    Tolerating PT/OT     Diarrhea    Tolerating Diet     Constipation    Other       Could NOT obtain due to:      Objective:     VITALS:   Last 24hrs VS reviewed since prior progress note.  Most recent are:  Patient Vitals for the past 24 hrs:   Temp Pulse Resp BP SpO2   06/12/20 1153 98.1 °F (36.7 °C) 86 16 170/82 97 %   06/12/20 0651 98.5 °F (36.9 °C) (!) 101 16 156/86 93 %   06/12/20 0316 98.2 °F (36.8 °C) 95 16 182/78 95 %   06/11/20 2301 98.2 °F (36.8 °C) 99 16 180/81 94 %   06/11/20 1948 98 °F (36.7 °C) 99 18 (!) 187/98 96 %   06/11/20 1800 -- 87 -- 169/76 --   06/11/20 1644 97.9 °F (36.6 °C) 86 22 (!) 192/102 97 %   06/11/20 1334 97.4 °F (36.3 °C) 95 22 150/70 100 %   06/11/20 1255 -- 78 20 -- 97 %   06/11/20 1245 -- 95 23 181/81 96 %   06/11/20 1240 98.3 °F (36.8 °C) 83 22 172/79 94 %   06/11/20 1235 -- 91 23 -- 94 %   06/11/20 1230 -- 96 23 (!) 178/92 94 %   06/11/20 1229 -- 88 21 -- 95 %   06/11/20 1224 -- 100 22 -- 94 %   06/11/20 1215 -- 87 21 (!) 179/94 94 %   06/11/20 1208 -- 86 -- 185/85 --   06/11/20 1205 -- (!) 115 19 185/85 96 %       Intake/Output Summary (Last 24 hours) at 6/12/2020 1200  Last data filed at 6/12/2020 0316  Gross per 24 hour   Intake 555 ml   Output 150 ml   Net 405 ml        PHYSICAL EXAM:  General: WD, WN. Alert, cooperative, no acute distress, appears very weak/debilitated/chronically ill     EENT:  EOMI. Anicteric sclerae. MMM  Resp:  CTA bilaterally, no wheezing or rales. No accessory muscle use  CV:  Regular  rhythm,  No edema  GI:  Soft, Non distended, Non tender.  +Bowel sounds. + PEG   Neurologic:  Alert and oriented X 3, normal speech, generalized weakness 2/5   Psych:   Good insight. Not anxious nor agitated  Skin:  No rashes.   No jaundice    Reviewed most current lab test results and cultures  YES  Reviewed most current radiology test results   YES  Review and summation of old records today    NO  Reviewed patient's current orders and MAR    YES  PMH/SH reviewed - no change compared to H&P  ________________________________________________________________________  Care Plan discussed with:    Comments   Patient y    Family      RN y    Care Manager y    Consultant                       y Multidiciplinary team rounds were held today with , nursing, pharmacist and clinical coordinator. Patient's plan of care was discussed; medications were reviewed and discharge planning was addressed. ________________________________________________________________________  Total NON critical care TIME:  35  Minutes    Total CRITICAL CARE TIME Spent:   Minutes non procedure based      Comments   >50% of visit spent in counseling and coordination of care y    ________________________________________________________________________  Agueda Valverde MD     Procedures: see electronic medical records for all procedures/Xrays and details which were not copied into this note but were reviewed prior to creation of Plan. LABS:  I reviewed today's most current labs and imaging studies. Pertinent labs include:  No results for input(s): WBC, HGB, HCT, PLT, HGBEXT, HCTEXT, PLTEXT in the last 72 hours.   Recent Labs     06/11/20  0336   INR 1.3*       Signed: Agueda Valverde MD

## 2020-06-12 NOTE — PROGRESS NOTES
Gastroenterology Progress Note  VEL Yu   for Dr. Dylan Mora    6/12/2020    Admit Date: 6/5/2020    Subjective: Follow up for:  1) Feeding difficulty, s/p PEG placement on 6/12/20    2) Polymyositis    3) Generalized weakness    Patient is on NPO. Started on TF, tolerating well at 30 ml/hour. Goal of 50 ml per hour. Pain: Patient complains of abdominal pain yes. Mild pain surrounding PEG site. Bowel Movements: None today    There is no bleeding        Current Facility-Administered Medications   Medication Dose Route Frequency    simethicone (MYLICON) 00PK/5.0JQ oral drops 80 mg  1.2 mL Oral Multiple    EPINEPHrine (ADRENALIN) 0.1 mg/mL syringe 1 mg  1 mg Endoscopically ONCE PRN    atropine injection 0.5 mg  0.5 mg IntraVENous ONCE PRN    methylPREDNISolone (PF) (Solu-MEDROL) injection 125 mg  125 mg IntraVENous Q8H    [Held by provider] irbesartan (AVAPRO) tablet 300 mg  300 mg Oral QHS    hydrALAZINE (APRESOLINE) 20 mg/mL injection 10 mg  10 mg IntraVENous Q4H PRN    acetaminophen (TYLENOL) tablet 650 mg  650 mg Oral Q6H PRN    sodium chloride (NS) flush 5-40 mL  5-40 mL IntraVENous Q8H    sodium chloride (NS) flush 5-40 mL  5-40 mL IntraVENous PRN    ondansetron (ZOFRAN) injection 4 mg  4 mg IntraVENous Q4H PRN    insulin lispro (HUMALOG) injection   SubCUTAneous AC&HS    glucose chewable tablet 16 g  4 Tab Oral PRN    dextrose (D50W) injection syrg 12.5-25 g  12.5-25 g IntraVENous PRN    glucagon (GLUCAGEN) injection 1 mg  1 mg IntraMUSCular PRN        Objective:     Blood pressure 156/86, pulse (!) 101, temperature 98.5 °F (36.9 °C), resp. rate 16, height 5' 4\" (1.626 m), weight 63.7 kg (140 lb 8 oz), SpO2 93 %. No intake/output data recorded.     06/10 1901 - 06/12 0700  In: 855 [I.V.:535]  Out: 150 [Urine:150]    EXAM:  GEN: Pleasant BF, NAD  HEENT: NCAT, head turned to left  Abdomen: soft, NT, ND, PEG in left upper abdomen, C/D/I dressing applied  Ext: no edema    Data Review    Recent Results (from the past 24 hour(s))   GLUCOSE, POC    Collection Time: 06/11/20  4:42 PM   Result Value Ref Range    Glucose (POC) 93 65 - 100 mg/dL    Performed by Ruel Diaz    GLUCOSE, POC    Collection Time: 06/11/20  9:23 PM   Result Value Ref Range    Glucose (POC) 107 (H) 65 - 100 mg/dL    Performed by Diaz Ervin (PCT)    CK    Collection Time: 06/12/20  3:13 AM   Result Value Ref Range     (H) 26 - 192 U/L   GLUCOSE, POC    Collection Time: 06/12/20  7:02 AM   Result Value Ref Range    Glucose (POC) 161 (H) 65 - 100 mg/dL    Performed by Shivam Villeda      No results for input(s): WBC, HGB, HCT, PLT, HGBEXT, HCTEXT, PLTEXT in the last 72 hours. No results for input(s): NA, K, CL, CO2, BUN, CREA, GLU, CA, MG, PHOS, URICA in the last 72 hours. No results for input(s): ALT, AP, TBIL, TBILI, TP, ALB, GLOB, GGT, AML, LPSE in the last 72 hours. No lab exists for component: SGOT, GPT, AMYP, HLPSE  Recent Labs     06/11/20 0336   INR 1.3*   PTP 12.9*      No results for input(s): FE, TIBC, PSAT, FERR in the last 72 hours. No results found for: FOL, RBCF   No results for input(s): PH, PCO2, PO2 in the last 72 hours.   Recent Labs     06/12/20  0313 06/11/20  0336 06/10/20  0539   * 534* 692*     Lab Results   Component Value Date/Time    Cholesterol, total 221 (H) 08/28/2018 08:45 AM    HDL Cholesterol 60 08/28/2018 08:45 AM    LDL, calculated 135 (H) 08/28/2018 08:45 AM    Triglyceride 129 08/28/2018 08:45 AM     Lab Results   Component Value Date/Time    Glucose (POC) 161 (H) 06/12/2020 07:02 AM    Glucose (POC) 107 (H) 06/11/2020 09:23 PM    Glucose (POC) 93 06/11/2020 04:42 PM    Glucose (POC) 94 06/11/2020 07:46 AM    Glucose (POC) 102 (H) 06/10/2020 09:28 PM     Lab Results   Component Value Date/Time    Color YELLOW/STRAW 06/06/2020 06:50 AM    Appearance CLEAR 06/06/2020 06:50 AM    Specific gravity 1.028 06/06/2020 06:50 AM    Specific gravity <1.005 08/03/2017 01:04 PM    pH (UA) 5.5 06/06/2020 06:50 AM    Protein 30 (A) 06/06/2020 06:50 AM    Glucose Negative 06/06/2020 06:50 AM    Ketone 15 (A) 06/06/2020 06:50 AM    Bilirubin Negative 06/06/2020 06:50 AM    Urobilinogen 1.0 06/06/2020 06:50 AM    Nitrites Negative 06/06/2020 06:50 AM    Leukocyte Esterase SMALL (A) 06/06/2020 06:50 AM    Epithelial cells MODERATE (A) 06/06/2020 06:50 AM    Bacteria 1+ (A) 06/06/2020 06:50 AM    WBC 10-20 06/06/2020 06:50 AM    RBC 5-10 06/06/2020 06:50 AM           Assessment:     Active Problems:    Polymyositis ([address] (7/28/2016)      Oropharyngeal dysphagia (6/10/2020)        Plan:   Patient had PEG placed by Dr. Gary Palm and myself on 6/11/20, tolerating TF. Appreciate the dietitian's assistance, advance by 10ml/hour to goal of 50 ml/hour. Continue NPO given risk of aspiration. Provided with office info, contact if any issues with PEG or feedings arise. We will sign off, please call with questions or if further consultation is required. VEL Rodriges    06/12/20  10:49 AM  [address] [facility], [address] [place] [address] [place]: [phone]        She is tolerating TF and has been seen by dietician. Appreciate their input. Please use post PEG precautions in feedings and care of tube site as suggested. TF to goal as per dietary recommendations. We will see her on request and sign off. I have personally reviewed the history. I have reviewed the chart and agree with the documentation recorded by the Mid Level Provider, including the assessment, treatment plan, and disposition. Beverly Palm MD

## 2020-06-12 NOTE — PROGRESS NOTES
Problem: Mobility Impaired (Adult and Pediatric)  Goal: *Acute Goals and Plan of Care (Insert Text)  Description:   FUNCTIONAL STATUS PRIOR TO ADMISSION: Patient has been essentially dependent with mobility for the past 2 weeks. Prior to that she was able to ambulate from recliner to bathroom (very short distance) with RW. Has paid caregivers 4 hrs per day who assist with all ADLs, iALs and would assist her with ambulation. For the past 2 weeks has been unable to stand or walk. Sleeps and spends all day up in recliner chair. HOME SUPPORT PRIOR TO ADMISSION: The patient lived alone with paid caregivers 4 hrs per day to provide assistance. Physical Therapy Goals  Initiated 6/8/2020  1. Patient will move from supine to sit and sit to supine  in bed with maximal assistance within 7 day(s). 2.  Patient will sit EOB for static task with intermittent modA x 5 mins within 7 days. 3.  Patient will bed to chair via liliana lift and RN's educated on need to assist her to chair daily within 7 days. 4.  Patient will complete supine HEP with modA x 1 within 7 days. 5.  Patient will improve Carey Balance score by 7 points within 7 days. Outcome: Progressing Towards Goal   PHYSICAL THERAPY TREATMENT  Patient: Arcelia Johnson (7 y.o. female)  Date: 6/12/2020  Diagnosis: Polymyositis ([facility] [M33.20]   <principal problem not specified>  Procedure(s) (LRB):  PERCUTANEOUS ENDOSCOPIC GASTROSTOMY TUBE INSERTION (N/A) 1 Day Post-Op  Precautions: Fall  Chart, physical therapy assessment, plan of care and goals were reviewed. ASSESSMENT  Patient continues with skilled PT services and is progressing towards goals. Patient requesting to sit EOB this date. She appears motivated although also self limiting. Overall, she required total A x 2 for all mobility. She tolerated sitting EOB x 20 minutes with support. She was able to sit with arms propped without external support x 30 seconds with close supervision.  Initially patient with no trunk righting reaction without support, requiring total A for sitting balance (fluctuating). Noted increased tightness in L trap and perform neck stretching x 2 minutes and able to achieve midline posture. Current Level of Function Impacting Discharge (mobility/balance): total A x 2    Other factors to consider for discharge: [place], polymyositis, total A x 2         PLAN :  Patient continues to benefit from skilled intervention to address the above impairments. Continue treatment per established plan of care. to address goals. Recommendation for discharge: (in order for the patient to meet his/her long term goals)  Therapy up to 5 days/week in SNF setting    This discharge recommendation:  Has been made in collaboration with the attending provider and/or case management    IF patient discharges home will need the following DME: to be determined (TBD)       SUBJECTIVE:   Patient stated I would love to sit on the side of the bed.     OBJECTIVE DATA SUMMARY:   Critical Behavior:  Neurologic State: Alert  Orientation Level: Oriented X4  Cognition: Follows commands  Safety/Judgement: Awareness of environment  Functional Mobility Training:  Bed Mobility:  Rolling: Total assistance;Assist x2  Supine to Sit: Total assistance;Assist x2  Sit to Supine: Total assistance;Assist x2  Scooting: Total assistance;Assist x2        Transfers:                                   Balance:  Sitting: Impaired; Without support  Sitting - Static: Poor (constant support); Occassional;Fair (occasional)  Sitting - Dynamic: (not tested due to poor sitting balance )  Ambulation/Gait Training:                               Therapeutic Exercises:   MMT BLE:   R hip flexion/knee flexion 2/5, L 2/5  DF/PF: 3/5 B  Knee extension: 2/5 B  Hip abd: 2/5 B  Hip add: 2/5 B      Activity Tolerance:   Fair and SpO2 stable on RA  Please refer to the flowsheet for vital signs taken during this treatment.     After treatment patient left in no apparent distress:   Supine in bed, Call bell within reach, and Side rails x 3    COMMUNICATION/COLLABORATION:   The patients plan of care was discussed with: Occupational therapist, Registered nurse, and Case management.      Fran Appiah PT, DPT   Time Calculation: 38 mins

## 2020-06-12 NOTE — PROGRESS NOTES
Problem: Falls - Risk of  Goal: *Absence of Falls  Description: Document Dev Mast Fall Risk and appropriate interventions in the flowsheet. Outcome: Progressing Towards Goal  Note: Fall Risk Interventions:  Mobility Interventions: Communicate number of staff needed for ambulation/transfer         Medication Interventions: Evaluate medications/consider consulting pharmacy    Elimination Interventions: Call light in reach, Patient to call for help with toileting needs              Problem: Pressure Injury - Risk of  Goal: *Prevention of pressure injury  Description: Document Armen Scale and appropriate interventions in the flowsheet.   Outcome: Progressing Towards Goal  Note: Pressure Injury Interventions:  Sensory Interventions: Assess changes in LOC    Moisture Interventions: Absorbent underpads, Apply protective barrier, creams and emollients, Internal/External urinary devices    Activity Interventions: PT/OT evaluation    Mobility Interventions: Pressure redistribution bed/mattress (bed type)    Nutrition Interventions: Document food/fluid/supplement intake    Friction and Shear Interventions: Apply protective barrier, creams and emollients, Minimize layers

## 2020-06-12 NOTE — ROUTINE PROCESS
Bedside shift change report given to [address] [place] [address] Road (oncoming nurse) by Jerod Garner (offgoing nurse). Report included the following information SBAR, Procedure Summary, Intake/Output, MAR and Recent Results.

## 2020-06-13 LAB
ALBUMIN SERPL-MCNC: 2.4 G/DL (ref 3.5–5)
ALBUMIN/GLOB SERPL: 0.6 {RATIO} (ref 1.1–2.2)
ALP SERPL-CCNC: 94 U/L (ref 45–117)
ALT SERPL-CCNC: 66 U/L (ref 12–78)
ANION GAP SERPL CALC-SCNC: 7 MMOL/L (ref 5–15)
AST SERPL-CCNC: 35 U/L (ref 15–37)
BILIRUB SERPL-MCNC: 0.4 MG/DL (ref 0.2–1)
BUN SERPL-MCNC: 43 MG/DL (ref 6–20)
BUN/CREAT SERPL: 68 (ref 12–20)
CALCIUM SERPL-MCNC: 8.5 MG/DL (ref 8.5–10.1)
CHLORIDE SERPL-SCNC: 120 MMOL/L (ref 97–108)
CK SERPL-CCNC: 292 U/L (ref 26–192)
CO2 SERPL-SCNC: 22 MMOL/L (ref 21–32)
CREAT SERPL-MCNC: 0.63 MG/DL (ref 0.55–1.02)
ERYTHROCYTE [DISTWIDTH] IN BLOOD BY AUTOMATED COUNT: 18.1 % (ref 11.5–14.5)
GLOBULIN SER CALC-MCNC: 3.8 G/DL (ref 2–4)
GLUCOSE BLD STRIP.AUTO-MCNC: 118 MG/DL (ref 65–100)
GLUCOSE BLD STRIP.AUTO-MCNC: 124 MG/DL (ref 65–100)
GLUCOSE BLD STRIP.AUTO-MCNC: 146 MG/DL (ref 65–100)
GLUCOSE BLD STRIP.AUTO-MCNC: 169 MG/DL (ref 65–100)
GLUCOSE SERPL-MCNC: 113 MG/DL (ref 65–100)
HCT VFR BLD AUTO: 42 % (ref 35–47)
HGB BLD-MCNC: 13.7 G/DL (ref 11.5–16)
MAGNESIUM SERPL-MCNC: 2.2 MG/DL (ref 1.6–2.4)
MCH RBC QN AUTO: 21.9 PG (ref 26–34)
MCHC RBC AUTO-ENTMCNC: 32.6 G/DL (ref 30–36.5)
MCV RBC AUTO: 67.1 FL (ref 80–99)
NRBC # BLD: 0.05 K/UL (ref 0–0.01)
NRBC BLD-RTO: 0.2 PER 100 WBC
PHOSPHATE SERPL-MCNC: 2.5 MG/DL (ref 2.6–4.7)
PLATELET # BLD AUTO: 219 K/UL (ref 150–400)
PMV BLD AUTO: ABNORMAL FL (ref 8.9–12.9)
POTASSIUM SERPL-SCNC: 4.1 MMOL/L (ref 3.5–5.1)
PROT SERPL-MCNC: 6.2 G/DL (ref 6.4–8.2)
RBC # BLD AUTO: 6.26 M/UL (ref 3.8–5.2)
SERVICE CMNT-IMP: ABNORMAL
SODIUM SERPL-SCNC: 149 MMOL/L (ref 136–145)
WBC # BLD AUTO: 25.2 K/UL (ref 3.6–11)

## 2020-06-13 PROCEDURE — 94760 N-INVAS EAR/PLS OXIMETRY 1: CPT

## 2020-06-13 PROCEDURE — 74011250636 HC RX REV CODE- 250/636: Performed by: HOSPITALIST

## 2020-06-13 PROCEDURE — 74011636637 HC RX REV CODE- 636/637: Performed by: INTERNAL MEDICINE

## 2020-06-13 PROCEDURE — 83735 ASSAY OF MAGNESIUM: CPT

## 2020-06-13 PROCEDURE — 77030018798 HC PMP KT ENTRL FED COVD -A

## 2020-06-13 PROCEDURE — 74011250636 HC RX REV CODE- 250/636: Performed by: INTERNAL MEDICINE

## 2020-06-13 PROCEDURE — 82550 ASSAY OF CK (CPK): CPT

## 2020-06-13 PROCEDURE — 80053 COMPREHEN METABOLIC PANEL: CPT

## 2020-06-13 PROCEDURE — 85027 COMPLETE CBC AUTOMATED: CPT

## 2020-06-13 PROCEDURE — 74011250637 HC RX REV CODE- 250/637: Performed by: HOSPITALIST

## 2020-06-13 PROCEDURE — 74011000258 HC RX REV CODE- 258: Performed by: HOSPITALIST

## 2020-06-13 PROCEDURE — 82962 GLUCOSE BLOOD TEST: CPT

## 2020-06-13 PROCEDURE — 65660000000 HC RM CCU STEPDOWN

## 2020-06-13 PROCEDURE — 36415 COLL VENOUS BLD VENIPUNCTURE: CPT

## 2020-06-13 PROCEDURE — 84100 ASSAY OF PHOSPHORUS: CPT

## 2020-06-13 RX ORDER — AMLODIPINE BESYLATE 5 MG/1
5 TABLET ORAL
Status: COMPLETED | OUTPATIENT
Start: 2020-06-13 | End: 2020-06-13

## 2020-06-13 RX ORDER — AMLODIPINE BESYLATE 5 MG/1
5 TABLET ORAL DAILY
Status: DISCONTINUED | OUTPATIENT
Start: 2020-06-14 | End: 2020-06-16 | Stop reason: HOSPADM

## 2020-06-13 RX ORDER — DEXTROSE MONOHYDRATE 50 MG/ML
50 INJECTION, SOLUTION INTRAVENOUS CONTINUOUS
Status: DISCONTINUED | OUTPATIENT
Start: 2020-06-13 | End: 2020-06-14

## 2020-06-13 RX ADMIN — Medication 10 ML: at 21:53

## 2020-06-13 RX ADMIN — DEXTROSE MONOHYDRATE 50 ML/HR: 5 INJECTION, SOLUTION INTRAVENOUS at 12:00

## 2020-06-13 RX ADMIN — HYDRALAZINE HYDROCHLORIDE 10 MG: 20 INJECTION INTRAMUSCULAR; INTRAVENOUS at 21:53

## 2020-06-13 RX ADMIN — PREDNISONE 60 MG: 20 TABLET ORAL at 10:02

## 2020-06-13 RX ADMIN — ENOXAPARIN SODIUM 40 MG: 40 INJECTION SUBCUTANEOUS at 12:34

## 2020-06-13 RX ADMIN — IRBESARTAN 300 MG: 150 TABLET, FILM COATED ORAL at 21:53

## 2020-06-13 RX ADMIN — Medication 10 ML: at 04:15

## 2020-06-13 RX ADMIN — AMLODIPINE BESYLATE 5 MG: 5 TABLET ORAL at 12:33

## 2020-06-13 RX ADMIN — INSULIN LISPRO 2 UNITS: 100 INJECTION, SOLUTION INTRAVENOUS; SUBCUTANEOUS at 17:40

## 2020-06-13 NOTE — PROGRESS NOTES
Gastroenterology Progress Note  Ruthie Lea MD       6/13/2020    Admit Date: 6/5/2020    Subjective: Follow up for:  1) Feeding difficulty, s/p PEG placement on 6/12/20    2) Polymyositis    3) Generalized weakness    Patient is on NPO. Started on TF, tolerating well at 350 ml/hour. Goal of 50 ml per hour. Pain: No pain surrounding PEG site. Bowel Movements: None today    There is no bleeding        Current Facility-Administered Medications   Medication Dose Route Frequency    irbesartan (AVAPRO) tablet 300 mg  300 mg Per G Tube QHS    enoxaparin (LOVENOX) injection 40 mg  40 mg SubCUTAneous Q24H    predniSONE (DELTASONE) tablet 60 mg  60 mg Oral DAILY WITH BREAKFAST    hydrALAZINE (APRESOLINE) 20 mg/mL injection 10 mg  10 mg IntraVENous Q4H PRN    acetaminophen (TYLENOL) tablet 650 mg  650 mg Oral Q6H PRN    sodium chloride (NS) flush 5-40 mL  5-40 mL IntraVENous Q8H    sodium chloride (NS) flush 5-40 mL  5-40 mL IntraVENous PRN    ondansetron (ZOFRAN) injection 4 mg  4 mg IntraVENous Q4H PRN    insulin lispro (HUMALOG) injection   SubCUTAneous AC&HS    glucose chewable tablet 16 g  4 Tab Oral PRN    dextrose (D50W) injection syrg 12.5-25 g  12.5-25 g IntraVENous PRN    glucagon (GLUCAGEN) injection 1 mg  1 mg IntraMUSCular PRN        Objective:     Blood pressure 165/80, pulse 91, temperature 98.6 °F (37 °C), resp. rate 18, height 5' 4\" (1.626 m), weight 67.2 kg (148 lb 1.6 oz), SpO2 97 %. No intake/output data recorded.     06/11 1901 - 06/13 0700  In: 0   Out: 200 [Urine:350]    EXAM:  GEN: Pleasant BF, NAD  HEENT: NCAT, head turned to left  Abdomen: soft, NT, ND, PEG in left upper abdomen, C/D/I dressing applied  Ext: no edema    Data Review    Recent Results (from the past 24 hour(s))   GLUCOSE, POC    Collection Time: 06/12/20 11:50 AM   Result Value Ref Range    Glucose (POC) 144 (H) 65 - 100 mg/dL    Performed by Kana Perez    GLUCOSE, POC    Collection Time: 06/12/20  5:15 PM   Result Value Ref Range    Glucose (POC) 150 (H) 65 - 100 mg/dL    Performed by Chantale Ruiz    GLUCOSE, POC    Collection Time: 06/12/20  9:23 PM   Result Value Ref Range    Glucose (POC) 133 (H) 65 - 100 mg/dL    Performed by Bruce Lord (ABEL)    CK    Collection Time: 06/13/20  3:28 AM   Result Value Ref Range     (H) 26 - 192 U/L   CBC W/O DIFF    Collection Time: 06/13/20  3:28 AM   Result Value Ref Range    WBC 25.2 (H) 3.6 - 11.0 K/uL    RBC 6.26 (H) 3.80 - 5.20 M/uL    HGB 13.7 11.5 - 16.0 g/dL    HCT 42.0 35.0 - 47.0 %    MCV 67.1 (L) 80.0 - 99.0 FL    MCH 21.9 (L) 26.0 - 34.0 PG    MCHC 32.6 30.0 - 36.5 g/dL    RDW 18.1 (H) 11.5 - 14.5 %    PLATELET 511 266 - 865 K/uL    MPV ABNORMAL 8.9 - 12.9 FL    NRBC 0.2 (H) 0  WBC    ABSOLUTE NRBC 0.05 (H) 0.00 - 0.01 K/uL   MAGNESIUM    Collection Time: 06/13/20  3:28 AM   Result Value Ref Range    Magnesium 2.2 1.6 - 2.4 mg/dL   PHOSPHORUS    Collection Time: 06/13/20  3:28 AM   Result Value Ref Range    Phosphorus 2.5 (L) 2.6 - 4.7 MG/DL   METABOLIC PANEL, COMPREHENSIVE    Collection Time: 06/13/20  3:28 AM   Result Value Ref Range    Sodium 149 (H) 136 - 145 mmol/L    Potassium 4.1 3.5 - 5.1 mmol/L    Chloride 120 (H) 97 - 108 mmol/L    CO2 22 21 - 32 mmol/L    Anion gap 7 5 - 15 mmol/L    Glucose 113 (H) 65 - 100 mg/dL    BUN 43 (H) 6 - 20 MG/DL    Creatinine 0.63 0.55 - 1.02 MG/DL    BUN/Creatinine ratio 68 (H) 12 - 20      GFR est AA >60 >60 ml/min/1.73m2    GFR est non-AA >60 >60 ml/min/1.73m2    Calcium 8.5 8.5 - 10.1 MG/DL    Bilirubin, total 0.4 0.2 - 1.0 MG/DL    ALT (SGPT) 66 12 - 78 U/L    AST (SGOT) 35 15 - 37 U/L    Alk.  phosphatase 94 45 - 117 U/L    Protein, total 6.2 (L) 6.4 - 8.2 g/dL    Albumin 2.4 (L) 3.5 - 5.0 g/dL    Globulin 3.8 2.0 - 4.0 g/dL    A-G Ratio 0.6 (L) 1.1 - 2.2     GLUCOSE, POC    Collection Time: 06/13/20  7:35 AM   Result Value Ref Range    Glucose (POC) 118 (H) 65 - 100 mg/dL    Performed by Truman Colvin (PCT)      Recent Labs     06/13/20  0328   WBC 25.2*   HGB 13.7   HCT 42.0        Recent Labs     06/13/20  0328   *   K 4.1   *   CO2 22   BUN 43*   CREA 0.63   *   CA 8.5   MG 2.2   PHOS 2.5*     Recent Labs     06/13/20  0328   ALT 66   AP 94   TBILI 0.4   TP 6.2*   ALB 2.4*   GLOB 3.8     Recent Labs     06/11/20  0336   INR 1.3*   PTP 12.9*      No results for input(s): FE, TIBC, PSAT, FERR in the last 72 hours. No results found for: FOL, RBCF   No results for input(s): PH, PCO2, PO2 in the last 72 hours.   Recent Labs     06/13/20  0328 06/12/20  0313 06/11/20  0336   * 370* 534*     Lab Results   Component Value Date/Time    Cholesterol, total 221 (H) 08/28/2018 08:45 AM    HDL Cholesterol 60 08/28/2018 08:45 AM    LDL, calculated 135 (H) 08/28/2018 08:45 AM    Triglyceride 129 08/28/2018 08:45 AM     Lab Results   Component Value Date/Time    Glucose (POC) 118 (H) 06/13/2020 07:35 AM    Glucose (POC) 133 (H) 06/12/2020 09:23 PM    Glucose (POC) 150 (H) 06/12/2020 05:15 PM    Glucose (POC) 144 (H) 06/12/2020 11:50 AM    Glucose (POC) 161 (H) 06/12/2020 07:02 AM     Lab Results   Component Value Date/Time    Color YELLOW/STRAW 06/06/2020 06:50 AM    Appearance CLEAR 06/06/2020 06:50 AM    Specific gravity 1.028 06/06/2020 06:50 AM    Specific gravity <1.005 08/03/2017 01:04 PM    pH (UA) 5.5 06/06/2020 06:50 AM    Protein 30 (A) 06/06/2020 06:50 AM    Glucose Negative 06/06/2020 06:50 AM    Ketone 15 (A) 06/06/2020 06:50 AM    Bilirubin Negative 06/06/2020 06:50 AM    Urobilinogen 1.0 06/06/2020 06:50 AM    Nitrites Negative 06/06/2020 06:50 AM    Leukocyte Esterase SMALL (A) 06/06/2020 06:50 AM    Epithelial cells MODERATE (A) 06/06/2020 06:50 AM    Bacteria 1+ (A) 06/06/2020 06:50 AM    WBC 10-20 06/06/2020 06:50 AM    RBC 5-10 06/06/2020 06:50 AM           Assessment:     Active Problems:    Polymyositis ([facility] (7/28/2016)      Oropharyngeal dysphagia (6/10/2020)        Plan:       Please use post PEG precautions in feedings and care of tube site as suggested. TF goal is  50 cc/hr which she is on and she is tolerating it well so far    Osmolite 1.2 @ 20 mL/hr and advance as tolerated by 10 mL q 8 hours to goal rate of 50 mL/hr + 100 mL water flushes q 6 hours (1440 kcal, 67g protein, 1384 mL water)   We will  sign off. Beverly Nick MD

## 2020-06-13 NOTE — PROGRESS NOTES
Feeding difficulties [R63.3] Esophagitis, Hiatal Hernia, Gastritis, Duodenitis  Feeding difficulties [R63.3] Esophagitis, Hiatal Hernia, Gastritis, Duodenitis  Bedside shift change report given to Ferny (oncoming nurse) by Nadja Traore (offgoing nurse). Report included the following information SBAR, Kardex, ED Summary and OR Summary. Zone Phone:   [phone]      Significant changes during shift:  Pt is at goal for tube feedings. Pt was started on fluids     Patient Information    Soren Duff  59 y.o.  6/5/2020  4:13 PM by Naveed Natarajan MD. Soren Duff was admitted from Home    Problem List    Patient Active Problem List    Diagnosis Date Noted    Oropharyngeal dysphagia 06/10/2020    S/p nephrectomy 05/27/2020    History of renal cell cancer 05/27/2020    Vitamin D deficiency 10/10/2018    Age-related osteoporosis without current pathological fracture 08/23/2018    Elevated glucose 01/18/2017    Obstructive sleep apnea syndrome 01/17/2017    Polymyositis ([address] 07/28/2016    HTN (hypertension) 07/28/2016     Past Medical History:   Diagnosis Date    Congestive heart failure ([address]     Hypertension     Pneumonia 10/2018    Polymyositis ([address] 12/2015    in setting of [address] 2015    Polymyositis associated with autoimmune disease ([address]     Renal cancer ([address] 07/08/2016    s/p right nephrectomy.  Respiratory arrest ([address] 11/2015    [facility].     SBO (small bowel obstruction) ([address] 8/3/2017         Core Measures:    PNA:Yes Yes    Activity Status:    OOB to Chair No  Ambulated this shift No   Bed Rest Yes    DVT prophylaxis:    DVT prophylaxis Med- Yes  DVT prophylaxis SCD or ELMER- No     Wounds: (If Applicable)    Wounds- Yes    Location Open areas to buttocks     Patient Safety:    Falls Score Total Score: 2  Safety Level_______  Bed Alarm On? Yes  Sitter?  No    Plan for upcoming shift: Safety and monitoring        Discharge Plan: Yes SNF or back to home with round the clock care Active Consults:  IP CONSULT TO HOSPITALIST  IP CONSULT TO RHEUMATOLOGY  IP CONSULT TO PALLIATIVE CARE - PROVIDER  IP CONSULT TO NEUROLOGY  IP CONSULT TO GASTROENTEROLOGY

## 2020-06-13 NOTE — PROGRESS NOTES
Hospitalist Progress Note    NAME: Neville Garcia   :  1955   MRN:  [id number]     Admitted on  with worsening weakness for 1 week. Pt with long history of polymyositis. Assessed as possible pneumonia on admission. Cxray on admission :1. Left basilar atelectasis or minimal infiltrate. 2. Otherwise stable atelectasis and scar in the right lung. Cxray : Unchanged left lower lobe retrocardiac consolidative opacity, which may represent atelectasis and/or infection. Possible small left pleural effusion. Clinically no pneumonia. Cxray findings is likely d/t atelectasis d/t poor inspiratory effort. S/p CTX/zithromax on admission, was not continued       Assessment / Plan:  Acute polymyositis flare causing significant debility   - remain very weak   - high dose IV solumedrol --> prednisone 60 mg daily   Ck trending down slowly: 3313 on admission -->292  On SS with steroids  - poor inspiratory effort, started on IS   - rheumatology help appreciated:  Primary rheumatologist at [facility]   She has tried mmf, mtx, aza, IVIG and one round of RTX  Her cpk has improved on IV STeroids this admission  Polymyositis patient's mm strength lag behind and need to work with PT at length.   high dose prednisone at discharge 60mg   - neurology help appreciated:  Pt with h/o inflammatory muscle disease that has been at least partially refractory to multiple immunosuppressants. Per documentation she does have serology previously that revealed anti-Sabine positivity. CPK has responded to steroid therapy. 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 Would need to keep an atypical inclusion body myositis in the differential given her refractory nature to treatment. Will need EMG/nerve conduction study. This can be obtained at a later date or as an outpatient. She should have a full screen for underlying malignancy.   This can be coordinated through her outpatient primary rheumatologist.  Casper Gabriel also sending the antibody for inclusion body myositis, [id number]  agree with steroids and consideration of repeat induction with rituximab. Hypernatremia  - will increase free water , monitor  Add d5w IVF      Dysphasia d/t #1   Severe protein calorie malnutrition  - failed speech   PEG 6/11 by GI Dr Maximo Rousseau   tolerating well so far    Osmolite 1.2 @ 20 mL/hr and advance as tolerated by 10 mL q 8 hours to goal rate of 50 mL/hr + 100 mL water flushes q 6 hours (1440 kcal, 67g protein, 1384 mL water)     HTN   - BP high side can be d/t steroids   cont Avapro   Will add norvasc   Need to be adjusted as needed     Bacteremia likely contamination   BC 6/5 coag neg staph in 1/3 bottles  No ;eukocytosis or fever on admission  Repeated BC  6/6 negative   TTE: EF 60%, no vegetations; mild MR, mild TR, pulm HTN 34, small pericardial eff     Leukocytosis is likely d/t steroids ( wbc was normal on admission). 6/7 17--> 12.5   Pneumonia was ruled out   History of JOHN  History of renal cancer status post right nephrectomy  18.5 - 24.9 Normal weight / Body mass index is 25.42 kg/m². Code status: DNR ( DDNR signed with palliative care team)   MPOA: cousin Millie Rodriguez   Prophylaxis: lovenox     Baseline: lives alone, no children, was ambulating with walker   Recommended Disposition: PT: SNF, 1-2 days, CM consulted      Subjective:     Chief Complaint / Reason for Physician Visit: following polymyositis flare/ HTN/ debility   Tolerating TF   Still very weak      Discussed with RN events overnight. Review of Systems:  Symptom Y/N Comments  Symptom Y/N Comments   Fever/Chills n   Chest Pain n    Poor Appetite    Edema     Cough    Abdominal Pain     Sputum    Joint Pain     SOB/LOREDO n   Pruritis/Rash     Nausea/vomit    Tolerating PT/OT     Diarrhea    Tolerating Diet     Constipation    Other       Could NOT obtain due to:      Objective:     VITALS:   Last 24hrs VS reviewed since prior progress note.  Most recent are:  Patient Vitals for the past 24 hrs:   Temp Pulse Resp BP SpO2   06/13/20 0415 98.6 °F (37 °C) 91 18 165/80 97 %   06/12/20 2300 97.8 °F (36.6 °C) 83 18 158/62 97 %   06/12/20 2000 -- -- -- 165/78 --   06/12/20 1919 98.8 °F (37.1 °C) 96 18 (!) 176/101 96 %   06/12/20 1453 98.2 °F (36.8 °C) 98 16 173/74 97 %   06/12/20 1153 98.1 °F (36.7 °C) 86 16 170/82 97 %       Intake/Output Summary (Last 24 hours) at 6/13/2020 1116  Last data filed at 6/13/2020 0450  Gross per 24 hour   Intake 523 ml   Output 200 ml   Net 323 ml        PHYSICAL EXAM:  General: WD, WN. Alert, cooperative, no acute distress, appears very weak/debilitated/chronically ill     EENT:  EOMI. Anicteric sclerae. MMM  Resp:  CTA bilaterally, no wheezing or rales. No accessory muscle use  CV:  Regular  rhythm,  No edema  GI:  Soft, Non distended, Non tender.  +Bowel sounds. + PEG   Neurologic:  Alert and oriented X 3, normal speech, generalized weakness 2/5   Psych:   Good insight. Not anxious nor agitated  Skin:  No rashes. No jaundice    Reviewed most current lab test results and cultures  YES  Reviewed most current radiology test results   YES  Review and summation of old records today    NO  Reviewed patient's current orders and MAR    YES  PMH/SH reviewed - no change compared to H&P  ________________________________________________________________________  Care Plan discussed with:    Comments   Patient y    Family      RN y    Care Manager     Consultant                        Multidiciplinary team rounds were held today with , nursing, pharmacist and clinical coordinator. Patient's plan of care was discussed; medications were reviewed and discharge planning was addressed.      ________________________________________________________________________  Total NON critical care TIME:  35  Minutes    Total CRITICAL CARE TIME Spent:   Minutes non procedure based      Comments   >50% of visit spent in counseling and coordination of care y ________________________________________________________________________  Clarissa Tim MD     Procedures: see electronic medical records for all procedures/Xrays and details which were not copied into this note but were reviewed prior to creation of Plan. LABS:  I reviewed today's most current labs and imaging studies.   Pertinent labs include:  Recent Labs     06/13/20 0328   WBC 25.2*   HGB 13.7   HCT 42.0        Recent Labs     06/13/20 0328 06/11/20  0336   *  --    K 4.1  --    *  --    CO2 22  --    *  --    BUN 43*  --    CREA 0.63  --    CA 8.5  --    MG 2.2  --    PHOS 2.5*  --    ALB 2.4*  --    TBILI 0.4  --    ALT 66  --    INR  --  1.3*       Signed: Clarissa Tim MD

## 2020-06-13 NOTE — PROGRESS NOTES
Feeding difficulties [R63.3] Esophagitis, Hiatal Hernia, Gastritis, Duodenitis  Feeding difficulties [R63.3] Esophagitis, Hiatal Hernia, Gastritis, Duodenitis  Bedside shift change report given to Ferny (oncoming nurse) by Nishant Baxter (offgoing nurse). Report included the following information SBAR, Kardex, ED Summary and OR Summary. Zone Phone:   [phone]      Significant changes during shift:    Pt tolerating PEG feedings increased to goal rate of 50. Patient Information    Elba Caputo  59 y.o.  6/5/2020  4:13 PM by Yobani Elena MD. Elba Caputo was admitted from Home    Problem List    Patient Active Problem List    Diagnosis Date Noted    Oropharyngeal dysphagia 06/10/2020    S/p nephrectomy 05/27/2020    History of renal cell cancer 05/27/2020    Vitamin D deficiency 10/10/2018    Age-related osteoporosis without current pathological fracture 08/23/2018    Elevated glucose 01/18/2017    Obstructive sleep apnea syndrome 01/17/2017    Polymyositis ([address] 07/28/2016    HTN (hypertension) 07/28/2016     Past Medical History:   Diagnosis Date    Congestive heart failure ([address]     Hypertension     Pneumonia 10/2018    Polymyositis ([address] 12/2015    in setting of [address] 2015    Polymyositis associated with autoimmune disease ([address]     Renal cancer ([address] 07/08/2016    s/p right nephrectomy.  Respiratory arrest ([address] 11/2015    [facility].     SBO (small bowel obstruction) ([address] 8/3/2017         Core Measures:    PNA:Yes Yes    Activity Status:    OOB to Chair No  Ambulated this shift No   Bed Rest Yes    DVT prophylaxis:    DVT prophylaxis Med- Yes  DVT prophylaxis SCD or ELMER- No     Wounds: (If Applicable)    Wounds- Yes    Location Open areas to buttocks     Patient Safety:    Falls Score Total Score: 2  Safety Level_______  Bed Alarm On? Yes  Sitter?  No    Plan for upcoming shift:   Safety and monitoring        Discharge Plan: Yes SNF or back to home with round the clock care     Active Consults:  IP CONSULT TO HOSPITALIST  IP CONSULT TO RHEUMATOLOGY  IP CONSULT TO PALLIATIVE CARE - PROVIDER  IP CONSULT TO NEUROLOGY  IP CONSULT TO GASTROENTEROLOGY

## 2020-06-13 NOTE — PROGRESS NOTES
Problem: Falls - Risk of  Goal: *Absence of Falls  Description: Document Rene Avila Fall Risk and appropriate interventions in the flowsheet. Outcome: Progressing Towards Goal  Note: Fall Risk Interventions:  Mobility Interventions: Bed/chair exit alarm, Communicate number of staff needed for ambulation/transfer, Patient to call before getting OOB, PT Consult for mobility concerns, Strengthening exercises (ROM-active/passive), OT consult for ADLs, Mechanical lift, PT Consult for assist device competence         Medication Interventions: Bed/chair exit alarm, Evaluate medications/consider consulting pharmacy, Patient to call before getting OOB, Teach patient to arise slowly    Elimination Interventions: Bed/chair exit alarm, Call light in reach, Elevated toilet seat, Patient to call for help with toileting needs, Stay With Me (per policy), Toileting schedule/hourly rounds              Problem: Patient Education: Go to Patient Education Activity  Goal: Patient/Family Education  Outcome: Progressing Towards Goal     Problem: Pressure Injury - Risk of  Goal: *Prevention of pressure injury  Description: Document Armen Scale and appropriate interventions in the flowsheet. Outcome: Progressing Towards Goal  Note: Pressure Injury Interventions:  Sensory Interventions: Assess changes in LOC, Assess need for specialty bed, Avoid rigorous massage over bony prominences, Chair cushion, Check visual cues for pain, Discuss PT/OT consult with provider, Float heels, Keep linens dry and wrinkle-free, Maintain/enhance activity level, Minimize linen layers, Monitor skin under medical devices, Pad between skin to skin, Turn and reposition approx.  every two hours (pillows and wedges if needed)    Moisture Interventions: Absorbent underpads, Apply protective barrier, creams and emollients, Check for incontinence Q2 hours and as needed, Assess need for specialty bed, Internal/External urinary devices, Maintain skin hydration (lotion/cream), Minimize layers, Moisture barrier    Activity Interventions: Increase time out of bed, Pressure redistribution bed/mattress(bed type), PT/OT evaluation    Mobility Interventions: Assess need for specialty bed, HOB 30 degrees or less, Float heels, Pressure redistribution bed/mattress (bed type), PT/OT evaluation, Turn and reposition approx.  every two hours(pillow and wedges)    Nutrition Interventions: Document food/fluid/supplement intake, Offer support with meals,snacks and hydration, Discuss nutritional consult with provider    Friction and Shear Interventions: Apply protective barrier, creams and emollients, Foam dressings/transparent film/skin sealants, Feet elevated on foot rest, HOB 30 degrees or less, Lift sheet, Lift team/patient mobility team, Minimize layers                Problem: Patient Education: Go to Patient Education Activity  Goal: Patient/Family Education  Outcome: Progressing Towards Goal     Problem: Patient Education: Go to Patient Education Activity  Goal: Patient/Family Education  Outcome: Progressing Towards Goal     Problem: Patient Education: Go to Patient Education Activity  Goal: Patient/Family Education  Outcome: Progressing Towards Goal     Problem: Patient Education: Go to Patient Education Activity  Goal: Patient/Family Education  Outcome: Progressing Towards Goal

## 2020-06-14 LAB
ANION GAP SERPL CALC-SCNC: 5 MMOL/L (ref 5–15)
BUN SERPL-MCNC: 40 MG/DL (ref 6–20)
BUN/CREAT SERPL: 80 (ref 12–20)
CALCIUM SERPL-MCNC: 8.3 MG/DL (ref 8.5–10.1)
CHLORIDE SERPL-SCNC: 117 MMOL/L (ref 97–108)
CK SERPL-CCNC: 396 U/L (ref 26–192)
CO2 SERPL-SCNC: 23 MMOL/L (ref 21–32)
CREAT SERPL-MCNC: 0.5 MG/DL (ref 0.55–1.02)
GLUCOSE BLD STRIP.AUTO-MCNC: 103 MG/DL (ref 65–100)
GLUCOSE BLD STRIP.AUTO-MCNC: 108 MG/DL (ref 65–100)
GLUCOSE BLD STRIP.AUTO-MCNC: 127 MG/DL (ref 65–100)
GLUCOSE BLD STRIP.AUTO-MCNC: 99 MG/DL (ref 65–100)
GLUCOSE SERPL-MCNC: 130 MG/DL (ref 65–100)
POTASSIUM SERPL-SCNC: 4.2 MMOL/L (ref 3.5–5.1)
SERVICE CMNT-IMP: ABNORMAL
SERVICE CMNT-IMP: NORMAL
SODIUM SERPL-SCNC: 145 MMOL/L (ref 136–145)

## 2020-06-14 PROCEDURE — 77030018798 HC PMP KT ENTRL FED COVD -A

## 2020-06-14 PROCEDURE — 82550 ASSAY OF CK (CPK): CPT

## 2020-06-14 PROCEDURE — 74011250637 HC RX REV CODE- 250/637: Performed by: HOSPITALIST

## 2020-06-14 PROCEDURE — 74011250637 HC RX REV CODE- 250/637: Performed by: INTERNAL MEDICINE

## 2020-06-14 PROCEDURE — 74011250636 HC RX REV CODE- 250/636: Performed by: HOSPITALIST

## 2020-06-14 PROCEDURE — 74011636637 HC RX REV CODE- 636/637: Performed by: INTERNAL MEDICINE

## 2020-06-14 PROCEDURE — 80048 BASIC METABOLIC PNL TOTAL CA: CPT

## 2020-06-14 PROCEDURE — 65660000000 HC RM CCU STEPDOWN

## 2020-06-14 PROCEDURE — 36415 COLL VENOUS BLD VENIPUNCTURE: CPT

## 2020-06-14 PROCEDURE — 94760 N-INVAS EAR/PLS OXIMETRY 1: CPT

## 2020-06-14 PROCEDURE — 82962 GLUCOSE BLOOD TEST: CPT

## 2020-06-14 RX ORDER — SCOLOPAMINE TRANSDERMAL SYSTEM 1 MG/1
1 PATCH, EXTENDED RELEASE TRANSDERMAL
Status: DISCONTINUED | OUTPATIENT
Start: 2020-06-14 | End: 2020-06-16 | Stop reason: HOSPADM

## 2020-06-14 RX ADMIN — AMLODIPINE BESYLATE 5 MG: 5 TABLET ORAL at 10:23

## 2020-06-14 RX ADMIN — Medication 10 ML: at 02:25

## 2020-06-14 RX ADMIN — ENOXAPARIN SODIUM 40 MG: 40 INJECTION SUBCUTANEOUS at 15:12

## 2020-06-14 RX ADMIN — IRBESARTAN 300 MG: 150 TABLET, FILM COATED ORAL at 21:56

## 2020-06-14 RX ADMIN — PREDNISONE 60 MG: 20 TABLET ORAL at 10:23

## 2020-06-14 RX ADMIN — CARBAMIDE PEROXIDE 6.5% 5 DROP: 6.5 LIQUID AURICULAR (OTIC) at 11:36

## 2020-06-14 RX ADMIN — Medication 10 ML: at 15:12

## 2020-06-14 RX ADMIN — Medication 10 ML: at 21:58

## 2020-06-14 RX ADMIN — DEXTROSE MONOHYDRATE 50 ML/HR: 5 INJECTION, SOLUTION INTRAVENOUS at 02:30

## 2020-06-14 NOTE — PROGRESS NOTES
Feeding difficulties [R63.3] Esophagitis, Hiatal Hernia, Gastritis, Duodenitis  Feeding difficulties [R63.3] Esophagitis, Hiatal Hernia, Gastritis, Duodenitis  Bedside shift change report given to Ferny (oncoming nurse) by Nishi Liang (offgoing nurse). Report included the following information SBAR, Kardex, ED Summary and OR Summary. Zone Phone:   [phone]      Significant changes during shift:  Pt is at goal for tube feedings. Pt was taken off fluids    Patient Information    Paco Marshall  59 y.o.  6/5/2020  4:13 PM by Rosa Quintanilla MD. Paco Marshall was admitted from Home    Problem List    Patient Active Problem List    Diagnosis Date Noted    Oropharyngeal dysphagia 06/10/2020    S/p nephrectomy 05/27/2020    History of renal cell cancer 05/27/2020    Vitamin D deficiency 10/10/2018    Age-related osteoporosis without current pathological fracture 08/23/2018    Elevated glucose 01/18/2017    Obstructive sleep apnea syndrome 01/17/2017    Polymyositis ([address] 07/28/2016    HTN (hypertension) 07/28/2016     Past Medical History:   Diagnosis Date    Congestive heart failure ([address]     Hypertension     Pneumonia 10/2018    Polymyositis ([address] 12/2015    in setting of [address] 2015    Polymyositis associated with autoimmune disease ([address]     Renal cancer ([address] 07/08/2016    s/p right nephrectomy.  Respiratory arrest ([address] 11/2015    [facility].     SBO (small bowel obstruction) ([address] 8/3/2017         Core Measures:    PNA:Yes Yes    Activity Status:    OOB to Chair No  Ambulated this shift No   Bed Rest Yes    DVT prophylaxis:    DVT prophylaxis Med- Yes  DVT prophylaxis SCD or ELMER- No     Wounds: (If Applicable)    Wounds- Yes    Location Open areas to buttocks     Patient Safety:    Falls Score Total Score: 2  Safety Level_______  Bed Alarm On? Yes  Sitter?  No    Plan for upcoming shift: Safety and monitoring        Discharge Plan: Yes SNF or back to home with round the clock care Active Consults:  IP CONSULT TO HOSPITALIST  IP CONSULT TO RHEUMATOLOGY  IP CONSULT TO PALLIATIVE CARE - PROVIDER  IP CONSULT TO NEUROLOGY  IP CONSULT TO GASTROENTEROLOGY

## 2020-06-14 NOTE — PROGRESS NOTES
CHAN: Inpatient Rehab (pending acceptance)  1) [facility] (pending)  2) [facility] (pending)  3) [facility] (declined)    CM contacted [facility] and [facility] requesting update for IPR referrals sent on 6/9. Awaiting responses at this time.      Krys Ponce. [facility] 29 Manager  [phone]

## 2020-06-14 NOTE — PROGRESS NOTES
Hospitalist Progress Note    NAME: Humza Clayton   :  1955   MRN:  [id number]     Admitted on  with worsening weakness for 1 week. Pt with long history of polymyositis. Assessed as possible pneumonia on admission. Cxray on admission :1. Left basilar atelectasis or minimal infiltrate. 2. Otherwise stable atelectasis and scar in the right lung. Cxray : Unchanged left lower lobe retrocardiac consolidative opacity, which may represent atelectasis and/or infection. Possible small left pleural effusion. Clinically no pneumonia. Cxray findings is likely d/t atelectasis d/t poor inspiratory effort. S/p CTX/zithromax on admission, was not continued       Assessment / Plan:  Acute polymyositis flare causing significant debility   - remain very weak   - high dose IV solumedrol --> prednisone 60 mg daily ( )   Ck is up today: 3313 on admission -->292--> 396 ? Will monitor closely   Wbc is trending up , no fever   On SS with steroids  - poor inspiratory effort, started on IS   - rheumatology help appreciated:  Primary rheumatologist at [address] [place] [address]   She has tried mmf, mtx, aza, IVIG and one round of RTX  Her cpk has improved on IV STeroids this admission  Polymyositis patient's mm strength lag behind and need to work with PT at length.   high dose prednisone at discharge 60mg   - neurology help appreciated:  Pt with h/o inflammatory muscle disease that has been at least partially refractory to multiple immunosuppressants. Per documentation she does have serology previously that revealed anti-Sabine positivity. CPK has responded to steroid therapy. 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 Would need to keep an atypical inclusion body myositis in the differential given her refractory nature to treatment. Will need EMG/nerve conduction study. This can be obtained at a later date or as an outpatient. She should have a full screen for underlying malignancy.   This can be coordinated through her outpatient primary rheumatologist.   recommend also sending the antibody for inclusion body myositis, [id number]  agree with steroids and consideration of repeat induction with rituximab. R ear hearing loss  - may be due to building up wax, trial of debrox gtt  If not resolving will need OP ENT eval     Hypernatremia resolved  - stop d5w, cont free water      Dysphasia d/t #1   Severe protein calorie malnutrition  - failed speech   PEG 6/11 by GI Dr Maximo Rousseau   tolerating well so far    Osmolite 1.2 @ 20 mL/hr and advance as tolerated by 10 mL q 8 hours to goal rate of 50 mL/hr + 100 mL water flushes q 6 hours (1440 kcal, 67g protein, 1384 mL water)     HTN   - BP better   Incr in BP can be d/t high dose steroids    cont Avapro   added norvasc   Need to be adjusted as needed     Bacteremia likely contamination   BC 6/5 coag neg staph in 1/3 bottles  No ;eukocytosis or fever on admission  Repeated BC  6/6 negative   TTE: EF 60%, no vegetations; mild MR, mild TR, pulm HTN 34, small pericardial eff     Leukocytosis is likely d/t steroids ( wbc was normal on admission). 6/7 17--> 12.5   Pneumonia was ruled out   History of JOHN  History of renal cancer status post right nephrectomy  18.5 - 24.9 Normal weight / Body mass index is 25.42 kg/m². Code status: DNR ( DDNR signed with palliative care team)   MPOA: cousin Millie Rodriguez   Prophylaxis: lovenox     Baseline: lives alone, no children, was ambulating with walker   Recommended Disposition: PT: SNF , CK is up today, will monitor in am. CM consulted for DC planning      Subjective:     Chief Complaint / Reason for Physician Visit: following polymyositis flare/ HTN/ debility   Tolerating TF   C/o R ear slow hearing loss started ~ 3-4 weeks ago , no pain/drain      Discussed with RN events overnight.      Review of Systems:  Symptom Y/N Comments  Symptom Y/N Comments   Fever/Chills n   Chest Pain n    Poor Appetite    Edema     Cough    Abdominal Pain     Sputum    Joint Pain     SOB/LOREDO n   Pruritis/Rash     Nausea/vomit    Tolerating PT/OT     Diarrhea    Tolerating Diet     Constipation    Other       Could NOT obtain due to:      Objective:     VITALS:   Last 24hrs VS reviewed since prior progress note. Most recent are:  Patient Vitals for the past 24 hrs:   Temp Pulse Resp BP SpO2   06/14/20 0636 98.4 °F (36.9 °C) 89 16 145/61 96 %   06/14/20 0240 98.3 °F (36.8 °C) 86 16 159/81 99 %   06/13/20 2228 98.4 °F (36.9 °C) 80 16 166/65 96 %   06/13/20 2045 -- -- -- 162/74 --   06/13/20 1952 98.4 °F (36.9 °C) 86 16 (!) 188/94 97 %   06/13/20 1557 98.5 °F (36.9 °C) 86 16 171/76 96 %   06/13/20 1214 98.5 °F (36.9 °C) 80 16 (!) 185/99 96 %       Intake/Output Summary (Last 24 hours) at 6/14/2020 0953  Last data filed at 6/14/2020 0172  Gross per 24 hour   Intake 700 ml   Output --   Net 700 ml        PHYSICAL EXAM:  General: WD, WN. Alert, cooperative, no acute distress, appears very weak/debilitated/chronically ill     EENT:  EOMI. Anicteric sclerae. MMM                          L ear otoscopy: +++ wax obscuring tympanic membrane    Resp:  CTA bilaterally, no wheezing or rales. No accessory muscle use  CV:  Regular  rhythm,  No edema  GI:  Soft, Non distended, Non tender.  +Bowel sounds. + PEG ( mild discomfort around peg)  Neurologic:  Alert and oriented X 3, normal speech, generalized weakness 2/5   Psych:   Good insight. Not anxious nor agitated  Skin:  No rashes.   No jaundice    Reviewed most current lab test results and cultures  YES  Reviewed most current radiology test results   YES  Review and summation of old records today    NO  Reviewed patient's current orders and MAR    YES  PMH/SH reviewed - no change compared to H&P  ________________________________________________________________________  Care Plan discussed with:    Comments   Patient y    Family      RN y    Care Manager     Consultant                        Multidiciplinary team rounds were held today with case manager, nursing, pharmacist and clinical coordinator. Patient's plan of care was discussed; medications were reviewed and discharge planning was addressed. ________________________________________________________________________  Total NON critical care TIME:  35  Minutes    Total CRITICAL CARE TIME Spent:   Minutes non procedure based      Comments   >50% of visit spent in counseling and coordination of care y    ________________________________________________________________________  Cecy Marcus MD     Procedures: see electronic medical records for all procedures/Xrays and details which were not copied into this note but were reviewed prior to creation of Plan. LABS:  I reviewed today's most current labs and imaging studies.   Pertinent labs include:  Recent Labs     06/13/20  0328   WBC 25.2*   HGB 13.7   HCT 42.0        Recent Labs     06/14/20 0226 06/13/20 0328    149*   K 4.2 4.1   * 120*   CO2 23 22   * 113*   BUN 40* 43*   CREA 0.50* 0.63   CA 8.3* 8.5   MG  --  2.2   PHOS  --  2.5*   ALB  --  2.4*   TBILI  --  0.4   ALT  --  66       Signed: Cecy Marcus MD

## 2020-06-14 NOTE — PROGRESS NOTES
Feeding difficulties [R63.3] Esophagitis, Hiatal Hernia, Gastritis, Duodenitis  Feeding difficulties [R63.3] Esophagitis, Hiatal Hernia, Gastritis, Duodenitis  Bedside shift change report given to Reema Lundberg (oncoming nurse) by Ferny (offgoing nurse). Report included the following information SBAR, Kardex, ED Summary and OR Summary. Zone Phone:   [phone]      Significant changes during shift:   Patient c/w elevated BP, hydralazine given. New IV, c/w fluids. Patient Information    Seema Mckeon  59 y.o.  6/5/2020  4:13 PM by Yue Aguilar MD. Seema Mckeon was admitted from Home    Problem List    Patient Active Problem List    Diagnosis Date Noted    Oropharyngeal dysphagia 06/10/2020    S/p nephrectomy 05/27/2020    History of renal cell cancer 05/27/2020    Vitamin D deficiency 10/10/2018    Age-related osteoporosis without current pathological fracture 08/23/2018    Elevated glucose 01/18/2017    Obstructive sleep apnea syndrome 01/17/2017    Polymyositis ([address] 07/28/2016    HTN (hypertension) 07/28/2016     Past Medical History:   Diagnosis Date    Congestive heart failure ([address]     Hypertension     Pneumonia 10/2018    Polymyositis ([address] 12/2015    in setting of [address]    Polymyositis associated with autoimmune disease ([address]     Renal cancer ([address] 07/08/2016    s/p right nephrectomy.  Respiratory arrest ([address] 11/2015    [facility].     SBO (small bowel obstruction) ([address] 8/3/2017         Core Measures:    PNA:Yes Yes    Activity Status:    OOB to Chair No  Ambulated this shift No   Bed Rest Yes    DVT prophylaxis:    DVT prophylaxis Med- Yes  DVT prophylaxis SCD or ELMER- No     Wounds: (If Applicable)    Wounds- Yes    Location Open areas to buttocks     Patient Safety:    Falls Score Total Score: 2  Safety Level_______  Bed Alarm On? Yes  Sitter?  No    Plan for upcoming shift:   Safety and monitoring  PEG feedings  Gentle Hydration        Discharge Plan: Yes SNF or back to home with round the clock care     Active Consults:  IP CONSULT TO HOSPITALIST  IP CONSULT TO RHEUMATOLOGY  IP CONSULT TO PALLIATIVE CARE - PROVIDER  IP CONSULT TO NEUROLOGY  IP CONSULT TO GASTROENTEROLOGY

## 2020-06-14 NOTE — PROGRESS NOTES
Problem: Falls - Risk of  Goal: *Absence of Falls  Description: Document [place] Fall Risk and appropriate interventions in the flowsheet. Outcome: Progressing Towards Goal  Note: Fall Risk Interventions:  Mobility Interventions: Bed/chair exit alarm, OT consult for ADLs, Patient to call before getting OOB, PT Consult for mobility concerns, PT Consult for assist device competence, Strengthening exercises (ROM-active/passive), Utilize walker, cane, or other assistive device         Medication Interventions: Assess postural VS orthostatic hypotension, Bed/chair exit alarm, Evaluate medications/consider consulting pharmacy, Patient to call before getting OOB    Elimination Interventions: Bed/chair exit alarm, Call light in reach              Problem: Patient Education: Go to Patient Education Activity  Goal: Patient/Family Education  Outcome: Progressing Towards Goal     Problem: Pressure Injury - Risk of  Goal: *Prevention of pressure injury  Description: Document Armen Scale and appropriate interventions in the flowsheet. Outcome: Progressing Towards Goal  Note: Pressure Injury Interventions:  Sensory Interventions: Assess changes in LOC, Assess need for specialty bed, Avoid rigorous massage over bony prominences, Check visual cues for pain, Discuss PT/OT consult with provider, Float heels, Keep linens dry and wrinkle-free, Maintain/enhance activity level, Minimize linen layers, Monitor skin under medical devices, Pad between skin to skin, Turn and reposition approx.  every two hours (pillows and wedges if needed)    Moisture Interventions: Check for incontinence Q2 hours and as needed, Internal/External urinary devices, Minimize layers, Offer toileting Q_hr, Assess need for specialty bed, Apply protective barrier, creams and emollients, Absorbent underpads, Maintain skin hydration (lotion/cream), Moisture barrier    Activity Interventions: Increase time out of bed, PT/OT evaluation, Pressure redistribution bed/mattress(bed type), Assess need for specialty bed    Mobility Interventions: Assess need for specialty bed, Chair cushion, Float heels, HOB 30 degrees or less, Pressure redistribution bed/mattress (bed type), PT/OT evaluation, Turn and reposition approx.  every two hours(pillow and wedges)    Nutrition Interventions: Document food/fluid/supplement intake, Offer support with meals,snacks and hydration    Friction and Shear Interventions: Apply protective barrier, creams and emollients, Feet elevated on foot rest, HOB 30 degrees or less, Lift team/patient mobility team, Minimize layers                Problem: Patient Education: Go to Patient Education Activity  Goal: Patient/Family Education  Outcome: Progressing Towards Goal     Problem: Patient Education: Go to Patient Education Activity  Goal: Patient/Family Education  Outcome: Progressing Towards Goal     Problem: Patient Education: Go to Patient Education Activity  Goal: Patient/Family Education  Outcome: Progressing Towards Goal     Problem: Patient Education: Go to Patient Education Activity  Goal: Patient/Family Education  Outcome: Progressing Towards Goal

## 2020-06-15 LAB
ANION GAP SERPL CALC-SCNC: 5 MMOL/L (ref 5–15)
BUN SERPL-MCNC: 35 MG/DL (ref 6–20)
BUN/CREAT SERPL: 97 (ref 12–20)
CALCIUM SERPL-MCNC: 8.3 MG/DL (ref 8.5–10.1)
CHLORIDE SERPL-SCNC: 115 MMOL/L (ref 97–108)
CK SERPL-CCNC: 310 U/L (ref 26–192)
CO2 SERPL-SCNC: 23 MMOL/L (ref 21–32)
CREAT SERPL-MCNC: 0.36 MG/DL (ref 0.55–1.02)
ERYTHROCYTE [DISTWIDTH] IN BLOOD BY AUTOMATED COUNT: 18.6 % (ref 11.5–14.5)
GLUCOSE BLD STRIP.AUTO-MCNC: 101 MG/DL (ref 65–100)
GLUCOSE BLD STRIP.AUTO-MCNC: 123 MG/DL (ref 65–100)
GLUCOSE BLD STRIP.AUTO-MCNC: 129 MG/DL (ref 65–100)
GLUCOSE BLD STRIP.AUTO-MCNC: 99 MG/DL (ref 65–100)
GLUCOSE SERPL-MCNC: 104 MG/DL (ref 65–100)
HCT VFR BLD AUTO: 43.8 % (ref 35–47)
HGB BLD-MCNC: 14.5 G/DL (ref 11.5–16)
MAGNESIUM SERPL-MCNC: 2 MG/DL (ref 1.6–2.4)
MCH RBC QN AUTO: 22.3 PG (ref 26–34)
MCHC RBC AUTO-ENTMCNC: 33.1 G/DL (ref 30–36.5)
MCV RBC AUTO: 67.3 FL (ref 80–99)
NRBC # BLD: 0 K/UL (ref 0–0.01)
NRBC BLD-RTO: 0 PER 100 WBC
PHOSPHATE SERPL-MCNC: 2.3 MG/DL (ref 2.6–4.7)
PLATELET # BLD AUTO: 166 K/UL (ref 150–400)
PMV BLD AUTO: ABNORMAL FL (ref 8.9–12.9)
POTASSIUM SERPL-SCNC: 4.6 MMOL/L (ref 3.5–5.1)
RBC # BLD AUTO: 6.51 M/UL (ref 3.8–5.2)
SERVICE CMNT-IMP: ABNORMAL
SERVICE CMNT-IMP: NORMAL
SODIUM SERPL-SCNC: 143 MMOL/L (ref 136–145)
WBC # BLD AUTO: 14.9 K/UL (ref 3.6–11)

## 2020-06-15 PROCEDURE — 65660000000 HC RM CCU STEPDOWN

## 2020-06-15 PROCEDURE — 85027 COMPLETE CBC AUTOMATED: CPT

## 2020-06-15 PROCEDURE — 74011250637 HC RX REV CODE- 250/637: Performed by: HOSPITALIST

## 2020-06-15 PROCEDURE — 94760 N-INVAS EAR/PLS OXIMETRY 1: CPT

## 2020-06-15 PROCEDURE — 82962 GLUCOSE BLOOD TEST: CPT

## 2020-06-15 PROCEDURE — 97530 THERAPEUTIC ACTIVITIES: CPT | Performed by: OCCUPATIONAL THERAPIST

## 2020-06-15 PROCEDURE — 74011250636 HC RX REV CODE- 250/636: Performed by: HOSPITALIST

## 2020-06-15 PROCEDURE — 80048 BASIC METABOLIC PNL TOTAL CA: CPT

## 2020-06-15 PROCEDURE — 83735 ASSAY OF MAGNESIUM: CPT

## 2020-06-15 PROCEDURE — 82550 ASSAY OF CK (CPK): CPT

## 2020-06-15 PROCEDURE — 87635 SARS-COV-2 COVID-19 AMP PRB: CPT

## 2020-06-15 PROCEDURE — 36415 COLL VENOUS BLD VENIPUNCTURE: CPT

## 2020-06-15 PROCEDURE — 74011636637 HC RX REV CODE- 636/637: Performed by: INTERNAL MEDICINE

## 2020-06-15 PROCEDURE — 97535 SELF CARE MNGMENT TRAINING: CPT | Performed by: OCCUPATIONAL THERAPIST

## 2020-06-15 PROCEDURE — 84100 ASSAY OF PHOSPHORUS: CPT

## 2020-06-15 RX ORDER — SODIUM,POTASSIUM PHOSPHATES 280-250MG
2 POWDER IN PACKET (EA) ORAL ONCE
Status: COMPLETED | OUTPATIENT
Start: 2020-06-15 | End: 2020-06-15

## 2020-06-15 RX ADMIN — Medication 10 ML: at 22:08

## 2020-06-15 RX ADMIN — AMLODIPINE BESYLATE 5 MG: 5 TABLET ORAL at 10:54

## 2020-06-15 RX ADMIN — Medication 10 ML: at 17:55

## 2020-06-15 RX ADMIN — PREDNISONE 60 MG: 20 TABLET ORAL at 10:54

## 2020-06-15 RX ADMIN — POTASSIUM & SODIUM PHOSPHATES POWDER PACK 280-160-250 MG 2 PACKET: 280-160-250 PACK at 10:54

## 2020-06-15 RX ADMIN — CARBAMIDE PEROXIDE 6.5% 5 DROP: 6.5 LIQUID AURICULAR (OTIC) at 17:56

## 2020-06-15 RX ADMIN — Medication 10 ML: at 04:05

## 2020-06-15 RX ADMIN — IRBESARTAN 300 MG: 150 TABLET, FILM COATED ORAL at 22:08

## 2020-06-15 RX ADMIN — CARBAMIDE PEROXIDE 6.5% 5 DROP: 6.5 LIQUID AURICULAR (OTIC) at 10:54

## 2020-06-15 RX ADMIN — ENOXAPARIN SODIUM 40 MG: 40 INJECTION SUBCUTANEOUS at 13:37

## 2020-06-15 NOTE — PROGRESS NOTES
Problem: Self Care Deficits Care Plan (Adult)  Goal: *Acute Goals and Plan of Care (Insert Text)  Description:   FUNCTIONAL STATUS PRIOR TO ADMISSION: Pt reports living alone in single story home with ramped entrance, reporting she has Max A from daily caregiver (x4 hours daily), recently (past month), being bathed seated at [facility].  1 month ago pt reports she was standing with RW at walk-in shower with Max A for bathing. Pt reports sleeping in recliner. DME needs met. HOME SUPPORT: The patient lived with alone with paid caregiver assist.    Occupational Therapy Goals  Initiated 6/8/2020, Goals reviewed and revised PRN as per 6/15 weekly Reevaluation. 1.  Patient will perform self-feeding with minimal assistance within 7 day(s). Omit goal, patient is NPO and on tub feeds. 2.  Patient will perform grooming with moderate assistance  within 7 day(s). Not met, continue goal with elbows propped and use of AE. 3.  Patient will perform upper body dressing with Max assistance  within 7 day(s). Not met, continue goal.  4.  Patient will perform toilet transfers, EOB to [facility], with maximal assistance within 7 day(s). Not met, change to roll in bed with max A to be placed on bed pan. 5.  Patient will perform all aspects of toileting with maximal assistance within 7 day(s). Not met, change to bladder hygiene. 6.  Patient will utilize energy conservation techniques during functional activities with Min verbal cues within 7 day(s). Continue goal.   7.  Patient will perform UB sponge bathing, supine with HOB raised, with maximum assistance within 7 day(s).  Established 6/15            Outcome: Progressing Towards Goal    OCCUPATIONAL THERAPY TREATMENT/WEEKLY RE-ASSESSMENT  Patient: Lizzy Beckett (69 y.o. female)  Date: 6/15/2020  Diagnosis: Polymyositis ([facility] [address] [M33.20]   <principal problem not specified>  Procedure(s) (LRB):  PERCUTANEOUS ENDOSCOPIC GASTROSTOMY TUBE INSERTION (N/A) 4 Days Post-Op  Precautions: Fall  Chart, occupational therapy assessment, plan of care, and goals were reviewed. ASSESSMENT  Patient continues with skilled OT services and is not progressing towards goals. She remains total A for most ADLs and bed mobility, 2/2 continued severe weakness which is most significant proximally. Her balance is poor for EOB sitting, but she is able to assist with correct her sitting posture to midline. Patient's neck is very weak and she is unable to hold her head up when in sitting. Decreased neck ROM also noted and is likely due to soft tissue tightness. She is able to move all extremities with assistance, but minimal movement is noted for B shoulder flexion and abduction 2/2 to the severity of her weakness. The patient does have enough control of her B hands, wrists and elbows to allow her to perform some grooming ADLs if her UE were propped at elbows with shoulders flexed. She did perform some grooming with mod A today. Patient is very motivated and receptive to OT intervention. She will benefit from continued OT and will need SNF rehab after discharge. If her strength does not improve in the rehab setting they will have to focus on power WC fitting, positioning and other compensatory rather than restorative strategies. PLAN :  Goals have been updated based on progression since last assessment. Patient continues to benefit from skilled intervention to address the above impairments. Continue to follow patient 3 times a week to address goals. Recommendation for discharge: (in order for the patient to meet his/her long term goals)  Therapy up to 5 days/week in SNF setting           OBJECTIVE DATA SUMMARY:   Cognitive/Behavioral Status:  Neurologic State: Alert  Orientation Level: Oriented X4  Cognition: Appropriate for age attention/concentration; Follows commands        Safety/Judgement: Insight into deficits    Functional Mobility and Transfers for ADLs:  Bed Mobility:  Rolling:  Total assistance  Supine to Sit: Total assistance  Sit to Supine: Total assistance  Scooting: Total assistance    Balance:  Sitting: Impaired  Sitting - Static: Poor (constant support)      ADL Intervention:  Grooming  Grooming Assistance: Set-up; Supervision(to suction mouth with yanker)  Washing Face: Moderate assistance    Cognitive Retraining  Safety/Judgement: Insight into deficits    Therapeutic Exercises/Balance:   Performed trunk rotation, lateral, posterior and anterior weight shifts while in supported sitting, EOB. Strength:  Grossly decreased non-functional   MMT: B shoulders:1/5 for flexion and abduction. 2/5 for scapular elevation, retraction and protraction. B elbow flexion/extension: 2/5  B Wrists: supination pronation 3/5, L wrist flexion/extension 2/5, right wrist flexion/extension 3/5. B digits: flexion 3/5, extension 2/5, thumb opposition 2/5    BLE are grossly 2/5, with weakness more significant proximally. Activity Tolerance:   Poor  Please refer to the flowsheet for vital signs taken during this treatment. After treatment patient left in no apparent distress:   Supine in bed, Call bell within reach and Positioned in L sidelying    COMMUNICATION/COLLABORATION:   The patients plan of care was discussed with: Registered nurse.      Pranav Lyons, OTR/L  Time Calculation: 27 mins

## 2020-06-15 NOTE — PROGRESS NOTES
Problem: Falls - Risk of  Goal: *Absence of Falls  Description: Document [place] Fall Risk and appropriate interventions in the flowsheet. Outcome: Progressing Towards Goal  Note: Fall Risk Interventions:  Mobility Interventions: Bed/chair exit alarm, Patient to call before getting OOB, PT Consult for assist device competence, PT Consult for mobility concerns, Strengthening exercises (ROM-active/passive), Utilize walker, cane, or other assistive device, Communicate number of staff needed for ambulation/transfer         Medication Interventions: Bed/chair exit alarm    Elimination Interventions: Bed/chair exit alarm, Call light in reach, Patient to call for help with toileting needs, Stay With Me (per policy), Toileting schedule/hourly rounds              Problem: Patient Education: Go to Patient Education Activity  Goal: Patient/Family Education  Outcome: Progressing Towards Goal     Problem: Pressure Injury - Risk of  Goal: *Prevention of pressure injury  Description: Document Armen Scale and appropriate interventions in the flowsheet. Outcome: Progressing Towards Goal  Note: Pressure Injury Interventions:  Sensory Interventions: Assess changes in LOC, Assess need for specialty bed, Avoid rigorous massage over bony prominences, Check visual cues for pain, Discuss PT/OT consult with provider, Float heels, Keep linens dry and wrinkle-free, Minimize linen layers, Maintain/enhance activity level, Monitor skin under medical devices, Pad between skin to skin, Turn and reposition approx.  every two hours (pillows and wedges if needed), Pressure redistribution bed/mattress (bed type)    Moisture Interventions: Absorbent underpads, Apply protective barrier, creams and emollients, Assess need for specialty bed, Check for incontinence Q2 hours and as needed, Internal/External urinary devices, Minimize layers, Offer toileting Q_hr, Maintain skin hydration (lotion/cream)    Activity Interventions: Increase time out of bed, Pressure redistribution bed/mattress(bed type), PT/OT evaluation    Mobility Interventions: Assess need for specialty bed, Chair cushion, Float heels, HOB 30 degrees or less, Pressure redistribution bed/mattress (bed type), PT/OT evaluation, Turn and reposition approx.  every two hours(pillow and wedges)    Nutrition Interventions: Document food/fluid/supplement intake, Offer support with meals,snacks and hydration    Friction and Shear Interventions: Feet elevated on foot rest, Apply protective barrier, creams and emollients, Lift team/patient mobility team, Minimize layers, Foam dressings/transparent film/skin sealants, Lift sheet                Problem: Patient Education: Go to Patient Education Activity  Goal: Patient/Family Education  Outcome: Progressing Towards Goal     Problem: Patient Education: Go to Patient Education Activity  Goal: Patient/Family Education  Outcome: Progressing Towards Goal     Problem: Patient Education: Go to Patient Education Activity  Goal: Patient/Family Education  Outcome: Progressing Towards Goal     Problem: Patient Education: Go to Patient Education Activity  Goal: Patient/Family Education  Outcome: Progressing Towards Goal

## 2020-06-15 NOTE — PROGRESS NOTES
Feeding difficulties [R63.3] Esophagitis, Hiatal Hernia, Gastritis, Duodenitis  Feeding difficulties [R63.3] Esophagitis, Hiatal Hernia, Gastritis, Duodenitis  Bedside shift change report given to Ferny (oncoming nurse) by Ferny (offgoing nurse). Report included the following information SBAR, Kardex, ED Summary and OR Summary. Zone Phone:   [phone]      Significant changes during shift:    PT continues with secretions, scopolamine patch seems to be helping. Patient Information    Sundar Del Rio  59 y.o.  6/5/2020  4:13 PM by Annalise Shaffer MD. Sundar Del Rio was admitted from Home    Problem List    Patient Active Problem List    Diagnosis Date Noted    Oropharyngeal dysphagia 06/10/2020    S/p nephrectomy 05/27/2020    History of renal cell cancer 05/27/2020    Vitamin D deficiency 10/10/2018    Age-related osteoporosis without current pathological fracture 08/23/2018    Elevated glucose 01/18/2017    Obstructive sleep apnea syndrome 01/17/2017    Polymyositis ([address] 07/28/2016    HTN (hypertension) 07/28/2016     Past Medical History:   Diagnosis Date    Congestive heart failure ([address]     Hypertension     Pneumonia 10/2018    Polymyositis ([address] 12/2015    in setting of [address] 2015    Polymyositis associated with autoimmune disease ([address]     Renal cancer ([address] 07/08/2016    s/p right nephrectomy.  Respiratory arrest ([address] 11/2015    [facility].     SBO (small bowel obstruction) ([address] 8/3/2017         Core Measures:    PNA:Yes Yes    Activity Status:    OOB to Chair No  Ambulated this shift No   Bed Rest Yes    DVT prophylaxis:    DVT prophylaxis Med- Yes  DVT prophylaxis SCD or ELMER- No     Wounds: (If Applicable)    Wounds- Yes    Location Open areas to buttocks     Patient Safety:    Falls Score Total Score: 2  Safety Level_______  Bed Alarm On? Yes  Sitter?  No    Plan for upcoming shift:   Safety and monitoring        Discharge Plan: Yes SNF or back to home with round the clock care     Active Consults:  IP CONSULT TO HOSPITALIST  IP CONSULT TO RHEUMATOLOGY  IP CONSULT TO PALLIATIVE CARE - PROVIDER  IP CONSULT TO NEUROLOGY  IP CONSULT TO GASTROENTEROLOGY

## 2020-06-15 NOTE — PROGRESS NOTES
CHAN:  - SNF vs Acute Rehab - Likely SNF most appropriate level of care (Denied by [facility] and [facility] - [facility] pending)  - FOC for SNF obtained and referrals sent to 1) [facility] who is reviewing - [id number] [facility] also noted on choice  - Medicaid screen pending - will need UAI completed if approved  - BLS transport upon DC  - Second IMM  - Covid testing ordered per facility request      CM met with patient at bedside to discuss alternative choices for acute rehab, denied by [facility] and [facility] stating SNF rehab most appropriate at this time. [facility] still pending awaiting today's PT/OT notes. Pt is in agreement to send referral to [facility] currently reviewing. Pt remains weak, new PEG placement this admission. Palliative following. COVID testing will be required prior to DC to SNF. CM will continue to follow and remain available for transitional discharge planning.      Sami FRENCHN, RN  Care Management  [phone]

## 2020-06-15 NOTE — PROGRESS NOTES
Nutrition Assessment:    INTERVENTIONS/RECOMMENDATIONS:   Continue current TF regimen     ASSESSMENT:   Chart reviewed; patient with TF infusing at goal rate during visit. She reports tolerating well; no nausea, vomiting, or abdominal pain. Minimal residual. Discharge planning noted. Continue current nutrition plan of care. Diet Order: NPO(PEG: Osmolite 1.2 @ 50 mL/hr + 100 mL q6; provides 1440 kcal, 67g protein, 1384 mL water)  % Eaten:  No data found. Pertinent Medications: [x] Reviewed []Other: Norvasc, Humalog, Prednisone   Pertinent Labs: [x]Reviewed  []Other:   Food Allergies: []None [x]Yes: Peanut     Last BM: 6/13   [x]Active     []Hyperactive  []Hypoactive       [] Absent  BS  Skin:    [] Intact   [] Incision  [] Breakdown   []Edema   [x]Other: moisture acquired gluteal cleft wound    Anthropometrics: Height: 5' 4\" (162.6 cm) Weight: 69.2 kg (152 lb 9.6 oz)    IBW (%IBW):   ( ) UBW (%UBW):   (  %)    BMI: Body mass index is 26.19 kg/m². This BMI is indicative of:  []Underweight   []Normal   [x]Overweight   [] Obesity   [] Extreme Obesity (BMI>40)  Last Weight Metrics:  Weight Loss Metrics 6/15/2020 10/30/2019 9/11/2019 8/14/2019 11/29/2018 11/19/2018 10/16/2018   Today's Wt 152 lb 9.6 oz 147 lb 154 lb 158 lb 167 lb 6.4 oz 161 lb 6 oz 163 lb   BMI 26.19 kg/m2 25.23 kg/m2 26.43 kg/m2 27.12 kg/m2 27.86 kg/m2 26.85 kg/m2 28.87 kg/m2       Estimated Nutrition Needs (Based on): 0671 Kcals/day(-1476 BMR (1135) x 1.2-13 g/kg bw) , 60 g(-72g (1.0-1.2 g/kg bw)) Protein  Carbohydrate:  At Least 130 g/day  Fluids: 1400 mL/day     Pt expected to meet estimated nutrient needs: [x]Yes []No    NUTRITION DIAGNOSES:   Problem:  Swallowing difficulty      Etiology: related to progressive polymyositis      Signs/Symptoms: as evidenced by s/p PEG placement       NUTRITION INTERVENTIONS:   Enteral/Parenteral Nutrition: Other(Continue current TF regimen)               GOAL:   Continue to tolerate TF at goal rate with residual <500 mL next 2-4 days    NUTRITION MONITORING AND EVALUATION   Food/Nutrient Intake Outcomes: Enteral/parenteral nutrition intake  Physical Signs/Symptoms Outcomes: Weight/weight change, Electrolyte and renal profile, GI profile, Glucose profile    Previous Goal Met:   [x] Met              [] Progressing Towards Goal              [] Not Progressing Towards Goal   Previous Recommendations:   [x] Implemented          [] Not Implemented          [] Not Applicable    LEARNING NEEDS (Diet, Food/Nutrient-Drug Interaction):    [x] None Identified   [] Identified and Education Provided/Documented   [] Identified and Pt declined/was not appropriate     Cultural, Rastafari, OR Ethnic Dietary Needs:    [x] None Identified   [] Identified and Addressed     [x] Interdisciplinary Care Plan Reviewed/Documented    [x] Discharge Planning: Continue current TF regimen    [] Participated in Interdisciplinary Rounds    NUTRITION RISK:    [x] Patient At Nutritional Risk             [] Patient Not At [address]  Ext [phone]  Pager [phone]    Weekend Pager [phone]

## 2020-06-15 NOTE — PROGRESS NOTES
Patient still struggles with secretions and frequent suctioning. She may benefit from an anticholinergic medication such as scopolamine patch, patient said this has worked for her in the past. Reached out to tele-hospitalist- Dr. Juan Carlos Nguyen placed orders for transdermal scopolamine patch.

## 2020-06-15 NOTE — PROGRESS NOTES
Hospitalist Progress Note    NAME: Linus Townsend   :  1955   MRN:  [id number]     Admitted on  with worsening weakness for 1 week. Pt with long history of polymyositis. Assessed as possible pneumonia on admission. Cxray on admission :1. Left basilar atelectasis or minimal infiltrate. 2. Otherwise stable atelectasis and scar in the right lung. Cxray : Unchanged left lower lobe retrocardiac consolidative opacity, which may represent atelectasis and/or infection. Possible small left pleural effusion. Clinically no pneumonia. Cxray findings is likely d/t atelectasis d/t poor inspiratory effort. S/p CTX/zithromax on admission, was not continued       Assessment / Plan:  Acute polymyositis flare causing significant debility   - remain very weak   C/o excessive secretion, scopolamine patch started last night ( helped in the past)   - high dose IV solumedrol --> prednisone 60 mg daily ( )   Ck: 3313 on admission -->292--> 396--> 310, likely mild fluctuation, l monitor closely   Wbc fluctuating, likely up d/t steroids , no fever   On SS with steroids, BS normal   - poor inspiratory effort, started on IS   - rheumatology help appreciated:  Primary rheumatologist at [facility]   She has tried mmf, mtx, aza, IVIG and one round of RTX  Her cpk has improved on IV STeroids this admission  Polymyositis patient's mm strength lag behind and need to work with PT at length.   high dose prednisone at discharge 60mg   - neurology help appreciated:  Pt with h/o inflammatory muscle disease that has been at least partially refractory to multiple immunosuppressants. Per documentation she does have serology previously that revealed anti-Sabine positivity. CPK has responded to steroid therapy. 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 Would need to keep an atypical inclusion body myositis in the differential given her refractory nature to treatment. Will need EMG/nerve conduction study.   This can be obtained at a later date or as an outpatient. She should have a full screen for underlying malignancy. This can be coordinated through her outpatient primary rheumatologist.   recommend also sending the antibody for inclusion body myositis, [id number]  agree with steroids and consideration of repeat induction with rituximab. R ear hearing loss, reported by pt on 6/14 ( started 3-4 weeks ago)   - may be due to building up wax, trial of debrox gtt  If not resolving will need OP ENT eval     Hypernatremia resolved  - stop d5w, cont free water      Dysphasia d/t #1   Severe protein calorie malnutrition  - failed speech   PEG 6/11 by GI Dr Dylan Mora   tolerating well so far    Osmolite 1.2 @ 20 mL/hr and advance as tolerated by 10 mL q 8 hours to goal rate of 50 mL/hr + 100 mL water flushes q 6 hours (1440 kcal, 67g protein, 1384 mL water)     HTN   - -150s   Incr in BP can be d/t high dose steroids    cont home Avapro   added norvasc   Need to be adjusted as needed     Bacteremia likely contamination   BC 6/5 coag neg staph in 1/3 bottles  No ;eukocytosis or fever on admission  Repeated BC  6/6 negative   TTE: EF 60%, no vegetations; mild MR, mild TR, pulm HTN 34, small pericardial eff     Hypophosphatemia , supplement as needed   Leukocytosis is likely d/t steroids ( wbc was normal on admission). 6/7 17--> 12.5   Pneumonia was ruled out   History of JOHN  History of renal cancer status post right nephrectomy  18.5 - 24.9 Normal weight / Body mass index is 26.19 kg/m².      6/15: called jv Gomez to update, no answer, massage left to call back with questions     Code status: DNR ( DDNR signed with palliative care team)   MPOA: jv Gomez   Prophylaxis: lovenox     Baseline: lives alone, no children, was ambulating with walker   Recommended Disposition: PT: [facility] or [facility], pending acceptance  covid for placement requested      Subjective:     Chief Complaint / Reason for Physician Visit: following polymyositis flare/ HTN/ debility   Tolerating TF   R ear the same  Excessive secretion requiring suctioning      Discussed with RN events overnight. Review of Systems:  Symptom Y/N Comments  Symptom Y/N Comments   Fever/Chills n   Chest Pain n    Poor Appetite    Edema     Cough    Abdominal Pain     Sputum    Joint Pain     SOB/LOREDO n   Pruritis/Rash     Nausea/vomit    Tolerating PT/OT     Diarrhea    Tolerating Diet     Constipation    Other       Could NOT obtain due to:      Objective:     VITALS:   Last 24hrs VS reviewed since prior progress note. Most recent are:  Patient Vitals for the past 24 hrs:   Temp Pulse Resp BP SpO2   06/15/20 0638 97.5 °F (36.4 °C) 84 16 153/75 99 %   06/15/20 0357 97.6 °F (36.4 °C) 84 16 144/80 98 %   06/14/20 2308 98.3 °F (36.8 °C) 82 16 145/65 98 %   06/14/20 1935 98.4 °F (36.9 °C) 85 16 144/67 95 %   06/14/20 1527 98.2 °F (36.8 °C) 84 16 146/75 96 %   06/14/20 1117 98.3 °F (36.8 °C) 92 16 150/72 97 %       Intake/Output Summary (Last 24 hours) at 6/15/2020 1045  Last data filed at 6/15/2020 0847  Gross per 24 hour   Intake 945 ml   Output --   Net 945 ml        PHYSICAL EXAM:  General: WD, WN. Alert, cooperative, no acute distress, appears very weak/debilitated/chronically ill     EENT:  EOMI. Anicteric sclerae. MMM                          L ear otoscopy 6/14: +++ wax obscuring tympanic membrane    Resp:  CTA bilaterally, no wheezing or rales. No accessory muscle use  CV:  Regular  rhythm,  No edema  GI:  Soft, Non distended, Non tender.  +Bowel sounds. + PEG ( mild discomfort around peg)  Neurologic:  Alert and oriented X 3, normal speech, generalized weakness 2/5   Psych:   Good insight. Not anxious nor agitated  Skin:  No rashes.   No jaundice    Reviewed most current lab test results and cultures  YES  Reviewed most current radiology test results   YES  Review and summation of old records today    NO  Reviewed patient's current orders and MAR    YES  PMH/SH reviewed - no change compared to H&P  ________________________________________________________________________  Care Plan discussed with:    Comments   Patient y    Family  y    RN y    Care Manager y    Consultant                       y Multidiciplinary team rounds were held today with , nursing, pharmacist and clinical coordinator. Patient's plan of care was discussed; medications were reviewed and discharge planning was addressed. ________________________________________________________________________  Total NON critical care TIME:  35  Minutes    Total CRITICAL CARE TIME Spent:   Minutes non procedure based      Comments   >50% of visit spent in counseling and coordination of care y    ________________________________________________________________________  Isaac Borja MD     Procedures: see electronic medical records for all procedures/Xrays and details which were not copied into this note but were reviewed prior to creation of Plan. LABS:  I reviewed today's most current labs and imaging studies.   Pertinent labs include:  Recent Labs     06/15/20  0331 06/13/20  0328   WBC 14.9* 25.2*   HGB 14.5 13.7   HCT 43.8 42.0    219     Recent Labs     06/15/20  0331 06/14/20 0226 06/13/20  0328    145 149*   K 4.6 4.2 4.1   * 117* 120*   CO2 23 23 22   * 130* 113*   BUN 35* 40* 43*   CREA 0.36* 0.50* 0.63   CA 8.3* 8.3* 8.5   MG 2.0  --  2.2   PHOS 2.3*  --  2.5*   ALB  --   --  2.4*   TBILI  --   --  0.4   ALT  --   --  66       Signed: Isaac Borja MD

## 2020-06-16 VITALS
HEART RATE: 79 BPM | BODY MASS INDEX: 26.05 KG/M2 | DIASTOLIC BLOOD PRESSURE: 66 MMHG | WEIGHT: 152.6 LBS | HEIGHT: 64 IN | SYSTOLIC BLOOD PRESSURE: 128 MMHG | RESPIRATION RATE: 18 BRPM | TEMPERATURE: 98.3 F | OXYGEN SATURATION: 100 %

## 2020-06-16 LAB
ANION GAP SERPL CALC-SCNC: 4 MMOL/L (ref 5–15)
BUN SERPL-MCNC: 37 MG/DL (ref 6–20)
BUN/CREAT SERPL: 97 (ref 12–20)
CALCIUM SERPL-MCNC: 8.2 MG/DL (ref 8.5–10.1)
CHLORIDE SERPL-SCNC: 113 MMOL/L (ref 97–108)
CK SERPL-CCNC: 304 U/L (ref 26–192)
CO2 SERPL-SCNC: 25 MMOL/L (ref 21–32)
CREAT SERPL-MCNC: 0.38 MG/DL (ref 0.55–1.02)
ERYTHROCYTE [DISTWIDTH] IN BLOOD BY AUTOMATED COUNT: 18.7 % (ref 11.5–14.5)
GLUCOSE BLD STRIP.AUTO-MCNC: 111 MG/DL (ref 65–100)
GLUCOSE BLD STRIP.AUTO-MCNC: 80 MG/DL (ref 65–100)
GLUCOSE SERPL-MCNC: 112 MG/DL (ref 65–100)
HCT VFR BLD AUTO: 41.7 % (ref 35–47)
HGB BLD-MCNC: 13.8 G/DL (ref 11.5–16)
MAGNESIUM SERPL-MCNC: 2 MG/DL (ref 1.6–2.4)
MCH RBC QN AUTO: 22.2 PG (ref 26–34)
MCHC RBC AUTO-ENTMCNC: 33.1 G/DL (ref 30–36.5)
MCV RBC AUTO: 67 FL (ref 80–99)
NRBC # BLD: 0 K/UL (ref 0–0.01)
NRBC BLD-RTO: 0 PER 100 WBC
PHOSPHATE SERPL-MCNC: 3.2 MG/DL (ref 2.6–4.7)
PLATELET # BLD AUTO: 154 K/UL (ref 150–400)
POTASSIUM SERPL-SCNC: 4.9 MMOL/L (ref 3.5–5.1)
RBC # BLD AUTO: 6.22 M/UL (ref 3.8–5.2)
SARS-COV-2, COV2: NOT DETECTED
SERVICE CMNT-IMP: ABNORMAL
SERVICE CMNT-IMP: NORMAL
SODIUM SERPL-SCNC: 142 MMOL/L (ref 136–145)
SOURCE, COVRS: NORMAL
SPECIMEN SOURCE, FCOV2M: NORMAL
WBC # BLD AUTO: 17.2 K/UL (ref 3.6–11)

## 2020-06-16 PROCEDURE — 74011250637 HC RX REV CODE- 250/637: Performed by: HOSPITALIST

## 2020-06-16 PROCEDURE — 94760 N-INVAS EAR/PLS OXIMETRY 1: CPT

## 2020-06-16 PROCEDURE — 84100 ASSAY OF PHOSPHORUS: CPT

## 2020-06-16 PROCEDURE — 74011636637 HC RX REV CODE- 636/637: Performed by: INTERNAL MEDICINE

## 2020-06-16 PROCEDURE — 80048 BASIC METABOLIC PNL TOTAL CA: CPT

## 2020-06-16 PROCEDURE — 85027 COMPLETE CBC AUTOMATED: CPT

## 2020-06-16 PROCEDURE — 36415 COLL VENOUS BLD VENIPUNCTURE: CPT

## 2020-06-16 PROCEDURE — 83735 ASSAY OF MAGNESIUM: CPT

## 2020-06-16 PROCEDURE — 82550 ASSAY OF CK (CPK): CPT

## 2020-06-16 PROCEDURE — 82962 GLUCOSE BLOOD TEST: CPT

## 2020-06-16 RX ORDER — PANTOPRAZOLE SODIUM 40 MG/1
40 TABLET, DELAYED RELEASE ORAL DAILY
Qty: 30 TAB | Refills: 1 | Status: SHIPPED | OUTPATIENT
Start: 2020-06-16 | End: 2022-01-25

## 2020-06-16 RX ORDER — PREDNISONE 20 MG/1
60 TABLET ORAL
Qty: 30 TAB | Refills: 0 | Status: SHIPPED | OUTPATIENT
Start: 2020-06-17 | End: 2022-01-25

## 2020-06-16 RX ORDER — SCOLOPAMINE TRANSDERMAL SYSTEM 1 MG/1
1 PATCH, EXTENDED RELEASE TRANSDERMAL
Qty: 10 PATCH | Refills: 1 | Status: SHIPPED | OUTPATIENT
Start: 2020-06-17 | End: 2022-01-20

## 2020-06-16 RX ORDER — AMLODIPINE BESYLATE 5 MG/1
5 TABLET ORAL DAILY
Qty: 30 TAB | Refills: 2 | Status: SHIPPED | OUTPATIENT
Start: 2020-06-17 | End: 2022-01-25

## 2020-06-16 RX ORDER — IRBESARTAN 300 MG/1
300 TABLET ORAL
Qty: 30 TAB | Refills: 2 | Status: SHIPPED | OUTPATIENT
Start: 2020-06-16

## 2020-06-16 RX ADMIN — Medication 10 ML: at 05:21

## 2020-06-16 RX ADMIN — CARBAMIDE PEROXIDE 6.5% 5 DROP: 6.5 LIQUID AURICULAR (OTIC) at 09:04

## 2020-06-16 RX ADMIN — AMLODIPINE BESYLATE 5 MG: 5 TABLET ORAL at 09:06

## 2020-06-16 RX ADMIN — PREDNISONE 60 MG: 20 TABLET ORAL at 09:06

## 2020-06-16 NOTE — DISCHARGE INSTRUCTIONS
HOSPITALIST DISCHARGE INSTRUCTIONS    NAME: Humza Clayton   :  1955   MRN:  [id number]     Date/Time:  2020 12:18 PM    ADMIT DATE: 2020     DISCHARGE DATE: 2020     DISCHARGE DIAGNOSIS:  Polymyositis  Rhabdomyolysis  Dysphagia    MEDICATIONS:  · It is important that you take the medication exactly as they are prescribed. · Keep your medication in the bottles provided by the pharmacist and keep a list of the medication names, dosages, and times to be taken in your wallet. · Do not take other medications without consulting your doctor. Pain Management: per above medications    What to do at Home    Recommended diet:  PEG feeding at 50 ml/hr    Recommended activity: Activity as tolerated    If you have questions regarding the hospital related prescriptions or hospital related issues please call [facility] at . If you experience any of the following symptoms then please call your primary care physician or return to the emergency room if you cannot get hold of your doctor:  Fever, chills, nausea, vomiting, diarrhea, change in mentation, falling, bleeding, shortness of breath,     Follow Up:  Dr. Joelle Campbell MD  you are to call and set up an appointment to see them in 7-10 days. Information obtained by :  I understand that if any problems occur once I am at home I am to contact my physician. I understand and acknowledge receipt of the instructions indicated above.                                                                                                                                            Physician's or R.N.'s Signature                                                                  Date/Time                                                                                                                                              Patient or Representative Signature Date/Time

## 2020-06-16 NOTE — PROGRESS NOTES
I have reviewed discharge instructions with the PATIENT PARENT GUARDIAN: patient. The patient verbalized understanding. Discharge medications reviewed with patient and appropriate educational materials and side effects teaching were provided. Follow-up appointments reviewed. Opportunity for questions and clarification was provided. Venous access removed without difficulty. Patient's belongings gathered and sent with patient. Patient is ready for discharge. [facility] and rehab called, report given to Tavon.  Transport in place for 1pm    [email or web address]

## 2020-06-16 NOTE — PROGRESS NOTES
CHAN:  - SNF vs Acute Rehab - SNF most appropriate level of care (Denied by [facility], [facility] and [facility])  - FOC for SNF obtained and referral sent to [facility] (both have accepted) and [facility] also noted on choice - Pt has decided on [facility]  - Medicaid screen pending - will need UAI completed if approved  - BLS transport upon DC  - Second IMM  - Covid testing ordered per facility request/completed this AM    CM met with pt to discuss acceptance by [facility] for tx today if medically stable. Pt stated she discussed with her cousin overnight and would like to change first choice to [facility]. Komal Lomeli, liaison, notified, referral sent to [facility]. Awaiting bed status of [facility]. CM continuing to follow for transitional care needs.      Alexandra FRENCHN, RN  Care Management  [phone]

## 2020-06-16 NOTE — DISCHARGE SUMMARY
Hospitalist Discharge Summary     Patient ID:  Josy Cantrell  [id number]  64 y.o.  1955 6/5/2020    PCP on record: Beulah Mendoza MD    Admit date: 6/5/2020  Discharge date and time: 6/16/2020    DISCHARGE DIAGNOSIS:  Acute polymyositis flare causing significant debility   R ear hearing loss, reported by pt on 6/14 ( started 3-4 weeks ago)    Hypernatremia resolved  Dysphasia d/t #1   Severe protein calorie malnutrition  HTN   Bacteremia likely contamination   Hypophosphatemia ,  Leukocytosis   Pneumonia was ruled out   History of JOHN  History of renal cancer status post right nephrectomy      CONSULTATIONS:  IP CONSULT TO HOSPITALIST  IP CONSULT TO RHEUMATOLOGY  IP CONSULT TO PALLIATIVE CARE - PROVIDER  IP CONSULT TO NEUROLOGY  IP CONSULT TO GASTROENTEROLOGY    Excerpted HPI from H&P of Hailee Díaz MD:  Don Rodriguez is a 59 y.o.  female who presents with past medical history of polymyositis, hypertension, history of renal cancer status post right nephrectomy who presented to ED with complaint of weakness. She has a complicated and long history of questionable polymyositis, has seen multiple doctors at [facility], [address]. At 1 point she was on methotrexate, which was stopped few months ago due to infection. Currently she is not taking any medication for that. She also tried rituximab in the past.     -At baseline she usually uses a recliner bike which she uses to get around, and usually can ambulate with a walker, but for past 1 week her weakness has gotten worse and she is requiring assistance with 2 people to get around. She denies any fever, chills, shortness of breath, palpitation.        We were asked to admit for work up and evaluation of the above problems.        ______________________________________________________________________  DISCHARGE SUMMARY/HOSPITAL COURSE:  for full details see H&P, daily progress notes, labs, consult notes. Acute polymyositis flare causing significant debility   - remain very weak   C/o excessive secretion, scopolamine patch started last night ( helped in the past)   - high dose IV solumedrol --> prednisone 60 mg daily ( 6/12)   Ck: 3313 on admission -->292--> 396--> 310-->304, likely mild fluctuation  Prednisone to be tapered by Rheumatology  Wbc fluctuating, likely up d/t steroids , no fever   BS normal   - poor inspiratory effort, started on IS   - rheumatology help appreciated:  Primary rheumatologist at [facility] , [address] wants to f/u with Dr. Gregoria Hoover, referal provided  She has tried mmf, mtx, aza, IVIG and one round of RTX  Her cpk has improved on IV STeroids this admission  Polymyositis patient's mm strength lag behind and need to work with PT at length.   high dose prednisone at discharge 60mg   - neurology help appreciated:  Pt with h/o inflammatory muscle disease that has been at least partially refractory to multiple immunosuppressants.  Per documentation she does have serology previously that revealed anti-Sabine positivity. CPK has responded to steroid therapy.  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  Would need to keep an atypical inclusion body myositis in the differential given her refractory nature to treatment. Will need EMG/nerve conduction study.  This can be obtained at a later date or as an outpatient.   She should have a full screen for underlying malignancy.  This can be coordinated through her outpatient primary rheumatologist.   recommend also sending the antibody for inclusion body myositis, [id number]  agree with steroids and consideration of repeat induction with rituximab.     R ear hearing loss, reported by pt on 6/14 ( started 3-4 weeks ago)   - Improved  -Outpatient ENT f/u     Hypernatremia resolved  - cont free water with tube feeds      Dysphasia d/t #1   Severe protein calorie malnutrition  - failed speech   PEG 6/11 by GI Dr Rich Johnson   tolerating well so far    Osmolite 1.2 @ 20 mL/hr and advance as tolerated by 10 mL q 8 hours to goal rate of 50 mL/hr + 100 mL water flushes q 6 hours (1440 kcal, 67g protein, 1384 mL water)      HTN   - -150s   Incr in BP can be d/t high dose steroids    cont home Avapro   added norvasc   Need to be adjusted as needed      Bacteremia likely contamination   BC 6/5 coag neg staph in 1/3 bottles  No ;eukocytosis or fever on admission  Repeated BC  6/6 negative   TTE: EF 60%, no vegetations; mild MR, mild TR, pulm HTN 34, small pericardial eff      Hypophosphatemia , supplement as needed   Leukocytosis is likely d/t steroids ( wbc was normal on admission). 6/7 17--> 12.5   Pneumonia was ruled out   History of JOHN  History of renal cancer status post right nephrectomy      _______________________________________________________________________  Patient seen and examined by me on discharge day. Pertinent Findings:  Gen:    Not in distress  Chest: Clear lungs  CVS:   Regular rhythm. No edema  Abd:  Soft, not distended, not tender  Neuro:  Alert, oriented x4  _______________________________________________________________________  DISCHARGE MEDICATIONS:   Current Discharge Medication List      START taking these medications    Details   amLODIPine (NORVASC) 5 mg tablet 1 Tab by Per G Tube route daily. Qty: 30 Tab, Refills: 2      predniSONE (DELTASONE) 20 mg tablet Take 60 mg by mouth daily (with breakfast). Qty: 30 Tab, Refills: 0      scopolamine (TRANSDERM-SCOP) 1 mg over 3 days pt3d 1 Patch by TransDERmal route every seventy-two (72) hours. Qty: 10 Patch, Refills: 1      pantoprazole (Protonix) 40 mg tablet Take 1 Tab by mouth daily. Qty: 30 Tab, Refills: 1         CONTINUE these medications which have CHANGED    Details   irbesartan (AVAPRO) 300 mg tablet 1 Tab by Per G Tube route nightly. Qty: 30 Tab, Refills: 2               Patient Follow Up Instructions:    Activity: Activity as tolerated  Diet: PEG feeding at 50 ml/hr with free water flushes 125ml every 4 hours  Wound Care: Reinforce dressing PRN    Follow-up with PCP and Rheumatology in 1-2 weeks   Follow-up tests/labs None  Follow-up Information     Follow up With Specialties Details Why Jann KIRAN [address]  [address]   [address]    Mary Brito MD Internal Medicine  after rehab [facility] [address]  [phone]      Rheumatologist at [address] an appointment as soon as possible for a visit      Parker Sims MD Rheumatology In 1 week  [address]  [phone]          ________________________________________________________________    Risk of deterioration: High    Condition at Discharge:  Stable  __________________________________________________________________    Disposition  SNF/LTC    ____________________________________________________________________    Code Status: DNR/DNI  ___________________________________________________________________      Total time in minutes spent coordinating this discharge (includes going over instructions, follow-up, prescriptions, and preparing report for sign off to her PCP) :  40 minutes    Signed:  Davey Rice MD

## 2020-06-16 NOTE — PROGRESS NOTES
Problem: Falls - Risk of  Goal: *Absence of Falls  Description: Document Nelly Starch Fall Risk and appropriate interventions in the flowsheet. Outcome: Progressing Towards Goal  Note: Fall Risk Interventions:  Mobility Interventions: Bed/chair exit alarm         Medication Interventions: Bed/chair exit alarm    Elimination Interventions: Bed/chair exit alarm              Problem: Patient Education: Go to Patient Education Activity  Goal: Patient/Family Education  Outcome: Progressing Towards Goal     Problem: Pressure Injury - Risk of  Goal: *Prevention of pressure injury  Description: Document Armen Scale and appropriate interventions in the flowsheet.   Outcome: Progressing Towards Goal  Note: Pressure Injury Interventions:  Sensory Interventions: Assess changes in LOC    Moisture Interventions: Absorbent underpads, Apply protective barrier, creams and emollients    Activity Interventions: Pressure redistribution bed/mattress(bed type)    Mobility Interventions: Assess need for specialty bed    Nutrition Interventions: Document food/fluid/supplement intake, Offer support with meals,snacks and hydration    Friction and Shear Interventions: Feet elevated on foot rest                Problem: Patient Education: Go to Patient Education Activity  Goal: Patient/Family Education  Outcome: Progressing Towards Goal     Problem: Patient Education: Go to Patient Education Activity  Goal: Patient/Family Education  Outcome: Progressing Towards Goal     Problem: Patient Education: Go to Patient Education Activity  Goal: Patient/Family Education  Outcome: Progressing Towards Goal     Problem: Patient Education: Go to Patient Education Activity  Goal: Patient/Family Education  Outcome: Progressing Towards Goal

## 2020-06-16 NOTE — PROGRESS NOTES
Bedside shift change report given to ABEL HARRIS (oncoming nurse) by Isidoro Yu (offgoing nurse). Report included the following information SBAR, Kardex, ED Summary and OR Summary.     Zone Phone:   [phone]        Significant changes during shift:    Continues on Osmolite 1.2 bryan at 5 ml/hr with 200 ml O2 flush q 6 hrs. Patient Information     Minh Rojas  59 y.o.  6/5/2020  4:13 PM by Mortimer Kub, MD. Minh Rojas was admitted from Home     Problem List          Patient Active Problem List     Diagnosis Date Noted    Oropharyngeal dysphagia 06/10/2020    S/p nephrectomy 05/27/2020    History of renal cell cancer 05/27/2020    Vitamin D deficiency 10/10/2018    Age-related osteoporosis without current pathological fracture 08/23/2018    Elevated glucose 01/18/2017    Obstructive sleep apnea syndrome 01/17/2017    Polymyositis ([address] 07/28/2016    HTN (hypertension) 07/28/2016           Past Medical History:   Diagnosis Date    Congestive heart failure ([address]      Hypertension      Pneumonia 10/2018    Polymyositis ([address] 12/2015     in setting of RCC 2015    Polymyositis associated with autoimmune disease ([address]      Renal cancer ([address] 07/08/2016     s/p right nephrectomy.  Respiratory arrest ([address] 11/2015     [facility].     SBO (small bowel obstruction) ([address] 8/3/2017            Core Measures:     PNA:Yes Yes     Activity Status:     OOB to Chair No  Ambulated this shift No   Bed Rest Yes     DVT prophylaxis:     DVT prophylaxis Med- Yes  DVT prophylaxis SCD or ELMER- No      Wounds: (If Applicable)     Wounds- Yes     Location Open areas to buttocks      Patient Safety:     Falls Score Total Score: 2  Safety Level_______  Bed Alarm On? Yes  Sitter?  No     Plan for upcoming shift:   Safety and monitoring           Discharge Plan: Yes SNF or back to home with round the clock care      Active Consults:  IP CONSULT TO HOSPITALIST  IP CONSULT TO RHEUMATOLOGY  IP CONSULT TO PALLIATIVE CARE - PROVIDER  IP CONSULT TO NEUROLOGY  IP CONSULT TO GASTROENTEROLOGY

## 2020-06-16 NOTE — PROGRESS NOTES
CHAN plan:  -  Short term rehab at [facility]. Pt will go into Room 317 B.  -  [facility] scheduled for pick-up at 1:00 pm. PCS, DDNR, Facesheet, and H&P placed on chart for transport. -  Nursing will need to call report to [facility] at [phone]. Please send AVS, MAR, and any written prescriptions to facility in an envelope. Please send two bottles of Osmolite with pt to facility.  -  Plan discussed with pt and cousin who are both in agreement with plan. Medicare pt has received, reviewed, and signed 2nd IM letter informing them of their right to appeal the discharge. Signed copy has been placed on pt bedside chart. No further concerns indicated at this time. AVS updated. Patient is ready for discharge from a Care Management standpoint. Care Management Interventions  PCP Verified by CM: Yes  Mode of Transport at Discharge: [facility] Time of Discharge: 1300  Transition of Care Consult (CM Consult): SNF  Partner SNF: Yes  MyChart Signup: No  Discharge Durable Medical Equipment: No  Health Maintenance Reviewed: Yes  Physical Therapy Consult: Yes  Occupational Therapy Consult: Yes  Speech Therapy Consult: Yes  Current Support Network: Has Personal Caregivers  Confirm Follow Up Transport: Family  The Plan for Transition of Care is Related to the Following Treatment Goals : SNF  The Patient and/or Patient Representative was Provided with a Choice of Provider and Agrees with the Discharge Plan?: Yes  Freedom of Choice List was Provided with Basic Dialogue that Supports the Patient's Individualized Plan of Care/Goals, Treatment Preferences and Shares the Quality Data Associated with the Providers?: Yes   Resource Information Provided?: No  Discharge Location  Discharge Placement: Skilled nursing facility        Transition of Care Plan to SNF/Rehab    SNF/Rehab Transition:  Patient has been accepted to [facility] and meets criteria for admission.    Patient will transported by [facility] and expected to leave at 1:00 pm.    Communication to Patient/Family:  Met with patient and cousin, Ernestina Hunter, and they are agreeable to the transition plan. Communication to SNF/Rehab:  Bedside RN, Frank Franks, has been notified to update the transition plan to the facility and call report (phone number [phone]). Discharge information has been updated on the AVS.     Discharge instructions to be fax'd to facility at [facility] # [phone]). Nursing Please include all hard scripts for controlled substances, med rec and dc summary, and AVS in packet. SNF/Rehab Transition:  Patient to follow-up with Home Health: [address]  PCP/Specialist: Dr. Darwin Casillas (PCP), Rheumatologist at [address] [place] [address] and confirmed with facility admissions director, Darcie Lyn ([phone]), [facility] can manage the patient care needs for the following:     Kasey Davila with (X) only those applicable:    Medication:  [x]  Medications will be available at the facility  []  IV Antibiotics  []  Controlled Substance - hard copy to be sent with patient   []  Weekly Labs   Documents:  [] Hard RX  [x] MAR  [x] Kardex  [x] AVS  [x]Transfer Summary  [x]Discharge   Equipment:  []  CPAP/BiPAP  []  Wound Vacuum  []  Robledo or Urinary Device  []  PICC/Central Line  []  Nebulizer  []  Ventilator   Treatment:  []Isolation (for MRSA, VRE, etc.)  []Surgical Drain Management  []Tracheostomy Care  [x]Dressing Changes - wounds on bilateral buttocks, PEG site  []Dialysis with transportation and chair time  [x]PEG Care  []Oxygen  []Daily Weights for Heart Failure   Dietary:  []Any diet limitations  [x]Tube Feedings   []Total Parenteral Management (TPN)   Eligible for Medicaid Long Term Services and Supports  Yes:  [] Eligible for medical assistance or will become eligible within 180 days and UAI completed. [x] Provider/Patient and/or support system has requested screening.   [] UAI copy provided to patient or responsible party  [x] UAI unavailable at discharge will send once processed to SNF provider. [] UAI unavailable at discharged mailed to patient  No:   [] Private pay and is not financially eligible for Medicaid within the next 180 days. [] Reside out-of-state. [] A residents of a state owned/operated facility that is licensed  by [facility] or formerly [facility]  [] Enrollment in [facility] hospice services  [] [address]  [] Patient /Family declines to have screening completed or provide financial information for screening     Financial Resources:  [] Medicaid    [] Initiated and application pending   [x] Full coverage - Medicare A&B     Advanced Care Plan:  [x]Surrogate Decision Maker of Care - Eric Swann (cousin)  []POA  [x]Communicated Code Status - DDNR   Other  -  We will send two bottles of Osmolite (tube feeds) since [facility] is not able to get an order in until 6/18/20  -  A referral was sent to [employer] for a Medicaid screening. We will complete a UAI if pt is eligible for Medicaid.        MARTIN Gray  Care Manager  [phone]

## 2020-06-17 ENCOUNTER — PATIENT OUTREACH (OUTPATIENT)
Dept: CASE MANAGEMENT | Age: 65
End: 2020-06-17

## 2020-06-17 NOTE — PROGRESS NOTES
Community Care Team documentation for patient in [facility]  Initial Follow Up       Patient was discharged to [address] [place] [address], [facility]. Information included in this progress note has been provided to SNF. Hospital Admission and Diagnosis:  ED [facility] 6/5/2020 - 6/16/2020 Acute polymyositis flare causing significant debility    PCP : Samuel Jeffery MD    SNF Attending:  Drew Hiceky MD    Spoke with SNF team. Patient admitted on 6/16, therapy evaluations to be completed today. Community Care Team will follow up weekly with [facility] until discharge. Medications were not reconciled and general patient assessment was not completed during this [facility] outreach.       Freddy Calix RN  [phone]

## 2020-06-24 ENCOUNTER — PATIENT OUTREACH (OUTPATIENT)
Dept: CASE MANAGEMENT | Age: 65
End: 2020-06-24

## 2020-06-24 NOTE — PROGRESS NOTES
Community Care Team Documentation for Patient in [facility]  Subsequent Follow up     Patient remains at [address] ([facility]). See previous [facility] Team notes. Spoke with SNF team. SNF medical update:  Patient is NPO and had a peg due to dysphasia. PT/OT update:  Currently patient is dependent for bed mobility, transfers and all ADL's. LOS/Disposition: Projected LOS 4 weeks. Medications were not reconciled and general patient assessment was not completed during this skilled nursing facility outreach.      Gina Lanza RN  [phone]

## 2020-07-01 ENCOUNTER — PATIENT OUTREACH (OUTPATIENT)
Dept: CASE MANAGEMENT | Age: 65
End: 2020-07-01

## 2020-07-04 NOTE — PROGRESS NOTES
Community Care Team Documentation for Patient in [facility]  Subsequent Follow up     Patient remains at [address] ([facility]). See previous [facility] Team notes. Spoke with SNF team.  SNF medical update:   lbs 6/30. PT/OT/Stupdate: Currently bed mobility and transfers max assist; non-ambulatory;  UB ADL's bathing and dressing max assist; LB ADL's bathing and dressing max assist- dependent; toileting dependent; speech is working with pt for mild oral dysphagia. LOS/Disposition: Projected 4 weeks. Medications were not reconciled and general patient assessment was not completed during this skilled nursing facility outreach.      Lin Arteaga RN  [phone]

## 2020-07-08 ENCOUNTER — PATIENT OUTREACH (OUTPATIENT)
Dept: CASE MANAGEMENT | Age: 65
End: 2020-07-08

## 2020-07-09 ENCOUNTER — HOSPITAL ENCOUNTER (OUTPATIENT)
Dept: MAMMOGRAPHY | Age: 65
Discharge: HOME | End: 2020-07-09
Attending: INTERNAL MEDICINE

## 2020-07-09 NOTE — PROGRESS NOTES
Community Care Team Documentation for Patient in [facility]  Subsequent Follow up     Patient remains at [address] ([facility]). See previous [facility] Team notes. Spoke with SNF team. SNF medical update:  .5 lbs on 7/7. PT/OT update: Currently bed mobility mod assist; transfers max assist;  non ambulatory; UB ADL's, bathing and dressing max assist; LB ADL's bathing, dressing  and toileting dependent. ST is  following pt  has a peg tube and tr id ialing ice chips. LOS/Disposition: Projected 4 weeks. Medications were not reconciled and general patient assessment was not completed during this skilled nursing facility outreach.      Lin Arteaga RN  [phone]

## 2020-07-10 DIAGNOSIS — Z12.39 BREAST SCREENING: ICD-10-CM

## 2020-07-10 DIAGNOSIS — N64.4 MASTODYNIA OF LEFT BREAST: ICD-10-CM

## 2020-07-10 DIAGNOSIS — N64.4 MASTALGIA IN FEMALE: Primary | ICD-10-CM

## 2020-07-10 DIAGNOSIS — Z12.39 ENCOUNTER FOR SCREENING FOR MALIGNANT NEOPLASM OF BREAST: ICD-10-CM

## 2020-07-10 NOTE — PROGRESS NOTES
Patient currently a skilled resident at [facility]. She was admitted here after hospitalization for polymyositis. She wass supposed to have routine mammogram performed yesterday. However, patient initiated cancelling it since she is here for skilled care. Patient started having left breast discomfort and heaviness approximately 5 days ago. There is no redness, nodules, lumps or masses palpated. No apparent cellulitis. Rates discomfort as 2-3 out of 10, but not resolving. She would like her mammogram and ultrasound rescheduled for her breasts.

## 2020-07-15 ENCOUNTER — PATIENT OUTREACH (OUTPATIENT)
Dept: CASE MANAGEMENT | Age: 65
End: 2020-07-15

## 2020-07-15 NOTE — PROGRESS NOTES
Community Care Team Documentation for Patient in [facility]  Subsequent Follow up     Patient remains at [address] ([facility]). See previous [facility] Team notes. Spoke with SNF team.  PT/OT update: Currently bed mobilty and transfers mod assist; non-ambulatory; UB ADL's bathing and dressing max assist; LB ADL's bathing, dressing and toileting dependent. ST for pet tube management continues to trial ice chips. LOS/Disposition: Projected 3 weeks    Medications were not reconciled and general patient assessment was not completed during this skilled nursing facility outreach.      Ankit Price RN   [phone]

## 2020-07-16 DIAGNOSIS — M33.22 POLYMYOSITIS WITH MYOPATHY (HCC): Primary | ICD-10-CM

## 2020-07-16 NOTE — PROGRESS NOTES
Patient seen this morning via virtual visit by Dr. Mario Bernabe from [facility] for polymyositis. Orders were received for the following: methotrexate 20mg per peg weekly, folic acid 1mg per PEG daily, prednisone 50mg per PEG daily, IVIG infusion at 2grams/kg spread out over 5 days dur to previous intolerability, two week after IVIG retuximab 1000mg x 2 doses  by 2 weeks, Bactrim DS 1 tablet three times per week for PCP pneumonia prophylaxis, and CBC, CMP, and CK monthly. Once she has completed first round of IVIG, patient should have dosing monthly. Also requested new CT Chest, Abdomen, Pelvis to rule out interstitial lung disease and metastatic disease.

## 2020-07-17 DIAGNOSIS — M33.20 POLYMYOSITIS (HCC): Primary | ICD-10-CM

## 2020-07-21 ENCOUNTER — HOSPITAL ENCOUNTER (OUTPATIENT)
Dept: MAMMOGRAPHY | Age: 65
Discharge: HOME | End: 2020-07-21
Attending: NURSE PRACTITIONER
Payer: COMMERCIAL

## 2020-07-21 ENCOUNTER — DOCUMENTATION ONLY (OUTPATIENT)
Dept: INTERNAL MEDICINE CLINIC | Age: 65
End: 2020-07-21

## 2020-07-21 DIAGNOSIS — R10.9 ABDOMINAL PAIN, UNSPECIFIED ABDOMINAL LOCATION: ICD-10-CM

## 2020-07-21 DIAGNOSIS — Z12.31 BREAST CANCER SCREENING BY MAMMOGRAM: ICD-10-CM

## 2020-07-21 DIAGNOSIS — M33.20 POLYMYOSITIS (HCC): Primary | ICD-10-CM

## 2020-07-21 DIAGNOSIS — M33.20 POLYMYOSITIS (HCC): ICD-10-CM

## 2020-07-21 DIAGNOSIS — Z85.528 PERSONAL HISTORY OF RENAL CELL CARCINOMA: Primary | ICD-10-CM

## 2020-07-21 DIAGNOSIS — R06.00 DYSPNEA, UNSPECIFIED TYPE: ICD-10-CM

## 2020-07-21 PROCEDURE — 77067 SCR MAMMO BI INCL CAD: CPT

## 2020-07-21 NOTE — PROGRESS NOTES
Have been discussing patients case with rheumatology from [facility], Dr. Pancho Crum concurrently with Dr[facility] Gabino [facility]. Already ordered CT abdomen, pelvis, chest with and without contrast. Also recommended MRI of pelvis, right thigh and left thigh with and without contrast to evaluate for polmyositis before further treatment. Patient will also be seen by neurology Dr. Mariela Magdaleno for muscle biopsy.

## 2020-07-21 NOTE — PROGRESS NOTES
Received report from radiologist regarding generalized edema on mammogram ordered by HUMZA Donaldson. Patient currently inpatient with [facility]. She is undergoing further treatment and evaluation for polymyositis.    Defer fluid management plan to this inpatient team.     Andrés Mejias MD

## 2020-07-22 ENCOUNTER — PATIENT OUTREACH (OUTPATIENT)
Dept: CASE MANAGEMENT | Age: 65
End: 2020-07-22

## 2020-07-22 ENCOUNTER — HOSPITAL ENCOUNTER (OUTPATIENT)
Dept: CT IMAGING | Age: 65
Discharge: HOME | End: 2020-07-22
Attending: NURSE PRACTITIONER
Payer: MEDICARE

## 2020-07-22 ENCOUNTER — HOSPITAL ENCOUNTER (OUTPATIENT)
Dept: CT IMAGING | Age: 65
End: 2020-07-22
Attending: NURSE PRACTITIONER
Payer: MEDICARE

## 2020-07-22 DIAGNOSIS — M33.20 POLYMYOSITIS (HCC): ICD-10-CM

## 2020-07-22 DIAGNOSIS — R10.9 ABDOMINAL PAIN, UNSPECIFIED ABDOMINAL LOCATION: ICD-10-CM

## 2020-07-22 DIAGNOSIS — R06.02 SHORTNESS OF BREATH: ICD-10-CM

## 2020-07-22 DIAGNOSIS — R10.9 ABDOMINAL PAIN: ICD-10-CM

## 2020-07-22 DIAGNOSIS — Z85.528 PERSONAL HISTORY OF RENAL CELL CARCINOMA: ICD-10-CM

## 2020-07-22 PROCEDURE — 74011636320 HC RX REV CODE- 636/320: Performed by: NURSE PRACTITIONER

## 2020-07-22 PROCEDURE — 71260 CT THORAX DX C+: CPT

## 2020-07-22 PROCEDURE — 74177 CT ABD & PELVIS W/CONTRAST: CPT

## 2020-07-22 RX ORDER — SODIUM CHLORIDE 0.9 % (FLUSH) 0.9 %
10 SYRINGE (ML) INJECTION
Status: COMPLETED | OUTPATIENT
Start: 2020-07-22 | End: 2020-07-22

## 2020-07-22 RX ADMIN — IOHEXOL 20 ML: 240 INJECTION, SOLUTION INTRATHECAL; INTRAVASCULAR; INTRAVENOUS; ORAL at 16:31

## 2020-07-22 RX ADMIN — IOPAMIDOL 100 ML: 755 INJECTION, SOLUTION INTRAVENOUS at 16:30

## 2020-07-22 RX ADMIN — Medication 10 ML: at 16:30

## 2020-07-22 NOTE — PROGRESS NOTES
Community Care Team Documentation for Patient in [facility]  Subsequent Follow up     Patient remains at [address] ([facility]). See previous [facility] Team notes. Spoke with SNF team.  PT/OT update: Currently bed moibility and transfers mod assist; non-ambulatory, all ADL's and toileting max assit. ST for peg tube management, continues to trial ice chips with progression. LOS/Disposition:  Projected 2 weeks. Medications were not reconciled and general patient assessment was not completed during this skilled nursing facility outreach.      Jc Damian RN  [phone]

## 2020-07-23 NOTE — PROGRESS NOTES
Reviewed results and will disseminate results to specialists Dr. Fareed Castro and Dr. Christy Patrick to discuss further recommendations.

## 2020-07-23 NOTE — PROGRESS NOTES
Patients results reviewed and will disseminate information to specialists Dr. Katie Vee and Dr. Hanna Cleaning for review and recommendations.

## 2020-07-29 ENCOUNTER — PATIENT OUTREACH (OUTPATIENT)
Dept: CASE MANAGEMENT | Age: 65
End: 2020-07-29

## 2020-07-30 NOTE — PROGRESS NOTES
Community Care Team Documentation for Patient in [facility]  Subsequent Follow up     Patient remains at [address] ([facility]). See previous [facility] Team notes. Spoke with SNF team.   PT/OT update: Currently bed mobility mod assist; transfers mod to max assist; non-ambulatory;  UB ADL's max assist; LB ADL's and toileting dependent. ST continues with ice chip tirals. LOS/Disposition: ELOS 4 weeks. Medications were not reconciled and general patient assessment was not completed during this skilled nursing facility outreach.      Hetal Vázquez RN  [phone]

## 2020-08-02 ENCOUNTER — APPOINTMENT (OUTPATIENT)
Dept: CT IMAGING | Age: 65
DRG: 329 | End: 2020-08-02
Attending: EMERGENCY MEDICINE
Payer: MEDICARE

## 2020-08-02 ENCOUNTER — HOSPITAL ENCOUNTER (INPATIENT)
Age: 65
LOS: 10 days | Discharge: NURSING, REHAB OR LONG-TERM CARE | DRG: 329 | End: 2020-08-12
Attending: EMERGENCY MEDICINE | Admitting: SURGERY
Payer: MEDICARE

## 2020-08-02 DIAGNOSIS — J18.9 PNEUMONIA DUE TO INFECTIOUS ORGANISM, UNSPECIFIED LATERALITY, UNSPECIFIED PART OF LUNG: ICD-10-CM

## 2020-08-02 DIAGNOSIS — K56.609 SBO (SMALL BOWEL OBSTRUCTION) (HCC): Primary | ICD-10-CM

## 2020-08-02 LAB
ALBUMIN SERPL-MCNC: 3.2 G/DL (ref 3.5–5)
ALBUMIN/GLOB SERPL: 0.9 {RATIO} (ref 1.1–2.2)
ALP SERPL-CCNC: 83 U/L (ref 45–117)
ALT SERPL-CCNC: 24 U/L (ref 12–78)
ANION GAP SERPL CALC-SCNC: 8 MMOL/L (ref 5–15)
AST SERPL-CCNC: 27 U/L (ref 15–37)
BASOPHILS # BLD: 0 K/UL (ref 0–0.1)
BASOPHILS NFR BLD: 0 % (ref 0–1)
BILIRUB SERPL-MCNC: 0.8 MG/DL (ref 0.2–1)
BUN SERPL-MCNC: 41 MG/DL (ref 6–20)
BUN/CREAT SERPL: 55 (ref 12–20)
CALCIUM SERPL-MCNC: 9.1 MG/DL (ref 8.5–10.1)
CHLORIDE SERPL-SCNC: 99 MMOL/L (ref 97–108)
CO2 SERPL-SCNC: 31 MMOL/L (ref 21–32)
CREAT SERPL-MCNC: 0.74 MG/DL (ref 0.55–1.02)
DIFFERENTIAL METHOD BLD: ABNORMAL
EOSINOPHIL # BLD: 0 K/UL (ref 0–0.4)
EOSINOPHIL NFR BLD: 0 % (ref 0–7)
ERYTHROCYTE [DISTWIDTH] IN BLOOD BY AUTOMATED COUNT: 23.2 % (ref 11.5–14.5)
GLOBULIN SER CALC-MCNC: 3.7 G/DL (ref 2–4)
GLUCOSE SERPL-MCNC: 128 MG/DL (ref 65–100)
HCT VFR BLD AUTO: 46.9 % (ref 35–47)
HGB BLD-MCNC: 15.3 G/DL (ref 11.5–16)
IMM GRANULOCYTES # BLD AUTO: 0.1 K/UL (ref 0–0.04)
IMM GRANULOCYTES NFR BLD AUTO: 1 % (ref 0–0.5)
LIPASE SERPL-CCNC: 111 U/L (ref 73–393)
LYMPHOCYTES # BLD: 0.7 K/UL (ref 0.8–3.5)
LYMPHOCYTES NFR BLD: 7 % (ref 12–49)
MCH RBC QN AUTO: 22.9 PG (ref 26–34)
MCHC RBC AUTO-ENTMCNC: 32.6 G/DL (ref 30–36.5)
MCV RBC AUTO: 70.2 FL (ref 80–99)
MONOCYTES # BLD: 0.6 K/UL (ref 0–1)
MONOCYTES NFR BLD: 6 % (ref 5–13)
NEUTS SEG # BLD: 9 K/UL (ref 1.8–8)
NEUTS SEG NFR BLD: 86 % (ref 32–75)
NRBC # BLD: 0 K/UL (ref 0–0.01)
NRBC BLD-RTO: 0 PER 100 WBC
PLATELET # BLD AUTO: 227 K/UL (ref 150–400)
PMV BLD AUTO: ABNORMAL FL (ref 8.9–12.9)
POTASSIUM SERPL-SCNC: 4.2 MMOL/L (ref 3.5–5.1)
PROT SERPL-MCNC: 6.9 G/DL (ref 6.4–8.2)
RBC # BLD AUTO: 6.68 M/UL (ref 3.8–5.2)
RBC MORPH BLD: ABNORMAL
SODIUM SERPL-SCNC: 138 MMOL/L (ref 136–145)
WBC # BLD AUTO: 10.4 K/UL (ref 3.6–11)

## 2020-08-02 PROCEDURE — 87635 SARS-COV-2 COVID-19 AMP PRB: CPT

## 2020-08-02 PROCEDURE — 74177 CT ABD & PELVIS W/CONTRAST: CPT

## 2020-08-02 PROCEDURE — 77030040831 HC BAG URINE DRNG MDII -A

## 2020-08-02 PROCEDURE — 74011636320 HC RX REV CODE- 636/320: Performed by: EMERGENCY MEDICINE

## 2020-08-02 PROCEDURE — 74011250636 HC RX REV CODE- 250/636: Performed by: SURGERY

## 2020-08-02 PROCEDURE — 65270000029 HC RM PRIVATE

## 2020-08-02 PROCEDURE — 36415 COLL VENOUS BLD VENIPUNCTURE: CPT

## 2020-08-02 PROCEDURE — 83690 ASSAY OF LIPASE: CPT

## 2020-08-02 PROCEDURE — 77030038269 HC DRN EXT URIN PURWCK BARD -A

## 2020-08-02 PROCEDURE — 74011250637 HC RX REV CODE- 250/637: Performed by: SURGERY

## 2020-08-02 PROCEDURE — 85025 COMPLETE CBC W/AUTO DIFF WBC: CPT

## 2020-08-02 PROCEDURE — 74011250636 HC RX REV CODE- 250/636: Performed by: EMERGENCY MEDICINE

## 2020-08-02 PROCEDURE — 99285 EMERGENCY DEPT VISIT HI MDM: CPT

## 2020-08-02 PROCEDURE — 96374 THER/PROPH/DIAG INJ IV PUSH: CPT

## 2020-08-02 PROCEDURE — 74011250636 HC RX REV CODE- 250/636: Performed by: INTERNAL MEDICINE

## 2020-08-02 PROCEDURE — 74011000258 HC RX REV CODE- 258: Performed by: EMERGENCY MEDICINE

## 2020-08-02 PROCEDURE — 80053 COMPREHEN METABOLIC PANEL: CPT

## 2020-08-02 RX ORDER — ONDANSETRON 2 MG/ML
4 INJECTION INTRAMUSCULAR; INTRAVENOUS
Status: COMPLETED | OUTPATIENT
Start: 2020-08-02 | End: 2020-08-02

## 2020-08-02 RX ORDER — AMLODIPINE BESYLATE 5 MG/1
5 TABLET ORAL DAILY
Status: DISCONTINUED | OUTPATIENT
Start: 2020-08-03 | End: 2020-08-03

## 2020-08-02 RX ORDER — HYDRALAZINE HYDROCHLORIDE 20 MG/ML
10 INJECTION INTRAMUSCULAR; INTRAVENOUS
Status: DISCONTINUED | OUTPATIENT
Start: 2020-08-02 | End: 2020-08-12 | Stop reason: HOSPADM

## 2020-08-02 RX ORDER — ONDANSETRON 2 MG/ML
4 INJECTION INTRAMUSCULAR; INTRAVENOUS
Status: DISCONTINUED | OUTPATIENT
Start: 2020-08-02 | End: 2020-08-12 | Stop reason: HOSPADM

## 2020-08-02 RX ORDER — SODIUM CHLORIDE 0.9 % (FLUSH) 0.9 %
5-40 SYRINGE (ML) INJECTION AS NEEDED
Status: DISCONTINUED | OUTPATIENT
Start: 2020-08-02 | End: 2020-08-12 | Stop reason: HOSPADM

## 2020-08-02 RX ORDER — DEXTROSE, SODIUM CHLORIDE, AND POTASSIUM CHLORIDE 5; .45; .15 G/100ML; G/100ML; G/100ML
50 INJECTION INTRAVENOUS CONTINUOUS
Status: DISCONTINUED | OUTPATIENT
Start: 2020-08-02 | End: 2020-08-06

## 2020-08-02 RX ORDER — NALOXONE HYDROCHLORIDE 0.4 MG/ML
0.4 INJECTION, SOLUTION INTRAMUSCULAR; INTRAVENOUS; SUBCUTANEOUS AS NEEDED
Status: DISCONTINUED | OUTPATIENT
Start: 2020-08-02 | End: 2020-08-12 | Stop reason: HOSPADM

## 2020-08-02 RX ORDER — METRONIDAZOLE 500 MG/100ML
500 INJECTION, SOLUTION INTRAVENOUS
Status: COMPLETED | OUTPATIENT
Start: 2020-08-02 | End: 2020-08-03

## 2020-08-02 RX ORDER — DIPHENHYDRAMINE HYDROCHLORIDE 50 MG/ML
25 INJECTION, SOLUTION INTRAMUSCULAR; INTRAVENOUS
Status: DISCONTINUED | OUTPATIENT
Start: 2020-08-02 | End: 2020-08-12 | Stop reason: HOSPADM

## 2020-08-02 RX ORDER — ONDANSETRON 2 MG/ML
INJECTION INTRAMUSCULAR; INTRAVENOUS
Status: DISPENSED
Start: 2020-08-02 | End: 2020-08-03

## 2020-08-02 RX ORDER — PREDNISONE 20 MG/1
60 TABLET ORAL
Status: DISCONTINUED | OUTPATIENT
Start: 2020-08-03 | End: 2020-08-03

## 2020-08-02 RX ORDER — SCOLOPAMINE TRANSDERMAL SYSTEM 1 MG/1
1 PATCH, EXTENDED RELEASE TRANSDERMAL
Status: DISCONTINUED | OUTPATIENT
Start: 2020-08-02 | End: 2020-08-12 | Stop reason: HOSPADM

## 2020-08-02 RX ORDER — SODIUM CHLORIDE 0.9 % (FLUSH) 0.9 %
10 SYRINGE (ML) INJECTION
Status: COMPLETED | OUTPATIENT
Start: 2020-08-02 | End: 2020-08-02

## 2020-08-02 RX ORDER — PANTOPRAZOLE SODIUM 40 MG/1
40 TABLET, DELAYED RELEASE ORAL DAILY
Status: DISCONTINUED | OUTPATIENT
Start: 2020-08-03 | End: 2020-08-03

## 2020-08-02 RX ORDER — ACETAMINOPHEN 325 MG/1
650 TABLET ORAL
Status: DISCONTINUED | OUTPATIENT
Start: 2020-08-02 | End: 2020-08-12 | Stop reason: HOSPADM

## 2020-08-02 RX ORDER — LORAZEPAM 2 MG/ML
1 INJECTION INTRAMUSCULAR
Status: DISCONTINUED | OUTPATIENT
Start: 2020-08-02 | End: 2020-08-12 | Stop reason: HOSPADM

## 2020-08-02 RX ORDER — MORPHINE SULFATE 2 MG/ML
2 INJECTION, SOLUTION INTRAMUSCULAR; INTRAVENOUS
Status: DISCONTINUED | OUTPATIENT
Start: 2020-08-02 | End: 2020-08-06

## 2020-08-02 RX ORDER — IRBESARTAN 150 MG/1
300 TABLET ORAL
Status: DISCONTINUED | OUTPATIENT
Start: 2020-08-02 | End: 2020-08-03

## 2020-08-02 RX ORDER — SODIUM CHLORIDE 0.9 % (FLUSH) 0.9 %
5-40 SYRINGE (ML) INJECTION EVERY 8 HOURS
Status: DISCONTINUED | OUTPATIENT
Start: 2020-08-02 | End: 2020-08-12 | Stop reason: HOSPADM

## 2020-08-02 RX ADMIN — IOPAMIDOL 100 ML: 755 INJECTION, SOLUTION INTRAVENOUS at 17:58

## 2020-08-02 RX ADMIN — IOHEXOL 50 ML: 240 INJECTION, SOLUTION INTRATHECAL; INTRAVASCULAR; INTRAVENOUS; ORAL at 15:43

## 2020-08-02 RX ADMIN — CEFTRIAXONE 1 G: 1 INJECTION, POWDER, FOR SOLUTION INTRAMUSCULAR; INTRAVENOUS at 21:05

## 2020-08-02 RX ADMIN — HYDRALAZINE HYDROCHLORIDE 10 MG: 20 INJECTION INTRAMUSCULAR; INTRAVENOUS at 23:32

## 2020-08-02 RX ADMIN — ONDANSETRON 4 MG: 2 INJECTION INTRAMUSCULAR; INTRAVENOUS at 15:41

## 2020-08-02 RX ADMIN — Medication 10 ML: at 17:58

## 2020-08-02 RX ADMIN — Medication 10 ML: at 22:43

## 2020-08-02 RX ADMIN — AZITHROMYCIN 500 MG: 500 INJECTION, POWDER, LYOPHILIZED, FOR SOLUTION INTRAVENOUS at 22:10

## 2020-08-02 NOTE — ED NOTES
Patient currently vomiting; unable to have anything PO d/t pmh of GI condition; ok to administer Oral contrast via PEG tube per Dr Nani Lagos.

## 2020-08-02 NOTE — ED NOTES
Patient tolerating oral contrast via PEG well. No further c/o nausea and or abdominal pain at this time.

## 2020-08-03 ENCOUNTER — HOSPITAL ENCOUNTER (OUTPATIENT)
Dept: INFUSION THERAPY | Age: 65
End: 2020-08-03

## 2020-08-03 ENCOUNTER — APPOINTMENT (OUTPATIENT)
Dept: GENERAL RADIOLOGY | Age: 65
DRG: 329 | End: 2020-08-03
Attending: SURGERY
Payer: MEDICARE

## 2020-08-03 PROBLEM — G47.33 OBSTRUCTIVE SLEEP APNEA SYNDROME: Chronic | Status: ACTIVE | Noted: 2017-01-17

## 2020-08-03 LAB
ANION GAP SERPL CALC-SCNC: 3 MMOL/L (ref 5–15)
BUN SERPL-MCNC: 37 MG/DL (ref 6–20)
BUN/CREAT SERPL: 52 (ref 12–20)
CALCIUM SERPL-MCNC: 9.3 MG/DL (ref 8.5–10.1)
CHLORIDE SERPL-SCNC: 98 MMOL/L (ref 97–108)
CO2 SERPL-SCNC: 36 MMOL/L (ref 21–32)
CREAT SERPL-MCNC: 0.71 MG/DL (ref 0.55–1.02)
ERYTHROCYTE [DISTWIDTH] IN BLOOD BY AUTOMATED COUNT: 22.8 % (ref 11.5–14.5)
GLUCOSE SERPL-MCNC: 94 MG/DL (ref 65–100)
HCT VFR BLD AUTO: 47.1 % (ref 35–47)
HGB BLD-MCNC: 15.5 G/DL (ref 11.5–16)
MCH RBC QN AUTO: 23.1 PG (ref 26–34)
MCHC RBC AUTO-ENTMCNC: 32.9 G/DL (ref 30–36.5)
MCV RBC AUTO: 70.1 FL (ref 80–99)
NRBC # BLD: 0 K/UL (ref 0–0.01)
NRBC BLD-RTO: 0 PER 100 WBC
PLATELET # BLD AUTO: 174 K/UL (ref 150–400)
POTASSIUM SERPL-SCNC: 4.2 MMOL/L (ref 3.5–5.1)
RBC # BLD AUTO: 6.72 M/UL (ref 3.8–5.2)
SARS-COV-2, COV2: NOT DETECTED
SODIUM SERPL-SCNC: 137 MMOL/L (ref 136–145)
SOURCE, COVRS: NORMAL
SPECIMEN SOURCE, FCOV2M: NORMAL
WBC # BLD AUTO: 13.3 K/UL (ref 3.6–11)

## 2020-08-03 PROCEDURE — 74011250637 HC RX REV CODE- 250/637: Performed by: HOSPITALIST

## 2020-08-03 PROCEDURE — 74011250636 HC RX REV CODE- 250/636: Performed by: SURGERY

## 2020-08-03 PROCEDURE — 74011250636 HC RX REV CODE- 250/636: Performed by: INTERNAL MEDICINE

## 2020-08-03 PROCEDURE — C1894 INTRO/SHEATH, NON-LASER: HCPCS

## 2020-08-03 PROCEDURE — 65270000029 HC RM PRIVATE

## 2020-08-03 PROCEDURE — 74018 RADEX ABDOMEN 1 VIEW: CPT

## 2020-08-03 PROCEDURE — 85027 COMPLETE CBC AUTOMATED: CPT

## 2020-08-03 PROCEDURE — 36573 INSJ PICC RS&I 5 YR+: CPT | Performed by: SURGERY

## 2020-08-03 PROCEDURE — C1751 CATH, INF, PER/CENT/MIDLINE: HCPCS

## 2020-08-03 PROCEDURE — 74011250636 HC RX REV CODE- 250/636: Performed by: HOSPITALIST

## 2020-08-03 PROCEDURE — 76937 US GUIDE VASCULAR ACCESS: CPT

## 2020-08-03 PROCEDURE — 36415 COLL VENOUS BLD VENIPUNCTURE: CPT

## 2020-08-03 PROCEDURE — 74011000250 HC RX REV CODE- 250: Performed by: HOSPITALIST

## 2020-08-03 PROCEDURE — 77030018786 HC NDL GD F/USND BARD -B

## 2020-08-03 PROCEDURE — 74011000250 HC RX REV CODE- 250: Performed by: SURGERY

## 2020-08-03 PROCEDURE — 3E0436Z INTRODUCTION OF NUTRITIONAL SUBSTANCE INTO CENTRAL VEIN, PERCUTANEOUS APPROACH: ICD-10-PCS | Performed by: SURGERY

## 2020-08-03 PROCEDURE — C9113 INJ PANTOPRAZOLE SODIUM, VIA: HCPCS | Performed by: HOSPITALIST

## 2020-08-03 PROCEDURE — 77030038269 HC DRN EXT URIN PURWCK BARD -A

## 2020-08-03 PROCEDURE — 02HV33Z INSERTION OF INFUSION DEVICE INTO SUPERIOR VENA CAVA, PERCUTANEOUS APPROACH: ICD-10-PCS | Performed by: SURGERY

## 2020-08-03 PROCEDURE — 74011000258 HC RX REV CODE- 258: Performed by: SURGERY

## 2020-08-03 PROCEDURE — 80048 BASIC METABOLIC PNL TOTAL CA: CPT

## 2020-08-03 RX ORDER — METRONIDAZOLE 500 MG/100ML
500 INJECTION, SOLUTION INTRAVENOUS EVERY 12 HOURS
Status: COMPLETED | OUTPATIENT
Start: 2020-08-03 | End: 2020-08-07

## 2020-08-03 RX ORDER — BACITRACIN 500 UNIT/G
1 PACKET (EA) TOPICAL AS NEEDED
Status: DISCONTINUED | OUTPATIENT
Start: 2020-08-03 | End: 2020-08-12 | Stop reason: HOSPADM

## 2020-08-03 RX ADMIN — ONDANSETRON 4 MG: 2 INJECTION INTRAMUSCULAR; INTRAVENOUS at 00:17

## 2020-08-03 RX ADMIN — POTASSIUM CHLORIDE, DEXTROSE MONOHYDRATE AND SODIUM CHLORIDE 50 ML/HR: 150; 5; 450 INJECTION, SOLUTION INTRAVENOUS at 20:17

## 2020-08-03 RX ADMIN — METRONIDAZOLE 500 MG: 500 INJECTION, SOLUTION INTRAVENOUS at 01:46

## 2020-08-03 RX ADMIN — MORPHINE SULFATE 2 MG: 2 INJECTION, SOLUTION INTRAMUSCULAR; INTRAVENOUS at 13:13

## 2020-08-03 RX ADMIN — METHYLPREDNISOLONE SODIUM SUCCINATE 40 MG: 40 INJECTION, POWDER, FOR SOLUTION INTRAMUSCULAR; INTRAVENOUS at 13:00

## 2020-08-03 RX ADMIN — CALCIUM GLUCONATE: 94 INJECTION, SOLUTION INTRAVENOUS at 18:28

## 2020-08-03 RX ADMIN — SODIUM CHLORIDE 40 MG: 9 INJECTION, SOLUTION INTRAMUSCULAR; INTRAVENOUS; SUBCUTANEOUS at 21:48

## 2020-08-03 RX ADMIN — POTASSIUM CHLORIDE, DEXTROSE MONOHYDRATE AND SODIUM CHLORIDE 125 ML/HR: 150; 5; 450 INJECTION, SOLUTION INTRAVENOUS at 10:49

## 2020-08-03 RX ADMIN — NITROGLYCERIN 1 INCH: 20 OINTMENT TOPICAL at 13:22

## 2020-08-03 RX ADMIN — HYDRALAZINE HYDROCHLORIDE 10 MG: 20 INJECTION INTRAMUSCULAR; INTRAVENOUS at 10:26

## 2020-08-03 RX ADMIN — ONDANSETRON 4 MG: 2 INJECTION INTRAMUSCULAR; INTRAVENOUS at 10:44

## 2020-08-03 RX ADMIN — METRONIDAZOLE 500 MG: 500 INJECTION, SOLUTION INTRAVENOUS at 16:15

## 2020-08-03 RX ADMIN — POTASSIUM CHLORIDE, DEXTROSE MONOHYDRATE AND SODIUM CHLORIDE 125 ML/HR: 150; 5; 450 INJECTION, SOLUTION INTRAVENOUS at 03:11

## 2020-08-03 RX ADMIN — CEFTRIAXONE 1 G: 1 INJECTION, POWDER, FOR SOLUTION INTRAMUSCULAR; INTRAVENOUS at 21:46

## 2020-08-03 RX ADMIN — METRONIDAZOLE 500 MG: 500 INJECTION, SOLUTION INTRAVENOUS at 22:56

## 2020-08-03 RX ADMIN — SODIUM CHLORIDE 40 MG: 9 INJECTION, SOLUTION INTRAMUSCULAR; INTRAVENOUS; SUBCUTANEOUS at 13:25

## 2020-08-03 RX ADMIN — NITROGLYCERIN 1 INCH: 20 OINTMENT TOPICAL at 18:25

## 2020-08-03 RX ADMIN — Medication 10 ML: at 13:26

## 2020-08-03 NOTE — PROGRESS NOTES
2240: Pt arrived to floor. 20 g in L AC flushed. IV is positional and pt states it hurts. Azithromycin running. This RN tried x2 to place new IV with no success. Ashely Flynn RN tried x2 with vein finder. Rapid response nurse called, spoke to [facility].   ED called, they are not available for IV. Darryl Marx RN from [facility] called, will come to assess shortly. Darryl Marx RN attempted x2 with no success. Pt has had a total of SIX sticks. Current IV is still working and pt said pain is tolerable. Will continue to use IV until pt states it is not tolerable. Flagyl ordered for 2000 hung at 0146 due to limited IV access. Will notify day shift RN and PICC team to place new IV and draw labs.

## 2020-08-03 NOTE — ED PROVIDER NOTES
EMERGENCY DEPARTMENT HISTORY AND PHYSICAL EXAM      Date: 8/2/2020  Patient Name: Rehana Ernst    History of Presenting Illness     Chief Complaint   Patient presents with    Abdominal Pain     arrived from [facility] care for abdominal pain n/v x-ray done at facility ? ileus    Vomiting     last vomiting onset 1 hour ago       History Provided By: Patient    HPI: Rehana Ernst, 59 y.o. female with PMHx as noted below presents the emergency department chief complaint of abdominal pain, nausea vomiting. Patient reports onset of abdominal pain approximately 2 days ago and began having vomiting yesterday. Notes the pain is a dull ache that is been constant without relieving or exacerbating factors. Pain is in her entire abdomen and does not radiate. Is otherwise denying any chest pain, shortness of breath, cough, fevers, chills, diarrhea. Notes that her last bowel movement was 2 days ago.       PCP: Jory Underwood MD    Current Facility-Administered Medications   Medication Dose Route Frequency Provider Last Rate Last Dose    metroNIDAZOLE (FLAGYL) IVPB premix 500 mg  500 mg IntraVENous NOW Rowena Fernandez MD        azithromycin (ZITHROMAX) 500 mg in 0.9% sodium chloride (MBP/ADV) 250 mL  500 mg IntraVENous NOW Rowena Fernandez  mL/hr at 08/02/20 2210 500 mg at 08/02/20 2210    [START ON 8/3/2020] amLODIPine (NORVASC) tablet 5 mg  5 mg Per G Tube DAILY Rowena Fernandez MD        irbesartan (AVAPRO) tablet 300 mg  300 mg Per G Tube QHS Rowena Fernandez MD        [START ON 8/3/2020] pantoprazole (PROTONIX) tablet 40 mg  40 mg Oral DAILY MD Clau Araujo [facility] ON 8/3/2020] predniSONE (DELTASONE) tablet 60 mg  60 mg Oral DAILY WITH BREAKFAST Rowena Fernandez MD        scopolamine (TRANSDERM-SCOP) 1 mg over 3 days 1 Patch  1 Patch TransDERmal Q72H Rowena Fernandez MD   1 Patch at 08/02/20 2212    dextrose 5% - 0.45% NaCl with KCl 20 mEq/L infusion  125 mL/hr IntraVENous CONTINUOUS Nel Allen MD        sodium chloride (NS) flush 5-40 mL  5-40 mL IntraVENous Q8H Nel Allen MD        sodium chloride (NS) flush 5-40 mL  5-40 mL IntraVENous PRN Nel Allen MD        acetaminophen (TYLENOL) tablet 650 mg  650 mg Oral Q6H PRN Nel Allen MD        naloxone [facility]) injection 0.4 mg  0.4 mg IntraVENous PRN Nel Allen MD        [facility] injection 4 mg  4 mg IntraVENous Q4H PRN Nel Allen MD        diphenhydrAMINE (BENADRYL) injection 25 mg  25 mg IntraVENous Q6H PRN Nel Allen MD        morphine injection 2 mg  2 mg IntraVENous Q4H PRN Nel Allen MD        LORazepam (ATIVAN) injection 1 mg  1 mg IntraVENous Q6H PRN Nel Allen MD        benzocaine-menthoL [facility] MAX) lozenge 1 Lozenge  1 Lozenge Oral Q2H PRN Nel Allen MD         Current Outpatient Medications   Medication Sig Dispense Refill    irbesartan (AVAPRO) 300 mg tablet 1 Tab by Per G Tube route nightly. 30 Tab 2    amLODIPine (NORVASC) 5 mg tablet 1 Tab by Per G Tube route daily. 30 Tab 2    predniSONE (DELTASONE) 20 mg tablet Take 60 mg by mouth daily (with breakfast). 30 Tab 0    scopolamine (TRANSDERM-SCOP) 1 mg over 3 days pt3d 1 Patch by TransDERmal route every seventy-two (72) hours. 10 Patch 1    pantoprazole (Protonix) 40 mg tablet Take 1 Tab by mouth daily. 30 Tab 1       Past History     Past Medical History:  Past Medical History:   Diagnosis Date    Congestive heart failure ([address]     Hypertension     Pneumonia 10/2018    Polymyositis ([address] 12/2015    in setting of [address] 2015    Polymyositis associated with autoimmune disease ([address]     Renal cancer ([address] 07/08/2016    s/p right nephrectomy.      Respiratory arrest ([address] 11/2015    [facility].    [facility] 8/3/2017       Past Surgical History:  Past Surgical History:   Procedure Laterality Date    HX GYN  1999    hysterectomy    HX NEPHRECTOMY Right     for kidney cancer    US GUIDED CORE BREAST BIOPSY Left     Negative       Family History:  Family History   Problem Relation Age of Onset   [facility] Cancer Mother     Breast Cancer Mother 68    Diabetes Father     Hypertension Father     Dementia Father 80    Stroke Maternal Grandmother     Breast Cancer Cousin         52's       Social History:  Social History     Tobacco Use    Smoking status: Former Smoker     Packs/day: 1.00     Years: 20.00     Pack years: 20.00     Types: Cigarettes     Last attempt to quit: 1998     Years since quittin.2    Smokeless tobacco: Never Used   Substance Use Topics    Alcohol use: No     Alcohol/week: 1.0 standard drinks     Types: 1 Glasses of wine per week    Drug use: No       Allergies: Allergies   Allergen Reactions    Ace Inhibitors Cough    Dilaudid [Hydromorphone] Other (comments)    Levaquin [Levofloxacin] Other (comments)     Leg swelling    Lisinopril Other (comments)     Cough      Metoprolol Other (comments)     hallucinations    Peanut Other (comments)         Review of Systems   Review of Systems  Constitutional: Negative for fever, chills, and fatigue. HENT: Negative for congestion, sore throat, rhinorrhea, sneezing and neck stiffness   Eyes: Negative for discharge and redness. Respiratory: Negative for  shortness of breath, wheezing   Cardiovascular: Negative for chest pain, palpitations   Gastrointestinal: Positive for nausea, vomiting, abdominal pain, constipation. Negative diarrhea and blood in stool. Genitourinary: Negative for dysuria, urgency, frequency, hematuria, flank pain, decreased urine volume, discharge,   Musculoskeletal: Negative for myalgias or joint pain . Skin: Negative for rash or lesions . Neurological: Negative weakness, light-headedness, numbness and headaches.          Physical Exam   Physical Exam    GENERAL: alert and oriented, no acute distress  EYES: PEERL, No injection, discharge or icterus. ENT: Mucous membranes pink and moist.  NECK: Supple  LUNGS: Airway patent. Non-labored respirations. Breath sounds clear with good air entry bilaterally. HEART: Regular rate and rhythm. No peripheral edema  ABDOMEN: Diffusely tender, without guarding or rebound. SKIN:  warm, dry  EXTREMITIES: Without swelling, tenderness or deformity, symmetric with normal ROM  NEUROLOGICAL: Alert, oriented      Diagnostic Study Results     Labs -     Recent Results (from the past 12 hour(s))   CBC WITH AUTOMATED DIFF    Collection Time: 08/02/20  2:46 PM   Result Value Ref Range    WBC 10.4 3.6 - 11.0 K/uL    RBC 6.68 (H) 3.80 - 5.20 M/uL    HGB 15.3 11.5 - 16.0 g/dL    HCT 46.9 35.0 - 47.0 %    MCV 70.2 (L) 80.0 - 99.0 FL    MCH 22.9 (L) 26.0 - 34.0 PG    MCHC 32.6 30.0 - 36.5 g/dL    RDW 23.2 (H) 11.5 - 14.5 %    PLATELET 314 156 - 137 K/uL    MPV ABNORMAL 8.9 - 12.9 FL    NRBC 0.0 0  WBC    ABSOLUTE NRBC 0.00 0.00 - 0.01 K/uL    NEUTROPHILS 86 (H) 32 - 75 %    LYMPHOCYTES 7 (L) 12 - 49 %    MONOCYTES 6 5 - 13 %    EOSINOPHILS 0 0 - 7 %    BASOPHILS 0 0 - 1 %    IMMATURE GRANULOCYTES 1 (H) 0.0 - 0.5 %    ABS. NEUTROPHILS 9.0 (H) 1.8 - 8.0 K/UL    ABS. LYMPHOCYTES 0.7 (L) 0.8 - 3.5 K/UL    ABS. MONOCYTES 0.6 0.0 - 1.0 K/UL    ABS. EOSINOPHILS 0.0 0.0 - 0.4 K/UL    ABS. BASOPHILS 0.0 0.0 - 0.1 K/UL    ABS. IMM.  GRANS. 0.1 (H) 0.00 - 0.04 K/UL    DF SMEAR SCANNED      RBC COMMENTS MICROCYTOSIS  1+        RBC COMMENTS HYPOCHROMIA  1+        RBC COMMENTS ANISOCYTOSIS  2+       METABOLIC PANEL, COMPREHENSIVE    Collection Time: 08/02/20  2:46 PM   Result Value Ref Range    Sodium 138 136 - 145 mmol/L    Potassium 4.2 3.5 - 5.1 mmol/L    Chloride 99 97 - 108 mmol/L    CO2 31 21 - 32 mmol/L    Anion gap 8 5 - 15 mmol/L    Glucose 128 (H) 65 - 100 mg/dL    BUN 41 (H) 6 - 20 MG/DL    Creatinine 0.74 0.55 - 1.02 MG/DL    BUN/Creatinine ratio 55 (H) 12 - 20      GFR est AA >60 >60 ml/min/1.73m2    GFR est non-AA >60 >60 ml/min/1.73m2    Calcium 9.1 8.5 - 10.1 MG/DL    Bilirubin, total 0.8 0.2 - 1.0 MG/DL    ALT (SGPT) 24 12 - 78 U/L    AST (SGOT) 27 15 - 37 U/L    Alk. phosphatase 83 45 - 117 U/L    Protein, total 6.9 6.4 - 8.2 g/dL    Albumin 3.2 (L) 3.5 - 5.0 g/dL    Globulin 3.7 2.0 - 4.0 g/dL    A-G Ratio 0.9 (L) 1.1 - 2.2     LIPASE    Collection Time: 08/02/20  2:46 PM   Result Value Ref Range    Lipase 111 73 - 393 U/L   SARS-COV-2    Collection Time: 08/02/20  9:08 PM   Result Value Ref Range    Specimen source Nasopharyngeal      SARS-CoV-2 PENDING     SARS-CoV-2 PENDING     Specimen source Nasopharyngeal      COVID-19 rapid test PENDING     COVID-19 PENDING NEG       Radiologic Studies -   CT ABD PELV W CONT   Final Result   IMPRESSION:   Small bowel obstruction secondary to internal hernia versus adhesion with new   ascites. New right lower lobe infiltrate   Incidental pericardial effusion, stable        CT Results  (Last 48 hours)               08/02/20 1758  CT ABD PELV W CONT Final result    Impression:  IMPRESSION:   Small bowel obstruction secondary to internal hernia versus adhesion with new   ascites. New right lower lobe infiltrate   Incidental pericardial effusion, stable       Narrative:  EXAM: CT ABD PELV W CONT       INDICATION: acute abd pain, vomiting, r/o obstruction       COMPARISON: 7/22/2020        CONTRAST: 100 mL of Isovue-370. TECHNIQUE:    Following the uneventful intravenous administration of contrast, thin axial   images were obtained through the abdomen and pelvis. Coronal and sagittal   reconstructions were generated. Oral contrast was administered. CT dose   reduction was achieved through use of a standardized protocol tailored for this   examination and automatic exposure control for dose modulation. FINDINGS:    LOWER THORAX:    Pericardial effusion. Infiltrate in the right posterior costophrenic angle. LIVER: No mass. BILIARY TREE: Gallbladder contracted. CBD is not dilated. SPLEEN: within normal limits. Small spleen. PANCREAS: No mass or ductal dilatation. ADRENALS: Unremarkable. KIDNEYS: No mass, calculus, or hydronephrosis. The right kidney is surgically   absent. STOMACH: G tube in place   SMALL BOWEL: Small bowel distention to maximal axial diameter of 3.4 cm. Transition level in the left lower quadrant (axial image 64). COLON: No dilatation or wall thickening. APPENDIX: Unremarkable. Normal on image 83   PERITONEUM: No ascites or pneumoperitoneum. RETROPERITONEUM: No lymphadenopathy or aortic aneurysm. REPRODUCTIVE ORGANS: Prior hysterectomy   URINARY BLADDER: No mass or calculus. BONES: No destructive bone lesion. ABDOMINAL WALL: No mass or hernia. ADDITIONAL COMMENTS: N/A               CXR Results  (Last 48 hours)    None            Medical Decision Making   ITammy MD am the first provider for this patient and am the attending of record for this patient encounter. I reviewed the vital signs, available nursing notes, past medical history, past surgical history, family history and social history. Vital Signs-Reviewed the patient's vital signs. Patient Vitals for the past 12 hrs:   Temp Pulse Resp BP SpO2   08/02/20 2200 -- -- -- (!) 169/101 97 %   08/02/20 2125 -- -- -- (!) 189/110 97 %   08/02/20 2000 -- -- -- (!) 177/102 98 %   08/02/20 1930 -- 88 18 (!) 182/94 97 %   08/02/20 1831 -- 88 16 (!) 179/98 99 %   08/02/20 1731 -- -- -- (!) 177/92 98 %   08/02/20 1631 -- 79 18 179/90 100 %   08/02/20 1531 -- -- -- -- 97 %   08/02/20 1431 99 °F (37.2 °C) 80 20 (!) 165/91 100 %         Records Reviewed: Nursing Notes and Old Medical Records    Provider Notes (Medical Decision Making): This is a 27-year-old female presenting with diffuse abdominal pain, nausea vomiting. Differential is broad and included bowel obstruction, dehydration, electrolyte abnormality, pancreatitis, gastritis, perforation among others.   CT is consistent with small bowel obstruction from internal hernia versus adhesion. Also incidentally noted to pneumonia. Will test for COVID-19 and cover for possible aspiration. Patient will be admitted to general surgery. ED Course:   Initial assessment performed. The patients presenting problems have been discussed, and they are in agreement with the care plan formulated and outlined with them. I have encouraged them to ask questions as they arise throughout their visit. PROGRESS:  The patient has been re-evaluated. Reviewed available results with patient and have counseled them on diagnosis and care plan. They have expressed understanding, and all their questions have been answered. They agree with plan for admission. CONSULT:  Tammy Lorenzo MD spoke with Dr. Elenita Maldonado. Discussed HPI and PE, available diagnostic tests and clinical findings. He is in agreement with care plans as outlined and will evaluate for admission     Admit Note  Patient is being admitted to the hospitalist.  The results of their tests and reasons for their admission have been discussed with them and/or available family. They convey agreement and understanding for the need to be admitted and for their admission diagnosis. Consultation has been made with the inpatient physician specialist for hospitalization. Disposition:  admission    PLAN:  1. Admit     Diagnosis     Clinical Impression:   1. SBO (small bowel obstruction) ([address]    2. Pneumonia due to infectious organism, unspecified laterality, unspecified part of lung        Please note that this dictation was completed with Dragon, computer voice recognition software. Quite often unanticipated grammatical, syntax, homophones, and other interpretive errors are inadvertently transcribed by the computer software. Please disregard these errors. Additionally, please excuse any errors that have escaped final proofreading.

## 2020-08-03 NOTE — ED NOTES
Bedside and Verbal shift change report given to Tarah Mayen (oncoming nurse) by Barber Conner (offgoing nurse). Report included the following information SBAR, ED Summary, Intake/Output, MAR and Recent Results.

## 2020-08-03 NOTE — PROGRESS NOTES
PICC (Peripherally Inserted Central Catheter) line insertion  procedure note :     Procedure explained to patient along with risks and benefits  and patient agreed to proceed. Informed consent obtained from  patient. Patient teaching completed. Timeout completed. Pre-procedure assessment done. Maximum sterile barrier precautions observed throughout procedure. Lidocaine 1%  3.0    ml sq given prior to cannulation. Attempted basilic vein to right arm without success. Cannulated brachial  vein using ultrasound guidance and modified seldinger technique. Inserted 5  Malay double  lumen PICC to right arm using [employer] Tip Location System and  [address]. Pt has    sinus   rhythm. PICC tip location was confirmed by 3 CG tip positioning system, indicating tall P wave and no negative deflection before P wave which would indicate that the PICC tip is properly placed in the distal SVC or at the [place]. PICC tip location was  confirmed by 2 PICC nurses and 3CG printout placed on patient's chart. Blood return verified and flushed with 20 ml normal saline in each port. Sterile dressing applied with biopatch, statLock and occlusive dressing as per protocol. Curos caps applied to each port. Patient tolerated procedure well with minimal blood loss ( less than 5 ml.)   Patient education material provided. PICC procedure performed by  :  Rebecca Knox RN. DAVE. CMSRJOSUE. PICC nurse. Vascular Access Team.   Assisted by : Ana Lilia Hu RN  PICC nurse  Reason for access : reliable access / MD order / TPN infusion   Complications related to insertion  : none  X-Ray : not applicable  Notified primary nurse  Isabella ROMAN  that  PICC line can be used. Total Trimmed Length :  47   cm   External Length :   0 cm .     PICC line site arm circumference:    27    cm   PICC catheter occupies   17   % of vein  Type of PICC: Bard Solo Power PICC   Ref # :   [id number]    Lot # :  [id number]   Expiration Date :    2021-08-31       Mahogany Davis RN. BSN. DAVE,CMSRN.  PICC Nurse, Vascular Access Team.

## 2020-08-03 NOTE — H&P
Surgery Consult    Subjective:      Rosa Vann is a 59 y.o. female who is being seen for evaluation of abdominal pain. The pain is located in the diffusely  . Pain is described as dull and aching and measures 6/10 in intensity. Onset of pain was 2 days ago. Aggravating factors include none. Alleviating factors include movement, activity, pressure and tube feeds. Associated symptoms include nausea and vomiting. Patient has a history of SBO 3 years ago that was managed with bowel rest and resolved. Patient Active Problem List    Diagnosis Date Noted    SBO (small bowel obstruction) ([address] 2020    Oropharyngeal dysphagia 06/10/2020    S/p nephrectomy 2020    History of renal cell cancer 2020    Vitamin D deficiency 10/10/2018    Age-related osteoporosis without current pathological fracture 2018    Elevated glucose 2017    Obstructive sleep apnea syndrome 2017    Polymyositis ([address] 2016    HTN (hypertension) 2016     Past Medical History:   Diagnosis Date    Congestive heart failure ([address]     Hypertension     Pneumonia 10/2018    Polymyositis ([address] 2015    in setting of [address]     Polymyositis associated with autoimmune disease ([address]     Renal cancer ([address] 2016    s/p right nephrectomy.      Respiratory arrest ([address] 2015    [facility].    [facility] SBO (small bowel obstruction) ([address] 8/3/2017      Past Surgical History:   Procedure Laterality Date    HX GYN      hysterectomy    HX NEPHRECTOMY Right 2016    for kidney cancer    US GUIDED CORE BREAST BIOPSY Left     Negative      Social History     Tobacco Use    Smoking status: Former Smoker     Packs/day: 1.00     Years: 20.00     Pack years: 20.00     Types: Cigarettes     Last attempt to quit: 1998     Years since quittin.2    Smokeless tobacco: Never Used   Substance Use Topics    Alcohol use: No     Alcohol/week: 1.0 standard drinks     Types: 1 Glasses of wine per week      Family History   Problem Relation Age of Onset    Cancer Mother     Breast Cancer Mother 68    Diabetes Father     Hypertension Father     Dementia Father 80    Stroke Maternal Grandmother     Breast Cancer Cousin         50's      Current Facility-Administered Medications   Medication Dose Route Frequency    metroNIDAZOLE (FLAGYL) IVPB premix 500 mg  500 mg IntraVENous NOW    amLODIPine (NORVASC) tablet 5 mg  5 mg Per G Tube DAILY    irbesartan (AVAPRO) tablet 300 mg  300 mg Per G Tube QHS    pantoprazole (PROTONIX) tablet 40 mg  40 mg Oral DAILY    predniSONE (DELTASONE) tablet 60 mg  60 mg Oral DAILY WITH BREAKFAST    scopolamine (TRANSDERM-SCOP) 1 mg over 3 days 1 Patch  1 Patch TransDERmal Q72H    dextrose 5% - 0.45% NaCl with KCl 20 mEq/L infusion  125 mL/hr IntraVENous CONTINUOUS    sodium chloride (NS) flush 5-40 mL  5-40 mL IntraVENous Q8H    sodium chloride (NS) flush 5-40 mL  5-40 mL IntraVENous PRN    acetaminophen (TYLENOL) tablet 650 mg  650 mg Oral Q6H PRN    naloxone (NARCAN) injection 0.4 mg  0.4 mg IntraVENous PRN    ondansetron (ZOFRAN) injection 4 mg  4 mg IntraVENous Q4H PRN    diphenhydrAMINE (BENADRYL) injection 25 mg  25 mg IntraVENous Q6H PRN    morphine injection 2 mg  2 mg IntraVENous Q4H PRN    LORazepam (ATIVAN) injection 1 mg  1 mg IntraVENous Q6H PRN    benzocaine-menthoL (CHLORASEPTIC MAX) lozenge 1 Lozenge  1 Lozenge Oral Q2H PRN    hydrALAZINE (APRESOLINE) 20 mg/mL injection 10 mg  10 mg IntraVENous Q6H PRN      Allergies   Allergen Reactions    Ace Inhibitors Cough    Dilaudid [Hydromorphone] Other (comments)    Levaquin [Levofloxacin] Other (comments)     Leg swelling    Lisinopril Other (comments)     Cough      Metoprolol Other (comments)     hallucinations    Peanut Other (comments)       Review of Systems:    A comprehensive review of systems was negative except for that written in the History of Present Illness. Objective:        Visit Vitals  BP (!) 180/109 (BP 1 Location: Right arm, BP Patient Position: At rest)   Pulse 96   Temp 97.6 °F (36.4 °C)   Resp 17   Ht 5' 4\" (1.626 m)   Wt 68.9 kg (152 lb)   SpO2 96%   BMI 26.09 kg/m²       Physical Exam:  GENERAL: alert, cooperative, no distress, appears stated age, LUNG: clear to auscultation bilaterally, HEART: regular rate and rhythm, S1, S2 normal, no murmur, click, rub or gallop, ABDOMEN: distended, soft, non-tender, G-tube in place     Imaging:  images and reports reviewed    Lab/Data Review:  BMP:   Lab Results   Component Value Date/Time     08/02/2020 02:46 PM    K 4.2 08/02/2020 02:46 PM    CL 99 08/02/2020 02:46 PM    CO2 31 08/02/2020 02:46 PM    AGAP 8 08/02/2020 02:46 PM     (H) 08/02/2020 02:46 PM    BUN 41 (H) 08/02/2020 02:46 PM    CREA 0.74 08/02/2020 02:46 PM    GFRAA >60 08/02/2020 02:46 PM    GFRNA >60 08/02/2020 02:46 PM     CBC:   Lab Results   Component Value Date/Time    WBC 10.4 08/02/2020 02:46 PM    HGB 15.3 08/02/2020 02:46 PM    HCT 46.9 08/02/2020 02:46 PM     08/02/2020 02:46 PM         Assessment:     59year old with distal SBO. CT is similar in appearance to previous SBO that resolved with bowel rest.      Plan:     1.  I recommend proceeding with Conservative therapy:  Bowel rest.

## 2020-08-03 NOTE — PROGRESS NOTES
Bedside and Verbal shift change report given to [facility] (oncoming nurse) by Crescencio Cardoso (offgoing nurse). Report included the following information SBAR, Kardex, MAR and Recent Results.

## 2020-08-03 NOTE — ED NOTES
PEG tubing hooked up to [place] drainage bag; approximately 100 ml of clear greenish  liquid output noted.

## 2020-08-03 NOTE — ED NOTES
Spoke with pharmacy about IV compatibility, cont fluids not compatible with either due abx, so will finish abx and then start cont fluids

## 2020-08-03 NOTE — PROGRESS NOTES
PICC order acknowledged. TPN needed in the future. Discussed with Dr Elenita Maldonado. We will place PICC when and if pt requires TPN. Hold for now. Pt has 2 PIV's currently working.   Nia FRENCHN, RN, [facility]-BC  Vascular Access Team

## 2020-08-03 NOTE — PROGRESS NOTES
SURGERY PROGRESS NOTE      Admit Date: 2020      Subjective:     Patient complains of being hungery. Abdominal pain is better. Denies nausea. No flatus      Objective:     Visit Vitals  /75   Pulse (!) 105   Temp 98.8 °F (37.1 °C)   Resp 17   Ht 5' 4\" (1.626 m)   Wt 68.9 kg (152 lb)   SpO2 96%   BMI 26.09 kg/m²        Temp (24hrs), Av.2 °F (36.8 °C), Min:97.6 °F (36.4 °C), Max:98.8 °F (37.1 °C)      No intake/output data recorded.  1901 -  0700  In: -   Out: 100     Physical Exam:    General:  alert, cooperative, no distress, appears stated age   Abdomen: soft, bowel sounds active, non-tender,  G-tube - thin, faint bilious fluid            Lab Results   Component Value Date/Time    WBC 13.3 (H) 2020 09:35 AM    HGB 15.5 2020 09:35 AM    HCT 47.1 (H) 2020 09:35 AM    PLATELET 344  09:35 AM    MCV 70.1 (L) 2020 09:35 AM     Lab Results   Component Value Date/Time    GFR est non-AA >60 2020 09:35 AM    GFR est AA >60 2020 09:35 AM    Creatinine 0.71 2020 09:35 AM    BUN 37 (H) 2020 09:35 AM    Sodium 137 2020 09:35 AM    Potassium 4.2 2020 09:35 AM    Chloride 98 2020 09:35 AM    CO2 36 (H) 2020 09:35 AM    Magnesium 2.0 2020 04:44 AM    Phosphorus 3.2 2020 04:44 AM       COVID-19 - not detected     Assessment/plan: Active Problems:    SBO (small bowel obstruction) ([facility] (2020)    SBO -  Secondary to adhesions. CT looks less impressive than previous admission so, likely to resolve with bowel rest    Pulmonary infiltrate  -  First Covid-19 is negative. Given high risk due to immunosuppression, frail and SNF will have a second done in 24 hours to rule-out.   Will treat as CAP with ceftriaxone and flagyl

## 2020-08-03 NOTE — ED NOTES
TRANSFER - OUT REPORT:    Verbal report given to ABEL Espinoza(name) on Trupti and Zahida  being transferred to [facility](unit) for routine progression of care       Report consisted of patients Situation, Background, Assessment and   Recommendations(SBAR). Information from the following report(s) SBAR, ED Summary, Intake/Output, MAR, Recent Results and Med Rec Status was reviewed with the receiving nurse. Lines:   Peripheral IV 08/02/20 Left Antecubital (Active)   Site Assessment Clean, dry, & intact 08/02/20 1452   Phlebitis Assessment 0 08/02/20 1452   Infiltration Assessment 0 08/02/20 1452   Dressing Status Clean, dry, & intact 08/02/20 1452   Dressing Type Transparent 08/02/20 1452   Hub Color/Line Status Pink;Flushed 08/02/20 1452   Alcohol Cap Used Yes 08/02/20 1452        Opportunity for questions and clarification was provided.       Patient transported with:  Tech/Patient transporter

## 2020-08-04 LAB
ANION GAP SERPL CALC-SCNC: 1 MMOL/L (ref 5–15)
APPEARANCE UR: CLEAR
BACTERIA URNS QL MICRO: NEGATIVE /HPF
BILIRUB UR QL: NEGATIVE
BUN SERPL-MCNC: 33 MG/DL (ref 6–20)
BUN/CREAT SERPL: 53 (ref 12–20)
CALCIUM SERPL-MCNC: 8.5 MG/DL (ref 8.5–10.1)
CHLORIDE SERPL-SCNC: 101 MMOL/L (ref 97–108)
CO2 SERPL-SCNC: 35 MMOL/L (ref 21–32)
COLOR UR: ABNORMAL
CREAT SERPL-MCNC: 0.62 MG/DL (ref 0.55–1.02)
EPITH CASTS URNS QL MICRO: ABNORMAL /LPF
ERYTHROCYTE [DISTWIDTH] IN BLOOD BY AUTOMATED COUNT: 21.8 % (ref 11.5–14.5)
GLUCOSE SERPL-MCNC: 114 MG/DL (ref 65–100)
GLUCOSE UR STRIP.AUTO-MCNC: NEGATIVE MG/DL
HCT VFR BLD AUTO: 38.4 % (ref 35–47)
HGB BLD-MCNC: 12.5 G/DL (ref 11.5–16)
HGB UR QL STRIP: NEGATIVE
HYALINE CASTS URNS QL MICRO: ABNORMAL /LPF (ref 0–5)
KETONES UR QL STRIP.AUTO: NEGATIVE MG/DL
LEUKOCYTE ESTERASE UR QL STRIP.AUTO: ABNORMAL
MCH RBC QN AUTO: 23 PG (ref 26–34)
MCHC RBC AUTO-ENTMCNC: 32.6 G/DL (ref 30–36.5)
MCV RBC AUTO: 70.7 FL (ref 80–99)
NITRITE UR QL STRIP.AUTO: NEGATIVE
NRBC # BLD: 0 K/UL (ref 0–0.01)
NRBC BLD-RTO: 0 PER 100 WBC
PH UR STRIP: 6 [PH] (ref 5–8)
PLATELET # BLD AUTO: 138 K/UL (ref 150–400)
POTASSIUM SERPL-SCNC: 4.1 MMOL/L (ref 3.5–5.1)
PROT UR STRIP-MCNC: ABNORMAL MG/DL
RBC # BLD AUTO: 5.43 M/UL (ref 3.8–5.2)
RBC #/AREA URNS HPF: ABNORMAL /HPF (ref 0–5)
SODIUM SERPL-SCNC: 137 MMOL/L (ref 136–145)
SP GR UR REFRACTOMETRY: 1.02 (ref 1–1.03)
UA: UC IF INDICATED,UAUC: ABNORMAL
UROBILINOGEN UR QL STRIP.AUTO: 0.2 EU/DL (ref 0.2–1)
WBC # BLD AUTO: 10.2 K/UL (ref 3.6–11)
WBC URNS QL MICRO: ABNORMAL /HPF (ref 0–4)

## 2020-08-04 PROCEDURE — 74011000250 HC RX REV CODE- 250: Performed by: SURGERY

## 2020-08-04 PROCEDURE — 85027 COMPLETE CBC AUTOMATED: CPT

## 2020-08-04 PROCEDURE — 74011000258 HC RX REV CODE- 258: Performed by: SURGERY

## 2020-08-04 PROCEDURE — 65270000029 HC RM PRIVATE

## 2020-08-04 PROCEDURE — 80048 BASIC METABOLIC PNL TOTAL CA: CPT

## 2020-08-04 PROCEDURE — 81001 URINALYSIS AUTO W/SCOPE: CPT

## 2020-08-04 PROCEDURE — 74011250637 HC RX REV CODE- 250/637: Performed by: HOSPITALIST

## 2020-08-04 PROCEDURE — 36415 COLL VENOUS BLD VENIPUNCTURE: CPT

## 2020-08-04 PROCEDURE — 74011250636 HC RX REV CODE- 250/636: Performed by: SURGERY

## 2020-08-04 PROCEDURE — 74011250636 HC RX REV CODE- 250/636: Performed by: HOSPITALIST

## 2020-08-04 PROCEDURE — 74011000250 HC RX REV CODE- 250: Performed by: HOSPITALIST

## 2020-08-04 PROCEDURE — 74011250637 HC RX REV CODE- 250/637: Performed by: INTERNAL MEDICINE

## 2020-08-04 PROCEDURE — C9113 INJ PANTOPRAZOLE SODIUM, VIA: HCPCS | Performed by: HOSPITALIST

## 2020-08-04 RX ORDER — CLONIDINE 0.1 MG/24H
1 PATCH, EXTENDED RELEASE TRANSDERMAL
Status: DISCONTINUED | OUTPATIENT
Start: 2020-08-04 | End: 2020-08-05

## 2020-08-04 RX ADMIN — NITROGLYCERIN 1 INCH: 20 OINTMENT TOPICAL at 18:28

## 2020-08-04 RX ADMIN — NITROGLYCERIN 1 INCH: 20 OINTMENT TOPICAL at 12:00

## 2020-08-04 RX ADMIN — Medication 10 ML: at 01:16

## 2020-08-04 RX ADMIN — Medication 10 ML: at 23:08

## 2020-08-04 RX ADMIN — METHYLPREDNISOLONE SODIUM SUCCINATE 40 MG: 40 INJECTION, POWDER, FOR SOLUTION INTRAMUSCULAR; INTRAVENOUS at 08:34

## 2020-08-04 RX ADMIN — CALCIUM GLUCONATE: 94 INJECTION, SOLUTION INTRAVENOUS at 18:29

## 2020-08-04 RX ADMIN — SODIUM CHLORIDE 40 MG: 9 INJECTION, SOLUTION INTRAMUSCULAR; INTRAVENOUS; SUBCUTANEOUS at 23:02

## 2020-08-04 RX ADMIN — METRONIDAZOLE 500 MG: 500 INJECTION, SOLUTION INTRAVENOUS at 23:03

## 2020-08-04 RX ADMIN — METRONIDAZOLE 500 MG: 500 INJECTION, SOLUTION INTRAVENOUS at 08:29

## 2020-08-04 RX ADMIN — POTASSIUM CHLORIDE, DEXTROSE MONOHYDRATE AND SODIUM CHLORIDE 50 ML/HR: 150; 5; 450 INJECTION, SOLUTION INTRAVENOUS at 08:29

## 2020-08-04 RX ADMIN — ONDANSETRON 4 MG: 2 INJECTION INTRAMUSCULAR; INTRAVENOUS at 02:48

## 2020-08-04 RX ADMIN — MORPHINE SULFATE 2 MG: 2 INJECTION, SOLUTION INTRAMUSCULAR; INTRAVENOUS at 08:48

## 2020-08-04 RX ADMIN — Medication 10 ML: at 06:43

## 2020-08-04 RX ADMIN — NITROGLYCERIN 1 INCH: 20 OINTMENT TOPICAL at 06:43

## 2020-08-04 RX ADMIN — Medication 10 ML: at 14:00

## 2020-08-04 RX ADMIN — NITROGLYCERIN 1 INCH: 20 OINTMENT TOPICAL at 01:16

## 2020-08-04 RX ADMIN — CEFTRIAXONE 1 G: 1 INJECTION, POWDER, FOR SOLUTION INTRAMUSCULAR; INTRAVENOUS at 22:00

## 2020-08-04 RX ADMIN — SODIUM CHLORIDE 40 MG: 9 INJECTION, SOLUTION INTRAMUSCULAR; INTRAVENOUS; SUBCUTANEOUS at 08:34

## 2020-08-04 NOTE — CONSULTS
Hospitalist Consultation Note    NAME:  Suraj Layton   :   1955   MRN:   [id number]     ATTENDING: Estrella Mane MD  PCP:  Fracisco Lagos MD    Date/Time:  8/3/2020 8:37 PM      Recommendations/Plan:       Active Problems:    Polymyositis ([facility] (2016)      HTN (hypertension) (2016)      Obstructive sleep apnea syndrome (2017)      [facility] (2020)       Accelerated hypertension not POA  Likely due to missed p.o. blood pressure meds due to n.p.o. bowel rest status    Continue adequate pain management as per primary surgical team  Continue IV fluids but DC ASAP once able to resume enteral feeding-we will help with the blood pressure control  Continue nitro glycerin ointment 1 inch every 6 hours for now  Resume home dose steroid for polymyositis IV for now  Continue home Protonix IV for now until patient n.p.o./bowel rest    Suspected right lower lobe pneumonia on CT imaging  Likely atelectasis POA  COVID test is negative  Incentive spirometer recommended  On empiric IV antibiotics as per primary surgical team    Recurrent small bowel obstruction POA-secondary to adhesions as per surgery team  Continue bowel rest per primary surgical team, resume PEG tube feeding once cleared by surgery. DC IV fluids when able        Code Status: Full code as per patient's wishes  DVT Prophylaxis: As per primary surgical team          Subjective:   REQUESTING PHYSICIAN: Dr. Ace Gave: Tanner Medley is a 59 y.o.  female who I was asked to see this patient of small bowel obstruction due to adhesions who is on bowel rest, IV fluids and PPN  Patient denies any history of cough, fever, chills but complains of pain which is improved now since bowel rest.    Patient was found to have blood pressure of 189/110 today earlier for which the consult was called in, has improved now status post Nitropaste every 6 hours as ordered. -Down to 136/72  Pain is relatively under control when seen by me. Past Medical History:   Diagnosis Date    Congest[facility] failure [facility]     Hypertension     Pneumonia 10/2018    Polymyositis ([facility] 2015    in setting of [address]     Polymyositis associated with autoimmune disease ([facility]     Renal cancer ([facility] 2016    s/p right nephrectomy.  Respiratory arrest ([facility] 2015    [facility].     SBO (small bowel obstruction) ([facility] 8/3/2017      Past Surgical History:   Procedure Laterality Date    HX GYN      hysterectomy    HX NEPHRECTOMY Right 2016    for kidney cancer    US GUIDED CORE BREAST BIOPSY Left     Negative     Social History     Tobacco Use    Smoking status: Former Smoker     Packs/day: 1.00     Years: 20.00     Pack years: 20.00     Types: Cigarettes     Last attempt to quit: 1998     Years since quittin.2    Smokeless tobacco: Never Used   Substance Use Topics    Alcohol use: No     Alcohol/week: 1.0 standard drinks     Types: 1 Glasses of wine per week      Family History   Problem Relation Age of Onset    Cancer Mother     Breast Cancer Mother 68    Diabetes Father     Hypertension Father     Dementia Father 80    Stroke Maternal Grandmother     Breast Cancer Cousin         50's       Allergies   Allergen Reactions    Ace Inhibitors Cough    Dilaudid [Hydromorphone] Other (comments)    Levaquin [Levofloxacin] Other (comments)     Leg swelling    Lisinopril Other (comments)     Cough      Metoprolol Other (comments)     hallucinations    Peanut Other (comments)      Prior to Admission medications    Medication Sig Start Date End Date Taking? Authorizing Provider   irbesartan (AVAPRO) 300 mg tablet 1 Tab by Per G Tube route nightly. 20   Alonzo Ny MD   amLODIPine (NORVASC) 5 mg tablet 1 Tab by Per G Tube route daily. 20   Alonzo Ny MD   predniSONE (DELTASONE) 20 mg tablet Take 60 mg by mouth daily (with breakfast). 20   Jarrod Conklin MD   scopolamine (TRANSDERM-SCOP) 1 mg over 3 days pt3d 1 Patch by TransDERmal route every seventy-two (72) hours. 20   Jarrod Conklin MD   pantoprazole (Protonix) 40 mg tablet Take 1 Tab by mouth daily. 20   Jarrod Conklin MD       REVIEW OF SYSTEMS:          POSITIVE= underlined text  Negative = text not underlined  General:  fever, chills, sweats, generalized weakness, weight loss/gain,      loss of appetite   Eyes:    blurred vision, eye pain, loss of vision, double vision  ENT:    rhinorrhea, pharyngitis   Respiratory:   cough, sputum production, SOB, wheezing, LOREDO, pleuritic pain   Cardiology:   chest pain, palpitations, orthopnea, PND, edema, syncope   Gastrointestinal:  abdominal pain , N/V, dysphagia, diarrhea, constipation, bleeding   Genitourinary:  frequency, urgency, dysuria, hematuria, incontinence   Muskuloskeletal :  arthralgia, myalgia   Hematology:  easy bruising, nose or gum bleeding, lymphadenopathy   Dermatological: rash, ulceration, pruritis   Endocrine:   hot flashes or polydipsia   Neurological:  headache, dizziness, confusion, focal weakness, paresthesia,     Speech difficulties, memory loss, gait disturbance  Psychological: Feelings of anxiety, depression, agitation    Objective:   VITALS:    Visit Vitals  /72   Pulse 100   Temp 98.5 °F (36.9 °C)   Resp 16   Ht 5' 4\" (1.626 m)   Wt 68.9 kg (152 lb)   SpO2 95%   BMI 26.09 kg/m²     Temp (24hrs), Av.3 °F (36.8 °C), Min:97.6 °F (36.4 °C), Max:98.8 °F (37.1 °C)      PHYSICAL EXAM:   General:    Alert, cooperative, no distress, appears stated age. Thin and emaciated +  HEENT: Atraumatic, anicteric sclerae, pink conjunctivae     No oral ulcers, mucosa moist, throat clear  Neck:  Supple, symmetrical,  thyroid: non tender  Lungs:   Clear to auscultation bilaterally. No Wheezing or Rhonchi. No rales. Chest wall:  No tenderness  No Accessory muscle use.   Heart:   Regular  rhythm,  No  murmur   No edema  Abdomen:   Soft, non-tender. Not distended. Bowel sounds normal, PEG tube noted+  Extremities: No cyanosis. No clubbing  Skin:     Not pale. Not Jaundiced  No rashes   Psych:  Good insight. Not depressed. Not anxious or agitated. Neurologic: EOMs intact. No facial asymmetry. No aphasia or slurred speech. Symmetrical strength, Alert and oriented X 4.     _______________________________________________________________________  Care Plan discussed with:    Comments   Patient x    Family      RN x    Care Manager                    Consultant:      ____________________________________________________________________  TOTAL TIME:     41 mins    Comments    x Reviewed previous records   >50% of visit spent in counseling and coordination of care x Discussion with patient and questions answered       Critical Care Provided     Minutes non procedure based  ________________________________________________________________________  Signed: Tam Nguyễn MD      Procedures: see electronic medical records for all procedures/Xrays and details which were not copied into this note but were reviewed prior to creation of Plan.     LAB DATA REVIEWED:    Recent Results (from the past 24 hour(s))   SARS-COV-2    Collection Time: 08/02/20  9:08 PM   Result Value Ref Range    Specimen source Nasopharyngeal      SARS-CoV-2 Not detected NOTD      Specimen source Nasopharyngeal     METABOLIC PANEL, BASIC    Collection Time: 08/03/20  9:35 AM   Result Value Ref Range    Sodium 137 136 - 145 mmol/L    Potassium 4.2 3.5 - 5.1 mmol/L    Chloride 98 97 - 108 mmol/L    CO2 36 (H) 21 - 32 mmol/L    Anion gap 3 (L) 5 - 15 mmol/L    Glucose 94 65 - 100 mg/dL    BUN 37 (H) 6 - 20 MG/DL    Creatinine 0.71 0.55 - 1.02 MG/DL    BUN/Creatinine ratio 52 (H) 12 - 20      GFR est AA >60 >60 ml/min/1.73m2    GFR est non-AA >60 >60 ml/min/1.73m2    Calcium 9.3 8.5 - 10.1 MG/DL   CBC W/O DIFF    Collection Time: 08/03/20  9:35 AM   Result Value Ref Range    WBC 13.3 (H) 3.6 - 11.0 K/uL    RBC 6.72 (H) 3.80 - 5.20 M/uL    HGB 15.5 11.5 - 16.0 g/dL    HCT 47.1 (H) 35.0 - 47.0 %    MCV 70.1 (L) 80.0 - 99.0 FL    MCH 23.1 (L) 26.0 - 34.0 PG    MCHC 32.9 30.0 - 36.5 g/dL    RDW 22.8 (H) 11.5 - 14.5 %    PLATELET 038 257 - 720 K/uL    NRBC 0.0 0  WBC    ABSOLUTE NRBC 0.00 0.00 - 0.01 K/uL       _____________________________  Hospitalist: Renae Rocha MD

## 2020-08-04 NOTE — PROGRESS NOTES
CHAN plan:    -  Return to SNF with transition to [place] [facility] test within 72 hrs of d/c - last done 8/2  -  UAI for LTSS - CM to complete, Medicaid reji submitted July 2020  -  Cam to offer AMD and MD to address code status (DDNR on file but listed as Full Code)  -  BLS transport at d/c -  to arrange  -  2nd IMM letter      Reason for Admission:  Small bowel obstruction                   RUR Score:  15                PCP: First and Last name:  Dr. Ayala Minus   Name of Practice: [facility]   Are you a current patient: Yes/No: Yes   Approximate date of last visit: June 2020   Can you participate in a virtual visit if needed: Yes    Do you (patient/family) have any concerns for transition/discharge? None identified at this time. Plan for utilizing home health: No plan for HHC at this time. Hx of  with [address]. Current Advanced Directive/Advance Care Plan:  Full code with DDNR on file? CM will bring this to Attending's attention. No AMD on file. CM will offer to have one completed prior to d/c. Transition of Care Plan: Return to SNF with transition to [facility]       CM met with patient at bedside to complete initial assessment. Patient was awake and alert/oriented x4. CM introduced role, verified demographics, and discussed discharge planning. Pt is a 60 yo female admitted to [facility] from [facility] for small bowel obstruction. PMHx includes polymyositis, congestive heart failure, hypertension, renal cancer, etc. General Surgery is the attending physician at this time. Pt has been at [facility] since 6/16/20 and will likely remain there for LTC per [facility]. A Medicaid application was completed and submitted approximately one month ago by the rehab facility. CM will complete a UAI and fax to [employer].  CM spoke to pt's cousin, Magaly Biswas, with pt's permission and updated her on plan. CM will continue to follow and remain available for transitional care planning. Care Management Interventions  PCP Verified by CM: Yes(Dr. Loretta Roa)  Mode of Transport at Discharge: BLS  Transition of Care Consult (CM Consult): Discharge Planning(anticipate return to [facility])  Discharge Durable Medical Equipment: No  Physical Therapy Consult: No  Occupational Therapy Consult: No  Speech Therapy Consult: No  Current Support Network: Lives Alone(prior to admission pt was attending re[facility] at [facility].  Primary support is Matthew carias)  Confirm Follow Up Transport: Family  Discharge Location  Discharge Placement: Skilled nursing facility    MARTIN Jimenez  Care Manager  [phone]

## 2020-08-04 NOTE — PROGRESS NOTES
SURGERY PROGRESS NOTE      Admit Date: 2020      Subjective:     Patient has no complaints. She states she feels better. But, no flatus. Objective:     Visit Vitals  /77 (BP 1 Location: Right arm, BP Patient Position: At rest)   Pulse 83   Temp 98.3 °F (36.8 °C)   Resp 16   Ht 5' 4\" (1.626 m)   Wt 60.8 kg (134 lb 0.6 oz)   SpO2 97%   BMI 23.01 kg/m²        Temp (24hrs), Av.7 °F (37.1 °C), Min:98.3 °F (36.8 °C), Max:99.2 °F (37.3 °C)      No intake/output data recorded.    1901 -  0700  In: 0   Out: 775 [Urine:400]    Physical Exam:    General:  alert, cooperative, no distress, appears stated age   Abdomen: soft, distended, tympanic, bowel sounds active, non-tender           Lab Results   Component Value Date/Time    WBC 10.2 2020 03:03 AM    HGB 12.5 2020 03:03 AM    HCT 38.4 2020 03:03 AM    PLATELET 108 (L)  03:03 AM    MCV 70.7 (L) 2020 03:03 AM     Lab Results   Component Value Date/Time    GFR est non-AA >60 2020 03:03 AM    GFR est AA >60 2020 03:03 AM    Creatinine 0.62 2020 03:03 AM    BUN 33 (H) 2020 03:03 AM    Sodium 137 2020 03:03 AM    Potassium 4.1 2020 03:03 AM    Chloride 101 2020 03:03 AM    CO2 35 (H) 2020 03:03 AM    Magnesium 2.0 2020 04:44 AM    Phosphorus 3.2 2020 04:44 AM       Assessment:     Active Problems:    Polymyositis ([facility] (2016)      HTN (hypertension) (2016)      Obstructive sleep apnea syndrome (2017)      SBO (small bowel obstruction) ([facility] (2020)    possibly improving     Plan:       Gastrografin challenge

## 2020-08-04 NOTE — PROGRESS NOTES
Bedside and Verbal shift change report given to Rianna Osei (oncoming nurse) by Aamir Santiago (offgoing nurse). Report included the following information SBAR, Kardex, OR Summary, Intake/Output and MAR.

## 2020-08-04 NOTE — PROGRESS NOTES
Bedside shift change report given to Niranjan Araujo (oncoming nurse) by Nicole Thompson (offgoing nurse). Report included the following information SBAR, Kardex, Intake/Output, MAR and Recent Results. Pt running TPN. IV abx hung. Pt needed zofran x1. AM labs drawn.

## 2020-08-04 NOTE — PROGRESS NOTES
Comprehensive Nutrition Assessment    Type and Reason for Visit: Initial(new PPN)    Nutrition Recommendations/Plan:  Transition to TPN: AA5% D20% @ 63 mL/hr (provides 1331 kcal, 76g protein)    Nutrition Assessment:     Patient medically noted for SBO, pneumonia, and accelerated HTN. PMH RCC, dysphagia, and PEG placement. Patient currently NPO; PPN ordered. PICC placed yesterday afternoon. TPN recommendations provided above to meet 97% of estimated kcal needs and 100% protein needs. Patient reports she has been receiving Osmolite 1.2 continuously PTA via PEG. She states her weight has been fairly stable recently around 131#. Previously tolerating TF well. Will monitor progress and readiness to resume enteral feedings (osmolite 1.2 @ goal rate 50 mL/hr + 100 mL water flushes q 6). Estimated Daily Nutrient Needs:  Energy (kcal):  1374 kcal (BMR 1145 x 1. 2AF)  Protein (g):  73g (1.2 g/kg bw)       Fluid (ml/day):  1400 mL    Nutrition Related Findings:       Medications: Solu-medrol, Protonix  BM 7/31    Wounds:    None       Current Nutrition Therapies:   DIET NPO Except Meds, With Ice Chips  TPN ADULT - PERIPHERAL (AA4.25% D10% @ 42 mL/hr; provides 514 kcal, 43g protein)    Anthropometric Measures:  · Height:  5' 4\" (162.6 cm)  · Current Body Wt:  60.8 kg (134 lb 0.6 oz)   · BMI Category:  Normal weight (BMI 18.5-24. 9)       Nutrition Diagnosis:   · Altered GI function related to (SBO) as evidenced by (imaging, NPO, PPN/TPN)      Nutrition Interventions:   Food and/or Nutrient Delivery: Modify parenteral nutrition  Nutrition Education and Counseling: No recommendations at this time  Coordination of Nutrition Care: No recommendation at this time    Goals:  TPN at goal with lytes WNL next 2-4 days       Nutrition Monitoring and Evaluation:   Behavioral-Environmental Outcomes:    Food/Nutrient Intake Outcomes: Parenteral nutrition intake/tolerance  Physical Signs/Symptoms Outcomes: Biochemical data, GI status, Weight    Discharge Planning:    Enteral nutrition(Resume home TF regimen as able)     Electronically signed by Christine Reyna RD on 8/4/2020 at 9:38 AM    Contact: ext [phone]

## 2020-08-04 NOTE — PROGRESS NOTES
Hospitalist Progress Note    NAME: Araceli Paige   :  1955   MRN:  [id number]       Assessment / Plan: Active Problems:    Polymyositis ([facility] (2016)       HTN (hypertension) (2016)       Obstructive sleep apnea syndrome (2017)       SBO (small bowel obstruction) ([facility] (2020)        Accelerated hypertension not POA  Likely due to missed p.o. blood pressure meds due to n.p.o. bowel rest status     Continue adequate pain management as per primary surgical team  Continue IV fluids but DC ASAP once able to resume enteral feeding-we will help with the blood pressure control  Continue nitro glycerin ointment 1 inch every 6 hours for now  Blood pressure still elevated, will add clonidine 0.1mg patch  C/w IV steroid for polymyositis IV for now since pt is NPO  Continue home Protonix IV for now until patient n.p.o./bowel rest     Suspected right lower lobe pneumonia on CT imaging  Likely atelectasis POA  COVID test is negative  Incentive spirometer recommended  On empiric IV antibiotics as per primary surgical team     Recurrent small bowel obstruction POA-secondary to adhesions as per surgery team  Continue bowel rest per primary surgical team, resume PEG tube feeding once cleared by surgery. DC IV fluids when able           Code Status: Full code as per patient's wishes  DVT Prophylaxis: As per primary surgical team       Subjective:     Chief Complaint / Reason for Physician Visit  \"Reports abdominal pain is improved with pain medicine, No BM yet no flatus\". Discussed with RN events overnight.      Review of Systems:  Symptom Y/N Comments  Symptom Y/N Comments   Fever/Chills n   Chest Pain n    Poor Appetite n   Edema n    Cough n   Abdominal Pain n    Sputum n   Joint Pain     SOB/LOREDO n   Pruritis/Rash     Nausea/vomit n   Tolerating PT/OT     Diarrhea n   Tolerating Diet     Constipation y   Other       Could NOT obtain due to:      Objective:     VITALS:   Last 24hrs VS reviewed since prior progress note. Most recent are:  Patient Vitals for the past 24 hrs:   Temp Pulse Resp BP SpO2   08/04/20 0734 98.3 °F (36.8 °C) 83 16 166/77 97 %   08/03/20 2226 99.2 °F (37.3 °C) 91 16 132/59 98 %   08/03/20 1608 98.5 °F (36.9 °C) 100 16 136/72 95 %   08/03/20 1322 -- (!) 105 -- 146/75 --   08/03/20 1051 -- (!) 103 -- 149/71 --       Intake/Output Summary (Last 24 hours) at 8/4/2020 0953  Last data filed at 8/4/2020 0645  Gross per 24 hour   Intake 0 ml   Output 675 ml   Net -675 ml        PHYSICAL EXAM:  General: WD, WN. Alert, cooperative, no acute distress    EENT:  EOMI. Anicteric sclerae. MMM  Resp:  CTA bilaterally, no wheezing or rales. No accessory muscle use  CV:  Regular  rhythm,  No edema  GI:  Soft, Non distended, Non tender.  +Bowel sounds  Neurologic:  Alert and oriented X 3, normal speech,   Psych:   Good insight. Not anxious nor agitated  Skin:  No rashes. No jaundice    Reviewed most current lab test results and cultures  YES  Reviewed most current radiology test results   YES  Review and summation of old records today    NO  Reviewed patient's current orders and MAR    YES  PMH/ reviewed - no change compared to H&P  ________________________________________________________________________  Care Plan discussed with:    Comments   Patient x    Family      RN x    Care Manager     Consultant                       x Multidiciplinary team rounds were held today with , nursing, pharmacist and clinical coordinator. Patient's plan of care was discussed; medications were reviewed and discharge planning was addressed.      ________________________________________________________________________  Total NON critical care TIME:  28   Minutes    Total CRITICAL CARE TIME Spent:   Minutes non procedure based      Comments   >50% of visit spent in counseling and coordination of care     ________________________________________________________________________  Kwan Araujo MD     Procedures: see electronic medical records for all procedures/Xrays and details which were not copied into this note but were reviewed prior to creation of Plan. LABS:  I reviewed today's most current labs and imaging studies.   Pertinent labs include:  Recent Labs     08/04/20  0303 08/03/20  0935 08/02/20  1446   WBC 10.2 13.3* 10.4   HGB 12.5 15.5 15.3   HCT 38.4 47.1* 46.9   * 174 227     Recent Labs     08/04/20  0303 08/03/20  0935 08/02/20  1446    137 138   K 4.1 4.2 4.2    98 99   CO2 35* 36* 31   * 94 128*   BUN 33* 37* 41*   CREA 0.62 0.71 0.74   CA 8.5 9.3 9.1   ALB  --   --  3.2*   TBILI  --   --  0.8   ALT  --   --  24       Signed: Kati Tarango MD

## 2020-08-05 ENCOUNTER — APPOINTMENT (OUTPATIENT)
Dept: GENERAL RADIOLOGY | Age: 65
DRG: 329 | End: 2020-08-05
Attending: SURGERY
Payer: MEDICARE

## 2020-08-05 LAB
ANION GAP SERPL CALC-SCNC: 1 MMOL/L (ref 5–15)
BUN SERPL-MCNC: 26 MG/DL (ref 6–20)
BUN/CREAT SERPL: 57 (ref 12–20)
CALCIUM SERPL-MCNC: 8.7 MG/DL (ref 8.5–10.1)
CHLORIDE SERPL-SCNC: 101 MMOL/L (ref 97–108)
CO2 SERPL-SCNC: 33 MMOL/L (ref 21–32)
CREAT SERPL-MCNC: 0.46 MG/DL (ref 0.55–1.02)
ERYTHROCYTE [DISTWIDTH] IN BLOOD BY AUTOMATED COUNT: 21.3 % (ref 11.5–14.5)
GLUCOSE SERPL-MCNC: 81 MG/DL (ref 65–100)
HCT VFR BLD AUTO: 37.8 % (ref 35–47)
HGB BLD-MCNC: 12.3 G/DL (ref 11.5–16)
MAGNESIUM SERPL-MCNC: 2.1 MG/DL (ref 1.6–2.4)
MCH RBC QN AUTO: 23.3 PG (ref 26–34)
MCHC RBC AUTO-ENTMCNC: 32.5 G/DL (ref 30–36.5)
MCV RBC AUTO: 71.5 FL (ref 80–99)
NRBC # BLD: 0 K/UL (ref 0–0.01)
NRBC BLD-RTO: 0 PER 100 WBC
PHOSPHATE SERPL-MCNC: 2.5 MG/DL (ref 2.6–4.7)
PLATELET # BLD AUTO: 153 K/UL (ref 150–400)
PMV BLD AUTO: ABNORMAL FL (ref 8.9–12.9)
POTASSIUM SERPL-SCNC: 3.8 MMOL/L (ref 3.5–5.1)
RBC # BLD AUTO: 5.29 M/UL (ref 3.8–5.2)
SODIUM SERPL-SCNC: 135 MMOL/L (ref 136–145)
WBC # BLD AUTO: 9.5 K/UL (ref 3.6–11)

## 2020-08-05 PROCEDURE — 65270000029 HC RM PRIVATE

## 2020-08-05 PROCEDURE — 74011000250 HC RX REV CODE- 250: Performed by: SURGERY

## 2020-08-05 PROCEDURE — 97530 THERAPEUTIC ACTIVITIES: CPT

## 2020-08-05 PROCEDURE — 74011000258 HC RX REV CODE- 258: Performed by: SURGERY

## 2020-08-05 PROCEDURE — 84100 ASSAY OF PHOSPHORUS: CPT

## 2020-08-05 PROCEDURE — 80048 BASIC METABOLIC PNL TOTAL CA: CPT

## 2020-08-05 PROCEDURE — C9113 INJ PANTOPRAZOLE SODIUM, VIA: HCPCS | Performed by: HOSPITALIST

## 2020-08-05 PROCEDURE — 77030038269 HC DRN EXT URIN PURWCK BARD -A

## 2020-08-05 PROCEDURE — 97161 PT EVAL LOW COMPLEX 20 MIN: CPT

## 2020-08-05 PROCEDURE — 74011250637 HC RX REV CODE- 250/637: Performed by: INTERNAL MEDICINE

## 2020-08-05 PROCEDURE — 74011636320 HC RX REV CODE- 636/320: Performed by: RADIOLOGY

## 2020-08-05 PROCEDURE — 83735 ASSAY OF MAGNESIUM: CPT

## 2020-08-05 PROCEDURE — 74011250637 HC RX REV CODE- 250/637: Performed by: HOSPITALIST

## 2020-08-05 PROCEDURE — 36415 COLL VENOUS BLD VENIPUNCTURE: CPT

## 2020-08-05 PROCEDURE — 74250 X-RAY XM SM INT 1CNTRST STD: CPT

## 2020-08-05 PROCEDURE — 85027 COMPLETE CBC AUTOMATED: CPT

## 2020-08-05 PROCEDURE — 74011250636 HC RX REV CODE- 250/636: Performed by: HOSPITALIST

## 2020-08-05 PROCEDURE — 74011000250 HC RX REV CODE- 250: Performed by: HOSPITALIST

## 2020-08-05 PROCEDURE — 74011250636 HC RX REV CODE- 250/636: Performed by: SURGERY

## 2020-08-05 RX ORDER — CLONIDINE 0.2 MG/24H
1 PATCH, EXTENDED RELEASE TRANSDERMAL
Status: DISCONTINUED | OUTPATIENT
Start: 2020-08-05 | End: 2020-08-12 | Stop reason: HOSPADM

## 2020-08-05 RX ORDER — ENOXAPARIN SODIUM 100 MG/ML
40 INJECTION SUBCUTANEOUS EVERY 24 HOURS
Status: DISCONTINUED | OUTPATIENT
Start: 2020-08-05 | End: 2020-08-12 | Stop reason: HOSPADM

## 2020-08-05 RX ADMIN — NITROGLYCERIN 1 INCH: 20 OINTMENT TOPICAL at 23:41

## 2020-08-05 RX ADMIN — SODIUM CHLORIDE 40 MG: 9 INJECTION, SOLUTION INTRAMUSCULAR; INTRAVENOUS; SUBCUTANEOUS at 22:00

## 2020-08-05 RX ADMIN — NITROGLYCERIN 1 INCH: 20 OINTMENT TOPICAL at 01:53

## 2020-08-05 RX ADMIN — SODIUM CHLORIDE 40 MG: 9 INJECTION, SOLUTION INTRAMUSCULAR; INTRAVENOUS; SUBCUTANEOUS at 10:00

## 2020-08-05 RX ADMIN — DIATRIZOATE MEGLUMINE AND DIATRIZOATE SODIUM 240 ML: 600; 100 SOLUTION ORAL; RECTAL at 13:00

## 2020-08-05 RX ADMIN — METRONIDAZOLE 500 MG: 500 INJECTION, SOLUTION INTRAVENOUS at 21:45

## 2020-08-05 RX ADMIN — MORPHINE SULFATE 2 MG: 2 INJECTION, SOLUTION INTRAMUSCULAR; INTRAVENOUS at 16:33

## 2020-08-05 RX ADMIN — METHYLPREDNISOLONE SODIUM SUCCINATE 40 MG: 40 INJECTION, POWDER, FOR SOLUTION INTRAMUSCULAR; INTRAVENOUS at 10:00

## 2020-08-05 RX ADMIN — CEFTRIAXONE 1 G: 1 INJECTION, POWDER, FOR SOLUTION INTRAMUSCULAR; INTRAVENOUS at 23:41

## 2020-08-05 RX ADMIN — MORPHINE SULFATE 2 MG: 2 INJECTION, SOLUTION INTRAMUSCULAR; INTRAVENOUS at 05:38

## 2020-08-05 RX ADMIN — POTASSIUM CHLORIDE, DEXTROSE MONOHYDRATE AND SODIUM CHLORIDE 50 ML/HR: 150; 5; 450 INJECTION, SOLUTION INTRAVENOUS at 01:52

## 2020-08-05 RX ADMIN — METRONIDAZOLE 500 MG: 500 INJECTION, SOLUTION INTRAVENOUS at 10:00

## 2020-08-05 RX ADMIN — ENOXAPARIN SODIUM 40 MG: 40 INJECTION SUBCUTANEOUS at 16:33

## 2020-08-05 RX ADMIN — Medication 10 ML: at 23:40

## 2020-08-05 RX ADMIN — Medication 10 ML: at 05:43

## 2020-08-05 RX ADMIN — CALCIUM GLUCONATE: 94 INJECTION, SOLUTION INTRAVENOUS at 18:32

## 2020-08-05 RX ADMIN — NITROGLYCERIN 1 INCH: 20 OINTMENT TOPICAL at 07:50

## 2020-08-05 RX ADMIN — NITROGLYCERIN 1 INCH: 20 OINTMENT TOPICAL at 18:31

## 2020-08-05 NOTE — PROGRESS NOTES
Problem: Falls - Risk of  Goal: *Absence of Falls  Description: Document Magaly Carver Fall Risk and appropriate interventions in the flowsheet. Outcome: Progressing Towards Goal  Note: Fall Risk Interventions:            Medication Interventions: Evaluate medications/consider consulting pharmacy    Elimination Interventions: Call light in reach, Patient to call for help with toileting needs              Problem: Pressure Injury - Risk of  Goal: *Prevention of pressure injury  Description: Document Armen Scale and appropriate interventions in the flowsheet. Outcome: Progressing Towards Goal  Note: Pressure Injury Interventions:  Sensory Interventions: Check visual cues for pain, Float heels, Assess need for specialty bed, Avoid rigorous massage over bony prominences, Turn and reposition approx.  every two hours (pillows and wedges if needed)    Moisture Interventions: Absorbent underpads, Check for incontinence Q2 hours and as needed    Activity Interventions: Increase time out of bed, Pressure redistribution bed/mattress(bed type)    Mobility Interventions: Chair cushion, HOB 30 degrees or less, Pressure redistribution bed/mattress (bed type)    Nutrition Interventions: Document food/fluid/supplement intake    Friction and Shear Interventions: Feet elevated on foot rest, HOB 30 degrees or less, Apply protective barrier, creams and emollients, Minimize layers

## 2020-08-05 NOTE — PROGRESS NOTES
1930 - Patient had Gastroview contrast ordered. Called Radiology, as instructed on MAR, to verify instructions for administering it. Radiologist at this facility left for the evening - per X-Ray technician. Instructions not clear for administering medication, and I didn't see a test ordered. Relayed info to Kip Boone, oncoming nurse. Bedside and Verbal shift change report given to Kip Boone (oncoming nurse) by Manny Garrison (offgoing nurse). Report included the following information SBAR, Kardex, Intake/Output, MAR and Recent Results.

## 2020-08-05 NOTE — PROGRESS NOTES
Bedside and Verbal shift change report given to Yadira Diaz (oncoming nurse) by Aletha Gonzalez (offgoing nurse). Report included the following information SBAR, Kardex, Intake/Output and MAR.

## 2020-08-05 NOTE — PROGRESS NOTES
Orders received, chart reviewed and patient evaluated by physical therapy. Pending progression with skilled acute physical therapy, recommend:  Therapy up to 5 days/week in SNF setting     Thank you for your assistance. Full evaluation to follow.

## 2020-08-05 NOTE — PROGRESS NOTES
Hospitalist Progress Note    NAME: Nicki Hernandez   :  1955   MRN:  [id number]       Assessment / Plan: Active Problems:    Polymyositis ([facility] [address] (2016)       HTN (hypertension) (2016)       Obstructive sleep apnea syndrome (2017)       SBO (small bowel obstruction) ([facility] [address] (2020)        Accelerated hypertension not POA  Likely due to missed p.o. blood pressure meds due to n.p.o. bowel rest status     Continue adequate pain management as per primary surgical team  Continue IV fluids but DC ASAP once able to resume enteral feeding-we will help with the blood pressure control  Continue nitro glycerin ointment 1 inch every 6 hours for now  Blood pressure still elevated, will increase  Clonidine to  0.2 mg patch  C/w IV steroid for polymyositis IV for now since pt is NPO  Continue home Protonix IV for now until patient n.p.o./bowel rest     Suspected right lower lobe pneumonia on CT imaging  Likely atelectasis POA  COVID test is negative  Incentive spirometer recommended  On empiric IV antibiotics as per primary surgical team     Recurrent small bowel obstruction POA-secondary to adhesions as per surgery team  Continue bowel rest per primary surgical team, resume PEG tube feeding once cleared by surgery. DC IV fluids when able           Code Status: Full code as per patient's wishes  DVT Prophylaxis: As per primary surgical team       Subjective:     Chief Complaint / Reason for Physician Visit  \"Reports abdominal pain is improved with pain medicine, No BM yet no flatus\". Discussed with RN events overnight.      Review of Systems:  Symptom Y/N Comments  Symptom Y/N Comments   Fever/Chills n   Chest Pain n    Poor Appetite n   Edema n    Cough n   Abdominal Pain n    Sputum n   Joint Pain     SOB/LOREDO n   Pruritis/Rash     Nausea/vomit n   Tolerating PT/OT     Diarrhea n   Tolerating Diet     Constipation y   Other       Could NOT obtain due to:      Objective:     VITALS:   Last 24hrs VS reviewed since prior progress note. Most recent are:  Patient Vitals for the past 24 hrs:   Temp Pulse Resp BP SpO2   08/05/20 0742 97.5 °F (36.4 °C) 72 16 (!) 183/94 98 %   08/04/20 2248 98.5 °F (36.9 °C) 67 16 177/88 100 %   08/04/20 1407 98.4 °F (36.9 °C) 77 16 166/77 98 %       Intake/Output Summary (Last 24 hours) at 8/5/2020 1038  Last data filed at 8/5/2020 0620  Gross per 24 hour   Intake --   Output 450 ml   Net -450 ml        PHYSICAL EXAM:  General: WD, WN. Alert, cooperative, no acute distress    EENT:  EOMI. Anicteric sclerae. MMM  Resp:  CTA bilaterally, no wheezing or rales. No accessory muscle use  CV:  Regular  rhythm,  No edema  GI:  Soft, Non distended, Non tender.  +Bowel sounds  Neurologic:  Alert and oriented X 3, normal speech,   Psych:   Good insight. Not anxious nor agitated  Skin:  No rashes. No jaundice    Reviewed most current lab test results and cultures  YES  Reviewed most current radiology test results   YES  Review and summation of old records today    NO  Reviewed patient's current orders and MAR    YES  PMH/SH reviewed - no change compared to H&P  ________________________________________________________________________  Care Plan discussed with:    Comments   Patient x    Family      RN x    Care Manager     Consultant                       x Multidiciplinary team rounds were held today with , nursing, pharmacist and clinical coordinator. Patient's plan of care was discussed; medications were reviewed and discharge planning was addressed.      ________________________________________________________________________  Total NON critical care TIME:  28   Minutes    Total CRITICAL CARE TIME Spent:   Minutes non procedure based      Comments   >50% of visit spent in counseling and coordination of care     ________________________________________________________________________  Inder Moreland MD     Procedures: see electronic medical records for all procedures/Xrays and details which were not copied into this note but were reviewed prior to creation of Plan. LABS:  I reviewed today's most current labs and imaging studies.   Pertinent labs include:  Recent Labs     08/05/20  0550 08/04/20  0303 08/03/20  0935   WBC 9.5 10.2 13.3*   HGB 12.3 12.5 15.5   HCT 37.8 38.4 47.1*    138* 174     Recent Labs     08/05/20  0550 08/04/20  0303 08/03/20  0935 08/02/20  1446   * 137 137 138   K 3.8 4.1 4.2 4.2    101 98 99   CO2 33* 35* 36* 31   GLU 81 114* 94 128*   BUN 26* 33* 37* 41*   CREA 0.46* 0.62 0.71 0.74   CA 8.7 8.5 9.3 9.1   MG 2.1  --   --   --    PHOS 2.5*  --   --   --    ALB  --   --   --  3.2*   TBILI  --   --   --  0.8   ALT  --   --   --  24       Signed: Shiv Sanford MD

## 2020-08-05 NOTE — PROGRESS NOTES
Problem: Mobility Impaired (Adult and Pediatric)  Goal: *Acute Goals and Plan of Care (Insert Text)  Description: FUNCTIONAL STATUS PRIOR TO ADMISSION: Pt reports she was receiving PT, OT and ST services at [facility]. She reports being able to sit eob a little on her own, but required assistance to achieve this position and also required assistance to roll in bed. HOME SUPPORT PRIOR TO ADMISSION:  pt resides in a SNF with plan to transition to LTC    Physical Therapy Goals  Initiated 8/5/2020  1. Patient will roll side to side in bed with maximal assistance within 7 day(s). 2.  Patient will move supine to sit and sit to supine with maximal assistance within 7 days. 3.  Patient will scoot up, down, and laterally in bed, as well as scoot to the eob in sitting with maximal assistance within 7 days. 4.  Patient will sit eob unsupported x 5 min, while performing LE exercises within 7 days  Outcome: Progressing Towards Goal   PHYSICAL THERAPY EVALUATION  Patient: Deshawn Paul (70 y.o. female)  Date: 8/5/2020  Primary Diagnosis: SBO (small bowel obstruction) ([facility] [address] [K56.609]        Precautions:        ASSESSMENT  Based on the objective data described below, the patient presents with generalized weakness, extremity weakness(proximal greater than distal), impaired mobility, abdominal pain, and decreased activity tolerance. Pt recently discharged from this hospital 6/16/2020 and went to [facility], where she reports she was getting PT, OT, and ST. She reports her last PT session was last Saturday 8/1/2020. Today pt requires total assist to roll, max A x 1 for sup>sit, and total assist for sit>sup. Pt able to sit eob unsupported, demonstrating G to Fair static balance. She only tolerated approx 3 min due to report abdominal pain, requesting to lie back down. Noted pt with slight improvement in function, since her last admission and PT will continue to see her in the acute setting. Recommend discharge back to SNF and rehab services resume. Current Level of Function Impacting Discharge (mobility/balance): total to max A x 1    Functional Outcome Measure: The patient scored 15/100 on the Barthel Index outcome measure which is indicative of 85% impaired function/adls. Other factors to consider for discharge: none     Patient will benefit from skilled therapy intervention to address the above noted impairments. PLAN :  Recommendations and Planned Interventions: bed mobility training, transfer training, therapeutic exercises, patient and family training/education, and therapeutic activities      Frequency/Duration: Patient will be followed by physical therapy:  3 times a week to address goals. Recommendation for discharge: (in order for the patient to meet his/her long term goals)  Therapy up to 5 days/week in SNF setting    This discharge recommendation:  Has been made in collaboration with the attending provider and/or case management    IF patient discharges home will need the following DME: none         SUBJECTIVE:   Patient stated I have been getting therapy every day.     OBJECTIVE DATA SUMMARY:   HISTORY:    Past Medical History:   Diagnosis Date    Congestive heart failure ([address]     Hypertension     Pneumonia 10/2018    Polymyositis ([facility] [address] 12/2015    in setting of [address] 2015    Polymyositis associated with autoimmune disease ([address]     Renal cancer ([facility] [address] 07/08/2016    s/p right nephrectomy.      Respiratory arrest ([facility] [address] 11/2015    [facility].    Dorothy Domenico SBO (small bowel obstruction) ([facility] [address] 8/3/2017     Past Surgical History:   Procedure Laterality Date    HX GYN  1999    hysterectomy    HX NEPHRECTOMY Right 2016    for kidney cancer    US GUIDED CORE BREAST BIOPSY Left 1999    Negative       Personal factors and/or comorbidities impacting plan of care: medical status    Home Situation  Home Environment: Skilled nursing facility    EXAMINATION/PRESENTATION/DECISION MAKING:   Critical Behavior:  Neurologic State: Alert  Orientation Level: Oriented X4        Hearing: Auditory  Auditory Impairment: Hard of hearing, bilateral  Range Of Motion:  AROM: Generally decreased, functional(B hip/shoulder non functional)           PROM: Generally decreased, functional           Strength:    Strength: Generally decreased, functional(B shoulder/hip nonfunctional)                    Tone & Sensation:   Tone: Normal              Sensation: Intact               Coordination:  Coordination: Generally decreased, functional  Vision:      Functional Mobility:  Bed Mobility:  Rolling: Total assistance  Supine to Sit: Maximum assistance  Sit to Supine: Total assistance  Scooting: Total assistance     Balance:   Sitting: Impaired  Sitting - Static: Fair (occasional); Good (unsupported); Occassional(3 min tolerance due to abdominal pain)  Sitting - Dynamic: Not tested    Functional Measure:  Barthel Index:    Bathin  Bladder: 5  Bowels: 10  Groomin  Dressin  Feedin  Mobility: 0  Stairs: 0  Toilet Use: 0  Transfer (Bed to Chair and Back): 0  Total: 15/100       The Barthel ADL Index: Guidelines  1. The index should be used as a record of what a patient does, not as a record of what a patient could do. 2. The main aim is to establish degree of independence from any help, physical or verbal, however minor and for whatever reason. 3. The need for supervision renders the patient not independent. 4. A patient's performance should be established using the best available evidence. Asking the patient, friends/relatives and nurses are the usual sources, but direct observation and common sense are also important. However direct testing is not needed. 5. Usually the patient's performance over the preceding 24-48 hours is important, but occasionally longer periods will be relevant.   6. Middle categories imply that the patient supplies over 50 per cent of the effort. 7. Use of aids to be independent is allowed. Leo Alvarez., Barthel, DGretaW. (4072). Functional evaluation: the Barthel Index. [address] (14)2. REDDY Nguyen Pleas Mole., Jodie Fus., [place], [address] (1999). Measuring the change indisability after inpatient rehabilitation; comparison of the responsiveness of the Barthel Index and Functional [place] Measure. Journal of Neurology, Neurosurgery, and Psychiatry, 66(4), 149-473. Lendon Soulier, N.[place].A, ARIELA Crowe, & Fernando Ba M.A. (2004.) Assessment of post-stroke quality of life in cost-effectiveness studies: The usefulness of the Barthel Index and the EuroQoL-5D. Quality of Life Research, 15, 910-20           Physical Therapy Evaluation Charge Determination   History Examination Presentation Decision-Making   MEDIUM  Complexity : 1-2 comorbidities / personal factors will impact the outcome/ POC  MEDIUM Complexity : 3 Standardized tests and measures addressing body structure, function, activity limitation and / or participation in recreation  MEDIUM Complexity : Evolving with changing characteristics  MEDIUM Complexity : FOTO score of 26-74      Based on the above components, the patient evaluation is determined to be of the following complexity level: MEDIUM    Pain Rating:  None reported    Activity Tolerance:   Fair  Please refer to the flowsheet for vital signs taken during this treatment. After treatment patient left in no apparent distress:   Supine in bed, Call bell within reach, and Side rails x 3    COMMUNICATION/EDUCATION:   The patients plan of care was discussed with: Registered nurse and Case management. Fall prevention education was provided and the patient/caregiver indicated understanding., Patient/family have participated as able in goal setting and plan of care. , and Patient/family agree to work toward stated goals and plan of care.     Thank you for this referral.  Maddy Leal, PT   Time Calculation: 15 mins

## 2020-08-06 ENCOUNTER — APPOINTMENT (OUTPATIENT)
Dept: GENERAL RADIOLOGY | Age: 65
DRG: 329 | End: 2020-08-06
Attending: SURGERY
Payer: MEDICARE

## 2020-08-06 ENCOUNTER — ANESTHESIA EVENT (OUTPATIENT)
Dept: SURGERY | Age: 65
DRG: 329 | End: 2020-08-06
Payer: MEDICARE

## 2020-08-06 ENCOUNTER — ANESTHESIA (OUTPATIENT)
Dept: SURGERY | Age: 65
DRG: 329 | End: 2020-08-06
Payer: MEDICARE

## 2020-08-06 LAB
COVID-19, XGCOVT: NORMAL
GLUCOSE BLD STRIP.AUTO-MCNC: 126 MG/DL (ref 65–100)
SERVICE CMNT-IMP: ABNORMAL

## 2020-08-06 PROCEDURE — 0DTJ0ZZ RESECTION OF APPENDIX, OPEN APPROACH: ICD-10-PCS | Performed by: SURGERY

## 2020-08-06 PROCEDURE — 74011000250 HC RX REV CODE- 250: Performed by: SURGERY

## 2020-08-06 PROCEDURE — 77030009979 HC RELD STPLR TCR J&J -C: Performed by: SURGERY

## 2020-08-06 PROCEDURE — 77030011278 HC ELECTRD LIG IMPT COVD -F: Performed by: SURGERY

## 2020-08-06 PROCEDURE — 74011250637 HC RX REV CODE- 250/637: Performed by: SURGERY

## 2020-08-06 PROCEDURE — 99024 POSTOP FOLLOW-UP VISIT: CPT | Performed by: SURGERY

## 2020-08-06 PROCEDURE — 77030038692 HC WND DEB SYS IRMX -B: Performed by: SURGERY

## 2020-08-06 PROCEDURE — 74011250636 HC RX REV CODE- 250/636: Performed by: SURGERY

## 2020-08-06 PROCEDURE — 77030002966 HC SUT PDS J&J -A: Performed by: SURGERY

## 2020-08-06 PROCEDURE — C9113 INJ PANTOPRAZOLE SODIUM, VIA: HCPCS | Performed by: SURGERY

## 2020-08-06 PROCEDURE — 77030031139 HC SUT VCRL2 J&J -A: Performed by: SURGERY

## 2020-08-06 PROCEDURE — 74011250636 HC RX REV CODE- 250/636: Performed by: ANESTHESIOLOGY

## 2020-08-06 PROCEDURE — 0DNJ0ZZ RELEASE APPENDIX, OPEN APPROACH: ICD-10-PCS | Performed by: SURGERY

## 2020-08-06 PROCEDURE — 0WQF0ZZ REPAIR ABDOMINAL WALL, OPEN APPROACH: ICD-10-PCS | Performed by: SURGERY

## 2020-08-06 PROCEDURE — 77030008771 HC TU NG SALEM SUMP -A: Performed by: NURSE ANESTHETIST, CERTIFIED REGISTERED

## 2020-08-06 PROCEDURE — 77030008462 HC STPLR SKN PROX J&J -A: Performed by: SURGERY

## 2020-08-06 PROCEDURE — 77030036731 HC STPLR ENDOSC J&J -F: Performed by: SURGERY

## 2020-08-06 PROCEDURE — 77030013079 HC BLNKT BAIR HGGR 3M -A: Performed by: ANESTHESIOLOGY

## 2020-08-06 PROCEDURE — 76210000016 HC OR PH I REC 1 TO 1.5 HR: Performed by: SURGERY

## 2020-08-06 PROCEDURE — 88307 TISSUE EXAM BY PATHOLOGIST: CPT

## 2020-08-06 PROCEDURE — 0DN80ZZ RELEASE SMALL INTESTINE, OPEN APPROACH: ICD-10-PCS | Performed by: SURGERY

## 2020-08-06 PROCEDURE — 74011000250 HC RX REV CODE- 250: Performed by: NURSE ANESTHETIST, CERTIFIED REGISTERED

## 2020-08-06 PROCEDURE — 77030020061 HC IV BLD WRMR ADMIN SET 3M -B: Performed by: ANESTHESIOLOGY

## 2020-08-06 PROCEDURE — 44050 REDUCE BOWEL OBSTRUCTION: CPT | Performed by: SURGERY

## 2020-08-06 PROCEDURE — C9113 INJ PANTOPRAZOLE SODIUM, VIA: HCPCS | Performed by: HOSPITALIST

## 2020-08-06 PROCEDURE — 74011000258 HC RX REV CODE- 258: Performed by: SURGERY

## 2020-08-06 PROCEDURE — 77030018836 HC SOL IRR NACL ICUM -A: Performed by: SURGERY

## 2020-08-06 PROCEDURE — 0DBB0ZZ EXCISION OF ILEUM, OPEN APPROACH: ICD-10-PCS | Performed by: SURGERY

## 2020-08-06 PROCEDURE — 74011250636 HC RX REV CODE- 250/636: Performed by: NURSE ANESTHETIST, CERTIFIED REGISTERED

## 2020-08-06 PROCEDURE — 99231 SBSQ HOSP IP/OBS SF/LOW 25: CPT | Performed by: SURGERY

## 2020-08-06 PROCEDURE — 76010000153 HC OR TIME 1.5 TO 2 HR: Performed by: SURGERY

## 2020-08-06 PROCEDURE — 74011250637 HC RX REV CODE- 250/637: Performed by: HOSPITALIST

## 2020-08-06 PROCEDURE — 44120 REMOVAL OF SMALL INTESTINE: CPT | Performed by: SURGERY

## 2020-08-06 PROCEDURE — 77030011808 HC STPLR ENDOSCOPIC J&J -D: Performed by: SURGERY

## 2020-08-06 PROCEDURE — 77030003029 HC SUT VCRL J&J -B: Performed by: SURGERY

## 2020-08-06 PROCEDURE — 77030010939 HC CLP LIG TELE -B: Performed by: SURGERY

## 2020-08-06 PROCEDURE — 82962 GLUCOSE BLOOD TEST: CPT

## 2020-08-06 PROCEDURE — 77030011640 HC PAD GRND REM COVD -A: Performed by: SURGERY

## 2020-08-06 PROCEDURE — 88304 TISSUE EXAM BY PATHOLOGIST: CPT

## 2020-08-06 PROCEDURE — 77030008771 HC TU NG SALEM SUMP -A: Performed by: SURGERY

## 2020-08-06 PROCEDURE — 74018 RADEX ABDOMEN 1 VIEW: CPT

## 2020-08-06 PROCEDURE — 77030005513 HC CATH URETH FOL11 MDII -B: Performed by: SURGERY

## 2020-08-06 PROCEDURE — 77030019908 HC STETH ESOPH SIMS -A: Performed by: ANESTHESIOLOGY

## 2020-08-06 PROCEDURE — 77030026438 HC STYL ET INTUB CARD -A: Performed by: NURSE ANESTHETIST, CERTIFIED REGISTERED

## 2020-08-06 PROCEDURE — 76060000034 HC ANESTHESIA 1.5 TO 2 HR: Performed by: SURGERY

## 2020-08-06 PROCEDURE — 74011250636 HC RX REV CODE- 250/636: Performed by: HOSPITALIST

## 2020-08-06 PROCEDURE — 77030008684 HC TU ET CUF COVD -B: Performed by: ANESTHESIOLOGY

## 2020-08-06 PROCEDURE — 77030002888 HC SUT CHRMC J&J -A: Performed by: SURGERY

## 2020-08-06 PROCEDURE — 65270000029 HC RM PRIVATE

## 2020-08-06 RX ORDER — SODIUM CHLORIDE, SODIUM LACTATE, POTASSIUM CHLORIDE, CALCIUM CHLORIDE 600; 310; 30; 20 MG/100ML; MG/100ML; MG/100ML; MG/100ML
75 INJECTION, SOLUTION INTRAVENOUS CONTINUOUS
Status: DISCONTINUED | OUTPATIENT
Start: 2020-08-06 | End: 2020-08-06

## 2020-08-06 RX ORDER — MIDAZOLAM HYDROCHLORIDE 1 MG/ML
0.5 INJECTION, SOLUTION INTRAMUSCULAR; INTRAVENOUS
Status: DISCONTINUED | OUTPATIENT
Start: 2020-08-06 | End: 2020-08-06

## 2020-08-06 RX ORDER — LIDOCAINE HYDROCHLORIDE 10 MG/ML
0.1 INJECTION, SOLUTION EPIDURAL; INFILTRATION; INTRACAUDAL; PERINEURAL AS NEEDED
Status: DISCONTINUED | OUTPATIENT
Start: 2020-08-06 | End: 2020-08-06 | Stop reason: HOSPADM

## 2020-08-06 RX ORDER — PHENYLEPHRINE HCL IN 0.9% NACL 0.4MG/10ML
SYRINGE (ML) INTRAVENOUS AS NEEDED
Status: DISCONTINUED | OUTPATIENT
Start: 2020-08-06 | End: 2020-08-06 | Stop reason: HOSPADM

## 2020-08-06 RX ORDER — DIPHENHYDRAMINE HYDROCHLORIDE 50 MG/ML
12.5 INJECTION, SOLUTION INTRAMUSCULAR; INTRAVENOUS AS NEEDED
Status: DISCONTINUED | OUTPATIENT
Start: 2020-08-06 | End: 2020-08-06

## 2020-08-06 RX ORDER — MIDAZOLAM HYDROCHLORIDE 1 MG/ML
INJECTION, SOLUTION INTRAMUSCULAR; INTRAVENOUS AS NEEDED
Status: DISCONTINUED | OUTPATIENT
Start: 2020-08-06 | End: 2020-08-06 | Stop reason: HOSPADM

## 2020-08-06 RX ORDER — NEOSTIGMINE METHYLSULFATE 1 MG/ML
INJECTION, SOLUTION INTRAVENOUS AS NEEDED
Status: DISCONTINUED | OUTPATIENT
Start: 2020-08-06 | End: 2020-08-06 | Stop reason: HOSPADM

## 2020-08-06 RX ORDER — MORPHINE SULFATE 10 MG/ML
2 INJECTION, SOLUTION INTRAMUSCULAR; INTRAVENOUS
Status: DISCONTINUED | OUTPATIENT
Start: 2020-08-06 | End: 2020-08-06

## 2020-08-06 RX ORDER — KETAMINE HYDROCHLORIDE 100 MG/ML
INJECTION, SOLUTION INTRAMUSCULAR; INTRAVENOUS AS NEEDED
Status: DISCONTINUED | OUTPATIENT
Start: 2020-08-06 | End: 2020-08-06 | Stop reason: HOSPADM

## 2020-08-06 RX ORDER — ONDANSETRON 2 MG/ML
4 INJECTION INTRAMUSCULAR; INTRAVENOUS AS NEEDED
Status: DISCONTINUED | OUTPATIENT
Start: 2020-08-06 | End: 2020-08-06

## 2020-08-06 RX ORDER — ONDANSETRON 2 MG/ML
INJECTION INTRAMUSCULAR; INTRAVENOUS AS NEEDED
Status: DISCONTINUED | OUTPATIENT
Start: 2020-08-06 | End: 2020-08-06 | Stop reason: HOSPADM

## 2020-08-06 RX ORDER — SODIUM CHLORIDE 9 MG/ML
50 INJECTION, SOLUTION INTRAVENOUS CONTINUOUS
Status: DISCONTINUED | OUTPATIENT
Start: 2020-08-06 | End: 2020-08-06

## 2020-08-06 RX ORDER — SODIUM CHLORIDE 0.9 % (FLUSH) 0.9 %
5-40 SYRINGE (ML) INJECTION EVERY 8 HOURS
Status: DISCONTINUED | OUTPATIENT
Start: 2020-08-06 | End: 2020-08-06

## 2020-08-06 RX ORDER — SODIUM CHLORIDE 0.9 % (FLUSH) 0.9 %
5-40 SYRINGE (ML) INJECTION AS NEEDED
Status: DISCONTINUED | OUTPATIENT
Start: 2020-08-06 | End: 2020-08-12 | Stop reason: HOSPADM

## 2020-08-06 RX ORDER — MIDAZOLAM HYDROCHLORIDE 1 MG/ML
1 INJECTION, SOLUTION INTRAMUSCULAR; INTRAVENOUS AS NEEDED
Status: DISCONTINUED | OUTPATIENT
Start: 2020-08-06 | End: 2020-08-06 | Stop reason: HOSPADM

## 2020-08-06 RX ORDER — FENTANYL CITRATE 50 UG/ML
50 INJECTION, SOLUTION INTRAMUSCULAR; INTRAVENOUS AS NEEDED
Status: DISCONTINUED | OUTPATIENT
Start: 2020-08-06 | End: 2020-08-06 | Stop reason: HOSPADM

## 2020-08-06 RX ORDER — HYDRALAZINE HYDROCHLORIDE 20 MG/ML
10 INJECTION INTRAMUSCULAR; INTRAVENOUS
Status: DISPENSED | OUTPATIENT
Start: 2020-08-06 | End: 2020-08-06

## 2020-08-06 RX ORDER — FENTANYL CITRATE 50 UG/ML
INJECTION, SOLUTION INTRAMUSCULAR; INTRAVENOUS AS NEEDED
Status: DISCONTINUED | OUTPATIENT
Start: 2020-08-06 | End: 2020-08-06 | Stop reason: HOSPADM

## 2020-08-06 RX ORDER — CEFAZOLIN SODIUM 1 G/3ML
2 INJECTION, POWDER, FOR SOLUTION INTRAMUSCULAR; INTRAVENOUS EVERY 8 HOURS
Status: COMPLETED | OUTPATIENT
Start: 2020-08-06 | End: 2020-08-07

## 2020-08-06 RX ORDER — SODIUM CHLORIDE 9 MG/ML
50 INJECTION, SOLUTION INTRAVENOUS CONTINUOUS
Status: DISCONTINUED | OUTPATIENT
Start: 2020-08-06 | End: 2020-08-06 | Stop reason: HOSPADM

## 2020-08-06 RX ORDER — GLYCOPYRROLATE 0.2 MG/ML
INJECTION INTRAMUSCULAR; INTRAVENOUS AS NEEDED
Status: DISCONTINUED | OUTPATIENT
Start: 2020-08-06 | End: 2020-08-06 | Stop reason: HOSPADM

## 2020-08-06 RX ORDER — OXYCODONE AND ACETAMINOPHEN 5; 325 MG/1; MG/1
1 TABLET ORAL AS NEEDED
Status: DISCONTINUED | OUTPATIENT
Start: 2020-08-06 | End: 2020-08-06

## 2020-08-06 RX ORDER — MORPHINE SULFATE 2 MG/ML
2-4 INJECTION, SOLUTION INTRAMUSCULAR; INTRAVENOUS
Status: DISCONTINUED | OUTPATIENT
Start: 2020-08-06 | End: 2020-08-12 | Stop reason: HOSPADM

## 2020-08-06 RX ORDER — FENTANYL CITRATE 50 UG/ML
25 INJECTION, SOLUTION INTRAMUSCULAR; INTRAVENOUS
Status: DISCONTINUED | OUTPATIENT
Start: 2020-08-06 | End: 2020-08-06

## 2020-08-06 RX ORDER — DEXTROSE, SODIUM CHLORIDE, AND POTASSIUM CHLORIDE 5; .9; .15 G/100ML; G/100ML; G/100ML
100 INJECTION INTRAVENOUS CONTINUOUS
Status: DISCONTINUED | OUTPATIENT
Start: 2020-08-06 | End: 2020-08-12 | Stop reason: HOSPADM

## 2020-08-06 RX ORDER — SODIUM CHLORIDE, SODIUM LACTATE, POTASSIUM CHLORIDE, CALCIUM CHLORIDE 600; 310; 30; 20 MG/100ML; MG/100ML; MG/100ML; MG/100ML
75 INJECTION, SOLUTION INTRAVENOUS CONTINUOUS
Status: DISCONTINUED | OUTPATIENT
Start: 2020-08-06 | End: 2020-08-06 | Stop reason: HOSPADM

## 2020-08-06 RX ORDER — ROCURONIUM BROMIDE 10 MG/ML
INJECTION, SOLUTION INTRAVENOUS AS NEEDED
Status: DISCONTINUED | OUTPATIENT
Start: 2020-08-06 | End: 2020-08-06 | Stop reason: HOSPADM

## 2020-08-06 RX ORDER — LABETALOL HYDROCHLORIDE 5 MG/ML
INJECTION, SOLUTION INTRAVENOUS AS NEEDED
Status: DISCONTINUED | OUTPATIENT
Start: 2020-08-06 | End: 2020-08-06 | Stop reason: HOSPADM

## 2020-08-06 RX ORDER — SODIUM CHLORIDE 0.9 % (FLUSH) 0.9 %
5-40 SYRINGE (ML) INJECTION AS NEEDED
Status: DISCONTINUED | OUTPATIENT
Start: 2020-08-06 | End: 2020-08-06

## 2020-08-06 RX ORDER — PROPOFOL 10 MG/ML
INJECTION, EMULSION INTRAVENOUS AS NEEDED
Status: DISCONTINUED | OUTPATIENT
Start: 2020-08-06 | End: 2020-08-06 | Stop reason: HOSPADM

## 2020-08-06 RX ORDER — DEXAMETHASONE SODIUM PHOSPHATE 4 MG/ML
INJECTION, SOLUTION INTRA-ARTICULAR; INTRALESIONAL; INTRAMUSCULAR; INTRAVENOUS; SOFT TISSUE AS NEEDED
Status: DISCONTINUED | OUTPATIENT
Start: 2020-08-06 | End: 2020-08-06 | Stop reason: HOSPADM

## 2020-08-06 RX ORDER — SODIUM CHLORIDE 0.9 % (FLUSH) 0.9 %
5-40 SYRINGE (ML) INJECTION EVERY 8 HOURS
Status: DISCONTINUED | OUTPATIENT
Start: 2020-08-06 | End: 2020-08-12 | Stop reason: HOSPADM

## 2020-08-06 RX ADMIN — CEFAZOLIN 2 G: 1 INJECTION, POWDER, FOR SOLUTION INTRAMUSCULAR; INTRAVENOUS at 16:24

## 2020-08-06 RX ADMIN — Medication 3 MG: at 17:27

## 2020-08-06 RX ADMIN — PROPOFOL 100 MG: 10 INJECTION, EMULSION INTRAVENOUS at 16:12

## 2020-08-06 RX ADMIN — GLYCOPYRROLATE 0.6 MG: 0.2 INJECTION, SOLUTION INTRAMUSCULAR; INTRAVENOUS at 17:27

## 2020-08-06 RX ADMIN — MORPHINE SULFATE 4 MG: 2 INJECTION, SOLUTION INTRAMUSCULAR; INTRAVENOUS at 22:40

## 2020-08-06 RX ADMIN — FENTANYL CITRATE 25 MCG: 50 INJECTION, SOLUTION INTRAMUSCULAR; INTRAVENOUS at 17:44

## 2020-08-06 RX ADMIN — FENTANYL CITRATE 50 MCG: 50 INJECTION, SOLUTION INTRAMUSCULAR; INTRAVENOUS at 16:50

## 2020-08-06 RX ADMIN — CALCIUM GLUCONATE: 94 INJECTION, SOLUTION INTRAVENOUS at 16:01

## 2020-08-06 RX ADMIN — Medication 10 ML: at 05:30

## 2020-08-06 RX ADMIN — NITROGLYCERIN 1 INCH: 20 OINTMENT TOPICAL at 05:29

## 2020-08-06 RX ADMIN — METHYLPREDNISOLONE SODIUM SUCCINATE 40 MG: 40 INJECTION, POWDER, FOR SOLUTION INTRAMUSCULAR; INTRAVENOUS at 08:01

## 2020-08-06 RX ADMIN — DEXAMETHASONE SODIUM PHOSPHATE 4 MG: 4 INJECTION, SOLUTION INTRAMUSCULAR; INTRAVENOUS at 17:13

## 2020-08-06 RX ADMIN — ONDANSETRON 4 MG: 2 INJECTION INTRAMUSCULAR; INTRAVENOUS at 07:52

## 2020-08-06 RX ADMIN — Medication 80 MCG: at 16:56

## 2020-08-06 RX ADMIN — FENTANYL CITRATE 50 MCG: 50 INJECTION, SOLUTION INTRAMUSCULAR; INTRAVENOUS at 16:35

## 2020-08-06 RX ADMIN — PROPOFOL 20 MG: 10 INJECTION, EMULSION INTRAVENOUS at 16:35

## 2020-08-06 RX ADMIN — SODIUM CHLORIDE 40 MG: 9 INJECTION, SOLUTION INTRAMUSCULAR; INTRAVENOUS; SUBCUTANEOUS at 20:28

## 2020-08-06 RX ADMIN — Medication 1 AMPULE: at 22:05

## 2020-08-06 RX ADMIN — HYDRALAZINE HYDROCHLORIDE 10 MG: 20 INJECTION INTRAMUSCULAR; INTRAVENOUS at 18:22

## 2020-08-06 RX ADMIN — KETAMINE HYDROCHLORIDE 30 MG: 100 INJECTION, SOLUTION, CONCENTRATE INTRAMUSCULAR; INTRAVENOUS at 16:28

## 2020-08-06 RX ADMIN — FENTANYL CITRATE 25 MCG: 50 INJECTION, SOLUTION INTRAMUSCULAR; INTRAVENOUS at 19:15

## 2020-08-06 RX ADMIN — CEFTRIAXONE 1 G: 1 INJECTION, POWDER, FOR SOLUTION INTRAMUSCULAR; INTRAVENOUS at 20:27

## 2020-08-06 RX ADMIN — MIDAZOLAM HYDROCHLORIDE 1 MG: 1 INJECTION, SOLUTION INTRAMUSCULAR; INTRAVENOUS at 16:01

## 2020-08-06 RX ADMIN — SODIUM CHLORIDE 50 MCG/MIN: 900 INJECTION, SOLUTION INTRAVENOUS at 16:20

## 2020-08-06 RX ADMIN — FENTANYL CITRATE 50 MCG: 50 INJECTION, SOLUTION INTRAMUSCULAR; INTRAVENOUS at 16:45

## 2020-08-06 RX ADMIN — ENOXAPARIN SODIUM 40 MG: 40 INJECTION SUBCUTANEOUS at 20:28

## 2020-08-06 RX ADMIN — MORPHINE SULFATE 2 MG: 2 INJECTION, SOLUTION INTRAMUSCULAR; INTRAVENOUS at 20:28

## 2020-08-06 RX ADMIN — CALCIUM GLUCONATE: 94 INJECTION, SOLUTION INTRAVENOUS at 20:58

## 2020-08-06 RX ADMIN — MORPHINE SULFATE 2 MG: 2 INJECTION, SOLUTION INTRAMUSCULAR; INTRAVENOUS at 07:58

## 2020-08-06 RX ADMIN — METRONIDAZOLE 500 MG: 500 INJECTION, SOLUTION INTRAVENOUS at 08:01

## 2020-08-06 RX ADMIN — NITROGLYCERIN 1 INCH: 20 OINTMENT TOPICAL at 23:36

## 2020-08-06 RX ADMIN — Medication 10 ML: at 14:00

## 2020-08-06 RX ADMIN — METRONIDAZOLE 500 MG: 500 INJECTION, SOLUTION INTRAVENOUS at 20:28

## 2020-08-06 RX ADMIN — CEFAZOLIN 2 G: 1 INJECTION, POWDER, FOR SOLUTION INTRAMUSCULAR; INTRAVENOUS at 21:56

## 2020-08-06 RX ADMIN — SODIUM CHLORIDE, SODIUM LACTATE, POTASSIUM CHLORIDE, AND CALCIUM CHLORIDE: 600; 310; 30; 20 INJECTION, SOLUTION INTRAVENOUS at 17:52

## 2020-08-06 RX ADMIN — LABETALOL HYDROCHLORIDE 10 MG: 5 INJECTION, SOLUTION INTRAVENOUS at 18:01

## 2020-08-06 RX ADMIN — FENTANYL CITRATE 50 MCG: 50 INJECTION, SOLUTION INTRAMUSCULAR; INTRAVENOUS at 16:30

## 2020-08-06 RX ADMIN — KETAMINE HYDROCHLORIDE 10 MG: 100 INJECTION, SOLUTION, CONCENTRATE INTRAMUSCULAR; INTRAVENOUS at 17:14

## 2020-08-06 RX ADMIN — ONDANSETRON HYDROCHLORIDE 4 MG: 2 INJECTION, SOLUTION INTRAMUSCULAR; INTRAVENOUS at 17:27

## 2020-08-06 RX ADMIN — DEXTROSE MONOHYDRATE, SODIUM CHLORIDE, AND POTASSIUM CHLORIDE 100 ML/HR: 50; 9; 1.49 INJECTION, SOLUTION INTRAVENOUS at 19:00

## 2020-08-06 RX ADMIN — Medication 80 MCG: at 16:20

## 2020-08-06 RX ADMIN — FENTANYL CITRATE 100 MCG: 50 INJECTION, SOLUTION INTRAMUSCULAR; INTRAVENOUS at 16:12

## 2020-08-06 RX ADMIN — NITROGLYCERIN 1 INCH: 20 OINTMENT TOPICAL at 18:00

## 2020-08-06 RX ADMIN — SODIUM CHLORIDE 40 MG: 9 INJECTION, SOLUTION INTRAMUSCULAR; INTRAVENOUS; SUBCUTANEOUS at 08:01

## 2020-08-06 RX ADMIN — SODIUM CHLORIDE, SODIUM LACTATE, POTASSIUM CHLORIDE, AND CALCIUM CHLORIDE 75 ML/HR: 600; 310; 30; 20 INJECTION, SOLUTION INTRAVENOUS at 15:08

## 2020-08-06 RX ADMIN — HYDRALAZINE HYDROCHLORIDE 10 MG: 20 INJECTION INTRAMUSCULAR; INTRAVENOUS at 20:28

## 2020-08-06 RX ADMIN — ROCURONIUM BROMIDE 50 MG: 10 INJECTION INTRAVENOUS at 16:12

## 2020-08-06 NOTE — PROGRESS NOTES
Bedside and Verbal shift change report given to Annetta Vasquez (oncoming nurse) by [employer] (offgoing nurse). Report included the following information SBAR, Kardex, Intake/Output, MAR and Recent Results.

## 2020-08-06 NOTE — PROGRESS NOTES
Physical Therapy    Chart reviewed. Noted that pt is scheduled for surgery and is now RISSA for the procedure. Will defer at this time, but continue to follow.

## 2020-08-06 NOTE — OP NOTES
FULL Operative Note    Patient: Bella Sands MRN: [id number]  SSN: [id number]    YOB: 1955  Age: 59 y.o. Sex: female      Date of Surgery: 8/6/2020     Preoperative Diagnosis: Small bowel obstruction    Postoperative Diagnosis: Small bowel obstruction secondary to internal hernia created by adhesions of appendix to retroperitoneum with small bowel ischemia distal jejunum proximal ileum, and ventral abdominal wall hernia    Surgeon(s) and Role:     Alex Caceres MD - Primary    Surgical Staff: Circ-1: Suellen Garcia RN  Scrub Tech-1: Arabella Yun  Surg Asst-1: Varinder Hawkins     Anesthesia: General     Procedure: Procedure(s):  LAPAROTOMY EXPLORATORY RELEASE OF BOWEL OBSTRUCTION adhesio lysis and release of internal hernia, enterectomy small bowel with primary enteroenterostomy anastomosis, and appendectomy, and repair of ventral abdomen hernia without mesh    FINDINGS: Compromised ischemic small bowel to feet contained internal hernia adhesions created by the tip of the appendix to the retroperitoneum with complete small bowel obstruction, and multifocal ventral abdominal wall hernia incisional    Indications: The patient was admitted to the hospital with   evidence of small bowel obstruction that was not improving and confirmed on small bowel series last evening, I  Discussed the risk of surgery including infection, hematoma, bleeding, recurrence or persistence of symptoms or and other risks listed in H and P,  and the risks of general anesthetic including Myocardial Infarction, Cerebrovascular Accident, sudden death, or even reaction to anesthetic medications. The patient understands the risk that the procedure could be an open procedure. The patient understands the risks, any and all questions were answered to the patient's satisfaction, and they freely signed the consent for operation.      Procedure in Detail: *Patient was under general anesthesia received IV Ancef and Flagyl preoperatively prophylactically Robledo catheter placed by nursing staff, SCDs on the legs, abdomen prepped and draped with ChloraPrep and with Betadine prepping the gastrostomy tube and site. Site verification and timeout confirmed with the operating room team and 10 blade knife was used to resect the attenuated old scar in the midline and there was a multifocal ventral abdominal wall incisional hernia in the midline that was carefully dissected back to healthy fascia using knife and cautery  . Entire abdominal wall fascia was somewhat attenuated. Hernia and herniated fascia was resected back to the rectus muscle edges. The proxima bowel jejunum was very dilated and was eviscerated and at the distal jejunum proximal ileum there was a complete transition of proximal dilated bowel and distal decompressed completely bowel in this transition occurred posterior to an internal hernia created by adhesions of the tip of the appendix to the retroperitoneum and pelvis. The small bowel was reduced from the internal hernia and the appendiceal adhesion from a very long appendix was released. The dilated small bowel was retrograde reduced of the succus of approximately 2 L back into the stomach. The previous gastrostomy tube appeared to be well adherent to the stomach without complications. The colon was also decompressed. This allowed time to assess the ischemic small bowel that had been compromised in the internal hernia and the mesentery to this area of small bowel approximately 18-24 inches long was very bruised and the small bowel that had been compromised never pinked up normally and I will felt it was at risk to leave this in place as there could be microvascular congestion and clotting causing ischemia, and would be at risk of perforation and/or stricturing and in fact there was a chronic appearing stricture in the distal area of the compromised small bowel that would not been adequately.   I felt that this area compromise small bowel should be resected for adequate recovery and the 2 feet of small bowel that was ischemic congested looking and strictured looking was resected between ELIZABETH 75 staplers and the mesentery was ligated and divided using the LigaSure instrument and anastomosis completed side-to-side functional end-to-end using ELIZABETH 75 stapler, crotch of the anastomosis reinforced with 3-0 Vicryl suture and the remaining enterotomy for the anastomosis was closed with the TA 60 stapler and the TA 60 staple line reinforced with 3-0 Vicryl interrupted suture and the mesentery closed to prevent internal hernia using 3-0 silk suture with good hemostasis and no complications anastomosis healthy pink and viable and patent without bleeding or leak. Given the appendix had created adhesions and was part of the problem, appendectomy was performed with  ligating and dividing the mesentery to the appendix using LigaSure and the appendix doubly ligated with 2-0 chromic suture and divided and the appendiceal stump cauterized and stump inverted into the cecum using 3-0 chromic pursestring suture with no complications. The small bowel was run in its entirety and no evidence of other problems strictures or complications and small intestine appeared viable as did the colon and was returned to its normal location with no leak or hemorrhage and the abdomen irrigated with saline and then irrigated with IRICEPT   Anti-bacterial solution and fascia including the previous herniated areas closed and repaired using running #1 PDS suture and interrupted 0 and #1 Vicryl suture back to healthy fascia as much as possible. Care was taken not to injure the intraperitoneal structures and subcutaneous tissue closed with 3-0 Vicryl suture and skin staples and patient awakened and taken to recovery in stable condition.          Estimated Blood Loss: 50 mL    Tourniquet Time: * No tourniquets in log *      Implants: * No implants in log * Specimens:   ID Type Source Tests Collected by Time Destination   1 : Obstructed Ileum: Silk marks distal margin Preservative Ileum  Claudetta Holt, MD 8/6/2020 1705 Pathology   2 : Appendix Preservative Appendix  Claudetta Holt, MD 8/6/2020 1709 Pathology           Drains: None                 Complications: None    Counts: Sponge and needle counts were correct times two.     Viktoria Galeazzi, MD

## 2020-08-06 NOTE — PERIOP NOTES
Handoff Report from Operating Room to PACU    Report received from [facility] and Christiano CRNA regarding Paola Chang. Surgeon(s):  Graciela Silvestre MD  And Procedure(s) (LRB):  LAPAROTOMY EXPLORATORY RELEASE OF BOWEL OBSTRUCTION lysis of adhesions and release of internal hernia, enterectomy small bowel with primary enteroenterostomy anastomosis, and appendectomy, and repair of ventral abdomen hernia without mesh (N/A)  confirmed   with allergies, drains and dressings discussed. Anesthesia type, drugs, patient history, complications, estimated blood loss, vital signs, intake and output, and last pain medication, lines, reversal medications and temperature were reviewed. 1849- B/P returning back to baseline after 10 mg Hydralazine IVP. Will continue to monitor. 1930- TRANSFER - OUT REPORT:    Verbal report given to [place]) on Paola Chang  being transferred to 2132(unit) for routine post - op       Report consisted of patients Situation, Background, Assessment and   Recommendations(SBAR). Information from the following report(s) OR Summary, Procedure Summary, Intake/Output and MAR was reviewed with the receiving nurse. Lines:   PICC Double Lumen 08/03/20 Right;Brachial (Active)   Central Line Being Utilized Yes 08/06/20 1930   Criteria for Appropriate Use Total parenteral nutrition 08/06/20 1930   Site Assessment Clean, dry, & intact 08/06/20 1930   Phlebitis Assessment 0 08/06/20 1455   Infiltration Assessment 0 08/06/20 1455   Arm Circumference (cm) 27 cm 08/03/20 1803   Date of Last Dressing Change 08/03/20 08/04/20 0116   Dressing Status Clean, dry, & intact 08/06/20 1455   External Catheter Length (cm) 0 centimeters 08/03/20 1803   Dressing Type Disk with Chlorhexadine gluconate (CHG) 08/06/20 1930   Hub Color/Line Status Purple; Infusing 08/06/20 1930   Positive Blood Return (Site #1) Yes 08/06/20 1800   Hub Color/Line Status Red; Infusing 08/06/20 1930   Positive Blood Return (Site #2) Yes 08/06/20 1800   Alcohol Cap Used Yes 08/06/20 1800       Peripheral IV 08/02/20 Left Antecubital (Active)   Site Assessment Clean, dry, & intact 08/06/20 1930   Phlebitis Assessment 0 08/06/20 1930   Infiltration Assessment 0 08/06/20 1930   Dressing Status Clean, dry, & intact 08/06/20 1930   Dressing Type Tape;Transparent 08/06/20 1930   Hub Color/Line Status Pink;Capped 08/06/20 1930   Alcohol Cap Used Yes 08/06/20 0819       Peripheral IV 08/03/20 Left Hand (Active)   Site Assessment Clean, dry, & intact 08/06/20 1930   Phlebitis Assessment 0 08/06/20 1930   Infiltration Assessment 0 08/06/20 1930   Dressing Status Clean, dry, & intact 08/06/20 1930   Dressing Type Tape;Transparent 08/06/20 1930   Hub Color/Line Status Blue;Capped 08/06/20 1930   Alcohol Cap Used Yes 08/06/20 0819        Opportunity for questions and clarification was provided. Patient transported with:   Registered Nurse   Med telemetry called and made aware of new room number. RN to call [place] and give report and bring patient's belongings. Patient's cousin Natacha Keller aware of new room number.

## 2020-08-06 NOTE — PROGRESS NOTES
Problem: Falls - Risk of  Goal: *Absence of Falls  Description: Document Gisella Patches Fall Risk and appropriate interventions in the flowsheet. Outcome: Progressing Towards Goal  Note: Fall Risk Interventions:            Medication Interventions: Bed/chair exit alarm, Evaluate medications/consider consulting pharmacy, Patient to call before getting OOB, Teach patient to arise slowly    Elimination Interventions: Call light in reach, Patient to call for help with toileting needs              Problem: Pressure Injury - Risk of  Goal: *Prevention of pressure injury  Description: Document Armen Scale and appropriate interventions in the flowsheet.   Outcome: Progressing Towards Goal  Note: Pressure Injury Interventions:  Sensory Interventions: Chair cushion, Check visual cues for pain, Float heels, Minimize linen layers    Moisture Interventions: Absorbent underpads    Activity Interventions: Increase time out of bed, Pressure redistribution bed/mattress(bed type)    Mobility Interventions: Chair cushion, Float heels    Nutrition Interventions: Document food/fluid/supplement intake    Friction and Shear Interventions: Feet elevated on foot rest, Apply protective barrier, creams and emollients, Lift team/patient mobility team

## 2020-08-06 NOTE — PROGRESS NOTES
Hospitalist Progress Note    NAME: Thierno Hurtado   :  1955   MRN:  [id number]       Assessment / Plan: Active Problems:    Polymyositis ([address] (2016)       HTN (hypertension) (2016)       Obstructive sleep apnea syndrome (2017)       SBO (small bowel obstruction) (HCC) (2020)        Accelerated hypertension not POA  Likely due to missed p.o. blood pressure meds due to n.p.o. bowel rest status     Continue adequate pain management as per primary surgical team  Continue IV fluids but DC ASAP once able to resume enteral feeding-we will help with the blood pressure control  Continue nitro glycerin ointment 1 inch every 6 hours for now  c/w Clonidine to  0.2 mg patch  C/w IV steroid for polymyositis IV for now since pt is NPO  Continue home Protonix IV for now until patient n.p.o./bowel rest     Suspected right lower lobe pneumonia on CT imaging  Likely atelectasis POA  COVID test is negative  Incentive spirometer recommended  On empiric IV antibiotics as per primary surgical team     Recurrent small bowel obstruction POA-secondary to adhesions as per surgery team  Plan for OR today, Gastrograffin study showed high grade SBO  DC IV fluids when able           Code Status: Full code as per patient's wishes  DVT Prophylaxis: As per primary surgical team       Subjective:     Chief Complaint / Reason for Physician Visit  \"Reports abdominal pain is improved with pain medicine, No BM yet no flatus\". Discussed with RN events overnight. Review of Systems:  Symptom Y/N Comments  Symptom Y/N Comments   Fever/Chills n   Chest Pain n    Poor Appetite n   Edema n    Cough n   Abdominal Pain n    Sputum n   Joint Pain     SOB/LORDEO n   Pruritis/Rash     Nausea/vomit n   Tolerating PT/OT     Diarrhea n   Tolerating Diet     Constipation y   Other       Could NOT obtain due to:      Objective:     VITALS:   Last 24hrs VS reviewed since prior progress note.  Most recent are:  Patient Vitals for the past 24 hrs:   Temp Pulse Resp BP SpO2   08/06/20 0802 97.7 °F (36.5 °C) 79 18 168/82 92 %   08/05/20 2251 98.3 °F (36.8 °C) 76 15 157/75 97 %   08/05/20 1607 98.7 °F (37.1 °C) 70 12 180/83 100 %       Intake/Output Summary (Last 24 hours) at 8/6/2020 1118  Last data filed at 8/6/2020 0819  Gross per 24 hour   Intake --   Output 700 ml   Net -700 ml        PHYSICAL EXAM:  General: WD, WN. Alert, cooperative, no acute distress    EENT:  EOMI. Anicteric sclerae. MMM  Resp:  CTA bilaterally, no wheezing or rales. No accessory muscle use  CV:  Regular  rhythm,  No edema  GI:  Soft, Non distended, Non tender.  +Bowel sounds  Neurologic:  Alert and oriented X 3, normal speech,   Psych:   Good insight. Not anxious nor agitated  Skin:  No rashes. No jaundice    Reviewed most current lab test results and cultures  YES  Reviewed most current radiology test results   YES  Review and summation of old records today    NO  Reviewed patient's current orders and MAR    YES  PMH/ reviewed - no change compared to H&P  ________________________________________________________________________  Care Plan discussed with:    Comments   Patient x    Family      RN x    Care Manager     Consultant                       x Multidiciplinary team rounds were held today with , nursing, pharmacist and clinical coordinator. Patient's plan of care was discussed; medications were reviewed and discharge planning was addressed.      ________________________________________________________________________  Total NON critical care TIME:  28   Minutes    Total CRITICAL CARE TIME Spent:   Minutes non procedure based      Comments   >50% of visit spent in counseling and coordination of care     ________________________________________________________________________  Helen Enrique MD     Procedures: see electronic medical records for all procedures/Xrays and details which were not copied into this note but were reviewed prior to creation of Plan.      LABS:  I reviewed today's most current labs and imaging studies.   Pertinent labs include:  Recent Labs     08/05/20  0550 08/04/20  0303   WBC 9.5 10.2   HGB 12.3 12.5   HCT 37.8 38.4    138*     Recent Labs     08/05/20  0550 08/04/20  0303   * 137   K 3.8 4.1    101   CO2 33* 35*   GLU 81 114*   BUN 26* 33*   CREA 0.46* 0.62   CA 8.7 8.5   MG 2.1  --    PHOS 2.5*  --        Signed: Beba Valdes MD

## 2020-08-06 NOTE — PROGRESS NOTES
Admit Date: 2020    POD * No surgery found *    Procedure:  * No surgery found *    Subjective:     Patient with some emesis, despite gastrostomy tube  To drainage, still moderate G-tube output, small bowel series yesterday suggested high-grade partial small bowel obstruction, patient has not improved in the last 3-4 days, no bowel movement, reviewed with patient recommendations to proceed with laparotomy release of bowel obstruction and indicated procedures and patient concurs rather than further expectant therapy. Reviewed with internal medicine as well. Will prophylax Ancef and Flagyl      History of percutaneous gastrostomy tube c 2020    Review of Systems   Constitutional: Negative. HENT: Negative. Respiratory: Negative. Cardiovascular: Negative. Gastrointestinal: Positive for abdominal pain, nausea and vomiting. Genitourinary: Negative. Musculoskeletal: Positive for arthralgias and myalgias. Hematological: Negative. Psychiatric/Behavioral: Negative. All other systems reviewed and are negative. Objective:     Blood pressure 168/82, pulse 79, temperature 97.7 °F (36.5 °C), resp. rate 18, height 5' 4\" (1.626 m), weight 139 lb 1.8 oz (63.1 kg), SpO2 92 %. Temp (24hrs), Av.2 °F (36.8 °C), Min:97.7 °F (36.5 °C), Max:98.7 °F (37.1 °C)          Physical Exam  Vitals signs and nursing note reviewed. Constitutional:       General: She is not in acute distress. Appearance: Normal appearance. HENT:      Head: Normocephalic. Cardiovascular:      Rate and Rhythm: Normal rate. Pulmonary:      Effort: Pulmonary effort is normal.   Abdominal:      Comments: Gastrostomy tube in place, low midline incision, mild nonspecific tenderness no peritoneal signs, no obvious hernias   Skin:     General: Skin is warm and dry. Neurological:      General: No focal deficit present. Mental Status: She is alert and oriented to person, place, and time.    Psychiatric: Mood and Affect: Mood normal.         Behavior: Behavior normal.         Thought Content: Thought content normal.         Judgment: Judgment normal.            Labs: No results found for this or any previous visit (from the past 24 hour(s)). Data Review images and reports reviewed    Assessment:     Active Problems:    Polymyositis ([facility] [address] (7/28/2016)      HTN (hypertension) (7/28/2016)      Obstructive sleep apnea syndrome (1/17/2017)      SBO (small bowel obstruction) ([facility] [address] (8/2/2020)        Plan/Recommendations/Medical Decision Making:     Continue present treatment  Patient with some emesis, despite gastrostomy tube  To drainage, still moderate G-tube output, small bowel series yesterday suggested high-grade partial small bowel obstruction, patient has not improved in the last 3-4 days, no bowel movement, reviewed with patient recommendations to proceed with laparotomy release of bowel obstruction and indicated procedures and patient concurs rather than further expectant therapy. Reviewed with internal medicine as well. Will prophylax Ancef and Flagyl    History of percutaneous gastrostomy tube c June 2020      Surgical benefits risks alternatives nonoperative therapy surgical risk of bleeding infection recovery morbidity mortality intestinal removal ostomy postoperative pulmonary and cardiac issues reviewed with patient and she concurs with surgery.   Previous echocardiogram and EKG and chest x-rays are reviewed, LVEF on echo normal     Face-to-face time and review with patient and imaging and discussion 31 minutes  Vickie Santillan MD , [facility]

## 2020-08-06 NOTE — ANESTHESIA POSTPROCEDURE EVALUATION
Procedure(s):  LAPAROTOMY EXPLORATORY RELEASE OF BOWEL OBSTRUCTION lysis of adhesions and release of internal hernia, enterectomy small bowel with primary enteroenterostomy anastomosis, and appendectomy, and repair of ventral abdomen hernia without mesh. general    Anesthesia Post Evaluation        Patient location during evaluation: PACU  Note status: Adequate. Level of consciousness: responsive to verbal stimuli and sleepy but conscious  Pain management: satisfactory to patient  Airway patency: patent  Anesthetic complications: no  Cardiovascular status: acceptable  Respiratory status: acceptable  Hydration status: acceptable  Comments: +Post-Anesthesia Evaluation and Assessment    Patient: Fanta Feldman MRN: [id number]  SSN: [id number]   YOB: 1955  Age: 59 y.o. Sex: female      Cardiovascular Function/Vital Signs    /76 (BP 1 Location: Left arm, BP Patient Position: At rest)   Pulse 77   Temp 36.7 °C (98.1 °F)   Resp 14   Ht 5' 4\" (1.626 m)   Wt 63.1 kg (139 lb 1.8 oz)   SpO2 100%   BMI 23.88 kg/m²     Patient is status post Procedure(s):  LAPAROTOMY EXPLORATORY RELEASE OF BOWEL OBSTRUCTION lysis of adhesions and release of internal hernia, enterectomy small bowel with primary enteroenterostomy anastomosis, and appendectomy, and repair of ventral abdomen hernia without mesh. Nausea/Vomiting: Controlled. Postoperative hydration reviewed and adequate. Pain:  Pain Scale 1: Numeric (0 - 10) (08/06/20 1930)  Pain Intensity 1: 3 (08/06/20 1930)   Managed. Neurological Status:   Neuro (WDL): Exceptions to WDL (08/06/20 1800)   At baseline. Mental Status and Level of Consciousness: Arousable. Pulmonary Status:   O2 Device: Nasal cannula (08/06/20 1930)   Adequate oxygenation and airway patent. Complications related to anesthesia: None    Post-anesthesia assessment completed. No concerns.     Signed By: Teresa Balderas MD    8/6/2020  Post anesthesia nausea and vomiting: controlled      INITIAL Post-op Vital signs:   Vitals Value Taken Time   /82 8/6/2020  7:45 PM   Temp 36.7 °C (98.1 °F) 8/6/2020  7:30 PM   Pulse 77 8/6/2020  7:52 PM   Resp 16 8/6/2020  7:52 PM   SpO2 100 % 8/6/2020  7:52 PM   Vitals shown include unvalidated device data.

## 2020-08-06 NOTE — PROGRESS NOTES
Bedside and Verbal shift change report given to Guanaco Srivastava (oncoming nurse) by Anjelica Kirby (offgoing nurse). Report included the following information SBAR, Kardex, Intake/Output and MAR.

## 2020-08-06 NOTE — PERIOP NOTES
TRANSFER - IN REPORT:    Verbal report received from [facility]) on [employer]  being received from Room 3257(unit) for ordered procedure      Report consisted of patients Situation, Background, Assessment and   Recommendations(SBAR). Information from the following report(s) SBAR, Kardex, Intake/Output, MAR, Recent Results, Med Rec Status and Procedure Verification was reviewed with the receiving nurse. Opportunity for questions and clarification was provided. Assessment completed upon patients arrival to unit and care assumed.

## 2020-08-06 NOTE — ANESTHESIA PREPROCEDURE EVALUATION
Relevant Problems   No relevant active problems       Anesthetic History   No history of anesthetic complications            Review of Systems / Medical History  Patient summary reviewed and pertinent labs reviewed    Pulmonary        Sleep apnea  Pneumonia, shortness of breath and smoker      Comments: Hx of respiratory arrest  1 ppd for 20 years   Neuro/Psych   Within defined limits           Cardiovascular    Hypertension: poorly controlled      CHF: dyspnea on exertion        Exercise tolerance: <4 METS  Comments: ECHO this week showed a 60-65% EF with mild MR and TR   GI/Hepatic/Renal         Renal disease      Comments: Oropharyngeal dysphagia    RCC S/P nephrectomy  Hx of SBO Endo/Other        Cancer     Other Findings   Comments: SBO    CT:  Small pericardial effusion, right lower lobe infiltrate    Polymyositis, on steroids         Physical Exam    Airway  Mallampati: IV  TM Distance: < 4 cm  Neck ROM: decreased range of motion   Mouth opening: Diminished (comment)     Cardiovascular    Rhythm: regular  Rate: normal         Dental    Dentition: Caps/crowns, Upper dentition intact and Lower dentition intact     Pulmonary    Rhonchi:LLF             Abdominal  GI exam deferred       Other Findings            Anesthetic Plan    ASA: 3, emergent  Anesthesia type: general    Monitoring Plan: BIS      Induction: Intravenous  Anesthetic plan and risks discussed with: Patient

## 2020-08-06 NOTE — ROUTINE PROCESS
Patient: Rosa Vann MRN: [id number]  SSN: [id number]   YOB: 1955  Age: 59 y.o. Sex: female     Patient is status post Procedure(s):  LAPAROTOMY EXPLORATORY RELEASE OF BOWEL OBSTRUCTION lysis of adhesions and release of internal hernia, enterectomy small bowel with primary enteroenterostomy anastomosis, and appendectomy, and repair of ventral abdomen hernia without mesh. Surgeon(s) and Role:     Tatum Mari MD - Primary    Local/Dose/Irrigation:  No local            PICC Double Lumen 08/03/20 Right;Brachial (Active)   Central Line Being Utilized Yes 08/06/20 1455   Criteria for Appropriate Use Total parenteral nutrition 08/06/20 1455   Site Assessment Clean, dry, & intact 08/06/20 1455   Phlebitis Assessment 0 08/06/20 1455   Infiltration Assessment 0 08/06/20 1455   Arm Circumference (cm) 27 cm 08/03/20 1803   Date of Last Dressing Change 08/03/20 08/04/20 0116   Dressing Status Clean, dry, & intact 08/06/20 1455   External Catheter Length (cm) 0 centimeters 08/03/20 1803   Dressing Type Disk with Chlorhexadine gluconate (CHG) 08/06/20 0819   Hub Color/Line Status Purple; Infusing 08/06/20 0819   Positive Blood Return (Site #1) Yes 08/06/20 0819   Hub Color/Line Status Red; Infusing 08/06/20 0819   Positive Blood Return (Site #2) Yes 08/06/20 0819   Alcohol Cap Used Yes 08/06/20 0819          Peripheral IV 08/02/20 Left Antecubital (Active)   Site Assessment Clean, dry, & intact 08/06/20 1455   Phlebitis Assessment 0 08/06/20 1455   Infiltration Assessment 0 08/06/20 1455   Dressing Status Clean, dry, & intact 08/06/20 1455   Dressing Type Tape;Transparent 08/06/20 1455   Hub Color/Line Status Pink;Capped 08/06/20 1455   Alcohol Cap Used Yes 08/06/20 0819       Peripheral IV 08/03/20 Left Hand (Active)   Site Assessment Clean, dry, & intact 08/06/20 1455   Phlebitis Assessment 0 08/06/20 1455   Infiltration Assessment 0 08/06/20 1455   Dressing Status Clean, dry, & intact 08/06/20 1452 Dressing Type Tape;Transparent 08/06/20 1455   Hub Color/Line Status Blue; Infusing;Patent 08/06/20 1455   Alcohol Cap Used Yes 08/06/20 0819          Nasogastric Tube 08/06/20 (Active)       PEG/Gastrostomy Tube 08/02/20 (Active)   Site Assessment Clean, dry, & intact 08/06/20 1455   Dressing Status Clean, dry, & intact 08/06/20 1455   Intake (ml) 0 ml 08/03/20 1803   Output (ml) 600 ml 08/05/20 1621       External Female Catheter 08/02/20 (Active)   Site Assessment Clean, dry, & intact 08/06/20 0819   Repositioned Yes 08/06/20 0819   Perineal Care Yes 08/06/20 0819   Wick Changed Yes 08/06/20 0819   Suction Canister/Tubing Changed No 08/06/20 0819   Urine Output (mL) 100 ml 08/06/20 8756                     Dressing/Packing:  Wound Abdomen One midline incision with staples and Optifoam.-Dressing Type: Staples; Other (Comment)(Optifoam) (08/06/20 1700)    Splint/Cast:  ]

## 2020-08-07 LAB
ANION GAP SERPL CALC-SCNC: 2 MMOL/L (ref 5–15)
BUN SERPL-MCNC: 25 MG/DL (ref 6–20)
BUN/CREAT SERPL: 42 (ref 12–20)
CALCIUM SERPL-MCNC: 8.3 MG/DL (ref 8.5–10.1)
CHLORIDE SERPL-SCNC: 104 MMOL/L (ref 97–108)
CO2 SERPL-SCNC: 30 MMOL/L (ref 21–32)
CREAT SERPL-MCNC: 0.59 MG/DL (ref 0.55–1.02)
ERYTHROCYTE [DISTWIDTH] IN BLOOD BY AUTOMATED COUNT: 21.2 % (ref 11.5–14.5)
GLUCOSE SERPL-MCNC: 189 MG/DL (ref 65–100)
HCT VFR BLD AUTO: 42.3 % (ref 35–47)
HGB BLD-MCNC: 13.6 G/DL (ref 11.5–16)
MCH RBC QN AUTO: 22.7 PG (ref 26–34)
MCHC RBC AUTO-ENTMCNC: 32.2 G/DL (ref 30–36.5)
MCV RBC AUTO: 70.7 FL (ref 80–99)
NRBC # BLD: 0 K/UL (ref 0–0.01)
NRBC BLD-RTO: 0 PER 100 WBC
PLATELET # BLD AUTO: 174 K/UL (ref 150–400)
PMV BLD AUTO: ABNORMAL FL (ref 8.9–12.9)
POTASSIUM SERPL-SCNC: 4 MMOL/L (ref 3.5–5.1)
RBC # BLD AUTO: 5.98 M/UL (ref 3.8–5.2)
SODIUM SERPL-SCNC: 136 MMOL/L (ref 136–145)
WBC # BLD AUTO: 18 K/UL (ref 3.6–11)

## 2020-08-07 PROCEDURE — C9113 INJ PANTOPRAZOLE SODIUM, VIA: HCPCS | Performed by: SURGERY

## 2020-08-07 PROCEDURE — 74011250636 HC RX REV CODE- 250/636: Performed by: INTERNAL MEDICINE

## 2020-08-07 PROCEDURE — 74011000250 HC RX REV CODE- 250: Performed by: SURGERY

## 2020-08-07 PROCEDURE — 74011000258 HC RX REV CODE- 258: Performed by: NURSE PRACTITIONER

## 2020-08-07 PROCEDURE — 74011250636 HC RX REV CODE- 250/636: Performed by: SURGERY

## 2020-08-07 PROCEDURE — 74011000250 HC RX REV CODE- 250: Performed by: HOSPITALIST

## 2020-08-07 PROCEDURE — 74011000250 HC RX REV CODE- 250: Performed by: NURSE PRACTITIONER

## 2020-08-07 PROCEDURE — 74011000258 HC RX REV CODE- 258: Performed by: HOSPITALIST

## 2020-08-07 PROCEDURE — 85027 COMPLETE CBC AUTOMATED: CPT

## 2020-08-07 PROCEDURE — 74011250636 HC RX REV CODE- 250/636: Performed by: NURSE PRACTITIONER

## 2020-08-07 PROCEDURE — 36415 COLL VENOUS BLD VENIPUNCTURE: CPT

## 2020-08-07 PROCEDURE — 77010033678 HC OXYGEN DAILY

## 2020-08-07 PROCEDURE — 74011250637 HC RX REV CODE- 250/637: Performed by: SURGERY

## 2020-08-07 PROCEDURE — 65270000029 HC RM PRIVATE

## 2020-08-07 PROCEDURE — 44955 APPENDECTOMY ADD-ON: CPT | Performed by: SURGERY

## 2020-08-07 PROCEDURE — 80048 BASIC METABOLIC PNL TOTAL CA: CPT

## 2020-08-07 PROCEDURE — 51798 US URINE CAPACITY MEASURE: CPT

## 2020-08-07 PROCEDURE — 74011250637 HC RX REV CODE- 250/637: Performed by: NURSE PRACTITIONER

## 2020-08-07 RX ORDER — IRBESARTAN 150 MG/1
300 TABLET ORAL
Status: DISCONTINUED | OUTPATIENT
Start: 2020-08-07 | End: 2020-08-12 | Stop reason: HOSPADM

## 2020-08-07 RX ORDER — AMLODIPINE BESYLATE 5 MG/1
5 TABLET ORAL DAILY
Status: DISCONTINUED | OUTPATIENT
Start: 2020-08-07 | End: 2020-08-12 | Stop reason: HOSPADM

## 2020-08-07 RX ADMIN — Medication 20 ML: at 06:02

## 2020-08-07 RX ADMIN — Medication 10 ML: at 14:38

## 2020-08-07 RX ADMIN — SODIUM CHLORIDE 40 MG: 9 INJECTION, SOLUTION INTRAMUSCULAR; INTRAVENOUS; SUBCUTANEOUS at 08:59

## 2020-08-07 RX ADMIN — ENOXAPARIN SODIUM 40 MG: 40 INJECTION SUBCUTANEOUS at 16:07

## 2020-08-07 RX ADMIN — HYDRALAZINE HYDROCHLORIDE 10 MG: 20 INJECTION INTRAMUSCULAR; INTRAVENOUS at 06:06

## 2020-08-07 RX ADMIN — CEFAZOLIN 2 G: 1 INJECTION, POWDER, FOR SOLUTION INTRAMUSCULAR; INTRAVENOUS at 06:02

## 2020-08-07 RX ADMIN — Medication 10 ML: at 01:22

## 2020-08-07 RX ADMIN — SODIUM CHLORIDE 5 MG/HR: 900 INJECTION, SOLUTION INTRAVENOUS at 00:47

## 2020-08-07 RX ADMIN — METRONIDAZOLE 500 MG: 500 INJECTION, SOLUTION INTRAVENOUS at 09:07

## 2020-08-07 RX ADMIN — Medication 10 ML: at 21:41

## 2020-08-07 RX ADMIN — Medication 20 ML: at 14:31

## 2020-08-07 RX ADMIN — METRONIDAZOLE 500 MG: 500 INJECTION, SOLUTION INTRAVENOUS at 21:33

## 2020-08-07 RX ADMIN — Medication 1 AMPULE: at 21:33

## 2020-08-07 RX ADMIN — DEXTROSE MONOHYDRATE, SODIUM CHLORIDE, AND POTASSIUM CHLORIDE 100 ML/HR: 50; 9; 1.49 INJECTION, SOLUTION INTRAVENOUS at 06:09

## 2020-08-07 RX ADMIN — NITROGLYCERIN 1 INCH: 20 OINTMENT TOPICAL at 18:58

## 2020-08-07 RX ADMIN — SODIUM CHLORIDE 1000 ML: 900 INJECTION, SOLUTION INTRAVENOUS at 06:34

## 2020-08-07 RX ADMIN — Medication 10 ML: at 06:11

## 2020-08-07 RX ADMIN — CALCIUM GLUCONATE: 94 INJECTION, SOLUTION INTRAVENOUS at 18:58

## 2020-08-07 RX ADMIN — SODIUM CHLORIDE 40 MG: 9 INJECTION, SOLUTION INTRAMUSCULAR; INTRAVENOUS; SUBCUTANEOUS at 21:32

## 2020-08-07 RX ADMIN — METHYLPREDNISOLONE SODIUM SUCCINATE 40 MG: 40 INJECTION, POWDER, FOR SOLUTION INTRAMUSCULAR; INTRAVENOUS at 09:23

## 2020-08-07 RX ADMIN — Medication 1 AMPULE: at 08:53

## 2020-08-07 RX ADMIN — NITROGLYCERIN 1 INCH: 20 OINTMENT TOPICAL at 14:12

## 2020-08-07 RX ADMIN — IRBESARTAN 300 MG: 150 TABLET, FILM COATED ORAL at 22:42

## 2020-08-07 RX ADMIN — DEXTROSE MONOHYDRATE, SODIUM CHLORIDE, AND POTASSIUM CHLORIDE 100 ML/HR: 50; 9; 1.49 INJECTION, SOLUTION INTRAVENOUS at 18:05

## 2020-08-07 RX ADMIN — NITROGLYCERIN 1 INCH: 20 OINTMENT TOPICAL at 06:00

## 2020-08-07 RX ADMIN — AMLODIPINE BESYLATE 5 MG: 5 TABLET ORAL at 14:12

## 2020-08-07 RX ADMIN — MORPHINE SULFATE 4 MG: 2 INJECTION, SOLUTION INTRAMUSCULAR; INTRAVENOUS at 03:12

## 2020-08-07 NOTE — PROGRESS NOTES
Vitals w/ MEWS Score (last day)     Date/Time MEWS Score Pulse Resp Temp BP Level of Consciousness SpO2    08/06/20 2320  4  (!) 104  16  98.1 °F (36.7 °C)  (!) 212/107  Alert  100 %    08/06/20 2207  1  89  16  98 °F (36.7 °C)  (!) 176/93  Alert  100 %    08/06/20 2107  1  88  16  98.4 °F (36.9 °C)  170/90  Alert  100 %    08/06/20 2008  1  73  16  98.1 °F (36.7 °C)  (!) 185/94  Alert  100 %    08/06/20 1930  --  77  14  98.1 °F (36.7 °C)  153/76  --  100 %    08/06/20 1915  --  75  16  --  154/76  --  100 %    08/06/20 1900  --  76  15  --  151/74  --  100 %    08/06/20 1845  --  80  14  --  155/79  --  100 %    08/06/20 1835  --  76  13  --  168/84  --  100 %    08/06/20 1830  --  71  16  --  187/90  --  99 %    08/06/20 1825  --  66  12  --  (!) 197/98  --  100 %    08/06/20 1820  --  66  13  --  (!) 198/97  --  100 %    08/06/20 1815  --  66  14  --  (!) 199/91  --  100 %    08/06/20 1810  --  67  14  --  196/90  --  100 %    08/06/20 1805  --  69  14  --  (!) 186/91  --  100 %    08/06/20 1804  --  70  14  97.3 °F (36.3 °C)  195/85  --  100 %    08/06/20 1800  --  86  16  --  195/85  --  100 %    08/06/20 1452  1  80  16  98.4 °F (36.9 °C)  (!) 165/94  Alert  98 %    08/06/20 0802  1  79  18  97.7 °F (36.5 °C)  168/82  Alert  92 %    08/05/20 2251  1  76  15  98.3 °F (36.8 °C)  157/75  Alert  97 %    08/05/20 1607  0  70  12  98.7 °F (37.1 °C)  180/83  Alert  100 %    08/05/20 0742  1  72  16  97.5 °F (36.4 °C)  (!) 183/94  Alert  98 %            Patient blood pressure continues to rise. Physician notified for BP of 212/107. Physician to start patient on Nicardipine drip and transfer patient to appropriate unit to administer drip.

## 2020-08-07 NOTE — PROGRESS NOTES
Hospitalist Progress Note    NAME: Emanuel Srivastava   :  1955   MRN:  [id number]       Assessment / Plan: Active Problems:    Polymyositis ([address] (2016)       HTN (hypertension) (2016)       Obstructive sleep apnea syndrome (2017)       SBO (small bowel obstruction) (HCC) (2020)        Accelerated hypertension not POA  Likely due to missed p.o. blood pressure meds due to n.p.o. bowel rest status     Continue adequate pain management as per primary surgical team  Continue IV fluids but DC ASAP once able to resume enteral feeding-we will help with the blood pressure control  Continue nitro glycerin ointment 1 inch every 6 hours for now  c/w Clonidine to  0.2 mg patch  C/w Irbesartan  C/w IV steroid for polymyositis IV for now since pt is NPO  Continue home Protonix IV for now until patient n.p.o./bowel rest     Suspected right lower lobe pneumonia on CT imaging  Likely atelectasis POA  COVID test is negative  Incentive spirometer recommended  On empiric IV antibiotics as per primary surgical team     Recurrent small bowel obstruction POA-secondary to adhesions as per surgery team     s/p LAPAROTOMY EXPLORATORY RELEASE OF BOWEL OBSTRUCTION lysis of adhesions and release of internal hernia, enterectomy small bowel with primary enteroenterostomy anastomosis, and appendectomy, and repair of ventral abdomen hernia without mesh  Npo for now, gastrostomy tube to gravity  Oral diet not resumed yet per surgery  Remains on TPN        Code Status: Full code as per patient's wishes  DVT Prophylaxis: As per primary surgical team       Subjective:     Chief Complaint / Reason for Physician Visit  \"Reports abdominal pain is improved  No BM yet, no flatus\". Discussed with RN events overnight.      Review of Systems:  Symptom Y/N Comments  Symptom Y/N Comments   Fever/Chills n   Chest Pain n    Poor Appetite n   Edema n    Cough n   Abdominal Pain n    Sputum n   Joint Pain     SOB/LOREDO n   Pruritis/Rash Nausea/vomit n   Tolerating PT/OT     Diarrhea n   Tolerating Diet     Constipation y   Other       Could NOT obtain due to:      Objective:     VITALS:   Last 24hrs VS reviewed since prior progress note.  Most recent are:  Patient Vitals for the past 24 hrs:   Temp Pulse Resp BP SpO2   08/07/20 1224 98 °F (36.7 °C) 87 17 154/81 100 %   08/07/20 1130 98.2 °F (36.8 °C) 98 16 149/78 97 %   08/07/20 1115 -- 97 14 150/73 96 %   08/07/20 1100 -- 98 15 149/77 95 %   08/07/20 1030 -- (!) 120 25 155/70 (!) 84 %   08/07/20 1000 -- (!) 108 15 139/77 100 %   08/07/20 0930 -- (!) 122 16 153/78 100 %   08/07/20 0915 -- (!) 123 18 155/64 100 %   08/07/20 0900 -- (!) 111 14 147/75 100 %   08/07/20 0845 -- (!) 108 14 145/73 100 %   08/07/20 0830 -- (!) 108 15 144/75 100 %   08/07/20 0815 -- (!) 116 14 149/77 99 %   08/07/20 0800 98.4 °F (36.9 °C) (!) 115 15 155/65 100 %   08/07/20 0745 -- (!) 124 16 146/80 94 %   08/07/20 0730 -- (!) 109 15 145/69 100 %   08/07/20 0715 -- (!) 111 14 147/70 100 %   08/07/20 0700 -- (!) 115 15 134/65 99 %   08/07/20 0645 -- (!) 118 15 136/66 99 %   08/07/20 0630 -- (!) 123 16 135/64 99 %   08/07/20 0615 -- (!) 122 20 141/72 99 %   08/07/20 0600 -- (!) 114 16 147/75 100 %   08/07/20 0545 -- (!) 125 17 152/83 100 %   08/07/20 0530 -- (!) 112 16 142/73 100 %   08/07/20 0515 -- (!) 108 14 143/74 100 %   08/07/20 0500 -- (!) 112 16 148/75 100 %   08/07/20 0445 -- (!) 109 16 140/76 100 %   08/07/20 0430 -- (!) 119 16 152/79 100 %   08/07/20 0415 97.8 °F (36.6 °C) (!) 114 15 149/78 100 %   08/07/20 0400 -- (!) 115 15 148/77 100 %   08/07/20 0330 -- (!) 117 16 152/76 100 %   08/07/20 0315 -- (!) 119 16 156/82 100 %   08/07/20 0300 -- (!) 126 20 158/86 100 %   08/07/20 0245 -- (!) 122 18 157/75 100 %   08/07/20 0230 -- (!) 121 18 156/76 100 %   08/07/20 0215 -- (!) 121 18 156/77 100 %   08/07/20 0200 -- (!) 131 18 160/81 100 %   08/07/20 0145 -- (!) 132 18 161/81 100 %   08/07/20 0130 -- (!) 119 21 171/88 100 %   08/07/20 0115 -- (!) 119 19 170/86 100 %   08/07/20 0100 -- (!) 103 18 (!) 187/93 100 %   08/07/20 0055 -- (!) 102 17 (!) 200/96 100 %   08/07/20 0045 -- 98 21 -- 100 %   08/07/20 0040 -- -- -- 190/90 --   08/07/20 0032 -- -- -- 192/83 --   08/07/20 0030 -- 97 15 192/83 93 %   08/06/20 2320 98.1 °F (36.7 °C) (!) 104 16 (!) 212/107 100 %   08/06/20 2207 98 °F (36.7 °C) 89 16 (!) 176/93 100 %   08/06/20 2107 98.4 °F (36.9 °C) 88 16 170/90 100 %   08/06/20 2008 98.1 °F (36.7 °C) 73 16 (!) 185/94 100 %   08/06/20 1930 98.1 °F (36.7 °C) 77 14 153/76 100 %   08/06/20 1915 -- 75 16 154/76 100 %   08/06/20 1900 -- 76 15 151/74 100 %   08/06/20 1845 -- 80 14 155/79 100 %   08/06/20 1835 -- 76 13 168/84 100 %   08/06/20 1830 -- 71 16 187/90 99 %   08/06/20 1825 -- 66 12 (!) 197/98 100 %   08/06/20 1820 -- 66 13 (!) 198/97 100 %   08/06/20 1815 -- 66 14 (!) 199/91 100 %   08/06/20 1810 -- 67 14 196/90 100 %   08/06/20 1805 -- 69 14 (!) 186/91 100 %   08/06/20 1804 97.3 °F (36.3 °C) 70 14 195/85 100 %   08/06/20 1800 -- 86 16 195/85 100 %   08/06/20 1452 98.4 °F (36.9 °C) 80 16 (!) 165/94 98 %       Intake/Output Summary (Last 24 hours) at 8/7/2020 1306  Last data filed at 8/7/2020 1200  Gross per 24 hour   Intake 5547.3 ml   Output 3130 ml   Net 2417.3 ml        PHYSICAL EXAM:  General: WD, WN. Alert, cooperative, no acute distress    EENT:  EOMI. Anicteric sclerae. MMM  Resp:  CTA bilaterally, no wheezing or rales. No accessory muscle use  CV:  Regular  rhythm,  No edema  GI:  Soft, Non distended, Non tender.  +Bowel sounds  Neurologic:  Alert and oriented X 3, normal speech,   Psych:   Good insight. Not anxious nor agitated  Skin:  No rashes.   No jaundice    Reviewed most current lab test results and cultures  YES  Reviewed most current radiology test results   YES  Review and summation of old records today    NO  Reviewed patient's current orders and MAR    YES  PMH/SH reviewed - no change compared to H&P  ________________________________________________________________________  Care Plan discussed with:    Comments   Patient x    Family      RN x    Care Manager     Consultant                       x Multidiciplinary team rounds were held today with , nursing, pharmacist and clinical coordinator. Patient's plan of care was discussed; medications were reviewed and discharge planning was addressed. ________________________________________________________________________  Total NON critical care TIME:  28   Minutes    Total CRITICAL CARE TIME Spent:   Minutes non procedure based      Comments   >50% of visit spent in counseling and coordination of care     ________________________________________________________________________  Shiv Sanford MD     Procedures: see electronic medical records for all procedures/Xrays and details which were not copied into this note but were reviewed prior to creation of Plan. LABS:  I reviewed today's most current labs and imaging studies.   Pertinent labs include:  Recent Labs     08/07/20 0427 08/05/20  0550   WBC 18.0* 9.5   HGB 13.6 12.3   HCT 42.3 37.8    153     Recent Labs     08/07/20 0427 08/05/20  0550    135*   K 4.0 3.8    101   CO2 30 33*   * 81   BUN 25* 26*   CREA 0.59 0.46*   CA 8.3* 8.7   MG  --  2.1   PHOS  --  2.5*       Signed: Shiv Sanford MD

## 2020-08-07 NOTE — PROGRESS NOTES
Problem: Falls - Risk of  Goal: *Absence of Falls  Description: Document Idania Corbett Fall Risk and appropriate interventions in the flowsheet. Outcome: Progressing Towards Goal  Note: Fall Risk Interventions:            Medication Interventions: Bed/chair exit alarm, Evaluate medications/consider consulting pharmacy, Patient to call before getting OOB, Teach patient to arise slowly    Elimination Interventions: Call light in reach, Patient to call for help with toileting needs, Stay With Me (per policy)              Problem: Patient Education: Go to Patient Education Activity  Goal: Patient/Family Education  Outcome: Progressing Towards Goal     Problem: Pressure Injury - Risk of  Goal: *Prevention of pressure injury  Description: Document Armen Scale and appropriate interventions in the flowsheet.   Outcome: Progressing Towards Goal  Note: Pressure Injury Interventions:  Sensory Interventions: Assess changes in LOC, Assess need for specialty bed, Avoid rigorous massage over bony prominences, Float heels, Keep linens dry and wrinkle-free, Maintain/enhance activity level, Minimize linen layers, Monitor skin under medical devices    Moisture Interventions: Absorbent underpads, Internal/External urinary devices    Activity Interventions: Increase time out of bed, Pressure redistribution bed/mattress(bed type), PT/OT evaluation    Mobility Interventions: HOB 30 degrees or less, Pressure redistribution bed/mattress (bed type), PT/OT evaluation    Nutrition Interventions: Document food/fluid/supplement intake    Friction and Shear Interventions: Lift sheet, Lift team/patient mobility team, Minimize layers                Problem: Patient Education: Go to Patient Education Activity  Goal: Patient/Family Education  Outcome: Progressing Towards Goal     Problem: Patient Education: Go to Patient Education Activity  Goal: Patient/Family Education  Outcome: Progressing Towards Goal     Problem: Patient Education: Go to Patient Education Activity  Goal: Patient/Family Education  Outcome: Progressing Towards Goal     Problem: Small Bowel Obstruction: Day 1  Goal: Off Pathway (Use only if patient is Off Pathway)  Outcome: Progressing Towards Goal  Goal: Activity/Safety  Outcome: Progressing Towards Goal  Goal: Consults, if ordered  Outcome: Progressing Towards Goal  Goal: Diagnostic Test/Procedures  Outcome: Progressing Towards Goal  Goal: Nutrition/Diet  Outcome: Progressing Towards Goal  Goal: Medications  Outcome: Progressing Towards Goal  Goal: Respiratory  Outcome: Progressing Towards Goal  Goal: Treatments/Interventions/Procedures  Outcome: Progressing Towards Goal  Goal: Psychosocial  Outcome: Progressing Towards Goal  Goal: *Optimal pain control at patient's stated goal  Outcome: Progressing Towards Goal  Goal: *Adequate urinary output (equal to or greater than 30 milliliters/hour)  Outcome: Progressing Towards Goal  Goal: *Hemodynamically stable  Outcome: Progressing Towards Goal  Goal: *Demonstrates progressive activity  Outcome: Progressing Towards Goal  Goal: *Absence of nausea/vomiting  Outcome: Progressing Towards Goal     Problem: Small Bowel Obstruction: Day 2  Goal: Off Pathway (Use only if patient is Off Pathway)  Outcome: Progressing Towards Goal  Goal: Activity/Safety  Outcome: Progressing Towards Goal  Goal: Consults, if ordered  Outcome: Progressing Towards Goal  Goal: Diagnostic Test/Procedures  Outcome: Progressing Towards Goal  Goal: Nutrition/Diet  Outcome: Progressing Towards Goal  Goal: Discharge Planning  Outcome: Progressing Towards Goal  Goal: Medications  Outcome: Progressing Towards Goal  Goal: Respiratory  Outcome: Progressing Towards Goal  Goal: Treatments/Interventions/Procedures  Outcome: Progressing Towards Goal  Goal: Psychosocial  Outcome: Progressing Towards Goal  Goal: *Optimal pain control at patient's stated goal  Outcome: Progressing Towards Goal  Goal: *Adequate urinary output (equal to or greater than 30 milliliters/hour)  Outcome: Progressing Towards Goal  Goal: *Hemodynamically stable  Outcome: Progressing Towards Goal  Goal: *Demonstrates progressive activity  Outcome: Progressing Towards Goal  Goal: *Absence of nausea/vomiting  Outcome: Progressing Towards Goal  Goal: *Return of normal bowel function  Outcome: Progressing Towards Goal     Problem: Small Bowel Obstruction: Day 3  Goal: Off Pathway (Use only if patient is Off Pathway)  Outcome: Progressing Towards Goal  Goal: Activity/Safety  Outcome: Progressing Towards Goal  Goal: Consults, if ordered  Outcome: Progressing Towards Goal  Goal: Diagnostic Test/Procedures  Outcome: Progressing Towards Goal  Goal: Nutrition/Diet  Outcome: Progressing Towards Goal  Goal: Discharge Planning  Outcome: Progressing Towards Goal  Goal: Medications  Outcome: Progressing Towards Goal  Goal: Respiratory  Outcome: Progressing Towards Goal  Goal: Treatments/Interventions/Procedures  Outcome: Progressing Towards Goal  Goal: Psychosocial  Outcome: Progressing Towards Goal  Goal: *Optimal pain control at patient's stated goal  Outcome: Progressing Towards Goal  Goal: *Adequate urinary output (equal to or greater than 30 milliliters/hour)  Outcome: Progressing Towards Goal  Goal: *Hemodynamically stable  Outcome: Progressing Towards Goal  Goal: *Adequate nutrition  Outcome: Progressing Towards Goal  Goal: *Demonstrates progressive activity  Outcome: Progressing Towards Goal  Goal: *Participates in discharge planning  Outcome: Progressing Towards Goal     Problem: Small Bowel Obstruction: Day 4 to Discharge  Goal: Off Pathway (Use only if patient is Off Pathway)  Outcome: Progressing Towards Goal  Goal: Activity/Safety  Outcome: Progressing Towards Goal  Goal: Nutrition/Diet  Outcome: Progressing Towards Goal  Goal: Discharge Planning  Outcome: Progressing Towards Goal  Goal: Medications  Outcome: Progressing Towards Goal  Goal: Respiratory  Outcome: Progressing Towards Goal  Goal: Treatments/Interventions/Procedures  Outcome: Progressing Towards Goal  Goal: Psychosocial  Outcome: Progressing Towards Goal     Problem: Small Bowel Obstruction - Non Surgical: Discharge Outcomes  Goal: *Hemodynamically stable  Outcome: Progressing Towards Goal  Goal: *Demonstrates independent activity or return to baseline  Outcome: Progressing Towards Goal  Goal: *Optimal pain control at patient's stated goal  Outcome: Progressing Towards Goal  Goal: *Verbalizes understanding and describes prescribed diet  Outcome: Progressing Towards Goal  Goal: *Tolerating diet  Outcome: Progressing Towards Goal  Goal: *Verbalizes name, dosage, time, side effects, and number of days to continue medications  Outcome: Progressing Towards Goal  Goal: *Anxiety reduced or absent  Outcome: Progressing Towards Goal  Goal: *Understands and describes signs and symptoms to report to providers(Stroke Metric)  Outcome: Progressing Towards Goal  Goal: *Describes follow-up/return visits to physicians  Outcome: Progressing Towards Goal  Goal: *Describes available resources and support systems  Outcome: Progressing Towards Goal  Goal: *Active bowel function  Outcome: Progressing Towards Goal

## 2020-08-07 NOTE — PROGRESS NOTES
Comprehensive Nutrition Assessment    Type and Reason for Visit: Reassess    Nutrition Recommendations/Plan:   TPN recommendations: Today) Change to D20, 5% AA @ 50mL/h   Saturday ) If BG <200mg/dL and electrolytes WNL, advance to Goal Rate of 75mL/h (provides 1584kcals/90gPro/360gDextrose)     Nutrition Assessment:   Chart reviewed, case discussed during CCU rounds. Pt is POD# 1 ex lap + SONAL + hernia repair. PPN started a few days ago, PICC placed yesterday. NGT removed today and PEG clamped. Surgeon in assessing pt at time of attempted visit. PPN is inadequate to meet est needs. Provided TPN recommendations for advancement above. Lytes WNL a couple of days ago. No BM in several days. Unable to determine malnutrition status at this time. Malnutrition Assessment:  Malnutrition Status:     UTD, not yet addressed. Estimated Daily Nutrient Needs:  Energy (kcal):  1786 (MSJ 1145 x 1.3)  Protein (g):  73g (1.2 g/kg bw)       Fluid (ml/day):  1400 mL    Nutrition Related Findings:  Meds: flagyl, rocephin, solumedrol, protonix, [email or web address]. BM 7/31. Wounds:    Surgical wound       Current Nutrition Therapies:   Current Parenteral Nutrition Orders:  · Type and Formula: D10, 4.25% AA   · Lipids: None  · Duration: Continuous  · Rate/Volume: 42mL/h  · Current PN Order Provides: 514kcals/43gPro/100gDextrose  · Goal PN Orders Provides: 1584kcals/90gPro/360gDextrose      Anthropometric Measures:  · Height:  5' 4\" (162.6 cm)  · Current Body Wt:  61.2 kg (134 lb 14.7 oz)    · Ideal Body Wt:  120 lbs:  111.7 %   · BMI Category:  Normal weight (BMI 18.5-24. 9)       Nutrition Diagnosis:   · Altered GI function related to (SBO) as evidenced by (imaging, NPO, PPN/TPN)      Nutrition Interventions:   Food and/or Nutrient Delivery: Modify parenteral nutrition  Nutrition Education and Counseling: No recommendations at this time  Coordination of Nutrition Care: No recommendation at this time    Goals:  Pt will tolerate TPN @ goal rate with electrolytes WNL and BG <200mg/dL in 2-4 days.        Nutrition Monitoring and Evaluation:   Food/Nutrient Intake Outcomes: Parenteral nutrition intake/tolerance  Physical Signs/Symptoms Outcomes: Biochemical data, Fluid status or edema, Weight, GI status    Discharge Planning:    Parenteral nutrition     Electronically signed by Winifred Molina RD, [address]  on 8/7/2020 at 11:01 AM    Contact: [id number]

## 2020-08-07 NOTE — PROGRESS NOTES
8/7/2020    INTENSIVIST PROGRESS NOTE:     Patient seen and evaluated, chart reviewed   60 yo female presented with SBO  Taken to the OR, underwent exp lap, lysis of adhesions and release of SBO  Observed in ccu overnight  No acute events overnight  Now pt in CCU awake, alert, in no distress    ROS: expected post op discomfort    Visit Vitals  /66   Pulse (!) 118   Temp 97.8 °F (36.6 °C)   Resp 15   Ht 5' 4\" (1.626 m)   Wt 61.2 kg (134 lb 14.7 oz)   SpO2 99%   BMI 23.16 kg/m²       General: no distress  Eyes: anicteric  HEENT: clear  Neck: FROM  CV: RRR  Lungs: clear  Abd: soft  : no flank pain  Ext: no edema  Skin: no rash  Musculoskeletal: normal inspection  Neuro: non focal    CXR: none today    Labs reviewed    A/P:  - SBO: s/p exp lap  - post op pain control  - NGT in place  - post op abx  - BP control  - IV steroids  - NPO  - Will assist on disposition planning, transfer to surgery floor today  Redd Frias MD

## 2020-08-07 NOTE — PROGRESS NOTES
2171- Report received from [facility]. Pt assessed, unchanged from previous assessed. No complaints of pain. 0500- Cardene decreased to 2.5mg, SBP <150.   0630- Cardene stopped. 0735- SBP remains <160. Pt having a few runs of Vtach, NS bolus slowed. Bedside and verbal report given to Carole and Randy Edmonds.

## 2020-08-07 NOTE — PROGRESS NOTES
Attempted to see pt for PT tx. Currently being transferred off the floor to new room. Will continue to follow.

## 2020-08-07 NOTE — PROGRESS NOTES
General Surgery End of Shift Nursing Note    Bedside shift change report given to ABEL Loomis (oncoming nurse) by Luis Pierre RN and ABEL Langston (offgoing nurse). Report included the following information SBAR, Kardex, ED Summary, Intake/Output, MAR and Recent Results. Shift worked:   7a-7p   Summary of shift:    Pt came onto floor at 1230 pm. Pt denies pain. Pt drain to gravity had minimal output. No significant changes during shift. Issues for physician to address:   none     Number times ambulated in hallway past shift: 0    Number of times OOB to chair past shift: 0        GI:    Current diet:  DIET NPO Except Meds, With Ice Chips  TPN ADULT - PERIPHERAL    Tolerating current diet: YES  Passing flatus: NO  Last Bowel Movement: several days ago   Appearance:     Respiratory:    Incentive Spirometer at bedside: YES  Patient instructed on use: YES    Patient Safety:    Falls Score: 3  Bed Alarm On? No  Sitter?  Not applicable    Cherylann Lefort

## 2020-08-07 NOTE — PROGRESS NOTES
Vitals w/ MEWS Score (last day) Date/Time MEWS Score Pulse Resp Temp BP Level of Consciousness SpO2  
 08/06/20 2320  4  (!) 104  16  98.1 °F (36.7 °C)  (!) 212/107  Alert  100 % 08/06/20 2207  1  89  16  98 °F (36.7 °C)  (!) 176/93  Alert  100 % 08/06/20 2107  1  88  16  98.4 °F (36.9 °C)  170/90  Alert  100 % 08/06/20 2008  1  73  16  98.1 °F (36.7 °C)  (!) 185/94  Alert  100 % 08/06/20 1930    77  14  98.1 °F (36.7 °C)  153/76    100 % 08/06/20 1915    75  16    154/76    100 % 08/06/20 1900    76  15    151/74    100 % 08/06/20 1845    80  14    155/79    100 % 08/06/20 1835    76  13    168/84    100 % 08/06/20 1830    71  16    187/90    99 % 08/06/20 1825    66  12    (!) 197/98    100 % 08/06/20 1820    66  13    (!) 198/97    100 % 08/06/20 1815    66  14    (!) 199/91    100 % 08/06/20 1810    67  14    196/90    100 % 08/06/20 1805    69  14    (!) 186/91    100 % 08/06/20 1804    70  14  97.3 °F (36.3 °C)  195/85    100 % 08/06/20 1800    86  16    195/85    100 % 08/06/20 1452  1  80  16  98.4 °F (36.9 °C)  (!) 165/94  Alert  98 % 08/06/20 0802  1  79  18  97.7 °F (36.5 °C)  168/82  Alert  92 % 08/05/20 2251  1  76  15  98.3 °F (36.8 °C)  157/75  Alert  97 % 08/05/20 1607  0  70  12  98.7 °F (37.1 °C)  180/83  Alert  100 % 08/05/20 0742  1  72  16  97.5 °F (36.4 °C)  (!) 183/94  Alert  98 % Patient post-op laparotomy release of bowel obstruction with lysis of adhesions, appendectomy, and hernia repair. Blood pressure continues to rise. Physician notified for BP of 212/107. Physician to start patient on Nicardipine drip and transfer patient to CCU room 2510 for administration of drip. Dr. Agustina Upton and family updated on patient transfer.

## 2020-08-07 NOTE — PROGRESS NOTES
CHAN:    SNF Placement-LTC  COVID test  2nd IM Medicare Letter  Medical Transport    CM: Rebecca Diaz is currently working with pt in the Gen Surg Unit. Pt will d/c from [id number] [address] and transition to snf: [facility] and [address] [place] [address]. Pt will require COVID test at the time of d/c. Pt will require medicare letter and medical transport at the time of d/c. CM will continue to follow.     MARTIN Valle, [address] [place] [address]

## 2020-08-07 NOTE — PROGRESS NOTES
1424- pt transferred to room 2510 from room 2134. Pt alert oriented, weak , transferred to bed, oriented to RN, call bell, surroundings. /100 . Awaiting the Nicardipine gtt arrival from pharmacy. D5NS plus 20 k and TPN both infusing. 0045-  Pt chlorhexadine bath completed. And pt turned to right side. 0050- cardene gtt up at 5 to start. 0115- BP looking better 170/86. Will keep cardene gtt at 5mg/min  for now. 0305- pt HR increasing , appears to be more guarded with turning. . Morphine iv given see mar. Pain level -6 climbing. Medicated with Morphine IV see MAR.    0415 report given to Gin Banegas RN.

## 2020-08-07 NOTE — PROGRESS NOTES
Admit Date: 2020    POD 1 Day Post-Op    Procedure:  Procedure(s):  LAPAROTOMY EXPLORATORY RELEASE OF BOWEL OBSTRUCTION lysis of adhesions and release of internal hernia, enterectomy small bowel with primary enteroenterostomy anastomosis, and appendectomy, and repair of ventral abdomen hernia without mesh    Subjective:     Patient feeling better, minimal NG tube output, urine output adequate, white blood cell count leukocytosis as expected postoperatively, was given perioperative prophylactic antibiotics  Minimal abdominal pain          Review of Systems  Minimal abdominal pain, no nausea or vomiting, overall feels better   Objective:     Blood pressure 149/77, pulse 98, temperature 98.4 °F (36.9 °C), resp. rate 15, height 5' 4\" (1.626 m), weight 134 lb 14.7 oz (61.2 kg), SpO2 95 %.     Temp (24hrs), Av.1 °F (36.7 °C), Min:97.3 °F (36.3 °C), Max:98.4 °F (36.9 °C)          Physical Exam  Patient alert awake oriented minimal clear NG tube output, heart regular rhythm abdomen soft incisions clean minimally tender    Labs:   Recent Results (from the past 24 hour(s))   GLUCOSE, POC    Collection Time: 20  3:08 PM   Result Value Ref Range    Glucose (POC) 126 (H) 65 - 100 mg/dL    Performed by Pilar Casillas    CBC W/O DIFF    Collection Time: 20  4:27 AM   Result Value Ref Range    WBC 18.0 (H) 3.6 - 11.0 K/uL    RBC 5.98 (H) 3.80 - 5.20 M/uL    HGB 13.6 11.5 - 16.0 g/dL    HCT 42.3 35.0 - 47.0 %    MCV 70.7 (L) 80.0 - 99.0 FL    MCH 22.7 (L) 26.0 - 34.0 PG    MCHC 32.2 30.0 - 36.5 g/dL    RDW 21.2 (H) 11.5 - 14.5 %    PLATELET 387 229 - 746 K/uL    MPV ABNORMAL 8.9 - 12.9 FL    NRBC 0.0 0  WBC    ABSOLUTE NRBC 0.00 0.00 - 0.59 K/uL   METABOLIC PANEL, BASIC    Collection Time: 20  4:27 AM   Result Value Ref Range    Sodium 136 136 - 145 mmol/L    Potassium 4.0 3.5 - 5.1 mmol/L    Chloride 104 97 - 108 mmol/L    CO2 30 21 - 32 mmol/L    Anion gap 2 (L) 5 - 15 mmol/L    Glucose 189 (H) 65 - 100 mg/dL    BUN 25 (H) 6 - 20 MG/DL    Creatinine 0.59 0.55 - 1.02 MG/DL    BUN/Creatinine ratio 42 (H) 12 - 20      GFR est AA >60 >60 ml/min/1.73m2    GFR est non-AA >60 >60 ml/min/1.73m2    Calcium 8.3 (L) 8.5 - 10.1 MG/DL       Data Review images and reports reviewed    Assessment:     Active Problems:    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 liquids    Transfer to regular surgery floor bed  IV fluids PPN TPN renew    Physical therapy  Antihypertensive therapy restart preop  Meds clamp NG tube for couple hours after delivery of oral meds    Out of bed     Internal medicine following as well    Operative course reviewed with patient.     I called patient's contact Ron and left voicemail that patient was doing better and call me if questions    Patrice Singh MD , [facility]

## 2020-08-07 NOTE — PROGRESS NOTES
0730:  Bedside and Verbal shift change report given to [id number] [address] CANDELARIO and Tiffanie RN (oncoming nurse) by Magui Sneed RN (offgoing nurse). Report included the following information SBAR, Kardex, Procedure Summary, Intake/Output, MAR, Recent Results and Cardiac Rhythm Sinus Tach. 1000:  Dr. Frankie Ugalde dc'd NG Tube. 1144:  TRANSFER - OUT REPORT:    Verbal report given to Viviane RN(name) on [facility] and [place]  being transferred to [facility]) for routine progression of care       Report consisted of patients Situation, Background, Assessment and   Recommendations(SBAR). Information from the following report(s) SBAR, Kardex, Procedure Summary, Intake/Output, MAR, Recent Results and Cardiac Rhythm Sinus Tach was reviewed with the receiving nurse. Lines:   PICC Double Lumen 08/03/20 Right;Brachial (Active)   Central Line Being Utilized Yes 08/07/20 0800   Criteria for Appropriate Use Total parenteral nutrition 08/07/20 0800   Site Assessment Clean, dry, & intact 08/07/20 0800   Phlebitis Assessment 0 08/07/20 0800   Infiltration Assessment 0 08/07/20 0800   Arm Circumference (cm) 27 cm 08/03/20 1803   Date of Last Dressing Change 08/03/20 08/07/20 0800   Dressing Status Clean, dry, & intact 08/07/20 0800   Action Taken Open ports on tubing capped 08/07/20 0800   External Catheter Length (cm) 0 centimeters 08/03/20 1803   Dressing Type Disk with Chlorhexadine gluconate (CHG); Transparent;Tape 08/07/20 0800   Hub Color/Line Status Purple;Flushed; Infusing 08/07/20 0800   Positive Blood Return (Site #1) Yes 08/07/20 0800   Hub Color/Line Status Red;Flushed; Infusing 08/07/20 0800   Positive Blood Return (Site #2) Yes 08/07/20 0800   Alcohol Cap Used Yes 08/07/20 0800       Peripheral IV 08/03/20 Left Hand (Active)   Site Assessment Clean, dry, & intact 08/07/20 0415   Phlebitis Assessment 0 08/07/20 0415   Infiltration Assessment 0 08/07/20 0415   Dressing Status Clean, dry, & intact 08/07/20 0415   Dressing Type Transparent 08/07/20 0415   Hub Color/Line Status Blue; Infusing 08/07/20 0415   Alcohol Cap Used Yes 08/07/20 0130        Opportunity for questions and clarification was provided.       Patient transported with:   Monitor  Registered Nurse  Tech

## 2020-08-07 NOTE — PROGRESS NOTES
Problem: Falls - Risk of  Goal: *Absence of Falls  Description: Document Henry Stephenson Fall Risk and appropriate interventions in the flowsheet. Outcome: Progressing Towards Goal  Note: Fall Risk Interventions:  Mobility Interventions: Communicate number of staff needed for ambulation/transfer         Medication Interventions: Bed/chair exit alarm, Patient to call before getting OOB    Elimination Interventions: Call light in reach, Patient to call for help with toileting needs              Problem: Patient Education: Go to Patient Education Activity  Goal: Patient/Family Education  Outcome: Progressing Towards Goal     Problem: Pressure Injury - Risk of  Goal: *Prevention of pressure injury  Description: Document Armen Scale and appropriate interventions in the flowsheet.   Outcome: Progressing Towards Goal  Note: Pressure Injury Interventions:  Sensory Interventions: Assess changes in LOC, Assess need for specialty bed, Float heels, Keep linens dry and wrinkle-free, Minimize linen layers, Pad between skin to skin    Moisture Interventions: Absorbent underpads, Apply protective barrier, creams and emollients, Assess need for specialty bed    Activity Interventions: Assess need for specialty bed, Increase time out of bed, PT/OT evaluation    Mobility Interventions: Assess need for specialty bed, Chair cushion, HOB 30 degrees or less, Pressure redistribution bed/mattress (bed type)    Nutrition Interventions: Document food/fluid/supplement intake    Friction and Shear Interventions: Apply protective barrier, creams and emollients, Feet elevated on foot rest, HOB 30 degrees or less, Lift sheet, Lift team/patient mobility team                Problem: Patient Education: Go to Patient Education Activity  Goal: Patient/Family Education  Outcome: Progressing Towards Goal     Problem: Patient Education: Go to Patient Education Activity  Goal: Patient/Family Education  Outcome: Progressing Towards Goal     Problem: Patient Education: Go to Patient Education Activity  Goal: Patient/Family Education  Outcome: Progressing Towards Goal     Problem: Small Bowel Obstruction: Day 1  Goal: Off Pathway (Use only if patient is Off Pathway)  Outcome: Progressing Towards Goal  Goal: Activity/Safety  Outcome: Progressing Towards Goal  Goal: Consults, if ordered  Outcome: Progressing Towards Goal  Goal: Diagnostic Test/Procedures  Outcome: Progressing Towards Goal  Goal: Nutrition/Diet  Outcome: Progressing Towards Goal  Goal: Medications  Outcome: Progressing Towards Goal  Goal: Respiratory  Outcome: Progressing Towards Goal  Goal: Treatments/Interventions/Procedures  Outcome: Progressing Towards Goal  Goal: Psychosocial  Outcome: Progressing Towards Goal  Goal: *Optimal pain control at patient's stated goal  Outcome: Progressing Towards Goal  Goal: *Adequate urinary output (equal to or greater than 30 milliliters/hour)  Outcome: Progressing Towards Goal  Goal: *Hemodynamically stable  Outcome: Progressing Towards Goal  Goal: *Demonstrates progressive activity  Outcome: Progressing Towards Goal  Goal: *Absence of nausea/vomiting  Outcome: Progressing Towards Goal     Problem: Small Bowel Obstruction: Day 2  Goal: Off Pathway (Use only if patient is Off Pathway)  Outcome: Progressing Towards Goal  Goal: Activity/Safety  Outcome: Progressing Towards Goal  Goal: Consults, if ordered  Outcome: Progressing Towards Goal  Goal: Diagnostic Test/Procedures  Outcome: Progressing Towards Goal  Goal: Nutrition/Diet  Outcome: Progressing Towards Goal  Goal: Discharge Planning  Outcome: Progressing Towards Goal  Goal: Medications  Outcome: Progressing Towards Goal  Goal: Respiratory  Outcome: Progressing Towards Goal  Goal: Treatments/Interventions/Procedures  Outcome: Progressing Towards Goal  Goal: Psychosocial  Outcome: Progressing Towards Goal  Goal: *Optimal pain control at patient's stated goal  Outcome: Progressing Towards Goal  Goal: *Adequate urinary output (equal to or greater than 30 milliliters/hour)  Outcome: Progressing Towards Goal  Goal: *Hemodynamically stable  Outcome: Progressing Towards Goal  Goal: *Demonstrates progressive activity  Outcome: Progressing Towards Goal  Goal: *Absence of nausea/vomiting  Outcome: Progressing Towards Goal  Goal: *Return of normal bowel function  Outcome: Progressing Towards Goal     Problem: Small Bowel Obstruction: Day 3  Goal: Off Pathway (Use only if patient is Off Pathway)  Outcome: Progressing Towards Goal  Goal: Activity/Safety  Outcome: Progressing Towards Goal  Goal: Consults, if ordered  Outcome: Progressing Towards Goal  Goal: Diagnostic Test/Procedures  Outcome: Progressing Towards Goal  Goal: Nutrition/Diet  Outcome: Progressing Towards Goal  Goal: Discharge Planning  Outcome: Progressing Towards Goal  Goal: Medications  Outcome: Progressing Towards Goal  Goal: Respiratory  Outcome: Progressing Towards Goal  Goal: Treatments/Interventions/Procedures  Outcome: Progressing Towards Goal  Goal: Psychosocial  Outcome: Progressing Towards Goal  Goal: *Optimal pain control at patient's stated goal  Outcome: Progressing Towards Goal  Goal: *Adequate urinary output (equal to or greater than 30 milliliters/hour)  Outcome: Progressing Towards Goal  Goal: *Hemodynamically stable  Outcome: Progressing Towards Goal  Goal: *Adequate nutrition  Outcome: Progressing Towards Goal  Goal: *Demonstrates progressive activity  Outcome: Progressing Towards Goal  Goal: *Participates in discharge planning  Outcome: Progressing Towards Goal     Problem: Small Bowel Obstruction: Day 4 to Discharge  Goal: Off Pathway (Use only if patient is Off Pathway)  Outcome: Progressing Towards Goal  Goal: Activity/Safety  Outcome: Progressing Towards Goal  Goal: Nutrition/Diet  Outcome: Progressing Towards Goal  Goal: Discharge Planning  Outcome: Progressing Towards Goal  Goal: Medications  Outcome: Progressing Towards Goal  Goal: Respiratory  Outcome: Progressing Towards Goal  Goal: Treatments/Interventions/Procedures  Outcome: Progressing Towards Goal  Goal: Psychosocial  Outcome: Progressing Towards Goal     Problem: Small Bowel Obstruction - Non Surgical: Discharge Outcomes  Goal: *Hemodynamically stable  Outcome: Progressing Towards Goal  Goal: *Demonstrates independent activity or return to baseline  Outcome: Progressing Towards Goal  Goal: *Optimal pain control at patient's stated goal  Outcome: Progressing Towards Goal  Goal: *Verbalizes understanding and describes prescribed diet  Outcome: Progressing Towards Goal  Goal: *Tolerating diet  Outcome: Progressing Towards Goal  Goal: *Verbalizes name, dosage, time, side effects, and number of days to continue medications  Outcome: Progressing Towards Goal  Goal: *Anxiety reduced or absent  Outcome: Progressing Towards Goal  Goal: *Understands and describes signs and symptoms to report to providers(Stroke Metric)  Outcome: Progressing Towards Goal  Goal: *Describes follow-up/return visits to physicians  Outcome: Progressing Towards Goal  Goal: *Describes available resources and support systems  Outcome: Progressing Towards Goal  Goal: *Active bowel function  Outcome: Progressing Towards Goal

## 2020-08-08 LAB
ANION GAP SERPL CALC-SCNC: 3 MMOL/L (ref 5–15)
BUN SERPL-MCNC: 20 MG/DL (ref 6–20)
BUN/CREAT SERPL: 54 (ref 12–20)
CALCIUM SERPL-MCNC: 8.1 MG/DL (ref 8.5–10.1)
CHLORIDE SERPL-SCNC: 109 MMOL/L (ref 97–108)
CO2 SERPL-SCNC: 25 MMOL/L (ref 21–32)
CREAT SERPL-MCNC: 0.37 MG/DL (ref 0.55–1.02)
ERYTHROCYTE [DISTWIDTH] IN BLOOD BY AUTOMATED COUNT: 21.2 % (ref 11.5–14.5)
GLUCOSE SERPL-MCNC: 89 MG/DL (ref 65–100)
HCT VFR BLD AUTO: 34.4 % (ref 35–47)
HGB BLD-MCNC: 10.9 G/DL (ref 11.5–16)
MCH RBC QN AUTO: 22.8 PG (ref 26–34)
MCHC RBC AUTO-ENTMCNC: 31.7 G/DL (ref 30–36.5)
MCV RBC AUTO: 72 FL (ref 80–99)
NRBC # BLD: 0 K/UL (ref 0–0.01)
NRBC BLD-RTO: 0 PER 100 WBC
PLATELET # BLD AUTO: 178 K/UL (ref 150–400)
PMV BLD AUTO: ABNORMAL FL (ref 8.9–12.9)
POTASSIUM SERPL-SCNC: 4.2 MMOL/L (ref 3.5–5.1)
RBC # BLD AUTO: 4.78 M/UL (ref 3.8–5.2)
SODIUM SERPL-SCNC: 137 MMOL/L (ref 136–145)
WBC # BLD AUTO: 13.9 K/UL (ref 3.6–11)

## 2020-08-08 PROCEDURE — C9113 INJ PANTOPRAZOLE SODIUM, VIA: HCPCS | Performed by: SURGERY

## 2020-08-08 PROCEDURE — 85027 COMPLETE CBC AUTOMATED: CPT

## 2020-08-08 PROCEDURE — 74011250637 HC RX REV CODE- 250/637: Performed by: NURSE PRACTITIONER

## 2020-08-08 PROCEDURE — 74011250637 HC RX REV CODE- 250/637: Performed by: INTERNAL MEDICINE

## 2020-08-08 PROCEDURE — 74011250636 HC RX REV CODE- 250/636: Performed by: SURGERY

## 2020-08-08 PROCEDURE — 74011250637 HC RX REV CODE- 250/637: Performed by: SURGERY

## 2020-08-08 PROCEDURE — 74011000258 HC RX REV CODE- 258: Performed by: SURGERY

## 2020-08-08 PROCEDURE — 74011000250 HC RX REV CODE- 250: Performed by: SURGERY

## 2020-08-08 PROCEDURE — 36415 COLL VENOUS BLD VENIPUNCTURE: CPT

## 2020-08-08 PROCEDURE — 74011250636 HC RX REV CODE- 250/636: Performed by: INTERNAL MEDICINE

## 2020-08-08 PROCEDURE — 65270000029 HC RM PRIVATE

## 2020-08-08 PROCEDURE — 80048 BASIC METABOLIC PNL TOTAL CA: CPT

## 2020-08-08 RX ADMIN — NITROGLYCERIN 1 INCH: 20 OINTMENT TOPICAL at 18:38

## 2020-08-08 RX ADMIN — METHYLPREDNISOLONE SODIUM SUCCINATE 40 MG: 40 INJECTION, POWDER, FOR SOLUTION INTRAMUSCULAR; INTRAVENOUS at 09:43

## 2020-08-08 RX ADMIN — IRBESARTAN 300 MG: 150 TABLET, FILM COATED ORAL at 21:20

## 2020-08-08 RX ADMIN — Medication 10 ML: at 14:06

## 2020-08-08 RX ADMIN — NITROGLYCERIN 1 INCH: 20 OINTMENT TOPICAL at 06:29

## 2020-08-08 RX ADMIN — Medication 1 AMPULE: at 21:20

## 2020-08-08 RX ADMIN — NITROGLYCERIN 1 INCH: 20 OINTMENT TOPICAL at 00:57

## 2020-08-08 RX ADMIN — ENOXAPARIN SODIUM 40 MG: 40 INJECTION SUBCUTANEOUS at 15:16

## 2020-08-08 RX ADMIN — DEXTROSE MONOHYDRATE, SODIUM CHLORIDE, AND POTASSIUM CHLORIDE 100 ML/HR: 50; 9; 1.49 INJECTION, SOLUTION INTRAVENOUS at 04:52

## 2020-08-08 RX ADMIN — Medication 10 ML: at 21:22

## 2020-08-08 RX ADMIN — Medication 1 AMPULE: at 09:44

## 2020-08-08 RX ADMIN — CALCIUM GLUCONATE: 94 INJECTION, SOLUTION INTRAVENOUS at 18:42

## 2020-08-08 RX ADMIN — Medication 10 ML: at 06:34

## 2020-08-08 RX ADMIN — SODIUM CHLORIDE 40 MG: 9 INJECTION, SOLUTION INTRAMUSCULAR; INTRAVENOUS; SUBCUTANEOUS at 09:43

## 2020-08-08 RX ADMIN — SODIUM CHLORIDE 40 MG: 9 INJECTION, SOLUTION INTRAMUSCULAR; INTRAVENOUS; SUBCUTANEOUS at 21:20

## 2020-08-08 RX ADMIN — AMLODIPINE BESYLATE 5 MG: 5 TABLET ORAL at 09:43

## 2020-08-08 RX ADMIN — DEXTROSE MONOHYDRATE, SODIUM CHLORIDE, AND POTASSIUM CHLORIDE 100 ML/HR: 50; 9; 1.49 INJECTION, SOLUTION INTRAVENOUS at 14:33

## 2020-08-08 RX ADMIN — NITROGLYCERIN 1 INCH: 20 OINTMENT TOPICAL at 23:19

## 2020-08-08 RX ADMIN — Medication 10 ML: at 14:07

## 2020-08-08 RX ADMIN — HYDRALAZINE HYDROCHLORIDE 10 MG: 20 INJECTION INTRAMUSCULAR; INTRAVENOUS at 03:45

## 2020-08-08 RX ADMIN — NITROGLYCERIN 1 INCH: 20 OINTMENT TOPICAL at 12:46

## 2020-08-08 NOTE — PROGRESS NOTES
Problem: Falls - Risk of  Goal: *Absence of Falls  Description: Document Devere Quincy Fall Risk and appropriate interventions in the flowsheet. Outcome: Progressing Towards Goal  Note: Fall Risk Interventions:  Mobility Interventions: Communicate number of staff needed for ambulation/transfer         Medication Interventions: Evaluate medications/consider consulting pharmacy    Elimination Interventions: Call light in reach              Problem: Patient Education: Go to Patient Education Activity  Goal: Patient/Family Education  Outcome: Progressing Towards Goal     Problem: Pressure Injury - Risk of  Goal: *Prevention of pressure injury  Description: Document Armen Scale and appropriate interventions in the flowsheet.   Outcome: Progressing Towards Goal  Note: Pressure Injury Interventions:  Sensory Interventions: Assess changes in LOC    Moisture Interventions: Absorbent underpads    Activity Interventions: Assess need for specialty bed    Mobility Interventions: Assess need for specialty bed    Nutrition Interventions: Document food/fluid/supplement intake    Friction and Shear Interventions: Apply protective barrier, creams and emollients                Problem: Patient Education: Go to Patient Education Activity  Goal: Patient/Family Education  Outcome: Progressing Towards Goal     Problem: Patient Education: Go to Patient Education Activity  Goal: Patient/Family Education  Outcome: Progressing Towards Goal     Problem: Patient Education: Go to Patient Education Activity  Goal: Patient/Family Education  Outcome: Progressing Towards Goal     Problem: Small Bowel Obstruction: Day 1  Goal: Off Pathway (Use only if patient is Off Pathway)  Outcome: Progressing Towards Goal  Goal: Activity/Safety  Outcome: Progressing Towards Goal  Goal: Consults, if ordered  Outcome: Progressing Towards Goal  Goal: Diagnostic Test/Procedures  Outcome: Progressing Towards Goal  Goal: Nutrition/Diet  Outcome: Progressing Towards Goal  Goal: Medications  Outcome: Progressing Towards Goal  Goal: Respiratory  Outcome: Progressing Towards Goal  Goal: Treatments/Interventions/Procedures  Outcome: Progressing Towards Goal  Goal: Psychosocial  Outcome: Progressing Towards Goal  Goal: *Optimal pain control at patient's stated goal  Outcome: Progressing Towards Goal  Goal: *Adequate urinary output (equal to or greater than 30 milliliters/hour)  Outcome: Progressing Towards Goal  Goal: *Hemodynamically stable  Outcome: Progressing Towards Goal  Goal: *Demonstrates progressive activity  Outcome: Progressing Towards Goal  Goal: *Absence of nausea/vomiting  Outcome: Progressing Towards Goal     Problem: Small Bowel Obstruction: Day 2  Goal: Off Pathway (Use only if patient is Off Pathway)  Outcome: Progressing Towards Goal  Goal: Activity/Safety  Outcome: Progressing Towards Goal  Goal: Consults, if ordered  Outcome: Progressing Towards Goal  Goal: Diagnostic Test/Procedures  Outcome: Progressing Towards Goal  Goal: Nutrition/Diet  Outcome: Progressing Towards Goal  Goal: Discharge Planning  Outcome: Progressing Towards Goal  Goal: Medications  Outcome: Progressing Towards Goal  Goal: Respiratory  Outcome: Progressing Towards Goal  Goal: Treatments/Interventions/Procedures  Outcome: Progressing Towards Goal  Goal: Psychosocial  Outcome: Progressing Towards Goal  Goal: *Optimal pain control at patient's stated goal  Outcome: Progressing Towards Goal  Goal: *Adequate urinary output (equal to or greater than 30 milliliters/hour)  Outcome: Progressing Towards Goal  Goal: *Hemodynamically stable  Outcome: Progressing Towards Goal  Goal: *Demonstrates progressive activity  Outcome: Progressing Towards Goal  Goal: *Absence of nausea/vomiting  Outcome: Progressing Towards Goal  Goal: *Return of normal bowel function  Outcome: Progressing Towards Goal     Problem: Small Bowel Obstruction: Day 3  Goal: Off Pathway (Use only if patient is Off Pathway)  Outcome: Progressing Towards Goal  Goal: Activity/Safety  Outcome: Progressing Towards Goal  Goal: Consults, if ordered  Outcome: Progressing Towards Goal  Goal: Diagnostic Test/Procedures  Outcome: Progressing Towards Goal  Goal: Nutrition/Diet  Outcome: Progressing Towards Goal  Goal: Discharge Planning  Outcome: Progressing Towards Goal  Goal: Medications  Outcome: Progressing Towards Goal  Goal: Respiratory  Outcome: Progressing Towards Goal  Goal: Treatments/Interventions/Procedures  Outcome: Progressing Towards Goal  Goal: Psychosocial  Outcome: Progressing Towards Goal  Goal: *Optimal pain control at patient's stated goal  Outcome: Progressing Towards Goal  Goal: *Adequate urinary output (equal to or greater than 30 milliliters/hour)  Outcome: Progressing Towards Goal  Goal: *Hemodynamically stable  Outcome: Progressing Towards Goal  Goal: *Adequate nutrition  Outcome: Progressing Towards Goal  Goal: *Demonstrates progressive activity  Outcome: Progressing Towards Goal  Goal: *Participates in discharge planning  Outcome: Progressing Towards Goal     Problem: Small Bowel Obstruction: Day 4 to Discharge  Goal: Off Pathway (Use only if patient is Off Pathway)  Outcome: Progressing Towards Goal  Goal: Activity/Safety  Outcome: Progressing Towards Goal  Goal: Nutrition/Diet  Outcome: Progressing Towards Goal  Goal: Discharge Planning  Outcome: Progressing Towards Goal  Goal: Medications  Outcome: Progressing Towards Goal  Goal: Respiratory  Outcome: Progressing Towards Goal  Goal: Treatments/Interventions/Procedures  Outcome: Progressing Towards Goal  Goal: Psychosocial  Outcome: Progressing Towards Goal     Problem: Small Bowel Obstruction - Non Surgical: Discharge Outcomes  Goal: *Hemodynamically stable  Outcome: Progressing Towards Goal  Goal: *Demonstrates independent activity or return to baseline  Outcome: Progressing Towards Goal  Goal: *Optimal pain control at patient's stated goal  Outcome: Progressing Towards Goal  Goal: *Verbalizes understanding and describes prescribed diet  Outcome: Progressing Towards Goal  Goal: *Tolerating diet  Outcome: Progressing Towards Goal  Goal: *Verbalizes name, dosage, time, side effects, and number of days to continue medications  Outcome: Progressing Towards Goal  Goal: *Anxiety reduced or absent  Outcome: Progressing Towards Goal  Goal: *Understands and describes signs and symptoms to report to providers(Stroke Metric)  Outcome: Progressing Towards Goal  Goal: *Describes follow-up/return visits to physicians  Outcome: Progressing Towards Goal  Goal: *Describes available resources and support systems  Outcome: Progressing Towards Goal  Goal: *Active bowel function  Outcome: Progressing Towards Goal

## 2020-08-08 NOTE — PROGRESS NOTES
General Surgery End of Shift Nursing Note    Bedside shift change report given to Viviane (oncoming nurse) by Charlie Acosta (offgoing nurse). Report included the following information SBAR, Kardex, Intake/Output and Recent Results. Shift worked:   [id number]   Summary of shift:    Uneventful shift. 200 ml emptied from G tube drainage bag. Voiding appropriately. No complaints of pain. Hydralazine given once for SBP>160   Issues for physician to address:   none     Number times ambulated in hallway past shift: 0    Number of times OOB to chair past shift: 0    Pain Management:  Current medication: see mar  Patient states pain is manageable on current pain medication: YES    GI:    Current diet:  DIET NPO Except Meds, With Ice Chips  TPN ADULT - PERIPHERAL    Tolerating current diet: YES  Passing flatus: NO  Last Bowel Movement: several days ago      Respiratory:    Incentive Spirometer at bedside: YES  Patient instructed on use: YES    Patient Safety:    Falls Score: 3  Bed Alarm On? No  Sitter?  No    Lyndsey Rowe RN

## 2020-08-08 NOTE — PROGRESS NOTES
Hospitalist Progress Note    NAME: Suraj Layton   :  1955   MRN:  [id number]       Assessment / Plan: Active Problems:    Polymyositis ([address] (2016)       HTN (hypertension) (2016)       Obstructive sleep apnea syndrome (2017)       SBO (small bowel obstruction) (HCC) (2020)        Accelerated hypertension not POA  Likely due to missed p.o. blood pressure meds due to n.p.o. bowel rest status     Continue adequate pain management as per primary surgical team  Continue IV fluids but DC ASAP once able to resume enteral feeding-we will help with the blood pressure control  Continue nitro glycerin ointment 1 inch every 6 hours for now  c/w Clonidine to  0.2 mg patch  C/w Irbesartan and amlodipine  C/w IV steroid for polymyositis IV for now since pt is NPO  Continue home Protonix IV for now until patient n.p.o./bowel rest     Suspected right lower lobe pneumonia on CT imaging  Likely atelectasis POA  COVID test is negative  Incentive spirometer recommended  On empiric IV antibiotics as per primary surgical team     Recurrent small bowel obstruction POA-secondary to adhesions as per surgery team     s/p LAPAROTOMY EXPLORATORY RELEASE OF BOWEL OBSTRUCTION lysis of adhesions and release of internal hernia, enterectomy small bowel with primary enteroenterostomy anastomosis, and appendectomy, and repair of ventral abdomen hernia without mesh  Npo for now, gastrostomy tube to gravity  Oral diet not resumed yet per surgery  Remains on TPN        Code Status: Full code as per patient's wishes  DVT Prophylaxis: As per primary surgical team       Subjective:     Chief Complaint / Reason for Physician Visit  \"Reports abdominal pain is improved  No BM yet, no flatus\". Discussed with RN events overnight.      Review of Systems:  Symptom Y/N Comments  Symptom Y/N Comments   Fever/Chills n   Chest Pain n    Poor Appetite n   Edema n    Cough n   Abdominal Pain n    Sputum n   Joint Pain     SOB/LOREDO n Pruritis/Rash     Nausea/vomit n   Tolerating PT/OT     Diarrhea n   Tolerating Diet     Constipation y   Other       Could NOT obtain due to:      Objective:     VITALS:   Last 24hrs VS reviewed since prior progress note. Most recent are:  Patient Vitals for the past 24 hrs:   Temp Pulse Resp BP SpO2   08/08/20 1202 98.9 °F (37.2 °C) 89 17 160/67 98 %   08/08/20 0900 98.1 °F (36.7 °C) 94 17 140/73 99 %   08/08/20 0629 -- 95 -- 148/83 --   08/08/20 0330 98.4 °F (36.9 °C) 81 17 167/85 98 %   08/08/20 0057 -- 87 -- 159/87 --   08/07/20 2350 98.2 °F (36.8 °C) (!) 122 16 158/85 99 %   08/07/20 2242 -- 95 -- (!) 153/91 --   08/07/20 2032 98.5 °F (36.9 °C) 83 16 169/89 100 %   08/07/20 1645 98 °F (36.7 °C) 84 16 147/84 100 %   08/07/20 1224 98 °F (36.7 °C) 87 17 154/81 100 %       Intake/Output Summary (Last 24 hours) at 8/8/2020 1208  Last data filed at 8/8/2020 0659  Gross per 24 hour   Intake 2897.64 ml   Output 350 ml   Net 2547.64 ml        PHYSICAL EXAM:  General: WD, WN. Alert, cooperative, no acute distress    EENT:  EOMI. Anicteric sclerae. MMM  Resp:  CTA bilaterally, no wheezing or rales. No accessory muscle use  CV:  Regular  rhythm,  No edema  GI:  Soft, Non distended, Non tender.  +Bowel sounds  Neurologic:  Alert and oriented X 3, normal speech,   Psych:   Good insight. Not anxious nor agitated  Skin:  No rashes. No jaundice    Reviewed most current lab test results and cultures  YES  Reviewed most current radiology test results   YES  Review and summation of old records today    NO  Reviewed patient's current orders and MAR    YES  PMH/SH reviewed - no change compared to H&P  ________________________________________________________________________  Care Plan discussed with:    Comments   Patient x    Family      RN x    Care Manager     Consultant                        Multidiciplinary team rounds were held today with , nursing, pharmacist and clinical coordinator.   Patient's plan of care was discussed; medications were reviewed and discharge planning was addressed. ________________________________________________________________________  Total NON critical care TIME:  28   Minutes    Total CRITICAL CARE TIME Spent:   Minutes non procedure based      Comments   >50% of visit spent in counseling and coordination of care     ________________________________________________________________________  Nanci Henry MD     Procedures: see electronic medical records for all procedures/Xrays and details which were not copied into this note but were reviewed prior to creation of Plan. LABS:  I reviewed today's most current labs and imaging studies.   Pertinent labs include:  Recent Labs     08/08/20 0400 08/07/20 0427   WBC 13.9* 18.0*   HGB 10.9* 13.6   HCT 34.4* 42.3    174     Recent Labs     08/08/20 0400 08/07/20 0427    136   K 4.2 4.0   * 104   CO2 25 30   GLU 89 189*   BUN 20 25*   CREA 0.37* 0.59   CA 8.1* 8.3*       Signed: Nanci Henry MD

## 2020-08-08 NOTE — PROGRESS NOTES
Admit Date: 2020    POD 2 Day Post-Op    Procedure:  Procedure(s):  LAPAROTOMY EXPLORATORY RELEASE OF BOWEL OBSTRUCTION lysis of adhesions and release of internal hernia, enterectomy small bowel with primary enteroenterostomy anastomosis, and appendectomy, and repair of ventral abdomen hernia without mesh    Subjective:     Patient feeling better, minimal G tube output, urine output adequate, white blood cell count down  Minimal abdominal pain          Review of Systems  Minimal abdominal pain, no nausea or vomiting, overall feels better   Objective:     Blood pressure 140/73, pulse 94, temperature 98.1 °F (36.7 °C), resp. rate 17, height 5' 4\" (1.626 m), weight 134 lb 14.7 oz (61.2 kg), SpO2 99 %, not currently breastfeeding.     Temp (24hrs), Av.2 °F (36.8 °C), Min:98 °F (36.7 °C), Max:98.5 °F (36.9 °C)          Physical Exam  Patient alert awake oriented minimal clear G tube output, heart regular rhythm abdomen soft incisions clean minimally tender    Labs:   Recent Results (from the past 24 hour(s))   CBC W/O DIFF    Collection Time: 20  4:00 AM   Result Value Ref Range    WBC 13.9 (H) 3.6 - 11.0 K/uL    RBC 4.78 3.80 - 5.20 M/uL    HGB 10.9 (L) 11.5 - 16.0 g/dL    HCT 34.4 (L) 35.0 - 47.0 %    MCV 72.0 (L) 80.0 - 99.0 FL    MCH 22.8 (L) 26.0 - 34.0 PG    MCHC 31.7 30.0 - 36.5 g/dL    RDW 21.2 (H) 11.5 - 14.5 %    PLATELET 739 621 - 733 K/uL    MPV ABNORMAL 8.9 - 12.9 FL    NRBC 0.0 0  WBC    ABSOLUTE NRBC 0.00 0.00 - 6.99 K/uL   METABOLIC PANEL, BASIC    Collection Time: 20  4:00 AM   Result Value Ref Range    Sodium 137 136 - 145 mmol/L    Potassium 4.2 3.5 - 5.1 mmol/L    Chloride 109 (H) 97 - 108 mmol/L    CO2 25 21 - 32 mmol/L    Anion gap 3 (L) 5 - 15 mmol/L    Glucose 89 65 - 100 mg/dL    BUN 20 6 - 20 MG/DL    Creatinine 0.37 (L) 0.55 - 1.02 MG/DL    BUN/Creatinine ratio 54 (H) 12 - 20      GFR est AA >60 >60 ml/min/1.73m2    GFR est non-AA >60 >60 ml/min/1.73m2    Calcium 8.1 (L) 8.5 - 10.1 MG/DL       Data Review images and reports reviewed    Assessment:     Active Problems:    Polymyositis ([facility] (7/28/2016)      HTN (hypertension) (7/28/2016)      Obstructive sleep apnea syndrome (1/17/2017)      SBO (small bowel obstruction) ([facility] (8/2/2020)        Plan/Recommendations/Medical Decision Making:     Continue present treatment     IV fluids PPN TPN renew    Pt was dependent on tube feeds prior to this event. Will not likely be able to take po. Will restart tube feeds once + bowel function. Physical therapy  Antihypertensive therapy restart preop  Meds clamp G tube for couple hours after delivery of oral meds    Out of bed     Internal medicine following as well      Cary Green MD, [facility]

## 2020-08-08 NOTE — PROGRESS NOTES
Problem: Falls - Risk of  Goal: *Absence of Falls  Description: Document Anette Maravilla Fall Risk and appropriate interventions in the flowsheet. Outcome: Progressing Towards Goal  Note: Fall Risk Interventions:  Mobility Interventions: Communicate number of staff needed for ambulation/transfer, Patient to call before getting OOB, PT Consult for mobility concerns         Medication Interventions: Evaluate medications/consider consulting pharmacy, Patient to call before getting OOB, Teach patient to arise slowly    Elimination Interventions: Call light in reach, Patient to call for help with toileting needs              Problem: Patient Education: Go to Patient Education Activity  Goal: Patient/Family Education  Outcome: Progressing Towards Goal     Problem: Pressure Injury - Risk of  Goal: *Prevention of pressure injury  Description: Document Armen Scale and appropriate interventions in the flowsheet.   Outcome: Progressing Towards Goal  Note: Pressure Injury Interventions:  Sensory Interventions: Assess changes in LOC, Float heels, Keep linens dry and wrinkle-free, Maintain/enhance activity level, Minimize linen layers, Pressure redistribution bed/mattress (bed type)    Moisture Interventions: Apply protective barrier, creams and emollients, Assess need for specialty bed, Check for incontinence Q2 hours and as needed, Internal/External urinary devices, Limit adult briefs, Maintain skin hydration (lotion/cream), Minimize layers    Activity Interventions: Increase time out of bed, Pressure redistribution bed/mattress(bed type)    Mobility Interventions: Assess need for specialty bed, Float heels, Pressure redistribution bed/mattress (bed type)    Nutrition Interventions: Document food/fluid/supplement intake    Friction and Shear Interventions: Lift sheet, Lift team/patient mobility team, Minimize layers                Problem: Patient Education: Go to Patient Education Activity  Goal: Patient/Family Education  Outcome: Progressing Towards Goal     Problem: Patient Education: Go to Patient Education Activity  Goal: Patient/Family Education  Outcome: Progressing Towards Goal     Problem: Patient Education: Go to Patient Education Activity  Goal: Patient/Family Education  Outcome: Progressing Towards Goal     Problem: Small Bowel Obstruction: Day 1  Goal: Off Pathway (Use only if patient is Off Pathway)  Outcome: Progressing Towards Goal  Goal: Activity/Safety  Outcome: Progressing Towards Goal  Goal: Consults, if ordered  Outcome: Progressing Towards Goal  Goal: Diagnostic Test/Procedures  Outcome: Progressing Towards Goal  Goal: Nutrition/Diet  Outcome: Progressing Towards Goal  Goal: Medications  Outcome: Progressing Towards Goal  Goal: Respiratory  Outcome: Progressing Towards Goal  Goal: Treatments/Interventions/Procedures  Outcome: Progressing Towards Goal  Goal: Psychosocial  Outcome: Progressing Towards Goal  Goal: *Optimal pain control at patient's stated goal  Outcome: Progressing Towards Goal  Goal: *Adequate urinary output (equal to or greater than 30 milliliters/hour)  Outcome: Progressing Towards Goal  Goal: *Hemodynamically stable  Outcome: Progressing Towards Goal  Goal: *Demonstrates progressive activity  Outcome: Progressing Towards Goal  Goal: *Absence of nausea/vomiting  Outcome: Progressing Towards Goal     Problem: Small Bowel Obstruction: Day 2  Goal: Off Pathway (Use only if patient is Off Pathway)  Outcome: Progressing Towards Goal  Goal: Activity/Safety  Outcome: Progressing Towards Goal  Goal: Consults, if ordered  Outcome: Progressing Towards Goal  Goal: Diagnostic Test/Procedures  Outcome: Progressing Towards Goal  Goal: Nutrition/Diet  Outcome: Progressing Towards Goal  Goal: Discharge Planning  Outcome: Progressing Towards Goal  Goal: Medications  Outcome: Progressing Towards Goal  Goal: Respiratory  Outcome: Progressing Towards Goal  Goal: Treatments/Interventions/Procedures  Outcome: Progressing Towards Goal  Goal: Psychosocial  Outcome: Progressing Towards Goal  Goal: *Optimal pain control at patient's stated goal  Outcome: Progressing Towards Goal  Goal: *Adequate urinary output (equal to or greater than 30 milliliters/hour)  Outcome: Progressing Towards Goal  Goal: *Hemodynamically stable  Outcome: Progressing Towards Goal  Goal: *Demonstrates progressive activity  Outcome: Progressing Towards Goal  Goal: *Absence of nausea/vomiting  Outcome: Progressing Towards Goal  Goal: *Return of normal bowel function  Outcome: Progressing Towards Goal     Problem: Small Bowel Obstruction: Day 3  Goal: Off Pathway (Use only if patient is Off Pathway)  Outcome: Progressing Towards Goal  Goal: Activity/Safety  Outcome: Progressing Towards Goal  Goal: Consults, if ordered  Outcome: Progressing Towards Goal  Goal: Diagnostic Test/Procedures  Outcome: Progressing Towards Goal  Goal: Nutrition/Diet  Outcome: Progressing Towards Goal  Goal: Discharge Planning  Outcome: Progressing Towards Goal  Goal: Medications  Outcome: Progressing Towards Goal  Goal: Respiratory  Outcome: Progressing Towards Goal  Goal: Treatments/Interventions/Procedures  Outcome: Progressing Towards Goal  Goal: Psychosocial  Outcome: Progressing Towards Goal  Goal: *Optimal pain control at patient's stated goal  Outcome: Progressing Towards Goal  Goal: *Adequate urinary output (equal to or greater than 30 milliliters/hour)  Outcome: Progressing Towards Goal  Goal: *Hemodynamically stable  Outcome: Progressing Towards Goal  Goal: *Adequate nutrition  Outcome: Progressing Towards Goal  Goal: *Demonstrates progressive activity  Outcome: Progressing Towards Goal  Goal: *Participates in discharge planning  Outcome: Progressing Towards Goal     Problem: Small Bowel Obstruction: Day 4 to Discharge  Goal: Off Pathway (Use only if patient is Off Pathway)  Outcome: Progressing Towards Goal  Goal: Activity/Safety  Outcome: Progressing Towards Goal  Goal: Nutrition/Diet  Outcome: Progressing Towards Goal  Goal: Discharge Planning  Outcome: Progressing Towards Goal  Goal: Medications  Outcome: Progressing Towards Goal  Goal: Respiratory  Outcome: Progressing Towards Goal  Goal: Treatments/Interventions/Procedures  Outcome: Progressing Towards Goal  Goal: Psychosocial  Outcome: Progressing Towards Goal     Problem: Small Bowel Obstruction - Non Surgical: Discharge Outcomes  Goal: *Hemodynamically stable  Outcome: Progressing Towards Goal  Goal: *Demonstrates independent activity or return to baseline  Outcome: Progressing Towards Goal  Goal: *Optimal pain control at patient's stated goal  Outcome: Progressing Towards Goal  Goal: *Verbalizes understanding and describes prescribed diet  Outcome: Progressing Towards Goal  Goal: *Tolerating diet  Outcome: Progressing Towards Goal  Goal: *Verbalizes name, dosage, time, side effects, and number of days to continue medications  Outcome: Progressing Towards Goal  Goal: *Anxiety reduced or absent  Outcome: Progressing Towards Goal  Goal: *Understands and describes signs and symptoms to report to providers(Stroke Metric)  Outcome: Progressing Towards Goal  Goal: *Describes follow-up/return visits to physicians  Outcome: Progressing Towards Goal  Goal: *Describes available resources and support systems  Outcome: Progressing Towards Goal  Goal: *Active bowel function  Outcome: Progressing Towards Goal

## 2020-08-09 LAB
ANION GAP SERPL CALC-SCNC: 4 MMOL/L (ref 5–15)
BUN SERPL-MCNC: 18 MG/DL (ref 6–20)
BUN/CREAT SERPL: 51 (ref 12–20)
CALCIUM SERPL-MCNC: 8.2 MG/DL (ref 8.5–10.1)
CHLORIDE SERPL-SCNC: 110 MMOL/L (ref 97–108)
CO2 SERPL-SCNC: 24 MMOL/L (ref 21–32)
CREAT SERPL-MCNC: 0.35 MG/DL (ref 0.55–1.02)
ERYTHROCYTE [DISTWIDTH] IN BLOOD BY AUTOMATED COUNT: 21.3 % (ref 11.5–14.5)
GLUCOSE SERPL-MCNC: 88 MG/DL (ref 65–100)
HCT VFR BLD AUTO: 32.2 % (ref 35–47)
HGB BLD-MCNC: 10.5 G/DL (ref 11.5–16)
MCH RBC QN AUTO: 23.4 PG (ref 26–34)
MCHC RBC AUTO-ENTMCNC: 32.6 G/DL (ref 30–36.5)
MCV RBC AUTO: 71.9 FL (ref 80–99)
NRBC # BLD: 0 K/UL (ref 0–0.01)
NRBC BLD-RTO: 0 PER 100 WBC
PLATELET # BLD AUTO: 142 K/UL (ref 150–400)
POTASSIUM SERPL-SCNC: 4.4 MMOL/L (ref 3.5–5.1)
RBC # BLD AUTO: 4.48 M/UL (ref 3.8–5.2)
SODIUM SERPL-SCNC: 138 MMOL/L (ref 136–145)
WBC # BLD AUTO: 11.6 K/UL (ref 3.6–11)

## 2020-08-09 PROCEDURE — 80048 BASIC METABOLIC PNL TOTAL CA: CPT

## 2020-08-09 PROCEDURE — C9113 INJ PANTOPRAZOLE SODIUM, VIA: HCPCS | Performed by: INTERNAL MEDICINE

## 2020-08-09 PROCEDURE — 74011000258 HC RX REV CODE- 258: Performed by: SURGERY

## 2020-08-09 PROCEDURE — 74011250637 HC RX REV CODE- 250/637: Performed by: INTERNAL MEDICINE

## 2020-08-09 PROCEDURE — 74011250636 HC RX REV CODE- 250/636: Performed by: INTERNAL MEDICINE

## 2020-08-09 PROCEDURE — 74011250637 HC RX REV CODE- 250/637: Performed by: NURSE PRACTITIONER

## 2020-08-09 PROCEDURE — 74011000250 HC RX REV CODE- 250: Performed by: SURGERY

## 2020-08-09 PROCEDURE — 74011250636 HC RX REV CODE- 250/636: Performed by: SURGERY

## 2020-08-09 PROCEDURE — 77030038269 HC DRN EXT URIN PURWCK BARD -A

## 2020-08-09 PROCEDURE — 77030018798 HC PMP KT ENTRL FED COVD -A

## 2020-08-09 PROCEDURE — 74011000250 HC RX REV CODE- 250: Performed by: INTERNAL MEDICINE

## 2020-08-09 PROCEDURE — 65270000029 HC RM PRIVATE

## 2020-08-09 PROCEDURE — 36415 COLL VENOUS BLD VENIPUNCTURE: CPT

## 2020-08-09 PROCEDURE — 85027 COMPLETE CBC AUTOMATED: CPT

## 2020-08-09 RX ADMIN — Medication 10 ML: at 06:02

## 2020-08-09 RX ADMIN — NITROGLYCERIN 1 INCH: 20 OINTMENT TOPICAL at 17:58

## 2020-08-09 RX ADMIN — METHYLPREDNISOLONE SODIUM SUCCINATE 40 MG: 40 INJECTION, POWDER, FOR SOLUTION INTRAMUSCULAR; INTRAVENOUS at 10:23

## 2020-08-09 RX ADMIN — AMLODIPINE BESYLATE 5 MG: 5 TABLET ORAL at 10:24

## 2020-08-09 RX ADMIN — DEXTROSE MONOHYDRATE, SODIUM CHLORIDE, AND POTASSIUM CHLORIDE 100 ML/HR: 50; 9; 1.49 INJECTION, SOLUTION INTRAVENOUS at 13:20

## 2020-08-09 RX ADMIN — Medication 10 ML: at 21:23

## 2020-08-09 RX ADMIN — NITROGLYCERIN 1 INCH: 20 OINTMENT TOPICAL at 13:03

## 2020-08-09 RX ADMIN — NITROGLYCERIN 1 INCH: 20 OINTMENT TOPICAL at 05:59

## 2020-08-09 RX ADMIN — IRBESARTAN 300 MG: 150 TABLET, FILM COATED ORAL at 21:22

## 2020-08-09 RX ADMIN — Medication 10 ML: at 13:23

## 2020-08-09 RX ADMIN — HYDRALAZINE HYDROCHLORIDE 10 MG: 20 INJECTION INTRAMUSCULAR; INTRAVENOUS at 03:08

## 2020-08-09 RX ADMIN — CALCIUM GLUCONATE: 94 INJECTION, SOLUTION INTRAVENOUS at 18:06

## 2020-08-09 RX ADMIN — Medication 1 AMPULE: at 21:23

## 2020-08-09 RX ADMIN — SODIUM CHLORIDE 40 MG: 9 INJECTION, SOLUTION INTRAMUSCULAR; INTRAVENOUS; SUBCUTANEOUS at 10:21

## 2020-08-09 RX ADMIN — Medication 20 ML: at 13:22

## 2020-08-09 RX ADMIN — Medication 10 ML: at 10:22

## 2020-08-09 RX ADMIN — Medication 1 AMPULE: at 10:20

## 2020-08-09 RX ADMIN — SODIUM CHLORIDE 40 MG: 9 INJECTION, SOLUTION INTRAMUSCULAR; INTRAVENOUS; SUBCUTANEOUS at 21:22

## 2020-08-09 RX ADMIN — DEXTROSE MONOHYDRATE, SODIUM CHLORIDE, AND POTASSIUM CHLORIDE 100 ML/HR: 50; 9; 1.49 INJECTION, SOLUTION INTRAVENOUS at 00:13

## 2020-08-09 RX ADMIN — ENOXAPARIN SODIUM 40 MG: 40 INJECTION SUBCUTANEOUS at 15:52

## 2020-08-09 NOTE — PROGRESS NOTES
Bedside shift change report given to Viviane (oncoming nurse) by [facility] (offgoing nurse). Report included the following information SBAR, Kardex, Intake/Output and Recent Results.     Shift worked:   [id number]   Summary of shift:    Uneventful shift. Minimal drainage from G tube. Voiding appropriately, david. No complaints of pain. Hydralazine given once for SBP>160. Not passing flatus. Issues for physician to address:   none      Number times ambulated in hallway past shift: 0    Number of times OOB to chair past shift: 0     Pain Management:  Current medication: see mar  Patient states pain is manageable on current pain medication: YES     GI:     Current diet:  DIET NPO Except Meds, With Ice Chips  TPN ADULT - PERIPHERAL    Tolerating current diet: YES  Passing flatus: NO  Last Bowel Movement: several days ago        Respiratory:     Incentive Spirometer at bedside: YES  Patient instructed on use: YES     Patient Safety:     Falls Score: 3  Bed Alarm On? No  Sitter?  No     Celena Lagos RN

## 2020-08-09 NOTE — PROGRESS NOTES
General Surgery End of Shift Nursing Note    Bedside shift change report given to ABEL De and Justine Busch RN (oncoming nurse) by Ana Rosa Ko (offgoing nurse). Report included the following information Tenzin MUÑOZdex, SBAR    Shift worked:   [id number]     Summary of shift:    Pt was changed to a specialty bed. She was started on tube feeding. Pt had a large BM this shift. She has a purwick. No complaints this shift. Pt is complete care. Issues for physician to address:  None at this time     Number times ambulated in hallway past shift: 0    Number of times OOB to chair past shift: 0    Pain Management:  Current medication: See MAR  Patient states pain is manageable on current pain medication: YES    GI:    Current diet:  DIET NPO Except Meds, With Ice Chips  TPN ADULT - PERIPHERAL  DIET TUBE FEEDING    Tolerating current diet: YES  Passing flatus: YES  Last Bowel Movement: today   Appearance: loose, brown    Respiratory:    Head of bed elevated  Patient Safety:    Falls Score: 2  Bed Alarm On? Yes  Sitter?  Yes    Errol Sandoval

## 2020-08-09 NOTE — PROGRESS NOTES
Admit Date: 2020    POD 3 Day Post-Op    Procedure:  Procedure(s):  LAPAROTOMY EXPLORATORY RELEASE OF BOWEL OBSTRUCTION lysis of adhesions and release of internal hernia, enterectomy small bowel with primary enteroenterostomy anastomosis, and appendectomy, and repair of ventral abdomen hernia without mesh    Subjective:     Patient feeling better, minimal G tube output, urine output adequate, white blood cell count down  No abdominal pain          Review of Systems  Minimal abdominal pain, no nausea or vomiting, overall feels better   Objective:     Blood pressure 133/70, pulse 78, temperature 98.5 °F (36.9 °C), resp. rate 16, height 5' 4\" (1.626 m), weight 134 lb 14.7 oz (61.2 kg), SpO2 99 %, not currently breastfeeding.     Temp (24hrs), Av.8 °F (37.1 °C), Min:98.5 °F (36.9 °C), Max:99.2 °F (37.3 °C)          Physical Exam  Patient alert awake oriented minimal clear G tube output, heart regular rhythm abdomen soft incisions clean minimally tender    Labs:   Recent Results (from the past 24 hour(s))   CBC W/O DIFF    Collection Time: 20  3:01 AM   Result Value Ref Range    WBC 11.6 (H) 3.6 - 11.0 K/uL    RBC 4.48 3.80 - 5.20 M/uL    HGB 10.5 (L) 11.5 - 16.0 g/dL    HCT 32.2 (L) 35.0 - 47.0 %    MCV 71.9 (L) 80.0 - 99.0 FL    MCH 23.4 (L) 26.0 - 34.0 PG    MCHC 32.6 30.0 - 36.5 g/dL    RDW 21.3 (H) 11.5 - 14.5 %    PLATELET 716 (L) 904 - 400 K/uL    NRBC 0.0 0  WBC    ABSOLUTE NRBC 0.00 0.00 - 8.29 K/uL   METABOLIC PANEL, BASIC    Collection Time: 20  3:01 AM   Result Value Ref Range    Sodium 138 136 - 145 mmol/L    Potassium 4.4 3.5 - 5.1 mmol/L    Chloride 110 (H) 97 - 108 mmol/L    CO2 24 21 - 32 mmol/L    Anion gap 4 (L) 5 - 15 mmol/L    Glucose 88 65 - 100 mg/dL    BUN 18 6 - 20 MG/DL    Creatinine 0.35 (L) 0.55 - 1.02 MG/DL    BUN/Creatinine ratio 51 (H) 12 - 20      GFR est AA >60 >60 ml/min/1.73m2    GFR est non-AA >60 >60 ml/min/1.73m2    Calcium 8.2 (L) 8.5 - 10.1 MG/DL Data Review images and reports reviewed    Assessment:     Active Problems:    [facility] (7/28/2016)      HTN (hypertension) (7/28/2016)      Obstructive sleep apnea syndrome (1/17/2017)      SBO (small bowel obstruction) ([facility] (8/2/2020)        Plan/Recommendations/Medical Decision Making:     Continue present treatment     IV fluids PPN TPN renew    Pt was dependent on tube feeds prior to this event. Will not likely be able to take po. Will restart tube feeds at trophic rate now. Advance once return of bowel function. Continue PPN today. Physical therapy  Antihypertensive therapy restart preop    Out of bed     Internal medicine following as well      Lisa Kenny MD, [facility]

## 2020-08-09 NOTE — PROGRESS NOTES
Hospitalist Progress Note    NAME: Catracho Treviño   :  1955   MRN:  [id number]       Assessment / Plan: Active Problems:    Polymyositis ([facility] [address] (2016)       HTN (hypertension) (2016)       Obstructive sleep apnea syndrome (2017)       SBO (small bowel obstruction) ([facility] [address] (2020)     Accelerated hypertensin     Accelerated hypertension not POA  Likely due to missed p.o. blood pressure meds due to n.p.o. bowel rest status  NOW CONTROLLED  Continue adequate pain management as per primary surgical team  Continue IV fluids but DC ASAP once enteral feeding resumed  Continue nitro glycerin ointment 1 inch every 6 hours for now  c/w Clonidine to  0.2 mg patch  C/w Irbesartan and amlodipine  C/w IV steroid for polymyositis IV for now since pt is NPO  Continue home Protonix IV for now until patient n.p.o./bowel rest     Suspected right lower lobe pneumonia on CT imaging  Likely atelectasis POA  COVID test is negative  Incentive spirometer recommended  Leukocytosis trending down  On empiric IV antibiotics     Recurrent small bowel obstruction POA-secondary to adhesions as per surgery team     s/p LAPAROTOMY EXPLORATORY RELEASE OF BOWEL OBSTRUCTION lysis of adhesions and release of internal hernia, enterectomy small bowel with primary enteroenterostomy anastomosis, and appendectomy, and repair of ventral abdomen hernia without mesh  Npo for now, gastrostomy tube to gravity  Oral diet not resumed yet per surgery  Remains on TPN  Still absent Bowel sounds, no flatus or BM        Code Status: Full code as per patient's wishes  DVT Prophylaxis: As per primary surgical team       Subjective:     Chief Complaint / Reason for Physician Visit  \"Reports abdominal pain is improved  No BM yet, no flatus\". Patient has no other complaints. Discussed with RN events overnight.      Review of Systems:  Symptom Y/N Comments  Symptom Y/N Comments   Fever/Chills n   Chest Pain n    Poor Appetite n   Edema n    Cough n   Abdominal Pain n    Sputum n   Joint Pain n    SOB/LOREDO n   Pruritis/Rash n    Nausea/vomit n   Tolerating PT/OT na    Diarrhea n   Tolerating Diet na    Constipation y   Other       Could NOT obtain due to:      Objective:     VITALS:   Last 24hrs VS reviewed since prior progress note. Most recent are:  Patient Vitals for the past 24 hrs:   Temp Pulse Resp BP SpO2   08/09/20 0758 98.5 °F (36.9 °C) 78 16 133/70 99 %   08/09/20 0559 -- 91 -- 139/73 --   08/09/20 0303 98.8 °F (37.1 °C) 73 18 168/81 100 %   08/08/20 2315 98.6 °F (37 °C) 98 20 142/72 98 %   08/08/20 2019 99.2 °F (37.3 °C) 96 18 159/79 100 %   08/08/20 1515 98.8 °F (37.1 °C) 97 17 156/83 97 %   08/08/20 1202 98.9 °F (37.2 °C) 89 17 160/67 98 %       Intake/Output Summary (Last 24 hours) at 8/9/2020 0900  Last data filed at 8/9/2020 0659  Gross per 24 hour   Intake 3419.9 ml   Output 1000 ml   Net 2419.9 ml        PHYSICAL EXAM:  General: WD, WN. Alert, cooperative, no acute distress    EENT:  EOMI. Anicteric sclerae. MMM  Resp:  CTA bilaterally, no wheezing or rales. No accessory muscle use  CV:  Regular  rhythm,  No edema  GI:  Soft, Non distended, Non tender.  +Bowel sounds  Neurologic:  Alert and oriented X 3, normal speech,   Psych:   Good insight. Not anxious nor agitated  Skin:  No rashes. No jaundice    Reviewed most current lab test results and cultures  YES  Reviewed most current radiology test results   YES  Review and summation of old records today    NO  Reviewed patient's current orders and MAR    YES  PMH/SH reviewed - no change compared to H&P  ________________________________________________________________________  Care Plan discussed with:    Comments   Patient x    Family      RN x    Care Manager     Consultant                        Multidiciplinary team rounds were held today with , nursing, pharmacist and clinical coordinator.   Patient's plan of care was discussed; medications were reviewed and discharge planning was addressed. ________________________________________________________________________  Total NON critical care TIME:  34   Minutes    Total CRITICAL CARE TIME Spent:   Minutes non procedure based      Comments   >50% of visit spent in counseling and coordination of care x    ________________________________________________________________________  Casandra Kessler MD     Procedures: see electronic medical records for all procedures/Xrays and details which were not copied into this note but were reviewed prior to creation of Plan. LABS:  I reviewed today's most current labs and imaging studies.   Pertinent labs include:  Recent Labs     08/09/20  0301 08/08/20  0400 08/07/20  0427   WBC 11.6* 13.9* 18.0*   HGB 10.5* 10.9* 13.6   HCT 32.2* 34.4* 42.3   * 178 174     Recent Labs     08/09/20  0301 08/08/20  0400 08/07/20  0427    137 136   K 4.4 4.2 4.0   * 109* 104   CO2 24 25 30   GLU 88 89 189*   BUN 18 20 25*   CREA 0.35* 0.37* 0.59   CA 8.2* 8.1* 8.3*       Signed: Casandra Kessler MD

## 2020-08-09 NOTE — PROGRESS NOTES
Problem: Falls - Risk of  Goal: *Absence of Falls  Description: Document Grzegorz Navarro Fall Risk and appropriate interventions in the flowsheet. Outcome: Progressing Towards Goal  Note: Fall Risk Interventions:  Mobility Interventions: Communicate number of staff needed for ambulation/transfer         Medication Interventions: Evaluate medications/consider consulting pharmacy    Elimination Interventions: Call light in reach              Problem: Patient Education: Go to Patient Education Activity  Goal: Patient/Family Education  Outcome: Progressing Towards Goal     Problem: Pressure Injury - Risk of  Goal: *Prevention of pressure injury  Description: Document Armen Scale and appropriate interventions in the flowsheet.   Outcome: Progressing Towards Goal  Note: Pressure Injury Interventions:  Sensory Interventions: Assess changes in LOC    Moisture Interventions: Absorbent underpads    Activity Interventions: Increase time out of bed    Mobility Interventions: Assess need for specialty bed    Nutrition Interventions: Document food/fluid/supplement intake    Friction and Shear Interventions: Lift sheet                Problem: Patient Education: Go to Patient Education Activity  Goal: Patient/Family Education  Outcome: Progressing Towards Goal     Problem: Patient Education: Go to Patient Education Activity  Goal: Patient/Family Education  Outcome: Progressing Towards Goal     Problem: Patient Education: Go to Patient Education Activity  Goal: Patient/Family Education  Outcome: Progressing Towards Goal     Problem: Small Bowel Obstruction: Day 1  Goal: Off Pathway (Use only if patient is Off Pathway)  Outcome: Progressing Towards Goal  Goal: Activity/Safety  Outcome: Progressing Towards Goal  Goal: Consults, if ordered  Outcome: Progressing Towards Goal  Goal: Diagnostic Test/Procedures  Outcome: Progressing Towards Goal  Goal: Nutrition/Diet  Outcome: Progressing Towards Goal  Goal: Medications  Outcome: Progressing Towards Goal  Goal: Respiratory  Outcome: Progressing Towards Goal  Goal: Treatments/Interventions/Procedures  Outcome: Progressing Towards Goal  Goal: Psychosocial  Outcome: Progressing Towards Goal  Goal: *Optimal pain control at patient's stated goal  Outcome: Progressing Towards Goal  Goal: *Adequate urinary output (equal to or greater than 30 milliliters/hour)  Outcome: Progressing Towards Goal  Goal: *Hemodynamically stable  Outcome: Progressing Towards Goal  Goal: *Demonstrates progressive activity  Outcome: Progressing Towards Goal  Goal: *Absence of nausea/vomiting  Outcome: Progressing Towards Goal     Problem: Small Bowel Obstruction: Day 2  Goal: Off Pathway (Use only if patient is Off Pathway)  Outcome: Progressing Towards Goal  Goal: Activity/Safety  Outcome: Progressing Towards Goal  Goal: Consults, if ordered  Outcome: Progressing Towards Goal  Goal: Diagnostic Test/Procedures  Outcome: Progressing Towards Goal  Goal: Nutrition/Diet  Outcome: Progressing Towards Goal  Goal: Discharge Planning  Outcome: Progressing Towards Goal  Goal: Medications  Outcome: Progressing Towards Goal  Goal: Respiratory  Outcome: Progressing Towards Goal  Goal: Treatments/Interventions/Procedures  Outcome: Progressing Towards Goal  Goal: Psychosocial  Outcome: Progressing Towards Goal  Goal: *Optimal pain control at patient's stated goal  Outcome: Progressing Towards Goal  Goal: *Adequate urinary output (equal to or greater than 30 milliliters/hour)  Outcome: Progressing Towards Goal  Goal: *Hemodynamically stable  Outcome: Progressing Towards Goal  Goal: *Demonstrates progressive activity  Outcome: Progressing Towards Goal  Goal: *Absence of nausea/vomiting  Outcome: Progressing Towards Goal  Goal: *Return of normal bowel function  Outcome: Progressing Towards Goal     Problem: Small Bowel Obstruction: Day 3  Goal: Off Pathway (Use only if patient is Off Pathway)  Outcome: Progressing Towards Goal  Goal: Activity/Safety  Outcome: Progressing Towards Goal  Goal: Consults, if ordered  Outcome: Progressing Towards Goal  Goal: Diagnostic Test/Procedures  Outcome: Progressing Towards Goal  Goal: Nutrition/Diet  Outcome: Progressing Towards Goal  Goal: Discharge Planning  Outcome: Progressing Towards Goal  Goal: Medications  Outcome: Progressing Towards Goal  Goal: Respiratory  Outcome: Progressing Towards Goal  Goal: Treatments/Interventions/Procedures  Outcome: Progressing Towards Goal  Goal: Psychosocial  Outcome: Progressing Towards Goal  Goal: *Optimal pain control at patient's stated goal  Outcome: Progressing Towards Goal  Goal: *Adequate urinary output (equal to or greater than 30 milliliters/hour)  Outcome: Progressing Towards Goal  Goal: *Hemodynamically stable  Outcome: Progressing Towards Goal  Goal: *Adequate nutrition  Outcome: Progressing Towards Goal  Goal: *Demonstrates progressive activity  Outcome: Progressing Towards Goal  Goal: *Participates in discharge planning  Outcome: Progressing Towards Goal     Problem: Small Bowel Obstruction: Day 4 to Discharge  Goal: Off Pathway (Use only if patient is Off Pathway)  Outcome: Progressing Towards Goal  Goal: Activity/Safety  Outcome: Progressing Towards Goal  Goal: Nutrition/Diet  Outcome: Progressing Towards Goal  Goal: Discharge Planning  Outcome: Progressing Towards Goal  Goal: Medications  Outcome: Progressing Towards Goal  Goal: Respiratory  Outcome: Progressing Towards Goal  Goal: Treatments/Interventions/Procedures  Outcome: Progressing Towards Goal  Goal: Psychosocial  Outcome: Progressing Towards Goal     Problem: Small Bowel Obstruction - Non Surgical: Discharge Outcomes  Goal: *Hemodynamically stable  Outcome: Progressing Towards Goal  Goal: *Demonstrates independent activity or return to baseline  Outcome: Progressing Towards Goal  Goal: *Optimal pain control at patient's stated goal  Outcome: Progressing Towards Goal  Goal: *Verbalizes understanding and describes prescribed diet  Outcome: Progressing Towards Goal  Goal: *Tolerating diet  Outcome: Progressing Towards Goal  Goal: *Verbalizes name, dosage, time, side effects, and number of days to continue medications  Outcome: Progressing Towards Goal  Goal: *Anxiety reduced or absent  Outcome: Progressing Towards Goal  Goal: *Understands and describes signs and symptoms to report to providers(Stroke Metric)  Outcome: Progressing Towards Goal  Goal: *Describes follow-up/return visits to physicians  Outcome: Progressing Towards Goal  Goal: *Describes available resources and support systems  Outcome: Progressing Towards Goal  Goal: *Active bowel function  Outcome: Progressing Towards Goal

## 2020-08-10 LAB
ANION GAP SERPL CALC-SCNC: 4 MMOL/L (ref 5–15)
BUN SERPL-MCNC: 18 MG/DL (ref 6–20)
BUN/CREAT SERPL: 55 (ref 12–20)
CALCIUM SERPL-MCNC: 8.1 MG/DL (ref 8.5–10.1)
CHLORIDE SERPL-SCNC: 112 MMOL/L (ref 97–108)
CO2 SERPL-SCNC: 24 MMOL/L (ref 21–32)
CREAT SERPL-MCNC: 0.33 MG/DL (ref 0.55–1.02)
ERYTHROCYTE [DISTWIDTH] IN BLOOD BY AUTOMATED COUNT: 21.7 % (ref 11.5–14.5)
GLUCOSE BLD STRIP.AUTO-MCNC: 130 MG/DL (ref 65–100)
GLUCOSE SERPL-MCNC: 129 MG/DL (ref 65–100)
HCT VFR BLD AUTO: 32.4 % (ref 35–47)
HGB BLD-MCNC: 10.5 G/DL (ref 11.5–16)
MCH RBC QN AUTO: 23.4 PG (ref 26–34)
MCHC RBC AUTO-ENTMCNC: 32.4 G/DL (ref 30–36.5)
MCV RBC AUTO: 72.3 FL (ref 80–99)
NRBC # BLD: 0 K/UL (ref 0–0.01)
NRBC BLD-RTO: 0 PER 100 WBC
PLATELET # BLD AUTO: 163 K/UL (ref 150–400)
PMV BLD AUTO: ABNORMAL FL (ref 8.9–12.9)
POTASSIUM SERPL-SCNC: 4.2 MMOL/L (ref 3.5–5.1)
RBC # BLD AUTO: 4.48 M/UL (ref 3.8–5.2)
SERVICE CMNT-IMP: ABNORMAL
SODIUM SERPL-SCNC: 140 MMOL/L (ref 136–145)
WBC # BLD AUTO: 9.3 K/UL (ref 3.6–11)

## 2020-08-10 PROCEDURE — 74011250636 HC RX REV CODE- 250/636: Performed by: INTERNAL MEDICINE

## 2020-08-10 PROCEDURE — C1751 CATH, INF, PER/CENT/MIDLINE: HCPCS

## 2020-08-10 PROCEDURE — 74011000250 HC RX REV CODE- 250: Performed by: INTERNAL MEDICINE

## 2020-08-10 PROCEDURE — 74011250637 HC RX REV CODE- 250/637: Performed by: NURSE PRACTITIONER

## 2020-08-10 PROCEDURE — 82962 GLUCOSE BLOOD TEST: CPT

## 2020-08-10 PROCEDURE — 77030018798 HC PMP KT ENTRL FED COVD -A

## 2020-08-10 PROCEDURE — 77010033678 HC OXYGEN DAILY

## 2020-08-10 PROCEDURE — 97530 THERAPEUTIC ACTIVITIES: CPT

## 2020-08-10 PROCEDURE — 74011250637 HC RX REV CODE- 250/637: Performed by: INTERNAL MEDICINE

## 2020-08-10 PROCEDURE — 65270000029 HC RM PRIVATE

## 2020-08-10 PROCEDURE — 80048 BASIC METABOLIC PNL TOTAL CA: CPT

## 2020-08-10 PROCEDURE — 77030038269 HC DRN EXT URIN PURWCK BARD -A

## 2020-08-10 PROCEDURE — 36415 COLL VENOUS BLD VENIPUNCTURE: CPT

## 2020-08-10 PROCEDURE — C9113 INJ PANTOPRAZOLE SODIUM, VIA: HCPCS | Performed by: INTERNAL MEDICINE

## 2020-08-10 PROCEDURE — 85027 COMPLETE CBC AUTOMATED: CPT

## 2020-08-10 RX ADMIN — NITROGLYCERIN 1 INCH: 20 OINTMENT TOPICAL at 00:20

## 2020-08-10 RX ADMIN — AMLODIPINE BESYLATE 5 MG: 5 TABLET ORAL at 09:45

## 2020-08-10 RX ADMIN — Medication 10 ML: at 05:59

## 2020-08-10 RX ADMIN — NITROGLYCERIN 1 INCH: 20 OINTMENT TOPICAL at 13:10

## 2020-08-10 RX ADMIN — Medication 10 ML: at 22:15

## 2020-08-10 RX ADMIN — Medication 20 ML: at 11:31

## 2020-08-10 RX ADMIN — NITROGLYCERIN 1 INCH: 20 OINTMENT TOPICAL at 05:58

## 2020-08-10 RX ADMIN — DEXTROSE MONOHYDRATE, SODIUM CHLORIDE, AND POTASSIUM CHLORIDE 100 ML/HR: 50; 9; 1.49 INJECTION, SOLUTION INTRAVENOUS at 10:49

## 2020-08-10 RX ADMIN — NITROGLYCERIN 1 INCH: 20 OINTMENT TOPICAL at 18:42

## 2020-08-10 RX ADMIN — DEXTROSE MONOHYDRATE, SODIUM CHLORIDE, AND POTASSIUM CHLORIDE 100 ML/HR: 50; 9; 1.49 INJECTION, SOLUTION INTRAVENOUS at 20:42

## 2020-08-10 RX ADMIN — ENOXAPARIN SODIUM 40 MG: 40 INJECTION SUBCUTANEOUS at 15:06

## 2020-08-10 RX ADMIN — DEXTROSE MONOHYDRATE, SODIUM CHLORIDE, AND POTASSIUM CHLORIDE 100 ML/HR: 50; 9; 1.49 INJECTION, SOLUTION INTRAVENOUS at 00:20

## 2020-08-10 RX ADMIN — SODIUM CHLORIDE 40 MG: 9 INJECTION, SOLUTION INTRAMUSCULAR; INTRAVENOUS; SUBCUTANEOUS at 22:01

## 2020-08-10 RX ADMIN — Medication 1 AMPULE: at 22:01

## 2020-08-10 RX ADMIN — METHYLPREDNISOLONE SODIUM SUCCINATE 40 MG: 40 INJECTION, POWDER, FOR SOLUTION INTRAMUSCULAR; INTRAVENOUS at 09:46

## 2020-08-10 RX ADMIN — Medication 1 AMPULE: at 09:43

## 2020-08-10 RX ADMIN — SODIUM CHLORIDE 40 MG: 9 INJECTION, SOLUTION INTRAMUSCULAR; INTRAVENOUS; SUBCUTANEOUS at 09:44

## 2020-08-10 RX ADMIN — Medication 10 ML: at 15:07

## 2020-08-10 RX ADMIN — IRBESARTAN 300 MG: 150 TABLET, FILM COATED ORAL at 22:02

## 2020-08-10 NOTE — PROGRESS NOTES
Comprehensive Nutrition Assessment    Type and Reason for Visit: Reassess    Nutrition Recommendations/Plan:   Advance TF rate 10mL q 6h as tolerated to Goal Rate of 55mL/h + 30mL H2O flush q 8h (provides 1584kcals/73gPro/208gCHO/1172mL)     Once IVF's discontinued, recommend increasing H2O flushes to 75mL q 4h (provides 1532 free water)     Consider transition to bolus regimen once at rehab    Nutrition Assessment:   Chart reviewed, pt sitting up in bed awake and alert. Discussed need to increase TF towards goal rate this morning with Surgical NP Cj Wolfe. Pt reports she is tolerating trophic feeds well. She was on Osmolite 1.2 @ 50mL/h at rehab, but this was held for hours at a time and bumped up to 75mL/h after working with PT to make up lost volume. I explained that one option is that she could be transitioned to bolus feeds at rehab to better facilitate her therapy schedule and give her freedome from the pump but she seemed very hesitant. I clarified that we will certainly keep her on continuous feeds while here and slowly advance to monitor tolerance, which she was happy to hear. Will leave H2O flushes as is as she is on IVF's @ 100mL/h, provided goal flush recommendations once these are discontinued. PPN to be discontinued as well. Estimated Daily Nutrient Needs:  Energy (kcal):  1560 (MSJ 1201 x 1.3)  Protein (g):  73g (1.2 g/kg bw)       Fluid (ml/day):  1600mL    Nutrition Related Findings:  Meds: solumedrol, protonix, [email or web address].  8/10.       Wounds:    Surgical wound       Current Nutrition Therapies:   Current Tube Feeding (TF) Orders:   · Feeding Route: PEG  · Formula: Osmolite 1.2  · Schedule:Continuous    · Regimen: 20mL/h  · Additives/Modulars:    · Water Flushes: 30mL q 8h  · Current TF & Flush Orders Provides: 576kcals/27gPro/76gCHO/484mL  · Goal TF & Flush Orders Provides: 1584kcals/73gPro/208gCHO/1532mL    Current Parenteral Nutrition Orders:  · Type and Formula: D10, 4.25% · Lipids: None  · Duration: Continuous  · Rate/Volume: 42mL/h  · Current PN Order Provides: 514kcals/43gPro/100gDextrose  · Goal PN Orders Provides: 0      Anthropometric Measures:  · Height:  5' 4\" (162.6 cm)  · Current Body Wt:  61.2 kg (134 lb 14.7 oz)   · Ideal Body Wt:  120 lbs:  111.7 %   · BMI Category:  Normal weight (BMI 18.5-24. 9)       Nutrition Diagnosis:   · Altered GI function related to (SBO) as evidenced by (imaging, NPO, PPN/TPN)  Previous dx resolving. Nutrition Interventions:   Food and/or Nutrient Delivery: Modify tube feeding  Nutrition Education and Counseling: No recommendations at this time  Coordination of Nutrition Care: No recommendation at this time    Goals:  Pt will tolerate TF @ goal rate with residuals <500mL and wean off of PPN in 2-4 days.        Nutrition Monitoring and Evaluation:   Food/Nutrient Intake Outcomes: Enteral nutrition intake/tolerance  Physical Signs/Symptoms Outcomes: Biochemical data, Fluid status or edema, Weight, GI status    Discharge Planning:    Enteral nutrition     Electronically signed by Waldo Pearson RD, [address]  on 8/10/2020 at 10:53 AM    Contact: [phone]

## 2020-08-10 NOTE — PROGRESS NOTES
Admit Date: 2020    POD 4 Day Post-Op    Procedure:  Procedure(s):  LAPAROTOMY EXPLORATORY RELEASE OF BOWEL OBSTRUCTION lysis of adhesions and release of internal hernia, enterectomy small bowel with primary enteroenterostomy anastomosis, and appendectomy, and repair of ventral abdomen hernia without mesh    Subjective:     Patient feeling better, shivam tube feeds at 20, urine output adequate, white blood cell count down to 9.3  No abdominal pain          Review of Systems  Minimal abdominal pain, no nausea or vomiting, overall feels better   Objective:     Blood pressure 153/70, pulse 76, temperature 98.1 °F (36.7 °C), resp. rate 16, height 5' 4\" (1.626 m), weight 146 lb 14.4 oz (66.6 kg), SpO2 100 %, not currently breastfeeding.     Temp (24hrs), Av.1 °F (36.7 °C), Min:97.7 °F (36.5 °C), Max:98.4 °F (36.9 °C)          Physical Exam  Patient alert awake oriented minimal clear G tube output, heart regular rhythm abdomen soft incisions clean minimally tender    Labs:   Recent Results (from the past 24 hour(s))   CBC W/O DIFF    Collection Time: 08/10/20  3:41 AM   Result Value Ref Range    WBC 9.3 3.6 - 11.0 K/uL    RBC 4.48 3.80 - 5.20 M/uL    HGB 10.5 (L) 11.5 - 16.0 g/dL    HCT 32.4 (L) 35.0 - 47.0 %    MCV 72.3 (L) 80.0 - 99.0 FL    MCH 23.4 (L) 26.0 - 34.0 PG    MCHC 32.4 30.0 - 36.5 g/dL    RDW 21.7 (H) 11.5 - 14.5 %    PLATELET 339 578 - 837 K/uL    MPV ABNORMAL 8.9 - 12.9 FL    NRBC 0.0 0  WBC    ABSOLUTE NRBC 0.00 0.00 - 0.01 K/uL   GLUCOSE, POC    Collection Time: 08/10/20  5:35 AM   Result Value Ref Range    Glucose (POC) 130 (H) 65 - 100 mg/dL    Performed by Josephine Dudley    METABOLIC PANEL, BASIC    Collection Time: 08/10/20  5:40 AM   Result Value Ref Range    Sodium 140 136 - 145 mmol/L    Potassium 4.2 3.5 - 5.1 mmol/L    Chloride 112 (H) 97 - 108 mmol/L    CO2 24 21 - 32 mmol/L    Anion gap 4 (L) 5 - 15 mmol/L    Glucose 129 (H) 65 - 100 mg/dL    BUN 18 6 - 20 MG/DL    Creatinine 0.33 (L) 0.55 - 1.02 MG/DL    BUN/Creatinine ratio 55 (H) 12 - 20      GFR est AA >60 >60 ml/min/1.73m2    GFR est non-AA >60 >60 ml/min/1.73m2    Calcium 8.1 (L) 8.5 - 10.1 MG/DL       Data Review images and reports reviewed    Assessment:     Active Problems:    Polymyositis ([facility] (7/28/2016)      HTN (hypertension) (7/28/2016)      Obstructive sleep apnea syndrome (1/17/2017)      SBO (small bowel obstruction) ([facility] (8/2/2020)        Plan/Recommendations/Medical Decision Making:     Continue present treatment     Wean and d/c PPN    Pt was dependent on tube feeds prior to this event. Will not likely be able to take po. Advance tube feeds to goal.    Physical therapy  Antihypertensive therapy restart preop    Out of bed     Internal medicine following as well      Hudson Aliza Elias MD, [facility]

## 2020-08-10 NOTE — INTERDISCIPLINARY ROUNDS
Interdisciplinary Rounds were completed on 08/10/20 for this patient. Rounds included NP, nursing, clinical care leader, pharmacy, and case management. Plan of care discussed. See clinical pathway and/or care plan for interventions and desired outcomes.

## 2020-08-10 NOTE — PROGRESS NOTES
1300- Surgical dressing removed per order  General Surgery End of Shift Nursing Note    Bedside shift change report given to Kenisha RN and US Melgar, RN (oncoming nurse) by Abel Guerra RN (offgoing nurse). Report included the following information SBAR, Kardex and MAR. Shift worked:   [id number]   Summary of shift:    Pt has been resting in bed. She has had two large loose, mucous bowel movements. PT worked with pt this shift. Pt has not had any complaints. Issues for physician to address:   None at this time     Number times ambulated in hallway past shift: 0    Number of times OOB to chair past shift: 0    Pain Management:  Current medication: See MAR  Patient states pain is manageable on current pain medication: YES    GI:    Current diet:  DIET NPO  DIET TUBE FEEDING    Tolerating current diet: YES  Passing flatus: YES  Last Bowel Movement: today   Appearance: loose, mucous, brown    Respiratory:    Incentive Spirometer at bedside: YES  Head of bed elevated    Patient Safety:    Falls Score: 2  Bed Alarm On? No  Sitter?  No    Vera Camel

## 2020-08-10 NOTE — PROGRESS NOTES
Hospitalist Progress Note    NAME: Lovely Astorga   :  1955   MRN:  [id number]       Assessment / Plan:  Accelerated hypertension resolved we will continue patient's current regimen of medications. Polymyositis    SBO as per surgery  Suspected right lower lobe pneumonia on CT imaging CT scan imaging likely atelectasis COVID negative leukocytosis trending down patient on empiric antibiotics regimen. Patient status post laparotomy exploratory release of bowel obstruction lysis of adhesions    We will sign off as patient is stable medically.   / Body mass index is 25.22 kg/m². Code status:   Prophylaxis: As per surgery  Recommended Disposition: Possible home     Subjective:     Chief Complaint / Reason for Physician Visit   Discussed with RN events overnight. Patient seen and examined at bedside comfortable no events overnight. We were consulted because of blood pressure accelerated hypertension which has improved and at baseline. Review of Systems:  Symptom Y/N Comments  Symptom Y/N Comments   Fever/Chills    Chest Pain     Poor Appetite    Edema     Cough    Abdominal Pain     Sputum    Joint Pain     SOB/LOREDO    Pruritis/Rash     Nausea/vomit    Tolerating PT/OT     Diarrhea    Tolerating Diet     Constipation    Other       Could NOT obtain due to:      Objective:     VITALS:   Last 24hrs VS reviewed since prior progress note.  Most recent are:  Patient Vitals for the past 24 hrs:   Temp Pulse Resp BP SpO2   08/10/20 1106 97.9 °F (36.6 °C) 77 18 157/69 100 %   08/10/20 0713 98.1 °F (36.7 °C) 76 16 153/70 100 %   08/10/20 0336 97.7 °F (36.5 °C) 79 16 156/74 99 %   20 2340 97.9 °F (36.6 °C) 80 16 155/73 99 %   20 2014 98.3 °F (36.8 °C) 72 16 162/70 99 %   20 1554 98.4 °F (36.9 °C) 93 16 161/84 100 %   20 1258 98.2 °F (36.8 °C) 84 16 156/71 100 %       Intake/Output Summary (Last 24 hours) at 8/10/2020 1147  Last data filed at 8/10/2020 1007  Gross per 24 hour   Intake 3068.5 ml   Output 2450 ml   Net 618.5 ml        PHYSICAL EXAM:  General: WD, WN. Alert, cooperative, no acute distress    EENT:  EOMI. Anicteric sclerae. MMM  Resp:  CTA bilaterally, no wheezing or rales. No accessory muscle use  CV:  Regular  rhythm,  No edema  GI:  Soft, Non distended, Non tender.  +Bowel sounds  Neurologic:  Alert responding. Reviewed most current lab test results and cultures  YES  Reviewed most current radiology test results   YES  Review and summation of old records today    NO  Reviewed patient's current orders and MAR    YES  PMH/SH reviewed - no change compared to H&P  ________________________________________________________________________  Care Plan discussed with:    Comments   Patient     Family      RN     Care Manager     Consultant                        Multidiciplinary team rounds were held today with , nursing, pharmacist and clinical coordinator. Patient's plan of care was discussed; medications were reviewed and discharge planning was addressed. ________________________________________________________________________            Comments   >50% of visit spent in counseling and coordination of care     ________________________________________________________________________  Lester Blancas MD     Procedures: see electronic medical records for all procedures/Xrays and details which were not copied into this note but were reviewed prior to creation of Plan. LABS:  I reviewed today's most current labs and imaging studies.   Pertinent labs include:  Recent Labs     08/10/20  0341 08/09/20  0301 08/08/20  0400   WBC 9.3 11.6* 13.9*   HGB 10.5* 10.5* 10.9*   HCT 32.4* 32.2* 34.4*    142* 178     Recent Labs     08/10/20  0540 08/09/20  0301 08/08/20  0400    138 137   K 4.2 4.4 4.2   * 110* 109*   CO2 24 24 25   * 88 89   BUN 18 18 20   CREA 0.33* 0.35* 0.37*   CA 8.1* 8.2* 8.1*       Signed: Lester Blancas MD

## 2020-08-10 NOTE — PROGRESS NOTES
General Surgery End of Shift Nursing Note    Bedside shift change report given to Ochoa Arias RN (oncoming nurse) by Jennette Bernheim, RN (offgoing nurse). Report included the following information SBAR, Kardex, Intake/Output, MAR and Recent Results. Shift worked:   7p-7a   Summary of shift:   No complaints of pain this shift, PICC line care complete. Pt had large sticky dark brown BM that was semi formed this PM. BP remained stable with current med regimen. Pure wick putting out appropriate urine amount. Pure wick changed once. Issues for physician to address:   none     Number times ambulated in hallway past shift: 0    Number of times OOB to chair past shift: 0    Pain Management:  Current medication: tylenol, morphine  Patient states pain is manageable on current pain medication: YES    GI:    Current diet:  DIET NPO Except Meds, With Ice Chips  TPN ADULT - PERIPHERAL  DIET TUBE FEEDING    Tolerating current diet: YES  Passing flatus: YES  Last Bowel Movement: today   Appearance: semi formed, dark brown, sticky    Respiratory:    Incentive Spirometer at bedside: NO  Patient instructed on use: NO    Patient Safety:    Falls Score: 2  Bed Alarm On? Yes  Sitter?  No    Jacquie Lopez

## 2020-08-10 NOTE — PROGRESS NOTES
CHAN:    SNF Placement  COVID Negative Test  2nd IM Medicare Letter  Medical Transport        CM informed that pt has been accepted to [facility] at the time of d/c. CM will discuss pt's d/c with MD to determine pt's target d/c date. Pt will require COVID negative test prior to d/c and transitioning to snf. Pt will require 2nd IM Medicare Letter and medical transport.     MARTIN Brush, [address] [place] [address]

## 2020-08-10 NOTE — PROGRESS NOTES
Problem: Falls - Risk of  Goal: *Absence of Falls  Description: Document [place] Fall Risk and appropriate interventions in the flowsheet. Outcome: Progressing Towards Goal  Note: Fall Risk Interventions:  Mobility Interventions: Communicate number of staff needed for ambulation/transfer         Medication Interventions: Patient to call before getting OOB, Teach patient to arise slowly    Elimination Interventions: Call light in reach, Toileting schedule/hourly rounds, Patient to call for help with toileting needs              Problem: Patient Education: Go to Patient Education Activity  Goal: Patient/Family Education  Outcome: Progressing Towards Goal     Problem: Pressure Injury - Risk of  Goal: *Prevention of pressure injury  Description: Document Armen Scale and appropriate interventions in the flowsheet. Outcome: Progressing Towards Goal  Note: Pressure Injury Interventions:  Sensory Interventions: Assess changes in LOC, Keep linens dry and wrinkle-free, Minimize linen layers    Moisture Interventions: Absorbent underpads, Apply protective barrier, creams and emollients, Minimize layers    Activity Interventions: Assess need for specialty bed, Pressure redistribution bed/mattress(bed type)    Mobility Interventions: Assess need for specialty bed, HOB 30 degrees or less, Pressure redistribution bed/mattress (bed type), Turn and reposition approx.  every two hours(pillow and wedges)    Nutrition Interventions: Document food/fluid/supplement intake    Friction and Shear Interventions: Apply protective barrier, creams and emollients, Feet elevated on foot rest, HOB 30 degrees or less, Lift sheet, Lift team/patient mobility team                Problem: Patient Education: Go to Patient Education Activity  Goal: Patient/Family Education  Outcome: Progressing Towards Goal     Problem: Patient Education: Go to Patient Education Activity  Goal: Patient/Family Education  Outcome: Progressing Towards Goal     Problem: Patient Education: Go to Patient Education Activity  Goal: Patient/Family Education  Outcome: Progressing Towards Goal     Problem: Small Bowel Obstruction: Day 1  Goal: Off Pathway (Use only if patient is Off Pathway)  Outcome: Progressing Towards Goal  Goal: Activity/Safety  Outcome: Progressing Towards Goal  Goal: Consults, if ordered  Outcome: Progressing Towards Goal  Goal: Diagnostic Test/Procedures  Outcome: Progressing Towards Goal  Goal: Nutrition/Diet  Outcome: Progressing Towards Goal  Goal: Medications  Outcome: Progressing Towards Goal  Goal: Respiratory  Outcome: Progressing Towards Goal  Goal: Treatments/Interventions/Procedures  Outcome: Progressing Towards Goal  Goal: Psychosocial  Outcome: Progressing Towards Goal  Goal: *Optimal pain control at patient's stated goal  Outcome: Progressing Towards Goal  Goal: *Adequate urinary output (equal to or greater than 30 milliliters/hour)  Outcome: Progressing Towards Goal  Goal: *Hemodynamically stable  Outcome: Progressing Towards Goal  Goal: *Demonstrates progressive activity  Outcome: Progressing Towards Goal  Goal: *Absence of nausea/vomiting  Outcome: Progressing Towards Goal     Problem: Small Bowel Obstruction: Day 2  Goal: Off Pathway (Use only if patient is Off Pathway)  Outcome: Progressing Towards Goal  Goal: Activity/Safety  Outcome: Progressing Towards Goal  Goal: Consults, if ordered  Outcome: Progressing Towards Goal  Goal: Diagnostic Test/Procedures  Outcome: Progressing Towards Goal  Goal: Nutrition/Diet  Outcome: Progressing Towards Goal  Goal: Discharge Planning  Outcome: Progressing Towards Goal  Goal: Medications  Outcome: Progressing Towards Goal  Goal: Respiratory  Outcome: Progressing Towards Goal  Goal: Treatments/Interventions/Procedures  Outcome: Progressing Towards Goal  Goal: Psychosocial  Outcome: Progressing Towards Goal  Goal: *Optimal pain control at patient's stated goal  Outcome: Progressing Towards Goal  Goal: *Adequate urinary output (equal to or greater than 30 milliliters/hour)  Outcome: Progressing Towards Goal  Goal: *Hemodynamically stable  Outcome: Progressing Towards Goal  Goal: *Demonstrates progressive activity  Outcome: Progressing Towards Goal  Goal: *Absence of nausea/vomiting  Outcome: Progressing Towards Goal  Goal: *Return of normal bowel function  Outcome: Progressing Towards Goal     Problem: Small Bowel Obstruction: Day 3  Goal: Off Pathway (Use only if patient is Off Pathway)  Outcome: Progressing Towards Goal  Goal: Activity/Safety  Outcome: Progressing Towards Goal  Goal: Consults, if ordered  Outcome: Progressing Towards Goal  Goal: Diagnostic Test/Procedures  Outcome: Progressing Towards Goal  Goal: Nutrition/Diet  Outcome: Progressing Towards Goal  Goal: Discharge Planning  Outcome: Progressing Towards Goal  Goal: Medications  Outcome: Progressing Towards Goal  Goal: Respiratory  Outcome: Progressing Towards Goal  Goal: Treatments/Interventions/Procedures  Outcome: Progressing Towards Goal  Goal: Psychosocial  Outcome: Progressing Towards Goal  Goal: *Optimal pain control at patient's stated goal  Outcome: Progressing Towards Goal  Goal: *Adequate urinary output (equal to or greater than 30 milliliters/hour)  Outcome: Progressing Towards Goal  Goal: *Hemodynamically stable  Outcome: Progressing Towards Goal  Goal: *Adequate nutrition  Outcome: Progressing Towards Goal  Goal: *Demonstrates progressive activity  Outcome: Progressing Towards Goal  Goal: *Participates in discharge planning  Outcome: Progressing Towards Goal     Problem: Small Bowel Obstruction: Day 4 to Discharge  Goal: Off Pathway (Use only if patient is Off Pathway)  Outcome: Progressing Towards Goal  Goal: Activity/Safety  Outcome: Progressing Towards Goal  Goal: Nutrition/Diet  Outcome: Progressing Towards Goal  Goal: Discharge Planning  Outcome: Progressing Towards Goal  Goal: Medications  Outcome: Progressing Towards Goal  Goal: Respiratory  Outcome: Progressing Towards Goal  Goal: Treatments/Interventions/Procedures  Outcome: Progressing Towards Goal  Goal: Psychosocial  Outcome: Progressing Towards Goal     Problem: Small Bowel Obstruction - Non Surgical: Discharge Outcomes  Goal: *Hemodynamically stable  Outcome: Progressing Towards Goal  Goal: *Demonstrates independent activity or return to baseline  Outcome: Progressing Towards Goal  Goal: *Optimal pain control at patient's stated goal  Outcome: Progressing Towards Goal  Goal: *Verbalizes understanding and describes prescribed diet  Outcome: Progressing Towards Goal  Goal: *Tolerating diet  Outcome: Progressing Towards Goal  Goal: *Verbalizes name, dosage, time, side effects, and number of days to continue medications  Outcome: Progressing Towards Goal  Goal: *Anxiety reduced or absent  Outcome: Progressing Towards Goal  Goal: *Understands and describes signs and symptoms to report to providers(Stroke Metric)  Outcome: Progressing Towards Goal  Goal: *Describes follow-up/return visits to physicians  Outcome: Progressing Towards Goal  Goal: *Describes available resources and support systems  Outcome: Progressing Towards Goal  Goal: *Active bowel function  Outcome: Progressing Towards Goal

## 2020-08-10 NOTE — PROGRESS NOTES
Problem: Mobility Impaired (Adult and Pediatric)  Goal: *Acute Goals and Plan of Care (Insert Text)  Description: FUNCTIONAL STATUS PRIOR TO ADMISSION: Pt reports she was receiving PT, OT and ST services at [facility]. She reports being able to sit eob a little on her own, but required assistance to achieve this position and also required assistance to roll in bed. HOME SUPPORT PRIOR TO ADMISSION:  pt resides in a SNF with plan to transition to LTC    Physical Therapy Goals  Initiated 8/5/2020  1. Patient will roll side to side in bed with maximal assistance within 7 day(s). 2.  Patient will move supine to sit and sit to supine with maximal assistance within 7 days. 3.  Patient will scoot up, down, and laterally in bed, as well as scoot to the eob in sitting with maximal assistance within 7 days. 4.  Patient will sit eob unsupported x 5 min, while performing LE exercises within 7 days  Outcome: Progressing Towards Goal   PHYSICAL THERAPY TREATMENT  Patient: Hal Jones (33 y.o. female)  Date: 8/10/2020  Diagnosis: SBO (small bowel obstruction) ([facility] [address] [K56.609]   <principal problem not specified>  Procedure(s) (LRB):  LAPAROTOMY EXPLORATORY RELEASE OF BOWEL OBSTRUCTION lysis of adhesions and release of internal hernia, enterectomy small bowel with primary enteroenterostomy anastomosis, and appendectomy, and repair of ventral abdomen hernia without mesh (N/A) 4 Days Post-Op  Precautions:    Chart, physical therapy assessment, plan of care and goals were reviewed. ASSESSMENT  Patient continues with skilled PT services and is progressing towards goals. Patient is requires total A for supine<>sit. She requires constant support in order to maintain sitting balance. Once seated EOB patient requires physical assistance to extend cervical spine and is unable to maintain head control for entirety of sitting.  She performs leaning R and L on to forearms for improved shoulder girdle strengthening. Patient is returned to supine and HOB is elevated to 90* in order to complete ADLs. She is provided bilateral elbow support in order to complete toothbrushing. Current Level of Function Impacting Discharge (mobility/balance): Total A     Other factors to consider for discharge: medical stability, decreased strength, decreased sitting balance, need for increased assistance         PLAN :  Patient continues to benefit from skilled intervention to address the above impairments. Continue treatment per established plan of care. to address goals. Recommendation for discharge: (in order for the patient to meet his/her long term goals)  Therapy up to 5 days/week in SNF setting to LTC    This discharge recommendation:  Has been made in collaboration with the attending provider and/or case management    IF patient discharges home will need the following DME: to be determined (TBD)       SUBJECTIVE:   Patient stated I need help with everything.     OBJECTIVE DATA SUMMARY:   Critical Behavior:  Neurologic State: Appropriate for age, Alert  Orientation Level: Oriented X4        Functional Mobility Training:  Bed Mobility:  Rolling: Total assistance  Supine to Sit: Total assistance  Sit to Supine: Total assistance  Scooting: Total assistance        Transfers:                                   Balance:  Sitting: Impaired  Sitting - Static: Poor (constant support)  Sitting - Dynamic: Not tested  Ambulation/Gait Training:                                                        Stairs: Therapeutic Exercises:     Pain Rating:      Activity Tolerance:   Fair  Please refer to the flowsheet for vital signs taken during this treatment. After treatment patient left in no apparent distress:   Supine in bed, Call bell within reach, and Side rails x 3    COMMUNICATION/COLLABORATION:   The patients plan of care was discussed with: Registered nurse and Rehabilitation technician.      Charlie Cruz, PT Time Calculation: 34 mins

## 2020-08-11 LAB
HEALTH STATUS, XMCV2T: NORMAL
SARS-COV-2, COV2: NOT DETECTED
SOURCE, COVRS: NORMAL
SPECIMEN SOURCE, FCOV2M: NORMAL
SPECIMEN TYPE, XMCV1T: NORMAL

## 2020-08-11 PROCEDURE — 74011250637 HC RX REV CODE- 250/637: Performed by: NURSE PRACTITIONER

## 2020-08-11 PROCEDURE — 77030038269 HC DRN EXT URIN PURWCK BARD -A

## 2020-08-11 PROCEDURE — 74011250636 HC RX REV CODE- 250/636: Performed by: INTERNAL MEDICINE

## 2020-08-11 PROCEDURE — 65270000029 HC RM PRIVATE

## 2020-08-11 PROCEDURE — 74011250637 HC RX REV CODE- 250/637: Performed by: INTERNAL MEDICINE

## 2020-08-11 PROCEDURE — 74011000250 HC RX REV CODE- 250: Performed by: INTERNAL MEDICINE

## 2020-08-11 PROCEDURE — C9113 INJ PANTOPRAZOLE SODIUM, VIA: HCPCS | Performed by: INTERNAL MEDICINE

## 2020-08-11 PROCEDURE — 87635 SARS-COV-2 COVID-19 AMP PRB: CPT

## 2020-08-11 RX ADMIN — ENOXAPARIN SODIUM 40 MG: 40 INJECTION SUBCUTANEOUS at 15:19

## 2020-08-11 RX ADMIN — Medication 10 ML: at 15:20

## 2020-08-11 RX ADMIN — SODIUM CHLORIDE 40 MG: 9 INJECTION, SOLUTION INTRAMUSCULAR; INTRAVENOUS; SUBCUTANEOUS at 09:50

## 2020-08-11 RX ADMIN — IRBESARTAN 300 MG: 150 TABLET, FILM COATED ORAL at 21:20

## 2020-08-11 RX ADMIN — AMLODIPINE BESYLATE 5 MG: 5 TABLET ORAL at 09:50

## 2020-08-11 RX ADMIN — NITROGLYCERIN 1 INCH: 20 OINTMENT TOPICAL at 18:25

## 2020-08-11 RX ADMIN — SODIUM CHLORIDE 40 MG: 9 INJECTION, SOLUTION INTRAMUSCULAR; INTRAVENOUS; SUBCUTANEOUS at 21:20

## 2020-08-11 RX ADMIN — DEXTROSE MONOHYDRATE, SODIUM CHLORIDE, AND POTASSIUM CHLORIDE 100 ML/HR: 50; 9; 1.49 INJECTION, SOLUTION INTRAVENOUS at 07:10

## 2020-08-11 RX ADMIN — Medication 10 ML: at 06:04

## 2020-08-11 RX ADMIN — Medication 1 AMPULE: at 09:55

## 2020-08-11 RX ADMIN — Medication 10 ML: at 21:24

## 2020-08-11 RX ADMIN — NITROGLYCERIN 1 INCH: 20 OINTMENT TOPICAL at 06:00

## 2020-08-11 RX ADMIN — DEXTROSE MONOHYDRATE, SODIUM CHLORIDE, AND POTASSIUM CHLORIDE 100 ML/HR: 50; 9; 1.49 INJECTION, SOLUTION INTRAVENOUS at 18:41

## 2020-08-11 RX ADMIN — METHYLPREDNISOLONE SODIUM SUCCINATE 40 MG: 40 INJECTION, POWDER, FOR SOLUTION INTRAMUSCULAR; INTRAVENOUS at 09:50

## 2020-08-11 RX ADMIN — NITROGLYCERIN 1 INCH: 20 OINTMENT TOPICAL at 13:01

## 2020-08-11 RX ADMIN — NITROGLYCERIN 1 INCH: 20 OINTMENT TOPICAL at 00:29

## 2020-08-11 RX ADMIN — Medication 1 AMPULE: at 21:20

## 2020-08-11 NOTE — DISCHARGE INSTRUCTIONS
Bowel Resection: What to Expect at [address] [place] [address] Drive are likely to have pain that comes and goes for the next few days after bowel surgery. You may have bowel cramps, and your cut (incision) may hurt. You may also feel like you have the flu. You may have a low fever and feel tired and nauseated. This is common. You should feel better after a week and will probably be back to normal in 2 to 3 weeks. This care sheet gives you a general idea about how long it will take for you to recover. But each person recovers at a different pace. Follow the steps below to get better as quickly as possible. How can you care for yourself at home? Activity  · Rest when you feel tired. Getting enough sleep will help you recover. · Try to walk each day. Start by walking a little more than you did the day before. Bit by bit, increase the amount you walk. Walking boosts blood flow and helps prevent pneumonia and constipation. · Avoid strenuous activities, such as biking, jogging, weight lifting, or aerobic exercise, until your doctor says it is okay. · Ask your doctor when you can drive again. · You will probably need to take 3 to 4 weeks off from work. It depends on the type of work you do and how you feel. You may need to take off 4 to 6 weeks if you lift heavy objects in your job. · You may shower 24 to 48 hours after surgery, if your doctor says it is okay. Pat the cut (incision) dry. Do not take a bath for the first 2 weeks, or until your doctor tells you it is okay. · Ask your doctor when it is okay for you to have sex. Diet  · You may not have much appetite after the surgery. But try to eat a healthy diet. Your doctor will tell you about any foods you should not eat. · Eat a low-fiber diet for several weeks after surgery. Eat many small meals throughout the day. Add high-fiber foods a little at a time. · Eat yogurt. It puts good bacteria into your colon and helps prevent diarrhea.   · Try to avoid nuts, seeds, and corn for a while. They may be hard to digest.  · You may need to take vitamins that contain sodium and potassium. Ask your doctor. · Drink plenty of fluids to avoid becoming dehydrated. Medicines  · Your doctor will tell you if and when you can restart your medicines. He or she will also give you instructions about taking any new medicines. · If you take blood thinners, such as warfarin (Coumadin), clopidogrel (Plavix), or aspirin, be sure to talk to your doctor. He or she will tell you if and when to start taking those medicines again. Make sure that you understand exactly what your doctor wants you to do. · Take pain medicines exactly as directed. ¨ If the doctor gave you a prescription medicine for pain, take it as prescribed. ¨ If you are not taking a prescription pain medicine, ask your doctor if you can take an over-the-counter medicine. ¨ Do not take two or more pain medicines at the same time unless the doctor told you to. Many pain medicines have acetaminophen, which is Tylenol. Too much acetaminophen (Tylenol) can be harmful. · If you think your pain medicine is making you sick to your stomach:  ¨ Take your medicine after meals (unless your doctor tells you not to). ¨ Ask your doctor for a different pain medicine. · If your doctor prescribed antibiotics, take them as directed. Do not stop taking them just because you feel better. You need to take the full course of antibiotics. · You may need to take some medicines in a different form. You will be told whether to crush pills or take a liquid form of the medicine. · If your doctor gives you a stool softener, take it as directed. Incision care  · If you have strips of tape on the incision, leave the tape on for a week or until it falls off. · Wash the area daily with warm, soapy water, and pat it dry. Follow-up care is a key part of your treatment and safety.  Be sure to make and go to all appointments, and call your doctor if you are having problems. It's also a good idea to know your test results and keep a list of the medicines you take. When should you call for help? Call 911 anytime you think you may need emergency care. For example, call if:  · You passed out (lost consciousness). · You have sudden chest pain and shortness of breath, or you cough up blood. · You have severe pain in your belly. Call your doctor now or seek immediate medical care if:  · You are sick to your stomach and cannot drink fluids or keep them down. · You have signs of a blood clot, such as:  ¨ Pain in your calf, back of the knee, thigh, or groin. ¨ Redness and swelling in your leg or groin. · You have a lot of diarrhea that smells very bad. · You have trouble passing urine or stool, especially if you have mild pain or swelling in your lower belly. · You have signs of infection, such as:  ¨ Increased pain, swelling, warmth, or redness. ¨ Red streaks leading from the incision. ¨ Pus draining from the incision. ¨ A fever. · You have pain that does not get better after you take pain medicine. · You have loose stitches, or your incision comes open. · You are bleeding or have new drainage from the incision. Watch closely for any changes in your health, and be sure to contact your doctor if:  · You do not have a bowel movement after taking a laxative. · You do not get better as expected. Where can you learn more? Go to http://www.gray.com/. Enter 004 [id number] in the search box to learn more about \"Open Bowel Resection: What to Expect at Home. \"  Current as of: August 9, 2016  Content Version: 11.3  © 1218-7700 [employer]. Care instructions adapted under license by [employer] (which disclaims liability or warranty for this information).  If you have questions about a medical condition or this instruction, always ask your healthcare professional. [address]  any warranty or liability for your use of this information.

## 2020-08-11 NOTE — PROGRESS NOTES
Problem: Falls - Risk of  Goal: *Absence of Falls  Description: Document Idania Corbett Fall Risk and appropriate interventions in the flowsheet. Outcome: Progressing Towards Goal  Note: Fall Risk Interventions:  Mobility Interventions: Communicate number of staff needed for ambulation/transfer, PT Consult for mobility concerns, Utilize walker, cane, or other assistive device         Medication Interventions: Teach patient to arise slowly    Elimination Interventions: Call light in reach, Patient to call for help with toileting needs, Toileting schedule/hourly rounds              Problem: Patient Education: Go to Patient Education Activity  Goal: Patient/Family Education  Outcome: Progressing Towards Goal     Problem: Pressure Injury - Risk of  Goal: *Prevention of pressure injury  Description: Document Armen Scale and appropriate interventions in the flowsheet. Outcome: Progressing Towards Goal  Note: Pressure Injury Interventions:  Sensory Interventions: Assess changes in LOC, Float heels, Keep linens dry and wrinkle-free, Minimize linen layers, Pressure redistribution bed/mattress (bed type), Turn and reposition approx. every two hours (pillows and wedges if needed)    Moisture Interventions: Absorbent underpads, Apply protective barrier, creams and emollients, Offer toileting Q_hr, Check for incontinence Q2 hours and as needed    Activity Interventions: Pressure redistribution bed/mattress(bed type)    Mobility Interventions: Float heels, HOB 30 degrees or less, Pressure redistribution bed/mattress (bed type), Turn and reposition approx.  every two hours(pillow and wedges)    Nutrition Interventions: (g tube feeds)    Friction and Shear Interventions: Apply protective barrier, creams and emollients, Feet elevated on foot rest, HOB 30 degrees or less, Lift team/patient mobility team, Lift sheet                Problem: Patient Education: Go to Patient Education Activity  Goal: Patient/Family Education  Outcome: Progressing Towards Goal     Problem: Patient Education: Go to Patient Education Activity  Goal: Patient/Family Education  Outcome: Progressing Towards Goal     Problem: Patient Education: Go to Patient Education Activity  Goal: Patient/Family Education  Outcome: Progressing Towards Goal     Problem: Small Bowel Obstruction: Day 1  Goal: Off Pathway (Use only if patient is Off Pathway)  Outcome: Progressing Towards Goal  Goal: Activity/Safety  Outcome: Progressing Towards Goal  Goal: Consults, if ordered  Outcome: Progressing Towards Goal  Goal: Diagnostic Test/Procedures  Outcome: Progressing Towards Goal  Goal: Nutrition/Diet  Outcome: Progressing Towards Goal  Goal: Medications  Outcome: Progressing Towards Goal  Goal: Respiratory  Outcome: Progressing Towards Goal  Goal: Treatments/Interventions/Procedures  Outcome: Progressing Towards Goal  Goal: Psychosocial  Outcome: Progressing Towards Goal  Goal: *Optimal pain control at patient's stated goal  Outcome: Progressing Towards Goal  Goal: *Adequate urinary output (equal to or greater than 30 milliliters/hour)  Outcome: Progressing Towards Goal  Goal: *Hemodynamically stable  Outcome: Progressing Towards Goal  Goal: *Demonstrates progressive activity  Outcome: Progressing Towards Goal  Goal: *Absence of nausea/vomiting  Outcome: Progressing Towards Goal     Problem: Small Bowel Obstruction: Day 2  Goal: Off Pathway (Use only if patient is Off Pathway)  Outcome: Progressing Towards Goal  Goal: Activity/Safety  Outcome: Progressing Towards Goal  Goal: Consults, if ordered  Outcome: Progressing Towards Goal  Goal: Diagnostic Test/Procedures  Outcome: Progressing Towards Goal  Goal: Nutrition/Diet  Outcome: Progressing Towards Goal  Goal: Discharge Planning  Outcome: Progressing Towards Goal  Goal: Medications  Outcome: Progressing Towards Goal  Goal: Respiratory  Outcome: Progressing Towards Goal  Goal: Treatments/Interventions/Procedures  Outcome: Progressing Towards Goal  Goal: Psychosocial  Outcome: Progressing Towards Goal  Goal: *Optimal pain control at patient's stated goal  Outcome: Progressing Towards Goal  Goal: *Adequate urinary output (equal to or greater than 30 milliliters/hour)  Outcome: Progressing Towards Goal  Goal: *Hemodynamically stable  Outcome: Progressing Towards Goal  Goal: *Demonstrates progressive activity  Outcome: Progressing Towards Goal  Goal: *Absence of nausea/vomiting  Outcome: Progressing Towards Goal  Goal: *Return of normal bowel function  Outcome: Progressing Towards Goal     Problem: Small Bowel Obstruction: Day 3  Goal: Off Pathway (Use only if patient is Off Pathway)  Outcome: Progressing Towards Goal  Goal: Activity/Safety  Outcome: Progressing Towards Goal  Goal: Consults, if ordered  Outcome: Progressing Towards Goal  Goal: Diagnostic Test/Procedures  Outcome: Progressing Towards Goal  Goal: Nutrition/Diet  Outcome: Progressing Towards Goal  Goal: Discharge Planning  Outcome: Progressing Towards Goal  Goal: Medications  Outcome: Progressing Towards Goal  Goal: Respiratory  Outcome: Progressing Towards Goal  Goal: Treatments/Interventions/Procedures  Outcome: Progressing Towards Goal  Goal: Psychosocial  Outcome: Progressing Towards Goal  Goal: *Optimal pain control at patient's stated goal  Outcome: Progressing Towards Goal  Goal: *Adequate urinary output (equal to or greater than 30 milliliters/hour)  Outcome: Progressing Towards Goal  Goal: *Hemodynamically stable  Outcome: Progressing Towards Goal  Goal: *Adequate nutrition  Outcome: Progressing Towards Goal  Goal: *Demonstrates progressive activity  Outcome: Progressing Towards Goal  Goal: *Participates in discharge planning  Outcome: Progressing Towards Goal     Problem: Small Bowel Obstruction: Day 4 to Discharge  Goal: Off Pathway (Use only if patient is Off Pathway)  Outcome: Progressing Towards Goal  Goal: Activity/Safety  Outcome: Progressing Towards Goal  Goal: Nutrition/Diet  Outcome: Progressing Towards Goal  Goal: Discharge Planning  Outcome: Progressing Towards Goal  Goal: Medications  Outcome: Progressing Towards Goal  Goal: Respiratory  Outcome: Progressing Towards Goal  Goal: Treatments/Interventions/Procedures  Outcome: Progressing Towards Goal  Goal: Psychosocial  Outcome: Progressing Towards Goal     Problem: Small Bowel Obstruction - Non Surgical: Discharge Outcomes  Goal: *Hemodynamically stable  Outcome: Progressing Towards Goal  Goal: *Demonstrates independent activity or return to baseline  Outcome: Progressing Towards Goal  Goal: *Optimal pain control at patient's stated goal  Outcome: Progressing Towards Goal  Goal: *Verbalizes understanding and describes prescribed diet  Outcome: Progressing Towards Goal  Goal: *Tolerating diet  Outcome: Progressing Towards Goal  Goal: *Verbalizes name, dosage, time, side effects, and number of days to continue medications  Outcome: Progressing Towards Goal  Goal: *Anxiety reduced or absent  Outcome: Progressing Towards Goal  Goal: *Understands and describes signs and symptoms to report to providers(Stroke Metric)  Outcome: Progressing Towards Goal  Goal: *Describes follow-up/return visits to physicians  Outcome: Progressing Towards Goal  Goal: *Describes available resources and support systems  Outcome: Progressing Towards Goal  Goal: *Active bowel function  Outcome: Progressing Towards Goal

## 2020-08-11 NOTE — PROGRESS NOTES
General Surgery End of Shift Nursing Note    Bedside shift change report given to Sedrick Claude RN and Enma RN (oncoming nurse) by Austin Hardy RN (offgoing nurse). Report included the following information SBAR, Kardex, Intake/Output, MAR and Recent Results. Shift worked:   7p-7a   Summary of shift:   No bM this shift, urine output appropriate via ext cath. Tube feeds advanced per orders to 50ml/hr. Pt c/o some stomach upset when initial advances are made. PRN zofran in MAR. No significant events overnight   Issues for physician to address:   None     Number times ambulated in hallway past shift: 0    Number of times OOB to chair past shift: 0    Pain Management:  Current medication: See MAR  Patient states pain is manageable on current pain medication: YES    GI:    Current diet:  DIET NPO  DIET TUBE FEEDING    Tolerating current diet: YES  Passing flatus: YES  Last Bowel Movement: yesterday   Appearance: loose, brown    Respiratory:    Incentive Spirometer at bedside: YES  Head of bed elevated      Patient Safety:    Falls Score: 2  Bed Alarm On? No  Sitter?  No    Edmonia Daily

## 2020-08-11 NOTE — PROGRESS NOTES
Problem: Falls - Risk of  Goal: *Absence of Falls  Description: Document Taylor Bridges Fall Risk and appropriate interventions in the flowsheet. 8/11/2020 1801 by James Abraham  Outcome: Progressing Towards Goal  Note: Fall Risk Interventions:  Mobility Interventions: Communicate number of staff needed for ambulation/transfer         Medication Interventions: Teach patient to arise slowly    Elimination Interventions: Call light in reach, Patient to call for help with toileting needs, Toileting schedule/hourly rounds           8/11/2020 1758 by James Abraham  Note: Fall Risk Interventions:  Mobility Interventions: Communicate number of staff needed for ambulation/transfer         Medication Interventions: Teach patient to arise slowly    Elimination Interventions: Call light in reach, Patient to call for help with toileting needs, Toileting schedule/hourly rounds           8/11/2020 1354 by James Abraham  Outcome: Progressing Towards Goal  Note: Fall Risk Interventions:  Mobility Interventions: Communicate number of staff needed for ambulation/transfer         Medication Interventions: Patient to call before getting OOB    Elimination Interventions: Call light in reach, Patient to call for help with toileting needs, Toileting schedule/hourly rounds              Problem: Patient Education: Go to Patient Education Activity  Goal: Patient/Family Education  Outcome: Progressing Towards Goal     Problem: Pressure Injury - Risk of  Goal: *Prevention of pressure injury  Description: Document Armen Scale and appropriate interventions in the flowsheet.   8/11/2020 1801 by James Abraham  Outcome: Progressing Towards Goal  Note: Pressure Injury Interventions:  Sensory Interventions: Assess changes in LOC, Assess need for specialty bed, Float heels, Keep linens dry and wrinkle-free    Moisture Interventions: Absorbent underpads, Apply protective barrier, creams and emollients, Check for incontinence Q2 hours and as needed    Activity Interventions: Pressure redistribution bed/mattress(bed type)    Mobility Interventions: Float heels, HOB 30 degrees or less, Pressure redistribution bed/mattress (bed type)    Nutrition Interventions: (g tube feed)    Friction and Shear Interventions: Apply protective barrier, creams and emollients, HOB 30 degrees or less             8/11/2020 1354 by Hortencia Hackett  Outcome: Progressing Towards Goal  Note: Pressure Injury Interventions:  Sensory Interventions: Assess changes in LOC, Float heels, Keep linens dry and wrinkle-free, Pressure redistribution bed/mattress (bed type)    Moisture Interventions: Absorbent underpads, Apply protective barrier, creams and emollients, Offer toileting Q_hr, Check for incontinence Q2 hours and as needed    Activity Interventions: Pressure redistribution bed/mattress(bed type)    Mobility Interventions: Float heels, HOB 30 degrees or less, Turn and reposition approx.  every two hours(pillow and wedges), Pressure redistribution bed/mattress (bed type)    Nutrition Interventions: (g tube feedings)    Friction and Shear Interventions: Apply protective barrier, creams and emollients

## 2020-08-11 NOTE — PROGRESS NOTES
General Surgery End of Shift Nursing Note    Bedside shift change report given to Remy (oncoming nurse) by Uday Montanez (offgoing nurse). Report included the following information SBAR, Kardex, Intake/Output, MAR and Recent Results. Shift worked:   7a-7p   Summary of shift:    Uneventful shift. Pt complained of no pain. Pt had a COVID test today and waiting for results for possible discharge tomorrow. Pt reached her goal for her tube feeding at 55mL/hr and has tolerated it well. Issues for physician to address:   none     Number times ambulated in hallway past shift: 0    Number of times OOB to chair past shift: 0    Pain Management:  Current medication: complains of no pain  Patient states pain is manageable on current pain medication: YES    GI:    Current diet:  DIET NPO  DIET TUBE FEEDING    Tolerating current diet: YES  Passing flatus: YES  Last Bowel Movement: yesterday   Appearance: loose and brown    Respiratory:    Incentive Spirometer at bedside: YES  Patient instructed on use: YES    Patient Safety:    Falls Score: 2  Bed Alarm On? No  Sitter?  No    Mary Levi

## 2020-08-11 NOTE — PROGRESS NOTES
CHAN:    SNF Placement  COVID test  2nd IM Medicare   Medical Transport      CM informed that pt is unable to go to snf: [facility] at this time due to in [facility] reasons. CM as asked if pt is willing to go to [facility] other facilities. CM completed room visit to speak with pt about other facilites. Pt agreeable to going to facility, however, pt will like for CM to speak with her cousin for updates. CM spoke with pt's cousin and she is agreeable with pt going to [address] [place] [address] at the time of d/c. CM will staff case with NP/MD to verify if pt will be on TPN at the time of d/c and if so, when will be her end date of TPN.     Slim Garcia, MSW, [address] [place] [address]

## 2020-08-12 VITALS
HEIGHT: 64 IN | HEART RATE: 79 BPM | DIASTOLIC BLOOD PRESSURE: 61 MMHG | WEIGHT: 146.9 LBS | SYSTOLIC BLOOD PRESSURE: 125 MMHG | OXYGEN SATURATION: 100 % | TEMPERATURE: 98.7 F | RESPIRATION RATE: 18 BRPM | BODY MASS INDEX: 25.08 KG/M2

## 2020-08-12 LAB
APPEARANCE UR: CLEAR
BACTERIA URNS QL MICRO: NEGATIVE /HPF
BILIRUB UR QL: NEGATIVE
COLOR UR: ABNORMAL
EPITH CASTS URNS QL MICRO: ABNORMAL /LPF
GLUCOSE UR STRIP.AUTO-MCNC: NEGATIVE MG/DL
HGB UR QL STRIP: ABNORMAL
HYALINE CASTS URNS QL MICRO: ABNORMAL /LPF (ref 0–5)
KETONES UR QL STRIP.AUTO: NEGATIVE MG/DL
LEUKOCYTE ESTERASE UR QL STRIP.AUTO: NEGATIVE
NITRITE UR QL STRIP.AUTO: NEGATIVE
PH UR STRIP: 5 [PH] (ref 5–8)
PROT UR STRIP-MCNC: NEGATIVE MG/DL
RBC #/AREA URNS HPF: ABNORMAL /HPF (ref 0–5)
SP GR UR REFRACTOMETRY: 1.02 (ref 1–1.03)
UA: UC IF INDICATED,UAUC: ABNORMAL
UROBILINOGEN UR QL STRIP.AUTO: 0.2 EU/DL (ref 0.2–1)
WBC URNS QL MICRO: ABNORMAL /HPF (ref 0–4)

## 2020-08-12 PROCEDURE — 74011250636 HC RX REV CODE- 250/636: Performed by: INTERNAL MEDICINE

## 2020-08-12 PROCEDURE — 77030038269 HC DRN EXT URIN PURWCK BARD -A

## 2020-08-12 PROCEDURE — 81001 URINALYSIS AUTO W/SCOPE: CPT

## 2020-08-12 PROCEDURE — 74011000250 HC RX REV CODE- 250: Performed by: INTERNAL MEDICINE

## 2020-08-12 PROCEDURE — 74011250637 HC RX REV CODE- 250/637: Performed by: INTERNAL MEDICINE

## 2020-08-12 PROCEDURE — 74011250637 HC RX REV CODE- 250/637: Performed by: NURSE PRACTITIONER

## 2020-08-12 PROCEDURE — 97112 NEUROMUSCULAR REEDUCATION: CPT

## 2020-08-12 PROCEDURE — 97110 THERAPEUTIC EXERCISES: CPT

## 2020-08-12 PROCEDURE — C9113 INJ PANTOPRAZOLE SODIUM, VIA: HCPCS | Performed by: INTERNAL MEDICINE

## 2020-08-12 RX ADMIN — NITROGLYCERIN 1 INCH: 20 OINTMENT TOPICAL at 00:12

## 2020-08-12 RX ADMIN — Medication 10 ML: at 06:17

## 2020-08-12 RX ADMIN — NITROGLYCERIN 1 INCH: 20 OINTMENT TOPICAL at 11:58

## 2020-08-12 RX ADMIN — Medication 1 AMPULE: at 08:56

## 2020-08-12 RX ADMIN — AMLODIPINE BESYLATE 5 MG: 5 TABLET ORAL at 08:47

## 2020-08-12 RX ADMIN — METHYLPREDNISOLONE SODIUM SUCCINATE 40 MG: 40 INJECTION, POWDER, FOR SOLUTION INTRAMUSCULAR; INTRAVENOUS at 08:48

## 2020-08-12 RX ADMIN — SODIUM CHLORIDE 40 MG: 9 INJECTION, SOLUTION INTRAMUSCULAR; INTRAVENOUS; SUBCUTANEOUS at 08:48

## 2020-08-12 RX ADMIN — ENOXAPARIN SODIUM 40 MG: 40 INJECTION SUBCUTANEOUS at 15:09

## 2020-08-12 RX ADMIN — NITROGLYCERIN 1 INCH: 20 OINTMENT TOPICAL at 06:09

## 2020-08-12 RX ADMIN — DEXTROSE MONOHYDRATE, SODIUM CHLORIDE, AND POTASSIUM CHLORIDE 100 ML/HR: 50; 9; 1.49 INJECTION, SOLUTION INTRAVENOUS at 06:09

## 2020-08-12 NOTE — PROGRESS NOTES
General Surgery End of Shift Nursing Note    Bedside shift change report given to Bentley Bee RN and Enma ROMAN (oncoming nurse) by [employer] (offgoing nurse). Report included the following information SBAR, Kardex, Intake/Output, MAR and Recent Results. Shift worked:   7p-7a   Summary of shift:    Pt concerned that pure wick is not working correctly. Changed out and repositioned. Urine output appropriate. No BM. No significant events overnight,   Issues for physician to address:   none     Number times ambulated in hallway past shift: 0    Number of times OOB to chair past shift: 0    Pain Management:  Current medication: see MAR  Patient states pain is manageable on current pain medication: YES    GI:    Current diet:  DIET NPO  DIET TUBE FEEDING    Tolerating current diet: YES  Passing flatus: YES  Last Bowel Movement: several days ago   Appearance: loose, brown    Respiratory:    Incentive Spirometer at bedside: YES  Patient instructed on use: YES    Patient Safety:    Falls Score: 2  Bed Alarm On? No  Sitter?  No    Ned Miranda

## 2020-08-12 NOTE — PROGRESS NOTES
Pt was complaining of very warm urine and some pain with urination. This nurse notified Dr. Ethel De Oliveira. Dr. Ethel De Oliveira called back and stated pt will still discharge today. This nurse will collect a UA if possible before pt discharges.

## 2020-08-12 NOTE — PROGRESS NOTES
*late entry*  Admit Date: 2020    POD 5 Day Post-Op    Procedure:  Procedure(s):  LAPAROTOMY EXPLORATORY RELEASE OF BOWEL OBSTRUCTION lysis of adhesions and release of internal hernia, enterectomy small bowel with primary enteroenterostomy anastomosis, and appendectomy, and repair of ventral abdomen hernia without mesh    Subjective:     Patient feeling better, shivam tube feeds at 50, urine output adequate. No abdominal pain          Review of Systems  Minimal abdominal pain, no nausea or vomiting, overall feels better   Objective:     Blood pressure 137/63, pulse 78, temperature 97.9 °F (36.6 °C), resp. rate 16, height 5' 4\" (1.626 m), weight 146 lb 14.4 oz (66.6 kg), SpO2 97 %, not currently breastfeeding. Temp (24hrs), Av.4 °F (36.9 °C), Min:97.9 °F (36.6 °C), Max:98.9 °F (37.2 °C)          Physical Exam  Patient alert awake oriented minimal clear G tube output, heart regular rhythm abdomen soft incisions clean minimally tender    Labs:   Recent Results (from the past 24 hour(s))   SARS-COV-2    Collection Time: 20  1:01 PM   Result Value Ref Range    Specimen source Nasopharyngeal      SARS-CoV-2 Not detected NOTD      Specimen source Nasopharyngeal      Specimen type NP Swab      Health status Symptomatic Testing         Data Review images and reports reviewed    Assessment:     Active Problems:    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    Physical therapy  Antihypertensive therapy restart preop    Out of bed     Internal medicine following as well    Needs new COVID testing prior to return to SNF. Geremias Prince.  Jazz Núñez MD, [facility]

## 2020-08-12 NOTE — DISCHARGE SUMMARY
Physician Discharge Summary     Patient ID:  Oumou Ya  [id number]  66 y.o.  1955    Admit Date: 8/2/2020    Discharge Date: 8/12/2020    Admission Diagnoses: SBO (small bowel obstruction) ([facility] [F81.541]    Discharge Diagnoses: Active Problems:    Polymyositis ([facility] (7/28/2016)      HTN (hypertension) (7/28/2016)      Obstructive sleep apnea syndrome (1/17/2017)      SBO (small bowel obstruction) ([facility] (8/2/2020)         Admission Condition: Poor    Discharge Condition: Good    Last Procedure: Procedure(s):  LAPAROTOMY EXPLORATORY RELEASE OF BOWEL OBSTRUCTION lysis of adhesions and release of internal hernia, enterectomy small bowel with primary enteroenterostomy anastomosis, and appendectomy, and repair of ventral abdomen hernia without mesh      Hospital Course:   Normal hospital course for this procedure. Consults: Hospitalist    Disposition: SNF    Patient Instructions:   Current Discharge Medication List      CONTINUE these medications which have NOT CHANGED    Details   irbesartan (AVAPRO) 300 mg tablet 1 Tab by Per G Tube route nightly. Qty: 30 Tab, Refills: 2      amLODIPine (NORVASC) 5 mg tablet 1 Tab by Per G Tube route daily. Qty: 30 Tab, Refills: 2      predniSONE (DELTASONE) 20 mg tablet Take 60 mg by mouth daily (with breakfast). Qty: 30 Tab, Refills: 0      scopolamine (TRANSDERM-SCOP) 1 mg over 3 days pt3d 1 Patch by TransDERmal route every seventy-two (72) hours. Qty: 10 Patch, Refills: 1      pantoprazole (Protonix) 40 mg tablet Take 1 Tab by mouth daily. Qty: 30 Tab, Refills: 1           Activity: Activity as tolerated  Diet: resume prior tube feed diet per G-tube  Wound Care: Keep wound clean and dry    Follow-up with surgery in 2 weeks.   Follow-up tests/labs none    Signed:  Angelia Salazar MD  [facility]  8/12/2020  9:57 AM

## 2020-08-12 NOTE — PROGRESS NOTES
Gave report to nurse Alexi Pace at [facility] Hospital to SNF SBAR Handoff - Amisha Pontiff                                                                        59 y.o.   female    [address]   Room: 2143/01    \[facility] [place] [facility]\"" 2 GENERAL SURGERY  Unit Phone# :  [phone]      [address]  \[facility] [place] [facility]\"" [address] [facility] [address]  Dept: [phone]  Loc: [phone]                    SITUATION     Admitted:  8/2/2020         Attending Provider:  Shelbi Pittman MD       Consultations:  IP CONSULT TO HOSPITALIST    PCP:  Greg Elder MD   [phone]    Treatment Team: Attending Provider: Shelbi Pittman MD; Utilization Review: Cely Hopkins RN; Hospitalist: Bautista Aleman MD; Care Manager: Terry Triana; Care Manager: Darius Johnson; Primary Nurse: Nia Ariza    Admitting Dx:  SBO [facility]small bowel obstruction) ([facility] [B35.844]       Principal Problem: <principal problem not specified>    6 Days Post-Op of   Procedure(s):  LAPAROTOMY EXPLORATORY RELEASE OF BOWEL OBSTRUCTION lysis of adhesions and release of internal hernia, enterectomy small bowel with primary enteroenterostomy anastomosis, and appendectomy, and repair of ventral abdomen hernia without mesh   BY: Nancy Baez MD             ON: 8/6/2020                  Code Status: Full Code                Advance Directives:   Advance Care Planning 8/7/2020   Patient's Healthcare Decision Maker is: Named in scanned ACP document   Confirm Advance Directive Yes, on file   Does the patient have other document types Do Not Resuscitate    (Send w/patient)   Not Received       Isolation:  There are currently no Active Isolations       MDRO: No current active infections    Pain Medications given:  none    Last dose:     Special Equipment needed: no  Type of equipment:         BACKGROUND     Allergies:   Allergies   Allergen Reactions    Ace Inhibitors Cough    Dilaudid [Hydromorphone] Other (comments)    Levaquin [Levofloxacin] Other (comments)     Leg swelling    Lisinopril Other (comments)     Cough      Metoprolol Other (comments)     hallucinations    Peanut Other (comments)       Past Medical History:   Diagnosis Date    [facility]est[facility]     Hypertension     Pneumonia 10/2018    Polymyositis ([facility] 12/2015    in setting of [address] 2015    Polymyositis associated with autoimmune disease ([facility]     Renal cancer ([facility] 07/08/2016    s/p right nephrectomy.  Respiratory arrest ([facility] 11/2015    [facility].    [employer] SBO [facility]small bowel obstruction) [facility] 8/3/2017       Past Surgical History:   Procedure Laterality Date    HX GYN  1999    hysterectomy    HX NEPHRECTOMY Right 2016    for kidney cancer    US GUIDED CORE BREAST BIOPSY Left 1999    Negative       Medications Prior to Admission   Medication Sig    irbesartan (AVAPRO) 300 mg tablet 1 Tab by Per G Tube route nightly.  amLODIPine (NORVASC) 5 mg tablet 1 Tab by Per G Tube route daily.  predniSONE (DELTASONE) 20 mg tablet Take 60 mg by mouth daily (with breakfast).  scopolamine (TRANSDERM-SCOP) 1 mg over 3 days pt3d 1 Patch by TransDERmal route every seventy-two (72) hours.  pantoprazole (Protonix) 40 mg tablet Take 1 Tab by mouth daily. Hard scripts included in transfer packet no    Vaccinations:    Immunization History   Administered Date(s) Administered    Influenza Vaccine 10/15/2015, 11/03/2016    Influenza Vaccine (Quad) PF 09/27/2017, 11/19/2018, 10/30/2019    Pneumococcal Conjugate (PCV-13) 10/30/2019    Pneumococcal Polysaccharide (PPSV-23) 09/27/2017    Rabies Immune Globulin 08/14/2019    Rabies Vaccine 09/11/2019    Rabies- IM Fibroblast Culture 08/14/2019, 08/17/2019, 08/21/2019, 08/28/2019, 09/11/2019    TB Skin Test (PPD) 01/01/2012    Tdap 07/06/2017       Readmission Risks:    Known Risks:          The [employer] Index tool is an evidenced based tool that has more automatic generated information. The tool looks at many different items such as the age of the patient, how many times they were admitted in the last calendar year, current length of stay in the hospital and their diagnosis. All of these items are pulled automatically from information documented in the chart from various places and will generate a score that predicts whether a patient is at low (less than 13), medium (13-20) or high (21 or greater) risk of being readmitted.         ASSESSMENT                Temp: 98.7 °F (37.1 °C) (08/12/20 1747) Pulse (Heart Rate): 79 (08/12/20 1747)     Resp Rate: 18 (08/12/20 1747)           BP: 125/61 (08/12/20 1747)     O2 Sat (%): 100 % (08/12/20 1747)     Weight: 66.6 kg (146 lb 14.4 oz)    Height: 5' 4\" (162.6 cm) (08/04/20 0935)       If above not within 1 hour of discharge:    BP:_____  P:____  R:____ T:_____ O2 Sat: ___%  O2: ______    Active Orders   Diet    DIET NPO         Orientation: oriented to time, place, person and situation     Active Behaviors: None                                   Active Lines/Drains:  (Peg Tube / [place] / CL or S/L?): yes    Urinary Status: Voiding, External catheter     Last BM: Last Bowel Movement Date: 08/12/20     Skin Integrity: Incision (comment)(midline abd/peg tube )             Mobility: Very limited   Weight Bearing Status: NWB (Non Weight Bearing)                Lab Results   Component Value Date/Time    Glucose 129 (H) 08/10/2020 05:40 AM    Hemoglobin A1c 5.2 10/30/2019 12:47 PM    INR 1.3 (H) 06/11/2020 03:36 AM    HGB 10.5 (L) 08/10/2020 03:41 AM    HGB 10.5 (L) 08/09/2020 03:01 AM        RECOMMENDATION     See After Visit Summary (AVS) for:  · Discharge instructions  · After [address]   · Special equipment needed (entered pre-discharge by Care Management)  · Medication Reconciliation    · Follow up Appointment(s)         Report given/sent by:  Urmila Kwok                    Verbal report given to: Tamika Brown  FAXED to:           Estimated discharge time:  8/12/2020 at 1600

## 2020-08-12 NOTE — PROGRESS NOTES
Signed copy of AVS accidentally went with pt. Transport took signed copy with them. Another copy was placed in chart. Went over discharge instructions with pt. PICC line was removed before discharge. Osmolite was sent with pt. PEG tube was flushed prior. All belongings sent with patient. Cleaned up pt and put new brief on before leaving. Left with transport. Pt left around 1800.

## 2020-08-12 NOTE — PROGRESS NOTES
Problem: Falls - Risk of  Goal: *Absence of Falls  Description: Document Susan Diallo Fall Risk and appropriate interventions in the flowsheet. Outcome: Progressing Towards Goal  Note: Fall Risk Interventions:  Mobility Interventions: Communicate number of staff needed for ambulation/transfer, PT Consult for mobility concerns, Utilize walker, cane, or other assistive device         Medication Interventions: Patient to call before getting OOB, Teach patient to arise slowly    Elimination Interventions: Call light in reach, Patient to call for help with toileting needs, Toileting schedule/hourly rounds              Problem: Patient Education: Go to Patient Education Activity  Goal: Patient/Family Education  Outcome: Progressing Towards Goal     Problem: Pressure Injury - Risk of  Goal: *Prevention of pressure injury  Description: Document Armen Scale and appropriate interventions in the flowsheet. Outcome: Progressing Towards Goal  Note: Pressure Injury Interventions:  Sensory Interventions: Assess changes in LOC, Float heels, Keep linens dry and wrinkle-free, Minimize linen layers, Maintain/enhance activity level, Turn and reposition approx. every two hours (pillows and wedges if needed)    Moisture Interventions: Absorbent underpads, Check for incontinence Q2 hours and as needed, Maintain skin hydration (lotion/cream), Minimize layers    Activity Interventions: Pressure redistribution bed/mattress(bed type), PT/OT evaluation    Mobility Interventions: Float heels, HOB 30 degrees or less, Pressure redistribution bed/mattress (bed type), Turn and reposition approx.  every two hours(pillow and wedges)    Nutrition Interventions: Offer support with meals,snacks and hydration(g tube feeds)    Friction and Shear Interventions: Apply protective barrier, creams and emollients, HOB 30 degrees or less, Lift team/patient mobility team, Transfer aides:transfer board/Cristiane lift/ceiling lift                Problem: Patient Education: Go to Patient Education Activity  Goal: Patient/Family Education  Outcome: Progressing Towards Goal     Problem: Patient Education: Go to Patient Education Activity  Goal: Patient/Family Education  Outcome: Progressing Towards Goal     Problem: Patient Education: Go to Patient Education Activity  Goal: Patient/Family Education  Outcome: Progressing Towards Goal     Problem: Small Bowel Obstruction: Day 1  Goal: Off Pathway (Use only if patient is Off Pathway)  Outcome: Progressing Towards Goal  Goal: Activity/Safety  Outcome: Progressing Towards Goal  Goal: Consults, if ordered  Outcome: Progressing Towards Goal  Goal: Diagnostic Test/Procedures  Outcome: Progressing Towards Goal  Goal: Nutrition/Diet  Outcome: Progressing Towards Goal  Goal: Medications  Outcome: Progressing Towards Goal  Goal: Respiratory  Outcome: Progressing Towards Goal  Goal: Treatments/Interventions/Procedures  Outcome: Progressing Towards Goal  Goal: Psychosocial  Outcome: Progressing Towards Goal  Goal: *Optimal pain control at patient's stated goal  Outcome: Progressing Towards Goal  Goal: *Adequate urinary output (equal to or greater than 30 milliliters/hour)  Outcome: Progressing Towards Goal  Goal: *Hemodynamically stable  Outcome: Progressing Towards Goal  Goal: *Demonstrates progressive activity  Outcome: Progressing Towards Goal  Goal: *Absence of nausea/vomiting  Outcome: Progressing Towards Goal     Problem: Small Bowel Obstruction: Day 2  Goal: Off Pathway (Use only if patient is Off Pathway)  Outcome: Progressing Towards Goal  Goal: Activity/Safety  Outcome: Progressing Towards Goal  Goal: Consults, if ordered  Outcome: Progressing Towards Goal  Goal: Diagnostic Test/Procedures  Outcome: Progressing Towards Goal  Goal: Nutrition/Diet  Outcome: Progressing Towards Goal  Goal: Discharge Planning  Outcome: Progressing Towards Goal  Goal: Medications  Outcome: Progressing Towards Goal  Goal: Respiratory  Outcome: Progressing Towards Goal  Goal: Treatments/Interventions/Procedures  Outcome: Progressing Towards Goal  Goal: Psychosocial  Outcome: Progressing Towards Goal  Goal: *Optimal pain control at patient's stated goal  Outcome: Progressing Towards Goal  Goal: *Adequate urinary output (equal to or greater than 30 milliliters/hour)  Outcome: Progressing Towards Goal  Goal: *Hemodynamically stable  Outcome: Progressing Towards Goal  Goal: *Demonstrates progressive activity  Outcome: Progressing Towards Goal  Goal: *Absence of nausea/vomiting  Outcome: Progressing Towards Goal  Goal: *Return of normal bowel function  Outcome: Progressing Towards Goal     Problem: Small Bowel Obstruction: Day 3  Goal: Off Pathway (Use only if patient is Off Pathway)  Outcome: Progressing Towards Goal  Goal: Activity/Safety  Outcome: Progressing Towards Goal  Goal: Consults, if ordered  Outcome: Progressing Towards Goal  Goal: Diagnostic Test/Procedures  Outcome: Progressing Towards Goal  Goal: Nutrition/Diet  Outcome: Progressing Towards Goal  Goal: Discharge Planning  Outcome: Progressing Towards Goal  Goal: Medications  Outcome: Progressing Towards Goal  Goal: Respiratory  Outcome: Progressing Towards Goal  Goal: Treatments/Interventions/Procedures  Outcome: Progressing Towards Goal  Goal: Psychosocial  Outcome: Progressing Towards Goal  Goal: *Optimal pain control at patient's stated goal  Outcome: Progressing Towards Goal  Goal: *Adequate urinary output (equal to or greater than 30 milliliters/hour)  Outcome: Progressing Towards Goal  Goal: *Hemodynamically stable  Outcome: Progressing Towards Goal  Goal: *Adequate nutrition  Outcome: Progressing Towards Goal  Goal: *Demonstrates progressive activity  Outcome: Progressing Towards Goal  Goal: *Participates in discharge planning  Outcome: Progressing Towards Goal     Problem: Small Bowel Obstruction: Day 4 to Discharge  Goal: Off Pathway (Use only if patient is Off Pathway)  Outcome: Progressing Towards Goal  Goal: Activity/Safety  Outcome: Progressing Towards Goal  Goal: Nutrition/Diet  Outcome: Progressing Towards Goal  Goal: Discharge Planning  Outcome: Progressing Towards Goal  Goal: Medications  Outcome: Progressing Towards Goal  Goal: Respiratory  Outcome: Progressing Towards Goal  Goal: Treatments/Interventions/Procedures  Outcome: Progressing Towards Goal  Goal: Psychosocial  Outcome: Progressing Towards Goal     Problem: Small Bowel Obstruction - Non Surgical: Discharge Outcomes  Goal: *Hemodynamically stable  Outcome: Progressing Towards Goal  Goal: *Demonstrates independent activity or return to baseline  Outcome: Progressing Towards Goal  Goal: *Optimal pain control at patient's stated goal  Outcome: Progressing Towards Goal  Goal: *Verbalizes understanding and describes prescribed diet  Outcome: Progressing Towards Goal  Goal: *Tolerating diet  Outcome: Progressing Towards Goal  Goal: *Verbalizes name, dosage, time, side effects, and number of days to continue medications  Outcome: Progressing Towards Goal  Goal: *Anxiety reduced or absent  Outcome: Progressing Towards Goal  Goal: *Understands and describes signs and symptoms to report to providers(Stroke Metric)  Outcome: Progressing Towards Goal  Goal: *Describes follow-up/return visits to physicians  Outcome: Progressing Towards Goal  Goal: *Describes available resources and support systems  Outcome: Progressing Towards Goal  Goal: *Active bowel function  Outcome: Progressing Towards Goal

## 2020-08-12 NOTE — PROGRESS NOTES
Problem: Mobility Impaired (Adult and Pediatric)  Goal: *Acute Goals and Plan of Care (Insert Text)  Description: FUNCTIONAL STATUS PRIOR TO ADMISSION: Pt reports she was receiving PT, OT and ST services at [facility]. She reports being able to sit eob a little on her own, but required assistance to achieve this position and also required assistance to roll in bed. HOME SUPPORT PRIOR TO ADMISSION:  pt resides in a SNF with plan to transition to LTC    Physical Therapy Goals  Initiated 8/5/2020, re-assessed 8/12/2020 and goals remain appropriate  1. Patient will roll side to side in bed with maximal assistance within 7 day(s). 2.  Patient will move supine to sit and sit to supine with maximal assistance within 7 days. 3.  Patient will scoot up, down, and laterally in bed, as well as scoot to the eob in sitting with maximal assistance within 7 days. 4.  Patient will sit eob unsupported x 5 min, while performing LE exercises within 7 days  Outcome: Not Progressing Towards Goal   PHYSICAL THERAPY TREATMENT: WEEKLY REASSESSMENT  Patient: Rosa Vann (9 y.o. female)  Date: 8/12/2020  Primary Diagnosis: SBO (small bowel obstruction) ([facility]) [K56.609]  Procedure(s) (LRB):  LAPAROTOMY EXPLORATORY RELEASE OF BOWEL OBSTRUCTION lysis of adhesions and release of internal hernia, enterectomy small bowel with primary enteroenterostomy anastomosis, and appendectomy, and repair of ventral abdomen hernia without mesh (N/A) 6 Days Post-Op   Precautions: Fall, Skin         ASSESSMENT  Patient continues with skilled PT services and is not progressing towards goals. Pt seen by PT twice this week and making very slow progress with mobility. Agreeable to therapy session and tolerated mobility and exercises well. Essentially Total A for bed mobility and able to progress to unsupported sitting with increased time following seated exercises. Initial poor balance with constant posterior support provided.  Pt with significant proximal muscle weakness though fair distal strength. D/t full LAQ demonstrated attempted two stands with Max A x 2. Unable to extend through trunk or hips. Returned to supine with all needs in reach. Patient's progression toward goals since last assessment: minimal progress, limited therapy session d/t abdominal pain    Current Level of Function Impacting Discharge (mobility/balance): Total care    Other factors to consider for discharge: likely will require LTC placement soon         PLAN :  Goals have been updated based on progression since last assessment. Patient continues to benefit from skilled intervention to address the above impairments. Recommendations and Planned Interventions: bed mobility training, transfer training, gait training, therapeutic exercises, neuromuscular re-education, patient and family training/education, and therapeutic activities      Frequency/Duration: Patient will be followed by physical therapy:  3 times a week to address goals. Recommendation for discharge: (in order for the patient to meet his/her long term goals)  Therapy up to 5 days/week in SNF setting    This discharge recommendation:  Has been made in collaboration with the attending provider and/or case management    IF patient discharges home will need the following DME: to be determined (TBD)         SUBJECTIVE:   Patient stated I would like to try the standing.     OBJECTIVE DATA SUMMARY:   HISTORY:    Past Medical History:   Diagnosis Date    Congestive heart failure ([address]     Hypertension     Pneumonia 10/2018    Polymyositis ([address] 12/2015    in setting of [address] 2015    Polymyositis associated with autoimmune disease ([address]     Renal cancer ([address] 07/08/2016    s/p right nephrectomy.      Respiratory arrest ([address] 11/2015    [facility].     SBO (small bowel obstruction) ([address] 8/3/2017     Past Surgical History:   Procedure Laterality Date    HX GYN  1999    hysterectomy    HX NEPHRECTOMY Right 2016    for kidney cancer    US GUIDED CORE BREAST BIOPSY Left 1999    Negative       Personal factors and/or comorbidities impacting plan of care: polymyositis, pneumonia, SBO    Home Situation  Home Environment: [address] [place] [address] Name: [address] [place] [address]  # Steps to Enter: 0  One/Two Story Residence: One story  Living Alone: No  Support Systems: Inpatient rehab  Patient Expects to be Discharged to[de-identified] Skilled nursing facility  Current DME Used/Available at Home: Glucometer, Nebulizer    EXAMINATION/PRESENTATION/DECISION MAKING:   Critical Behavior:  Neurologic State: Alert, Appropriate for age  Orientation Level: Oriented X4        Hearing: Auditory  Auditory Impairment: Hard of hearing, bilateral  Skin:    Edema:   Range Of Motion:  AROM: Grossly decreased, non-functional                       Strength:    Strength: Grossly decreased, non-functional                    Tone & Sensation:                                  Coordination:  Coordination: Grossly decreased, non-functional  Vision:      Functional Mobility:  Bed Mobility:  Rolling: Total assistance  Supine to Sit: Maximum assistance  Sit to Supine: Maximum assistance;Assist x2  Scooting: Total assistance  Transfers:  Sit to Stand: Maximum assistance;Assist x2                          Balance:   Sitting: Impaired  Sitting - Static: Fair (occasional)  Sitting - Dynamic: Poor (constant support)  Standing: Impaired  Standing - Static: Poor  Ambulation/Gait Training:                                                         Stairs: Therapeutic Exercises: Ankle pumps, heel raises, toe raises, LAQ, seated marching with assistance, shoulder rolls, biceps curls, shoulder shrugs, bridging with assistance      Activity Tolerance:   Poor and requires rest breaks  Please refer to the flowsheet for vital signs taken during this treatment.     After treatment patient left in no apparent distress:   Supine in bed, Heels elevated for pressure relief, Call bell within reach, and Side rails x 3    COMMUNICATION/EDUCATION:   The patients plan of care was discussed with: Registered nurse. Fall prevention education was provided and the patient/caregiver indicated understanding., Patient/family have participated as able in goal setting and plan of care. , and Patient/family agree to work toward stated goals and plan of care.     Thank you for this referral.  Bryon Alatorre, PT   Time Calculation: 28 mins

## 2020-08-12 NOTE — PROGRESS NOTES
CHAN:    SNF Placement  COVID Negative  2nd IM Medicare Letter  Medical Transport    UPDATE: 2:37PM    Transition of Care Plan to SNF/Rehab    Communication to Patient/Family:  Met with patient and family and they are agreeable to the transition plan. The Plan for Transition of Care is related to the following treatment goals:     CM aware that pt will d/c on today and will transition to [facility]. CM spoke with pt's family: Sola Douglas, and informed her of pt's d/c needs and plans. Ron aware that pt will be transitioning to snf today, via medical transport: [facility] @ 4:00PM.  Sola Douglas aware of 2nd IM Medicare Letter (verbal consent) placed in chart. CM contact SNF, and they are agreeable to pt's d/c. SNF aware of pt's transport time. The Patient and/or patient representative was provided with a choice of provider and agrees  with the discharge plan. Yes [x] No []    A Freedom of choice list was provided with basic dialogue that supports the patient's individualized plan of care/goals and shares the quality data associated with the providers. Yes [x] No []    SNF/Rehab Transition:  Patient has been accepted to [facility] SNF/Rehab and meets criteria for admission. Patient will transported by [facility] and expected to leave at 4:00pm .    Communication to SNF/Rehab:  Bedside RN, Shaji Doshi, has been notified to update the transition plan to the facility and call report ([phone]). Discharge information has been updated on the AVS. And communicated to facility via [employer], or CC link. Discharge instructions to be fax'd to facility at [facility] #). Patient has been identified as part of the  [employer].  For Care Coordination associated with that Bundle Program, please contact   Bundle information has been communication to . Nursing Please include all hard scripts for controlled substances, med rec and dc summary, and AVS in packet.      Reviewed and confirmed with facility, [address] [facility] can manage the patient care needs for the following:     Shelvia Leventhal with (X) only those applicable:  Medication:  [x]Medications are available at the facility  []IV Antibiotics    []Controlled Substance - hard copies available sent. []Weekly Labs    Equipment:  []CPAP/BiPAP  []Wound Vacuum  []Robledo or Urinary Device  []PICC/Central Line  []Nebulizer  []Ventilator    Treatment:  []Isolation (for MRSA, VRE, etc.)  []Surgical Drain Management  []Tracheostomy Care  []Dressing Changes  []Dialysis with transportation  [x]PEG Care  []Oxygen  []Daily Weights for Heart Failure    Dietary:  []Any diet limitations  []Tube Feedings   []Total Parenteral Management (TPN)    Financial Resources:  [x]Medicaid Application Completed    []UAI Completed and copy given to pt/family  and copy given to pt/family  []A screening has previously been completed. []Level II Completed    [] Private pay individual who will not become   financially eligible for Medicaid within 6 months from admission to a [address] [place] [address]. [] Individual refused to have screening conducted. []Medicaid Application Completed    []The screening denied because it was determined individual did not need/did not qualify for nursing facility level of care. [] Out of state residents seeking direct admission to a [address] facility. [] Individuals who are inpatients of an out of state hospital, or in state or out of state veterans/ hospital and seek direct admission to a [address] facility  [] Individuals who are pateints or residents of a state owned/operated facility that is licensed by Department of [employer] ([employer]) and seek direct admission to [address] [place] [address]  [] A screening not required for enrollment in [facility] as set out in [facility]-  [] [facility]) staff shall perform screenings of the [facility] clients.     Advanced Care Plan:  []Surrogate Decision Maker of Care  []POA  []Communicated Code Status and copy sent. Other:   Medicaid application pending             CM staffed case with MD and was informed that pt is currently not on TPN, and that she is now on tube feeds. CM informed that pt COVID test has been complete. CM contact snf, via telephone and was informed that snf is currently reviewing pt's clinicals and insurance. CM will arrange for transport this afternoon for pt. CM will provide pt's nurse with call report info. CM will continue to follow.     MARTIN Brink, [address] [place] [address]

## 2020-08-12 NOTE — PROGRESS NOTES
Problem: Falls - Risk of  Goal: *Absence of Falls  Description: Document Gisella Garcia Fall Risk and appropriate interventions in the flowsheet. Outcome: Resolved/Met     Problem: Patient Education: Go to Patient Education Activity  Goal: Patient/Family Education  Outcome: Resolved/Met     Problem: Pressure Injury - Risk of  Goal: *Prevention of pressure injury  Description: Document Armen Scale and appropriate interventions in the flowsheet.   Outcome: Resolved/Met     Problem: Patient Education: Go to Patient Education Activity  Goal: Patient/Family Education  Outcome: Resolved/Met     Problem: Small Bowel Obstruction: Day 1  Goal: Off Pathway (Use only if patient is Off Pathway)  Outcome: Resolved/Met  Goal: Activity/Safety  Outcome: Resolved/Met  Goal: Consults, if ordered  Outcome: Resolved/Met  Goal: Diagnostic Test/Procedures  Outcome: Resolved/Met  Goal: Nutrition/Diet  Outcome: Resolved/Met  Goal: Medications  Outcome: Resolved/Met  Goal: Respiratory  Outcome: Resolved/Met  Goal: Treatments/Interventions/Procedures  Outcome: Resolved/Met  Goal: Psychosocial  Outcome: Resolved/Met  Goal: *Optimal pain control at patient's stated goal  Outcome: Resolved/Met  Goal: *Adequate urinary output (equal to or greater than 30 milliliters/hour)  Outcome: Resolved/Met  Goal: *Hemodynamically stable  Outcome: Resolved/Met  Goal: *Demonstrates progressive activity  Outcome: Resolved/Met  Goal: *Absence of nausea/vomiting  Outcome: Resolved/Met     Problem: Small Bowel Obstruction: Day 2  Goal: Off Pathway (Use only if patient is Off Pathway)  Outcome: Resolved/Met  Goal: Activity/Safety  Outcome: Resolved/Met  Goal: Consults, if ordered  Outcome: Resolved/Met  Goal: Diagnostic Test/Procedures  Outcome: Resolved/Met  Goal: Nutrition/Diet  Outcome: Resolved/Met  Goal: Discharge Planning  Outcome: Resolved/Met  Goal: Medications  Outcome: Resolved/Met  Goal: Respiratory  Outcome: Resolved/Met  Goal: Treatments/Interventions/Procedures  Outcome: Resolved/Met  Goal: Psychosocial  Outcome: Resolved/Met  Goal: *Optimal pain control at patient's stated goal  Outcome: Resolved/Met  Goal: *Adequate urinary output (equal to or greater than 30 milliliters/hour)  Outcome: Resolved/Met  Goal: *Hemodynamically stable  Outcome: Resolved/Met  Goal: *Demonstrates progressive activity  Outcome: Resolved/Met  Goal: *Absence of nausea/vomiting  Outcome: Resolved/Met  Goal: *Return of normal bowel function  Outcome: Resolved/Met     Problem: Small Bowel Obstruction: Day 3  Goal: Off Pathway (Use only if patient is Off Pathway)  Outcome: Resolved/Met  Goal: Activity/Safety  Outcome: Resolved/Met  Goal: Consults, if ordered  Outcome: Resolved/Met  Goal: Diagnostic Test/Procedures  Outcome: Resolved/Met  Goal: Nutrition/Diet  Outcome: Resolved/Met  Goal: Discharge Planning  Outcome: Resolved/Met  Goal: Medications  Outcome: Resolved/Met  Goal: Respiratory  Outcome: Resolved/Met  Goal: Treatments/Interventions/Procedures  Outcome: Resolved/Met  Goal: Psychosocial  Outcome: Resolved/Met  Goal: *Optimal pain control at patient's stated goal  Outcome: Resolved/Met  Goal: *Adequate urinary output (equal to or greater than 30 milliliters/hour)  Outcome: Resolved/Met  Goal: *Hemodynamically stable  Outcome: Resolved/Met  Goal: *Adequate nutrition  Outcome: Resolved/Met  Goal: *Demonstrates progressive activity  Outcome: Resolved/Met  Goal: *Participates in discharge planning  Outcome: Resolved/Met     Problem: Small Bowel Obstruction: Day 4 to Discharge  Goal: Off Pathway (Use only if patient is Off Pathway)  Outcome: Resolved/Met  Goal: Activity/Safety  Outcome: Resolved/Met  Goal: Nutrition/Diet  Outcome: Resolved/Met  Goal: Discharge Planning  Outcome: Resolved/Met  Goal: Medications  Outcome: Resolved/Met  Goal: Respiratory  Outcome: Resolved/Met  Goal: Treatments/Interventions/Procedures  Outcome: Resolved/Met  Goal: Psychosocial  Outcome: Resolved/Met     Problem: Small Bowel Obstruction - Non Surgical: Discharge Outcomes  Goal: *Hemodynamically stable  Outcome: Resolved/Met  Goal: *Demonstrates independent activity or return to baseline  Outcome: Resolved/Met  Goal: *Optimal pain control at patient's stated goal  Outcome: Resolved/Met  Goal: *Verbalizes understanding and describes prescribed diet  Outcome: Resolved/Met  Goal: *Tolerating diet  Outcome: Resolved/Met  Goal: *Verbalizes name, dosage, time, side effects, and number of days to continue medications  Outcome: Resolved/Met  Goal: *Anxiety reduced or absent  Outcome: Resolved/Met  Goal: *Understands and describes signs and symptoms to report to providers(Stroke Metric)  Outcome: Resolved/Met  Goal: *Describes follow-up/return visits to physicians  Outcome: Resolved/Met  Goal: *Describes available resources and support systems  Outcome: Resolved/Met  Goal: *Active bowel function  Outcome: Resolved/Met

## 2020-08-13 ENCOUNTER — PATIENT OUTREACH (OUTPATIENT)
Dept: CASE MANAGEMENT | Age: 65
End: 2020-08-13

## 2020-08-13 NOTE — PROGRESS NOTES
Community Care Team medical review documentation for patient in [facility]     Patient was discharged from [facility] on 8/12/20 to [address] (SNF). Information included in this progress note has been provided to SNF. Discharge Diagnosis: SBO, HTN, Sleep apnea   PCP : Evonne Bae MD  ACP:  DDNR   Medication Changes at Discharge:   Diet: resume prior tube feed per G tube  Wound Care: keep wound clean and dry   Follow up Appointment Recommendations:   Surgeon - Dr. Radha Crawford - 2 weeks   PTA Functional Status: Pt reports she was receiving PT, OT and ST services at [facility]. She reports being able to sit eob a little on her own, but required assistance to achieve this position and also required assistance to roll in bed. HOME SUPPORT PRIOR TO ADMISSION:  pt resides in a SNF with plan to transition to [facility] will follow up weekly with [address] Govea until discharge. Medications were not reconciled and general patient assessment was not completed during this [facility] outreach.       Radha Sargent RN

## 2020-08-13 NOTE — CDMP QUERY
Patient admitted with intestinal obstruction due to adhesions. Notes indicated suspected right lower lobe pneumonia on CT imaging, 
likely atelectasis. Placed on empiric IV antibiotics. Please assist in clarifying the most accurate diagnosis: 
 
=> Pneumonia still suspected at time of discharge, 
=>Patient had atelectasis not pneumonia, 
=>Other diagnosis (please specify) or 
=>Clinically unable to determine. Thank You [facility], [address] [place] [address] 
   [phone]

## 2020-08-19 ENCOUNTER — PATIENT OUTREACH (OUTPATIENT)
Dept: CASE MANAGEMENT | Age: 65
End: 2020-08-19

## 2020-08-24 NOTE — PROGRESS NOTES
Physician Progress Note      José Luis ARRIAGA #:                  [id number]  :                       1955  ADMIT DATE:       2020 2:41 PM  [address] DATE:        2020 6:00 PM  RESPONDING  PROVIDER #:        Judie STEIN MD          QUERY TEXT:    Pt admitted with SBO s/p Lap release of bowel obstruction and has ischemic small bowel documented. If possible, please document in progress notes and discharge summary further specificity regarding the ischemic small bowel  to include the  acuity/chronicity of the affected/involved small bowel: The medical record reflects the following:  Risk Factors: HTN SBO  Clinical Indicators: op report  documented Compromised ischemic small bowel to feet contained internal hernia adhesions created by the tip of the appendix to the retroperitoneum with complete small bowel obstruction, and multifocal ventral abdominal wall hernia incisional,  chronic appearing stricture in the distal area of the compromised small bowel that would not been adequately. I felt that this area compromise small bowel should be resected for adequate recovery  ? Treatment: LAPAROTOMY EXPLORATORY RELEASE OF BOWEL OBSTRUCTION adhesio lysis and release of internal hernia, enterectomy small bowel with primary enteroenterostomy anastomosis, and appendectomy, and repair of ventral abdomen hernia without mesh    thank you,  Wilbert Silvestre RN, BS, CCDS  [phone]  Options provided:  -- Acute Ischemic small bowel  -- Chronic Ischemic small bowel  -- Other - I will add my own diagnosis  -- Disagree - Not applicable / Not valid  -- Disagree - Clinically unable to determine / Unknown  -- Refer to Clinical Documentation Reviewer    PROVIDER RESPONSE TEXT:    Acute ischemic small bowel.   From SBO compromise of vascular supply arterial and venous    Query created by: Avery Johnson on 2020 12:45 PM      Electronically signed by:  Fany Morrissey MD 2020 9:55 AM

## 2020-08-24 NOTE — PROGRESS NOTES
Community Care Team documentation for patient in [address] [place] [address]  Initial Follow Up       Patient was discharged to [address]. Information included in this progress note has been provided to SNF. PCP : Sierra Oropeza MD    Spoke with SNF team.  PT/OT/SP Progress and Goals & Any other important updates:      OT, PT, ST 5 x a week. Minimal progress in therapy due to hypotension and cognition. Fall on 8/18    [facility] Team will follow up weekly with [address] [place] [address] until discharge. Medications were not reconciled and general patient assessment was not completed during this [address] [place] [address] outreach.

## 2020-08-26 NOTE — PROGRESS NOTES
8/26/2020  UAI completed electronically, submitted for processing, successfully processed, and faxed to [employer] [facility] [employer].      Wade Lester, MSW  Care Manager

## 2020-08-27 ENCOUNTER — PATIENT OUTREACH (OUTPATIENT)
Dept: CASE MANAGEMENT | Age: 65
End: 2020-08-27

## 2020-09-02 ENCOUNTER — PATIENT OUTREACH (OUTPATIENT)
Dept: CASE MANAGEMENT | Age: 65
End: 2020-09-02

## 2020-09-10 ENCOUNTER — PATIENT OUTREACH (OUTPATIENT)
Dept: CASE MANAGEMENT | Age: 65
End: 2020-09-10

## 2020-09-11 NOTE — PROGRESS NOTES
Patient has graduated from the Transitions of Care Coordination  program on 9/11/20. Patient/family has the ability to self-manage at this time Care management goals have been completed. Patient was not referred to the [facility] team for further management. Goals Addressed    None         Patient has Care Transition Nurse's contact information for any further questions, concerns, or needs. Patients upcoming visits:  No future appointments.

## 2020-09-11 NOTE — PROGRESS NOTES
Late chart  Community Care Team Documentation for Patient in [facility]  Discharge Note    Patient has been discharged from [facility] ([facility]). See previous [facility] Team notes. PCP : Xin Arshad MD      Spoke with SNF team.  PT/OT/SP Progress and Goals & Any other important upda9/1tes: : Transferred to LTC here on 9 /1     Community Care Team will sign off at this time. Medications were not reconciled and general patient assessment was not completed during this skilled nursing facility outreach.      Zulma Kennedy RN

## 2020-10-20 RX ORDER — DIPHENHYDRAMINE HYDROCHLORIDE 50 MG/ML
25 INJECTION, SOLUTION INTRAMUSCULAR; INTRAVENOUS ONCE
Status: DISCONTINUED | OUTPATIENT
Start: 2020-10-27 | End: 2020-10-20

## 2020-10-20 RX ORDER — DIPHENHYDRAMINE HYDROCHLORIDE 50 MG/ML
25 INJECTION, SOLUTION INTRAMUSCULAR; INTRAVENOUS ONCE
Status: ACTIVE | OUTPATIENT
Start: 2020-10-26 | End: 2020-10-26

## 2020-10-20 RX ORDER — ACETAMINOPHEN 325 MG/1
650 TABLET ORAL ONCE
Status: ACTIVE | OUTPATIENT
Start: 2020-10-26 | End: 2020-10-26

## 2020-10-20 RX ORDER — ACETAMINOPHEN 325 MG/1
650 TABLET ORAL ONCE
Status: DISCONTINUED | OUTPATIENT
Start: 2020-10-27 | End: 2020-10-20

## 2020-10-20 NOTE — ED NOTES
[facility] They advised of the patient's chief complaint, current status, orders completed (to include IV access/medications/radiology testing), outstanding orders that still need to be completed, and the treatment plan. Questions asked and answered prior to assumption of care. 68.9

## 2020-10-26 ENCOUNTER — HOSPITAL ENCOUNTER (OUTPATIENT)
Dept: INFUSION THERAPY | Age: 65
Discharge: HOME | End: 2020-10-26

## 2020-10-26 NOTE — PROGRESS NOTES
[facility]    Patient called the  to reschedule her Rituxan day 1 treatment today.   Next appointment is 11/10/20 at [address] [place] [address].

## 2020-10-28 DIAGNOSIS — R13.19 OTHER DYSPHAGIA: Primary | ICD-10-CM

## 2020-10-28 DIAGNOSIS — M33.22 POLYMYOSITIS WITH MYOPATHY (HCC): ICD-10-CM

## 2020-10-28 NOTE — PROGRESS NOTES
Patient long term care resident at [facility] and rehabilitation. Has worked with speech therapy in reference to her swallowing is able to use cup taking small sips 5ml at a time. 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 Recent peg tube placed due to progression of polymyositis, but swallowing has improved, want to verify safe for eating. Until verified continue tube feedings.

## 2020-11-03 RX ORDER — DIPHENHYDRAMINE HYDROCHLORIDE 50 MG/ML
25 INJECTION, SOLUTION INTRAMUSCULAR; INTRAVENOUS ONCE
Status: COMPLETED | OUTPATIENT
Start: 2020-11-10 | End: 2020-11-10

## 2020-11-03 RX ORDER — ACETAMINOPHEN 325 MG/1
650 TABLET ORAL ONCE
Status: DISCONTINUED | OUTPATIENT
Start: 2020-11-10 | End: 2020-11-10

## 2020-11-06 ENCOUNTER — HOSPITAL ENCOUNTER (OUTPATIENT)
Dept: GENERAL RADIOLOGY | Age: 65
Discharge: HOME | End: 2020-11-06
Attending: NURSE PRACTITIONER
Payer: MEDICARE

## 2020-11-06 DIAGNOSIS — M33.22 POLYMYOSITIS WITH MYOPATHY (HCC): ICD-10-CM

## 2020-11-06 DIAGNOSIS — R13.19 OTHER DYSPHAGIA: ICD-10-CM

## 2020-11-06 PROCEDURE — 92611 MOTION FLUOROSCOPY/SWALLOW: CPT | Performed by: SPEECH-LANGUAGE PATHOLOGIST

## 2020-11-06 PROCEDURE — 74230 X-RAY XM SWLNG FUNCJ C+: CPT

## 2020-11-06 NOTE — PROGRESS NOTES
[address] [facility], [address] [facility] STUDY  Patient: Glenna Hopkins (62 y.o. female)  Date: 11/6/2020  Referring Provider:  Susie Khan NP    SUBJECTIVE:   Patient with history of dysphagia. [facility] 6/2020 resulted in NPO with PEG tube placement. Patient reports working with SLP and ice chips and water trials. OBJECTIVE:   Past Medical History:   Past Medical History:   Diagnosis Date    Congestive heart failure ([address]     Hypertension     Pneumonia 10/2018    Polymyositis ([address] 12/2015    in setting of [address] 2015    Polymyositis associated with autoimmune disease ([address]     Renal cancer ([address] 07/08/2016    s/p right nephrectomy.  Respiratory arrest ([address] 11/2015    [facility].    Temo Drake SBO (small bowel obstruction) ([address] 8/3/2017     Past Surgical History:   Procedure Laterality Date    HX GYN  1999    hysterectomy    HX NEPHRECTOMY Right 2016    for kidney cancer    US GUIDED CORE BREAST BIOPSY Left 1999    Negative     Current Dietary Status:  NPO except for ice chips and water with SLP  Radiologist: Dr. Amezquita Feeler: Lateral;Fluoro  Patient Position: Upright in [facility] chair    Trial 1: Trial 2:   Consistency Presented: Thin liquid Consistency Presented: Puree   How Presented: Self-fed/presented;Cup/sip;Straw How Presented: SLP-fed/presented;Spoon         Bolus Acceptance: No impairment Bolus Acceptance: No impairment   Bolus Formation/Control: No impairment:   Bolus Formation/Control: Impaired: Premature spillage;Piecemeal   Propulsion: No impairment Propulsion: Delayed (# of seconds); Discoordination   Oral Residue: None     Initiation of Swallow: Triggered at pyriform sinus(es) Initiation of Swallow: Triggered at pyriform sinus(es)   Timing: Pooling 1-5 sec Timing: Pooling 1-5 sec   Penetration: None Penetration: None   Aspiration/Timing: No evidence of aspiration Aspiration/Timing: No evidence of aspiration   Pharyngeal Clearance: Vallecular residue;Pyriform residue ;10-50% Pharyngeal Clearance: Vallecular residue;Pyriform residue ;10-50%   Attempted Modifications: Double swallow Attempted Modifications: Alternate liquids/solids; Double swallow   Effective Modifications: Double swallow Effective Modifications: Alternate liquids/solids; Double swallow                 Trial 3:   Consistency Presented: Solid   How Presented: SLP-fed/presented       Bolus Acceptance: No impairment    : Premature spillage   Propulsion: Delayed (# of seconds); Discoordination   Oral Residue: None   Initiation of Swallow: Triggered at valleculae   Timing: No impairment   Penetration: None   Aspiration/Timing: No evidence of aspiration   Pharyngeal Clearance: Greater than 50%   Attempted Modifications: Alternate liquids/solids; Double swallow   Effective Modifications: Alternate liquids/solids; Double swallow             Decreased Tongue Base Retraction?: Yes  Laryngeal Elevation: Inadequate epiglottic inversion; Reduced excursion with laryngeal vestibule gap  Aspiration/Penetration Score: 1 (No penetration or aspiration-Contrast does not enter the airway)  Pharyngeal Symmetry: Not assessed  Pharyngeal-Esophageal Segment: No impairment  Pharyngeal Dysfunction: Decreased tongue base retraction;Decreased strength;Decreased elevation/closure;Decreased pharyngeal wall constriction    Oral Phase Severity: Mild  Pharyngeal Phase Severity: Moderate    ASSESSMENT :  Based on the objective data described above, the patient presents with mild oral dysphagia characterized by mild oral motor weakness and suspect some self limiting (taking very small bites/sips) given h/o dysphagia. Oral dysphagia is characterized by mildly slow oral transit and premature spillage of bolus over base of tongue.   Patient presents with moderate pharyngeal dysphagia characterized by mild-moderate pharyngeal swallow delay, reduced tongue base retraction, reduced pharyngeal wall constriction, limited hyo-laryngeal elevation with reduced epiglottic deflection. Pharyngeal dysphagia resulted in moderate pharyngeal (vallecuae > pyriform) residue with thin liquids and purees and severe pharyngeal residue with solids. Patient independently initiated 3-4 swallows with all consistencies which reduced but did not clear pharyngeal residue. Liquid wash reduced but did not clear pharyngeal residue. Patient tolerated all trials without penetration or aspiration. Sensation appears intact as patient independently initiating double swallow and requesting liquid wash. Given intact sensation and protection of airway feel patient is safe to initiate purees and thin liquids. Suspect endurance and thus intake and ability to meet nutational needs will be limited. PLAN/RECOMMENDATIONS :  Pureed snacks/meals  Thin liquids  Sit up for all PO and 30 minutes after  Small bites  Single sips  Alternate solids and liquids  Recommend continue medication non-orally     COMMUNICATION/EDUCATION:   The above findings and recommendations were discussed with: patient who verbalized understanding.     Thank you for this referral.  Yesica Cueva, SLP  Time Calculation: 30 mins

## 2020-11-10 ENCOUNTER — HOSPITAL ENCOUNTER (OUTPATIENT)
Dept: INFUSION THERAPY | Age: 65
Discharge: HOME | End: 2020-11-10
Payer: MEDICARE

## 2020-11-10 VITALS
TEMPERATURE: 98.2 F | RESPIRATION RATE: 16 BRPM | HEART RATE: 71 BPM | BODY MASS INDEX: 22.36 KG/M2 | HEIGHT: 64 IN | DIASTOLIC BLOOD PRESSURE: 85 MMHG | SYSTOLIC BLOOD PRESSURE: 133 MMHG | WEIGHT: 131 LBS

## 2020-11-10 PROCEDURE — 74011000258 HC RX REV CODE- 258: Performed by: INTERNAL MEDICINE

## 2020-11-10 PROCEDURE — 74011250636 HC RX REV CODE- 250/636: Performed by: INTERNAL MEDICINE

## 2020-11-10 PROCEDURE — 74011250637 HC RX REV CODE- 250/637: Performed by: INTERNAL MEDICINE

## 2020-11-10 PROCEDURE — 96415 CHEMO IV INFUSION ADDL HR: CPT

## 2020-11-10 PROCEDURE — 96413 CHEMO IV INFUSION 1 HR: CPT

## 2020-11-10 PROCEDURE — 96375 TX/PRO/DX INJ NEW DRUG ADDON: CPT

## 2020-11-10 RX ORDER — SODIUM CHLORIDE 0.9 % (FLUSH) 0.9 %
10-40 SYRINGE (ML) INJECTION AS NEEDED
Status: DISCONTINUED | OUTPATIENT
Start: 2020-11-10 | End: 2020-11-11 | Stop reason: HOSPADM

## 2020-11-10 RX ORDER — SODIUM CHLORIDE 9 MG/ML
25 INJECTION, SOLUTION INTRAVENOUS CONTINUOUS
Status: DISCONTINUED | OUTPATIENT
Start: 2020-11-10 | End: 2020-11-11 | Stop reason: HOSPADM

## 2020-11-10 RX ADMIN — SODIUM CHLORIDE 25 ML/HR: 900 INJECTION, SOLUTION INTRAVENOUS at 09:32

## 2020-11-10 RX ADMIN — DIPHENHYDRAMINE HYDROCHLORIDE 25 MG: 50 INJECTION, SOLUTION INTRAMUSCULAR; INTRAVENOUS at 09:38

## 2020-11-10 RX ADMIN — SODIUM CHLORIDE 1000 MG: 900 INJECTION, SOLUTION INTRAVENOUS at 10:04

## 2020-11-10 RX ADMIN — Medication 10 ML: at 09:30

## 2020-11-10 RX ADMIN — ACETAMINOPHEN ORAL SOLUTION 650 MG: 650 SOLUTION ORAL at 09:47

## 2020-11-10 RX ADMIN — Medication 10 ML: at 14:42

## 2020-11-10 RX ADMIN — METHYLPREDNISOLONE SODIUM SUCCINATE 125 MG: 125 INJECTION, POWDER, FOR SOLUTION INTRAMUSCULAR; INTRAVENOUS at 09:37

## 2020-11-10 NOTE — PROGRESS NOTES
Pt arrived to [employer] in wheelchair in no acute distress at 0905 for Ruxience day 1. Assessment unremarkable except constipation, numbness/tingling in hands and feet, and weakness and fatigue. Pt has a peg tube but can take some sips of water or eat ice chips. #24g PIV established in left forearm without issue and positive blood return noted. Patient Vitals for the past 12 hrs:   Temp Pulse Resp BP   11/10/20 1439 98.2 °F (36.8 °C) 71 16 133/85   11/10/20 1343 97.8 °F (36.6 °C) 79 16 121/78   11/10/20 1310 97.6 °F (36.4 °C) 71 16 129/83   11/10/20 1240 98.1 °F (36.7 °C) 78 16 108/78   11/10/20 1206 97.6 °F (36.4 °C) 77 18 116/74   11/10/20 1136 98.8 °F (37.1 °C) 75 16 120/76   11/10/20 1106 98.1 °F (36.7 °C) 82 16 113/73   11/10/20 1035 98.5 °F (36.9 °C) 86 18 118/73   11/10/20 0905 98.6 °F (37 °C) 86 18 (!) 157/86       The following medications administered:  NS @ KVO  Solumedrol 125 mg IVP  Benadryl 25 mg IVP  Tylenol 650 mg solution via peg tube  Ruxience 1,000 mg IV titrated (started at 12.5 mL, increased by 12.5     Pt tolerated treatment well. No adverse reaction noted. IV flushed per policy and removed, 2x2 and coban placed. Pt discharged via wheelchair in no acute distress at 1445, accompanied by . Next appointment 11/25/20 @ 0800.

## 2020-11-17 RX ORDER — ACETAMINOPHEN 325 MG/1
650 TABLET ORAL ONCE
Status: DISCONTINUED | OUTPATIENT
Start: 2020-11-25 | End: 2020-11-25

## 2020-11-17 RX ORDER — DIPHENHYDRAMINE HYDROCHLORIDE 50 MG/ML
25 INJECTION, SOLUTION INTRAMUSCULAR; INTRAVENOUS ONCE
Status: COMPLETED | OUTPATIENT
Start: 2020-11-25 | End: 2020-11-25

## 2020-11-25 ENCOUNTER — HOSPITAL ENCOUNTER (OUTPATIENT)
Dept: INFUSION THERAPY | Age: 65
Discharge: HOME | End: 2020-11-25
Payer: MEDICARE

## 2020-11-25 VITALS
TEMPERATURE: 97.8 F | BODY MASS INDEX: 22.36 KG/M2 | HEIGHT: 64 IN | WEIGHT: 131 LBS | HEART RATE: 69 BPM | SYSTOLIC BLOOD PRESSURE: 155 MMHG | RESPIRATION RATE: 16 BRPM | DIASTOLIC BLOOD PRESSURE: 81 MMHG

## 2020-11-25 PROCEDURE — 96413 CHEMO IV INFUSION 1 HR: CPT

## 2020-11-25 PROCEDURE — 74011250636 HC RX REV CODE- 250/636: Performed by: INTERNAL MEDICINE

## 2020-11-25 PROCEDURE — 74011250637 HC RX REV CODE- 250/637: Performed by: INTERNAL MEDICINE

## 2020-11-25 PROCEDURE — 96375 TX/PRO/DX INJ NEW DRUG ADDON: CPT

## 2020-11-25 PROCEDURE — 96415 CHEMO IV INFUSION ADDL HR: CPT

## 2020-11-25 PROCEDURE — 74011000258 HC RX REV CODE- 258: Performed by: INTERNAL MEDICINE

## 2020-11-25 RX ADMIN — DIPHENHYDRAMINE HYDROCHLORIDE 25 MG: 50 INJECTION, SOLUTION INTRAMUSCULAR; INTRAVENOUS at 08:48

## 2020-11-25 RX ADMIN — ACETAMINOPHEN 650 MG: 325 SOLUTION ORAL at 08:49

## 2020-11-25 RX ADMIN — SODIUM CHLORIDE 1000 MG: 900 INJECTION, SOLUTION INTRAVENOUS at 09:20

## 2020-11-25 RX ADMIN — METHYLPREDNISOLONE SODIUM SUCCINATE 125 MG: 125 INJECTION, POWDER, FOR SOLUTION INTRAMUSCULAR; INTRAVENOUS at 08:49

## 2020-11-25 NOTE — PROGRESS NOTES
[facility] Progress Note    Date: 2020    Name: Maciel Mclaughlin    MRN: [id number]         : 1955    Ms. Jas Lyons arrived non ambulatory with wheelchair at [address] and in no distress for Ruxience D15. Assessment was completed, no acute issues at this time, no new complaints voiced. Covid Screening      1. Do you have any symptoms of COVID-19? SOB, coughing, fever, or generally not feeling well ? No  2. Have you been exposed to COVID-19 recently? No  3. Have you had any recent contact with family/friend that has a pending COVID test? No       Peripheral IV 20 Antecubital (Active)   Site Assessment Clean, dry, & intact 20 08   Phlebitis Assessment 0 20 08   Infiltration Assessment 0 20   Dressing Status New;Clean, dry, & intact 20   Dressing Type Transparent 20   Hub Color/Line Status Yellow; Flushed; Infusing 20 08   Action Taken Open ports on tubing capped 20   Alcohol Cap Used Yes 20 0800     ; + blood return noted, labs drawn and sent. Ms. Charmayne Gift vitals were reviewed. Patient Vitals for the past 12 hrs:   Temp Pulse Resp BP   20 1253 97.8 °F (36.6 °C) 69 16 (!) 155/81   20 1120 -- 70 16 (!) 145/88   20 1020 -- 67 16 (!) 153/86   20 0955 -- 67 16 (!) 159/88   20 0811 97.7 °F (36.5 °C) 76 16 (!) 145/89     Lab results were obtained and reviewed. Pre-medications  were administered as ordered and chemotherapy was initiated.      Medication:  Medications Administered     acetaminophen (TYLENOL) solution 650 mg     Admin Date  2020 Action  Given Dose  650 mg Route  Per G Tube Administered By  Chavez LINK          diphenhydrAMINE (BENADRYL) injection 25 mg     Admin Date  2020 Action  Given Dose  25 mg Route  IntraVENous Administered By  Chavez LINK          methylPREDNISolone (PF) (Solu-MEDROL) injection 125 mg     Admin Date  2020 Action  Given Dose  125 mg Route  IntraVENous Administered By  Ashley LINK          riTUXimab-pvvr (RUXIENCE) 1,000 mg in 0.9% sodium chloride 250 mL infusion     Admin Date  11/25/2020 Action  New Bag Dose  1000 mg Rate  25 mL/hr Route  IntraVENous Administered By  Ashley LINK              Patient PIV flushed and removed, bandage placed over site. Ms. Nancy Rodriguez tolerated treatment well and was discharged from [place] [address] in stable condition at 1300. She is aware of when to return for her next appointment. No future appointments.     Parag Vera  November 25, 2020

## 2021-01-01 NOTE — PROGRESS NOTES
PT DAILY TREATMENT NOTE - [facility] 2-15    Patient Name: Alex Nava  Date:2018  : 1955  [x]  Patient  Verified  Payor: Surya Pore / Plan: [place] & [place] / Product Type: Medicare /    In time: 11:57 AM  Out time: 12:54 PM  Total Treatment Time (min): 57 minutes  Total Timed Codes (min): 57 minutes  1:1 Treatment Time (MC only): 30 minutes   Visit #: 7     Treatment Area: Weakness [R53.1]  Localized edema [R60.0]    SUBJECTIVE  Pain Level (0-10 scale): 0/10  Any medication changes, allergies to medications, adverse drug reactions, diagnosis change, or new procedure performed?: [x] No    [] Yes (see summary sheet for update)  Subjective functional status/changes:   [] No changes reported  The Pt reports that she is doing well and feeling stronger. She continues to report diligence with her HEP. She was able to get her pulley system set up and has been using them at home frequently. OBJECTIVE    57 min Therapeutic Exercise:  [x] See flow sheet :   Rationale: increase ROM, increase strength, improve coordination, improve balance and increase proprioception to improve the patients ability to perform ADLs with less difficulty    With   [x] TE   [] TA   [] neuro   [] other: Patient Education: [x] Review HEP    [] Progressed/Changed HEP based on:   [] positioning   [] body mechanics   [] transfers   [] heat/ice application    [] other:      Other Objective/Functional Measures: None noted     Pain Level (0-10 scale) post treatment: 0/10    ASSESSMENT/Changes in Function:   The Pt tolerated the additions to her therapy program well and did not fatigue as quickly. Her axial weakness is improving in her hips, but her shoulders continue to be the most limited. Will continue to progress as tolerated during next PT session.   Patient will continue to benefit from skilled PT services to modify and progress therapeutic interventions, address functional mobility deficits, address ROM deficits, address strength deficits, analyze and address soft tissue restrictions, analyze and cue movement patterns, analyze and modify body mechanics/ergonomics, assess and modify postural abnormalities and instruct in home and community integration to attain remaining goals. []  See Plan of Care  []  See progress note/recertification  []  See Discharge Summary         Progress towards goals / Updated goals:  Short Term Goals: To be accomplished in 6 treatments:  1. The Pt will be independent and compliant with their HEP- progressing  Long Term Goals: To be accomplished in 20 treatments:  1. The Pt will score the MCII on her FOTO survey demonstrating improved overall function (47 to 59 points)- progressing  2. The Pt will be able to perform >/= 9 sit to stands in 30s demonstrating improved LE strength and stability- progressing  3. The Pt will perform the TUG test in </= 28s demonstrating improved community ambulation and gait speed- progressing  4.  The Pt will have >/= 70 degrees of active shoulder flexion to allow the Pt to be able to perform ADLs with less difficulty- progressing     PLAN  [x]  Upgrade activities as tolerated     [x]  Continue plan of care  [x]  Update interventions per flow sheet       []  Discharge due to:_  []  Other:_      Rogelio Betancourt, PT 6/11/2018  12:17 PM show

## 2021-01-19 DIAGNOSIS — M33.20 POLYMYOSITIS (HCC): Primary | ICD-10-CM

## 2021-01-19 DIAGNOSIS — Z79.52 CURRENT CHRONIC USE OF SYSTEMIC STEROIDS: ICD-10-CM

## 2021-01-19 DIAGNOSIS — M81.8 OTHER OSTEOPOROSIS WITHOUT CURRENT PATHOLOGICAL FRACTURE: ICD-10-CM

## 2021-02-11 ENCOUNTER — HOSPITAL ENCOUNTER (OUTPATIENT)
Dept: MAMMOGRAPHY | Age: 66
Discharge: HOME | End: 2021-02-11
Attending: NURSE PRACTITIONER
Payer: MEDICARE

## 2021-02-11 DIAGNOSIS — M81.8 OTHER OSTEOPOROSIS WITHOUT CURRENT PATHOLOGICAL FRACTURE: ICD-10-CM

## 2021-02-11 DIAGNOSIS — Z79.52 CURRENT CHRONIC USE OF SYSTEMIC STEROIDS: ICD-10-CM

## 2021-02-11 DIAGNOSIS — M33.20 POLYMYOSITIS (HCC): ICD-10-CM

## 2021-02-11 PROCEDURE — 77080 DXA BONE DENSITY AXIAL: CPT

## 2021-04-18 DIAGNOSIS — M33.22 POLYMYOSITIS WITH MYOPATHY (HCC): Primary | ICD-10-CM

## 2021-04-21 ENCOUNTER — TRANSCRIBE ORDER (OUTPATIENT)
Dept: SCHEDULING | Age: 66
End: 2021-04-21

## 2021-04-21 DIAGNOSIS — M33.22 POLYMYOSITIS WITH MYOPATHY (HCC): Primary | ICD-10-CM

## 2021-04-21 DIAGNOSIS — M33.20 POLYMYOSITIS (HCC): Primary | ICD-10-CM

## 2021-04-30 ENCOUNTER — HOSPITAL ENCOUNTER (OUTPATIENT)
Dept: MRI IMAGING | Age: 66
Discharge: HOME | End: 2021-04-30
Attending: NURSE PRACTITIONER
Payer: MEDICARE

## 2021-04-30 DIAGNOSIS — M33.20 POLYMYOSITIS (HCC): ICD-10-CM

## 2021-04-30 PROCEDURE — 73718 MRI LOWER EXTREMITY W/O DYE: CPT

## 2021-05-05 RX ORDER — DIPHENHYDRAMINE HYDROCHLORIDE 50 MG/ML
25 INJECTION, SOLUTION INTRAMUSCULAR; INTRAVENOUS ONCE
Status: COMPLETED | OUTPATIENT
Start: 2021-05-12 | End: 2021-05-12

## 2021-05-05 RX ORDER — ZOLEDRONIC ACID 5 MG/100ML
5 INJECTION, SOLUTION INTRAVENOUS ONCE
Status: COMPLETED | OUTPATIENT
Start: 2021-05-12 | End: 2021-05-12

## 2021-05-11 ENCOUNTER — HOSPITAL ENCOUNTER (OUTPATIENT)
Dept: NEUROLOGY | Age: 66
Discharge: HOME | End: 2021-05-11
Payer: MEDICARE

## 2021-05-11 DIAGNOSIS — M33.22 POLYMYOSITIS WITH MYOPATHY (HCC): ICD-10-CM

## 2021-05-11 PROCEDURE — 95908 NRV CNDJ TST 3-4 STUDIES: CPT

## 2021-05-11 PROCEDURE — 95885 MUSC TST DONE W/NERV TST LIM: CPT

## 2021-05-11 NOTE — PROGRESS NOTES
This is an EMG and nerve conduction study on [facility] a woman with polymyositis that has not responded to immunotherapy. Pt Is alert conversant and obedient but she is bedridden and not walking. Weakness is more proximal than distal.    The right peroneal motor velocity was 40.5 m/s with a distal motor latency of 4.9 ms and a motor amplitude of 3.8 mV and F-wave at 55.5 ms. The right sural sensory potential could not be recorded could not be recorded. The left posterior tibial distal motor latency was 7.5 ms with a velocity of 38.3 m/s and a motor amplitude of 2.3 mV and an F-wave latency of 55.7 ms. Left superficial peroneal sensory potential could not be elicited. An EMG needle exam was performed. The left medial grasp the left medial gastrocnemius, left tibialis anterior and left vastus medialis muscles showed normal insertion with relatively full recruitment with an increased number of small amplitude polyphasic motor units. The right iliopsoas right medial gastrocnemius right extensor hallucis longus and right tibialis anterior muscles were tested. The gastrocnemius muscle was irritable. All of the muscles in the right leg showed normal insertion with an increased number of small amplitude polyphasic motor units. The prominent feature of this study was the small amplitude polyphasic motor units demonstrated throughout the lower extremity muscles consistent with her history of polymyositis. In general there was not a lot of insertional activity in the muscles tested possibly suggesting chronic rather than acute pathology. Less prominent neuropathic changes were seen on the nerve conduction studies with absence of the sensory potentials and slight slowing of the motor conduction velocities with normal F waves and normal motor potential amplitudes. This may be part of a chronically ill polyneuropathy syndrome.     Please send a copy this test to nurse practitioner Abel Flowers [facility] to send a copy to a rheumatologist Dr. Adebayo Patino located in [address]. Jaye Urbina a neurologist at [facility] in [place] and

## 2021-05-12 ENCOUNTER — HOSPITAL ENCOUNTER (OUTPATIENT)
Dept: INFUSION THERAPY | Age: 66
Discharge: HOME | End: 2021-05-12
Payer: MEDICARE

## 2021-05-12 VITALS
DIASTOLIC BLOOD PRESSURE: 68 MMHG | HEART RATE: 87 BPM | RESPIRATION RATE: 18 BRPM | OXYGEN SATURATION: 100 % | BODY MASS INDEX: 24.2 KG/M2 | WEIGHT: 141 LBS | TEMPERATURE: 97.8 F | SYSTOLIC BLOOD PRESSURE: 140 MMHG

## 2021-05-12 LAB
ALBUMIN SERPL-MCNC: 3.9 G/DL (ref 3.5–5)
ANION GAP BLD CALC-SCNC: 9 MMOL/L (ref 10–20)
ANION GAP SERPL CALC-SCNC: 4 MMOL/L (ref 5–15)
BUN BLD-MCNC: 58 MG/DL (ref 9–20)
BUN SERPL-MCNC: 42 MG/DL (ref 6–20)
BUN/CREAT SERPL: 60 (ref 12–20)
CA-I BLD-MCNC: 1.3 MMOL/L (ref 1.12–1.32)
CALCIUM SERPL-MCNC: 10.3 MG/DL (ref 8.5–10.1)
CHLORIDE BLD-SCNC: 101 MMOL/L (ref 98–107)
CHLORIDE SERPL-SCNC: 102 MMOL/L (ref 97–108)
CO2 BLD-SCNC: 34 MMOL/L (ref 21–32)
CO2 SERPL-SCNC: 30 MMOL/L (ref 21–32)
CREAT BLD-MCNC: 0.7 MG/DL (ref 0.6–1.3)
CREAT SERPL-MCNC: 0.7 MG/DL (ref 0.55–1.02)
GLUCOSE BLD-MCNC: 81 MG/DL (ref 65–100)
GLUCOSE SERPL-MCNC: 72 MG/DL (ref 65–100)
HCT VFR BLD CALC: 45 % (ref 35–47)
PHOSPHATE SERPL-MCNC: 4 MG/DL (ref 2.6–4.7)
POTASSIUM BLD-SCNC: 5.4 MMOL/L (ref 3.5–5.1)
POTASSIUM SERPL-SCNC: 4.4 MMOL/L (ref 3.5–5.1)
SERVICE CMNT-IMP: ABNORMAL
SODIUM BLD-SCNC: 138 MMOL/L (ref 136–145)
SODIUM SERPL-SCNC: 136 MMOL/L (ref 136–145)

## 2021-05-12 PROCEDURE — 74011250637 HC RX REV CODE- 250/637: Performed by: INTERNAL MEDICINE

## 2021-05-12 PROCEDURE — 36415 COLL VENOUS BLD VENIPUNCTURE: CPT

## 2021-05-12 PROCEDURE — 74011000258 HC RX REV CODE- 258: Performed by: INTERNAL MEDICINE

## 2021-05-12 PROCEDURE — 80069 RENAL FUNCTION PANEL: CPT

## 2021-05-12 PROCEDURE — 96367 TX/PROPH/DG ADDL SEQ IV INF: CPT

## 2021-05-12 PROCEDURE — 96415 CHEMO IV INFUSION ADDL HR: CPT

## 2021-05-12 PROCEDURE — 96413 CHEMO IV INFUSION 1 HR: CPT

## 2021-05-12 PROCEDURE — 74011250636 HC RX REV CODE- 250/636: Performed by: INTERNAL MEDICINE

## 2021-05-12 PROCEDURE — 80047 BASIC METABLC PNL IONIZED CA: CPT

## 2021-05-12 RX ORDER — SODIUM CHLORIDE 9 MG/ML
25 INJECTION, SOLUTION INTRAVENOUS AS NEEDED
Status: DISCONTINUED | OUTPATIENT
Start: 2021-05-12 | End: 2021-05-13 | Stop reason: HOSPADM

## 2021-05-12 RX ADMIN — METHYLPREDNISOLONE SODIUM SUCCINATE 125 MG: 125 INJECTION, POWDER, FOR SOLUTION INTRAMUSCULAR; INTRAVENOUS at 10:54

## 2021-05-12 RX ADMIN — SODIUM CHLORIDE 25 ML/HR: 900 INJECTION, SOLUTION INTRAVENOUS at 10:54

## 2021-05-12 RX ADMIN — DIPHENHYDRAMINE HYDROCHLORIDE 25 MG: 50 INJECTION, SOLUTION INTRAMUSCULAR; INTRAVENOUS at 10:54

## 2021-05-12 RX ADMIN — ACETAMINOPHEN 650 MG: 325 SOLUTION ORAL at 11:07

## 2021-05-12 RX ADMIN — ZOLEDRONIC ACID 5 MG: 5 INJECTION, SOLUTION INTRAVENOUS at 11:10

## 2021-05-12 RX ADMIN — SODIUM CHLORIDE 1000 MG: 900 INJECTION, SOLUTION INTRAVENOUS at 11:35

## 2021-05-12 NOTE — PROGRESS NOTES
Outpatient Infusion Center - Chemotherapy Progress Note    0915 - Pt admit to [facility] for Ruxience in stable condition. Assessment completed. Patient denied having any symptoms of COVID-19, i.e. SOB, coughing, fever, or generally not feeling well. Also denies having been exposed to COVID-19 recently or having had any recent contact with family/friend that has a pending COVID test.     #24 PIV established with positive blood return. Labs drawn per order and sent. Line flushed, clamped, Curos Cap applied to end clave.     Patient Vitals for the past 12 hrs:   Temp Pulse Resp BP SpO2   05/12/21 1506 -- 87 18 (!) 140/68 --   05/12/21 1305 -- 88 18 130/78 --   05/12/21 1235 -- 92 18 118/75 --   05/12/21 1205 -- 78 18 133/76 --   05/12/21 0920 97.8 °F (36.6 °C) 86 18 (!) 144/76 100 %       Recent Results (from the past 24 hour(s))   POC CHEM8    Collection Time: 05/12/21 10:06 AM   Result Value Ref Range    Calcium, ionized (POC) 1.30 1.12 - 1.32 mmol/L    Sodium (POC) 138 136 - 145 mmol/L    Potassium (POC) 5.4 (H) 3.5 - 5.1 mmol/L    Chloride (POC) 101 98 - 107 mmol/L    CO2 (POC) 34 (H) 21 - 32 mmol/L    Anion gap (POC) 9 (L) 10 - 20 mmol/L    Glucose (POC) 81 65 - 100 mg/dL    BUN (POC) 58 (H) 9 - 20 mg/dL    Creatinine (POC) 0.70 0.6 - 1.3 mg/dL    GFRAA, POC >60 >60 ml/min/1.73m2    GFRNA, POC >60 >60 ml/min/1.73m2    Hematocrit (POC) 45 35.0 - 47.0 %    Comment Notified RN or MD immediately by      RENAL FUNCTION PANEL    Collection Time: 05/12/21 10:38 AM   Result Value Ref Range    Sodium 136 136 - 145 mmol/L    Potassium 4.4 3.5 - 5.1 mmol/L    Chloride 102 97 - 108 mmol/L    CO2 30 21 - 32 mmol/L    Anion gap 4 (L) 5 - 15 mmol/L    Glucose 72 65 - 100 mg/dL    BUN 42 (H) 6 - 20 MG/DL    Creatinine 0.70 0.55 - 1.02 MG/DL    BUN/Creatinine ratio 60 (H) 12 - 20      GFR est AA >60 >60 ml/min/1.73m2    GFR est non-AA >60 >60 ml/min/1.73m2    Calcium 10.3 (H) 8.5 - 10.1 MG/DL    Phosphorus 4.0 2.6 - 4.7 MG/DL Albumin 3.9 3.5 - 5.0 g/dL     Medications Administered     0.9% sodium chloride infusion     Admin Date  05/12/2021 Action  New Bag Dose  25 mL/hr Rate  25 mL/hr Route  IntraVENous Administered By  Miguel Raphael          acetaminophen (TYLENOL) solution 650 mg     Admin Date  05/12/2021 Action  Given Dose  650 mg Route  Per G Tube Administered By  Miguel Raphael          diphenhydrAMINE (BENADRYL) injection 25 mg     Admin Date  05/12/2021 Action  Given Dose  25 mg Route  IntraVENous Administered By  Miguel Raphael          methylPREDNISolone (PF) (Solu-MEDROL) injection 125 mg     Admin Date  05/12/2021 Action  Given Dose  125 mg Route  IntraVENous Administered By  Miguel Raphael          riTUXimab-pvvr (Alphia [place]) 1,000 mg in 0.9% sodium chloride 250 mL infusion     Admin Date  05/12/2021 Action  New Bag Dose  1,000 mg Route  IntraVENous Administered By  Miguel Raphael          zoledronic acid (RECLAST) IVPB 5 mg     Admin Date  05/12/2021 Action  Given Dose  5 mg Rate  400 mL/hr Route  IntraVENous Administered By  Miguel Raphael                Pt tolerated treatment well. PIV maintained positive blood return throughout treatment, flushed with positive blood return at conclusion, and DCd.     1525 - D/c home in no distress. Pt aware of next [facility] appointment scheduled for: PSR to schedule, as requested by nurse.      Future Appointments   Date Time Provider Nel Davis   5/12/2021  9:00 AM RA INFUSION NURSE 1 [facility]   5/26/2021  9:00 AM RA INFUSION NURSE 1 [address]

## 2021-05-19 RX ORDER — DIPHENHYDRAMINE HYDROCHLORIDE 50 MG/ML
25 INJECTION, SOLUTION INTRAMUSCULAR; INTRAVENOUS ONCE
Status: COMPLETED | OUTPATIENT
Start: 2021-05-26 | End: 2021-05-26

## 2021-05-26 ENCOUNTER — HOSPITAL ENCOUNTER (OUTPATIENT)
Dept: INFUSION THERAPY | Age: 66
Discharge: HOME | End: 2021-05-26
Payer: MEDICARE

## 2021-05-26 VITALS
TEMPERATURE: 97.8 F | WEIGHT: 141 LBS | RESPIRATION RATE: 18 BRPM | BODY MASS INDEX: 24.2 KG/M2 | SYSTOLIC BLOOD PRESSURE: 121 MMHG | DIASTOLIC BLOOD PRESSURE: 72 MMHG | HEART RATE: 78 BPM

## 2021-05-26 LAB
BASOPHILS # BLD: 0.1 K/UL (ref 0–0.1)
BASOPHILS NFR BLD: 1 % (ref 0–1)
DIFFERENTIAL METHOD BLD: ABNORMAL
EOSINOPHIL # BLD: 0.3 K/UL (ref 0–0.4)
EOSINOPHIL NFR BLD: 5 % (ref 0–7)
ERYTHROCYTE [DISTWIDTH] IN BLOOD BY AUTOMATED COUNT: 15.5 % (ref 11.5–14.5)
ERYTHROCYTE [SEDIMENTATION RATE] IN BLOOD: 15 MM/HR (ref 0–30)
HCT VFR BLD AUTO: 38.1 % (ref 35–47)
HGB BLD-MCNC: 12.2 G/DL (ref 11.5–16)
IMM GRANULOCYTES # BLD AUTO: 0.1 K/UL (ref 0–0.04)
IMM GRANULOCYTES NFR BLD AUTO: 1 % (ref 0–0.5)
LYMPHOCYTES # BLD: 1.6 K/UL (ref 0.8–3.5)
LYMPHOCYTES NFR BLD: 25 % (ref 12–49)
MCH RBC QN AUTO: 23.6 PG (ref 26–34)
MCHC RBC AUTO-ENTMCNC: 32 G/DL (ref 30–36.5)
MCV RBC AUTO: 73.6 FL (ref 80–99)
MONOCYTES # BLD: 1.1 K/UL (ref 0–1)
MONOCYTES NFR BLD: 17 % (ref 5–13)
NEUTS SEG # BLD: 3.2 K/UL (ref 1.8–8)
NEUTS SEG NFR BLD: 51 % (ref 32–75)
NRBC # BLD: 0 K/UL (ref 0–0.01)
NRBC BLD-RTO: 0 PER 100 WBC
PLATELET # BLD AUTO: 202 K/UL (ref 150–400)
RBC # BLD AUTO: 5.18 M/UL (ref 3.8–5.2)
WBC # BLD AUTO: 6.4 K/UL (ref 3.6–11)

## 2021-05-26 PROCEDURE — 96375 TX/PRO/DX INJ NEW DRUG ADDON: CPT

## 2021-05-26 PROCEDURE — 74011250637 HC RX REV CODE- 250/637: Performed by: INTERNAL MEDICINE

## 2021-05-26 PROCEDURE — 74011250636 HC RX REV CODE- 250/636: Performed by: INTERNAL MEDICINE

## 2021-05-26 PROCEDURE — 85652 RBC SED RATE AUTOMATED: CPT

## 2021-05-26 PROCEDURE — 74011000258 HC RX REV CODE- 258: Performed by: INTERNAL MEDICINE

## 2021-05-26 PROCEDURE — 36415 COLL VENOUS BLD VENIPUNCTURE: CPT

## 2021-05-26 PROCEDURE — 85025 COMPLETE CBC W/AUTO DIFF WBC: CPT

## 2021-05-26 PROCEDURE — 96417 CHEMO IV INFUS EACH ADDL SEQ: CPT

## 2021-05-26 PROCEDURE — 96413 CHEMO IV INFUSION 1 HR: CPT

## 2021-05-26 RX ORDER — SODIUM CHLORIDE 9 MG/ML
25 INJECTION, SOLUTION INTRAVENOUS CONTINUOUS
Status: DISCONTINUED | OUTPATIENT
Start: 2021-05-26 | End: 2021-05-27 | Stop reason: HOSPADM

## 2021-05-26 RX ADMIN — DIPHENHYDRAMINE HYDROCHLORIDE 25 MG: 50 INJECTION, SOLUTION INTRAMUSCULAR; INTRAVENOUS at 09:42

## 2021-05-26 RX ADMIN — SODIUM CHLORIDE 25 ML/HR: 900 INJECTION, SOLUTION INTRAVENOUS at 09:42

## 2021-05-26 RX ADMIN — ACETAMINOPHEN ORAL SOLUTION 650 MG: 650 SOLUTION ORAL at 10:02

## 2021-05-26 RX ADMIN — METHYLPREDNISOLONE SODIUM SUCCINATE 125 MG: 125 INJECTION, POWDER, FOR SOLUTION INTRAMUSCULAR; INTRAVENOUS at 09:42

## 2021-05-26 RX ADMIN — SODIUM CHLORIDE 1000 MG: 900 INJECTION, SOLUTION INTRAVENOUS at 10:10

## 2021-05-26 NOTE — PROGRESS NOTES
Outpatient Infusion Center - Progress Note    0830- Pt admit to [facility] for Ruxience in stable condition. Assessment completed, patient continues baseline muscle atrophy and weakness throughout, PEG tube for feeds and wheelchair bound. Patient denied having any symptoms of COVID-19, i.e. SOB, coughing, fever, or generally not feeling well. Also denies having been exposed to COVID-19 recently or having had any recent contact with family/friend that has a pending COVID test.     Right hand PIV established with positive blood return. Labs drawn per order and sent. Line flushed, clamped, Curos Cap applied to end clave. Patient Vitals for the past 12 hrs:   Temp Pulse Resp BP   05/26/21 1348 -- 78 -- 121/72   05/26/21 1140 -- 76 -- 120/66   05/26/21 1110 -- 85 -- 116/72   05/26/21 1040 -- 81 -- 127/72   05/26/21 0839 97.8 °F (36.6 °C) 76 18 (!) 142/76        Recent Results (from the past 12 hour(s))   CBC WITH AUTOMATED DIFF    Collection Time: 05/26/21  8:45 AM   Result Value Ref Range    WBC 6.4 3.6 - 11.0 K/uL    RBC 5.18 3.80 - 5.20 M/uL    HGB 12.2 11.5 - 16.0 g/dL    HCT 38.1 35.0 - 47.0 %    MCV 73.6 (L) 80.0 - 99.0 FL    MCH 23.6 (L) 26.0 - 34.0 PG    MCHC 32.0 30.0 - 36.5 g/dL    RDW 15.5 (H) 11.5 - 14.5 %    PLATELET 473 389 - 304 K/uL    NRBC 0.0 0  WBC    ABSOLUTE NRBC 0.00 0.00 - 0.01 K/uL    NEUTROPHILS 51 32 - 75 %    LYMPHOCYTES 25 12 - 49 %    MONOCYTES 17 (H) 5 - 13 %    EOSINOPHILS 5 0 - 7 %    BASOPHILS 1 0 - 1 %    IMMATURE GRANULOCYTES 1 (H) 0.0 - 0.5 %    ABS. NEUTROPHILS 3.2 1.8 - 8.0 K/UL    ABS. LYMPHOCYTES 1.6 0.8 - 3.5 K/UL    ABS. MONOCYTES 1.1 (H) 0.0 - 1.0 K/UL    ABS. EOSINOPHILS 0.3 0.0 - 0.4 K/UL    ABS. BASOPHILS 0.1 0.0 - 0.1 K/UL    ABS. IMM.  GRANS. 0.1 (H) 0.00 - 0.04 K/UL    DF AUTOMATED     SED RATE (ESR)    Collection Time: 05/26/21  8:45 AM   Result Value Ref Range    Sed rate, automated 15 0 - 30 mm/hr        Medications:  Medications Administered     0.9% sodium chloride infusion     Admin Date  05/26/2021 Action  New Bag Dose  25 mL/hr Rate  25 mL/hr Route  IntraVENous Administered By  Jean Wong RN          acetaminophen (TYLENOL) solution 650 mg     Admin Date  05/26/2021 Action  Given Dose  650 mg Route  Per G Tube Administered By  Jean Wong RN          diphenhydrAMINE (BENADRYL) injection 25 mg     Admin Date  05/26/2021 Action  Given Dose  25 mg Route  IntraVENous Administered By  Jean Wong RN          methylPREDNISolone (PF) (Solu-MEDROL) injection 125 mg     Admin Date  05/26/2021 Action  Given Dose  125 mg Route  IntraVENous Administered By  Jean Wong RN          riTUXimab-pvvr (Volodymyria Jasper) 1,000 mg in 0.9% sodium chloride 250 mL infusion     Admin Date  05/26/2021 Action  New Bag Dose  1,000 mg Route  IntraVENous Administered By  Jean Wong RN                 Pt tolerated treatment well. PIVmaintained positive blood return throughout treatment, flushed with positive blood return at conclusion, and de-accessed. 1400- D/c to [facility] in no distress.

## 2021-06-22 ENCOUNTER — TRANSCRIBE ORDER (OUTPATIENT)
Dept: SCHEDULING | Age: 66
End: 2021-06-22

## 2021-06-22 DIAGNOSIS — M16.11 OSTEOARTHRITIS OF RIGHT HIP, UNSPECIFIED OSTEOARTHRITIS TYPE: Primary | ICD-10-CM

## 2021-06-24 ENCOUNTER — APPOINTMENT (OUTPATIENT)
Dept: GENERAL RADIOLOGY | Age: 66
End: 2021-06-24
Attending: EMERGENCY MEDICINE
Payer: MEDICARE

## 2021-06-24 ENCOUNTER — HOSPITAL ENCOUNTER (EMERGENCY)
Age: 66
Discharge: NURSING, REHAB OR LONG-TERM CARE | End: 2021-06-24
Attending: EMERGENCY MEDICINE
Payer: MEDICARE

## 2021-06-24 VITALS
SYSTOLIC BLOOD PRESSURE: 135 MMHG | HEART RATE: 82 BPM | HEIGHT: 64 IN | WEIGHT: 171.74 LBS | TEMPERATURE: 98.3 F | OXYGEN SATURATION: 98 % | RESPIRATION RATE: 16 BRPM | DIASTOLIC BLOOD PRESSURE: 62 MMHG | BODY MASS INDEX: 29.32 KG/M2

## 2021-06-24 DIAGNOSIS — T85.848A PAIN AROUND PERCUTANEOUS ENDOSCOPIC GASTROSTOMY (PEG) TUBE SITE, INITIAL ENCOUNTER: Primary | ICD-10-CM

## 2021-06-24 PROCEDURE — 74018 RADEX ABDOMEN 1 VIEW: CPT

## 2021-06-24 PROCEDURE — 74011000636 HC RX REV CODE- 636: Performed by: EMERGENCY MEDICINE

## 2021-06-24 PROCEDURE — 99283 EMERGENCY DEPT VISIT LOW MDM: CPT

## 2021-06-24 RX ADMIN — DIATRIZOATE MEGLUMINE AND DIATRIZOATE SODIUM 30 ML: 600; 100 SOLUTION ORAL; RECTAL at 13:20

## 2021-06-24 NOTE — ED PROVIDER NOTES
EMERGENCY DEPARTMENT HISTORY AND PHYSICAL EXAM      Date: 6/24/2021  Patient Name: Sheila Feliciano  Patient Age and Sex: 72 y.o. female     History of Presenting Illness     No chief complaint on file. History Provided By: Patient    HPI: Sheila Feliciano is a 75-year-old female presenting with PEG tube problem. Patient states that she had her PEG tube placed about a year ago. States that 2 weeks ago it was tugged by staff members and ever since then has been having pain in her abdomen around the PEG tube site. Denies any increased drainage, fevers, vomiting. There are no other complaints, changes, or physical findings at this time. PCP: Charlie Moe NP    No current facility-administered medications on file prior to encounter. Current Outpatient Medications on File Prior to Encounter   Medication Sig Dispense Refill    METHOTREXATE by Per G Tube route Every Thursday.  irbesartan (AVAPRO) 300 mg tablet 1 Tab by Per G Tube route nightly. 30 Tab 2    amLODIPine (NORVASC) 5 mg tablet 1 Tab by Per G Tube route daily. 30 Tab 2    predniSONE (DELTASONE) 20 mg tablet Take 60 mg by mouth daily (with breakfast). 30 Tab 0    scopolamine (TRANSDERM-SCOP) 1 mg over 3 days pt3d 1 Patch by TransDERmal route every seventy-two (72) hours. 10 Patch 1    pantoprazole (Protonix) 40 mg tablet Take 1 Tab by mouth daily. 30 Tab 1       Past History     Past Medical History:  Past Medical History:   Diagnosis Date    Congestive heart failure ([address]     Hypertension     Pneumonia 10/2018    Polymyositis ([address] 12/2015    in setting of [address] 2015    Polymyositis associated with autoimmune disease ([address]     Renal cancer ([address] 07/08/2016    s/p right nephrectomy.      Respiratory arrest ([address] 11/2015    [facility].    Jeri Salinas SBO (small bowel obstruction) ([facility] [address] 8/3/2017       Past Surgical History:  Past Surgical History:   Procedure Laterality Date    HX GYN  1999    hysterectomy    HX NEPHRECTOMY Right     for kidney cancer    US GUIDED CORE BREAST BIOPSY Left     Negative       Family History:  Family History   Problem Relation Age of Onset   [employer] Cancer Mother     Breast Cancer Mother 68    Diabetes Father     Hypertension Father     Dementia Father 80    Stroke Maternal Grandmother     Breast Cancer Cousin         52's       Social History:  Social History     Tobacco Use    Smoking status: Former Smoker     Packs/day: 1.00     Years: 20.00     Pack years: 20.00     Types: Cigarettes     Quit date: 1998     Years since quittin.1    Smokeless tobacco: Never Used   Substance Use Topics    Alcohol use: No     Alcohol/week: 1.0 standard drinks     Types: 1 Glasses of wine per week    Drug use: No       Allergies: Allergies   Allergen Reactions    Ace Inhibitors Cough    Dilaudid [Hydromorphone] Other (comments)    Levaquin [Levofloxacin] Other (comments)     Leg swelling    Lisinopril Other (comments)     Cough      Metoprolol Other (comments)     hallucinations    Peanut Other (comments)         Review of Systems   Review of Systems   Constitutional: Negative for chills and fever. Respiratory: Negative for cough and shortness of breath. Cardiovascular: Negative for chest pain. Gastrointestinal: Positive for abdominal pain. Negative for constipation, diarrhea, nausea and vomiting. Genitourinary: Negative for dysuria, frequency and hematuria. Neurological: Negative for weakness and numbness. All other systems reviewed and are negative. Physical Exam   Physical Exam  Constitutional:       General: She is not in acute distress. Appearance: She is well-developed. HENT:      Head: Normocephalic and atraumatic. Nose: Nose normal.      Mouth/Throat:      Mouth: Mucous membranes are moist.   Eyes:      Extraocular Movements: Extraocular movements intact. Conjunctiva/sclera: Conjunctivae normal.   Cardiovascular:      Comments:  Well perfused  Pulmonary:      Effort: Pulmonary effort is normal. No respiratory distress. Abdominal:      Comments: Patient has PEG tube in her left abdomen that appears to be in place. No surrounding erythema or fluctuance. No drainage noted. No bleeding. Some tenderness when moving the PEG tube in and out. Minimal if any tenderness around the PEG tube. Musculoskeletal:         General: Normal range of motion. Cervical back: Normal range of motion. Neurological:      General: No focal deficit present. Mental Status: She is alert and oriented to person, place, and time. Psychiatric:         Mood and Affect: Mood normal.          Diagnostic Study Results     Labs -   No results found for this or any previous visit (from the past 12 hour(s)). Radiologic Studies -   XR ABD (KUB)   Final Result   No acute findings. Contrast injected in the PEG tube demonstrates   opacification of the stomach. CT Results  (Last 48 hours)    None        CXR Results  (Last 48 hours)    None            Medical Decision Making   I am the first provider for this patient. I reviewed the vital signs, available nursing notes, past medical history, past surgical history, family history and social history. Vital Signs-Reviewed the patient's vital signs. Patient Vitals for the past 12 hrs:   Temp Pulse Resp BP SpO2   06/24/21 1253 98.3 °F (36.8 °C) 82 16 135/62 98 %       Records Reviewed: Nursing Notes and Old Medical Records    Provider Notes (Medical Decision Making):   Patient presenting with pain around PEG tube. Will double check that PEG tube is actually in the right spot. Might have injured some scar tissue causing some pain. No signs of infection or hematoma. ED Course:   Initial assessment performed. The patients presenting problems have been discussed, and they are in agreement with the care plan formulated and outlined with them.   I have encouraged them to ask questions as they arise throughout their visit. Critical Care Time:   0    Disposition:  Discharge Note:  The patient has been re-evaluated and is ready for discharge. Reviewed available results with patient. Counseled patient on diagnosis and care plan. Patient has expressed understanding, and all questions have been answered. Patient agrees with plan and agrees to follow up as recommended, or to return to the ED if their symptoms worsen. Discharge instructions have been provided and explained to the patient, along with reasons to return to the ED. PLAN:  Discharge Medication List as of 6/24/2021  1:40 PM        2. Follow-up Information     Follow up With Specialties Details Why Contact Info    Unknown [facility]  As needed [address]  [phone]          3. Return to ED if worse     Diagnosis     Clinical Impression:   1. Pain around percutaneous endoscopic gastrostomy (PEG) tube site, initial encounter        Attestations:    Darren Baldwin M.D. Please note that this dictation was completed with [employer] the computer voice recognition software. Quite often unanticipated grammatical, syntax, homophones, and other interpretive errors are inadvertently transcribed by the computer software. Please disregard these errors. Please excuse any errors that have escaped final proofreading. Thank you.

## 2021-06-24 NOTE — DISCHARGE INSTRUCTIONS
You started having pain around PEG tube site around 2 weeks ago. Likely had a little bit of trauma to the muscle of the abdomen. It will take some time to heal.  Luckily the PEG tube is in the right place with no signs of infection or hematoma.

## 2021-06-24 NOTE — ED NOTES
Kyle Love reviewed discharge instructions with the patient. The patient verbalized understanding. All questions and concerns were addressed. The patient departed on a stretcher and discharged into  the care of [facility] for transport to [address] [place] [address] with instructions and prescriptions in hand. Pt is alert and oriented x 4. Respirations are clear and unlabored.

## 2021-06-24 NOTE — Clinical Note
[address]  \[facility] [place] [facility]\"" EMERGENCY DEPT  [address] [address]  [phone]    Work/School Note    Date: 6/24/2021    To Whom It May concern:      Polly Ceballos was seen and treated today in the emergency room by the following provider(s):  Attending Provider: Aidee Schreiber MD.      Polly Ceballos is excused from work/school on 06/24/21. She is clear to return to work/school on 06/25/21.         Sincerely,          Kierra Saunders MD

## 2021-06-24 NOTE — ED TRIAGE NOTES
Pt arrived from [facility] by EMS. Pt has had a PEG tube for 1+ year with no complications. Pt was turned recently by staff at facility and felt a tug on PEG tube. Pt states there is blood and drainage around PEG tube insertion site. Pt states that when feedings occur she feels pain at begining of feedings.

## 2021-07-07 ENCOUNTER — TRANSCRIBE ORDER (OUTPATIENT)
Dept: SCHEDULING | Age: 66
End: 2021-07-07

## 2021-09-29 ENCOUNTER — DOCUMENTATION ONLY (OUTPATIENT)
Dept: INTERNAL MEDICINE CLINIC | Age: 66
End: 2021-09-29

## 2021-09-30 DIAGNOSIS — Z12.31 VISIT FOR SCREENING MAMMOGRAM: Primary | ICD-10-CM

## 2021-12-27 ENCOUNTER — TRANSCRIBE ORDER (OUTPATIENT)
Dept: SCHEDULING | Age: 66
End: 2021-12-27

## 2021-12-27 DIAGNOSIS — Z12.31 SCREENING MAMMOGRAM, ENCOUNTER FOR: Primary | ICD-10-CM

## 2022-01-04 RX ORDER — ACETAMINOPHEN 325 MG/1
650 TABLET ORAL ONCE
Status: COMPLETED | OUTPATIENT
Start: 2022-01-11 | End: 2022-01-11

## 2022-01-04 RX ORDER — DIPHENHYDRAMINE HYDROCHLORIDE 50 MG/ML
25 INJECTION, SOLUTION INTRAMUSCULAR; INTRAVENOUS ONCE
Status: COMPLETED | OUTPATIENT
Start: 2022-01-11 | End: 2022-01-11

## 2022-01-11 ENCOUNTER — HOSPITAL ENCOUNTER (OUTPATIENT)
Dept: INFUSION THERAPY | Age: 67
Discharge: HOME | End: 2022-01-11
Payer: MEDICARE

## 2022-01-11 ENCOUNTER — APPOINTMENT (OUTPATIENT)
Dept: INFUSION THERAPY | Age: 67
End: 2022-01-11

## 2022-01-11 VITALS
RESPIRATION RATE: 18 BRPM | DIASTOLIC BLOOD PRESSURE: 70 MMHG | SYSTOLIC BLOOD PRESSURE: 126 MMHG | HEART RATE: 86 BPM | TEMPERATURE: 97.8 F

## 2022-01-11 PROCEDURE — 74011000258 HC RX REV CODE- 258: Performed by: INTERNAL MEDICINE

## 2022-01-11 PROCEDURE — 74011250636 HC RX REV CODE- 250/636: Performed by: INTERNAL MEDICINE

## 2022-01-11 PROCEDURE — 74011250637 HC RX REV CODE- 250/637: Performed by: INTERNAL MEDICINE

## 2022-01-11 PROCEDURE — 96375 TX/PRO/DX INJ NEW DRUG ADDON: CPT

## 2022-01-11 PROCEDURE — 96417 CHEMO IV INFUS EACH ADDL SEQ: CPT

## 2022-01-11 PROCEDURE — 96413 CHEMO IV INFUSION 1 HR: CPT

## 2022-01-11 RX ADMIN — ACETAMINOPHEN 650 MG: 325 TABLET ORAL at 09:55

## 2022-01-11 RX ADMIN — METHYLPREDNISOLONE SODIUM SUCCINATE 80 MG: 40 INJECTION, POWDER, FOR SOLUTION INTRAMUSCULAR; INTRAVENOUS at 09:52

## 2022-01-11 RX ADMIN — DIPHENHYDRAMINE HYDROCHLORIDE 25 MG: 50 INJECTION INTRAMUSCULAR; INTRAVENOUS at 09:52

## 2022-01-11 RX ADMIN — SODIUM CHLORIDE 1000 MG: 900 INJECTION, SOLUTION INTRAVENOUS at 10:13

## 2022-01-11 NOTE — PROGRESS NOTES
Outpatient Infusion Center Progress Note    2328  Pt arrived in stable condition for rituxan (arrived via transportation/stretcher). Assessment completed. Patient denied having any symptoms of COVID-19, i.e. SOB, coughing, fever, or generally not feeling well. Also denies having been exposed to COVID-19 recently or having had any recent contact with family/friend that has a pending COVID test.     24G PIV established in 20 [facility] on third attempt and positive blood return noted. Patient Vitals for the past 12 hrs:   Temp Pulse Resp BP   01/11/22 1330 -- 86 18 126/70   01/11/22 1143 -- 74 18 136/63   01/11/22 1113 -- 76 18 138/62   01/11/22 1043 -- 80 18 (!) 140/66   01/11/22 0910 97.8 °F (36.6 °C) 76 18 (!) 143/66        The following medications administered:  Medications Administered     acetaminophen (TYLENOL) tablet 650 mg     Admin Date  01/11/2022 Action  Given Dose  650 mg Route  Oral Administered By  ABEL Shirley          diphenhydrAMINE (BENADRYL) injection 25 mg     Admin Date  01/11/2022 Action  Given Dose  25 mg Route  IntraVENous Administered By  ABEL Shirley          methylPREDNISolone (PF) (SOLU-MEDROL) injection 80 mg     Admin Date  01/11/2022 Action  Given Dose  80 mg Route  IntraVENous Administered By  ABEL Shirley          riTUXimab-pvvr (RUXIENCE) 1,000 mg in 0.9% sodium chloride 250 mL infusion     Admin Date  01/11/2022 Action  New Bag Dose  1,000 mg Route  IntraVENous Administered By  ABEL Shirley                Pt tolerated treatment well. IV flushed per policy and removed, 2x2 and coban placed. Pt provided with education on possible side effects of medication along with discharge instructions. Pt verbalized understanding. 1340  Pt discharged in no acute distress. Next appointment:    Future Appointments   Date Time Provider Nel Davis   1/18/2022  1:15 PM [facility] [place] LONA 4 Madalyn Carr.  PIEDAD NARANJO   1/25/2022  9:00 AM [facility] INFUSION NURSE 2 [address] [facility]

## 2022-01-18 RX ORDER — ACETAMINOPHEN 325 MG/1
650 TABLET ORAL ONCE
Status: DISCONTINUED | OUTPATIENT
Start: 2022-01-25 | End: 2022-01-25

## 2022-01-18 RX ORDER — DIPHENHYDRAMINE HYDROCHLORIDE 50 MG/ML
25 INJECTION, SOLUTION INTRAMUSCULAR; INTRAVENOUS ONCE
Status: COMPLETED | OUTPATIENT
Start: 2022-01-25 | End: 2022-01-25

## 2022-01-20 ENCOUNTER — OFFICE VISIT (OUTPATIENT)
Dept: NEUROLOGY | Age: 67
End: 2022-01-20
Payer: MEDICARE

## 2022-01-20 VITALS
WEIGHT: 185 LBS | SYSTOLIC BLOOD PRESSURE: 116 MMHG | BODY MASS INDEX: 31.58 KG/M2 | HEART RATE: 85 BPM | HEIGHT: 64 IN | DIASTOLIC BLOOD PRESSURE: 76 MMHG

## 2022-01-20 DIAGNOSIS — M33.20 POLYMYOSITIS (HCC): Primary | ICD-10-CM

## 2022-01-20 PROCEDURE — G9899 SCRN MAM PERF RSLTS DOC: HCPCS | Performed by: PSYCHIATRY & NEUROLOGY

## 2022-01-20 PROCEDURE — 3017F COLORECTAL CA SCREEN DOC REV: CPT | Performed by: PSYCHIATRY & NEUROLOGY

## 2022-01-20 PROCEDURE — G8417 CALC BMI ABV UP PARAM F/U: HCPCS | Performed by: PSYCHIATRY & NEUROLOGY

## 2022-01-20 PROCEDURE — G8536 NO DOC ELDER MAL SCRN: HCPCS | Performed by: PSYCHIATRY & NEUROLOGY

## 2022-01-20 PROCEDURE — G8754 DIAS BP LESS 90: HCPCS | Performed by: PSYCHIATRY & NEUROLOGY

## 2022-01-20 PROCEDURE — G8427 DOCREV CUR MEDS BY ELIG CLIN: HCPCS | Performed by: PSYCHIATRY & NEUROLOGY

## 2022-01-20 PROCEDURE — 1090F PRES/ABSN URINE INCON ASSESS: CPT | Performed by: PSYCHIATRY & NEUROLOGY

## 2022-01-20 PROCEDURE — G8752 SYS BP LESS 140: HCPCS | Performed by: PSYCHIATRY & NEUROLOGY

## 2022-01-20 PROCEDURE — G8432 DEP SCR NOT DOC, RNG: HCPCS | Performed by: PSYCHIATRY & NEUROLOGY

## 2022-01-20 PROCEDURE — 1101F PT FALLS ASSESS-DOCD LE1/YR: CPT | Performed by: PSYCHIATRY & NEUROLOGY

## 2022-01-20 PROCEDURE — 99214 OFFICE O/P EST MOD 30 MIN: CPT | Performed by: PSYCHIATRY & NEUROLOGY

## 2022-01-20 NOTE — PROGRESS NOTES
Neurology Clinic Follow up Note    Patient ID:  Prema Barth  [id number]  31 y.o.  1955      Ms. Deion Huertas is here for follow up today of  Chief Complaint   Patient presents with   [facility] Follow Up          Last Appointment With Me:  Visit date not found       Interval History:     Ms. Deion Huertas was referred to [facility] neurology clinic as a new patient that she is been seen by Dr. Karyle Garret of [facility] on several occasions. 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 She has been seen at multiple academic centers underwent muscle biopsy to 15 which is overall nonspecific. She has had notable decline since thousand 20 essentially being bedbound since that time. Was sent here today for apparent reevaluation. PMHx/ PSHx/ FHx/ SHx:  Reviewed and unchanged previous visit. ROS:  Comprehensive review of systems negative except for as noted above. Objective:       Meds:  Current Outpatient Medications   Medication Sig Dispense Refill    METHOTREXATE by Per G Tube route Every Thursday.  irbesartan (AVAPRO) 300 mg tablet 1 Tab by Per G Tube route nightly. 30 Tab 2    amLODIPine (NORVASC) 5 mg tablet 1 Tab by Per G Tube route daily. 30 Tab 2    predniSONE (DELTASONE) 20 mg tablet Take 60 mg by mouth daily (with breakfast). 30 Tab 0    scopolamine (TRANSDERM-SCOP) 1 mg over 3 days pt3d 1 Patch by TransDERmal route every seventy-two (72) hours. 10 Patch 1    pantoprazole (Protonix) 40 mg tablet Take 1 Tab by mouth daily.  30 Tab 1     Facility-Administered Medications Ordered in Other Visits   Medication Dose Route Frequency Provider Last Rate Last Admin    [START ON 1/25/2022] acetaminophen (TYLENOL) tablet 650 mg  650 mg Oral ONCE Kavon PumpMD Clau ON 1/25/2022] diphenhydrAMINE (BENADRYL) injection 25 mg  25 mg IntraVENous ONCE Kavon PumpMD Clau ON 1/25/2022] methylPREDNISolone (PF) (SOLU-MEDROL) injection 80 mg  80 mg IntraVENous ONCE Bryon Johnson MD        [START ON 1/25/2022] riTUXimab-pvvr (RUXIENCE) 1,000 mg in 0.9% sodium chloride 250 mL infusion  1,000 mg IntraVENous ONCE TITR Bryon Johnson MD           Exam:  Visit Vitals  /76   Pulse 85   Ht 5' 4\" (1.626 m)   Wt 185 lb (83.9 kg)   BMI 31.76 kg/m²     Elderly female laying on stretcher in room in no clear distress. HEENT appears grossly unremarkable. Neck is supple. Cardiopulmonary exams are unremarkable. Abdomen is nondistended, PEG tube not visualized. Extremities are thin with notable muscle atrophy. Neurologically patient appears alert and oriented attention appears intact. Speech is clear, language fluent. Cranial nerves II through XII appear grossly unremarkable lower cranial nerves not examined in depth due to face mask. Motorically patient is essentially 0 out of 5 proximally in the arms 4- out of 5 and forearm flexors, finger flexors are 4+ to 5- out of 5 wrist extension is 4-2 3+ out of 5. Iliopsoas is 3 - to 2 out of 5 bilaterally knee flexion is in the neighborhood of 3 out of 5. Knee extension is difficult to quantify. Unable to dorsiflex ankles with gravity eliminated. Sensation appears grossly intact with hyperesthesia in lower extremities.   Remainder examination is deferred    LABS  Results for orders placed or performed during the hospital encounter of 05/26/21   CBC WITH AUTOMATED DIFF   Result Value Ref Range    WBC 6.4 3.6 - 11.0 K/uL    RBC 5.18 3.80 - 5.20 M/uL    HGB 12.2 11.5 - 16.0 g/dL    HCT 38.1 35.0 - 47.0 %    MCV 73.6 (L) 80.0 - 99.0 FL    MCH 23.6 (L) 26.0 - 34.0 PG    MCHC 32.0 30.0 - 36.5 g/dL    RDW 15.5 (H) 11.5 - 14.5 %    PLATELET 308 250 - 876 K/uL    NRBC 0.0 0  WBC    ABSOLUTE NRBC 0.00 0.00 - 0.01 K/uL    NEUTROPHILS 51 32 - 75 %    LYMPHOCYTES 25 12 - 49 %    MONOCYTES 17 (H) 5 - 13 %    EOSINOPHILS 5 0 - 7 %    BASOPHILS 1 0 - 1 %    IMMATURE GRANULOCYTES 1 (H) 0.0 - 0.5 %    ABS. NEUTROPHILS 3.2 1.8 - 8.0 K/UL    ABS. LYMPHOCYTES 1.6 0.8 - 3.5 K/UL    ABS. MONOCYTES 1.1 (H) 0.0 - 1.0 K/UL    ABS. EOSINOPHILS 0.3 0.0 - 0.4 K/UL    ABS. BASOPHILS 0.1 0.0 - 0.1 K/UL    ABS. IMM. GRANS. 0.1 (H) 0.00 - 0.04 K/UL    DF AUTOMATED     SED RATE (ESR)   Result Value Ref Range    Sed rate, automated 15 0 - 30 mm/hr       IMAGING:  MRI Results (most recent):  Results from Hospital Encounter encounter on 04/30/21    MRI FEMUR RT  WO CONT    Narrative  EXAM:  MRI FEMUR LT WO CONT, MRI FEMUR RT  WO CONT    INDICATION: Polymyositis. COMPARISON: None    TECHNIQUE: Axial, coronal, and sagittal MRI of the bilateral femurs arthrogram  separate studies in the T1 and T2 pulse sequences with and without fat  saturation. Due to patient discomfort, axial imaging of the right femur was not  performed. CONTRAST: None. FINDINGS:    Bone marrow: Moderate degenerative bone marrow edema is in the right femoral  head. No osteonecrosis or osteomyelitis. No fracture, dislocation, or marrow  replacing process. No evidence of stress reaction or periostitis. Joint fluid: Small bilateral hip joint effusions. Tendons: Intact. Muscles: Moderate-severe diffuse muscle atrophy. Hyperintense T2 signal is  greatest in the bilateral adductor longus muscles but also present in the distal  left vastus lateralis muscle and mid and distal right vastus lateralis muscle. Neurovascular bundles: Within normal limits. Articular cartilage: Severe right hip osteoarthritis. Soft tissue mass: None. Impression  Diffuse muscle atrophy and multifocal muscle nonspecific edema, greatest in the  bilateral adductor longus muscles and bilateral vastus lateralis muscles. Severe right hip osteoarthritis.           Assessment:     Thor Donald is a 60-year-old right-hand-dominant female with history of presumptively refractory polymyositis who presents to [facility] neurology clinic for repeat reevaluation    Plan:   Polymyositis:  Patient's history would be suggestive of inflammatory myopathy, inclusion body myositis not excluded based on the EMG report in the system  Muscle biopsy was reviewed appears nonspecific did not demonstrate any findings consistent with metabolic myopathy, mitochondrial myopathy with only chronic perivascular infiltrates noted from 2015  Not 100% sure what the point of today's visit was patient has been seen at multiple [facility] referral center such as [place] and [facility] patient has been refractory or unable to tolerate various medications such as azathioprine, mycophenolate without particular response to Rituxan  Mention that if goal is to confirm diagnosis really would recommend repeat muscle biopsy prior MRI of femur demonstrated nonspecific muscle atrophy and edema, patient is not interested in repeat biopsy  Could also consider sending antibody for inclusion body myositis as recommended by Dr. Kayley Alston otherwise agree with plan to rerefer patient to an academic rheumatologist    Can follow-up as needed    Please call with questions concerns      Signed:  Kaylan Walker MD  1/20/2022  3:09 PM

## 2022-01-25 ENCOUNTER — APPOINTMENT (OUTPATIENT)
Dept: INFUSION THERAPY | Age: 67
End: 2022-01-25

## 2022-01-25 ENCOUNTER — HOSPITAL ENCOUNTER (OUTPATIENT)
Dept: INFUSION THERAPY | Age: 67
Discharge: HOME | End: 2022-01-25
Payer: MEDICARE

## 2022-01-25 VITALS
BODY MASS INDEX: 31.58 KG/M2 | HEART RATE: 69 BPM | TEMPERATURE: 98.6 F | DIASTOLIC BLOOD PRESSURE: 63 MMHG | SYSTOLIC BLOOD PRESSURE: 122 MMHG | WEIGHT: 185 LBS | HEIGHT: 64 IN | OXYGEN SATURATION: 100 % | RESPIRATION RATE: 16 BRPM

## 2022-01-25 PROCEDURE — 96413 CHEMO IV INFUSION 1 HR: CPT

## 2022-01-25 PROCEDURE — 96415 CHEMO IV INFUSION ADDL HR: CPT

## 2022-01-25 PROCEDURE — 74011000258 HC RX REV CODE- 258: Performed by: INTERNAL MEDICINE

## 2022-01-25 PROCEDURE — 74011250637 HC RX REV CODE- 250/637: Performed by: INTERNAL MEDICINE

## 2022-01-25 PROCEDURE — 96375 TX/PRO/DX INJ NEW DRUG ADDON: CPT

## 2022-01-25 PROCEDURE — 74011250636 HC RX REV CODE- 250/636: Performed by: INTERNAL MEDICINE

## 2022-01-25 RX ORDER — OMEPRAZOLE 40 MG/1
40 CAPSULE, DELAYED RELEASE ORAL DAILY
COMMUNITY

## 2022-01-25 RX ORDER — POLYETHYLENE GLYCOL 3350 17 G/17G
17 POWDER, FOR SOLUTION ORAL DAILY
COMMUNITY

## 2022-01-25 RX ORDER — CHOLECALCIFEROL (VITAMIN D3) 125 MCG
1 CAPSULE ORAL DAILY
COMMUNITY

## 2022-01-25 RX ORDER — POLYVINYL ALCOHOL 14 MG/ML
1 SOLUTION/ DROPS OPHTHALMIC AS NEEDED
COMMUNITY

## 2022-01-25 RX ORDER — AMLODIPINE BESYLATE 5 MG/1
5 TABLET ORAL DAILY
COMMUNITY

## 2022-01-25 RX ORDER — SULFAMETHOXAZOLE AND TRIMETHOPRIM 800; 160 MG/1; MG/1
1 TABLET ORAL 2 TIMES DAILY
COMMUNITY

## 2022-01-25 RX ORDER — CHOLECALCIFEROL (VITAMIN D3) 125 MCG
5 CAPSULE ORAL
COMMUNITY

## 2022-01-25 RX ORDER — FOLIC ACID 1 MG/1
1 TABLET ORAL DAILY
COMMUNITY

## 2022-01-25 RX ORDER — MECLIZINE HCL 12.5 MG 12.5 MG/1
12.5 TABLET ORAL
COMMUNITY

## 2022-01-25 RX ADMIN — SODIUM CHLORIDE 1000 MG: 900 INJECTION, SOLUTION INTRAVENOUS at 10:36

## 2022-01-25 RX ADMIN — DIPHENHYDRAMINE HYDROCHLORIDE 25 MG: 50 INJECTION INTRAMUSCULAR; INTRAVENOUS at 10:22

## 2022-01-25 RX ADMIN — ACETAMINOPHEN 650 MG: 650 SOLUTION ORAL at 10:27

## 2022-01-25 RX ADMIN — METHYLPREDNISOLONE SODIUM SUCCINATE 80 MG: 40 INJECTION, POWDER, FOR SOLUTION INTRAMUSCULAR; INTRAVENOUS at 10:30

## 2022-01-25 NOTE — DISCHARGE INSTRUCTIONS
Patient Education   Rituximab (By injection)   Rituximab (mb-ANA-n-mab)  Treats rheumatoid arthritis (RA), Wegener granulomatosis, and microscopic polyangiitis (MPA). Also treats cancer, including lymphoma and leukemia. Brand Name(s): Rituxan   There may be other brand names for this medicine. When This Medicine Should Not Be Used: You should not receive this medicine if you have had an allergic reaction to rituximab or other murine (mice or rat) proteins. How to Use This Medicine:   Injectable  · Medicines used to treat cancer are very strong and can have many side effects. Before receiving this medicine, make sure you understand all the risks and benefits. It is important for you to work closely with your doctor during your treatment. · You will receive this medicine while you are in a hospital or cancer treatment center. A nurse or other trained health professional will give you this medicine. · Your doctor will prescribe your dose and schedule. This medicine is given through a needle placed in a vein. · Rituximab must be given slowly, so the needle will remain in place for a few hours. You may also receive medicines (such as acetaminophen, antihistamines) to help prevent possible allergic reactions to the injection. · You will be watched closely for unwanted side effects while you are receiving this medicine. · This medicine should come with a Medication Guide. Ask your pharmacist for a copy if you do not have one. If a dose is missed:   · Call your doctor or pharmacist for instructions. Drugs and Foods to Avoid:   Ask your doctor or pharmacist before using any other medicine, including over-the-counter medicines, vitamins, and herbal products. · Make sure your doctor knows if you are also receiving cisplatin (Orlin Hasknis Ultramar 112). Tell your doctor if you have taken other medicines to treat rheumatoid arthritis such as adalimumab (Humira®), etanercept (Enbrel®), or infliximab (Remicade®).   · This medicine may interfere with vaccines. Ask your doctor before you get a flu shot or any other vaccines. Some vaccines may be given 4 weeks before a rituximab injection. Warnings While Using This Medicine:   · Make sure your doctor knows if you are pregnant or planning to become pregnant. You must use an effective form of birth control to prevent pregnancy. You should not become pregnant while you are receiving this medicine and for 12 months after stopping it. · Make sure your doctor knows if you are breastfeeding, or if you have kidney disease, liver disease (including hepatitis B), chest pain (angina), heart disease, heart rhythm problems, or lung problems. Also tell your doctor if you have a weak immune system or any type of infection. · Tell your doctor if you have had a reaction to murine (mice or rat) proteins. Murine proteins are also used in other medicines. · Rituximab may cause a serious side effect called an infusion reaction. This can be life-threatening and requires immediate medical attention. Check with your doctor or nurse right away if you have a rash, itching, swelling of the face, tongue, and throat, trouble breathing, or chest pain. · This medicine may cause a serious reaction called tumor lysis syndrome. Call your doctor right away if you have changes in how often you urinate, rapid weight gain, swelling of the feet or lower legs, uneven heartbeat, or seizures. · If you have a severe skin reaction, tell your doctor right away. Symptoms may include blistering, peeling, or loosening of the skin, red skin lesions, severe acne or skin rash, sores or ulcers on the skin, or fever or chills. · This medicine may increase your risk of infections during treatment and up to a year after treatment. These infections can be severe and lead to death. Avoid being near people who are sick. Wash your hands often. Tell your doctor if you have lupus or have ever had an infection that kept coming back.   · Call your doctor right away if you have a cough that won't go away, weight loss, night sweats, fever, chills, flu-like symptoms (such as a runny or stuffy nose, headache, blurred vision, or feeling generally ill), painful or difficult urination, or sores, ulcers, or white spots in the mouth or on the lips. These may be signs of infection. · This medicine may cause a rare and serious brain infection called progressive multifocal leukoencephalopathy (PML). The risk for getting this infection is higher if you have rheumatoid arthritis. Check with your doctor right away if you have more than one of these symptoms: vision changes, loss of coordination, clumsiness, memory loss, difficulty speaking or understanding what others say, and weakness in the legs. · Check with your doctor right away if you have any symptoms of liver problems, including yellow skin and eyes, dark urine or pale stools, nausea and vomiting, or upper stomach pain. · Check with your doctor right away if you have abdominal pain. This medicine could cause serious stomach and bowel problems, such as a bowel obstruction or a hole in your bowel. · Your doctor will do lab tests at regular visits to check on the effects of this medicine. Keep all appointments.   Possible Side Effects While Using This Medicine:   Call your doctor right away if you notice any of these side effects:  · Allergic reaction: Itching or hives, swelling in your face or hands, swelling or tingling in your mouth or throat, chest tightness, trouble breathing  · Blistering, peeling, red skin rash  · Bloody vomit or vomit that looks like coffee grounds, severe stomach pain  · Changes in vision  · Chest pain, uneven heartbeat, sudden fainting  · Confusion, body weakness, shortness of breath, numbness or tingling in your hands, feet, or lips  · Dark-colored urine or pale stools, nausea, vomiting, loss of appetite, pain in your upper stomach, yellow eyes or skin  · Decrease in how much or how often you urinate, pain while urinating  · Dizziness, lightheadedness, drowsiness  · Fever, chills, cough, sore throat, and body aches  · Joint pain, stiffness, or swelling  · Problems with coordination or speech  · Rapid weight gain, swelling in your hands, ankles, or feet  · Sores, ulcers, or white spots in the mouth or on the lips  · Unusual bleeding or bruising  · Unusual tiredness or weakness  If you notice these less serious side effects, talk with your doctor:   · Headache  · Mild skin rash or itching  · Pain, itching, burning, swelling, or a lump under your skin where the needle is placed  If you notice other side effects that you think are caused by this medicine, tell your doctor. Call your doctor for medical advice about side effects. You may report side effects to FDA at 6-863-FDA-4052  © 2017 [facility] Information is for End User's use only and may not be sold, redistributed or otherwise used for commercial purposes. The above information is an  only. It is not intended as medical advice for individual conditions or treatments. Talk to your doctor, nurse or pharmacist before following any medical regimen to see if it is safe and effective for you.

## 2022-01-25 NOTE — PROGRESS NOTES
[facility] Progress Note                                                Date: January 25, 2022       Treatment: Rituximab      Ms. Claudio Hatfield arrived with transport via stretcher in no acute distress at 0910. Patient COVID Screening completed:   Do you have any symptoms of COVID-19? SOB, coughing, fever, or generally not feeling well? NO   Have you been exposed to COVID-19 recently? NO   Have you had any recent contact with family/friend that has a pending COVID test? NO    Assessment was unremarkable with the exception of muscle weakness/limited movement to all extremities and having a PEG tube. Patient unable to take Tylenol pills. Pharmacist changed to oral liquid Tylenol. No other concerns voiced. 24G PIV established in L forearm with positive blood return noted. Problem: Patient Education:  Go to Education Activity  Goal: Patient/Family Education  Outcome: Progressing Towards Goal    Ms. Garner's vitals for today's visit. Patient Vitals for the past 12 hrs:   Temp Pulse Resp BP SpO2   01/25/22 1418 -- 69 -- 122/63 --   01/25/22 1305 -- 65 -- (!) 130/57 --   01/25/22 1205 -- 71 -- 137/65 --   01/25/22 1135 -- 70 -- 138/62 --   01/25/22 1105 98.6 °F (37 °C) 68 16 (!) 139/48 --   01/25/22 0911 98 °F (36.7 °C) 75 16 136/63 100 %       Lab results:  No results found for this or any previous visit (from the past 12 hour(s)).     Medications given:  Medications Administered     acetaminophen (TYLENOL) solution 650 mg     Admin Date  01/25/2022 Action  Given Dose  650 mg Route  Oral Administered By  Melita Rossi RN          diphenhydrAMINE (BENADRYL) injection 25 mg     Admin Date  01/25/2022 Action  Given Dose  25 mg Route  IntraVENous Administered By  Melita Rossi RN          methylPREDNISolone (PF) (SOLU-MEDROL) injection 80 mg     Admin Date  01/25/2022 Action  Given Dose  80 mg Route  IntraVENous Administered By  Melita Rossi RN          riTUXimab-pvvr (RUXIENCE) 1,000 mg in 0.9% sodium chloride 250 mL infusion     Admin Date  01/25/2022 Action  New Bag Dose  1,000 mg Rate  25 mL/hr Route  IntraVENous Administered By  Nader Collado RN           Admin Date  01/25/2022 Action  Rate Change Dose   Rate  50 mL/hr Route  IntraVENous Administered By  Flor Fitzgerald RN           Admin Date  01/25/2022 Action  Rate Change Dose   Rate   Route  IntraVENous Administered By  Flor Fitzgerald RN           Admin Date  01/25/2022 Action  Rate Change Dose   Rate  100 mL/hr Route  IntraVENous Administered By  Flor Fitzgerald RN                [address] [facility] transportation ([phone]), no answer, left message on  that pt would be ready for pickup in 20 minutes. 1405 Infusion complete  1448 Called [facility] transportation again  [facility] 19 regarding transportation  [address] arrived    Ms. Garner tolerated the treatment without complaints. PIV flushed and removed, 2x2 and coban placed. Ms. Deion Huertas was discharged from [place] [address] in stable condition at [phone]. No future appointments.     Master Thomas RN  January 25, 2022  9:37 AM

## 2022-02-09 ENCOUNTER — TELEPHONE (OUTPATIENT)
Dept: NEUROLOGY | Age: 67
End: 2022-02-09

## 2022-02-09 NOTE — TELEPHONE ENCOUNTER
Dr. Mary He, Rheumatologist, left voicemail requesting to speak w/ Dr. Tati Rayo to discuss patient's MRI and EMG.  Please call

## 2022-03-18 PROBLEM — M81.0 AGE-RELATED OSTEOPOROSIS WITHOUT CURRENT PATHOLOGICAL FRACTURE: Status: ACTIVE | Noted: 2018-08-23

## 2022-03-19 PROBLEM — Z90.5 S/P NEPHRECTOMY: Status: ACTIVE | Noted: 2020-05-27

## 2022-03-19 PROBLEM — K56.609 SBO (SMALL BOWEL OBSTRUCTION) (HCC): Status: ACTIVE | Noted: 2020-08-02

## 2022-03-19 PROBLEM — R13.12 OROPHARYNGEAL DYSPHAGIA: Status: ACTIVE | Noted: 2020-06-10

## 2022-03-19 PROBLEM — E55.9 VITAMIN D DEFICIENCY: Status: ACTIVE | Noted: 2018-10-10

## 2022-03-19 PROBLEM — R73.09 ELEVATED GLUCOSE: Status: ACTIVE | Noted: 2017-01-18

## 2022-03-19 PROBLEM — Z85.528 HISTORY OF RENAL CELL CANCER: Status: ACTIVE | Noted: 2020-05-27

## 2022-03-19 PROBLEM — G47.33 OBSTRUCTIVE SLEEP APNEA SYNDROME: Status: ACTIVE | Noted: 2017-01-17

## 2022-06-13 NOTE — ED NOTES
Spoke with Madisyn. She states she stopped the Metoprolol Friday. Her HR Friday 6/10 was 48, Saturday 6/11 was 75 and Sunday 6/12 was 67. She states she got up a 2 am this morning to use the rest room and felt her heart racing. She states she checked her HR and it was 126. She rested for a few minutes, but kept an eye on her HR and it was between 110-120 so she took 25 mg of Metoprolol. States her HR is now currently 72. She states when her pulse was really low on Metoprolol she was lightheaded with position changes and lethargic. States she does not notice the lightheadedness now and feels she is lethargic now d/t not getting any rest over the weekend. Also requested a refill of Multaq, but appears Dr. Carmen Bloom is the renewal provider.    Quita: Patient would like direction on what to do regarding Metoprolol. Any other recommendations?     Pt alert skin warm dry pink, has all personal belongings at bedside, in position of comfort.  NTG connected to low interm suction noted 150 cc greenish drainage in canister

## 2022-09-16 RX ORDER — DIPHENHYDRAMINE HYDROCHLORIDE 50 MG/ML
50 INJECTION, SOLUTION INTRAMUSCULAR; INTRAVENOUS ONCE
Status: COMPLETED | OUTPATIENT
Start: 2022-09-23 | End: 2022-09-23

## 2022-09-23 ENCOUNTER — HOSPITAL ENCOUNTER (OUTPATIENT)
Dept: INFUSION THERAPY | Age: 67
Discharge: HOME | End: 2022-09-23
Payer: MEDICARE

## 2022-09-23 VITALS
HEART RATE: 72 BPM | OXYGEN SATURATION: 99 % | DIASTOLIC BLOOD PRESSURE: 79 MMHG | TEMPERATURE: 98 F | RESPIRATION RATE: 18 BRPM | BODY MASS INDEX: 36.56 KG/M2 | WEIGHT: 213 LBS | SYSTOLIC BLOOD PRESSURE: 151 MMHG

## 2022-09-23 PROCEDURE — 96375 TX/PRO/DX INJ NEW DRUG ADDON: CPT

## 2022-09-23 PROCEDURE — 96415 CHEMO IV INFUSION ADDL HR: CPT

## 2022-09-23 PROCEDURE — 74011000258 HC RX REV CODE- 258: Performed by: INTERNAL MEDICINE

## 2022-09-23 PROCEDURE — 74011000250 HC RX REV CODE- 250

## 2022-09-23 PROCEDURE — 74011250636 HC RX REV CODE- 250/636: Performed by: INTERNAL MEDICINE

## 2022-09-23 PROCEDURE — 74011250637 HC RX REV CODE- 250/637: Performed by: INTERNAL MEDICINE

## 2022-09-23 PROCEDURE — 74011250636 HC RX REV CODE- 250/636

## 2022-09-23 PROCEDURE — 96413 CHEMO IV INFUSION 1 HR: CPT

## 2022-09-23 RX ORDER — SODIUM CHLORIDE 0.9 % (FLUSH) 0.9 %
5-10 SYRINGE (ML) INJECTION AS NEEDED
Status: DISCONTINUED | OUTPATIENT
Start: 2022-09-23 | End: 2022-09-24 | Stop reason: HOSPADM

## 2022-09-23 RX ORDER — SODIUM CHLORIDE 9 MG/ML
25 INJECTION, SOLUTION INTRAVENOUS AS NEEDED
Status: DISCONTINUED | OUTPATIENT
Start: 2022-09-23 | End: 2022-09-24 | Stop reason: HOSPADM

## 2022-09-23 RX ADMIN — SODIUM CHLORIDE, PRESERVATIVE FREE 40 ML: 5 INJECTION INTRAVENOUS at 14:35

## 2022-09-23 RX ADMIN — DIPHENHYDRAMINE HYDROCHLORIDE 50 MG: 50 INJECTION INTRAMUSCULAR; INTRAVENOUS at 09:53

## 2022-09-23 RX ADMIN — SODIUM CHLORIDE 25 ML/HR: 9 INJECTION, SOLUTION INTRAVENOUS at 09:23

## 2022-09-23 RX ADMIN — ACETAMINOPHEN 650 MG: 160 SOLUTION ORAL at 10:40

## 2022-09-23 RX ADMIN — METHYLPREDNISOLONE SODIUM SUCCINATE 125 MG: 125 INJECTION, POWDER, FOR SOLUTION INTRAMUSCULAR; INTRAVENOUS at 09:53

## 2022-09-23 RX ADMIN — SODIUM CHLORIDE 1000 MG: 0.9 INJECTION, SOLUTION INTRAVENOUS at 11:10

## 2022-09-23 NOTE — PROGRESS NOTES
Outpatient Infusion Center Progress Note    Pt admit to [facility] for D1 Rituxan in stable condition. Assessment completed. Patient denied having any symptoms of COVID-19, i.e. SOB, coughing, fever, or generally not feeling well. Also denies having been exposed to COVID-19 recently or having had any recent contact with family/friend that has a pending COVID test.     #24 PIV established L hand w/o issue, with positive blood return. Labs drawn per order and sent. Line flushed, clamped, Curos Cap applied to end clave. 1100-Lab hemolyzed. Attempted re-draw w/o success and pt opted not to be stuck again at this time.      Patient Vitals for the past 12 hrs:   Temp Pulse Resp BP SpO2   09/23/22 1422 98 °F (36.7 °C) 72 18 (!) 151/79 99 %   09/23/22 1240 98.1 °F (36.7 °C) 67 18 (!) 162/71 98 %   09/23/22 1210 98 °F (36.7 °C) 77 18 (!) 143/75 98 %   09/23/22 1140 98.3 °F (36.8 °C) 75 18 (!) 154/71 100 %   09/23/22 0916 98.4 °F (36.9 °C) 70 18 (!) 168/94 97 %        Medications:  Medications Administered       0.9% sodium chloride infusion       Admin Date  09/23/2022 Action  New Bag Dose  25 mL/hr Rate  25 mL/hr Route  IntraVENous Administered By  [employer]              acetaminophen (TYLENOL) solution 650 mg       Admin Date  09/23/2022 Action  Given Dose  650 mg Route  Oral Administered By  Sasha Susan              diphenhydrAMINE (BENADRYL) injection 50 mg       Admin Date  09/23/2022 Action  Given Dose  50 mg Route  IntraVENous Administered By  ArMaestroDevla Susan              methylPREDNISolone (PF) (Solu-MEDROL) injection 125 mg       Admin Date  09/23/2022 Action  Given Dose  125 mg Route  IntraVENous Administered By  Sasha Davis              riTUXimab-pvvr (RUXIENCE) 1,000 mg in 0.9% sodium chloride 250 mL infusion       Admin Date  09/23/2022 Action  New Bag Dose  1,000 mg Route  IntraVENous Administered By  ArMaestroDevla Susan              sodium chloride (NS) flush 5-10 mL       Admin Date  09/23/2022 Action  Given Dose  40 mL Route  IntraVENous Administered By  Mayur Ojeda                   Pt tolerated treatment well. IV maintained positive blood return throughout treatment, flushed with positive blood return at conclusion, and dc'd. CMP redrawn and sent to lab. Notified by lab sample again hemolyzed; pt had been discharged. D/c home in no distress by transport service. Pt aware of next [facility] appointment scheduled for: 10/7/22 at 0900 for D15.      Future Appointments   Date Time Provider Nel Davis   10/7/2022  9:00 AM [facility]

## 2022-09-30 RX ORDER — DIPHENHYDRAMINE HYDROCHLORIDE 50 MG/ML
50 INJECTION, SOLUTION INTRAMUSCULAR; INTRAVENOUS ONCE
Status: COMPLETED | OUTPATIENT
Start: 2022-10-07 | End: 2022-10-07

## 2022-09-30 RX ORDER — ACETAMINOPHEN 160 MG/5ML
650 SOLUTION ORAL ONCE
Status: COMPLETED | OUTPATIENT
Start: 2022-10-07 | End: 2022-10-07

## 2022-10-07 ENCOUNTER — HOSPITAL ENCOUNTER (OUTPATIENT)
Dept: INFUSION THERAPY | Age: 67
Discharge: HOME | End: 2022-10-07
Payer: MEDICARE

## 2022-10-07 VITALS
TEMPERATURE: 98.4 F | SYSTOLIC BLOOD PRESSURE: 159 MMHG | OXYGEN SATURATION: 100 % | DIASTOLIC BLOOD PRESSURE: 86 MMHG | RESPIRATION RATE: 18 BRPM | HEART RATE: 78 BPM

## 2022-10-07 PROCEDURE — 96413 CHEMO IV INFUSION 1 HR: CPT

## 2022-10-07 PROCEDURE — 74011250637 HC RX REV CODE- 250/637: Performed by: INTERNAL MEDICINE

## 2022-10-07 PROCEDURE — 74011000258 HC RX REV CODE- 258: Performed by: INTERNAL MEDICINE

## 2022-10-07 PROCEDURE — 74011250636 HC RX REV CODE- 250/636: Performed by: INTERNAL MEDICINE

## 2022-10-07 PROCEDURE — 96375 TX/PRO/DX INJ NEW DRUG ADDON: CPT

## 2022-10-07 PROCEDURE — 96415 CHEMO IV INFUSION ADDL HR: CPT

## 2022-10-07 RX ADMIN — METHYLPREDNISOLONE SODIUM SUCCINATE 125 MG: 125 INJECTION, POWDER, FOR SOLUTION INTRAMUSCULAR; INTRAVENOUS at 10:07

## 2022-10-07 RX ADMIN — SODIUM CHLORIDE 1000 MG: 9 INJECTION, SOLUTION INTRAVENOUS at 10:50

## 2022-10-07 RX ADMIN — DIPHENHYDRAMINE HYDROCHLORIDE 50 MG: 50 INJECTION, SOLUTION INTRAMUSCULAR; INTRAVENOUS at 10:05

## 2022-10-07 RX ADMIN — ACETAMINOPHEN ORAL SOLUTION 650 MG: 650 SOLUTION ORAL at 10:17

## 2022-10-07 NOTE — PROGRESS NOTES
[facility] [place] Progress Note    Date: 2022    Name: Kehinde Bowens    MRN: [id number]         : 1955    Ms. Garner Arrived via wheelcahir and in no distress for Rituxan Day 15. Assessment was completed, no acute issues at this time, no new complaints voiced. 24 gauge Iv started in left hand (after two unsuccessful attempts) by Gab Fernandez RN. Labs drawn and sent for processing. Ms. Christy Hutchison vitals were reviewed.   Patient Vitals for the past 12 hrs:   Temp Pulse Resp BP SpO2   10/07/22 1405 -- 78 18 (!) 159/86 100 %   10/07/22 1225 -- 67 18 (!) 151/73 --   10/07/22 1151 -- 68 18 (!) 150/75 100 %   10/07/22 1121 -- 64 18 (!) 164/84 98 %   10/07/22 0909 98.4 °F (36.9 °C) 68 18 (!) 154/79 99 %     Medications:  Medications Administered       acetaminophen (TYLENOL) solution 650 mg       Admin Date  10/07/2022 Action  Given Dose  650 mg Route  Oral Administered By  Marky Rausch RN              diphenhydrAMINE (BENADRYL) injection 50 mg       Admin Date  10/07/2022 Action  Given Dose  50 mg Route  IntraVENous Administered By  Marky Rausch RN              methylPREDNISolone (PF) (Solu-MEDROL) injection 125 mg       Admin Date  10/07/2022 Action  Given Dose  125 mg Route  IntraVENous Administered By  Marky Rausch RN              riTUXimab-pvvr (RUXIENCE) 1,000 mg in 0.9% sodium chloride 250 mL infusion       Admin Date  10/07/2022 Action  New Bag Dose  1,000 mg Rate   Route  IntraVENous Administered By  Marky Rausch RN               Admin Date  10/07/2022 Action  Rate Change Dose   Rate  50 mL/hr Route  IntraVENous Administered By  Marky Rausch RN               Admin Date  10/07/2022 Action  Rate Change Dose   Rate  75 mL/hr Route  IntraVENous Administered By  Marky Rausch RN               Admin Date  10/07/2022 Action  Rate Change Dose   Rate  100 mL/hr Route  IntraVENous Administered By  Marky Rausch RN                    Two nurses verified prior to administering: Drug name, drug dose, infusion volume or drug volume when prepared in a syringe, rate of administration, route of administration,  expiration dates and/or times, appearance and physical integrity of the drugs, rate set on infusion pump, when used sequencing of drug administration. CMP drawn; per lab sample hemolyzed. Unable to obtain additional sample. Ms. Alejandro Schroeder tolerated treatment well and was discharged from [place] [address] in stable condition at 1410. She is to follow-up with referring provider regarding  her next appointment.     Susi Morales RN  October 7, 2022

## 2022-11-22 NOTE — PROGRESS NOTES
Pharmacy Automatic Renal Dosing Protocol - Antimicrobials    Indication for Antimicrobials: HAP or CAP     Current Regimen of Each Antimicrobial:  Azithromycin 500 mg IV daily (Start Date 11/15; Day # 2)  Cefepime 2 gm IV every 8 hours (Start Date 11/15; Day # 2)  Vancomycin 1500 mg IV x 1 dose then 750 mg Q12H (Start Date 11/15; Day # 2)    Significant Cultures:   11/15 Blood - NG - Prelim    Radiology / Imaging results: (X-ray, CT scan or MRI):   11/15 chest xray: Persistent patchy bilateral airspace disease    Paralysis, amputations, malnutrition:     Labs:  Recent Labs     18  0316 11/15/18  1747   CREA 0.91 1.00   BUN 31* 33*   WBC 8.3 11.3*     Temp (24hrs), Av.3 °F (36.8 °C), Min:97.8 °F (36.6 °C), Max:98.8 °F (37.1 °C)    Creatinine Clearance (mL/min) or Dialysis: 51.8 ml/min    Impression/Plan:   · Vancomycin 1500 mg x 1 then 750 mg Q12H with anticipated trough 16.4 mcg/ml ()  · Continue azithromcyin 500 mg IV daily  · Change cefepime dose back to 2 gm IV q8h  due to improved  renal function  · BMP daily. · Trough before 0800 tomorrow. · Antimicrobial stop date: to be determined     Pharmacy will follow daily and adjust medications as appropriate for renal function and/or serum levels. Thank you,  Chevy Campos PHARMD    Recommended duration of therapy  [email or web address]/Pharmacy/Clinical%20Companion/[email or web address]. docx    Renal Dosing  [email or web address]/[place]/[facility]/Pharmacy/Clinical%20Companion/[email or web address]. pdf H

## 2024-04-26 ENCOUNTER — HOSPITAL ENCOUNTER (EMERGENCY)
Facility: HOSPITAL | Age: 69
Discharge: HOME | End: 2024-04-26
Attending: EMERGENCY MEDICINE
Payer: MEDICARE

## 2024-04-26 VITALS
HEIGHT: 64 IN | DIASTOLIC BLOOD PRESSURE: 76 MMHG | SYSTOLIC BLOOD PRESSURE: 160 MMHG | WEIGHT: 213 LBS | TEMPERATURE: 97.7 F | HEART RATE: 74 BPM | RESPIRATION RATE: 18 BRPM | OXYGEN SATURATION: 98 % | BODY MASS INDEX: 36.37 KG/M2

## 2024-04-26 DIAGNOSIS — B37.2 CUTANEOUS CANDIDIASIS: Primary | ICD-10-CM

## 2024-04-26 PROCEDURE — 99283 EMERGENCY DEPT VISIT LOW MDM: CPT

## 2024-04-26 RX ORDER — NYSTATIN 100000 U/G
OINTMENT TOPICAL
Qty: 30 G | Refills: 0 | Status: SHIPPED | OUTPATIENT
Start: 2024-04-26

## 2024-04-26 RX ORDER — NYSTATIN 100000 [USP'U]/G
POWDER TOPICAL
Qty: 30 G | Refills: 0 | Status: SHIPPED | OUTPATIENT
Start: 2024-04-26

## 2024-04-26 ASSESSMENT — LIFESTYLE VARIABLES
HOW MANY STANDARD DRINKS CONTAINING ALCOHOL DO YOU HAVE ON A TYPICAL DAY: PATIENT DOES NOT DRINK
HOW OFTEN DO YOU HAVE A DRINK CONTAINING ALCOHOL: NEVER

## 2024-04-26 ASSESSMENT — PAIN - FUNCTIONAL ASSESSMENT: PAIN_FUNCTIONAL_ASSESSMENT: 0-10

## 2024-04-26 ASSESSMENT — PAIN SCALES - GENERAL: PAINLEVEL_OUTOF10: 8

## 2024-04-26 ASSESSMENT — PAIN DESCRIPTION - PAIN TYPE: TYPE: ACUTE PAIN

## 2024-04-26 ASSESSMENT — PAIN DESCRIPTION - ORIENTATION: ORIENTATION: MID

## 2024-04-26 ASSESSMENT — PAIN DESCRIPTION - LOCATION: LOCATION: CHEST

## 2024-04-26 ASSESSMENT — PAIN DESCRIPTION - DESCRIPTORS: DESCRIPTORS: BURNING

## 2024-04-26 NOTE — DISCHARGE INSTRUCTIONS
Your rash on your chest is most consistent with Candida (yeast) infection of the skin.  There is a similar to a diaper rash, and can be treated by keeping the area clean and dry, and applying nystatin ointment 2-3 times daily for at least a couple weeks until the rash resolves.  You can also apply it under the breasts or anywhere else where you develop yeast infections.  In areas that remain moist such as under the breast you can use the powder formulation instead.

## 2024-04-26 NOTE — ED TRIAGE NOTES
BIBEMS from [facility] for wound on chest that is not healing. Sts appeared a couple weeks ago and is not getting any better. Denies any fevers. Pt has medical condition where she has limited mobility in arms. Has not received treatment for wounds at facility. Pt is alert, oriented, speaking in clear sentences, afebrile. Large red raw wound in center of chest with clear drainage

## 2025-01-23 NOTE — DISCHARGE INSTRUCTIONS
Patient Education        Preventing Rabies Infection: Care Instructions  Your Care Instructions    Rabies is a disease caused by a virus that can affect the brain and nervous system. You can get rabies when you are exposed to an animal that has rabies. This can happen through a bite, scratch, or other contact. Your doctor can use two medicines to help your body fight the virus before it causes an infection. One is rabies immunoglobulin. It works by giving your body a type of protein called an antibody to stop the rabies virus. The other medicine is the rabies vaccine. It helps your body produce its own protection against the rabies virus. When you get these shots before serious symptoms appear, you should not get infected with rabies. Your doctor will give you a shot schedule. Make sure that you do not miss any doses. You need to get all the doses for the rabies vaccine to work. If you are exposed and have not been vaccinated against rabies, you should get 4 doses of rabies vaccine. · Get 1 dose right away. You should also get a rabies immunoglobulin shot when you get the first dose. · Get more doses on the 3rd, 7th, and 14th days. If you're exposed and have been vaccinated before, you should get 2 doses of rabies vaccine. · Get 1 dose right away. In this case, you do not need rabies immunoglobulin. · Get another on the 3rd day. You might also get a shot to prevent rabies if you handle animals often. Or you may get one if you plan to travel to places where rabies is a risk. Follow-up care is a key part of your treatment and safety. Be sure to make and go to all appointments, and call your doctor if you are having problems. It's also a good idea to know your test results and keep a list of the medicines you take. How can you care for yourself at home? · Do not drive or use machines if the rabies vaccine makes you dizzy. · Both types of rabies shots may cause fever.  And both may cause pain or stiffness where you got the shot. If your doctor recommends it, take an over-the-counter pain medicine, such as acetaminophen (Tylenol), ibuprofen (Advil, Motrin), or naproxen (Aleve), as needed. Read and follow all instructions on the label. · Do not take two or more pain medicines at the same time unless the doctor told you to. Many pain medicines have acetaminophen, which is Tylenol. Too much acetaminophen (Tylenol) can be harmful. To prevent contact with rabies  · Make sure your dog, cat, or ferret gets the rabies vaccine. · Avoid all contact with bats. · Never touch or try to pet or catch wild animals. This includes raccoons, skunks, foxes, and coyotes. · Secure trash and other items that attract animals. · Secure open areas of your home. Close off pet doors, chimneys, unscreened windows, or any place that wild or stray animals could get in. · Never handle a dead or wounded animal.  To take care of an animal bite  · Wash any animal bite or area of exposure right away. Use soap and water. · Call your doctor to find out how to care for your wound. · If the animal is a dog, cat, or pet ferret, try to find and contact the owner. If you can't find the owner, call the local animal control to safely catch the animal.  · If the animal is wild, do not try to catch or kill it. Find out what type of animal it is. Note whether it is acting normal. Report it to the local animal control. · Contact the local or state health department to report a bite or a severe scratch from an animal.  · Ask your doctor if you need a tetanus shot. When should you call for help? Watch closely for changes in your health, and be sure to contact your doctor if you have any problems. Where can you learn more? Go to http://brijesh-marichuy.info/. Enter U105 in the search box to learn more about \"Preventing Rabies Infection: Care Instructions. \"  Current as of: July 30, 2018  Content Version: 12.1  © 1818-8144 [employer]. Care instructions adapted under license by [employer] (which disclaims liability or warranty for this information). If you have questions about a medical condition or this instruction, always ask your healthcare professional. [address] any warranty or liability for your use of this information. [Negative] : Heme/Lymph

## (undated) DEVICE — STERILE POLYISOPRENE POWDER-FREE SURGICAL GLOVES: Brand: PROTEXIS

## (undated) DEVICE — INFECTION CONTROL KIT SYS

## (undated) DEVICE — SUTURE VCRL SZ 4-0 L27IN ABSRB VLT L26MM SH 1/2 CIR J315H

## (undated) DEVICE — BINDER ABD H12IN FOR 30-45IN WAIST UNIV 4 PNL PREM DSGN E

## (undated) DEVICE — 450 ML BOTTLE OF 0.05% CHLORHEXIDINE GLUCONATE IN 99.95% STERILE WATER FOR IRRIGATION, USP AND APPLICATOR.: Brand: IRRISEPT ANTIMICROBIAL WOUND LAVAGE

## (undated) DEVICE — TOTAL TRAY, 16FR 10ML SIL FOLEY, URN: Brand: MEDLINE

## (undated) DEVICE — STRAP,POSITIONING,KNEE/BODY,FOAM,4X60": Brand: MEDLINE

## (undated) DEVICE — SOLUTION IRRIG 1000ML H2O STRL BLT

## (undated) DEVICE — STAPLER INT L60MM REG TISS BLU B FRM 8 FIRING 2 ROW AUTO

## (undated) DEVICE — KIT,1200CC CANISTER,3/16"X6' TUBING: Brand: MEDLINE INDUSTRIES, INC.

## (undated) DEVICE — BITE BLK ENDOSCP AD 54FR GRN POLYETH ENDOSCP W STRP SLD

## (undated) DEVICE — TAPE,CLOTH/SILK,CURAD,3"X10YD,LF,40/CS: Brand: CURAD

## (undated) DEVICE — DBD-PACK,LAPAROTOMY,2 REINFORCED GOWNS: Brand: MEDLINE

## (undated) DEVICE — Device

## (undated) DEVICE — SUTURE VCRL SZ 0 L36IN ABSRB VLT L36MM CT-1 1/2 CIR J346H

## (undated) DEVICE — DUAL LUMEN STOMACH TUBE MULTI-FUNCTIONAL PORT: Brand: SALEM SUMP

## (undated) DEVICE — SUTURE PDS II SZ 1 L36IN ABSRB VLT L48MM CTX 1/2 CIR Z371T

## (undated) DEVICE — CLIP LIG L TI MTL LIG SYS 24 COUNT HORZ

## (undated) DEVICE — REM POLYHESIVE ADULT PATIENT RETURN ELECTRODE: Brand: VALLEYLAB

## (undated) DEVICE — CURVED, LARGE JAW, OPEN SEALER/DIVIDER NANO-COATED: Brand: LIGASURE IMPACT

## (undated) DEVICE — SYR 10ML LUER LOK 1/5ML GRAD --

## (undated) DEVICE — SUTURE CHROMIC GUT SZ 2-0 L27IN ABSRB BRN L26MM SH 1/2 CIR G123H

## (undated) DEVICE — CANISTER, RIGID, 3000CC: Brand: MEDLINE INDUSTRIES, INC.

## (undated) DEVICE — YANKAUER,POOLE TIP,STERILE,50/CS: Brand: MEDLINE

## (undated) DEVICE — SUTURE VCRL SZ 3-0 L18IN ABSRB VLT L22MM SH-1 1/2 CIR J772D

## (undated) DEVICE — SURGICAL PROCEDURE PACK BASIN MAJ SET CUST NO CAUT

## (undated) DEVICE — TOWEL 4 PLY TISS 19X30 SUE WHT

## (undated) DEVICE — STAPLER INT L75MM CUT LN L73MM STPL LN L77MM BLU B FRM 8

## (undated) DEVICE — SUTURE SZ 0 27IN 5/8 CIR UR-6  TAPER PT VIOLET ABSRB VICRYL J603H

## (undated) DEVICE — DRESSING,STRATASORB,COMP,ISLAND,6"X7.5": Brand: MEDLINE

## (undated) DEVICE — HANDLE LT SNAP ON ULT DURABLE LENS FOR TRUMPF ALC DISPOSABLE

## (undated) DEVICE — GARMENT,MEDLINE,DVT,INT,CALF,MED, GEN2: Brand: MEDLINE

## (undated) DEVICE — OPTIFOAM GENTLE SA, POSTOP, 4X8: Brand: MEDLINE

## (undated) DEVICE — RELOAD STPL L75MM OPN H3.8MM CLS 1.5MM WIRE DIA0.2MM REG

## (undated) DEVICE — SOLUTION IV 1000ML 0.9% SOD CHL

## (undated) DEVICE — YANKAUER BULB TIP, NO VENT: Brand: ARGYLE

## (undated) DEVICE — NDL PRT INJ NSAF BLNT 18GX1.5 --

## (undated) DEVICE — ROCKER SWITCH PENCIL BLADE ELECTRODE, HOLSTER: Brand: EDGE